# Patient Record
Sex: MALE | Race: WHITE | NOT HISPANIC OR LATINO | Employment: OTHER
[De-identification: names, ages, dates, MRNs, and addresses within clinical notes are randomized per-mention and may not be internally consistent; named-entity substitution may affect disease eponyms.]

---

## 2017-02-02 ENCOUNTER — RECORDS - HEALTHEAST (OUTPATIENT)
Dept: ADMINISTRATIVE | Facility: OTHER | Age: 62
End: 2017-02-02

## 2017-02-02 ENCOUNTER — COMMUNICATION - HEALTHEAST (OUTPATIENT)
Dept: INTERNAL MEDICINE | Facility: CLINIC | Age: 62
End: 2017-02-02

## 2017-02-07 ENCOUNTER — COMMUNICATION - HEALTHEAST (OUTPATIENT)
Dept: INTERNAL MEDICINE | Facility: CLINIC | Age: 62
End: 2017-02-07

## 2017-04-25 ENCOUNTER — RECORDS - HEALTHEAST (OUTPATIENT)
Dept: ADMINISTRATIVE | Facility: OTHER | Age: 62
End: 2017-04-25

## 2017-05-30 ENCOUNTER — RECORDS - HEALTHEAST (OUTPATIENT)
Dept: ADMINISTRATIVE | Facility: OTHER | Age: 62
End: 2017-05-30

## 2017-06-30 ENCOUNTER — HOSPITAL ENCOUNTER (OUTPATIENT)
Dept: CT IMAGING | Facility: HOSPITAL | Age: 62
Discharge: HOME | End: 2017-06-30
Attending: INTERNAL MEDICINE

## 2017-06-30 ENCOUNTER — OFFICE VISIT - HEALTHEAST (OUTPATIENT)
Dept: INTERNAL MEDICINE | Facility: CLINIC | Age: 62
End: 2017-06-30

## 2017-06-30 DIAGNOSIS — I25.5 ISCHEMIC CARDIOMYOPATHY: ICD-10-CM

## 2017-06-30 DIAGNOSIS — R10.12 LEFT UPPER QUADRANT PAIN: ICD-10-CM

## 2017-06-30 DIAGNOSIS — I25.10 ASCVD (ARTERIOSCLEROTIC CARDIOVASCULAR DISEASE): ICD-10-CM

## 2017-06-30 DIAGNOSIS — E11.42 DIABETIC PERIPHERAL NEUROPATHY ASSOCIATED WITH TYPE 2 DIABETES MELLITUS (H): ICD-10-CM

## 2017-06-30 DIAGNOSIS — I10 ESSENTIAL HYPERTENSION: ICD-10-CM

## 2017-06-30 ASSESSMENT — MIFFLIN-ST. JEOR: SCORE: 1803.23

## 2017-07-03 ENCOUNTER — COMMUNICATION - HEALTHEAST (OUTPATIENT)
Dept: INTERNAL MEDICINE | Facility: CLINIC | Age: 62
End: 2017-07-03

## 2017-07-03 DIAGNOSIS — K83.8 DILATED BILE DUCT: ICD-10-CM

## 2017-07-12 ENCOUNTER — HOSPITAL ENCOUNTER (OUTPATIENT)
Dept: MRI IMAGING | Facility: HOSPITAL | Age: 62
Discharge: HOME | End: 2017-07-12
Attending: INTERNAL MEDICINE

## 2017-07-12 DIAGNOSIS — K83.8 DILATED BILE DUCT: ICD-10-CM

## 2017-07-17 ENCOUNTER — COMMUNICATION - HEALTHEAST (OUTPATIENT)
Dept: INTERNAL MEDICINE | Facility: CLINIC | Age: 62
End: 2017-07-17

## 2017-07-17 DIAGNOSIS — K83.8 DILATED BILE DUCT: ICD-10-CM

## 2017-09-01 ENCOUNTER — RECORDS - HEALTHEAST (OUTPATIENT)
Dept: ADMINISTRATIVE | Facility: OTHER | Age: 62
End: 2017-09-01

## 2017-09-10 ENCOUNTER — COMMUNICATION - HEALTHEAST (OUTPATIENT)
Dept: INTERNAL MEDICINE | Facility: CLINIC | Age: 62
End: 2017-09-10

## 2017-09-14 ENCOUNTER — RECORDS - HEALTHEAST (OUTPATIENT)
Dept: ADMINISTRATIVE | Facility: OTHER | Age: 62
End: 2017-09-14

## 2017-09-19 ENCOUNTER — RECORDS - HEALTHEAST (OUTPATIENT)
Dept: ADMINISTRATIVE | Facility: OTHER | Age: 62
End: 2017-09-19

## 2017-10-02 ENCOUNTER — OFFICE VISIT - HEALTHEAST (OUTPATIENT)
Dept: INTERNAL MEDICINE | Facility: CLINIC | Age: 62
End: 2017-10-02

## 2017-10-02 DIAGNOSIS — E11.9 TYPE 2 DIABETES MELLITUS WITH INSULIN THERAPY (H): ICD-10-CM

## 2017-10-02 DIAGNOSIS — L40.9 PSORIASIS: ICD-10-CM

## 2017-10-02 DIAGNOSIS — I25.5 ISCHEMIC CARDIOMYOPATHY: ICD-10-CM

## 2017-10-02 DIAGNOSIS — N52.9 ERECTILE DYSFUNCTION: ICD-10-CM

## 2017-10-02 DIAGNOSIS — I25.10 ASCVD (ARTERIOSCLEROTIC CARDIOVASCULAR DISEASE): ICD-10-CM

## 2017-10-02 DIAGNOSIS — Z51.81 MEDICATION MONITORING ENCOUNTER: ICD-10-CM

## 2017-10-02 DIAGNOSIS — I10 ESSENTIAL HYPERTENSION: ICD-10-CM

## 2017-10-02 DIAGNOSIS — Z79.4 TYPE 2 DIABETES MELLITUS WITH INSULIN THERAPY (H): ICD-10-CM

## 2017-10-02 DIAGNOSIS — G47.33 OBSTRUCTIVE SLEEP APNEA ON CPAP: ICD-10-CM

## 2017-10-02 DIAGNOSIS — Z00.00 ROUTINE GENERAL MEDICAL EXAMINATION AT A HEALTH CARE FACILITY: ICD-10-CM

## 2017-10-02 DIAGNOSIS — K83.8 DILATED BILE DUCT: ICD-10-CM

## 2017-10-02 DIAGNOSIS — L40.50 PSORIATIC ARTHRITIS (H): ICD-10-CM

## 2017-10-02 DIAGNOSIS — E78.00 HYPERCHOLESTEROLEMIA: ICD-10-CM

## 2017-10-02 DIAGNOSIS — M47.816 SPONDYLOSIS OF LUMBAR REGION WITHOUT MYELOPATHY OR RADICULOPATHY: ICD-10-CM

## 2017-10-02 DIAGNOSIS — F17.201 TOBACCO ABUSE, IN REMISSION: ICD-10-CM

## 2017-10-02 DIAGNOSIS — N28.89 RIGHT RENAL MASS: ICD-10-CM

## 2017-10-02 DIAGNOSIS — E29.1 HYPOGONADISM IN MALE: ICD-10-CM

## 2017-10-02 DIAGNOSIS — Z23 NEED FOR IMMUNIZATION AGAINST INFLUENZA: ICD-10-CM

## 2017-10-02 DIAGNOSIS — E11.42 DIABETIC PERIPHERAL NEUROPATHY ASSOCIATED WITH TYPE 2 DIABETES MELLITUS (H): ICD-10-CM

## 2017-10-02 LAB
CHOLEST SERPL-MCNC: 198 MG/DL
FASTING STATUS PATIENT QL REPORTED: YES
HBA1C MFR BLD: 7 % (ref 3.5–6)
HCV AB SERPL QL IA: NEGATIVE
HDLC SERPL-MCNC: 41 MG/DL
LDLC SERPL CALC-MCNC: 137 MG/DL
PSA SERPL-MCNC: 0.8 NG/ML (ref 0–4.5)
TRIGL SERPL-MCNC: 99 MG/DL

## 2017-10-02 ASSESSMENT — MIFFLIN-ST. JEOR: SCORE: 1807.77

## 2017-10-04 ENCOUNTER — COMMUNICATION - HEALTHEAST (OUTPATIENT)
Dept: INTERNAL MEDICINE | Facility: CLINIC | Age: 62
End: 2017-10-04

## 2017-10-04 ENCOUNTER — HOSPITAL ENCOUNTER (OUTPATIENT)
Dept: ULTRASOUND IMAGING | Facility: HOSPITAL | Age: 62
Discharge: HOME | End: 2017-10-04
Attending: INTERNAL MEDICINE

## 2017-10-04 DIAGNOSIS — N28.89 RIGHT RENAL MASS: ICD-10-CM

## 2017-10-23 ENCOUNTER — COMMUNICATION - HEALTHEAST (OUTPATIENT)
Dept: ENDOCRINOLOGY | Facility: CLINIC | Age: 62
End: 2017-10-23

## 2017-10-23 DIAGNOSIS — L40.50 PSORIATIC ARTHRITIS (H): ICD-10-CM

## 2017-10-31 ENCOUNTER — RECORDS - HEALTHEAST (OUTPATIENT)
Dept: ADMINISTRATIVE | Facility: OTHER | Age: 62
End: 2017-10-31

## 2017-10-31 ENCOUNTER — HOSPITAL ENCOUNTER (OUTPATIENT)
Dept: CT IMAGING | Facility: HOSPITAL | Age: 62
Discharge: HOME | End: 2017-10-31
Attending: UROLOGY

## 2017-10-31 DIAGNOSIS — N28.89 RENAL MASS: ICD-10-CM

## 2017-10-31 DIAGNOSIS — D49.59 NEOPLASM OF UNSPECIFIED BEHAVIOR OF OTHER GENITOURINARY ORGAN: ICD-10-CM

## 2017-11-09 ENCOUNTER — OFFICE VISIT - HEALTHEAST (OUTPATIENT)
Dept: RHEUMATOLOGY | Facility: CLINIC | Age: 62
End: 2017-11-09

## 2017-11-09 DIAGNOSIS — M43.28 ANKYLOSIS, SACROILIAC JOINT: ICD-10-CM

## 2017-11-09 DIAGNOSIS — M47.816 SPONDYLOSIS OF LUMBAR REGION WITHOUT MYELOPATHY OR RADICULOPATHY: ICD-10-CM

## 2017-11-09 DIAGNOSIS — L40.50 PSORIATIC ARTHRITIS (H): ICD-10-CM

## 2017-11-09 ASSESSMENT — MIFFLIN-ST. JEOR: SCORE: 1824.55

## 2017-11-10 LAB — HBV SURFACE AG SERPL QL IA: NEGATIVE

## 2017-11-16 ENCOUNTER — COMMUNICATION - HEALTHEAST (OUTPATIENT)
Dept: INTERNAL MEDICINE | Facility: CLINIC | Age: 62
End: 2017-11-16

## 2017-11-16 DIAGNOSIS — L40.50 PSORIATIC ARTHRITIS (H): ICD-10-CM

## 2017-11-17 ENCOUNTER — COMMUNICATION - HEALTHEAST (OUTPATIENT)
Dept: RHEUMATOLOGY | Facility: CLINIC | Age: 62
End: 2017-11-17

## 2017-11-17 DIAGNOSIS — L40.50 PSORIATIC ARTHRITIS (H): ICD-10-CM

## 2017-12-26 ENCOUNTER — OFFICE VISIT - HEALTHEAST (OUTPATIENT)
Dept: INTERNAL MEDICINE | Facility: CLINIC | Age: 62
End: 2017-12-26

## 2017-12-26 DIAGNOSIS — N28.89 RIGHT RENAL MASS: ICD-10-CM

## 2017-12-26 DIAGNOSIS — I25.5 ISCHEMIC CARDIOMYOPATHY: ICD-10-CM

## 2017-12-26 DIAGNOSIS — I10 ESSENTIAL HYPERTENSION: ICD-10-CM

## 2017-12-26 DIAGNOSIS — Z79.4 TYPE 2 DIABETES MELLITUS WITH INSULIN THERAPY (H): ICD-10-CM

## 2017-12-26 DIAGNOSIS — L40.50 PSORIATIC ARTHRITIS (H): ICD-10-CM

## 2017-12-26 DIAGNOSIS — R19.7 ACUTE DIARRHEA: ICD-10-CM

## 2017-12-26 DIAGNOSIS — G47.33 OBSTRUCTIVE SLEEP APNEA ON CPAP: ICD-10-CM

## 2017-12-26 DIAGNOSIS — E11.9 TYPE 2 DIABETES MELLITUS WITH INSULIN THERAPY (H): ICD-10-CM

## 2017-12-26 DIAGNOSIS — I25.10 ASCVD (ARTERIOSCLEROTIC CARDIOVASCULAR DISEASE): ICD-10-CM

## 2017-12-26 DIAGNOSIS — F32.A DEPRESSION: ICD-10-CM

## 2017-12-26 DIAGNOSIS — Z01.818 PRE-OP EXAM: ICD-10-CM

## 2017-12-26 LAB
ATRIAL RATE - MUSE: 71 BPM
DIASTOLIC BLOOD PRESSURE - MUSE: NORMAL MMHG
INTERPRETATION ECG - MUSE: NORMAL
P AXIS - MUSE: 62 DEGREES
PR INTERVAL - MUSE: 156 MS
QRS DURATION - MUSE: 82 MS
QT - MUSE: 368 MS
QTC - MUSE: 399 MS
R AXIS - MUSE: 56 DEGREES
SYSTOLIC BLOOD PRESSURE - MUSE: NORMAL MMHG
T AXIS - MUSE: 182 DEGREES
VENTRICULAR RATE- MUSE: 71 BPM

## 2017-12-26 ASSESSMENT — MIFFLIN-ST. JEOR: SCORE: 1794.16

## 2017-12-28 ENCOUNTER — COMMUNICATION - HEALTHEAST (OUTPATIENT)
Dept: INTERNAL MEDICINE | Facility: CLINIC | Age: 62
End: 2017-12-28

## 2017-12-29 ENCOUNTER — COMMUNICATION - HEALTHEAST (OUTPATIENT)
Dept: INTERNAL MEDICINE | Facility: CLINIC | Age: 62
End: 2017-12-29

## 2018-01-08 ENCOUNTER — RECORDS - HEALTHEAST (OUTPATIENT)
Dept: ADMINISTRATIVE | Facility: OTHER | Age: 63
End: 2018-01-08

## 2018-01-10 ENCOUNTER — SURGERY - HEALTHEAST (OUTPATIENT)
Dept: SURGERY | Facility: HOSPITAL | Age: 63
End: 2018-01-10

## 2018-01-10 ENCOUNTER — ANESTHESIA - HEALTHEAST (OUTPATIENT)
Dept: SURGERY | Facility: HOSPITAL | Age: 63
End: 2018-01-10

## 2018-01-10 ASSESSMENT — MIFFLIN-ST. JEOR: SCORE: 1619.07

## 2018-01-11 ASSESSMENT — MIFFLIN-ST. JEOR: SCORE: 1805.96

## 2018-01-16 ENCOUNTER — COMMUNICATION - HEALTHEAST (OUTPATIENT)
Dept: INTERNAL MEDICINE | Facility: CLINIC | Age: 63
End: 2018-01-16

## 2018-01-18 ENCOUNTER — RECORDS - HEALTHEAST (OUTPATIENT)
Dept: ADMINISTRATIVE | Facility: OTHER | Age: 63
End: 2018-01-18

## 2018-01-23 ENCOUNTER — RECORDS - HEALTHEAST (OUTPATIENT)
Dept: ADMINISTRATIVE | Facility: OTHER | Age: 63
End: 2018-01-23

## 2018-05-04 ENCOUNTER — HOSPITAL ENCOUNTER (OUTPATIENT)
Dept: CT IMAGING | Facility: HOSPITAL | Age: 63
Discharge: HOME | End: 2018-05-04
Attending: UROLOGY

## 2018-05-04 DIAGNOSIS — C64.9 KIDNEY CANCER, PRIMARY, WITH METASTASIS FROM KIDNEY TO OTHER SITE (H): ICD-10-CM

## 2018-05-04 DIAGNOSIS — C79.9 SECONDARY MALIGNANT NEOPLASM OF UNSPECIFIED SITE (CODE) (H): ICD-10-CM

## 2018-05-04 LAB
CREAT BLD-MCNC: 0.9 MG/DL
POC GFR AMER AF HE - HISTORICAL: >60 ML/MIN/1.73M2
POC GFR NON AMER AF HE - HISTORICAL: >60 ML/MIN/1.73M2

## 2018-05-24 ENCOUNTER — RECORDS - HEALTHEAST (OUTPATIENT)
Dept: ADMINISTRATIVE | Facility: OTHER | Age: 63
End: 2018-05-24

## 2018-07-18 ENCOUNTER — COMMUNICATION - HEALTHEAST (OUTPATIENT)
Dept: RHEUMATOLOGY | Facility: CLINIC | Age: 63
End: 2018-07-18

## 2018-07-18 DIAGNOSIS — L40.50 PSORIATIC ARTHRITIS (H): ICD-10-CM

## 2018-08-30 ENCOUNTER — COMMUNICATION - HEALTHEAST (OUTPATIENT)
Dept: RHEUMATOLOGY | Facility: CLINIC | Age: 63
End: 2018-08-30

## 2018-10-08 ENCOUNTER — COMMUNICATION - HEALTHEAST (OUTPATIENT)
Dept: RHEUMATOLOGY | Facility: CLINIC | Age: 63
End: 2018-10-08

## 2018-10-08 DIAGNOSIS — L40.50 PSORIATIC ARTHRITIS (H): ICD-10-CM

## 2018-10-08 LAB — HBA1C MFR BLD: 6.5 % (ref 0–5.6)

## 2018-10-12 ENCOUNTER — RECORDS - HEALTHEAST (OUTPATIENT)
Dept: ADMINISTRATIVE | Facility: OTHER | Age: 63
End: 2018-10-12

## 2018-11-06 ENCOUNTER — OFFICE VISIT - HEALTHEAST (OUTPATIENT)
Dept: RHEUMATOLOGY | Facility: CLINIC | Age: 63
End: 2018-11-06

## 2018-11-06 DIAGNOSIS — M47.816 SPONDYLOSIS OF LUMBAR REGION WITHOUT MYELOPATHY OR RADICULOPATHY: ICD-10-CM

## 2018-11-06 DIAGNOSIS — M25.462 KNEE EFFUSION, LEFT: ICD-10-CM

## 2018-11-06 DIAGNOSIS — L40.50 PSORIATIC ARTHRITIS (H): ICD-10-CM

## 2018-11-06 LAB
ALBUMIN SERPL-MCNC: 3.9 G/DL (ref 3.5–5)
ALT SERPL W P-5'-P-CCNC: 20 U/L (ref 0–45)
CREAT SERPL-MCNC: 0.97 MG/DL (ref 0.7–1.3)
ERYTHROCYTE [DISTWIDTH] IN BLOOD BY AUTOMATED COUNT: 11.6 % (ref 11–14.5)
GFR SERPL CREATININE-BSD FRML MDRD: >60 ML/MIN/1.73M2
HCT VFR BLD AUTO: 47.3 % (ref 40–54)
HGB BLD-MCNC: 16.3 G/DL (ref 14–18)
MCH RBC QN AUTO: 29.8 PG (ref 27–34)
MCHC RBC AUTO-ENTMCNC: 34.5 G/DL (ref 32–36)
MCV RBC AUTO: 86 FL (ref 80–100)
PLATELET # BLD AUTO: 168 THOU/UL (ref 140–440)
PMV BLD AUTO: 8.6 FL (ref 7–10)
RBC # BLD AUTO: 5.48 MILL/UL (ref 4.4–6.2)
WBC: 7.6 THOU/UL (ref 4–11)

## 2018-11-09 ENCOUNTER — COMMUNICATION - HEALTHEAST (OUTPATIENT)
Dept: RHEUMATOLOGY | Facility: CLINIC | Age: 63
End: 2018-11-09

## 2018-11-09 DIAGNOSIS — L40.50 PSORIATIC ARTHRITIS (H): ICD-10-CM

## 2018-11-26 ENCOUNTER — RECORDS - HEALTHEAST (OUTPATIENT)
Dept: ADMINISTRATIVE | Facility: OTHER | Age: 63
End: 2018-11-26

## 2019-01-09 ENCOUNTER — RECORDS - HEALTHEAST (OUTPATIENT)
Dept: ADMINISTRATIVE | Facility: OTHER | Age: 64
End: 2019-01-09

## 2019-01-10 ENCOUNTER — HOSPITAL ENCOUNTER (OUTPATIENT)
Dept: RADIOLOGY | Facility: HOSPITAL | Age: 64
Discharge: HOME | End: 2019-01-10
Attending: UROLOGY

## 2019-01-10 ENCOUNTER — HOSPITAL ENCOUNTER (OUTPATIENT)
Dept: CT IMAGING | Facility: HOSPITAL | Age: 64
Discharge: HOME | End: 2019-01-10
Attending: UROLOGY

## 2019-01-10 DIAGNOSIS — Z85.528 PERSONAL HISTORY OF KIDNEY CANCER: ICD-10-CM

## 2019-01-10 LAB
CREAT BLD-MCNC: 1 MG/DL
POC GFR AMER AF HE - HISTORICAL: >60 ML/MIN/1.73M2
POC GFR NON AMER AF HE - HISTORICAL: >60 ML/MIN/1.73M2

## 2019-01-18 ENCOUNTER — RECORDS - HEALTHEAST (OUTPATIENT)
Dept: ADMINISTRATIVE | Facility: OTHER | Age: 64
End: 2019-01-18

## 2019-06-14 ENCOUNTER — COMMUNICATION - HEALTHEAST (OUTPATIENT)
Dept: INTERNAL MEDICINE | Facility: CLINIC | Age: 64
End: 2019-06-14

## 2019-07-30 ENCOUNTER — COMMUNICATION - HEALTHEAST (OUTPATIENT)
Dept: RHEUMATOLOGY | Facility: CLINIC | Age: 64
End: 2019-07-30

## 2019-07-30 DIAGNOSIS — L40.50 PSORIATIC ARTHRITIS (H): ICD-10-CM

## 2019-09-25 ENCOUNTER — RECORDS - HEALTHEAST (OUTPATIENT)
Dept: ADMINISTRATIVE | Facility: OTHER | Age: 64
End: 2019-09-25

## 2019-09-25 ENCOUNTER — COMMUNICATION - HEALTHEAST (OUTPATIENT)
Dept: SCHEDULING | Facility: CLINIC | Age: 64
End: 2019-09-25

## 2019-10-16 ENCOUNTER — COMMUNICATION - HEALTHEAST (OUTPATIENT)
Dept: RHEUMATOLOGY | Facility: CLINIC | Age: 64
End: 2019-10-16

## 2019-10-16 DIAGNOSIS — L40.50 PSORIATIC ARTHRITIS (H): ICD-10-CM

## 2019-11-26 ENCOUNTER — OFFICE VISIT - HEALTHEAST (OUTPATIENT)
Dept: RHEUMATOLOGY | Facility: CLINIC | Age: 64
End: 2019-11-26

## 2019-11-26 ENCOUNTER — COMMUNICATION - HEALTHEAST (OUTPATIENT)
Dept: RHEUMATOLOGY | Facility: CLINIC | Age: 64
End: 2019-11-26

## 2019-11-26 DIAGNOSIS — M43.28 ANKYLOSIS, SACROILIAC JOINT: ICD-10-CM

## 2019-11-26 DIAGNOSIS — M77.8 RIGHT SHOULDER TENDONITIS: ICD-10-CM

## 2019-11-26 DIAGNOSIS — Z79.899 HIGH RISK MEDICATION USE: ICD-10-CM

## 2019-11-26 DIAGNOSIS — L40.9 PSORIASIS: ICD-10-CM

## 2019-11-26 DIAGNOSIS — L40.50 PSORIATIC ARTHRITIS (H): ICD-10-CM

## 2019-11-26 LAB
ALBUMIN SERPL-MCNC: 4.3 G/DL (ref 3.5–5)
ALT SERPL W P-5'-P-CCNC: 19 U/L (ref 0–45)
CREAT SERPL-MCNC: 1.06 MG/DL (ref 0.7–1.3)
ERYTHROCYTE [DISTWIDTH] IN BLOOD BY AUTOMATED COUNT: 11.4 % (ref 11–14.5)
GFR SERPL CREATININE-BSD FRML MDRD: >60 ML/MIN/1.73M2
HCT VFR BLD AUTO: 45.7 % (ref 40–54)
HGB BLD-MCNC: 15.7 G/DL (ref 14–18)
MCH RBC QN AUTO: 29.7 PG (ref 27–34)
MCHC RBC AUTO-ENTMCNC: 34.2 G/DL (ref 32–36)
MCV RBC AUTO: 87 FL (ref 80–100)
PLATELET # BLD AUTO: 175 THOU/UL (ref 140–440)
PMV BLD AUTO: 8.8 FL (ref 7–10)
RBC # BLD AUTO: 5.27 MILL/UL (ref 4.4–6.2)
WBC: 10.5 THOU/UL (ref 4–11)

## 2019-12-03 ENCOUNTER — COMMUNICATION - HEALTHEAST (OUTPATIENT)
Dept: ADMINISTRATIVE | Facility: CLINIC | Age: 64
End: 2019-12-03

## 2019-12-03 DIAGNOSIS — L40.50 PSORIATIC ARTHRITIS (H): ICD-10-CM

## 2019-12-19 ENCOUNTER — RECORDS - HEALTHEAST (OUTPATIENT)
Dept: ADMINISTRATIVE | Facility: OTHER | Age: 64
End: 2019-12-19

## 2019-12-24 ENCOUNTER — RECORDS - HEALTHEAST (OUTPATIENT)
Dept: HEALTH INFORMATION MANAGEMENT | Facility: CLINIC | Age: 64
End: 2019-12-24

## 2020-01-18 ENCOUNTER — RECORDS - HEALTHEAST (OUTPATIENT)
Dept: ADMINISTRATIVE | Facility: OTHER | Age: 65
End: 2020-01-18

## 2020-01-23 ENCOUNTER — RECORDS - HEALTHEAST (OUTPATIENT)
Dept: ADMINISTRATIVE | Facility: OTHER | Age: 65
End: 2020-01-23

## 2020-01-31 ENCOUNTER — RECORDS - HEALTHEAST (OUTPATIENT)
Dept: ADMINISTRATIVE | Facility: OTHER | Age: 65
End: 2020-01-31

## 2020-02-19 ENCOUNTER — RECORDS - HEALTHEAST (OUTPATIENT)
Dept: ADMINISTRATIVE | Facility: OTHER | Age: 65
End: 2020-02-19

## 2020-04-23 ENCOUNTER — RECORDS - HEALTHEAST (OUTPATIENT)
Dept: ADMINISTRATIVE | Facility: OTHER | Age: 65
End: 2020-04-23

## 2020-05-05 ENCOUNTER — HOSPITAL ENCOUNTER (OUTPATIENT)
Dept: CT IMAGING | Facility: HOSPITAL | Age: 65
Discharge: HOME | End: 2020-05-05
Attending: UROLOGY

## 2020-05-05 ENCOUNTER — COMMUNICATION - HEALTHEAST (OUTPATIENT)
Dept: TELEHEALTH | Facility: CLINIC | Age: 65
End: 2020-05-05

## 2020-05-05 DIAGNOSIS — Z85.528 HISTORY OF KIDNEY CANCER: ICD-10-CM

## 2020-05-05 LAB
CREAT BLD-MCNC: 1 MG/DL (ref 0.7–1.3)
GFR SERPL CREATININE-BSD FRML MDRD: >60 ML/MIN/1.73M2

## 2020-05-26 ENCOUNTER — COMMUNICATION - HEALTHEAST (OUTPATIENT)
Dept: RHEUMATOLOGY | Facility: CLINIC | Age: 65
End: 2020-05-26

## 2020-07-07 ENCOUNTER — COMMUNICATION - HEALTHEAST (OUTPATIENT)
Dept: RHEUMATOLOGY | Facility: CLINIC | Age: 65
End: 2020-07-07

## 2020-07-07 ENCOUNTER — OFFICE VISIT - HEALTHEAST (OUTPATIENT)
Dept: RHEUMATOLOGY | Facility: CLINIC | Age: 65
End: 2020-07-07

## 2020-07-07 DIAGNOSIS — L40.9 PSORIASIS: ICD-10-CM

## 2020-07-07 DIAGNOSIS — M43.28 ANKYLOSIS, SACROILIAC JOINT: ICD-10-CM

## 2020-07-07 DIAGNOSIS — L40.50 PSORIATIC ARTHRITIS (H): ICD-10-CM

## 2020-07-22 ENCOUNTER — OFFICE VISIT - HEALTHEAST (OUTPATIENT)
Dept: RHEUMATOLOGY | Facility: CLINIC | Age: 65
End: 2020-07-22

## 2020-07-22 DIAGNOSIS — M70.61 TROCHANTERIC BURSITIS OF RIGHT HIP: ICD-10-CM

## 2020-07-22 DIAGNOSIS — E11.8 TYPE 2 DIABETES MELLITUS WITH COMPLICATION, WITH LONG-TERM CURRENT USE OF INSULIN (H): ICD-10-CM

## 2020-07-22 DIAGNOSIS — M25.50 POLYARTHRALGIA: ICD-10-CM

## 2020-07-22 DIAGNOSIS — M77.8 RIGHT SHOULDER TENDONITIS: ICD-10-CM

## 2020-07-22 DIAGNOSIS — Z79.4 TYPE 2 DIABETES MELLITUS WITH COMPLICATION, WITH LONG-TERM CURRENT USE OF INSULIN (H): ICD-10-CM

## 2020-10-28 ENCOUNTER — COMMUNICATION - HEALTHEAST (OUTPATIENT)
Dept: RHEUMATOLOGY | Facility: CLINIC | Age: 65
End: 2020-10-28

## 2020-12-30 ENCOUNTER — COMMUNICATION - HEALTHEAST (OUTPATIENT)
Dept: LAB | Facility: CLINIC | Age: 65
End: 2020-12-30

## 2020-12-30 DIAGNOSIS — L40.50 PSORIATIC ARTHRITIS (H): ICD-10-CM

## 2021-01-07 ENCOUNTER — OFFICE VISIT - HEALTHEAST (OUTPATIENT)
Dept: RHEUMATOLOGY | Facility: CLINIC | Age: 66
End: 2021-01-07

## 2021-01-07 ENCOUNTER — COMMUNICATION - HEALTHEAST (OUTPATIENT)
Dept: RHEUMATOLOGY | Facility: CLINIC | Age: 66
End: 2021-01-07

## 2021-01-07 DIAGNOSIS — M43.28 ANKYLOSIS, SACROILIAC JOINT: ICD-10-CM

## 2021-01-07 DIAGNOSIS — M47.816 SPONDYLOSIS OF LUMBAR REGION WITHOUT MYELOPATHY OR RADICULOPATHY: ICD-10-CM

## 2021-01-07 DIAGNOSIS — L40.50 PSORIATIC ARTHRITIS (H): ICD-10-CM

## 2021-01-07 DIAGNOSIS — L40.9 PSORIASIS: ICD-10-CM

## 2021-01-07 RX ORDER — MEDROXYPROGESTERONE ACETATE 150 MG/ML
50 INJECTION, SUSPENSION INTRAMUSCULAR WEEKLY
Qty: 4 ML | Refills: 5 | Status: SHIPPED | OUTPATIENT
Start: 2021-01-07 | End: 2021-08-20

## 2021-01-08 ENCOUNTER — COMMUNICATION - HEALTHEAST (OUTPATIENT)
Dept: RHEUMATOLOGY | Facility: CLINIC | Age: 66
End: 2021-01-08

## 2021-01-13 ENCOUNTER — AMBULATORY - HEALTHEAST (OUTPATIENT)
Dept: LAB | Facility: CLINIC | Age: 66
End: 2021-01-13

## 2021-01-13 ENCOUNTER — OFFICE VISIT - HEALTHEAST (OUTPATIENT)
Dept: RHEUMATOLOGY | Facility: CLINIC | Age: 66
End: 2021-01-13

## 2021-01-13 DIAGNOSIS — L40.50 PSORIATIC ARTHRITIS (H): ICD-10-CM

## 2021-01-13 DIAGNOSIS — M77.8 RIGHT SHOULDER TENDONITIS: ICD-10-CM

## 2021-01-13 DIAGNOSIS — M70.61 GREATER TROCHANTERIC BURSITIS OF RIGHT HIP: ICD-10-CM

## 2021-01-13 LAB
ALBUMIN SERPL-MCNC: 4 G/DL (ref 3.5–5)
ALT SERPL W P-5'-P-CCNC: 17 U/L (ref 0–45)
CREAT SERPL-MCNC: 1.07 MG/DL (ref 0.7–1.3)
ERYTHROCYTE [DISTWIDTH] IN BLOOD BY AUTOMATED COUNT: 10.7 % (ref 11–14.5)
GFR SERPL CREATININE-BSD FRML MDRD: >60 ML/MIN/1.73M2
HCT VFR BLD AUTO: 47.9 % (ref 40–54)
HGB BLD-MCNC: 16 G/DL (ref 14–18)
MCH RBC QN AUTO: 28.9 PG (ref 27–34)
MCHC RBC AUTO-ENTMCNC: 33.5 G/DL (ref 32–36)
MCV RBC AUTO: 86 FL (ref 80–100)
PLATELET # BLD AUTO: 174 THOU/UL (ref 140–440)
PMV BLD AUTO: 9 FL (ref 7–10)
RBC # BLD AUTO: 5.55 MILL/UL (ref 4.4–6.2)
WBC: 9.9 THOU/UL (ref 4–11)

## 2021-01-13 ASSESSMENT — MIFFLIN-ST. JEOR: SCORE: 1825.91

## 2021-05-24 ENCOUNTER — RECORDS - HEALTHEAST (OUTPATIENT)
Dept: ADMINISTRATIVE | Facility: CLINIC | Age: 66
End: 2021-05-24

## 2021-05-25 ENCOUNTER — RECORDS - HEALTHEAST (OUTPATIENT)
Dept: ADMINISTRATIVE | Facility: CLINIC | Age: 66
End: 2021-05-25

## 2021-05-26 ENCOUNTER — RECORDS - HEALTHEAST (OUTPATIENT)
Dept: ADMINISTRATIVE | Facility: CLINIC | Age: 66
End: 2021-05-26

## 2021-05-27 ENCOUNTER — RECORDS - HEALTHEAST (OUTPATIENT)
Dept: ADMINISTRATIVE | Facility: CLINIC | Age: 66
End: 2021-05-27

## 2021-05-29 NOTE — TELEPHONE ENCOUNTER
Dr. Munoz,    [facility] only.    Yanely MCGOVERN LPN .......... 9:13 AM  06/14/19

## 2021-05-29 NOTE — TELEPHONE ENCOUNTER
FYI - Status Update  Who is Calling: Shawna with [facility]  Update: Caller tried to reach out to the patient to offer telephone nursing support for the patient's diabetes and other health issues, but was not able to reach the patient. Caller stated that they like to make the PCP aware of this free service that is offered to the patient, in case the PCP wanted to offer this service to the patient.  Okay to leave a detailed message?:  No return call needed

## 2021-05-31 ENCOUNTER — RECORDS - HEALTHEAST (OUTPATIENT)
Dept: ADMINISTRATIVE | Facility: CLINIC | Age: 66
End: 2021-05-31

## 2021-05-31 VITALS — WEIGHT: 219.7 LBS | BODY MASS INDEX: 29.76 KG/M2 | HEIGHT: 72 IN

## 2021-05-31 VITALS — HEIGHT: 72 IN | WEIGHT: 216 LBS | BODY MASS INDEX: 29.26 KG/M2

## 2021-05-31 VITALS — BODY MASS INDEX: 29.2 KG/M2 | WEIGHT: 215.6 LBS | HEIGHT: 72 IN

## 2021-05-31 VITALS — BODY MASS INDEX: 28.85 KG/M2 | HEIGHT: 72 IN | WEIGHT: 213 LBS

## 2021-05-31 VITALS — BODY MASS INDEX: 29.12 KG/M2 | WEIGHT: 215 LBS | HEIGHT: 72 IN

## 2021-06-01 ENCOUNTER — RECORDS - HEALTHEAST (OUTPATIENT)
Dept: ADMINISTRATIVE | Facility: CLINIC | Age: 66
End: 2021-06-01

## 2021-06-01 NOTE — TELEPHONE ENCOUNTER
RN Triage:     Patient is calling in stating he thinks he broke his right shoulder. Pain since Thursday. He tripped over the dog and fell to the ground on Thursday. Patient is having pain on the top of the shoulder blade and pain towards the neck. Patient stated that when he turns over in bed the pain is worse. Patient advised an office visit today. He declined to schedule now. He may go to a place close to his home, or  to [facility].   Pauline Batres RN, BSN Care Connection Triage Nurse    Reason for Disposition    Can't move injured shoulder normally (e.g., full range of motion, able to touch top of head)    Patient wants to be seen    Protocols used: SHOULDER INJURY-A-OH

## 2021-06-02 ENCOUNTER — RECORDS - HEALTHEAST (OUTPATIENT)
Dept: ADMINISTRATIVE | Facility: CLINIC | Age: 66
End: 2021-06-02

## 2021-06-02 VITALS — BODY MASS INDEX: 30.79 KG/M2 | WEIGHT: 227 LBS

## 2021-06-03 NOTE — TELEPHONE ENCOUNTER
Dr. Soni patient    Patient will need a new script for Enbrel sent to new specialty pharmacy.    He decided to go with [facility].  He's not sure how to get this arrange and will need help.    Bon @ [phone]
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
## 2021-06-04 VITALS
BODY MASS INDEX: 29.84 KG/M2 | DIASTOLIC BLOOD PRESSURE: 78 MMHG | WEIGHT: 220 LBS | SYSTOLIC BLOOD PRESSURE: 134 MMHG | HEART RATE: 88 BPM

## 2021-06-04 VITALS
BODY MASS INDEX: 30.79 KG/M2 | DIASTOLIC BLOOD PRESSURE: 66 MMHG | WEIGHT: 227 LBS | HEART RATE: 100 BPM | SYSTOLIC BLOOD PRESSURE: 134 MMHG

## 2021-06-05 VITALS
HEIGHT: 72 IN | SYSTOLIC BLOOD PRESSURE: 130 MMHG | DIASTOLIC BLOOD PRESSURE: 66 MMHG | BODY MASS INDEX: 29.8 KG/M2 | HEART RATE: 80 BPM | WEIGHT: 220 LBS

## 2021-06-08 NOTE — TELEPHONE ENCOUNTER
[facility]  Staff have been trying to get a hold of pt many times. However, no call back from pt. Appt on Tuesday 2/26/20  with Dr. Soni is cancelled.  Isabelle Yen CMA MPW Rheumatology 5/26/2020 8:12 AM
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
## 2021-06-09 NOTE — PROGRESS NOTES
ASSESSMENT AND PLAN:  Bon Luque 65 y.o. male is seen here on 07/22/20 for evaluation of right shoulder, trochanteric area pain in the background of psoriatic arthritis, he has diabetes.  He has evidence of right trochanteric bursitis, right shoulder rotator cuff tendinopathy.  Options reviewed.  He wants to proceed with local injections with steroids pros and cons outlined including the risk of significant hyperglycemia, he has continuous monitoring for this, he will check with his primary physician for insulin management.  40 mg of Kenalog injected into the right trochanteric area and the same amount entered right sub-acromial space with brisk Marcaine effect.  Follow-up here in 3 months or sooner.  Diagnoses and all orders for this visit:    Trochanteric bursitis of right hip  -     triamcinolone acetonide 40 mg/mL injection 40 mg (KENALOG-40)    Right shoulder tendonitis  -     triamcinolone acetonide 40 mg/mL injection 40 mg (KENALOG-40)    Polyarthralgia    Type 2 diabetes mellitus with complication, with long-term current use of insulin (H)          HISTORY OF PRESENTING ILLNESS ON 07/22/20 :  Bon Luque 65 y.o. is here for a moderately severe flare up of pain. Here for a moderately severe flare up of pain.  Joints affected include multiple joints, both shoulder(s) and the right hip(s). This has gone on for several weeks. Pain is described as sharp. It is worse with activity at times bedtime..  Her symptoms are moderately severe. The symptoms are progressive.  Associated findings include /do not include: swelling, rash.  There is no associated recent fall or trauma.  Over-the-counter treatment to date has been without significant relief.    Further historical information, including ROS and limitation in activities as noted in the multidimensional health assessment questionnaire scanned in the EMR and in the assessment and plan section.    ALLERGIES:Atorvastatin; Valomag; Ampicillin; and Codeine    PAST  MEDICAL/ACTIVE PROBLEMS/MEDICATION/SOCIAL DATA  Past Medical History:   Diagnosis Date     Arthritis     psoriatic     ASCVD (arteriosclerotic cardiovascular disease) 4/28/2016    Coronary artery stent ×2 in RCA following myocardial infarction 2008 , echo June 2014 normal LV function with ejection fraction 50-55% with mild apical inferior hypokinesis     Chronic low back pain 4/28/2016     Coronary artery disease      Depression 12/26/2017     Diabetic peripheral neuropathy associated with type 2 diabetes mellitus (H) 04/28/2016    Abnormal EMG and workup at [facility]     Dilated bile duct 7/3/2017    CT scan with incidental finding of dilated intra-and extrahepatic ducts     HTN (hypertension)      Hypercholesterolemia      Hypogonadism in male      Ischemic cardiomyopathy     Stress Echocardiogram 2017 with decreased LV function with EF 43% worse compared to previous echo.  No change in infarct.  No new ischemia     Left upper quadrant pain 6/30/2017     MI (myocardial infarction) (H)      Obstructive sleep apnea on CPAP     CPAP     Optic neuritis      Psoriasis      Right renal mass      Sebaceous cyst 2010     Thoracic radiculopathy 2004    right T8 intercostal nerve block 2002     Type 2 diabetes mellitus with insulin therapy (H)      Social History     Tobacco Use   Smoking Status Never Smoker   Smokeless Tobacco Never Used     Patient Active Problem List   Diagnosis     Psoriatic arthritis (H)     Chronic low back pain     Diabetic peripheral neuropathy associated with type 2 diabetes mellitus (H)     Psoriasis     Hypercholesterolemia     HTN (hypertension)     Optic neuritis     Type 2 diabetes mellitus with insulin therapy (H)     Obstructive sleep apnea on CPAP     Thoracic radiculopathy     Hypogonadism in male     Erectile dysfunction     ASCVD (arteriosclerotic cardiovascular disease)     Alcohol abuse, in remission     Tobacco abuse, in remission     Ankylosis, sacroiliac joint     Spondylosis of  lumbar region without myelopathy or radiculopathy     Ischemic cardiomyopathy     Dilated bile duct     Right renal mass     Depression     Type 2 diabetes mellitus with complication, with long-term current use of insulin (H)     Chronic pain syndrome     Lower abdominal pain     Obstructive sleep apnea     Drug-induced constipation     Knee effusion, left     Current Outpatient Medications   Medication Sig Dispense Refill     DULoxetine (CYMBALTA) 60 MG capsule Take 60 mg by mouth 2 (two) times a day.       etanercept (ENBREL SURECLICK) 50 mg/mL (1 mL) PnIj Inject 50 mg under the skin once a week. 4 mL 5     gabapentin (NEURONTIN) 300 MG capsule Take 600 mg by mouth 2 (two) times a day.        ibuprofen (ADVIL,MOTRIN) 200 MG tablet Take 600 mg by mouth every 6 (six) hours as needed for pain.       insulin aspart (NOVOLOG) 100 unit/mL injection Inject under the skin 3 (three) times a day before meals. 1 unit of Novolog per 5 g of carbs       insulin glargine (LANTUS) 100 unit/mL injection Inject 70 Units under the skin 2 (two) times a day.        morphine (MS CONTIN) 60 MG 12 hr tablet Take 60 mg by mouth 3 (three) times a day.       oxyCODONE-acetaminophen (PERCOCET) 5-325 mg per tablet Take 1-2 tablets by mouth every 4 (four) hours as needed. 30 tablet 0     tadalafil (CIALIS) 20 MG tablet Take 20 mg by mouth daily as needed.       testosterone 10 mg/0.5 gram /actuation GlPm Place 40 mg on the skin daily. Apply to dry, clean, hairless skin       Current Facility-Administered Medications   Medication Dose Route Frequency Provider Last Rate Last Dose     triamcinolone acetonide 40 mg/mL injection 40 mg (KENALOG-40)  40 mg Intra-articular Once Merlin Soni MBBS         triamcinolone acetonide 40 mg/mL injection 40 mg (KENALOG-40)  40 mg Intra-articular Once Merlin Soni MBBS             DETAILED EXAMINATION  07/22/20  :  Vitals:    07/22/20 1258   BP: 134/78   Patient Site: Right Arm   Patient Position: Sitting    Cuff Size: Adult Large   Pulse: 88   Weight: 220 lb (99.8 kg)     Alert oriented. Head including the face is examined for malar rash, heliotropes, scarring, lupus pernio. Eyes examined for redness such as in episcleritis/scleritis, periorbital lesions.   Neck/ Face examined for parotid gland swelling, range of motion of neck.  Left upper and lower and right upper and lower extremities examined for tenderness, swelling, warmth of the appendicular joints, range of motion, edema, rash.  Some of the important findings included: He has mild tenderness the right trochanteric area there is no rash, he has impingement of the right shoulder less so on the left.  There is no dactylitis of digits.           LAB / IMAGING DATA:  ALT   Date Value Ref Range Status   11/26/2019 19 0 - 45 U/L Final   11/06/2018 20 0 - 45 U/L Final   10/02/2017 15 0 - 45 U/L Final     Albumin   Date Value Ref Range Status   11/26/2019 4.3 3.5 - 5.0 g/dL Final   11/06/2018 3.9 3.5 - 5.0 g/dL Final   10/02/2017 3.7 3.5 - 5.0 g/dL Final     Creatinine   Date Value Ref Range Status   11/26/2019 1.06 0.70 - 1.30 mg/dL Final   11/06/2018 0.97 0.70 - 1.30 mg/dL Final   01/13/2018 0.79 0.70 - 1.30 mg/dL Final       WBC   Date Value Ref Range Status   11/26/2019 10.5 4.0 - 11.0 thou/uL Final   11/06/2018 7.6 4.0 - 11.0 thou/uL Final     Hemoglobin   Date Value Ref Range Status   11/26/2019 15.7 14.0 - 18.0 g/dL Final   11/06/2018 16.3 14.0 - 18.0 g/dL Final   01/13/2018 11.7 (L) 14.0 - 18.0 g/dL Final     Platelets   Date Value Ref Range Status   11/26/2019 175 140 - 440 thou/uL Final   11/06/2018 168 140 - 440 thou/uL Final   01/13/2018 145 140 - 440 thou/uL Final       Lab Results   Component Value Date    SEDRATE 2 04/28/2016

## 2021-06-09 NOTE — PROGRESS NOTES
[facility]  [facility]st [facility],  no answer. Will try again later.     Isabelle Yen CMA [facility]umatology 7/7/2020 10:41 AM

## 2021-06-11 NOTE — PROGRESS NOTES
Office Visit - Follow Up   Bon Luque   62 y.o. male    Date of Visit: 6/30/2017    Chief Complaint   Patient presents with     side pain     lump on left side         Assessment and Plan   1. Left upper quadrant pain  Unclear etiology of left-sided abdominal pain.  On questioning whether this may be more of a referred problem from his back.  He does have history of thoracic radiculopathy but this was on his right side.  Will obtain appropriate labs and will obtain CT scan of abdomen/pelvis.  If all this proves normal, I would focus on his thoracic spine and should consider repeating MRI.  - HM2(CBC w/o Differential)  - Comprehensive Metabolic Panel  - CT Abdomen Pelvis With Oral With IV Contrast; Future    2. Ischemic cardiomyopathy with history of ASCVD.  Recent syncope with cardiac workup.  Reviewed stress echocardiogram showing decreased left ventricular systolic function now with ejection fraction of only 43%.  I am asking him to follow-up with his cardiologist to discuss.  He was previously on a beta-blocker but has not been taking his Toprol presumably after recent syncope.  He should discuss with cardiology.     3. Diabetic peripheral neuropathy associated with type 2 diabetes mellitus  He is followed by endocrinology.  Most recent hemoglobin A1c was 8.7%.    4. Essential hypertension  Blood pressure under fair control but systolic probably should be under 130.  Should consider starting ACE inhibitor or ARB.  He can discuss with cardiology.        Return in about 3 months (around 9/30/2017) for Annual physical.     History of Present Illness   This 62 y.o. old male with complex medical history including coronary artery disease, type 2 diabetes, hypertension, and psoriatic arthritis who is here with some pain and discomfort in the left side of his abdomen.  This is a new problem over the last month.  He describes a bulging on the left abdomen which is new.  The pain is described as a burning or sharp  sensation.  No change in bowel movements.  Appetite is good and weight is stable.  No fevers or chills.  No nausea or vomiting.  No change in urination.  No hematuria.  He does not recall any rash such as shingles.  He does have a history of thoracic radiculopathy 13 years ago but this was involving his right side.  He did require an injection to control symptoms.  This feels somewhat different.  He continues to see his endocrinologist for his diabetes.  Recent evaluation for syncope.  He underwent a stress echocardiogram showing no change in known infarct but no new ischemia.  However his left ventricular systolic function is decreased and his EF is only 43% worsened from previous.    Review of Systems: No blood in his stools.  Denies any recent chest pain.  No increasing shortness of breath.       Medications, Allergies and Problem List   Patient Active Problem List   Diagnosis     Psoriatic arthritis     Chronic low back pain     Diabetic peripheral neuropathy associated with type 2 diabetes mellitus     Psoriasis     Hypercholesterolemia     HTN (hypertension)     Optic neuritis     Type 2 diabetes mellitus with insulin therapy     Obstructive sleep apnea on CPAP     Thoracic radiculopathy     Hypogonadism in male     Erectile dysfunction     ASCVD (arteriosclerotic cardiovascular disease)     Adjustment disorder with depressed mood     Alcohol abuse, in remission     Tobacco abuse, in remission     Ankylosis, sacroiliac joint     Spondylosis of lumbar region without myelopathy or radiculopathy     Left upper quadrant pain     Ischemic cardiomyopathy       He has a past surgical history that includes Coronary stent placement; Nose surgery; Knee surgery; and Appendectomy.    Allergies   Allergen Reactions     Atorvastatin Myalgia     Valomag Nausea And Vomiting     gastric ulcer from aspirin use     Ampicillin Rash     Had a reaction to this medication many years ago.     Codeine Rash     It has been about 30  years ago       Current Outpatient Prescriptions   Medication Sig Dispense Refill     aspirin 81 MG EC tablet Take 1 tablet (81 mg total) by mouth daily. 150 tablet 2     DULoxetine (CYMBALTA) 60 MG capsule TAKE 1 CAPSULE BY MOUTH ONCE DAILY.  1     ENBREL SURECLICK 50 mg/mL (0.98 mL) PnIj REMOVE NEEDLE SHIELD. INJECT ONE PEN (50MG) SUBCUTANEOUSLY ONCE PER WEEK AS DIRECTED. TO USE, PRESS BUTTON AND RELEASE, WAIT FOR 2 CLICKS TH 4 Syringe 6     gabapentin (NEURONTIN) 300 MG capsule Take 300 mg by mouth 2 (two) times a day.        insulin aspart (NOVOLOG) 100 unit/mL injection Inject under the skin 3 (three) times a day before meals.       insulin glargine (LANTUS) 100 unit/mL injection Inject 70 Units under the skin 2 (two) times a day.        oxyCODONE (ROXICODONE) 5 MG immediate release tablet Take 5-10 mg by mouth.       tadalafil (CIALIS) 20 MG tablet Take 20 mg by mouth daily as needed.       testosterone 10 mg/0.5 gram /actuation GlPm Apply 40 mg on dry, clean, hairless skin once daily.       metoprolol succinate (TOPROL-XL) 25 MG Take 25 mg by mouth daily.       morphine (MS CONTIN) 15 MG 12 hr tablet Take 15 mg by mouth.       morphine (MS CONTIN) 60 MG 12 hr tablet Take 60 mg by mouth.       sulfamethoxazole-trimethoprim (SEPTRA DS) 800-160 mg per tablet        tamsulosin (FLOMAX) 0.4 mg Cp24 Take 0.4 mg by mouth daily.       No current facility-administered medications for this visit.         Physical Exam   General Appearance:   Middle-age male who appears stable    /60 (Patient Site: Right Arm, Patient Position: Sitting, Cuff Size: Adult Regular)  Pulse 78  Ht 6' (1.829 m)  Wt 215 lb (97.5 kg)  SpO2 95%  BMI 29.16 kg/m2        Abdomen soft, some asymmetry with slight bulging on left side.  No hepatosplenomegaly or masses.  No tenderness with deep palpation.  No rashes are seen.       Additional Information   Social History   Substance Use Topics     Smoking status: Never Smoker     Smokeless  tobacco: None     Alcohol use None         Review and/or order of clinical lab tests: We will check CBC and CMP  Review and/or order of radiology tests: Ordering CT scan abdomen/pelvis  Review and/or order of medicine tests: Reviewed recent stress echocardiogram showing no change in infarct with no new ischemia but decreased left ventricular systolic function with ejection fraction of 43%       Aditya Munoz MD

## 2021-06-12 NOTE — TELEPHONE ENCOUNTER
Prior Authorization Approval     Authorization Effective Date: 11/27/2020  Authorization Expiration Date: 1127/2021  Medication: Enbrel 50MG  Approved Dose/Quantity: 4/28 days  Reference #:   [id number]  Insurance Company: [employer]  Expected CoPay:       CoPay Card Available:  Yes  Foundation Assistance Needed:  No  Which Pharmacy is filling the prescription (Not needed for infusion/clinic administered):  [facility]  Pharmacy Notified:    Patient Notified:

## 2021-06-13 NOTE — PROGRESS NOTES
Office Visit - Physical   Bon Luque   62 y.o.  male    Date of visit: 10/2/2017  Physician: Aditya Munoz MD     Assessment and Plan   1. Routine general medical examination at a health care facility  Immunizations are reviewed and will update Tdap.  Living will discussed.  Former smoker.  Quit alcohol.  Regular exercise discussed.  Up to date with colonoscopies and this should be repeated in 5 years.  Prostate exam is normal and I will check a PSA for prostate cancer screening.  He sees his ophthalmologist regularly and gets glaucoma screening.  Skin exam performed and recommending regular use of sunblock.  Hepatitis C antibody for screening.      - Hepatitis C Antibody (Anti-HCV)  - PSA, Annual Screen (Prostatic-Specific Antigen)    2. Need for immunization against influenza    - Influenza, Seasonal Quad, Preservative Free 36+ Months    3. Type 2 diabetes mellitus with insulin therapy  Followed by endocrinology.  Last hemoglobin A1c over 3 months ago and will obtain.  He should continue to get an eye exam annually.  He does have peripheral neuropathy.  - Microalbumin, Random Urine  - Glycosylated Hemoglobin A1c  - Basic Metabolic Panel    4. ASCVD (arteriosclerotic cardiovascular disease)  Stable with current medical management    5. Ischemic cardiomyopathy  Well compensated.  Last EF 43%.  Continue current medication    6. Dilated bile duct  He needs to follow-up with gastroenterology to get endoscopic ultrasound completed.  Concern for stenosis at ampulla.  Cant exclude malignancy.  - Hepatic Profile  - Ambulatory referral to Gastroenterology    7. Right renal mass  1.9 cm mass in right kidney.  Will send to urology and recheck ultrasound  - Urinalysis  - US Kidney Right; Future  - Ambulatory referral to Urology    8. Psoriatic arthritis  Encouraging follow-up with rheumatologist.  Enbrel needs to be prescribed by him  - Ambulatory referral to Rheumatology    9. Psoriasis  Stable with current dose  of Enbrel    10. Tobacco abuse, in remission  Quit smoking but never heavy smoker.  Less than 30-pack-year    11. Spondylosis of lumbar region without myelopathy or radiculopathy  Chronic back pain followed by pain clinic.  Uses MS Contin    12. Obstructive sleep apnea on CPAP  Using CPAP regularly    13. Hypogonadism in male  Continues on testosterone replacement.  Level checked earlier this year was within normal range    14. Essential hypertension  Blood pressure looks well-controlled    15. Hypercholesterolemia  Recheck lipid profile.  Confirm whether he is on a statin.  He has had side effects from atorvastatin.  - Lipid Cascade    16. Erectile dysfunction  Using Cialis    17. Diabetic peripheral neuropathy associated with type 2 diabetes mellitus  Followed by pain clinic and is on gabapentin plus requires MS Contin    18. Medication monitoring encounter  Monitor CBC while on Enbrel  - HM2(CBC w/o Differential)    Return in about 3 months (around 1/2/2018) for Recheck.     Chief Complaint   Chief Complaint   Patient presents with     Annual Exam        Patient Profile   Social History     Social History Narrative        Past Medical History   Patient Active Problem List   Diagnosis     Psoriatic arthritis     Chronic low back pain     Diabetic peripheral neuropathy associated with type 2 diabetes mellitus     Psoriasis     Hypercholesterolemia     HTN (hypertension)     Optic neuritis     Type 2 diabetes mellitus with insulin therapy     Obstructive sleep apnea on CPAP     Thoracic radiculopathy     Hypogonadism in male     Erectile dysfunction     ASCVD (arteriosclerotic cardiovascular disease)     Adjustment disorder with depressed mood     Alcohol abuse, in remission     Tobacco abuse, in remission     Ankylosis, sacroiliac joint     Spondylosis of lumbar region without myelopathy or radiculopathy     Ischemic cardiomyopathy     Dilated bile duct     Right renal mass       Past Surgical History  He has a  past surgical history that includes Coronary stent placement; Nose surgery; Knee surgery; Appendectomy; and Colonoscopy.     History of Present Illness   This 62 y.o. old male with multiple chronic medical problems is here for a physical and to follow-up these problems and to discuss some new concerns.  He has psoriasis and psoriatic arthritis and remains on Enbrel.  It is working well to control his symptoms.  He is having some intermittent pain involving his left rib cage.  He has known thoracic radiculopathy.  Was having severe pain earlier this year which improved.  A CT scan at that time was obtained and did show dilated biliary ducts both intrahepatic and extrahepatic.  Had an MRCP confirming findings and met with gastroenterology who recommended endoscopic ultrasound but he never had this completed.  Also noted to have 1.9 cm mass on right kidney.  No history of hematuria.  No previous history of kidney mass.  He underwent colonoscopy last month showing hyperplastic polyp.  Diabetes is under control and he is followed by endocrinology although his last hemoglobin A1c was in the spring and was not at goal over 8%.  He continues on 65 units of Lantus twice daily.  History of coronary artery disease and ischemic cardiomyopathy with decreased left ventricular systolic function with EF of 43%.  Denies any increasing dyspnea or worsening edema.  Discontinued beta-blocker earlier this year after syncopal episode.  This has not recurred.  He does have sleep apnea and wears CPAP.  Continues on testosterone replacement for hypogonadism.    Review of Systems: A comprehensive review of systems was negative except as noted.  General: No chronic fatigue, unexpected weight loss or weight gain, fevers, chills, or night sweats  Eyes: No significant change in vision.  Seeing ophthalmologist regularly.  ENT: No ear or sinus infections.  No change in hearing.  No tinnitus.  Respiratory: No wheezing, dyspnea on exertion, or  chronic cough  Cardiovascular: No chest pain, palpitations, dizziness, or syncope.  No peripheral edema.  GI: No abdominal pain, reflux symptoms, dysphagia, nausea, vomiting, constipation, or diarrhea  : No change in frequency or incontinence.  No hematuria.  Skin: No new rashes or lesions.  Psoriasis under control  Neurologic: No headaches, seizures, dizziness, weakness,   Musculoskeletal: Chronic back pain  Lymphatic: No swollen lymph nodes  Psychiatric: Mood is stable     Medications and Allergies   Current Outpatient Prescriptions   Medication Sig Dispense Refill     aspirin 81 MG EC tablet Take 1 tablet (81 mg total) by mouth daily. 150 tablet 2     DULoxetine (CYMBALTA) 60 MG capsule TAKE 1 CAPSULE BY MOUTH ONCE DAILY.  1     ENBREL SURECLICK 50 mg/mL (0.98 mL) PnIj REMOVE NEEDLE SHIELD. INJECT ONE PEN (50MG) SUBCUTANEOUSLY ONCE PER WEEK AS DIRECTED. TO USE, PRESS BUTTON AND RELEASE, WAIT FOR 2 CLICKS TH 4 Syringe 6     gabapentin (NEURONTIN) 300 MG capsule Take 300 mg by mouth 2 (two) times a day.        insulin aspart (NOVOLOG) 100 unit/mL injection Inject under the skin 3 (three) times a day before meals.       insulin glargine (LANTUS) 100 unit/mL injection Inject 70 Units under the skin 2 (two) times a day.        morphine (MS CONTIN) 100 MG 12 hr tablet TK 1 T PO Q 8 H AS DIRECTED  0     oxyCODONE (ROXICODONE) 5 MG immediate release tablet Take 5-10 mg by mouth.       sulfamethoxazole-trimethoprim (SEPTRA DS) 800-160 mg per tablet        tadalafil (CIALIS) 20 MG tablet Take 20 mg by mouth daily as needed.       testosterone 10 mg/0.5 gram /actuation GlPm Apply 40 mg on dry, clean, hairless skin once daily.       metoprolol succinate (TOPROL-XL) 25 MG Take 25 mg by mouth daily.       tamsulosin (FLOMAX) 0.4 mg Cp24 Take 0.4 mg by mouth daily.       No current facility-administered medications for this visit.      Allergies   Allergen Reactions     Atorvastatin Myalgia     Valomag Nausea And Vomiting      gastric ulcer from aspirin use     Ampicillin Rash     Had a reaction to this medication many years ago.     Codeine Rash     It has been about 30 years ago        Family and Social History   History reviewed. No pertinent family history.     Social History   Substance Use Topics     Smoking status: Never Smoker     Smokeless tobacco: Never Used     Alcohol use None        Physical Exam   General Appearance:   Well-appearing middle-aged male    /68 (Patient Site: Left Arm, Patient Position: Sitting, Cuff Size: Adult Large)  Pulse 69  Ht 6' (1.829 m)  Wt 216 lb (98 kg)  SpO2 98%  BMI 29.29 kg/m2    EYES: Eyelids, conjunctiva, and sclera were normal. Pupils were normal. Cornea, iris, and lens were normal bilaterally.  HEAD, EARS, NOSE, MOUTH, AND THROAT: Head and face were normal. Nose appearance was normal and there was no discharge. Oropharynx was normal.  NECK: Neck appearance was normal. There were no neck masses and the thyroid was not enlarged and no nodules are felt.  No lymphadenopathy.  RESPIRATORY: Breathing pattern was normal and the chest moved symmetrically.  Percussion/auscultatory percussion was normal.  Lung sounds were normal and there were no rales or wheezes.  CARDIOVASCULAR: Heart rate and rhythm were normal.  S1 and S2 were normal and there were no extra sounds or murmurs. Peripheral pulses in arms and legs were normal.  Jugular venous pressure was normal.  There was no peripheral edema.  No carotid bruits.  GASTROINTESTINAL: The abdomen was normal in contour.  Bowel sounds were present.  Percussion detected no organ enlargement or tenderness.  Palpation detected no tenderness, mass, or enlarged organs.   RECTAL/PROSTATE: No external lesions.  Sphincter tone normal.  No palpable rectal lesions.  Prostate normal size, smooth, nontender without palpable lesions.  MUSCULOSKELETAL: Skeletal configuration was normal and muscle mass was normal for age. Joint appearance was overall  normal.  LYMPHATIC: There were no enlarged nodes.  SKIN/HAIR/NAILS: Skin color was normal.  There were no skin lesions.  Hair and nails were normal.  NEUROLOGIC: The patient was alert and oriented to person, place, time, and circumstance. Speech was normal. Cranial nerves were normal. Motor strength was normal for age. The patient was normally coordinated.    Diabetic foot exam: Decreased vibratory sensation.  Normal pedal pulses no other abnormalities  PSYCHIATRIC:  Mood and affect were normal and the patient had normal recent and remote memory. The patient's judgment and insight were normal.       Additional Information        Aditya Munoz MD  Internal Medicine  Contact me at [phone]

## 2021-06-14 NOTE — TELEPHONE ENCOUNTER
LM with Bon's wife letting her know of the PA approval and that [facility] is no longer [email or web address]. The plan allows him to fill at [facility] or [facility]. She will have Bon follow up with me. She took my name and direct #

## 2021-06-14 NOTE — PROGRESS NOTES
ASSESSMENT AND PLAN:  Bon Luque 62 y.o. male is here for follow-up of psoriatic arthritis, sacroiliitis, lumbar spondylosis, doing well on Enbrel.  He is also had trochanteric bursitis, discussed this with him defer corticosteroid injections.  Continue Enbrel once a hep B status is clarified. We talked what combination of spondylosis and spondylitis/sacroiliitis adding to pain.  Physical therapy importance is discussed.  We'll arrange for him to be seen in spine clinic. Return for follow-up here in the next 6 months.    Diagnoses and all orders for this visit:    Psoriatic arthritis  -     Hepatitis B Surface Antigen (HBsAG)    Spondylosis of lumbar region without myelopathy or radiculopathy    Ankylosis, sacroiliac joint  -     Hepatitis B Surface Antigen (HBsAG)      HISTORY OF PRESENTING ILLNESS:  Bon Luque, 62 y.o., male is here for for follow-up of ankylosing sacroiliitis, psoriatic arthritis, lumbar spondylosis.  He is complaining of pain in his trochanteric, shoulder region.  This is been bothersome over the past few months.  Wakes him up if he is laying on one side or the other for the 2-3 hours.  There is no radiation.  During the daytime there is no issue.  There is no rash.  His low back pain does not trouble him anymore.  He went to chiropractor who did a procedure which led to complete reversal as he recalls of the pain that he had felt to be associated with lumbar spondylosis.  He recalls that it was about 20 years ago when he started hurting in his joints.  He describes swelling of his fingers thumbs and feet.  It seems that he may be a describing dactylitis.  He was started on triple regimen one of his company as well as methotrexate that did help.  His psoriasis remained uncontrolled.  Then he changed his rheumatologist to Dr. Lewis.  He was started on Enbrel which provided him with dramatic improvement in both his joint symptoms as well as the psoriasis he has been on it since then.  He  reports that the pain in his lower back is severe.  There is no radiation down the leg.  He has morning stiffness lasting about an hour.  He wonders if this is a neuropathy as it is reminiscent of the one that he had that in the past.  He is on a variety of medications including Cymbalta,, gabapentin, morphine.  He is an  at 3 AM, he is nonsmoker.  His mom had been psoriasis.  Further historical information and ADL limitations as noted in the multidimensional health assessment questionnaire attached in the EMR.ALLERGIES:Atorvastatin; Valomag; Ampicillin; and Codeine    PAST MEDICAL/ACTIVE PROBLEMS/MEDICATION/ FAMILY HISTORY/SOCIAL DATA:  The patient has a family history of  Past Medical History:   Diagnosis Date     Adjustment disorder with depressed mood 5/22/2015     Alcohol abuse, in remission 4/28/2016     ASCVD (arteriosclerotic cardiovascular disease) 4/28/2016    Coronary artery stent ×2 in RCA following myocardial infarction 2008 , echo June 2014 normal LV function with ejection fraction 50-55% with mild apical inferior hypokinesis     Chronic low back pain 4/28/2016     Diabetic peripheral neuropathy associated with type 2 diabetes mellitus 04/28/2016    Abnormal EMG and workup at [facility]     Dilated bile duct 7/3/2017    CT scan with incidental finding of dilated intra-and extrahepatic ducts     Erectile dysfunction     Urologic evaluation July 2015, hypogonadism     HTN (hypertension)      Hypercholesterolemia      Hypogonadism in male      Ischemic cardiomyopathy     Stress Echocardiogram 2017 with decreased LV function with EF 43% worse compared to previous echo.  No change in infarct.  No new ischemia     Left upper quadrant pain 6/30/2017     MI (myocardial infarction)      Obstructive sleep apnea on CPAP      Optic neuritis      Psoriasis      Psoriatic arthritis 4/28/2016     Right renal mass 10/02/2017    1.9cm mass on right kidney on MRI July 2017, unchanged on ultrasound September  2017 concerning for renal cell cancer, has been referred to urology.     Sebaceous cyst 2010     Thoracic radiculopathy 2004    right T8 intercostal nerve block 2002     Tobacco abuse, in remission 4/28/2016     Type 2 diabetes mellitus with insulin therapy      History   Smoking Status     Never Smoker   Smokeless Tobacco     Never Used     Patient Active Problem List   Diagnosis     Psoriatic arthritis     Chronic low back pain     Diabetic peripheral neuropathy associated with type 2 diabetes mellitus     Psoriasis     Hypercholesterolemia     HTN (hypertension)     Optic neuritis     Type 2 diabetes mellitus with insulin therapy     Obstructive sleep apnea on CPAP     Thoracic radiculopathy     Hypogonadism in male     Erectile dysfunction     ASCVD (arteriosclerotic cardiovascular disease)     Adjustment disorder with depressed mood     Alcohol abuse, in remission     Tobacco abuse, in remission     Ankylosis, sacroiliac joint     Spondylosis of lumbar region without myelopathy or radiculopathy     Ischemic cardiomyopathy     Dilated bile duct     Right renal mass     Current Outpatient Prescriptions   Medication Sig Dispense Refill     aspirin 81 MG EC tablet Take 1 tablet (81 mg total) by mouth daily. 150 tablet 2     DULoxetine (CYMBALTA) 60 MG capsule TAKE 1 CAPSULE BY MOUTH ONCE DAILY.  1     etanercept (ENBREL SURECLICK) 50 mg/mL (0.98 mL) PnIj Inject 50 mg under the skin once a week. 4 Syringe 1     gabapentin (NEURONTIN) 300 MG capsule Take 300 mg by mouth 2 (two) times a day.        insulin aspart (NOVOLOG) 100 unit/mL injection Inject under the skin 3 (three) times a day before meals. Inject 10 units with meals plus sliding scale if necessary       insulin glargine (LANTUS) 100 unit/mL injection Inject 70 Units under the skin 2 (two) times a day.        morphine (MS CONTIN) 100 MG 12 hr tablet TK 1 T PO Q 8 H AS DIRECTED  0     tadalafil (CIALIS) 20 MG tablet Take 20 mg by mouth daily as needed.        tamsulosin (FLOMAX) 0.4 mg Cp24 Take 0.4 mg by mouth daily.       testosterone 10 mg/0.5 gram /actuation GlPm Apply 40 mg on dry, clean, hairless skin once daily.       No current facility-administered medications for this visit.        DETAILED EXAMINATION:  Vitals:    11/09/17 1548   BP: 134/66   Patient Site: Right Arm   Patient Position: Sitting   Cuff Size: Adult Regular   Pulse: 80   Weight: 219 lb 11.2 oz (99.7 kg)   Height: 6' (1.829 m)    Comfortable.  Alert oriented.  Eyes are without inflammatory changes.  Examination of both upper and lower extremities is performed for swollen & tender joints, range of motion, rash, weakness, discoloration, warmth, swelling.  The skin examined for nodules. The salient normal / abnormal findings are appended.  There is extensive psoriatic lesions still on the dorsum of his hand.  There is Heberden nodes.  He does not have dactylitis.  He has trochanteric area tenderness bilaterally.  No impingement in the shoulder range of motion.   No appreciable synovitis in any of the palpable appendicular joints.   LAB / IMAGING DATA:  ALT   Date Value Ref Range Status   10/02/2017 15 0 - 45 U/L Final   06/30/2017 13 0 - 45 U/L Final   04/28/2016 21 12 - 78 U/L Final     Albumin   Date Value Ref Range Status   10/02/2017 3.7 3.5 - 5.0 g/dL Final   06/30/2017 3.7 3.5 - 5.0 g/dL Final   04/28/2016 3.8 3.5 - 5.0 g/dL Final     Creatinine   Date Value Ref Range Status   10/02/2017 0.86 0.70 - 1.30 mg/dL Final   06/30/2017 0.86 0.70 - 1.30 mg/dL Final   04/28/2016 1.05 0.70 - 1.30 mg/dL Final     Comment:       New Creatinine method with new reference ranges as of 9/14/15       WBC   Date Value Ref Range Status   10/02/2017 8.2 4.0 - 11.0 thou/uL Final   06/30/2017 6.9 4.0 - 11.0 thou/uL Final     Hemoglobin   Date Value Ref Range Status   10/02/2017 15.6 14.0 - 18.0 g/dL Final   06/30/2017 14.9 14.0 - 18.0 g/dL Final   04/28/2016 16.5 14.0 - 18.0 g/dL Final     Platelets   Date Value Ref  Range Status   10/02/2017 173 140 - 440 thou/uL Final   06/30/2017 183 140 - 440 thou/uL Final   04/28/2016 184 140 - 440 thou/uL Final       Lab Results   Component Value Date    SEDRATE 2 04/28/2016

## 2021-06-14 NOTE — PROGRESS NOTES
"Bon Luque is a 65 y.o. male who is being evaluated via a billable telephone visit.      The patient has been notified of following:     \"This telephone visit will be conducted via a call between you and your physician/provider. We have found that certain health care needs can be provided without the need for a physical exam.  This service lets us provide the care you need with a short phone conversation.  If a prescription is necessary we can send it directly to your pharmacy.  If lab work is needed we can place an order for that and you can then stop by our lab to have the test done at a later time.    Telephone visits are billed at different rates depending on your insurance coverage. During this emergency period, for some insurers they may be billed the same as an in-person visit.  Please reach out to your insurance provider with any questions.    If during the course of the call the physician/provider feels a telephone visit is not appropriate, you will not be charged for this service.\"    Patient has given verbal consent to a Telephone visit? Yes    What phone number would you like to be contacted at? [phone]     Patient would like to receive their AVS by AVS Preference: Mail a copy.    This document was created using a software with less than 100% fidelity, at times resulting in unintended, even erroneous syntax and grammar.  The reader is advised to keep this under consideration while reviewing, interpreting this note.    ASSESSMENT AND PLAN:    Diagnoses and all orders for this visit:    Ankylosis, sacroiliac joint    Psoriatic arthritis (H)  -     etanercept (ENBREL SURECLICK) 50 mg/mL (1 mL) PnIj; Inject 50 mg under the skin once a week.  Dispense: 4 mL; Refill: 5  -     ALT (SGPT); Standing  -     Albumin; Standing  -     Creatinine; Standing  -     HM2(CBC w/o Differential); Standing    Spondylosis of lumbar region without myelopathy or radiculopathy    Psoriasis  -     etanercept (ENBREL " SURECLICK) 50 mg/mL (1 mL) PnIj; Inject 50 mg under the skin once a week.  Dispense: 4 mL; Refill: 5          HISTORY OF PRESENTING ILLNESS:  Bon Luque 65 y.o. is evaluated here via phone link for follow-up.  He is known to have psoriatic arthritis, psoriasis and sacroiliac involvement, he has been hurting more, this is in the trochanteric area bilaterally, he has had significant interference with his sleep, repeatedly woken up, he goes to the pain clinic, in the past corticosteroid injections were helpful.  He wonders if those could be repeated.  We will arrange for this in the next near future.  Reminded him of of labs.  Continue Enbrel as now.  Refills provided.       ROS enquiry held for fever, ocular symptoms, rash, headache,  GI issues.  Today we also discussed the issues related to the current pandemic, the pros and cons of the current treatment plan, the CDC guidelines such as social distancing washing the hands covering the cough.  ALLERGIES:Atorvastatin, Valomag, Ampicillin, and Codeine    PAST MEDICAL/ACTIVE PROBLEMS/MEDICATION/SOCIAL DATA  Past Medical History:   Diagnosis Date     Arthritis     psoriatic     ASCVD (arteriosclerotic cardiovascular disease) 4/28/2016    Coronary artery stent ×2 in RCA following myocardial infarction 2008 , echo June 2014 normal LV function with ejection fraction 50-55% with mild apical inferior hypokinesis     Chronic low back pain 4/28/2016     Coronary artery disease      Depression 12/26/2017     Diabetic peripheral neuropathy associated with type 2 diabetes mellitus (H) 04/28/2016    Abnormal EMG and workup at [facility]     Dilated bile duct 7/3/2017    CT scan with incidental finding of dilated intra-and extrahepatic ducts     HTN (hypertension)      Hypercholesterolemia      Hypogonadism in male      Ischemic cardiomyopathy     Stress Echocardiogram 2017 with decreased LV function with EF 43% worse compared to previous echo.  No change in infarct.  No new  ischemia     Left upper quadrant pain 6/30/2017     MI (myocardial infarction) (H)      Obstructive sleep apnea on CPAP     CPAP     Optic neuritis      Psoriasis      Right renal mass      Sebaceous cyst 2010     Thoracic radiculopathy 2004    right T8 intercostal nerve block 2002     Type 2 diabetes mellitus with insulin therapy (H)      Social History     Tobacco Use   Smoking Status Never Smoker   Smokeless Tobacco Never Used     Patient Active Problem List   Diagnosis     Psoriatic arthritis (H)     Chronic low back pain     Diabetic peripheral neuropathy associated with type 2 diabetes mellitus (H)     Psoriasis     Hypercholesterolemia     HTN (hypertension)     Optic neuritis     Type 2 diabetes mellitus with insulin therapy (H)     Obstructive sleep apnea on CPAP     Thoracic radiculopathy     Hypogonadism in male     Erectile dysfunction     ASCVD (arteriosclerotic cardiovascular disease)     Alcohol abuse, in remission     Tobacco abuse, in remission     Ankylosis, sacroiliac joint     Spondylosis of lumbar region without myelopathy or radiculopathy     Ischemic cardiomyopathy     Dilated bile duct     Right renal mass     Depression     Type 2 diabetes mellitus with complication, with long-term current use of insulin (H)     Chronic pain syndrome     Lower abdominal pain     Obstructive sleep apnea     Drug-induced constipation     Knee effusion, left     Current Outpatient Medications   Medication Sig Dispense Refill     DULoxetine (CYMBALTA) 60 MG capsule Take 60 mg by mouth 2 (two) times a day.       etanercept (ENBREL SURECLICK) 50 mg/mL (1 mL) PnIj Inject 50 mg under the skin once a week. 4 mL 5     gabapentin (NEURONTIN) 300 MG capsule Take 600 mg by mouth 2 (two) times a day.        ibuprofen (ADVIL,MOTRIN) 200 MG tablet Take 600 mg by mouth every 6 (six) hours as needed for pain.       insulin aspart (NOVOLOG) 100 unit/mL injection Inject under the skin 3 (three) times a day before meals. 1 unit  of Novolog per 5 g of carbs       insulin glargine (LANTUS) 100 unit/mL injection Inject 70 Units under the skin 2 (two) times a day.        morphine (MS CONTIN) 60 MG 12 hr tablet Take 60 mg by mouth 3 (three) times a day.       oxyCODONE-acetaminophen (PERCOCET) 5-325 mg per tablet Take 1-2 tablets by mouth every 4 (four) hours as needed. 30 tablet 0     tadalafil (CIALIS) 20 MG tablet Take 20 mg by mouth daily as needed.       testosterone 10 mg/0.5 gram /actuation GlPm Place 40 mg on the skin daily. Apply to dry, clean, hairless skin       No current facility-administered medications for this visit.          EXAMINATION: He sounded comfortable, alert, oriented, speech was fluent, he did not sound like he was in pain.  LAB / IMAGING DATA:  ALT   Date Value Ref Range Status   11/26/2019 19 0 - 45 U/L Final   11/06/2018 20 0 - 45 U/L Final   10/02/2017 15 0 - 45 U/L Final     Albumin   Date Value Ref Range Status   11/26/2019 4.3 3.5 - 5.0 g/dL Final   11/06/2018 3.9 3.5 - 5.0 g/dL Final   10/02/2017 3.7 3.5 - 5.0 g/dL Final     Creatinine   Date Value Ref Range Status   11/26/2019 1.06 0.70 - 1.30 mg/dL Final   11/06/2018 0.97 0.70 - 1.30 mg/dL Final   01/13/2018 0.79 0.70 - 1.30 mg/dL Final       WBC   Date Value Ref Range Status   11/26/2019 10.5 4.0 - 11.0 thou/uL Final   11/06/2018 7.6 4.0 - 11.0 thou/uL Final     Hemoglobin   Date Value Ref Range Status   11/26/2019 15.7 14.0 - 18.0 g/dL Final   11/06/2018 16.3 14.0 - 18.0 g/dL Final   01/13/2018 11.7 (L) 14.0 - 18.0 g/dL Final     Platelets   Date Value Ref Range Status   11/26/2019 175 140 - 440 thou/uL Final   11/06/2018 168 140 - 440 thou/uL Final   01/13/2018 145 140 - 440 ou/ Final       Lab Results   Component Value Date    SEDRATE 2 04/28/2016     Duration of the call:8  Minutes  Call start: 104  pm

## 2021-06-14 NOTE — PROGRESS NOTES
"ASSESSMENT AND PLAN:  Bon Luque 65 y.o. male is seen here on 01/13/21 for moderately severe exacerbation of pain in his right shoulder likely secondary to tendinopathy, and the right trochanteric area associated with likely bursitis, and this was in the background of several comorbidities including psoriatic arthritis, diabetes, in the past corticosteroid injections have provided him several months of relief.  He also describes recent cardiac work-up including stress test and understand that he \"passed with flying colors\".  We discussed options.  He would like to proceed with injections of steroids, 40 mg of Kenalog injected into the right trochanteric area, and the same amount of the right subacromial, postinjection hyperglycemia discussed.  Follow-up in 6 months or sooner.      Diagnoses and all orders for this visit:    Right shoulder tendonitis  -     triamcinolone acetonide 40 mg/mL injection 40 mg (KENALOG-40)    Greater trochanteric bursitis of right hip  -     triamcinolone acetonide 40 mg/mL injection 40 mg (KENALOG-40)          HISTORY OF PRESENTING ILLNESS ON 01/13/21 :  Bon Luque 65 y.o. is here for a moderately severe flare up of pain. Here for a moderately severe flare up of pain.  Joints affected include the right shoulder(s) and the right hip(s). This has gone on for several weeks. Pain is described as sharp. It is worse with activity at times bedtime..  Her symptoms are moderately severe. The symptoms are progressive.  Associated findings include /do not include: swelling, rash.  There is no associated recent fall or trauma.  Over-the-counter treatment to date has been without significant relief.    Further historical information, including ROS and limitation in activities as noted in the multidimensional health assessment questionnaire scanned in the EMR and in the assessment and plan section.    ALLERGIES:Atorvastatin, Valomag, Ampicillin, and Codeine    PAST MEDICAL/ACTIVE " PROBLEMS/MEDICATION/SOCIAL DATA  Past Medical History:   Diagnosis Date     Arthritis     psoriatic     ASCVD (arteriosclerotic cardiovascular disease) 4/28/2016    Coronary artery stent ×2 in RCA following myocardial infarction 2008 , echo June 2014 normal LV function with ejection fraction 50-55% with mild apical inferior hypokinesis     Chronic low back pain 4/28/2016     Coronary artery disease      Depression 12/26/2017     Diabetic peripheral neuropathy associated with type 2 diabetes mellitus (H) 04/28/2016    Abnormal EMG and workup at [facility]     Dilated bile duct 7/3/2017    CT scan with incidental finding of dilated intra-and extrahepatic ducts     HTN (hypertension)      Hypercholesterolemia      Hypogonadism in male      Ischemic cardiomyopathy     Stress Echocardiogram 2017 with decreased LV function with EF 43% worse compared to previous echo.  No change in infarct.  No new ischemia     Left upper quadrant pain 6/30/2017     MI (myocardial infarction) (H)      Obstructive sleep apnea on CPAP     CPAP     Optic neuritis      Psoriasis      Right renal mass      Sebaceous cyst 2010     Thoracic radiculopathy 2004    right T8 intercostal nerve block 2002     Type 2 diabetes mellitus with insulin therapy (H)      Social History     Tobacco Use   Smoking Status Never Smoker   Smokeless Tobacco Never Used     Patient Active Problem List   Diagnosis     Psoriatic arthritis (H)     Chronic low back pain     Diabetic peripheral neuropathy associated with type 2 diabetes mellitus (H)     Psoriasis     Hypercholesterolemia     HTN (hypertension)     Optic neuritis     Type 2 diabetes mellitus with insulin therapy (H)     Obstructive sleep apnea on CPAP     Thoracic radiculopathy     Hypogonadism in male     Erectile dysfunction     ASCVD (arteriosclerotic cardiovascular disease)     Alcohol abuse, in remission     Tobacco abuse, in remission     Ankylosis, sacroiliac joint     Spondylosis of lumbar region  without myelopathy or radiculopathy     Ischemic cardiomyopathy     Dilated bile duct     Right renal mass     Depression     Type 2 diabetes mellitus with complication, with long-term current use of insulin (H)     Chronic pain syndrome     Lower abdominal pain     Obstructive sleep apnea     Drug-induced constipation     Knee effusion, left     Current Outpatient Medications   Medication Sig Dispense Refill     DULoxetine (CYMBALTA) 60 MG capsule Take 60 mg by mouth 2 (two) times a day.       empagliflozin 25 mg Tab Take 25 mg by mouth daily.       etanercept (ENBREL SURECLICK) 50 mg/mL (1 mL) PnIj Inject 50 mg under the skin once a week. 4 mL 5     gabapentin (NEURONTIN) 300 MG capsule Take 600 mg by mouth 3 (three) times a day.        ibuprofen (ADVIL,MOTRIN) 200 MG tablet Take 600 mg by mouth every 6 (six) hours as needed for pain.       insulin aspart (NOVOLOG) 100 unit/mL injection Inject under the skin 3 (three) times a day before meals. 1 unit of Novolog per 5 g of carbs       insulin glargine (LANTUS) 100 unit/mL injection Inject 70 Units under the skin 2 (two) times a day.        morphine (MS CONTIN) 60 MG 12 hr tablet Take 60 mg by mouth 3 (three) times a day.       oxyCODONE-acetaminophen (PERCOCET) 5-325 mg per tablet Take 1-2 tablets by mouth every 4 (four) hours as needed. 30 tablet 0     tadalafil (CIALIS) 20 MG tablet Take 20 mg by mouth daily as needed.       testosterone 10 mg/0.5 gram /actuation GlPm Place 40 mg on the skin daily. Apply to dry, clean, hairless skin       No current facility-administered medications for this visit.          DETAILED EXAMINATION  01/13/21  :  Vitals:    01/13/21 1201   BP: 130/66   Pulse: 80   Weight: 220 lb (99.8 kg)   Height: 6' (1.829 m)     Alert oriented. Head including the face is examined for malar rash, heliotropes, scarring, lupus pernio. Eyes examined for redness such as in episcleritis/scleritis, periorbital lesions.   Neck/ Face examined for parotid  gland swelling, range of motion of neck.  Left upper and lower and right upper and lower extremities examined for tenderness, swelling, warmth of the appendicular joints, range of motion, edema, rash.  Some of the important findings included: He has tenderness the right trochanteric area there is no rash, he has impingement of the right shoulder less so on the left.  There is no dactylitis of digits.           LAB / IMAGING DATA:  ALT   Date Value Ref Range Status   11/26/2019 19 0 - 45 U/L Final   11/06/2018 20 0 - 45 U/L Final   10/02/2017 15 0 - 45 U/L Final     Albumin   Date Value Ref Range Status   11/26/2019 4.3 3.5 - 5.0 g/dL Final   11/06/2018 3.9 3.5 - 5.0 g/dL Final   10/02/2017 3.7 3.5 - 5.0 g/dL Final     Creatinine   Date Value Ref Range Status   11/26/2019 1.06 0.70 - 1.30 mg/dL Final   11/06/2018 0.97 0.70 - 1.30 mg/dL Final   01/13/2018 0.79 0.70 - 1.30 mg/dL Final       WBC   Date Value Ref Range Status   11/26/2019 10.5 4.0 - 11.0 thou/uL Final   11/06/2018 7.6 4.0 - 11.0 thou/uL Final     Hemoglobin   Date Value Ref Range Status   11/26/2019 15.7 14.0 - 18.0 g/dL Final   11/06/2018 16.3 14.0 - 18.0 g/dL Final   01/13/2018 11.7 (L) 14.0 - 18.0 g/dL Final     Platelets   Date Value Ref Range Status   11/26/2019 175 140 - 440 thou/uL Final   11/06/2018 168 140 - 440 thou/uL Final   01/13/2018 145 140 - 440 thou/uL Final       Lab Results   Component Value Date    SEDRATE 2 04/28/2016

## 2021-06-14 NOTE — TELEPHONE ENCOUNTER
Bon  has an upcoming lab appointment.  No orders have been placed in the chart or faxed to lab.  Please review and place orders if appropriate.  If not ok for labs, please advise patient.    Thanks,     Lab

## 2021-06-15 NOTE — PROGRESS NOTES
Office Visit - Follow Up   Bon Luque   62 y.o. male    Date of Visit: 12/26/2017    Chief Complaint   Patient presents with     Pre-op Exam     surg 01/10 [facility] Dr Rodríguez        Assessment and Plan   1. Pre-op exam, partial nephrectomy planned for right renal mass suspicious for renal cell carcinoma  No contraindications to proceeding with planned surgery and general anesthesia.  Overall risk is moderate given his history of coronary artery disease and immunosuppression for psoriatic arthritis.  Has had no problems with previous surgery.  No personal or family history of DVT or pulmonary embolus.  History of coronary artery disease, stable with normal stress test earlier this year.  Hypertension and diabetes reasonably well-controlled.  He does wear CPAP nightly and should continue to do so during hospitalization.  He is instructed to hold his Enbrel for 1 week prior to surgery and for 2 weeks following surgery to minimize risk for infection.  He will hold aspirin and NSAIDs for 1 week prior to surgery.  DVT prophylaxis per usual protocol.  - Electrocardiogram Perform and Read  - HM2(CBC w/o Differential)        3. ASCVD (arteriosclerotic cardiovascular disease)  History of myocardial infarction 2008 with coronary artery stent placement.  Recent stress test normal without ischemia.  Continue current medical management  - Electrocardiogram Perform and Read    4. Psoriatic arthritis  He uses Enbrel weekly to control symptoms.  Will clarify with his rheumatologist how long this should be held before and following surgery.    5. Type 2 diabetes mellitus with insulin therapy  We discussed insulin management prior and during hospitalization.  He will take his morning dose of Lantus but will hold his evening dose the night before surgery and will avoid all insulin including his NovoLog on the morning of surgery.  Monitor sugars 4 times daily and cover with insulin sliding scale.    6. Obstructive sleep apnea on  CPAP  He should bring his home CPAP machine to the hospital and where at night    7. Ischemic cardiomyopathy  Most recent echocardiogram showing normal LV systolic function    8. Essential hypertension  Good blood pressure control   - Basic Metabolic Panel    9. Depression  He will continue current dose of Cymbalta but will try taking at bedtime.  Consider increasing to 90 mg depending on response    10. Acute diarrhea  Resolving viral gastroenteritis.  Checking electrolytes and magnesium only 1.7.  Will replace.  - Magnesium    Return in about 3 months (around 3/26/2018) for Recheck.     History of Present Illness   This 62 y.o. old male with multiple chronic medical problems including psoriatic arthritis, type 2 diabetes requiring insulin complicated by peripheral neuropathy, coronary artery disease with history of myocardial infarction and ischemic cardiomyopathy, sleep apnea requiring CPAP, and hypertension who has been found to have a right renal mass worrisome for renal cell carcinoma.  Plans are made for partial nephrectomy on January 10, 2017.  Surgeon is Dr. Rodríguez and will take place at [facility].    He has had no significant complications following surgery or with general anesthesia.  No personal or family history of DVT or pulmonary embolus.  Known history of coronary artery disease with myocardial infarction 2008 with coronary artery stents placed.  Ischemic cardiomyopathy although most recent echocardiogram showing normal EF.  He underwent stress test earlier this year showing no ischemia.  Denies any exertional chest pain.  No increasing dyspnea.    Hypertension well controlled.  Wears CPAP faithfully at night.  Diabetes with fair control.  Uses insulin and is followed by endocrinology.    Chronic pain syndrome followed by pain clinic and remains on MS Contin decreased dose to 75 mg 3 times daily.    Recent gastroenteritis with diarrhea seems to be resolving.  No abdominal  pain.    Experiencing some worsening of his mood especially when he wakes up in the morning.  Asking about increasing dose of Cymbalta beyond 60 mg daily.    Review of Systems:  Otherwise, a comprehensive review of systems was negative except as noted.     Medications, Allergies and Problem List   Patient Active Problem List   Diagnosis     Psoriatic arthritis     Chronic low back pain     Diabetic peripheral neuropathy associated with type 2 diabetes mellitus     Psoriasis     Hypercholesterolemia     HTN (hypertension)     Optic neuritis     Type 2 diabetes mellitus with insulin therapy     Obstructive sleep apnea on CPAP     Thoracic radiculopathy     Hypogonadism in male     Erectile dysfunction     ASCVD (arteriosclerotic cardiovascular disease)     Alcohol abuse, in remission     Tobacco abuse, in remission     Ankylosis, sacroiliac joint     Spondylosis of lumbar region without myelopathy or radiculopathy     Ischemic cardiomyopathy     Dilated bile duct     Right renal mass     Depression       He has a past surgical history that includes Coronary stent placement; Nose surgery; Knee surgery; Appendectomy; and Colonoscopy.    Allergies   Allergen Reactions     Atorvastatin Myalgia     Valomag Nausea And Vomiting     gastric ulcer from aspirin use     Ampicillin Rash     Had a reaction to this medication many years ago.     Codeine Rash     It has been about 30 years ago       Current Outpatient Prescriptions   Medication Sig Dispense Refill     DULoxetine (CYMBALTA) 60 MG capsule TAKE 1 CAPSULE BY MOUTH ONCE DAILY.  1     etanercept (ENBREL SURECLICK) 50 mg/mL (0.98 mL) PnIj Inject 50 mg under the skin once a week. 4 Syringe 5     gabapentin (NEURONTIN) 300 MG capsule Take 300 mg by mouth 2 (two) times a day.        insulin aspart (NOVOLOG) 100 unit/mL injection Inject under the skin 3 (three) times a day before meals. Inject 10 units with meals plus sliding scale if necessary       insulin glargine (LANTUS)  100 unit/mL injection Inject 70 Units under the skin 2 (two) times a day.        morphine (MS CONTIN) 15 MG 12 hr tablet Take with 60 mg tab for a total of 75 mg three times daily       morphine (MS CONTIN) 60 MG 12 hr tablet Take with 15 mg tab for a total of 75 mg three times daily       tadalafil (CIALIS) 20 MG tablet Take 20 mg by mouth daily as needed.       tamsulosin (FLOMAX) 0.4 mg Cp24 Take 0.4 mg by mouth daily.       testosterone 10 mg/0.5 gram /actuation GlPm Apply 40 mg on dry, clean, hairless skin once daily.       No current facility-administered medications for this visit.         Physical Exam   General Appearance:   Well-appearing middle-aged male    /70 (Patient Site: Left Arm, Patient Position: Sitting, Cuff Size: Adult Large)  Pulse 80  Ht 6' (1.829 m)  Wt 213 lb (96.6 kg)  SpO2 98%  BMI 28.89 kg/m2    HEENT: Normal  Neck without lymphadenopathy  Respiratory: Normal respiratory effort.  Lungs are clear with no rales or wheezes.  Heart: Regular rate and rhythm without murmurs, rubs, or gallops.  No carotid bruits.  Abdomen: Abdomen is soft, nontender without guarding, rebound, masses, or hepatosplenomegaly.  Extremities: No peripheral edema.  Neurologic: Grossly nonfocal  Skin: No cyanosis or pallor  Psych: Alert and oriented ×3, mood appropriate    EKG with normal sinus rhythm, old inferior infarct and nonspecific ST/T-wave abnormality, no change from 2015    Potassium 4.2 magnesium 1.7 hemoglobin 16.0     Additional Information   Social History   Substance Use Topics     Smoking status: Never Smoker     Smokeless tobacco: Never Used     Alcohol use None         Review and/or order of clinical lab tests: We will check appropriate labs including BMP and CBC    Review and/or order of medicine tests: Ordering EKG as part of preop for upcoming surgery        Independent visualization of image, tracing or specimen itself: I personally reviewed and interpreted his EKG    Time: total time  spent with the patient was 40 minutes of which >50% was spent in counseling and coordination of care     Aditya Munoz MD

## 2021-06-15 NOTE — ANESTHESIA CARE TRANSFER NOTE
Last vitals:   Vitals:    01/10/18 1509   BP: (!) 199/91   Pulse: 98   Resp: 14   Temp: 36.5  C (97.7  F)   SpO2: 100%     Patient's level of consciousness is drowsy  Spontaneous respirations: yes  Maintains airway independently: yes  Dentition unchanged: yes  Oropharynx: oropharynx clear of all foreign objects    QCDR Measures:  ASA# 20 - Surgical Safety Checklist: WHO surgical safety checklist completed prior to induction  PQRS# 430 - Adult PONV Prevention: 4558F - Pt received => 2 anti-emetic agents (different classes) preop & intraop  ASA# 8 - Peds PONV Prevention: NA - Not pediatric patient, not GA or 2 or more risk factors NOT present  PQRS# 424 - Maru-op Temp Management: 4559F - At least one body temp DOCUMENTED => 35.5C or 95.9F within required timeframe  PQRS# 426 - PACU Transfer Protocol: - Transfer of care checklist used  ASA# 14 - Acute Post-op Pain: ASA14B - Patient did NOT experience pain >= 7 out of 10

## 2021-06-15 NOTE — ANESTHESIA POSTPROCEDURE EVALUATION
Patient: Bon Luque  RIGHT ROBOTIC PARTIAL NEPHRECTOMY WITH INTRAOPERATIVE ULTRASOUND  Anesthesia type: general    Patient location: PACU  Last vitals:   Vitals:    01/10/18 1620   BP: 144/67   Pulse: 83   Resp: 10   Temp:    SpO2: 99%     Post vital signs: stable  Level of consciousness: awake and responds to simple questions  Post-anesthesia pain: pain controlled  Post-anesthesia nausea and vomiting: no  Pulmonary: unassisted, return to baseline  Cardiovascular: stable and blood pressure at baseline  Hydration: adequate  Anesthetic events: no    QCDR Measures:  ASA# 11 - Maru-op Cardiac Arrest: ASA11B - Patient did NOT experience unanticipated cardiac arrest  ASA# 12 - Maru-op Mortality Rate: ASA12B - Patient did NOT die  ASA# 13 - PACU Re-Intubation Rate: ASA13B - Patient did NOT require a new airway mgmt  ASA# 10 - Composite Anes Safety: ASA10A - No serious adverse event    Additional Notes:

## 2021-06-15 NOTE — ANESTHESIA PREPROCEDURE EVALUATION
Anesthesia Evaluation      Patient summary reviewed   No history of anesthetic complications     Airway   Mallampati: I  Neck ROM: full   Pulmonary - normal exam   (+) sleep apnea on no CPAP, moderate,                          Cardiovascular - normal exam  Exercise tolerance: > or = 4 METS  (+) hypertension, CAD, CABG/stent, ,     (-) murmur  Rhythm: regular  Rate: normal,    no murmur      Neuro/Psych    (+) neuromuscular disease,  chronic pain    Endo/Other    (+) diabetes mellitus poorly controlled using insulin,      GI/Hepatic/Renal - negative ROS           Dental - normal exam                        Anesthesia Plan  Planned anesthetic: general endotracheal    ASA 3   Induction: intravenous   Anesthetic plan and risks discussed with: patient  Anesthesia plan special considerations: antiemetics,   Post-op plan: routine recovery

## 2021-06-16 PROBLEM — M25.462 KNEE EFFUSION, LEFT: Status: ACTIVE | Noted: 2018-11-06

## 2021-06-16 PROBLEM — K83.8 DILATED BILE DUCT: Status: ACTIVE | Noted: 2017-07-03

## 2021-06-16 PROBLEM — N28.89 RIGHT RENAL MASS: Status: ACTIVE | Noted: 2017-10-02

## 2021-06-16 PROBLEM — F32.A DEPRESSION: Status: ACTIVE | Noted: 2017-12-26

## 2021-06-16 NOTE — TELEPHONE ENCOUNTER
Telephone Encounter by Laura Madrigal at 11/26/2019  2:48 PM     Author: Laura Madrigal Service: -- Author Type: Financial Resource Guide    Filed: 11/26/2019  3:22 PM Encounter Date: 11/26/2019 Status: Signed    : Laura Madrigal (Financial Resource Guide)       Medication: Enbrel Sureclick 50mg/ml  Qty: 4 pens for 28 days  Effective Dates: 11/26/2019 - 11/26/2020  Pharmacy: [facility] - pt wanted to stay with [facility]  Copay Amount: $5170.12  Copay Assistance: Patient already enrolled in copay assistance and is on file at [facility]  Patient Notified? Yes  Additional Information: I will follow up with pharmacy and check on what copay ends up being for the patient. Told patient I would follow up with him on Friday 11/29/2019

## 2021-06-16 NOTE — TELEPHONE ENCOUNTER
Telephone Encounter by Laura Madrigal at 7/30/2019  3:05 PM     Author: Laura Madrigal Service: -- Author Type: Financial Resource Guide    Filed: 7/30/2019  3:07 PM Encounter Date: 7/30/2019 Status: Signed    : Laura Madrigal (Financial Resource Guide)       Medication: Enbrel Sureclick 50mg/ml  Qty: 4 pens for 28 days  Insurance: 3M  Test Claim: Product/Service not appropriate at this location  Pharmacy: Restricted to [facility]  Additional Information: PA expires 11/09/2020

## 2021-06-16 NOTE — TELEPHONE ENCOUNTER
Telephone Encounter by Laura Madrigal at 11/26/2019  1:40 PM     Author: Laura Madrigal Service: -- Author Type: Financial Resource Guide    Filed: 11/26/2019  1:42 PM Encounter Date: 11/26/2019 Status: Signed    : Laura Madrigal (Financial Resource Guide)       Medication: Enbrel Sureclick 50mg/ml  QTY: 4 pens for 28 days  Insurance: [employer] [place]  Additional Information: insurance change

## 2021-06-17 NOTE — TELEPHONE ENCOUNTER
Telephone Encounter by Laura Madrigal at 1/8/2021  2:16 PM     Author: Laura Madrigal Service: -- Author Type: Financial Resource Guide    Filed: 1/8/2021  2:19 PM Encounter Date: 1/8/2021 Status: Signed    : Laura Madrigal (Financial Resource Guide)       PA Initiation  Medication: Enbrel Sureclick 50mg/ml  QTY: 4 pens for 28 days  Insurance Company: [employer] Commercial  Pharmacy Filing Rx: pending  Filling Pharmacy Phone: NA  Filling Pharmacy Fax: NA  Start Date: 01/08/2021  Additional Information: PA on file states PA exp 11/2020

## 2021-06-17 NOTE — TELEPHONE ENCOUNTER
Telephone Encounter by Laura Madrigal at 11/2/2020  8:47 AM     Author: Laura Madrigal Service: -- Author Type: Financial Resource Guide    Filed: 11/2/2020  8:48 AM Encounter Date: 10/28/2020 Status: Signed    : Laura Madrigal (Financial Resource Guide)

## 2021-06-17 NOTE — TELEPHONE ENCOUNTER
Telephone Encounter by Laura Madrigal at 1/11/2021 12:10 PM     Author: Laura Madrigal Service: -- Author Type: Financial Resource Guide    Filed: 1/11/2021 12:23 PM Encounter Date: 1/8/2021 Status: Signed    : Laura Madrigal (Financial Resource Guide)       No PA required until 11/27/2021

## 2021-06-17 NOTE — TELEPHONE ENCOUNTER
Telephone Encounter by Laura Madrigal at 7/7/2020  1:54 PM     Author: Laura Madrigal Service: -- Author Type: Financial Resource Guide    Filed: 7/7/2020  2:01 PM Encounter Date: 7/7/2020 Status: Signed    : Laura Madrigal (Financial Resource Guide)       Medication: Enbrel Sureclick 50mg/ml  Qty: 4 pens for 28 days  Insurance: [facility] Commercial  Test Claim: $0  Pharmacy: [facility] per pt's request   Additional Information: patient decided he wanted to fill at [facility] December 2019
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
## 2021-08-09 DIAGNOSIS — L40.9 PSORIASIS: ICD-10-CM

## 2021-08-09 DIAGNOSIS — L40.50 PSORIATIC ARTHRITIS (H): ICD-10-CM

## 2021-08-10 NOTE — TELEPHONE ENCOUNTER
Patient is scheduled for labs 08/12 for med refills and soonest available in person appt on 11/23 for injection.     Patient doesn't know that he will able to wait until Nov for right shoulder injection. Please advise if there is sooner availability.

## 2021-08-12 NOTE — TELEPHONE ENCOUNTER
[facility] is calling to follow up on the refill status, prescription would be needed today to get medication sent out to the Pt.  Pharmacy notified that refill is pending labs.      Pharmacy states they will call back to check up on the status of the fill, but if there is any issue getting medication refilled, after labs are completed, please let the pharmacy know.

## 2021-08-19 ENCOUNTER — LAB (OUTPATIENT)
Dept: LAB | Facility: CLINIC | Age: 66
End: 2021-08-19
Payer: COMMERCIAL

## 2021-08-19 DIAGNOSIS — L40.50 PSORIATIC ARTHRITIS (H): ICD-10-CM

## 2021-08-19 LAB
ALBUMIN SERPL-MCNC: 4 G/DL (ref 3.5–5)
ALT SERPL W P-5'-P-CCNC: 17 U/L (ref 0–45)
CREAT SERPL-MCNC: 0.97 MG/DL (ref 0.7–1.3)
ERYTHROCYTE [DISTWIDTH] IN BLOOD BY AUTOMATED COUNT: 12.8 % (ref 10–15)
GFR SERPL CREATININE-BSD FRML MDRD: 82 ML/MIN/1.73M2
HCT VFR BLD AUTO: 44 % (ref 40–53)
HGB BLD-MCNC: 15 G/DL (ref 13.3–17.7)
MCH RBC QN AUTO: 28.4 PG (ref 26.5–33)
MCHC RBC AUTO-ENTMCNC: 34.1 G/DL (ref 31.5–36.5)
MCV RBC AUTO: 83 FL (ref 78–100)
PLATELET # BLD AUTO: 207 10E3/UL (ref 150–450)
RBC # BLD AUTO: 5.28 10E6/UL (ref 4.4–5.9)
WBC # BLD AUTO: 9.2 10E3/UL (ref 4–11)

## 2021-08-19 PROCEDURE — 36415 COLL VENOUS BLD VENIPUNCTURE: CPT

## 2021-08-19 PROCEDURE — 82040 ASSAY OF SERUM ALBUMIN: CPT

## 2021-08-19 PROCEDURE — 84460 ALANINE AMINO (ALT) (SGPT): CPT

## 2021-08-19 PROCEDURE — 82565 ASSAY OF CREATININE: CPT

## 2021-08-19 PROCEDURE — 85027 COMPLETE CBC AUTOMATED: CPT

## 2021-08-20 RX ORDER — MEDROXYPROGESTERONE ACETATE 150 MG/ML
INJECTION, SUSPENSION INTRAMUSCULAR
Qty: 4 ML | Refills: 1 | Status: SHIPPED | OUTPATIENT
Start: 2021-08-20 | End: 2021-10-14

## 2021-08-20 NOTE — TELEPHONE ENCOUNTER
Assuming this is for Enbrel,  Specialty pharmacy wanting an update on this refill. Patient had labs done yesterday. Please fill.

## 2021-08-24 ENCOUNTER — OFFICE VISIT (OUTPATIENT)
Dept: RHEUMATOLOGY | Facility: CLINIC | Age: 66
End: 2021-08-24
Payer: COMMERCIAL

## 2021-08-24 VITALS
HEART RATE: 76 BPM | HEIGHT: 72 IN | SYSTOLIC BLOOD PRESSURE: 132 MMHG | WEIGHT: 215 LBS | DIASTOLIC BLOOD PRESSURE: 68 MMHG | BODY MASS INDEX: 29.12 KG/M2

## 2021-08-24 DIAGNOSIS — L40.9 PSORIASIS: ICD-10-CM

## 2021-08-24 DIAGNOSIS — M77.8 RIGHT SHOULDER TENDONITIS: ICD-10-CM

## 2021-08-24 DIAGNOSIS — M43.28 ANKYLOSIS, SACROILIAC JOINT: Primary | ICD-10-CM

## 2021-08-24 DIAGNOSIS — L40.50 PSORIATIC ARTHRITIS (H): ICD-10-CM

## 2021-08-24 PROCEDURE — 20610 DRAIN/INJ JOINT/BURSA W/O US: CPT | Mod: RT | Performed by: INTERNAL MEDICINE

## 2021-08-24 PROCEDURE — 99214 OFFICE O/P EST MOD 30 MIN: CPT | Mod: 25 | Performed by: INTERNAL MEDICINE

## 2021-08-24 RX ORDER — TRIAMCINOLONE ACETONIDE 40 MG/ML
40 INJECTION, SUSPENSION INTRA-ARTICULAR; INTRAMUSCULAR ONCE
Status: COMPLETED | OUTPATIENT
Start: 2021-08-24 | End: 2021-08-24

## 2021-08-24 RX ADMIN — TRIAMCINOLONE ACETONIDE 40 MG: 40 INJECTION, SUSPENSION INTRA-ARTICULAR; INTRAMUSCULAR at 12:50

## 2021-08-24 ASSESSMENT — MIFFLIN-ST. JEOR: SCORE: 1803.23

## 2021-08-24 NOTE — PROGRESS NOTES
Rheumatology follow-up visit note     Bon is a 66 year old male presents today for follow-up.    Bon was seen today for recheck.    Diagnoses and all orders for this visit:    Ankylosis, sacroiliac joint  -     XR Shoulder 3 View Bilateral; Future  -     XR Shoulder 3 View Bilateral  -     ASPIRATION/INJECTION MAJOR JOINT  -     triamcinolone (KENALOG-40) injection 40 mg    Psoriatic arthritis (H)    Psoriasis    Right shoulder tendonitis  -     XR Shoulder 3 View Bilateral; Future  -     XR Shoulder 3 View Bilateral  -     ASPIRATION/INJECTION MAJOR JOINT  -     triamcinolone (KENALOG-40) injection 40 mg        This patient with ankylosing spondylitis, is complaining of increasing pain in his right shoulder consistent with tendinopathy of the shoulder he would like to proceed with local injection.    After pros and cons were discussed including risk of infection, bleeding, skin changes including thinning, pigmentary alteration and scarring to name a few, right shoulder injected as noted in the orders section. This was done with nontouch technique.  The patient tolerated the procedure well and had a brisk Marcaine effect.  The postinjection care was discussed.      Follow up in 3 months.    HPI    Bon Luque is a 66 year old male is here for follow-up. Here for a moderately severe flare up of pain.  Joints affected include the right shoulder(s) and the right hip(s). This has gone on for several weeks. Pain is described as sharp. It is worse with activity at times bedtime..  Her symptoms are moderately severe. The symptoms are progressive.  Associated findings include /do not include: swelling, rash.  There is no associated recent fall or trauma.  Over-the-counter treatment to date has been without significant relief.    Further historical information, including ROS and limitation in activities as noted in the multidimensional health assessment questionnaire scanned in the EMR and in the assessment and  plan section.      DETAILED EXAMINATION  08/24/21  :    Vitals:    08/24/21 1050   BP: 132/68   Pulse: 76   Weight: 97.5 kg (215 lb)   Height: 1.829 m (6')     Alert oriented. Head including the face is examined for malar rash, heliotropes, scarring, lupus pernio. Eyes examined for redness such as in episcleritis/scleritis, periorbital lesions.   Neck/ Face examined for parotid gland swelling, range of motion of neck.  Left upper and lower and right upper and lower extremities examined for tenderness, swelling, warmth of the appendicular joints, range of motion, edema, rash.  Some of the important findings included: he does not have evidence of synovitis in the palpable joints of the upper extremities.  No significant deformities of the digits.  He has painful abduction of the right shoulder, normal on the left.  No JLT effusion or warmth of the knees.  he does not have dactylitis of the digits.     Patient Active Problem List    Diagnosis Date Noted     Knee effusion, left 11/06/2018     Priority: Medium     Drug-induced constipation      Priority: Medium     Type 2 diabetes mellitus with complication, with long-term current use of insulin (H)      Priority: Medium     Chronic pain syndrome      Priority: Medium     Lower abdominal pain      Priority: Medium     Obstructive sleep apnea      Priority: Medium     Depression 12/26/2017     Priority: Medium     Right renal mass 10/02/2017     Priority: Medium     1.9cm mass on right kidney on MRI July 2017, unchanged on ultrasound   September 2017 concerning for renal cell cancer, has been referred to   urology.         Dilated bile duct 07/03/2017     Priority: Medium     CT scan with incidental finding of dilated intra-and extrahepatic ducts         Ischemic cardiomyopathy      Priority: Medium     Stress Echocardiogram 2017 with decreased LV function with EF 43% worse   compared to previous echo.  No change in infarct.  No new ischemia         Ankylosis, sacroiliac  joint 07/11/2016     Priority: Medium     Spondylosis of lumbar region without myelopathy or radiculopathy 07/11/2016     Priority: Medium     Psoriatic arthritis (H) 04/28/2016     Priority: Medium     Chronic low back pain 04/28/2016     Priority: Medium     Diabetic peripheral neuropathy associated with type 2 diabetes mellitus (H) 04/28/2016     Priority: Medium     ASCVD (arteriosclerotic cardiovascular disease) 04/28/2016     Priority: Medium     Coronary artery stent ×2 in RCA following myocardial infarction 2008 ,   echo June 2014 normal LV function with ejection fraction 50-55% with mild   apical inferior hypokinesis         Alcohol abuse, in remission 04/28/2016     Priority: Medium     Tobacco abuse, in remission 04/28/2016     Priority: Medium     Psoriasis      Priority: Medium     Hypercholesterolemia      Priority: Medium     HTN (hypertension)      Priority: Medium     Optic neuritis      Priority: Medium     Type 2 diabetes mellitus with insulin therapy (H)      Priority: Medium     Obstructive sleep apnea on CPAP      Priority: Medium     Hypogonadism in male      Priority: Medium     Erectile dysfunction      Priority: Medium     Urologic evaluation July 2015, hypogonadism         Thoracic radiculopathy 01/01/2004     Priority: Medium     right T8 intercostal nerve block 2002         Past Surgical History:   Procedure Laterality Date     APPENDECTOMY       COLONOSCOPY      Colonoscopy September 2017 with hyperplastic polyps, repeat in 10 years     CORONARY ANGIOPLASTY      stent     KNEE SURGERY       NOSE SURGERY       RI LAP,PARTIAL NEPHRECTOMY Right 1/10/2018    Procedure: RIGHT ROBOTIC PARTIAL NEPHRECTOMY WITH INTRAOPERATIVE ULTRASOUND;  Surgeon: Chase Rodríguez MD;  Location: [facility] - [place];  Service: Urology      Past Medical History:   Diagnosis Date     Arthritis     psoriatic     ASCVD (arteriosclerotic cardiovascular disease) 4/28/2016    Coronary artery stent ×2 in RCA  following myocardial infarction 2008 , echo June 2014 normal LV function with ejection fraction 50-55% with mild apical inferior hypokinesis     Chronic low back pain 4/28/2016     Coronary artery disease      Depression 12/26/2017     Diabetic peripheral neuropathy associated with type 2 diabetes mellitus (H) 04/28/2016    Abnormal [facility] and [facility] at [facility]     Dilated bile duct 7/3/2017    CT scan with incidental finding of dilated intra-and extrahepatic ducts     HTN (hypertension)      Hypercholesterolemia      Hypogonadism in male      Ischemic cardiomyopathy     Stress Echocardiogram 2017 with decreased LV function with EF 43% worse compared to previous echo.  No change in infarct.  No new ischemia     Left upper quadrant pain 6/30/2017     MI (myocardial infarction) (H)      Obstructive sleep apnea on CPAP     CPAP     Optic neuritis      Psoriasis      Right renal mass      Sebaceous cyst 2010     Thoracic radiculopathy 2004    right T8 intercostal nerve block 2002     Type 2 diabetes mellitus with insulin therapy (H)      Allergies   Allergen Reactions     Atorvastatin Muscle Pain (Myalgia)     Valomag [Arthritis Pain Formula] Nausea and Vomiting     gastric ulcer from aspirin use     Ampicillin Rash     Had a reaction to this medication many years ago.     Codeine Rash     It has been about 30 years ago     Current Outpatient Medications   Medication Sig Dispense Refill     DULoxetine (CYMBALTA) 60 MG capsule [DULOXETINE (CYMBALTA) 60 MG CAPSULE] Take 60 mg by mouth 2 (two) times a day.       empagliflozin 25 mg Tab [EMPAGLIFLOZIN 25 MG TAB] Take 25 mg by mouth daily.       ENBREL SURECLICK 50 MG/ML autoinjector INJECT THE CONTENTS OF 1 SURECLICK AUTOINJECTOR SUBCUTANEOUSLY EVERY WEEK. 4 mL 1     gabapentin (NEURONTIN) 300 MG capsule [GABAPENTIN (NEURONTIN) 300 MG CAPSULE] Take 600 mg by mouth 3 (three) times a day.        ibuprofen (ADVIL,MOTRIN) 200 MG tablet [IBUPROFEN (ADVIL,MOTRIN) 200 MG TABLET]  Take 600 mg by mouth every 6 (six) hours as needed for pain.       insulin aspart (NOVOLOG) 100 unit/mL injection [INSULIN ASPART (NOVOLOG) 100 UNIT/ML INJECTION] Inject under the skin 3 (three) times a day before meals. 1 unit of Novolog per 5 g of carbs       insulin glargine (LANTUS) 100 unit/mL injection [INSULIN GLARGINE (LANTUS) 100 UNIT/ML INJECTION] Inject 70 Units under the skin 2 (two) times a day.        morphine (MS CONTIN) 60 MG 12 hr tablet [MORPHINE (MS CONTIN) 60 MG 12 HR TABLET] Take 60 mg by mouth 3 (three) times a day.       oxyCODONE-acetaminophen (PERCOCET) 5-325 mg per tablet [OXYCODONE-ACETAMINOPHEN (PERCOCET) 5-325 MG PER TABLET] Take 1-2 tablets by mouth every 4 (four) hours as needed. 30 tablet 0     tadalafil (CIALIS) 20 MG tablet [TADALAFIL (CIALIS) 20 MG TABLET] Take 20 mg by mouth daily as needed.       testosterone 10 mg/0.5 gram /actuation GlPm [TESTOSTERONE 10 MG/0.5 GRAM /ACTUATION GLPM] Place 40 mg on the skin daily. Apply to dry, clean, hairless skin       family history is not on file.     Social Connections:      Frequency of Communication with Friends and Family:      Frequency of Social Gatherings with Friends and Family:      Attends Oriental orthodox Services:      Active Member of Clubs or Organizations:      Attends Club or Organization Meetings:      Marital Status:           WBC Count   Date Value Ref Range Status   08/19/2021 9.2 4.0 - 11.0 10e3/uL Final     RBC Count   Date Value Ref Range Status   08/19/2021 5.28 4.40 - 5.90 10e6/uL Final     Hemoglobin   Date Value Ref Range Status   08/19/2021 15.0 13.3 - 17.7 g/dL Final     Hematocrit   Date Value Ref Range Status   08/19/2021 44.0 40.0 - 53.0 % Final     MCV   Date Value Ref Range Status   08/19/2021 83 78 - 100 fL Final     MCH   Date Value Ref Range Status   08/19/2021 28.4 26.5 - 33.0 pg Final     Platelet Count   Date Value Ref Range Status   08/19/2021 207 150 - 450 10e3/uL Final     % Lymphocytes   Date Value Ref  Range Status   01/13/2018 18 (L) 20 - 40 % Final     ALT   Date Value Ref Range Status   08/19/2021 17 0 - 45 U/L Final     Albumin   Date Value Ref Range Status   08/19/2021 4.0 3.5 - 5.0 g/dL Final     Creatinine   Date Value Ref Range Status   08/19/2021 0.97 0.70 - 1.30 mg/dL Final     GFR Estimate   Date Value Ref Range Status   08/19/2021 82 >60 mL/min/1.73m2 Final     Comment:     As of July 11, 2021, eGFR is calculated by the CKD-EPI creatinine equation, without race adjustment. eGFR can be influenced by muscle mass, exercise, and diet. The reported eGFR is an estimation only and is only applicable if the renal function is stable.   01/13/2021 >60 >60 mL/min/1.73m2 Final     GFR Estimate If Black   Date Value Ref Range Status   01/13/2021 >60 >60 mL/min/1.73m2 Final

## 2021-08-28 ENCOUNTER — HEALTH MAINTENANCE LETTER (OUTPATIENT)
Age: 66
End: 2021-08-28

## 2021-10-20 ENCOUNTER — OFFICE VISIT (OUTPATIENT)
Dept: RHEUMATOLOGY | Facility: CLINIC | Age: 66
End: 2021-10-20
Payer: COMMERCIAL

## 2021-10-20 VITALS
BODY MASS INDEX: 29.89 KG/M2 | DIASTOLIC BLOOD PRESSURE: 80 MMHG | SYSTOLIC BLOOD PRESSURE: 140 MMHG | WEIGHT: 220.4 LBS | HEART RATE: 84 BPM

## 2021-10-20 DIAGNOSIS — G89.29 CHRONIC RIGHT SHOULDER PAIN: Primary | ICD-10-CM

## 2021-10-20 DIAGNOSIS — M19.011 OSTEOARTHRITIS OF BILATERAL GLENOHUMERAL JOINTS: ICD-10-CM

## 2021-10-20 DIAGNOSIS — M77.8 RIGHT SHOULDER TENDONITIS: ICD-10-CM

## 2021-10-20 DIAGNOSIS — L40.50 PSORIATIC ARTHRITIS (H): ICD-10-CM

## 2021-10-20 DIAGNOSIS — M19.012 OSTEOARTHRITIS OF BILATERAL GLENOHUMERAL JOINTS: ICD-10-CM

## 2021-10-20 DIAGNOSIS — M25.511 CHRONIC RIGHT SHOULDER PAIN: Primary | ICD-10-CM

## 2021-10-20 PROCEDURE — 99214 OFFICE O/P EST MOD 30 MIN: CPT | Performed by: INTERNAL MEDICINE
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
## 2021-10-23 ENCOUNTER — HEALTH MAINTENANCE LETTER (OUTPATIENT)
Age: 66
End: 2021-10-23

## 2021-11-04 ENCOUNTER — TELEPHONE (OUTPATIENT)
Dept: RHEUMATOLOGY | Facility: CLINIC | Age: 66
End: 2021-11-04

## 2021-11-04 DIAGNOSIS — L40.50 PSORIATIC ARTHRITIS (H): ICD-10-CM

## 2021-11-04 DIAGNOSIS — M19.011 OSTEOARTHRITIS OF BILATERAL GLENOHUMERAL JOINTS: ICD-10-CM

## 2021-11-04 DIAGNOSIS — M77.8 RIGHT SHOULDER TENDONITIS: Primary | ICD-10-CM

## 2021-11-04 DIAGNOSIS — M19.012 OSTEOARTHRITIS OF BILATERAL GLENOHUMERAL JOINTS: ICD-10-CM

## 2021-11-04 NOTE — TELEPHONE ENCOUNTER
Dr. Soni ordered an MRI for patient. He was scheduled at [facility] but they are out of network with his insurance. He would like MRI order sent to [place].

## 2021-11-08 ENCOUNTER — TELEPHONE (OUTPATIENT)
Dept: RHEUMATOLOGY | Facility: CLINIC | Age: 66
End: 2021-11-08
Payer: COMMERCIAL

## 2021-11-08 NOTE — TELEPHONE ENCOUNTER
PA Initiation    Medication: Enbrel Sureclick - pending   Insurance Company: [employer] [place] - Phone [phone] Fax [phone]  Pharmacy Filling the Rx: [facility] - [place], [place] - [facility] [address]  Filling Pharmacy Phone: [phone]  Filling Pharmacy Fax:    Start Date: 11/8/2021

## 2021-11-11 ENCOUNTER — TELEPHONE (OUTPATIENT)
Dept: RHEUMATOLOGY | Facility: CLINIC | Age: 66
End: 2021-11-11

## 2021-11-11 DIAGNOSIS — L40.50 PSORIATIC ARTHRITIS (H): ICD-10-CM

## 2021-11-11 DIAGNOSIS — L40.9 PSORIASIS: ICD-10-CM

## 2021-11-11 NOTE — TELEPHONE ENCOUNTER
Prior Authorization Approval    Authorization Effective Date: 11/28/2021  Authorization Expiration Date: 11/28/2022  Medication: Enbrel Sureclick - approved  Approved Dose/Quantity: 4 pens for 28 days  Reference #: Key: [id number]   Insurance Company: [employer] [place] - Phone [phone] Fax [phone]  Expected CoPay: $0     CoPay Card Available: Yes    Foundation Assistance Needed: no  Which Pharmacy is filling the prescription (Not needed for infusion/clinic administered): [facility] - [place], [place] - [address]  Pharmacy Notified: Yes  Patient Notified: Yes

## 2021-11-11 NOTE — TELEPHONE ENCOUNTER
Prior Authorization Not Needed per Insurance    Medication: sajjad  Insurance Company: [employer] - Phone [phone] Fax [phone]  Expected CoPay:      Pharmacy Filling the Rx:    Pharmacy Notified: No  Patient Notified: No    Please see encounter date 11/8, medication approved until 11/28/2022    Patria Velez [place] OSHEA    6:49 AM  Note     Prior Authorization Approval     Authorization Effective Date: 11/28/2021  Authorization Expiration Date: 11/28/2022  Medication: Enbrel Sureclick - approved  Approved Dose/Quantity: 4 pens for 28 days  Reference #: Key: [id number]   Insurance Company: [employer] [place] - Phone [phone] Fax [phone]  Expected CoPay: $0     CoPay Card Available: Yes    Foundation Assistance Needed: no  Which Pharmacy is filling the prescription (Not needed for infusion/clinic administered): [facility] - [place], [place] - [address]  Pharmacy Notified: Yes  Patient Notified: Yes

## 2021-11-12 RX ORDER — MEDROXYPROGESTERONE ACETATE 150 MG/ML
INJECTION, SUSPENSION INTRAMUSCULAR
Qty: 12 ML | Refills: 3 | Status: SHIPPED | OUTPATIENT
Start: 2021-11-12 | End: 2022-05-16

## 2021-12-13 ENCOUNTER — APPOINTMENT (OUTPATIENT)
Dept: URBAN - METROPOLITAN AREA CLINIC 260 | Age: 66
Setting detail: DERMATOLOGY
End: 2021-12-14

## 2021-12-13 VITALS — WEIGHT: 225.5 LBS | HEIGHT: 70 IN

## 2021-12-13 DIAGNOSIS — B00.1 HERPESVIRAL VESICULAR DERMATITIS: ICD-10-CM

## 2021-12-13 DIAGNOSIS — L71.8 OTHER ROSACEA: ICD-10-CM

## 2021-12-13 DIAGNOSIS — L40.0 PSORIASIS VULGARIS: ICD-10-CM

## 2021-12-13 DIAGNOSIS — L57.0 ACTINIC KERATOSIS: ICD-10-CM

## 2021-12-13 DIAGNOSIS — B35.6 TINEA CRURIS: ICD-10-CM

## 2021-12-13 PROCEDURE — OTHER COUNSELING: OTHER

## 2021-12-13 PROCEDURE — OTHER PRESCRIPTION MEDICATION MANAGEMENT: OTHER

## 2021-12-13 PROCEDURE — 17000 DESTRUCT PREMALG LESION: CPT

## 2021-12-13 PROCEDURE — 99204 OFFICE O/P NEW MOD 45 MIN: CPT | Mod: 25

## 2021-12-13 PROCEDURE — OTHER PRESCRIPTION: OTHER

## 2021-12-13 PROCEDURE — 17003 DESTRUCT PREMALG LES 2-14: CPT

## 2021-12-13 PROCEDURE — OTHER LIQUID NITROGEN: OTHER

## 2021-12-13 RX ORDER — ECONAZOLE NITRATE 10 MG/G
CREAM TOPICAL BID
Qty: 30 | Refills: 2 | Status: ERX | COMMUNITY
Start: 2021-12-13

## 2021-12-13 RX ORDER — METRONIDAZOLE 7.5 MG/G
CREAM TOPICAL BID
Qty: 45 | Refills: 3 | Status: ERX | COMMUNITY
Start: 2021-12-13

## 2021-12-13 RX ORDER — CLOBETASOL PROPIONATE 0.5 MG/G
CREAM TOPICAL BID
Qty: 60 | Refills: 2 | Status: ERX | COMMUNITY
Start: 2021-12-13

## 2021-12-13 RX ORDER — VALACYCLOVIR 500 MG/1
TABLET, FILM COATED ORAL BID
Qty: 24 | Refills: 2 | Status: ERX | COMMUNITY
Start: 2021-12-13

## 2021-12-13 ASSESSMENT — LOCATION ZONE DERM
LOCATION ZONE: PENIS
LOCATION ZONE: TRUNK
LOCATION ZONE: HAND
LOCATION ZONE: LEG
LOCATION ZONE: FACE

## 2021-12-13 ASSESSMENT — SEVERITY ASSESSMENT OVERALL AMONG ALL PATIENTS
IN YOUR EXPERIENCE, AMONG ALL PATIENTS YOU HAVE SEEN WITH THIS CONDITION, HOW SEVERE IS THIS PATIENT'S CONDITION?: MODERATE TO SEVERE

## 2021-12-13 ASSESSMENT — LOCATION DETAILED DESCRIPTION DERM
LOCATION DETAILED: DORSAL GLANS
LOCATION DETAILED: RIGHT KNEE
LOCATION DETAILED: LEFT FOREHEAD
LOCATION DETAILED: LEFT CENTRAL MALAR CHEEK
LOCATION DETAILED: RIGHT INFERIOR LATERAL FOREHEAD
LOCATION DETAILED: RIGHT ULNAR DORSAL HAND
LOCATION DETAILED: LEFT KNEE
LOCATION DETAILED: RIGHT INGUINAL CREASE
LOCATION DETAILED: SUPRAPUBIC SKIN
LOCATION DETAILED: LEFT RADIAL DORSAL HAND

## 2021-12-13 ASSESSMENT — LOCATION SIMPLE DESCRIPTION DERM
LOCATION SIMPLE: LEFT FOREHEAD
LOCATION SIMPLE: LEFT CHEEK
LOCATION SIMPLE: PENIS
LOCATION SIMPLE: LEFT HAND
LOCATION SIMPLE: GROIN
LOCATION SIMPLE: RIGHT KNEE
LOCATION SIMPLE: LEFT KNEE
LOCATION SIMPLE: RIGHT FOREHEAD
LOCATION SIMPLE: RIGHT HAND

## 2021-12-13 ASSESSMENT — PGA PSORIASIS: PGA PSORIASIS 2020: MILD

## 2021-12-13 NOTE — HPI: INFECTION (TINEA)
How Severe Is Your Dermatophytosis?: moderate
Is This A New Presentation, Or A Follow-Up?: Follow Up Tinea Cruris
Additional History: Has used econazole cream to treat this area before and it worked well.  Does not have a current RX

## 2021-12-13 NOTE — PROCEDURE: LIQUID NITROGEN
Duration Of Freeze Thaw-Cycle (Seconds): 0
Post-Care Instructions: - Patient was instructed to avoid picking at any of the treated lesions.
Render Post-Care Instructions In Note?: yes
Detail Level: Detailed
Consent: - Discussed that these are a result of cumulative sun exposure.\\n- Verbal and written consent was obtained, and risks were reviewed prior to procedure today. \\n- Risks discussed include but are not limited to pain, crusting, scabbing, blistering, scarring, temporary or permanent darker or lighter pigmentary change, recurrence, incomplete resolution, and infection.

## 2021-12-13 NOTE — PROCEDURE: PRESCRIPTION MEDICATION MANAGEMENT
Render In Strict Bullet Format?: No
Continue Regimen: Retinol nightly (has at home)
Plan: Follow up in 60 days
Continue Regimen: Enbrel as prescribed by rheumatology for PSA, Clobetasol cream BID as needed for persistent plaques
Detail Level: Simple
Initiate Treatment: Econazole 1% cream BID
Initiate Treatment: Metronidazole 0.75% cream BID

## 2021-12-23 ENCOUNTER — DOCUMENTATION ONLY (OUTPATIENT)
Dept: LAB | Facility: CLINIC | Age: 66
End: 2021-12-23
Payer: COMMERCIAL

## 2021-12-23 DIAGNOSIS — L40.50 PSORIATIC ARTHRITIS (H): Primary | ICD-10-CM

## 2022-04-09 ENCOUNTER — HEALTH MAINTENANCE LETTER (OUTPATIENT)
Age: 67
End: 2022-04-09

## 2022-04-14 NOTE — TELEPHONE ENCOUNTER
Telephone Encounter by Maria Isabel Gamble at 10/28/2020  3:28 PM     Author: Maria Isabel Gamble Service: -- Author Type: Financial Resource Guide    Filed: 10/28/2020  3:40 PM Encounter Date: 10/28/2020 Status: Signed    : Maria Isabel Gamble (Financial Resource Guide)         PA Initiation    Medication: Enbrel  Insurance Company: [facility]  Pharmacy Filling Rx: [facility]  Start Date: 10/28/2020            moderate

## 2022-04-19 ENCOUNTER — TELEPHONE (OUTPATIENT)
Dept: RHEUMATOLOGY | Facility: CLINIC | Age: 67
End: 2022-04-19
Payer: COMMERCIAL

## 2022-04-19 DIAGNOSIS — L40.50 PSORIATIC ARTHRITIS (H): Primary | ICD-10-CM

## 2022-04-19 NOTE — TELEPHONE ENCOUNTER
Patient requesting Enbrel refill. Please initiate PA if needed. Patient has new insurance through [employer].

## 2022-05-10 NOTE — TELEPHONE ENCOUNTER
Multiple attempts have been made to contact patient to schedule a lab appointment with no call back from the patient.

## 2022-05-16 ENCOUNTER — LAB (OUTPATIENT)
Dept: LAB | Facility: CLINIC | Age: 67
End: 2022-05-16

## 2022-05-16 ENCOUNTER — OFFICE VISIT (OUTPATIENT)
Dept: RHEUMATOLOGY | Facility: CLINIC | Age: 67
End: 2022-05-16
Payer: COMMERCIAL

## 2022-05-16 VITALS
SYSTOLIC BLOOD PRESSURE: 130 MMHG | BODY MASS INDEX: 31.06 KG/M2 | DIASTOLIC BLOOD PRESSURE: 70 MMHG | WEIGHT: 229 LBS | HEART RATE: 80 BPM

## 2022-05-16 DIAGNOSIS — L40.50 PSORIATIC ARTHRITIS (H): ICD-10-CM

## 2022-05-16 DIAGNOSIS — G89.29 CHRONIC RIGHT SHOULDER PAIN: ICD-10-CM

## 2022-05-16 DIAGNOSIS — L40.9 PSORIASIS: ICD-10-CM

## 2022-05-16 DIAGNOSIS — M43.28 ANKYLOSIS, SACROILIAC JOINT: ICD-10-CM

## 2022-05-16 DIAGNOSIS — L40.50 PSORIATIC ARTHRITIS (H): Primary | ICD-10-CM

## 2022-05-16 DIAGNOSIS — M25.511 CHRONIC RIGHT SHOULDER PAIN: ICD-10-CM

## 2022-05-16 DIAGNOSIS — M77.8 RIGHT SHOULDER TENDONITIS: ICD-10-CM

## 2022-05-16 LAB
ALBUMIN SERPL-MCNC: 3.9 G/DL (ref 3.5–5)
ALT SERPL W P-5'-P-CCNC: 41 U/L (ref 0–45)
CREAT SERPL-MCNC: 1.11 MG/DL (ref 0.7–1.3)
ERYTHROCYTE [DISTWIDTH] IN BLOOD BY AUTOMATED COUNT: 12.5 % (ref 10–15)
GFR SERPL CREATININE-BSD FRML MDRD: 74 ML/MIN/1.73M2
HCT VFR BLD AUTO: 44.1 % (ref 40–53)
HGB BLD-MCNC: 15.2 G/DL (ref 13.3–17.7)
MCH RBC QN AUTO: 28.4 PG (ref 26.5–33)
MCHC RBC AUTO-ENTMCNC: 34.5 G/DL (ref 31.5–36.5)
MCV RBC AUTO: 82 FL (ref 78–100)
PLATELET # BLD AUTO: 180 10E3/UL (ref 150–450)
RBC # BLD AUTO: 5.36 10E6/UL (ref 4.4–5.9)
WBC # BLD AUTO: 6.4 10E3/UL (ref 4–11)

## 2022-05-16 PROCEDURE — 82040 ASSAY OF SERUM ALBUMIN: CPT

## 2022-05-16 PROCEDURE — 82565 ASSAY OF CREATININE: CPT

## 2022-05-16 PROCEDURE — 85027 COMPLETE CBC AUTOMATED: CPT

## 2022-05-16 PROCEDURE — 84460 ALANINE AMINO (ALT) (SGPT): CPT

## 2022-05-16 PROCEDURE — 36415 COLL VENOUS BLD VENIPUNCTURE: CPT

## 2022-05-16 PROCEDURE — 99214 OFFICE O/P EST MOD 30 MIN: CPT | Performed by: INTERNAL MEDICINE

## 2022-05-16 RX ORDER — MEDROXYPROGESTERONE ACETATE 150 MG/ML
INJECTION, SUSPENSION INTRAMUSCULAR
Qty: 12 ML | Refills: 3 | Status: SHIPPED | OUTPATIENT
Start: 2022-05-16 | End: 2023-10-05

## 2022-05-16 NOTE — PROGRESS NOTES
Rheumatology follow-up visit note     Bon is a 67 year old male presents today for follow-up.    Bon was seen today for recheck.    Diagnoses and all orders for this visit:    Psoriatic arthritis (H)  -     MR Shoulder Right w/o Contrast; Future  -     etanercept (ENBREL SURECLICK) 50 MG/ML autoinjector; INJECT THE CONTENTS OF 1 SURECLICK AUTOINJECTOR SUBCUTANEOUSLY EVERY WEEK.    Psoriasis  -     etanercept (ENBREL SURECLICK) 50 MG/ML autoinjector; INJECT THE CONTENTS OF 1 SURECLICK AUTOINJECTOR SUBCUTANEOUSLY EVERY WEEK.    Right shoulder tendonitis  -     MR Shoulder Right w/o Contrast; Future    Ankylosis, sacroiliac joint    Chronic right shoulder pain        This patient is here for follow-up of psoriatic arthritis on Enbrel, worsening pain in the right shoulder where he has evidence of tendinopathy, osteoarthritis worsening pain keeping him up at night, arranging for MRI of the right shoulder and depending on the results consider orthopedic referral in the past corticosteroid injections have provided him with transient relief.  He is to continue Enbrel as now.  We will meet here in 6 months with labs today as noted.    Follow up in 6 months.    HPI    Bon Luque is a 67 year old male is here for follow-up of moderately severe flare up of pain.  Joints affected include the right shoulder.  He was unable to do the MRI as we discussed previously year.  It is related with insurance issues those have been sorted out.  He is continue to be on Enbrel.  He has noted discomfort in some of the joints although predominantly related to the shoulder symptoms.  He is able to do most of his day-to-day activities with some more no difficulty.  The right shoulder pain is noted to be severe keeping him up at night.  Overall stiffness is for 30 minutes in the morning.    .    Further historical information, including ROS and limitation in activities as noted in the multidimensional health assessment questionnaire  scanned in the EMR and in the assessment and plan section.      DETAILED EXAMINATION  05/16/22  :    Vitals:    05/16/22 1238   BP: 130/70   Pulse: 80   Weight: 103.9 kg (229 lb)     Alert oriented. Head including the face is examined for malar rash, heliotropes, scarring, lupus pernio. Eyes examined for redness such as in episcleritis/scleritis, periorbital lesions.   Neck/ Face examined for parotid gland swelling, range of motion of neck.  Left upper and lower and right upper and lower extremities examined for tenderness, swelling, warmth of the appendicular joints, range of motion, edema, rash.  Some of the important findings included: he does not have evidence of synovitis in the palpable joints of the upper extremities.  No significant deformities of the digits.  + Heberden nodes.  Range of motion of the shoulders  show full abduction, however painful arc on the right side.  No JLT effusion or warmth of the knees.  he does not have dactylitis of the digits.     Patient Active Problem List    Diagnosis Date Noted     Knee effusion, left 11/06/2018     Priority: Medium     Drug-induced constipation      Priority: Medium     Type 2 diabetes mellitus with complication, with long-term current use of insulin (H)      Priority: Medium     Chronic pain syndrome      Priority: Medium     Lower abdominal pain      Priority: Medium     Obstructive sleep apnea      Priority: Medium     Depression 12/26/2017     Priority: Medium     Right renal mass 10/02/2017     Priority: Medium     1.9cm mass on right kidney on MRI July 2017, unchanged on ultrasound   September 2017 concerning for renal cell cancer, has been referred to   urology.         Dilated bile duct 07/03/2017     Priority: Medium     CT scan with incidental finding of dilated intra-and extrahepatic ducts         Ischemic cardiomyopathy      Priority: Medium     Stress Echocardiogram 2017 with decreased LV function with EF 43% worse   compared to previous echo.   No change in infarct.  No new ischemia         Ankylosis, sacroiliac joint 07/11/2016     Priority: Medium     Spondylosis of lumbar region without myelopathy or radiculopathy 07/11/2016     Priority: Medium     Psoriatic arthritis (H) 04/28/2016     Priority: Medium     Chronic low back pain 04/28/2016     Priority: Medium     Diabetic peripheral neuropathy associated with type 2 diabetes mellitus (H) 04/28/2016     Priority: Medium     ASCVD (arteriosclerotic cardiovascular disease) 04/28/2016     Priority: Medium     Coronary artery stent ×2 in RCA following myocardial infarction 2008 ,   echo June 2014 normal LV function with ejection fraction 50-55% with mild   apical inferior hypokinesis         Alcohol abuse, in remission 04/28/2016     Priority: Medium     Tobacco abuse, in remission 04/28/2016     Priority: Medium     Psoriasis      Priority: Medium     Hypercholesterolemia      Priority: Medium     HTN (hypertension)      Priority: Medium     Optic neuritis      Priority: Medium     Type 2 diabetes mellitus with insulin therapy (H)      Priority: Medium     Obstructive sleep apnea on CPAP      Priority: Medium     Hypogonadism in male      Priority: Medium     Erectile dysfunction      Priority: Medium     Urologic evaluation July 2015, hypogonadism         Thoracic radiculopathy 01/01/2004     Priority: Medium     right T8 intercostal nerve block 2002         Past Surgical History:   Procedure Laterality Date     APPENDECTOMY       COLONOSCOPY      Colonoscopy September 2017 with hyperplastic polyps, repeat in 10 years     CORONARY ANGIOPLASTY      stent     KNEE SURGERY       NOSE SURGERY       UT LAP,PARTIAL NEPHRECTOMY Right 1/10/2018    Procedure: RIGHT ROBOTIC PARTIAL NEPHRECTOMY WITH INTRAOPERATIVE ULTRASOUND;  Surgeon: Chase Rodríguez MD;  Location: [facility] [place];  Service: Urology      Past Medical History:   Diagnosis Date     Arthritis     psoriatic     ASCVD (arteriosclerotic  cardiovascular disease) 4/28/2016    Coronary artery stent ×2 in RCA following myocardial infarction 2008 , echo June 2014 normal LV function with ejection fraction 50-55% with mild apical inferior hypokinesis     Chronic low back pain 4/28/2016     Coronary artery disease      Depression 12/26/2017     Diabetic peripheral neuropathy associated with type 2 diabetes mellitus (H) 04/28/2016    Abnormal EMG and [facility]up at [facility]     Dilated bile duct 7/3/2017    CT scan with incidental finding of dilated intra-and extrahepatic ducts     HTN (hypertension)      Hypercholesterolemia      Hypogonadism in male      Ischemic cardiomyopathy     Stress Echocardiogram 2017 with decreased LV function with EF 43% worse compared to previous echo.  No change in infarct.  No new ischemia     Left upper quadrant pain 6/30/2017     MI (myocardial infarction) (H)      Obstructive sleep apnea on CPAP     CPAP     Optic neuritis      Psoriasis      Right renal mass      Sebaceous cyst 2010     Thoracic radiculopathy 2004    right T8 intercostal nerve block 2002     Type 2 diabetes mellitus with insulin therapy (H)      Allergies   Allergen Reactions     Atorvastatin Muscle Pain (Myalgia)     Valomag [Arthritis Pain Formula] Nausea and Vomiting     gastric ulcer from aspirin use     Ampicillin Rash     Had a reaction to this medication many years ago.     Codeine Rash     It has been about 30 years ago     Current Outpatient Medications   Medication Sig Dispense Refill     DULoxetine (CYMBALTA) 60 MG capsule [DULOXETINE (CYMBALTA) 60 MG CAPSULE] Take 60 mg by mouth 2 (two) times a day.       empagliflozin 25 mg Tab [EMPAGLIFLOZIN 25 MG TAB] Take 25 mg by mouth daily.       ENBREL SURECLICK 50 MG/ML autoinjector INJECT THE CONTENTS OF 1 SURECLICK AUTOINJECTOR SUBCUTANEOUSLY EVERY WEEK. 12 mL 3     gabapentin (NEURONTIN) 300 MG capsule [GABAPENTIN (NEURONTIN) 300 MG CAPSULE] Take 600 mg by mouth 3 (three) times a day.        ibuprofen  (ADVIL,MOTRIN) 200 MG tablet [IBUPROFEN (ADVIL,MOTRIN) 200 MG TABLET] Take 600 mg by mouth every 6 (six) hours as needed for pain.       insulin aspart (NOVOLOG) 100 unit/mL injection [INSULIN ASPART (NOVOLOG) 100 UNIT/ML INJECTION] Inject under the skin 3 (three) times a day before meals. 1 unit of Novolog per 5 g of carbs       insulin glargine (LANTUS) 100 unit/mL injection [INSULIN GLARGINE (LANTUS) 100 UNIT/ML INJECTION] Inject 70 Units under the skin 2 (two) times a day.        morphine (MS CONTIN) 60 MG 12 hr tablet [MORPHINE (MS CONTIN) 60 MG 12 HR TABLET] Take 60 mg by mouth 3 (three) times a day.       oxyCODONE-acetaminophen (PERCOCET) 5-325 mg per tablet [OXYCODONE-ACETAMINOPHEN (PERCOCET) 5-325 MG PER TABLET] Take 1-2 tablets by mouth every 4 (four) hours as needed. 30 tablet 0     tadalafil (CIALIS) 20 MG tablet [TADALAFIL (CIALIS) 20 MG TABLET] Take 20 mg by mouth daily as needed.       testosterone 10 mg/0.5 gram /actuation GlPm [TESTOSTERONE 10 MG/0.5 GRAM /ACTUATION GLPM] Place 40 mg on the skin daily. Apply to dry, clean, hairless skin       family history is not on file.  Social Connections: Not on file          WBC Count   Date Value Ref Range Status   08/19/2021 9.2 4.0 - 11.0 10e3/uL Final     RBC Count   Date Value Ref Range Status   08/19/2021 5.28 4.40 - 5.90 10e6/uL Final     Hemoglobin   Date Value Ref Range Status   08/19/2021 15.0 13.3 - 17.7 g/dL Final     Hematocrit   Date Value Ref Range Status   08/19/2021 44.0 40.0 - 53.0 % Final     MCV   Date Value Ref Range Status   08/19/2021 83 78 - 100 fL Final     MCH   Date Value Ref Range Status   08/19/2021 28.4 26.5 - 33.0 pg Final     Platelet Count   Date Value Ref Range Status   08/19/2021 207 150 - 450 10e3/uL Final     % Lymphocytes   Date Value Ref Range Status   01/13/2018 18 (L) 20 - 40 % Final     ALT   Date Value Ref Range Status   08/19/2021 17 0 - 45 U/L Final     Albumin   Date Value Ref Range Status   08/19/2021 4.0 3.5 -  5.0 g/dL Final     Creatinine   Date Value Ref Range Status   08/19/2021 0.97 0.70 - 1.30 mg/dL Final     GFR Estimate   Date Value Ref Range Status   08/19/2021 82 >60 mL/min/1.73m2 Final     Comment:     As of July 11, 2021, eGFR is calculated by the CKD-EPI creatinine equation, without race adjustment. eGFR can be influenced by muscle mass, exercise, and diet. The reported eGFR is an estimation only and is only applicable if the renal function is stable.   01/13/2021 >60 >60 mL/min/1.73m2 Final     GFR Estimate If Black   Date Value Ref Range Status   01/13/2021 >60 >60 mL/min/1.73m2 Final

## 2022-05-26 ENCOUNTER — TELEPHONE (OUTPATIENT)
Dept: RHEUMATOLOGY | Facility: CLINIC | Age: 67
End: 2022-05-26

## 2022-05-27 NOTE — TELEPHONE ENCOUNTER
Called patient to notify of high copay and look into free drug as an option but had to leave a message.

## 2022-05-31 ENCOUNTER — NURSE TRIAGE (OUTPATIENT)
Dept: NURSING | Facility: CLINIC | Age: 67
End: 2022-05-31
Payer: COMMERCIAL

## 2022-05-31 NOTE — TELEPHONE ENCOUNTER
"Patient is calling and feels that he may have a blood clot in his leg.  Right leg above the knee in the inside has a spot and sore to touch and does spasm.  \"stinging can be present\".  Denies swelling.  Denies entire foot is cool or blue.  Patient states that he will go to hospital.    COVID 19 Nurse Triage Plan/Patient Instructions    Please be aware that novel coronavirus (COVID-19) may be circulating in the community. If you develop symptoms such as fever, cough, or SOB or if you have concerns about the presence of another infection including coronavirus (COVID-19), please contact your health care provider or visit [email or web address].     Disposition/Instructions    In-Person Visit with provider recommended. Reference Visit Selection Guide.    Thank you for taking steps to prevent the spread of this virus.  o Limit your contact with others.  o Wear a simple mask to cover your cough.  o Wash your hands well and often.    Resources    [facility]: About COVID-19: [email or web address]    CDC: What to Do If You're Sick: www.cdc.gov/coronavirus/2019-ncov/about/steps-when-sick.html    CDC: Ending Home Isolation: www.cdc.gov/coronavirus/2019-ncov/hcp/disposition-in-home-patients.html     CDC: Caring for Someone: www.cdc.gov/coronavirus/2019-ncov/if-you-are-sick/care-for-someone.html     [facility]: Interim Guidance for Hospital Discharge to Home: www.health.[facility].[place]/diseases/coronavirus/hcp/hospdischarge.pdf    [facility] clinical trials (COVID-19 research studies): [email or web address]     Below are the COVID-19 hotlines at the [facility] ([facility]). Interpreters are available.   o For health questions: Call 651-387-8387 or 1-967.584.3638 (7 a.m. to 7 p.m.)  o For questions about schools and childcare: Call 645-477-5896 or 1-619.203.2423 (7 a.m. to 7 p.m.)                       Reason for Disposition    Thigh or calf pain in only one leg and " present > 1 hour    Additional Information    Negative: Looks like a broken bone or dislocated joint (e.g., crooked or deformed)    Negative: Sounds like a life-threatening emergency to the triager    Negative: Chest pain    Negative: Difficulty breathing    Negative: Entire foot is cool or blue in comparison to other side    Negative: Unable to walk    Negative: Fever and red area (or area very tender to touch)    Negative: Fever and swollen joint    Protocols used: LEG PAIN-A-[place]

## 2022-06-02 NOTE — TELEPHONE ENCOUNTER
Prior Authorization Not Needed per Insurance    Medication: Enbrel - Free Drug Pending  Insurance Company: [email or web address] - Phone [phone] Fax [phone]  Expected CoPay: $1595.64    Pharmacy Filling the Rx: [facility] - [place], [place] - [address]   Pharmacy Notified:    Patient Notified:      Reaching out to patient regarding free drug.

## 2022-06-04 ENCOUNTER — HEALTH MAINTENANCE LETTER (OUTPATIENT)
Age: 67
End: 2022-06-04

## 2022-06-09 NOTE — TELEPHONE ENCOUNTER
Left voicemail for patient to call back to discuss high copay/pursue free drug.   Pt reports chest pain started yesterday, feels pressure, some SOA. Hx heart attack. Cardiologist Kailey De Santiago, RN  08/19/19 [id number]

## 2022-06-16 ENCOUNTER — HOSPITAL ENCOUNTER (OUTPATIENT)
Dept: CT IMAGING | Facility: CLINIC | Age: 67
Discharge: HOME | End: 2022-06-16
Attending: PHYSICIAN ASSISTANT | Admitting: PHYSICIAN ASSISTANT
Payer: COMMERCIAL

## 2022-06-16 ENCOUNTER — OFFICE VISIT (OUTPATIENT)
Dept: FAMILY MEDICINE | Facility: CLINIC | Age: 67
End: 2022-06-16
Payer: COMMERCIAL

## 2022-06-16 VITALS
TEMPERATURE: 97.8 F | OXYGEN SATURATION: 96 % | DIASTOLIC BLOOD PRESSURE: 84 MMHG | RESPIRATION RATE: 19 BRPM | HEART RATE: 80 BPM | SYSTOLIC BLOOD PRESSURE: 150 MMHG

## 2022-06-16 DIAGNOSIS — R10.13 EPIGASTRIC PAIN: ICD-10-CM

## 2022-06-16 DIAGNOSIS — R53.83 FATIGUE, UNSPECIFIED TYPE: Primary | ICD-10-CM

## 2022-06-16 DIAGNOSIS — E11.9 TYPE 2 DIABETES MELLITUS WITHOUT COMPLICATION, UNSPECIFIED WHETHER LONG TERM INSULIN USE (H): ICD-10-CM

## 2022-06-16 LAB
ALBUMIN SERPL-MCNC: 3.8 G/DL (ref 3.5–5)
ALBUMIN UR-MCNC: NEGATIVE MG/DL
ALP SERPL-CCNC: 143 U/L (ref 45–120)
ALT SERPL W P-5'-P-CCNC: 20 U/L (ref 0–45)
ANION GAP SERPL CALCULATED.3IONS-SCNC: 14 MMOL/L (ref 5–18)
APPEARANCE UR: CLEAR
AST SERPL W P-5'-P-CCNC: 18 U/L (ref 0–40)
BACTERIA #/AREA URNS HPF: ABNORMAL /HPF
BASOPHILS # BLD AUTO: 0 10E3/UL (ref 0–0.2)
BASOPHILS NFR BLD AUTO: 0 %
BILIRUB SERPL-MCNC: 0.7 MG/DL (ref 0–1)
BILIRUB UR QL STRIP: NEGATIVE
BUN SERPL-MCNC: 14 MG/DL (ref 8–22)
CALCIUM SERPL-MCNC: 9.1 MG/DL (ref 8.5–10.5)
CHLORIDE BLD-SCNC: 99 MMOL/L (ref 98–107)
CO2 SERPL-SCNC: 22 MMOL/L (ref 22–31)
COLOR UR AUTO: YELLOW
CREAT BLD-MCNC: 1 MG/DL (ref 0.7–1.3)
CREAT SERPL-MCNC: 1.18 MG/DL (ref 0.7–1.3)
EOSINOPHIL # BLD AUTO: 0.2 10E3/UL (ref 0–0.7)
EOSINOPHIL NFR BLD AUTO: 2 %
ERYTHROCYTE [DISTWIDTH] IN BLOOD BY AUTOMATED COUNT: 12.4 % (ref 10–15)
GFR SERPL CREATININE-BSD FRML MDRD: 68 ML/MIN/1.73M2
GFR SERPL CREATININE-BSD FRML MDRD: >60 ML/MIN/1.73M2
GLUCOSE BLD-MCNC: 336 MG/DL (ref 70–125)
GLUCOSE UR STRIP-MCNC: 500 MG/DL
HCT VFR BLD AUTO: 47 % (ref 40–53)
HGB BLD-MCNC: 16 G/DL (ref 13.3–17.7)
HGB UR QL STRIP: ABNORMAL
IMM GRANULOCYTES # BLD: 0 10E3/UL
IMM GRANULOCYTES NFR BLD: 0 %
KETONES UR STRIP-MCNC: 15 MG/DL
LEUKOCYTE ESTERASE UR QL STRIP: NEGATIVE
LIPASE SERPL-CCNC: 16 U/L (ref 0–52)
LYMPHOCYTES # BLD AUTO: 2.4 10E3/UL (ref 0.8–5.3)
LYMPHOCYTES NFR BLD AUTO: 25 %
MCH RBC QN AUTO: 28 PG (ref 26.5–33)
MCHC RBC AUTO-ENTMCNC: 34 G/DL (ref 31.5–36.5)
MCV RBC AUTO: 82 FL (ref 78–100)
MONOCYTES # BLD AUTO: 0.9 10E3/UL (ref 0–1.3)
MONOCYTES NFR BLD AUTO: 9 %
NEUTROPHILS # BLD AUTO: 6.3 10E3/UL (ref 1.6–8.3)
NEUTROPHILS NFR BLD AUTO: 64 %
NITRATE UR QL: NEGATIVE
PH UR STRIP: 5.5 [PH] (ref 5–8)
PLATELET # BLD AUTO: 265 10E3/UL (ref 150–450)
POTASSIUM BLD-SCNC: 4.4 MMOL/L (ref 3.5–5)
PROT SERPL-MCNC: 7.4 G/DL (ref 6–8)
RBC # BLD AUTO: 5.71 10E6/UL (ref 4.4–5.9)
RBC #/AREA URNS AUTO: ABNORMAL /HPF
SODIUM SERPL-SCNC: 135 MMOL/L (ref 136–145)
SP GR UR STRIP: 1.02 (ref 1–1.03)
SQUAMOUS #/AREA URNS AUTO: ABNORMAL /LPF
TSH SERPL DL<=0.005 MIU/L-ACNC: 0.7 UIU/ML (ref 0.3–5)
UROBILINOGEN UR STRIP-ACNC: 1 E.U./DL
WBC # BLD AUTO: 9.9 10E3/UL (ref 4–11)
WBC #/AREA URNS AUTO: ABNORMAL /HPF

## 2022-06-16 PROCEDURE — 83690 ASSAY OF LIPASE: CPT | Performed by: PHYSICIAN ASSISTANT

## 2022-06-16 PROCEDURE — 74177 CT ABD & PELVIS W/CONTRAST: CPT

## 2022-06-16 PROCEDURE — 82565 ASSAY OF CREATININE: CPT

## 2022-06-16 PROCEDURE — 36415 COLL VENOUS BLD VENIPUNCTURE: CPT | Performed by: PHYSICIAN ASSISTANT

## 2022-06-16 PROCEDURE — 250N000011 HC RX IP 250 OP 636: Performed by: PHYSICIAN ASSISTANT

## 2022-06-16 PROCEDURE — 99204 OFFICE O/P NEW MOD 45 MIN: CPT | Performed by: PHYSICIAN ASSISTANT

## 2022-06-16 PROCEDURE — 83880 ASSAY OF NATRIURETIC PEPTIDE: CPT | Performed by: PHYSICIAN ASSISTANT

## 2022-06-16 PROCEDURE — 80050 GENERAL HEALTH PANEL: CPT | Performed by: PHYSICIAN ASSISTANT

## 2022-06-16 PROCEDURE — 81001 URINALYSIS AUTO W/SCOPE: CPT | Performed by: PHYSICIAN ASSISTANT

## 2022-06-16 RX ORDER — IOPAMIDOL 755 MG/ML
100 INJECTION, SOLUTION INTRAVASCULAR ONCE
Status: COMPLETED | OUTPATIENT
Start: 2022-06-16 | End: 2022-06-16

## 2022-06-16 RX ADMIN — IOPAMIDOL 100 ML: 755 INJECTION, SOLUTION INTRAVENOUS at 19:43

## 2022-06-16 NOTE — PROGRESS NOTES
Assessment & Plan:      Problem List Items Addressed This Visit    None     Visit Diagnoses     Fatigue, unspecified type    -  Primary    Relevant Orders    Comprehensive metabolic panel (BMP + Alb, Alk Phos, ALT, AST, Total. Bili, TP)    Lipase    CBC with platelets and differential (Completed)    TSH with free T4 reflex    B-Type Natriuretic Peptide ([facility] Only)    UA macro with reflex to Microscopic and Culture - Clinc Collect (Completed)    Urine Microscopic (Completed)    Epigastric pain        Relevant Orders    CT Abdomen Pelvis w Contrast (Completed)    Adult Gastro Ref - Consult Only    Type 2 diabetes mellitus without complication, unspecified whether long term insulin use (H)            Medical Decision Making  Patient with history of type 2 diabetes presents with ongoing fatigue, nausea, poor appetite, night sweats following a diagnosis of COVID-19 2 weeks ago.  Low concern for COVID-19 causing patient's ongoing symptoms.  Instead have more concern for patient's abdominal pains, nausea, and poor appetite.  Urine analysis was negative for UTI but did show glucose urea.  Suspect this likely explains patient's increased thirst.  Did consider possible diabetic ketoacidosis, however patient states that blood sugar levels at home have been between 126 and 84.  CBC shows normal white blood cell count to rule out a worse infection.  Obtain CT of the abdomen which showed ongoing dilation of the common duct and the intrahepatic biliary region.  Also found diffuse hepatic steatosis with trace ascites.  Thus, suspect likely hepatobiliary etiology.  There are other labs in process.  Recommend follow-up with primary care to discuss these lab results in person.  Also placed urgent referral to gastroenterology due to patient's significant symptoms.  Patient is otherwise tolerating fluids and some fluids appropriately at this time.  Discussed signs of worsening symptoms and when to be seen immediately in the  emergency room.     Subjective:      Bon Luque is a 67 year old male with history of type 2 diabetes, here for evaluation of ongoing fatigue, nausea, poor appetite, night sweats.  Onset of symptoms was 2 weeks ago with on and off symptoms before then.  Patient was diagnosed with COVID-19 2 weeks ago and feels like he has been no improvement, in fact symptoms have slightly worsened.     The following portions of the patient's history were reviewed and updated as appropriate: allergies, current medications, and problem list.     Review of Systems  Pertinent items are noted in HPI.    Allergies  Allergies   Allergen Reactions     Atorvastatin Muscle Pain (Myalgia)     Valomag [Arthritis Pain Formula] Nausea and Vomiting     gastric ulcer from aspirin use     Ampicillin Rash     Had a reaction to this medication many years ago.     Codeine Rash     It has been about 30 years ago       No family history on file.    Social History     Tobacco Use     Smoking status: Former Smoker     Smokeless tobacco: Never Used   Substance Use Topics     Alcohol use: No     Comment: Alcoholic Drinks/day: alcohol abuse - in remission x16 years        Objective:      BP (!) 150/84   Pulse 80   Temp 97.8  F (36.6  C)   Resp 19   SpO2 96%   General appearance - alert, well appearing, and in no distress and pale  Ears - bilateral TM's and external ear canals normal  Nose - normal and patent, no erythema, discharge or polyps  Mouth - mucous membranes moist, pharynx normal without lesions  Neck - supple, no significant adenopathy  Chest - clear to auscultation, no wheezes, rales or rhonchi, symmetric air entry  Heart - normal rate, regular rhythm, normal S1, S2, no murmurs, rubs, clicks or gallops  Abdomen - Tenderness across the epigastric and right upper quadrant regions  Back exam - No CVA tenderness     Lab & Imaging Results    Results for orders placed or performed during the hospital encounter of 06/16/22   Creatinine POCT      Status: Normal   Result Value Ref Range    Creatinine POCT 1.0 0.7 - 1.3 mg/dL    GFR, ESTIMATED POCT >60 >60 mL/min/1.73m2   Results for orders placed or performed in visit on 06/16/22   CT Abdomen Pelvis w Contrast     Status: None    Narrative    EXAM: CT ABDOMEN PELVIS W CONTRAST  LOCATION: [facility]  DATE/TIME: 6/16/2022 7:12 PM    INDICATION: Upper abdominal discomfort with poor appetite. Evaluate for abdominal mass versus hepatomegaly.  COMPARISON: 05/05/2020, 10/31/2017  TECHNIQUE: CT scan of the abdomen and pelvis was performed following injection of IV contrast. Multiplanar reformats were obtained. Dose reduction techniques were used.  CONTRAST: [id number] 100ml    FINDINGS:   LOWER CHEST: Unremarkable.    HEPATOBILIARY: Diffuse hepatic steatosis. No focal lesion identified. Gallbladder appears normal. The common duct measures 1.9 cm in diameter, unchanged since 2017. Mild intrahepatic biliary dilatation.    PANCREAS: Normal.    SPLEEN: Normal.    ADRENAL GLANDS: Normal.    KIDNEYS/BLADDER: Redemonstrated postsurgical changes from partial right nephrectomy. Left kidney appears normal. No hydronephrosis. Urinary bladder is partially underdistended with mild bladder wall thickening, likely related to underdistention.    BOWEL: No obstruction or inflammatory change. Appendix not visualized, although no secondary signs of acute appendicitis.    PERITONEUM/RETROPERITONEUM: Trace free fluid along the bilateral paracolic gutters and in the pelvis. No focal fluid collection. No intraperitoneal free air.    LYMPH NODES: No lymphadenopathy.    VASCULATURE: Moderate atherosclerotic calcifications.    PELVIC ORGANS: Unremarkable.    MUSCULOSKELETAL: Multilevel degenerative changes of the spine.      Impression    IMPRESSION:   1.  Trace ascites, of uncertain etiology.    2.  Otherwise, no acute findings or inflammatory changes in the abdomen or pelvis.    3.  Diffuse hepatic steatosis.    4.   Unchanged chronic common duct dilatation and mild intrahepatic biliary dilatation.   UA macro with reflex to Microscopic and Culture - Clinc Collect     Status: Abnormal    Specimen: Urine, Clean Catch   Result Value Ref Range    Color Urine Yellow Colorless, Straw, Light Yellow, Yellow    Appearance Urine Clear Clear    Glucose Urine 500  (A) Negative mg/dL    Bilirubin Urine Negative Negative    Ketones Urine 15  (A) Negative mg/dL    Specific Gravity Urine 1.020 1.005 - 1.030    Blood Urine Trace (A) Negative    pH Urine 5.5 5.0 - 8.0    Protein Albumin Urine Negative Negative mg/dL    Urobilinogen Urine 1.0 0.2, 1.0 E.U./dL    Nitrite Urine Negative Negative    Leukocyte Esterase Urine Negative Negative   CBC with platelets and differential     Status: None   Result Value Ref Range    WBC Count 9.9 4.0 - 11.0 10e3/uL    RBC Count 5.71 4.40 - 5.90 10e6/uL    Hemoglobin 16.0 13.3 - 17.7 g/dL    Hematocrit 47.0 40.0 - 53.0 %    MCV 82 78 - 100 fL    MCH 28.0 26.5 - 33.0 pg    MCHC 34.0 31.5 - 36.5 g/dL    RDW 12.4 10.0 - 15.0 %    Platelet Count 265 150 - 450 10e3/uL    % Neutrophils 64 %    % Lymphocytes 25 %    % Monocytes 9 %    % Eosinophils 2 %    % Basophils 0 %    % Immature Granulocytes 0 %    Absolute Neutrophils 6.3 1.6 - 8.3 10e3/uL    Absolute Lymphocytes 2.4 0.8 - 5.3 10e3/uL    Absolute Monocytes 0.9 0.0 - 1.3 10e3/uL    Absolute Eosinophils 0.2 0.0 - 0.7 10e3/uL    Absolute Basophils 0.0 0.0 - 0.2 10e3/uL    Absolute Immature Granulocytes 0.0 <=0.4 10e3/uL   Urine Microscopic     Status: Abnormal   Result Value Ref Range    Bacteria Urine None Seen None Seen /HPF    RBC Urine None Seen 0-2 /HPF /HPF    WBC Urine None Seen 0-5 /HPF /HPF    Squamous Epithelials Urine Few (A) None Seen /LPF    Narrative    Urine Culture not indicated   CBC with platelets and differential     Status: None    Narrative    The following orders were created for panel order CBC with platelets and differential.  Procedure                                Abnormality         Status                     ---------                               -----------         ------                     CBC with platelets and d...[[id number]]                      Final result                 Please view results for these tests on the individual orders.       I personally reviewed these results and discussed findings with the patient.    The use of Dragon/[employer] services was used to construct the content of this note; any grammatical errors are non-intentional. Please contact the author directly if you are in need of any clarification.

## 2022-06-17 ENCOUNTER — TELEPHONE (OUTPATIENT)
Dept: GASTROENTEROLOGY | Facility: CLINIC | Age: 67
End: 2022-06-17

## 2022-06-17 LAB — BNP SERPL-MCNC: 11 PG/ML (ref 0–59)

## 2022-06-17 NOTE — TELEPHONE ENCOUNTER
[facility] Call Center    Phone Message    May a detailed message be left on voicemail: yes     Reason for Call: Appointment Intake    Referring Provider Name: Epigastric pain     Diagnosis and/or Symptoms: Epigastric pain, Liver disease    URGENT REFERRAL 3-5 DAYS    Action Taken: Message routed to:  [facility] ([facility]): hep    Travel Screening: Not Applicable

## 2022-06-23 ENCOUNTER — OFFICE VISIT (OUTPATIENT)
Dept: INTERNAL MEDICINE | Facility: CLINIC | Age: 67
End: 2022-06-23
Payer: COMMERCIAL

## 2022-06-23 VITALS
WEIGHT: 223.9 LBS | SYSTOLIC BLOOD PRESSURE: 148 MMHG | DIASTOLIC BLOOD PRESSURE: 75 MMHG | BODY MASS INDEX: 30.37 KG/M2 | HEART RATE: 77 BPM | OXYGEN SATURATION: 97 %

## 2022-06-23 DIAGNOSIS — M25.511 CHRONIC RIGHT SHOULDER PAIN: ICD-10-CM

## 2022-06-23 DIAGNOSIS — E29.1 HYPOGONADISM IN MALE: ICD-10-CM

## 2022-06-23 DIAGNOSIS — G89.4 CHRONIC PAIN SYNDROME: ICD-10-CM

## 2022-06-23 DIAGNOSIS — F32.1 CURRENT MODERATE EPISODE OF MAJOR DEPRESSIVE DISORDER, UNSPECIFIED WHETHER RECURRENT (H): ICD-10-CM

## 2022-06-23 DIAGNOSIS — E11.8 TYPE 2 DIABETES MELLITUS WITH COMPLICATION, WITH LONG-TERM CURRENT USE OF INSULIN (H): ICD-10-CM

## 2022-06-23 DIAGNOSIS — C64.1 RENAL CELL CARCINOMA, RIGHT (H): ICD-10-CM

## 2022-06-23 DIAGNOSIS — L40.50 PSORIATIC ARTHRITIS (H): ICD-10-CM

## 2022-06-23 DIAGNOSIS — K83.8 DILATED BILE DUCT: ICD-10-CM

## 2022-06-23 DIAGNOSIS — G89.29 CHRONIC RIGHT SHOULDER PAIN: ICD-10-CM

## 2022-06-23 DIAGNOSIS — Z79.4 TYPE 2 DIABETES MELLITUS WITH COMPLICATION, WITH LONG-TERM CURRENT USE OF INSULIN (H): ICD-10-CM

## 2022-06-23 DIAGNOSIS — E11.42 DIABETIC PERIPHERAL NEUROPATHY ASSOCIATED WITH TYPE 2 DIABETES MELLITUS (H): ICD-10-CM

## 2022-06-23 DIAGNOSIS — R53.82 CHRONIC FATIGUE: ICD-10-CM

## 2022-06-23 DIAGNOSIS — I10 PRIMARY HYPERTENSION: ICD-10-CM

## 2022-06-23 DIAGNOSIS — G47.33 OBSTRUCTIVE SLEEP APNEA ON CPAP: ICD-10-CM

## 2022-06-23 DIAGNOSIS — I25.10 ASCVD (ARTERIOSCLEROTIC CARDIOVASCULAR DISEASE): ICD-10-CM

## 2022-06-23 DIAGNOSIS — R10.13 ABDOMINAL PAIN, EPIGASTRIC: Primary | ICD-10-CM

## 2022-06-23 DIAGNOSIS — L40.9 PSORIASIS: ICD-10-CM

## 2022-06-23 PROBLEM — N28.89 RIGHT RENAL MASS: Status: RESOLVED | Noted: 2017-10-02 | Resolved: 2022-06-23

## 2022-06-23 PROCEDURE — 99215 OFFICE O/P EST HI 40 MIN: CPT | Performed by: INTERNAL MEDICINE

## 2022-06-23 PROCEDURE — 96127 BRIEF EMOTIONAL/BEHAV ASSMT: CPT | Performed by: INTERNAL MEDICINE

## 2022-06-23 RX ORDER — METFORMIN HYDROCHLORIDE 750 MG/1
750 TABLET, EXTENDED RELEASE ORAL
COMMUNITY
Start: 2022-03-17 | End: 2023-01-20

## 2022-06-23 RX ORDER — INSULIN GLARGINE-YFGN 100 [IU]/ML
50 INJECTION, SOLUTION SUBCUTANEOUS 2 TIMES DAILY
Status: ON HOLD | COMMUNITY
Start: 2022-03-28 | End: 2023-10-30

## 2022-06-23 RX ORDER — PEN NEEDLE, DIABETIC 31 GX5/16"
NEEDLE, DISPOSABLE MISCELLANEOUS
COMMUNITY
Start: 2021-12-06 | End: 2024-04-15

## 2022-06-23 RX ORDER — GABAPENTIN 300 MG/1
600 CAPSULE ORAL
COMMUNITY
End: 2022-06-23

## 2022-06-23 RX ORDER — VALACYCLOVIR HYDROCHLORIDE 500 MG/1
500 TABLET, FILM COATED ORAL 2 TIMES DAILY PRN
COMMUNITY
Start: 2021-12-13 | End: 2024-05-09

## 2022-06-23 RX ORDER — FLASH GLUCOSE SENSOR
KIT MISCELLANEOUS
COMMUNITY
Start: 2022-05-31

## 2022-06-23 RX ORDER — CLOBETASOL PROPIONATE 0.5 MG/G
CREAM TOPICAL 2 TIMES DAILY PRN
COMMUNITY
Start: 2021-12-13 | End: 2023-10-28

## 2022-06-23 RX ORDER — ECONAZOLE NITRATE 10 MG/G
CREAM TOPICAL 2 TIMES DAILY PRN
COMMUNITY
Start: 2021-12-13 | End: 2023-10-05

## 2022-06-23 RX ORDER — MORPHINE SULFATE 30 MG/1
30 TABLET, FILM COATED, EXTENDED RELEASE ORAL EVERY 8 HOURS
COMMUNITY
Start: 2022-05-31

## 2022-06-23 RX ORDER — TESTOSTERONE 20.25 MG/1.25G
20.25 GEL TOPICAL DAILY
COMMUNITY
Start: 2022-03-17 | End: 2023-11-17

## 2022-06-23 ASSESSMENT — PATIENT HEALTH QUESTIONNAIRE - PHQ9
10. IF YOU CHECKED OFF ANY PROBLEMS, HOW DIFFICULT HAVE THESE PROBLEMS MADE IT FOR YOU TO DO YOUR WORK, TAKE CARE OF THINGS AT HOME, OR GET ALONG WITH OTHER PEOPLE: SOMEWHAT DIFFICULT
SUM OF ALL RESPONSES TO PHQ QUESTIONS 1-9: 12
SUM OF ALL RESPONSES TO PHQ QUESTIONS 1-9: 12

## 2022-06-23 NOTE — PATIENT INSTRUCTIONS
You need to be more faithful wearing CPAP every night    Begin omeprazole 20 mg every morning before breakfast for the next 6 weeks

## 2022-06-23 NOTE — PROGRESS NOTES
Assessment & Plan     Abdominal pain, epigastric  67-year-old male with complicated medical history experiencing epigastric abdominal pain occurring after meals associated with bloating.  Stomach feels hard.  Recent CT scan abdomen/pelvis with no acute findings.  Again seen is mildly dilated biliary ducts without other abnormality.  However, no change since 2017.  Reviewed labs from June 16.  LFTs normal except for slightly elevated alkaline phosphatase of 143.  AST 18 ALT 20 and total bilirubin 0.7.  Former smoker quitting last fall.  He does not use alcohol for the past 20 years.  He will take Aleve frequently.  I suspect symptoms represent gastritis or peptic ulcer disease.  Recommending omeprazole 20 mg every morning for the next 6 weeks.  I would also like him to be evaluated by gastroenterology and he does have an appointment scheduled for early August.  - omeprazole (PRILOSEC) 20 MG DR capsule; Take 1 capsule (20 mg) by mouth daily before breakfast    Renal cell carcinoma, right (H)  Diagnosed with renal cell carcinoma 2017 with right partial nephrectomy.  Recent CT scan showing no recurrence.  He should follow-up with urology annually.    Chronic fatigue  Describes having low energy feeling tired throughout the day.  Reviewed recent labs showing no obvious cause.  Could be from inadequately treated sleep apnea as he is not wearing his CPAP at night.  Chronic use of opioids may also be a contributing factor.    Type 2 diabetes mellitus with complication, with long-term current use of insulin (H)  Followed by endocrinology and most recent A1c was 7.1%.  He is using Semglee 70 units twice daily and NovoLog with meals and as needed.  He now has a freestyle camron system.  He should be getting annual diabetic eye exams completed.    Obstructive sleep apnea on CPAP  Unfortunately not wearing CPAP faithfully at night and this may be contributing to some of his daytime fatigue and sleepiness.  Encouraging him to  start using on a regular basis.    Moderate depression  PHQ-9 score is 12.  Depression could be contributing to some of his symptoms of fatigue.  Consider low-dose SSRI if not feeling better wearing CPAP more faithfully    Diabetic peripheral neuropathy associated with type 2 diabetes mellitus (H)  Chronic pain followed by pain clinic.  Continues on gabapentin 3 times daily and is managed with MS Contin    ASCVD (arteriosclerotic cardiovascular disease)  History of placement of coronary artery stents.  Denies exertional chest pain.    Chronic pain syndrome  Followed by pain clinic as above.  Using MS Contin 30 mg 3 times daily    Dilated bile duct  No change in mildly dilated bile ducts going back to 2017.  Doubtful if of any significance.  He will see a gastroenterologist in early August.  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    Primary hypertension  Blood pressure not at goal and will need to be rechecked at his return.    Hypogonadism in male  He is on testosterone replacement    Psoriatic arthritis (H)  Followed by rheumatology and continues on Enbrel injections    Psoriasis  Psoriasis well controlled with Enbrel    Chronic right shoulder pain  Plans for MRI and possible surgery        46 minutes spent on the date of the encounter doing chart review, history and exam, documentation and further activities per the note       BMI:   Estimated body mass index is 30.37 kg/m  as calculated from the following:    Height as of 8/24/21: 1.829 m (6').    Weight as of this encounter: 101.6 kg (223 lb 14.4 oz).       Depression Screening Follow Up    PHQ 6/23/2022   PHQ-9 Total Score 12   Q9: Thoughts of better off dead/self-harm past 2 weeks Not at all             Return in about 3 months (around 9/23/2022) for Annual wellness visit.    Aditya Munoz MD  [facility]    Florencio Crockett is a 67 year old, presenting for the following health issues: Multiple problems  discussed today including abdominal pain and chronic fatigue.  See assessment plan for details  RECHECK (F/U CT Scan)      Review of Systems   12 point review of systems is negative other than what is discussed in assessment and plan      Objective    BP (!) 148/75   Pulse 77   Wt 101.6 kg (223 lb 14.4 oz)   SpO2 97%   BMI 30.37 kg/m    Body mass index is 30.37 kg/m .  Physical Exam   Well-appearing middle-age male  Lungs clear bilaterally no rales or wheezes  Heart regular rate and rhythm without murmur or gallop  Abdomen soft without hepatosplenomegaly masses or tenderness  PHQ-9 score is 12            .  ..

## 2022-06-24 ENCOUNTER — HOSPITAL ENCOUNTER (OUTPATIENT)
Dept: MRI IMAGING | Facility: HOSPITAL | Age: 67
Discharge: HOME | End: 2022-06-24
Attending: INTERNAL MEDICINE | Admitting: INTERNAL MEDICINE
Payer: COMMERCIAL

## 2022-06-24 DIAGNOSIS — M77.8 RIGHT SHOULDER TENDONITIS: ICD-10-CM

## 2022-06-24 DIAGNOSIS — L40.50 PSORIATIC ARTHRITIS (H): ICD-10-CM

## 2022-06-24 PROCEDURE — 73221 MRI JOINT UPR EXTREM W/O DYE: CPT | Mod: RT

## 2022-07-06 NOTE — TELEPHONE ENCOUNTER
Patient called to discuss free drug. I emailed the application along with link to the website. Included my fax number to send back if he doesn't send to [employer]. Sent message to care team for provider's signature.

## 2022-07-08 NOTE — TELEPHONE ENCOUNTER
Free Drug Application Initiated  Medication: Enbrel  Sponsor: [employer]  Phone #: [phone]  Fax #: [phone]  Additional Information: Faxed signed provider portion only, patient portion is still pending.

## 2022-07-12 ENCOUNTER — APPOINTMENT (OUTPATIENT)
Dept: CT IMAGING | Facility: HOSPITAL | Age: 67
DRG: 418 | End: 2022-07-12
Attending: EMERGENCY MEDICINE
Payer: COMMERCIAL

## 2022-07-12 ENCOUNTER — ANESTHESIA EVENT (OUTPATIENT)
Dept: SURGERY | Facility: HOSPITAL | Age: 67
DRG: 418 | End: 2022-07-12
Payer: COMMERCIAL

## 2022-07-12 ENCOUNTER — APPOINTMENT (OUTPATIENT)
Dept: RADIOLOGY | Facility: HOSPITAL | Age: 67
DRG: 418 | End: 2022-07-12
Attending: EMERGENCY MEDICINE
Payer: COMMERCIAL

## 2022-07-12 ENCOUNTER — HOSPITAL ENCOUNTER (INPATIENT)
Facility: HOSPITAL | Age: 67
LOS: 1 days | Discharge: HOME | DRG: 418 | End: 2022-07-13
Attending: EMERGENCY MEDICINE | Admitting: INTERNAL MEDICINE
Payer: COMMERCIAL

## 2022-07-12 ENCOUNTER — APPOINTMENT (OUTPATIENT)
Dept: RADIOLOGY | Facility: HOSPITAL | Age: 67
DRG: 418 | End: 2022-07-12
Attending: INTERNAL MEDICINE
Payer: COMMERCIAL

## 2022-07-12 ENCOUNTER — ANESTHESIA (OUTPATIENT)
Dept: SURGERY | Facility: HOSPITAL | Age: 67
DRG: 418 | End: 2022-07-12
Payer: COMMERCIAL

## 2022-07-12 DIAGNOSIS — R17 JAUNDICE: ICD-10-CM

## 2022-07-12 DIAGNOSIS — Z79.4 TYPE 2 DIABETES MELLITUS WITH INSULIN THERAPY (H): ICD-10-CM

## 2022-07-12 DIAGNOSIS — E11.9 TYPE 2 DIABETES MELLITUS WITH INSULIN THERAPY (H): ICD-10-CM

## 2022-07-12 DIAGNOSIS — K83.8 DILATED CBD, ACQUIRED: ICD-10-CM

## 2022-07-12 DIAGNOSIS — K81.0 ACUTE CHOLECYSTITIS: Primary | ICD-10-CM

## 2022-07-12 DIAGNOSIS — R74.01 TRANSAMINITIS: ICD-10-CM

## 2022-07-12 PROBLEM — E87.1 HYPONATREMIA: Status: ACTIVE | Noted: 2022-07-12

## 2022-07-12 LAB
ALBUMIN SERPL-MCNC: 3.5 G/DL (ref 3.5–5)
ALBUMIN UR-MCNC: 50 MG/DL
ALP SERPL-CCNC: 143 U/L (ref 45–120)
ALT SERPL W P-5'-P-CCNC: 396 U/L (ref 0–45)
AMORPH CRY #/AREA URNS HPF: ABNORMAL /HPF
ANION GAP SERPL CALCULATED.3IONS-SCNC: 7 MMOL/L (ref 5–18)
APPEARANCE UR: ABNORMAL
AST SERPL W P-5'-P-CCNC: 431 U/L (ref 0–40)
ATRIAL RATE - MUSE: 95 BPM
B BURGDOR IGG+IGM SER QL: 0.02
BACTERIA #/AREA URNS HPF: ABNORMAL /HPF
BASOPHILS # BLD AUTO: 0 10E3/UL (ref 0–0.2)
BASOPHILS NFR BLD AUTO: 0 %
BILIRUB SERPL-MCNC: 5.3 MG/DL (ref 0–1)
BILIRUB UR QL STRIP: ABNORMAL
BUN SERPL-MCNC: 10 MG/DL (ref 8–22)
CALCIUM SERPL-MCNC: 8.9 MG/DL (ref 8.5–10.5)
CHLORIDE BLD-SCNC: 98 MMOL/L (ref 98–107)
CO2 SERPL-SCNC: 27 MMOL/L (ref 22–31)
COLOR UR AUTO: ABNORMAL
CREAT SERPL-MCNC: 1.08 MG/DL (ref 0.7–1.3)
DIASTOLIC BLOOD PRESSURE - MUSE: NORMAL MMHG
EOSINOPHIL # BLD AUTO: 0 10E3/UL (ref 0–0.7)
EOSINOPHIL NFR BLD AUTO: 0 %
ERCP: NORMAL
ERYTHROCYTE [DISTWIDTH] IN BLOOD BY AUTOMATED COUNT: 12.8 % (ref 10–15)
ETHANOL SERPL-MCNC: <10 MG/DL
FLUAV AG SPEC QL IA: NEGATIVE
FLUBV AG SPEC QL IA: NEGATIVE
GFR SERPL CREATININE-BSD FRML MDRD: 76 ML/MIN/1.73M2
GLUCOSE BLD-MCNC: 134 MG/DL (ref 70–125)
GLUCOSE BLDC GLUCOMTR-MCNC: 117 MG/DL (ref 70–99)
GLUCOSE BLDC GLUCOMTR-MCNC: 152 MG/DL (ref 70–99)
GLUCOSE BLDC GLUCOMTR-MCNC: 174 MG/DL (ref 70–99)
GLUCOSE BLDC GLUCOMTR-MCNC: 194 MG/DL (ref 70–99)
GLUCOSE BLDC GLUCOMTR-MCNC: 255 MG/DL (ref 70–99)
GLUCOSE BLDC GLUCOMTR-MCNC: 290 MG/DL (ref 70–99)
GLUCOSE BLDC GLUCOMTR-MCNC: 326 MG/DL (ref 70–99)
GLUCOSE UR STRIP-MCNC: NEGATIVE MG/DL
HBA1C MFR BLD: 8.2 %
HCT VFR BLD AUTO: 42.6 % (ref 40–53)
HGB BLD-MCNC: 14.5 G/DL (ref 13.3–17.7)
HGB UR QL STRIP: ABNORMAL
IMM GRANULOCYTES # BLD: 0.1 10E3/UL
IMM GRANULOCYTES NFR BLD: 1 %
INTERPRETATION ECG - MUSE: NORMAL
KETONES UR STRIP-MCNC: 20 MG/DL
LEUKOCYTE ESTERASE UR QL STRIP: NEGATIVE
LIPASE SERPL-CCNC: <9 U/L (ref 0–52)
LYMPHOCYTES # BLD AUTO: 0.4 10E3/UL (ref 0.8–5.3)
LYMPHOCYTES NFR BLD AUTO: 4 %
MCH RBC QN AUTO: 28.2 PG (ref 26.5–33)
MCHC RBC AUTO-ENTMCNC: 34 G/DL (ref 31.5–36.5)
MCV RBC AUTO: 83 FL (ref 78–100)
MONOCYTES # BLD AUTO: 0.7 10E3/UL (ref 0–1.3)
MONOCYTES NFR BLD AUTO: 7 %
MUCOUS THREADS #/AREA URNS LPF: PRESENT /LPF
NEUTROPHILS # BLD AUTO: 8.2 10E3/UL (ref 1.6–8.3)
NEUTROPHILS NFR BLD AUTO: 88 %
NITRATE UR QL: NEGATIVE
NRBC # BLD AUTO: 0 10E3/UL
NRBC BLD AUTO-RTO: 0 /100
P AXIS - MUSE: 54 DEGREES
PH UR STRIP: 6 [PH] (ref 5–7)
PLATELET # BLD AUTO: 150 10E3/UL (ref 150–450)
PLATELET # BLD AUTO: 151 10E3/UL (ref 150–450)
POTASSIUM BLD-SCNC: 3.5 MMOL/L (ref 3.5–5)
PR INTERVAL - MUSE: 156 MS
PROT SERPL-MCNC: 7.1 G/DL (ref 6–8)
QRS DURATION - MUSE: 94 MS
QT - MUSE: 330 MS
QTC - MUSE: 414 MS
R AXIS - MUSE: 41 DEGREES
RBC # BLD AUTO: 5.15 10E6/UL (ref 4.4–5.9)
RBC URINE: 7 /HPF
SARS-COV-2 RNA RESP QL NAA+PROBE: POSITIVE
SODIUM SERPL-SCNC: 132 MMOL/L (ref 136–145)
SP GR UR STRIP: 1.03 (ref 1–1.03)
SQUAMOUS EPITHELIAL: 2 /HPF
SYSTOLIC BLOOD PRESSURE - MUSE: NORMAL MMHG
T AXIS - MUSE: 143 DEGREES
TROPONIN I SERPL-MCNC: 0.03 NG/ML (ref 0–0.29)
UROBILINOGEN UR STRIP-MCNC: >12 MG/DL
VENTRICULAR RATE- MUSE: 95 BPM
WBC # BLD AUTO: 9.3 10E3/UL (ref 4–11)
WBC URINE: 3 /HPF

## 2022-07-12 PROCEDURE — 999N000180 XR SURGERY CARM FLUORO LESS THAN 5 MIN

## 2022-07-12 PROCEDURE — 0FT44ZZ RESECTION OF GALLBLADDER, PERCUTANEOUS ENDOSCOPIC APPROACH: ICD-10-PCS | Performed by: SURGERY

## 2022-07-12 PROCEDURE — 0FC98ZZ EXTIRPATION OF MATTER FROM COMMON BILE DUCT, VIA NATURAL OR ARTIFICIAL OPENING ENDOSCOPIC: ICD-10-PCS | Performed by: INTERNAL MEDICINE

## 2022-07-12 PROCEDURE — 96361 HYDRATE IV INFUSION ADD-ON: CPT

## 2022-07-12 PROCEDURE — 87798 DETECT AGENT NOS DNA AMP: CPT | Performed by: EMERGENCY MEDICINE

## 2022-07-12 PROCEDURE — 255N000002 HC RX 255 OP 636: Performed by: INTERNAL MEDICINE

## 2022-07-12 PROCEDURE — C1769 GUIDE WIRE: HCPCS | Performed by: INTERNAL MEDICINE

## 2022-07-12 PROCEDURE — 36415 COLL VENOUS BLD VENIPUNCTURE: CPT | Performed by: EMERGENCY MEDICINE

## 2022-07-12 PROCEDURE — 258N000003 HC RX IP 258 OP 636: Performed by: SURGERY

## 2022-07-12 PROCEDURE — 360N000083 HC SURGERY LEVEL 3 W/ FLUORO, PER MIN: Performed by: INTERNAL MEDICINE

## 2022-07-12 PROCEDURE — 999N000141 HC STATISTIC PRE-PROCEDURE NURSING ASSESSMENT: Performed by: INTERNAL MEDICINE

## 2022-07-12 PROCEDURE — 370N000017 HC ANESTHESIA TECHNICAL FEE, PER MIN: Performed by: INTERNAL MEDICINE

## 2022-07-12 PROCEDURE — 250N000011 HC RX IP 250 OP 636: Performed by: EMERGENCY MEDICINE

## 2022-07-12 PROCEDURE — 710N000009 HC RECOVERY PHASE 1, LEVEL 1, PER MIN: Performed by: INTERNAL MEDICINE

## 2022-07-12 PROCEDURE — 250N000012 HC RX MED GY IP 250 OP 636 PS 637: Performed by: INTERNAL MEDICINE

## 2022-07-12 PROCEDURE — 84484 ASSAY OF TROPONIN QUANT: CPT | Performed by: EMERGENCY MEDICINE

## 2022-07-12 PROCEDURE — 86618 LYME DISEASE ANTIBODY: CPT | Performed by: EMERGENCY MEDICINE

## 2022-07-12 PROCEDURE — 87149 DNA/RNA DIRECT PROBE: CPT | Performed by: EMERGENCY MEDICINE

## 2022-07-12 PROCEDURE — C1726 CATH, BAL DIL, NON-VASCULAR: HCPCS | Performed by: INTERNAL MEDICINE

## 2022-07-12 PROCEDURE — 87077 CULTURE AEROBIC IDENTIFY: CPT | Performed by: EMERGENCY MEDICINE

## 2022-07-12 PROCEDURE — C9803 HOPD COVID-19 SPEC COLLECT: HCPCS

## 2022-07-12 PROCEDURE — 85025 COMPLETE CBC W/AUTO DIFF WBC: CPT | Performed by: EMERGENCY MEDICINE

## 2022-07-12 PROCEDURE — 88304 TISSUE EXAM BY PATHOLOGIST: CPT | Mod: TC | Performed by: SURGERY

## 2022-07-12 PROCEDURE — 250N000012 HC RX MED GY IP 250 OP 636 PS 637: Performed by: ANESTHESIOLOGY

## 2022-07-12 PROCEDURE — 250N000025 HC SEVOFLURANE, PER MIN: Performed by: INTERNAL MEDICINE

## 2022-07-12 PROCEDURE — 74176 CT ABD & PELVIS W/O CONTRAST: CPT

## 2022-07-12 PROCEDURE — 85049 AUTOMATED PLATELET COUNT: CPT | Performed by: SURGERY

## 2022-07-12 PROCEDURE — 250N000011 HC RX IP 250 OP 636: Performed by: SURGERY

## 2022-07-12 PROCEDURE — 80053 COMPREHEN METABOLIC PANEL: CPT | Performed by: EMERGENCY MEDICINE

## 2022-07-12 PROCEDURE — 82077 ASSAY SPEC XCP UR&BREATH IA: CPT | Performed by: EMERGENCY MEDICINE

## 2022-07-12 PROCEDURE — 250N000011 HC RX IP 250 OP 636: Performed by: INTERNAL MEDICINE

## 2022-07-12 PROCEDURE — 87804 INFLUENZA ASSAY W/OPTIC: CPT | Performed by: EMERGENCY MEDICINE

## 2022-07-12 PROCEDURE — 99222 1ST HOSP IP/OBS MODERATE 55: CPT | Mod: AI | Performed by: INTERNAL MEDICINE

## 2022-07-12 PROCEDURE — 250N000013 HC RX MED GY IP 250 OP 250 PS 637: Performed by: SURGERY

## 2022-07-12 PROCEDURE — 88304 TISSUE EXAM BY PATHOLOGIST: CPT | Mod: 26 | Performed by: PATHOLOGY

## 2022-07-12 PROCEDURE — 250N000013 HC RX MED GY IP 250 OP 250 PS 637: Performed by: ANESTHESIOLOGY

## 2022-07-12 PROCEDURE — 71045 X-RAY EXAM CHEST 1 VIEW: CPT

## 2022-07-12 PROCEDURE — 83690 ASSAY OF LIPASE: CPT | Performed by: EMERGENCY MEDICINE

## 2022-07-12 PROCEDURE — 999N000157 HC STATISTIC RCP TIME EA 10 MIN

## 2022-07-12 PROCEDURE — 250N000012 HC RX MED GY IP 250 OP 636 PS 637: Performed by: SURGERY

## 2022-07-12 PROCEDURE — 36415 COLL VENOUS BLD VENIPUNCTURE: CPT | Performed by: SURGERY

## 2022-07-12 PROCEDURE — 272N000001 HC OR GENERAL SUPPLY STERILE: Performed by: INTERNAL MEDICINE

## 2022-07-12 PROCEDURE — 47562 LAPAROSCOPIC CHOLECYSTECTOMY: CPT | Performed by: SURGERY

## 2022-07-12 PROCEDURE — 99285 EMERGENCY DEPT VISIT HI MDM: CPT | Mod: 25

## 2022-07-12 PROCEDURE — 258N000003 HC RX IP 258 OP 636: Performed by: STUDENT IN AN ORGANIZED HEALTH CARE EDUCATION/TRAINING PROGRAM

## 2022-07-12 PROCEDURE — 250N000013 HC RX MED GY IP 250 OP 250 PS 637: Performed by: INTERNAL MEDICINE

## 2022-07-12 PROCEDURE — 120N000001 HC R&B MED SURG/OB

## 2022-07-12 PROCEDURE — 81001 URINALYSIS AUTO W/SCOPE: CPT | Performed by: EMERGENCY MEDICINE

## 2022-07-12 PROCEDURE — 99221 1ST HOSP IP/OBS SF/LOW 40: CPT | Mod: 57 | Performed by: SURGERY

## 2022-07-12 PROCEDURE — 258N000003 HC RX IP 258 OP 636: Performed by: NURSE ANESTHETIST, CERTIFIED REGISTERED

## 2022-07-12 PROCEDURE — 96365 THER/PROPH/DIAG IV INF INIT: CPT

## 2022-07-12 PROCEDURE — 93005 ELECTROCARDIOGRAM TRACING: CPT | Performed by: EMERGENCY MEDICINE

## 2022-07-12 PROCEDURE — 250N000011 HC RX IP 250 OP 636: Performed by: ANESTHESIOLOGY

## 2022-07-12 PROCEDURE — 258N000003 HC RX IP 258 OP 636: Performed by: EMERGENCY MEDICINE

## 2022-07-12 PROCEDURE — 83036 HEMOGLOBIN GLYCOSYLATED A1C: CPT | Performed by: STUDENT IN AN ORGANIZED HEALTH CARE EDUCATION/TRAINING PROGRAM

## 2022-07-12 PROCEDURE — 250N000011 HC RX IP 250 OP 636: Performed by: NURSE ANESTHETIST, CERTIFIED REGISTERED

## 2022-07-12 PROCEDURE — 87635 SARS-COV-2 COVID-19 AMP PRB: CPT | Performed by: EMERGENCY MEDICINE

## 2022-07-12 PROCEDURE — 250N000009 HC RX 250: Performed by: NURSE ANESTHETIST, CERTIFIED REGISTERED

## 2022-07-12 PROCEDURE — C9113 INJ PANTOPRAZOLE SODIUM, VIA: HCPCS | Performed by: INTERNAL MEDICINE

## 2022-07-12 RX ORDER — ONDANSETRON 2 MG/ML
4 INJECTION INTRAMUSCULAR; INTRAVENOUS EVERY 6 HOURS PRN
Status: DISCONTINUED | OUTPATIENT
Start: 2022-07-12 | End: 2022-07-13 | Stop reason: HOSPADM

## 2022-07-12 RX ORDER — ONDANSETRON 2 MG/ML
4 INJECTION INTRAMUSCULAR; INTRAVENOUS EVERY 6 HOURS PRN
Status: DISCONTINUED | OUTPATIENT
Start: 2022-07-12 | End: 2022-07-13

## 2022-07-12 RX ORDER — EPINEPHRINE IN SOD CHLOR,ISO 1 MG/10 ML
SYRINGE (ML) INTRAVENOUS PRN
Status: DISCONTINUED | OUTPATIENT
Start: 2022-07-12 | End: 2022-07-12 | Stop reason: HOSPADM

## 2022-07-12 RX ORDER — HEPARIN SODIUM 5000 [USP'U]/.5ML
5000 INJECTION, SOLUTION INTRAVENOUS; SUBCUTANEOUS EVERY 8 HOURS
Status: DISCONTINUED | OUTPATIENT
Start: 2022-07-15 | End: 2022-07-13 | Stop reason: HOSPADM

## 2022-07-12 RX ORDER — ONDANSETRON 4 MG/1
4 TABLET, ORALLY DISINTEGRATING ORAL EVERY 6 HOURS PRN
Status: DISCONTINUED | OUTPATIENT
Start: 2022-07-12 | End: 2022-07-12

## 2022-07-12 RX ORDER — MORPHINE SULFATE 15 MG/1
30 TABLET, FILM COATED, EXTENDED RELEASE ORAL EVERY 8 HOURS
Status: DISCONTINUED | OUTPATIENT
Start: 2022-07-12 | End: 2022-07-13 | Stop reason: HOSPADM

## 2022-07-12 RX ORDER — SODIUM CHLORIDE, SODIUM LACTATE, POTASSIUM CHLORIDE, CALCIUM CHLORIDE 600; 310; 30; 20 MG/100ML; MG/100ML; MG/100ML; MG/100ML
INJECTION, SOLUTION INTRAVENOUS CONTINUOUS
Status: DISCONTINUED | OUTPATIENT
Start: 2022-07-12 | End: 2022-07-13

## 2022-07-12 RX ORDER — NALOXONE HYDROCHLORIDE 0.4 MG/ML
0.4 INJECTION, SOLUTION INTRAMUSCULAR; INTRAVENOUS; SUBCUTANEOUS
Status: DISCONTINUED | OUTPATIENT
Start: 2022-07-12 | End: 2022-07-13 | Stop reason: HOSPADM

## 2022-07-12 RX ORDER — BISACODYL 10 MG
10 SUPPOSITORY, RECTAL RECTAL DAILY PRN
Status: DISCONTINUED | OUTPATIENT
Start: 2022-07-12 | End: 2022-07-13 | Stop reason: HOSPADM

## 2022-07-12 RX ORDER — FENTANYL CITRATE 50 UG/ML
25-50 INJECTION, SOLUTION INTRAMUSCULAR; INTRAVENOUS EVERY 5 MIN PRN
Status: DISCONTINUED | OUTPATIENT
Start: 2022-07-12 | End: 2022-07-12 | Stop reason: HOSPADM

## 2022-07-12 RX ORDER — OXYCODONE HYDROCHLORIDE 5 MG/1
10 TABLET ORAL EVERY 4 HOURS PRN
Status: DISCONTINUED | OUTPATIENT
Start: 2022-07-12 | End: 2022-07-13 | Stop reason: HOSPADM

## 2022-07-12 RX ORDER — NALOXONE HYDROCHLORIDE 0.4 MG/ML
0.2 INJECTION, SOLUTION INTRAMUSCULAR; INTRAVENOUS; SUBCUTANEOUS
Status: DISCONTINUED | OUTPATIENT
Start: 2022-07-12 | End: 2022-07-13 | Stop reason: HOSPADM

## 2022-07-12 RX ORDER — SODIUM CHLORIDE 9 MG/ML
INJECTION, SOLUTION INTRAVENOUS CONTINUOUS
Status: DISCONTINUED | OUTPATIENT
Start: 2022-07-12 | End: 2022-07-12

## 2022-07-12 RX ORDER — FLUMAZENIL 0.1 MG/ML
0.2 INJECTION, SOLUTION INTRAVENOUS
Status: DISCONTINUED | OUTPATIENT
Start: 2022-07-12 | End: 2022-07-13

## 2022-07-12 RX ORDER — ONDANSETRON 2 MG/ML
4 INJECTION INTRAMUSCULAR; INTRAVENOUS EVERY 6 HOURS PRN
Status: DISCONTINUED | OUTPATIENT
Start: 2022-07-12 | End: 2022-07-12

## 2022-07-12 RX ORDER — LIDOCAINE HYDROCHLORIDE 10 MG/ML
INJECTION, SOLUTION INFILTRATION; PERINEURAL PRN
Status: DISCONTINUED | OUTPATIENT
Start: 2022-07-12 | End: 2022-07-12

## 2022-07-12 RX ORDER — INDOMETHACIN 50 MG/1
100 SUPPOSITORY RECTAL
Status: DISCONTINUED | OUTPATIENT
Start: 2022-07-12 | End: 2022-07-13

## 2022-07-12 RX ORDER — DIPHENHYDRAMINE HYDROCHLORIDE 50 MG/ML
12.5 INJECTION INTRAMUSCULAR; INTRAVENOUS EVERY 6 HOURS PRN
Status: DISCONTINUED | OUTPATIENT
Start: 2022-07-12 | End: 2022-07-13 | Stop reason: HOSPADM

## 2022-07-12 RX ORDER — KETAMINE HYDROCHLORIDE 10 MG/ML
INJECTION INTRAMUSCULAR; INTRAVENOUS PRN
Status: DISCONTINUED | OUTPATIENT
Start: 2022-07-12 | End: 2022-07-12

## 2022-07-12 RX ORDER — SODIUM CHLORIDE, SODIUM LACTATE, POTASSIUM CHLORIDE, CALCIUM CHLORIDE 600; 310; 30; 20 MG/100ML; MG/100ML; MG/100ML; MG/100ML
INJECTION, SOLUTION INTRAVENOUS CONTINUOUS
Status: DISCONTINUED | OUTPATIENT
Start: 2022-07-12 | End: 2022-07-12

## 2022-07-12 RX ORDER — DEXAMETHASONE SODIUM PHOSPHATE 10 MG/ML
INJECTION, SOLUTION INTRAMUSCULAR; INTRAVENOUS PRN
Status: DISCONTINUED | OUTPATIENT
Start: 2022-07-12 | End: 2022-07-12

## 2022-07-12 RX ORDER — HALOPERIDOL 5 MG/ML
1 INJECTION INTRAMUSCULAR
Status: DISCONTINUED | OUTPATIENT
Start: 2022-07-12 | End: 2022-07-12 | Stop reason: HOSPADM

## 2022-07-12 RX ORDER — DEXMEDETOMIDINE HYDROCHLORIDE 4 UG/ML
INJECTION, SOLUTION INTRAVENOUS PRN
Status: DISCONTINUED | OUTPATIENT
Start: 2022-07-12 | End: 2022-07-12

## 2022-07-12 RX ORDER — SODIUM CHLORIDE, SODIUM LACTATE, POTASSIUM CHLORIDE, CALCIUM CHLORIDE 600; 310; 30; 20 MG/100ML; MG/100ML; MG/100ML; MG/100ML
INJECTION, SOLUTION INTRAVENOUS CONTINUOUS
Status: DISCONTINUED | OUTPATIENT
Start: 2022-07-12 | End: 2022-07-12 | Stop reason: HOSPADM

## 2022-07-12 RX ORDER — HYDROMORPHONE HYDROCHLORIDE 1 MG/ML
0.5 INJECTION, SOLUTION INTRAMUSCULAR; INTRAVENOUS; SUBCUTANEOUS
Status: DISCONTINUED | OUTPATIENT
Start: 2022-07-12 | End: 2022-07-13 | Stop reason: HOSPADM

## 2022-07-12 RX ORDER — ACETAMINOPHEN 325 MG/1
650 TABLET ORAL EVERY 4 HOURS PRN
Status: DISCONTINUED | OUTPATIENT
Start: 2022-07-15 | End: 2022-07-13 | Stop reason: HOSPADM

## 2022-07-12 RX ORDER — CEFAZOLIN SODIUM 2 G/100ML
2 INJECTION, SOLUTION INTRAVENOUS SEE ADMIN INSTRUCTIONS
Status: CANCELLED | OUTPATIENT
Start: 2022-07-12

## 2022-07-12 RX ORDER — ONDANSETRON 4 MG/1
4 TABLET, ORALLY DISINTEGRATING ORAL EVERY 30 MIN PRN
Status: DISCONTINUED | OUTPATIENT
Start: 2022-07-12 | End: 2022-07-12 | Stop reason: HOSPADM

## 2022-07-12 RX ORDER — NICOTINE POLACRILEX 4 MG
15-30 LOZENGE BUCCAL
Status: DISCONTINUED | OUTPATIENT
Start: 2022-07-12 | End: 2022-07-12

## 2022-07-12 RX ORDER — FENTANYL CITRATE 50 UG/ML
25 INJECTION, SOLUTION INTRAMUSCULAR; INTRAVENOUS
Status: CANCELLED | OUTPATIENT
Start: 2022-07-12

## 2022-07-12 RX ORDER — AMOXICILLIN 250 MG
1 CAPSULE ORAL 2 TIMES DAILY
Status: DISCONTINUED | OUTPATIENT
Start: 2022-07-12 | End: 2022-07-13 | Stop reason: HOSPADM

## 2022-07-12 RX ORDER — PROCHLORPERAZINE MALEATE 5 MG
5 TABLET ORAL EVERY 6 HOURS PRN
Status: DISCONTINUED | OUTPATIENT
Start: 2022-07-12 | End: 2022-07-12

## 2022-07-12 RX ORDER — ACETAMINOPHEN 325 MG/1
975 TABLET ORAL ONCE
Status: COMPLETED | OUTPATIENT
Start: 2022-07-12 | End: 2022-07-12

## 2022-07-12 RX ORDER — ONDANSETRON 2 MG/ML
4 INJECTION INTRAMUSCULAR; INTRAVENOUS EVERY 30 MIN PRN
Status: DISCONTINUED | OUTPATIENT
Start: 2022-07-12 | End: 2022-07-12 | Stop reason: HOSPADM

## 2022-07-12 RX ORDER — DULOXETIN HYDROCHLORIDE 60 MG/1
60 CAPSULE, DELAYED RELEASE ORAL AT BEDTIME
Status: DISCONTINUED | OUTPATIENT
Start: 2022-07-12 | End: 2022-07-13 | Stop reason: HOSPADM

## 2022-07-12 RX ORDER — OXYCODONE HYDROCHLORIDE 5 MG/1
5 TABLET ORAL EVERY 4 HOURS PRN
Status: DISCONTINUED | OUTPATIENT
Start: 2022-07-12 | End: 2022-07-12 | Stop reason: HOSPADM

## 2022-07-12 RX ORDER — FAMOTIDINE 20 MG/1
20 TABLET, FILM COATED ORAL EVERY 12 HOURS
Status: DISCONTINUED | OUTPATIENT
Start: 2022-07-12 | End: 2022-07-13 | Stop reason: HOSPADM

## 2022-07-12 RX ORDER — MEROPENEM 1 G/1
1 INJECTION, POWDER, FOR SOLUTION INTRAVENOUS EVERY 8 HOURS
Status: DISCONTINUED | OUTPATIENT
Start: 2022-07-12 | End: 2022-07-13 | Stop reason: HOSPADM

## 2022-07-12 RX ORDER — LIDOCAINE 40 MG/G
CREAM TOPICAL
Status: DISCONTINUED | OUTPATIENT
Start: 2022-07-12 | End: 2022-07-13 | Stop reason: HOSPADM

## 2022-07-12 RX ORDER — PROPOFOL 10 MG/ML
INJECTION, EMULSION INTRAVENOUS PRN
Status: DISCONTINUED | OUTPATIENT
Start: 2022-07-12 | End: 2022-07-12

## 2022-07-12 RX ORDER — PROCHLORPERAZINE MALEATE 5 MG
5 TABLET ORAL EVERY 6 HOURS PRN
Status: DISCONTINUED | OUTPATIENT
Start: 2022-07-12 | End: 2022-07-13 | Stop reason: HOSPADM

## 2022-07-12 RX ORDER — LIDOCAINE 40 MG/G
CREAM TOPICAL
Status: DISCONTINUED | OUTPATIENT
Start: 2022-07-12 | End: 2022-07-12

## 2022-07-12 RX ORDER — PROCHLORPERAZINE 25 MG
12.5 SUPPOSITORY, RECTAL RECTAL EVERY 12 HOURS PRN
Status: DISCONTINUED | OUTPATIENT
Start: 2022-07-12 | End: 2022-07-13 | Stop reason: HOSPADM

## 2022-07-12 RX ORDER — DEXTROSE MONOHYDRATE 25 G/50ML
25-50 INJECTION, SOLUTION INTRAVENOUS
Status: DISCONTINUED | OUTPATIENT
Start: 2022-07-12 | End: 2022-07-12

## 2022-07-12 RX ORDER — DIPHENHYDRAMINE HCL 12.5MG/5ML
12.5 LIQUID (ML) ORAL EVERY 6 HOURS PRN
Status: DISCONTINUED | OUTPATIENT
Start: 2022-07-12 | End: 2022-07-13 | Stop reason: HOSPADM

## 2022-07-12 RX ORDER — HYDROMORPHONE HYDROCHLORIDE 1 MG/ML
0.5 INJECTION, SOLUTION INTRAMUSCULAR; INTRAVENOUS; SUBCUTANEOUS EVERY 5 MIN PRN
Status: DISCONTINUED | OUTPATIENT
Start: 2022-07-12 | End: 2022-07-12 | Stop reason: HOSPADM

## 2022-07-12 RX ORDER — KETOROLAC TROMETHAMINE 30 MG/ML
15 INJECTION, SOLUTION INTRAMUSCULAR; INTRAVENOUS EVERY 6 HOURS
Status: DISCONTINUED | OUTPATIENT
Start: 2022-07-12 | End: 2022-07-13 | Stop reason: HOSPADM

## 2022-07-12 RX ORDER — POLYETHYLENE GLYCOL 3350 17 G/17G
17 POWDER, FOR SOLUTION ORAL DAILY
Status: DISCONTINUED | OUTPATIENT
Start: 2022-07-13 | End: 2022-07-13 | Stop reason: HOSPADM

## 2022-07-12 RX ORDER — CEFAZOLIN SODIUM 2 G/100ML
2 INJECTION, SOLUTION INTRAVENOUS
Status: CANCELLED | OUTPATIENT
Start: 2022-07-12

## 2022-07-12 RX ORDER — FENTANYL CITRATE 50 UG/ML
INJECTION, SOLUTION INTRAMUSCULAR; INTRAVENOUS PRN
Status: DISCONTINUED | OUTPATIENT
Start: 2022-07-12 | End: 2022-07-12

## 2022-07-12 RX ORDER — GABAPENTIN 300 MG/1
600 CAPSULE ORAL 3 TIMES DAILY
Status: DISCONTINUED | OUTPATIENT
Start: 2022-07-12 | End: 2022-07-13 | Stop reason: HOSPADM

## 2022-07-12 RX ORDER — ONDANSETRON 2 MG/ML
INJECTION INTRAMUSCULAR; INTRAVENOUS PRN
Status: DISCONTINUED | OUTPATIENT
Start: 2022-07-12 | End: 2022-07-12

## 2022-07-12 RX ORDER — MEROPENEM 1 G/1
1 INJECTION, POWDER, FOR SOLUTION INTRAVENOUS ONCE
Status: COMPLETED | OUTPATIENT
Start: 2022-07-12 | End: 2022-07-12

## 2022-07-12 RX ORDER — ONDANSETRON 4 MG/1
4 TABLET, ORALLY DISINTEGRATING ORAL EVERY 6 HOURS PRN
Status: DISCONTINUED | OUTPATIENT
Start: 2022-07-12 | End: 2022-07-13 | Stop reason: HOSPADM

## 2022-07-12 RX ORDER — OXYCODONE HYDROCHLORIDE 5 MG/1
5 TABLET ORAL EVERY 4 HOURS PRN
Status: DISCONTINUED | OUTPATIENT
Start: 2022-07-12 | End: 2022-07-13 | Stop reason: HOSPADM

## 2022-07-12 RX ORDER — ACETAMINOPHEN 325 MG/1
975 TABLET ORAL EVERY 8 HOURS
Status: DISCONTINUED | OUTPATIENT
Start: 2022-07-12 | End: 2022-07-13 | Stop reason: HOSPADM

## 2022-07-12 RX ORDER — MAGNESIUM SULFATE 4 G/50ML
4 INJECTION INTRAVENOUS ONCE
Status: COMPLETED | OUTPATIENT
Start: 2022-07-12 | End: 2022-07-12

## 2022-07-12 RX ORDER — BUPIVACAINE HYDROCHLORIDE 2.5 MG/ML
INJECTION, SOLUTION INFILTRATION; PERINEURAL PRN
Status: DISCONTINUED | OUTPATIENT
Start: 2022-07-12 | End: 2022-07-12 | Stop reason: HOSPADM

## 2022-07-12 RX ADMIN — LIDOCAINE HYDROCHLORIDE 5 ML: 10 INJECTION, SOLUTION INFILTRATION; PERINEURAL at 12:14

## 2022-07-12 RX ADMIN — PHENYLEPHRINE HYDROCHLORIDE 200 MCG: 10 INJECTION INTRAVENOUS at 13:31

## 2022-07-12 RX ADMIN — DULOXETINE HYDROCHLORIDE 60 MG: 60 CAPSULE, DELAYED RELEASE PELLETS ORAL at 21:49

## 2022-07-12 RX ADMIN — INSULIN ASPART 1 UNITS: 100 INJECTION, SOLUTION INTRAVENOUS; SUBCUTANEOUS at 10:51

## 2022-07-12 RX ADMIN — INSULIN ASPART 4 UNITS: 100 INJECTION, SOLUTION INTRAVENOUS; SUBCUTANEOUS at 22:47

## 2022-07-12 RX ADMIN — KETAMINE HYDROCHLORIDE 50 MG: 10 INJECTION, SOLUTION INTRAMUSCULAR; INTRAVENOUS at 12:14

## 2022-07-12 RX ADMIN — PANTOPRAZOLE SODIUM 40 MG: 40 INJECTION, POWDER, FOR SOLUTION INTRAVENOUS at 10:42

## 2022-07-12 RX ADMIN — MAGNESIUM SULFATE HEPTAHYDRATE 4 G: 80 INJECTION, SOLUTION INTRAVENOUS at 12:01

## 2022-07-12 RX ADMIN — PHENYLEPHRINE HYDROCHLORIDE 100 MCG: 10 INJECTION INTRAVENOUS at 13:22

## 2022-07-12 RX ADMIN — SODIUM CHLORIDE: 9 INJECTION, SOLUTION INTRAVENOUS at 15:40

## 2022-07-12 RX ADMIN — INSULIN ASPART 3 UNITS: 100 INJECTION, SOLUTION INTRAVENOUS; SUBCUTANEOUS at 19:03

## 2022-07-12 RX ADMIN — FENTANYL CITRATE 25 MCG: 50 INJECTION, SOLUTION INTRAMUSCULAR; INTRAVENOUS at 15:07

## 2022-07-12 RX ADMIN — KETOROLAC TROMETHAMINE 15 MG: 30 INJECTION, SOLUTION INTRAMUSCULAR at 14:40

## 2022-07-12 RX ADMIN — PROPOFOL 150 MG: 10 INJECTION, EMULSION INTRAVENOUS at 12:14

## 2022-07-12 RX ADMIN — KETOROLAC TROMETHAMINE 15 MG: 30 INJECTION, SOLUTION INTRAMUSCULAR at 20:45

## 2022-07-12 RX ADMIN — ACETAMINOPHEN 975 MG: 325 TABLET ORAL at 20:45

## 2022-07-12 RX ADMIN — Medication 12 MCG: at 12:45

## 2022-07-12 RX ADMIN — FENTANYL CITRATE 50 MCG: 50 INJECTION, SOLUTION INTRAMUSCULAR; INTRAVENOUS at 14:15

## 2022-07-12 RX ADMIN — GABAPENTIN 600 MG: 300 CAPSULE ORAL at 10:39

## 2022-07-12 RX ADMIN — ACETAMINOPHEN 975 MG: 325 TABLET ORAL at 12:02

## 2022-07-12 RX ADMIN — DEXAMETHASONE SODIUM PHOSPHATE 10 MG: 10 INJECTION, SOLUTION INTRAMUSCULAR; INTRAVENOUS at 12:14

## 2022-07-12 RX ADMIN — INSULIN ASPART 4 UNITS: 100 INJECTION, SOLUTION INTRAVENOUS; SUBCUTANEOUS at 15:12

## 2022-07-12 RX ADMIN — MORPHINE SULFATE 30 MG: 15 TABLET, EXTENDED RELEASE ORAL at 21:48

## 2022-07-12 RX ADMIN — PHENYLEPHRINE HYDROCHLORIDE 100 MCG: 10 INJECTION INTRAVENOUS at 13:25

## 2022-07-12 RX ADMIN — MIDAZOLAM 2 MG: 1 INJECTION INTRAMUSCULAR; INTRAVENOUS at 12:10

## 2022-07-12 RX ADMIN — SODIUM CHLORIDE, POTASSIUM CHLORIDE, SODIUM LACTATE AND CALCIUM CHLORIDE: 600; 310; 30; 20 INJECTION, SOLUTION INTRAVENOUS at 18:31

## 2022-07-12 RX ADMIN — FAMOTIDINE 40 MG: 10 INJECTION INTRAVENOUS at 18:31

## 2022-07-12 RX ADMIN — ONDANSETRON 4 MG: 4 TABLET, ORALLY DISINTEGRATING ORAL at 10:39

## 2022-07-12 RX ADMIN — FENTANYL CITRATE 50 MCG: 50 INJECTION, SOLUTION INTRAMUSCULAR; INTRAVENOUS at 12:14

## 2022-07-12 RX ADMIN — ONDANSETRON 4 MG: 2 INJECTION INTRAMUSCULAR; INTRAVENOUS at 13:25

## 2022-07-12 RX ADMIN — Medication 8 MCG: at 12:35

## 2022-07-12 RX ADMIN — SODIUM CHLORIDE 1000 ML: 9 INJECTION, SOLUTION INTRAVENOUS at 02:37

## 2022-07-12 RX ADMIN — SUGAMMADEX 200 MG: 100 INJECTION, SOLUTION INTRAVENOUS at 14:00

## 2022-07-12 RX ADMIN — GABAPENTIN 600 MG: 300 CAPSULE ORAL at 20:44

## 2022-07-12 RX ADMIN — SODIUM CHLORIDE: 9 INJECTION, SOLUTION INTRAVENOUS at 05:20

## 2022-07-12 RX ADMIN — SODIUM CHLORIDE: 9 INJECTION, SOLUTION INTRAVENOUS at 12:52

## 2022-07-12 RX ADMIN — FENTANYL CITRATE 25 MCG: 50 INJECTION, SOLUTION INTRAMUSCULAR; INTRAVENOUS at 15:14

## 2022-07-12 RX ADMIN — MEROPENEM 1 G: 1 INJECTION, POWDER, FOR SOLUTION INTRAVENOUS at 20:44

## 2022-07-12 RX ADMIN — ROCURONIUM BROMIDE 50 MG: 50 INJECTION, SOLUTION INTRAVENOUS at 12:14

## 2022-07-12 RX ADMIN — MEROPENEM 1 G: 1 INJECTION, POWDER, FOR SOLUTION INTRAVENOUS at 04:17

## 2022-07-12 RX ADMIN — MEROPENEM 1 G: 1 INJECTION, POWDER, FOR SOLUTION INTRAVENOUS at 11:49

## 2022-07-12 RX ADMIN — HYDROMORPHONE HYDROCHLORIDE 1 MG: 1 INJECTION, SOLUTION INTRAMUSCULAR; INTRAVENOUS; SUBCUTANEOUS at 12:14

## 2022-07-12 RX ADMIN — PHENYLEPHRINE HYDROCHLORIDE 100 MCG: 10 INJECTION INTRAVENOUS at 13:19

## 2022-07-12 RX ADMIN — INSULIN GLARGINE 20 UNITS: 100 INJECTION, SOLUTION SUBCUTANEOUS at 22:48

## 2022-07-12 RX ADMIN — FENTANYL CITRATE 50 MCG: 50 INJECTION, SOLUTION INTRAMUSCULAR; INTRAVENOUS at 12:30

## 2022-07-12 RX ADMIN — ROCURONIUM BROMIDE 10 MG: 50 INJECTION, SOLUTION INTRAVENOUS at 13:01

## 2022-07-12 ASSESSMENT — ACTIVITIES OF DAILY LIVING (ADL)
ADLS_ACUITY_SCORE: 20
DIFFICULTY_EATING/SWALLOWING: NO
ADLS_ACUITY_SCORE: 35
ADLS_ACUITY_SCORE: 35
DRESSING/BATHING_DIFFICULTY: NO
ADLS_ACUITY_SCORE: 35
CONCENTRATING,_REMEMBERING_OR_MAKING_DECISIONS_DIFFICULTY: NO
ADLS_ACUITY_SCORE: 35
WALKING_OR_CLIMBING_STAIRS_DIFFICULTY: NO
CHANGE_IN_FUNCTIONAL_STATUS_SINCE_ONSET_OF_CURRENT_ILLNESS/INJURY: NO
ADLS_ACUITY_SCORE: 35
ADLS_ACUITY_SCORE: 37
TOILETING_ISSUES: NO
ADLS_ACUITY_SCORE: 35
DOING_ERRANDS_INDEPENDENTLY_DIFFICULTY: NO
WEAR_GLASSES_OR_BLIND: NO
FALL_HISTORY_WITHIN_LAST_SIX_MONTHS: NO
ADLS_ACUITY_SCORE: 35
ADLS_ACUITY_SCORE: 20

## 2022-07-12 NOTE — CONSULTS
General surgery consult         ASSESSMENT     1. Jaundice    2. Transaminitis    3. Dilated cbd, acquired    67-year-old diabetic male presents with right upper quadrant abdominal pain that appears consistent with choledocholithiasis and evidence for chronic cholecystitis.         PLAN      Laparoscopic cholecystectomy followed by ERCP         CHIEF COMPLAINT     Chief Complaint   Patient presents with     Altered Mental Status         HPI     Bon Luque is a 67 year old male who presents with ongoing troubles with epigastric abdominal pain that escalated over the last 3 days.  As things have gotten worse in the last few days he came into the hospital last night and the study showed him to have an elevated bilirubin at 5.3 with a dilated common bile duct.  There were stones noted in his gallbladder on a CT scan.  He denies any prior abdominal surgery he is a nondiabetic..         PAST MEDICAL HISTORY SURGICAL HISTORY     Past Medical History:   Diagnosis Date     Abdominal pain, epigastric 06/23/2022     Arthritis     psoriatic     ASCVD (arteriosclerotic cardiovascular disease) 04/28/2016    Coronary artery stent ×2 in RCA following myocardial infarction 2008 , echo June 2014 normal LV function with ejection fraction 50-55% with mild apical inferior hypokinesis     Chronic fatigue 06/23/2022     Chronic low back pain 04/28/2016     Chronic right shoulder pain 6/23/2022     Coronary artery disease      Depression 12/26/2017     Diabetic peripheral neuropathy associated with type 2 diabetes mellitus (H) 04/28/2016    Abnormal EMG and workup at [facility]     Dilated bile duct 07/03/2017    CT scan with incidental finding of dilated intra-and extrahepatic ducts     HTN (hypertension)      Hypercholesterolemia      Hypogonadism in male      Ischemic cardiomyopathy     Stress Echocardiogram 2017 with decreased LV function with EF 43% worse compared to previous echo.  No change in infarct.  No new ischemia     Left  upper quadrant pain 06/30/2017     MI (myocardial infarction) (H)      Obstructive sleep apnea on CPAP     CPAP     Optic neuritis      Psoriasis      Renal cell carcinoma, right (H) 06/23/2022    Right partial nephrectomy 2017     Right renal mass      Sebaceous cyst 2010     Thoracic radiculopathy 2004    right T8 intercostal nerve block 2002     Type 2 diabetes mellitus with insulin therapy (H)     Past Surgical History:   Procedure Laterality Date     APPENDECTOMY       COLONOSCOPY      Colonoscopy September 2017 with hyperplastic polyps, repeat in 10 years     CORONARY ANGIOPLASTY      stent     KNEE SURGERY       NOSE SURGERY       NE LAP,PARTIAL NEPHRECTOMY Right 1/10/2018    Procedure: RIGHT ROBOTIC PARTIAL NEPHRECTOMY WITH INTRAOPERATIVE ULTRASOUND;  Surgeon: Chase Rodríguez MD;  Location: [facility];  Service: Urology          CURRENT MEDICATIONS ALLERGIES     Current Outpatient Rx   Medication Sig Dispense Refill     B-D U/F 31G X 8 MM insulin pen needle FOR ADMINISTERING INSULIN AT HOME       clobetasol (TEMOVATE) 0.05 % external cream APPLY TO AREAS OF PSORIASIS ON HANDS TWICE DAILY AS NEEDED       Continuous Blood Gluc Sensor (QuantumSphereSTYLE SHIRA 14 DAY SENSOR) MISC        DULoxetine (CYMBALTA) 60 MG capsule [DULOXETINE (CYMBALTA) 60 MG CAPSULE] Take 60 mg by mouth 2 (two) times a day.       econazole nitrate 1 % external cream        etanercept (ENBREL SURECLICK) 50 MG/ML autoinjector INJECT THE CONTENTS OF 1 SURECLICK AUTOINJECTOR SUBCUTANEOUSLY EVERY WEEK. (Patient not taking: Reported on 6/23/2022) 12 mL 3     gabapentin (NEURONTIN) 300 MG capsule [GABAPENTIN (NEURONTIN) 300 MG CAPSULE] Take 600 mg by mouth 3 (three) times a day.        insulin aspart (NOVOLOG) 100 unit/mL injection [INSULIN ASPART (NOVOLOG) 100 UNIT/ML INJECTION] Inject under the skin 3 (three) times a day before meals. 1 unit of Novolog per 5 g of carbs       Insulin Glargine-yfgn 100 UNIT/ML SOPN Inject 70 Units  Subcutaneous       metFORMIN (GLUCOPHAGE-XR) 750 MG 24 hr tablet Take 1,500 mg by mouth       metroNIDAZOLE (METROCREAM) 0.75 % external cream APPLY THIN LAYER TOPICALLY TO FACE TWICE DAILY FOR ROSACEA       morphine (MS CONTIN) 30 MG CR tablet Take 30 mg by mouth       naloxone (NARCAN) 4 MG/0.1ML nasal spray Spray 1 spray in nostril       omeprazole (PRILOSEC) 20 MG DR capsule Take 1 capsule (20 mg) by mouth daily before breakfast 90 capsule 3     tadalafil (CIALIS) 20 MG tablet [TADALAFIL (CIALIS) 20 MG TABLET] Take 20 mg by mouth daily as needed.       testosterone (ANDROGEL) 20.25 MG/1.25GM (1.62%) topical gel Place 20.25 mg onto the skin       valACYclovir (VALTREX) 500 MG tablet TAKE 1 TABLET BY MOUTH TWICE DAILY AT ONSET OF SYMPTOMS FOR 3 DAYS. REPEAT FOR FLARES      Allergies   Allergen Reactions     Atorvastatin Muscle Pain (Myalgia)     Valomag [Arthritis Pain Formula] Nausea and Vomiting     gastric ulcer from aspirin use     Ampicillin Rash     Had a reaction to this medication many years ago.     Codeine Rash     It has been about 30 years ago          FAMILY HISTORY   No family history on file.      SOCIAL HISTORY     Social History     Socioeconomic History     Marital status:    Tobacco Use     Smoking status: Former Smoker     Smokeless tobacco: Never Used   Substance and Sexual Activity     Alcohol use: No     Comment: Alcoholic Drinks/day: alcohol abuse - in remission x16 years     Drug use: No         REVIEW OF SYSTEMS       12 point review of systems are unremarkable except for the symptoms described in the HPI    PHYSICAL EXAM     VITAL SIGNS: /69   Pulse 83   Temp 100.3  F (37.9  C) (Oral)   Resp 15   Ht 1.829 m (6')   Wt 104.3 kg (230 lb)   SpO2 96%   BMI 31.19 kg/m     General : Alert, cooperative, appears stated age   Skin: Skin color, texture, turgor normal, no rashes or lesions   Lymph nodes: No obvious adenopathy   Head: Normocephalic, without obvious abnormality,  atraumatic   HEENT:  conjunctiva/corneas clear, EOM's intact, no scleral icterus   Throat: Lips, mucosa, and tongue normal; teeth and gums normal    Neck: Supple, thyroid not enlarged   Back: No CVA tenderness   Lungs: Clear to auscultation bilaterally, respirations unlabored, no rales, rhonchi or wheezes   Chest Wall:No tenderness or deformity   Heart: Regular rate and rhythm, S1, S2 normal, no murmur, rub or gallop   Abdomen: Soft, mildly tender to palpation in the right upper quadrant of the abdomen no guarding, + bowel sounds active,   Extremities : No obvious swelling,  Neurologic: Cranial Nerves II-XII normal, moves all extremities equally, no focal findings          RADIOLOGY      CT Abdomen Pelvis w/o Contrast   Final Result   IMPRESSION:    1.  Dilated gallbladder and common bile duct with findings suspicious for choledocholithiasis. Further evaluation with with MRCP recommended.         XR Chest Port 1 View   Final Result   IMPRESSION: Negative chest.          EKG     Reviewed        LABS     Results for orders placed or performed during the hospital encounter of 07/12/22   XR Chest Port 1 View    Impression    IMPRESSION: Negative chest.   CT Abdomen Pelvis w/o Contrast    Impression    IMPRESSION:   1.  Dilated gallbladder and common bile duct with findings suspicious for choledocholithiasis. Further evaluation with with MRCP recommended.     Comprehensive metabolic panel   Result Value Ref Range    Sodium 132 (L) 136 - 145 mmol/L    Potassium 3.5 3.5 - 5.0 mmol/L    Chloride 98 98 - 107 mmol/L    Carbon Dioxide (CO2) 27 22 - 31 mmol/L    Anion Gap 7 5 - 18 mmol/L    Urea Nitrogen 10 8 - 22 mg/dL    Creatinine 1.08 0.70 - 1.30 mg/dL    Calcium 8.9 8.5 - 10.5 mg/dL    Glucose 134 (H) 70 - 125 mg/dL    Alkaline Phosphatase 143 (H) 45 - 120 U/L     (H) 0 - 40 U/L     (H) 0 - 45 U/L    Protein Total 7.1 6.0 - 8.0 g/dL    Albumin 3.5 3.5 - 5.0 g/dL    Bilirubin Total 5.3 (H) 0.0 - 1.0 mg/dL     GFR Estimate 76 >60 mL/min/1.73m2   Result Value Ref Range    Troponin I 0.03 0.00 - 0.29 ng/mL   UA with Microscopic reflex to Culture    Specimen: Urine, Midstream   Result Value Ref Range    Color Urine Franci (A) Colorless, Straw, Light Yellow, Yellow    Appearance Urine Turbid (A) Clear    Glucose Urine Negative Negative mg/dL    Bilirubin Urine 8.0 mg/dL (A) Negative    Ketones Urine 20  (A) Negative mg/dL    Specific Gravity Urine 1.030 1.001 - 1.030    Blood Urine 0.2 mg/dL (A) Negative    pH Urine 6.0 5.0 - 7.0    Protein Albumin Urine 50  (A) Negative mg/dL    Urobilinogen Urine >12.0 (A) <2.0 mg/dL    Nitrite Urine Negative Negative    Leukocyte Esterase Urine Negative Negative    Bacteria Urine Few (A) None Seen /HPF    Mucus Urine Present (A) None Seen /LPF    Amorphous Crystals Urine Few (A) None Seen /HPF    RBC Urine 7 (H) <=2 /HPF    WBC Urine 3 <=5 /HPF    Squamous Epithelials Urine 2 (H) <=1 /HPF   Result Value Ref Range    Lipase <9 0 - 52 U/L   Ethyl Alcohol Level   Result Value Ref Range    Alcohol, Blood <10 None detected mg/dL   Glucose by meter   Result Value Ref Range    GLUCOSE BY METER POCT 117 (H) 70 - 99 mg/dL   CBC with platelets and differential   Result Value Ref Range    WBC Count 9.3 4.0 - 11.0 10e3/uL    RBC Count 5.15 4.40 - 5.90 10e6/uL    Hemoglobin 14.5 13.3 - 17.7 g/dL    Hematocrit 42.6 40.0 - 53.0 %    MCV 83 78 - 100 fL    MCH 28.2 26.5 - 33.0 pg    MCHC 34.0 31.5 - 36.5 g/dL    RDW 12.8 10.0 - 15.0 %    Platelet Count 150 150 - 450 10e3/uL    % Neutrophils 88 %    % Lymphocytes 4 %    % Monocytes 7 %    % Eosinophils 0 %    % Basophils 0 %    % Immature Granulocytes 1 %    NRBCs per 100 WBC 0 <1 /100    Absolute Neutrophils 8.2 1.6 - 8.3 10e3/uL    Absolute Lymphocytes 0.4 (L) 0.8 - 5.3 10e3/uL    Absolute Monocytes 0.7 0.0 - 1.3 10e3/uL    Absolute Eosinophils 0.0 0.0 - 0.7 10e3/uL    Absolute Basophils 0.0 0.0 - 0.2 10e3/uL    Absolute Immature Granulocytes 0.1 <=0.4  10e3/uL    Absolute NRBCs 0.0 10e3/uL   Asymptomatic COVID-19 Virus (Coronavirus) by PCR Nasopharyngeal    Specimen: Nasopharyngeal; Swab   Result Value Ref Range    SARS CoV2 PCR Positive (A) Negative   Result Value Ref Range    Hemoglobin A1C 8.2 (H) <=5.6 %   Influenza A/B antigen    Specimen: Nasopharyngeal; Swab   Result Value Ref Range    Influenza A antigen Negative Negative    Influenza B antigen Negative Negative           [facility]  [phone]

## 2022-07-12 NOTE — CONSULTS
"[place] [facility]    Bon Luque   [address] [place] [address]  67 year old male    Admission Date/Time: 7/12/2022  2:08 AM    Primary Care Provider:  Aditya Munoz    Requesting Physician: Tammi Martell MD      CHIEF COMPLAINT:   Abdominal pain    REASON FOR THE CONSULT:  Elevated liver enzymes, cholelithiasis    HPI:   67 year old yo M with hx of DM, CAD, CHF 2/2 ICM, chronic pain/neuropathy, presents to the hospital with confusion/disorientation and fever. Pt reports upper abdominal pain, intermittent for years. Recent worsening symptoms over the past few days. Yesterday developed a fever. Associated nausea. Poor appetite. Denies weight loss. Had been taking 3 ASA daily without relief. BM normal. No melena or hematochezia. Urine becoming \"darker.\" Eval in ED revealed elevated LFTs. Imaging with biliary dilation and concern for choledocholithiasis. Currently reports ongoing nausea and mild abdominal pain. Antibx administered.  Recovered from COVID >1M ago, test remains positive. Denies CP, SOB.      REVIEW OF SYSTEMS:   10 point ROS neg other than the symptoms noted above in the HPI.    MEDICATIONS:  Current Outpatient Medications   Medication Instructions     B-D U/F 31G X 8 MM insulin pen needle FOR ADMINISTERING INSULIN AT HOME     clobetasol (TEMOVATE) 0.05 % external cream APPLY TO AREAS OF PSORIASIS ON HANDS TWICE DAILY AS NEEDED     Continuous Blood Gluc Sensor (FREESTYLE SHIRA 14 DAY SENSOR) MISC No dose, route, or frequency recorded.     DULoxetine (CYMBALTA) 60 mg, 2 TIMES DAILY     econazole nitrate 1 % external cream No dose, route, or frequency recorded.     etanercept (ENBREL SURECLICK) 50 MG/ML autoinjector INJECT THE CONTENTS OF 1 SURECLICK AUTOINJECTOR SUBCUTANEOUSLY EVERY WEEK.     gabapentin (NEURONTIN) 600 mg, 3 TIMES DAILY     insulin aspart (NOVOLOG) 100 unit/mL injection 3 TIMES DAILY BEFORE MEALS     Insulin Glargine-yfgn 70 Units, Subcutaneous     " metFORMIN (GLUCOPHAGE-XR) 1,500 mg, Oral     metroNIDAZOLE (METROCREAM) 0.75 % external cream APPLY THIN LAYER TOPICALLY TO FACE TWICE DAILY FOR ROSACEA     morphine (MS CONTIN) 30 mg, Oral     naloxone (NARCAN) 4 MG/0.1ML nasal spray 1 spray, Nasal     omeprazole (PRILOSEC) 20 mg, Oral, Daily before breakfast     tadalafil (CIALIS) 20 mg, DAILY PRN     testosterone (ANDROGEL) 20.25 mg, Transdermal     valACYclovir (VALTREX) 500 MG tablet TAKE 1 TABLET BY MOUTH TWICE DAILY AT ONSET OF SYMPTOMS FOR 3 DAYS. REPEAT FOR FLARES       PAST MEDICAL HISTORY:  Past Medical History:   Diagnosis Date     Abdominal pain, epigastric 06/23/2022     Arthritis     psoriatic     ASCVD (arteriosclerotic cardiovascular disease) 04/28/2016    Coronary artery stent ×2 in RCA following myocardial infarction 2008 , echo June 2014 normal LV function with ejection fraction 50-55% with mild apical inferior hypokinesis     Chronic fatigue 06/23/2022     Chronic low back pain 04/28/2016     Chronic right shoulder pain 6/23/2022     Coronary artery disease      Depression 12/26/2017     Diabetic peripheral neuropathy associated with type 2 diabetes mellitus (H) 04/28/2016    Abnormal EMG and workup at [facility]     Dilated bile duct 07/03/2017    CT scan with incidental finding of dilated intra-and extrahepatic ducts     HTN (hypertension)      Hypercholesterolemia      Hypogonadism in male      Ischemic cardiomyopathy     Stress Echocardiogram 2017 with decreased LV function with EF 43% worse compared to previous echo.  No change in infarct.  No new ischemia     Left upper quadrant pain 06/30/2017     MI (myocardial infarction) (H)      Obstructive sleep apnea on CPAP     CPAP     Optic neuritis      Psoriasis      Renal cell carcinoma, right (H) 06/23/2022    Right partial nephrectomy 2017     Right renal mass      Sebaceous cyst 2010     Thoracic radiculopathy 2004    right T8 intercostal nerve block 2002     Type 2 diabetes mellitus with  insulin therapy (H)        PAST SURGICAL HISTORY:  Past Surgical History:   Procedure Laterality Date     APPENDECTOMY       COLONOSCOPY      Colonoscopy September 2017 with hyperplastic polyps, repeat in 10 years     CORONARY ANGIOPLASTY      stent     KNEE SURGERY       NOSE SURGERY       OR LAP,PARTIAL NEPHRECTOMY Right 1/10/2018    Procedure: RIGHT ROBOTIC PARTIAL NEPHRECTOMY WITH INTRAOPERATIVE ULTRASOUND;  Surgeon: Chase Rodríguez MD;  Location: [facility];  Service: Urology       FAMILY HISTORY:  No family history on file.    SOCIAL HISTORY:  Social History     Tobacco Use     Smoking status: Former Smoker     Smokeless tobacco: Never Used   Substance Use Topics     Alcohol use: No     Comment: Alcoholic Drinks/day: alcohol abuse - in remission x16 years       ALLERGIES/SENSITIVITIES:  Atorvastatin, Valomag [arthritis pain formula], Ampicillin, and Codeine      PHYSICAL EXAM:  /69   Pulse 83   Temp 100.3  F (37.9  C) (Oral)   Resp 15   Ht 1.829 m (6')   Wt 104.3 kg (230 lb)   SpO2 96%   BMI 31.19 kg/m    Body mass index is 31.19 kg/m .  General: A&O, NAD, non-toxic appearing  Eyes: Mild icterus  ENT: MMM, OP clear without ulcerations  Neck/Thyroid: Supple, no masses  Pulmonary: CTA B  Cardiovascular: RR, S1, S2  Gastrointestinal: Soft, NTTP, NABS, no r/g, no masses  Skin: No jaundice/petechiae/rashes  Lymph: No cervical or supraclavicular lymphadenopathy  Extrem: PPI, no c/c/e      LABORATORY DATA:  CBC:  Recent Labs   Lab Test 07/12/22 0228   WBC 9.3   RBC 5.15   HGB 14.5   HCT 42.6   MCV 83   MCH 28.2   MCHC 34.0   RDW 12.8           BMP:  Recent Labs   Lab 07/12/22 0228 07/12/22 0210   *  --    POTASSIUM 3.5  --    CHLORIDE 98  --    CO2 27  --    * 117*   CR 1.08  --    BUN 10  --        INR:  No results for input(s): INR in the last 35713 hours.    Liver and Pancreas panel:  Recent Labs   Lab 07/12/22 0228   *   *   ALKPHOS 143*    BILITOTAL 5.3*   LIPASE <9         IMAGING:    XR Chest Port 1 View    Result Date: 7/12/2022  EXAM: XR CHEST PORT 1 VIEW LOCATION: [facility] DATE/TIME: 7/12/2022 2:51 AM INDICATION: Fatigue COMPARISON: 01/10/2019     IMPRESSION: Negative chest.    CT Abdomen Pelvis w/o Contrast    Result Date: 7/12/2022  EXAM: CT ABDOMEN PELVIS W/O CONTRAST LOCATION: [facility] DATE/TIME: 7/12/2022 2:57 AM INDICATION: resolved epigastric pain, dark urine, slight jaundice COMPARISON: 06/16/2022 TECHNIQUE: CT scan of the abdomen and pelvis was performed without IV contrast. Multiplanar reformats were obtained. Dose reduction techniques were used. CONTRAST: None. FINDINGS: LOWER CHEST: Left basilar atelectasis. Coronary artery calcification. HEPATOBILIARY: Hepatic steatosis. Distended gallbladder with dependent stones. Common bile duct is dilated measuring 1.9 cm, previously 1.7 cm. Probable tiny stone in the region of the ampulla, coronal series image 92. PANCREAS: Unremarkable SPLEEN: Unremarkable ADRENAL GLANDS: Unremarkable KIDNEYS/BLADDER: Postoperative changes in the right kidney. No hydronephrosis. Bladder is thick-walled but not well-distended. BOWEL: No bowel obstruction. LYMPH NODES: No significant retroperitoneal adenopathy VASCULATURE: Moderate atherosclerotic calcification. PELVIC ORGANS: No free fluid MUSCULOSKELETAL: No acute bony abnormalities.     IMPRESSION: 1.  Dilated gallbladder and common bile duct with findings suspicious for choledocholithiasis. Further evaluation with with MRCP recommended.         ASSESSMENT:   1. Elevated liver enzymes - Imaging concerning for choledocholithiasis. Possible stone noted at ampulla. Cholelithiasis. Antibx given. Febrile. Hemodynamically stable.  2. Cholelithiasis  3. DM  4. CAD  5. COVID+ - Recovered. Positive >1M ago.    PLAN:  -NPO  -Continue IV antibx  -Analgesia and anti-emetic rx PRN  -Surgery following  -ERCP  today  -Repeat LFTs in AM.    Approximately 25 minutes of total time was spent providing patient care including patient evaluation, reviewing documentation/test results, and .             Darron Gonzalez MD  Thank you for the opportunity to participate in the care of this patient.   Please feel free to call me with any questions or concerns.  Phone number [phone].            CC: [facility], Aditya Munoz

## 2022-07-12 NOTE — ANESTHESIA POSTPROCEDURE EVALUATION
Patient: Bon Luque    Procedure: Procedure(s):  ENDOSCOPIC RETROGRADE CHOLANGIOPANCREATOGRAPHY, STONE EXTRACTION, BILIARY SPHINCTEROTOMY  CHOLECYSTECTOMY, LAPAROSCOPIC       Anesthesia Type:  General    Note:  Disposition: Inpatient   Postop Pain Control: Uneventful            Sign Out: Well controlled pain   PONV: No   Neuro/Psych: Uneventful            Sign Out: Acceptable/Baseline neuro status   Airway/Respiratory: Uneventful            Sign Out: Acceptable/Baseline resp. status   CV/Hemodynamics: Uneventful            Sign Out: Acceptable CV status; No obvious hypovolemia; No obvious fluid overload   Other NRE: NONE   DID A NON-ROUTINE EVENT OCCUR?            Last vitals:  Vitals Value Taken Time   /68 07/12/22 1530   Temp 36.2  C (97.1  F) 07/12/22 1530   Pulse 72 07/12/22 1539   Resp 9 07/12/22 1539   SpO2 98 % 07/12/22 1539   Vitals shown include unvalidated device data.    Electronically Signed By: Susan Salinas MD  July 12, 2022  6:30 PM

## 2022-07-12 NOTE — PHARMACY-ADMISSION MEDICATION HISTORY
Pharmacy Note - Admission Medication History    Pertinent Provider Information: N/A     ______________________________________________________________________    Prior To Admission (PTA) med list completed and updated in EMR.       PTA Med List   Medication Sig Last Dose     clobetasol (TEMOVATE) 0.05 % external cream Apply topically 2 times daily as needed For psoriasis on hands. Unknown at Unknown time     DULoxetine (CYMBALTA) 60 MG capsule Take 60 mg by mouth At Bedtime 7/11/2022 at PM     econazole nitrate 1 % external cream Apply topically 2 times daily as needed Unknown at Unknown time     gabapentin (NEURONTIN) 300 MG capsule [GABAPENTIN (NEURONTIN) 300 MG CAPSULE] Take 600 mg by mouth 3 (three) times a day.  7/11/2022 at Unknown time     insulin aspart (NOVOLOG) 100 unit/mL injection Inject Subcutaneous 3 times daily (before meals) 1 unit per 5 g carbs plus sliding scale. 7/11/2022 at Unknown time     Insulin Glargine-yfgn 100 UNIT/ML SOPN Inject 70 Units Subcutaneous 2 times daily 7/11/2022 at AM     metFORMIN (GLUCOPHAGE-XR) 750 MG 24 hr tablet Take 750 mg by mouth daily (with dinner) 7/11/2022 at PM     metroNIDAZOLE (METROCREAM) 0.75 % external cream Apply topically 2 times daily as needed Apply thin layer to face for rosacea. Unknown at Unknown time     morphine (MS CONTIN) 30 MG CR tablet Take 30 mg by mouth every 8 hours 7/11/2022 at Unknown time     naloxone (NARCAN) 4 MG/0.1ML nasal spray Spray 1 spray into one nostril alternating nostrils once as needed for opioid reversal Unknown at Unknown time     omeprazole (PRILOSEC) 20 MG DR capsule Take 1 capsule (20 mg) by mouth daily before breakfast 7/11/2022 at AM     testosterone (ANDROGEL) 20.25 MG/1.25GM (1.62%) topical gel Place 20.25 mg onto the skin daily 7/11/2022 at AM     valACYclovir (VALTREX) 500 MG tablet Take 500 mg by mouth 2 times daily as needed Take twice daily at onset of symptoms for 3 days. Repeat for flares. Unknown at Unknown time        Information source(s): Patient and CareEverywhere/SureScripts  Method of interview communication: in-person    Summary of Changes to PTA Med List  New: N/A  Discontinued: Cialis  Changed: Cymbalta freq, Semglee freq, metformin dose, MS Contin freq, testosterone freq    Patient was asked about OTC/herbal products specifically.  PTA med list reflects this.    In the past week, patient estimated taking medication this percent of the time:  greater than 90%.    Allergies were reviewed, assessed, and updated with the patient.      Patient does not anticipate needing any multi-use medications during admission.    The information provided in this note is only as accurate as the sources available at the time of the update(s).    Thank you for the opportunity to participate in the care of this patient.    Torres Del Cid [facility]  7/12/2022 9:11 AM

## 2022-07-12 NOTE — ANESTHESIA CARE TRANSFER NOTE
Patient: Bon Luque    Procedure: Procedure(s):  ENDOSCOPIC RETROGRADE CHOLANGIOPANCREATOGRAPHY, STONE EXTRACTION, BILIARY SPHINCTEROTOMY  CHOLECYSTECTOMY, LAPAROSCOPIC       Diagnosis: Jaundice [R17]  Transaminitis [R74.01]  Dilated cbd, acquired [K83.8]  Diagnosis Additional Information: No value filed.    Anesthesia Type:   General     Note:    Oropharynx: oropharynx clear of all foreign objects  Level of Consciousness: awake  Oxygen Supplementation: face mask  Level of Supplemental Oxygen (L/min / FiO2): 8l/min/100%  Independent Airway: airway patency satisfactory and stable  Dentition: dentition unchanged  Vital Signs Stable: post-procedure vital signs reviewed and stable  Report to RN Given: handoff report given  Patient transferred to: PACU    Handoff Report: Identifed the Patient, Identified the Reponsible Provider, Reviewed the pertinent medical history, Discussed the surgical course, Reviewed Intra-OP anesthesia mangement and issues during anesthesia, Set expectations for post-procedure period and Allowed opportunity for questions and acknowledgement of understanding      Vitals:  Vitals Value Taken Time   /73 07/12/22 1418   Temp 36.8  C (98.2  F) 07/12/22 1418   Pulse 89 07/12/22 1420   Resp 17 07/12/22 1420   SpO2 100 % 07/12/22 1420   Vitals shown include unvalidated device data.    Electronically Signed By: CRISTINA Salazar CRNA  July 12, 2022  2:22 PM

## 2022-07-12 NOTE — H&P
[facility]    History and Physical - Hospitalist Service       Date of Admission:  7/12/2022    Assessment & Plan      Bon Luque is a 67 year old male with hx of IDDM, CAD, CHF 2/2 ICM, EtOH/tobacco use disorder in remission, chronic pain/neuropathy admitted on 7/12/2022.     Probable choledocholithiasis with elevated LFTs and CT abdomen with enlarged CBD.  At risk for stenting cholangitis.  -NPO, IV fluids, symptomatic treatment with analgesics/antiemetics/antipyretics  -GI consulted, plan for ERCP today  -Monitor LFTs  -Continue with meropenem, first dose was given in ED    Calculus cholecystitis.  Patient was seen by Dr. March, with plan for laparoscopic cholecystectomy followed by ERCP.    Insulin-dependent diabetes mellitus.  Not optimally controlled, A1c 8.2.  BG this morning 117-134, while patient is NPO.  OP controlled with metformin, glargine 70 units twice a day, NovoLog 1: 5 g carbs plus sliding scale.  Rita ischemic monitor in place.  Patient frequently misses doses of insulin, per family.  -NPO, holding prandial NovoLog, medium resistant SSI NovoLog  -Glargine 20 units only tonight, monitor glucose closely.  S/p 10 mg Decadron given at noon today.  -Diabetes educator consulted.    Diabetic polyneuropathy, on Cymbalta and Neurontin.    Coronary disease, no recent angina.  Patient not on any cardiac medications as OP.  EKG with normal sinus rhythm.    Chronic pain syndrome, due to chronic low back pain.  On MS Contin 30 mg 3 times daily.  -Continue home regimen of MS Contin.    COVID-19 convalescent, was positive more than 1 months ago.  Tested positive on admission.    Diet:  NPO  DVT Prophylaxis: Pneumatic Compression Devices, no heparin products in anticipation of GI procedure  Seals Catheter: Not present  Central Lines: None  Cardiac Monitoring: None  Code Status:  Full    Clinically Significant Risk Factors Present on Admission         # Hyponatremia: Na = 132 mmol/L  (Ref range: 136 - 145 mmol/L) on admission, will monitor as appropriate            # Hypertension: home medication list includes antihypertensive(s)  # Obesity: Estimated body mass index is 31.19 kg/m  as calculated from the following:    Height as of this encounter: 1.829 m (6').    Weight as of this encounter: 104.3 kg (230 lb).        Disposition Plan    Anticipate hospitalization for more than 2 midnights.    The patient's care was discussed with the Bedside Nurse, Patient and Patient's Family   Tammi Martell MD  Hospitalist Service  [facility]  Securely message with the Vocera Web Console (learn more here)  Text page via [facility] Paging/Directory ______________________________________________________________________    Chief Complaint   Intermittent abdominal pain    History is obtained from the patient, electronic health record and emergency department physician    History of Present Illness   Bon Luque is a 67 year old male Bon with hx of IDDM, CAD, CHF 2/2 ICM, EtOH/tobacco use disorder in remission, chronic pain/neuropathy, intermittent abdominal pain, COVID convalescent 1 months ago, presented to the hospital with fever, confusion, worsened abdominal pain.  He has chronic abdominal pain for years.  It worsened yesterday, with associated fever, nausea.  Patient took 3 aspirins and attempt to elevate pain, without success.  Also he noted urine becoming darker.  Patient denies melena, hematochezia, hematemesis.  Work-up in ED-elevated LFTs.  Patient with history of EtOH use disorder, currently in remission.  CT abdomen/pelvis: Dilated CBD.  He was already seen by GI, plan for ERCP today.  Collateral information from patient's wife: Patient has not been taking good care of diabetes.  He has camron glucose monitor and injects insulin sporadically, sometimes every other day, sometimes daily.    Review of Systems    The 10 point Review of Systems is negative other than noted in the  HPI or here.     Past Medical History    I have reviewed this patient's medical history and updated it with pertinent information if needed.   Past Medical History:   Diagnosis Date     Abdominal pain, epigastric 06/23/2022     Arthritis     psoriatic     ASCVD (arteriosclerotic cardiovascular disease) 04/28/2016    Coronary artery stent ×2 in RCA following myocardial infarction 2008 , echo June 2014 normal LV function with ejection fraction 50-55% with mild apical inferior hypokinesis     Chronic fatigue 06/23/2022     Chronic low back pain 04/28/2016     Chronic right shoulder pain 6/23/2022     Coronary artery disease      Depression 12/26/2017     Diabetic peripheral neuropathy associated with type 2 diabetes mellitus (H) 04/28/2016    Abnormal EMG and workup at [facility]     Dilated bile duct 07/03/2017    CT scan with incidental finding of dilated intra-and extrahepatic ducts     HTN (hypertension)      Hypercholesterolemia      Hypogonadism in male      Ischemic cardiomyopathy     Stress Echocardiogram 2017 with decreased LV function with EF 43% worse compared to previous echo.  No change in infarct.  No new ischemia     Left upper quadrant pain 06/30/2017     MI (myocardial infarction) (H)      Obstructive sleep apnea on CPAP     CPAP     Optic neuritis      Psoriasis      Renal cell carcinoma, right (H) 06/23/2022    Right partial nephrectomy 2017     Right renal mass      Sebaceous cyst 2010     Thoracic radiculopathy 2004    right T8 intercostal nerve block 2002     Type 2 diabetes mellitus with insulin therapy (H)        Past Surgical History   I have reviewed this patient's surgical history and updated it with pertinent information if needed.  Past Surgical History:   Procedure Laterality Date     APPENDECTOMY       COLONOSCOPY      Colonoscopy September 2017 with hyperplastic polyps, repeat in 10 years     CORONARY ANGIOPLASTY      stent     KNEE SURGERY       NOSE SURGERY       [place] LAP,PARTIAL  NEPHRECTOMY Right 1/10/2018    Procedure: RIGHT ROBOTIC PARTIAL NEPHRECTOMY WITH INTRAOPERATIVE ULTRASOUND;  Surgeon: Chase Rod[place]guez MD;  [place] [facility] [place]: [place]     Social History   I have reviewed this patient's social history and updated it with pertinent information if needed.  Social History     Tobacco Use     Smoking status: Former Smoker     Smokeless tobacco: Never Used   Substance Use Topics     Alcohol use: No     Comment: Alcoholic Drinks/day: alcohol abuse - in remission x16 years     Drug use: No     Family History   Family history reviewed and not pertinent to chief complaint.    Prior to Admission Medications   Prior to Admission Medications   Prescriptions Last Dose Informant Patient Reported? Taking?   B-D U/F 31G X 8 MM insulin pen needle   Yes No   Sig: FOR ADMINISTERING INSULIN AT HOME   Continuous Blood Gluc Sensor (FREESTYLE SHIRA 14 DAY SENSOR) MISC   Yes No   DULoxetine (CYMBALTA) 60 MG capsule 7/11/2022 at PM  Yes Yes   Sig: Take 60 mg by mouth At Bedtime   Insulin Glargine-yfgn 100 UNIT/ML SOPN   Yes No   Sig: Inject 70 Units Subcutaneous   clobetasol (TEMOVATE) 0.05 % external cream Unknown at Unknown time  Yes Yes   Sig: Apply topically 2 times daily as needed For psoriasis on hands.   econazole nitrate 1 % external cream Unknown at Unknown time  Yes Yes   Sig: Apply topically 2 times daily as needed   etanercept (ENBREL SURECLICK) 50 MG/ML autoinjector month  No No   Sig: INJECT THE CONTENTS OF 1 SURECLICK AUTOINJECTOR SUBCUTANEOUSLY EVERY WEEK.   Patient not taking: No sig reported   gabapentin (NEURONTIN) 300 MG capsule   Yes No   Sig: [GABAPENTIN (NEURONTIN) 300 MG CAPSULE] Take 600 mg by mouth 3 (three) times a day.    insulin aspart (NOVOLOG) 100 unit/mL injection   Yes No   Sig: [INSULIN ASPART (NOVOLOG) 100 UNIT/ML INJECTION] Inject under the skin 3 (three) times a day before meals. 1 unit of Novolog per 5 g of carbs   metFORMIN (GLUCOPHAGE-XR) 750  MG 24 hr tablet   Yes No   Sig: Take 1,500 mg by mouth   metroNIDAZOLE (METROCREAM) 0.75 % external cream   Yes No   Sig: APPLY THIN LAYER TOPICALLY TO FACE TWICE DAILY FOR ROSACEA   morphine (MS CONTIN) 30 MG CR tablet   Yes No   Sig: Take 30 mg by mouth   naloxone (NARCAN) 4 MG/0.1ML nasal spray   Yes No   Sig: Spray 1 spray in nostril   omeprazole (PRILOSEC) 20 MG DR capsule   No No   Sig: Take 1 capsule (20 mg) by mouth daily before breakfast   tadalafil (CIALIS) 20 MG tablet   Yes No   Sig: [TADALAFIL (CIALIS) 20 MG TABLET] Take 20 mg by mouth daily as needed.   testosterone (ANDROGEL) 20.25 MG/1.25GM (1.62%) topical gel   Yes No   Sig: Place 20.25 mg onto the skin   valACYclovir (VALTREX) 500 MG tablet   Yes No   Sig: TAKE 1 TABLET BY MOUTH TWICE DAILY AT ONSET OF SYMPTOMS FOR 3 DAYS. REPEAT FOR FLARES      Facility-Administered Medications: None     Allergies   Allergies   Allergen Reactions     Atorvastatin Muscle Pain (Myalgia)     Valomag [Arthritis Pain Formula] Nausea and Vomiting     gastric ulcer from aspirin use     Ampicillin Rash     Had a reaction to this medication many years ago.     Codeine Rash     It has been about 30 years ago     Physical Exam   Vital Signs: Temp: 100.3  F (37.9  C) Temp src: Oral BP: 138/69 Pulse: 76   Resp: 23 SpO2: 100 % O2 Device: None (Room air)    Weight: 230 lbs 0 oz    General: Alert and oriented x 3. Not in obvious distress.  HEENT: NC, AT. Neck- supple, No JVP elevation, lymphadenopathy or thyromegaly. Trachea-central.  Chest: Clear to auscultation bilaterally.  Heart: S1S2 regular. No M/R/G.  Abdomen: Soft, tender in epigastrium, right upper quadrant, no rebound tenderness.  Back: No spine tenderness. No CVA tenderness.  Extremities: No leg swelling. Peripheral pulses 2+ bilaterally.  Neuro: Cranial nerves 1-12 grossly normal. No focal neurological deficit    Data   Data reviewed today: I reviewed all medications, new labs and imaging results over the last 24  hours. I personally reviewed.    Recent Labs   Lab 07/12/22 0228 07/12/22  0210   WBC 9.3  --    HGB 14.5  --    MCV 83  --      --    *  --    POTASSIUM 3.5  --    CHLORIDE 98  --    CO2 27  --    BUN 10  --    CR 1.08  --    ANIONGAP 7  --    MIGUEL 8.9  --    * 117*   ALBUMIN 3.5  --    PROTTOTAL 7.1  --    BILITOTAL 5.3*  --    ALKPHOS 143*  --    *  --    *  --    LIPASE <9  --      9.3    \    14.5    /    150   N 88    L N/A    132 (L)    98    10 /   ------------------------------------ 134 (H)    (H)    (H)    (H)   ALB 3.5   Ca 8.9  3.5    27    1.08 \    % RETIC N/A    LDH N/A  Troponin N/A    BNP N/A    CK N/A  INR N/A   PTT N/A    D-dimer N/A    Fibrinogen N/A    Antithrombin N/A  Ferritin N/A  CRP N/A    IL-6 N/A  Recent Results (from the past 24 hour(s))   XR Chest Port 1 View    Narrative    EXAM: XR CHEST PORT 1 VIEW  LOCATION: [facility]  DATE/TIME: 7/12/2022 2:51 AM    INDICATION: Fatigue  COMPARISON: 01/10/2019      Impression    IMPRESSION: Negative chest.   CT Abdomen Pelvis w/o Contrast    Narrative    EXAM: CT ABDOMEN PELVIS W/O CONTRAST  LOCATION: [facility]  DATE/TIME: 7/12/2022 2:57 AM    INDICATION: resolved epigastric pain, dark urine, slight jaundice  COMPARISON: 06/16/2022  TECHNIQUE: CT scan of the abdomen and pelvis was performed without IV contrast. Multiplanar reformats were obtained. Dose reduction techniques were used.  CONTRAST: None.    FINDINGS:   LOWER CHEST: Left basilar atelectasis. Coronary artery calcification.    HEPATOBILIARY: Hepatic steatosis. Distended gallbladder with dependent stones. Common bile duct is dilated measuring 1.9 cm, previously 1.7 cm. Probable tiny stone in the region of the ampulla, coronal series image 92.    PANCREAS: Unremarkable    SPLEEN: Unremarkable    ADRENAL GLANDS: Unremarkable    KIDNEYS/BLADDER: Postoperative changes in the right  kidney. No hydronephrosis. Bladder is thick-walled but not well-distended.    BOWEL: No bowel obstruction.    LYMPH NODES: No significant retroperitoneal adenopathy    VASCULATURE: Moderate atherosclerotic calcification.    PELVIC ORGANS: No free fluid    MUSCULOSKELETAL: No acute bony abnormalities.      Impression    IMPRESSION:   1.  Dilated gallbladder and common bile duct with findings suspicious for choledocholithiasis. Further evaluation with with MRCP recommended.

## 2022-07-12 NOTE — PROGRESS NOTES
Pre-procedure Note    Reason for procedure: Cholelithiasis, biliary obstruction    History and Physical Reviewed: Reviewed, no changes.    Pre-sedation assessment:    General: alert, appears stated age, and cooperative  Airway: normal  Heart: regular rate and rhythm  Lungs: clear to auscultation bilaterally    Sedation Plan based on assessment: General    Mallampati score: Class II (visualization of the soft palate, fauces, and uvula)          ASA Classification: ASA 3 - Patient with moderate systemic disease with functional limitations    Impression: Patient deemed adequate candidate for general anesthesia     Risks, benefits and alternatives were discussed with the patient and informed consent was obtained.    Plan: ERCP      Darron Gonzalez MD 7/12/2022 11:57 AM                                               Darron Gonzalez MD  Thank you for the opportunity to participate in the care of this patient.   Please feel free to call me with any questions or concerns.  Phone number [phone].

## 2022-07-12 NOTE — ED PROVIDER NOTES
EMERGENCY DEPARTMENT ENCOUNTER      NAME: Bon Luque  AGE: 67 year old male  YOB: 1955  MRN: [id number]  EVALUATION DATE & TIME: 7/12/2022  2:08 AM    PCP: Aditya Munoz    ED PROVIDER: Chio Ellis M.D.      Chief Complaint   Patient presents with     Altered Mental Status         FINAL IMPRESSION:  1. Jaundice    2. Transaminitis    3. Dilated cbd, acquired          ED COURSE & MEDICAL DECISION MAKING:    ED Course as of 07/12/22 0458   Tue Jul 12, 2022   0219 Patient with normal VS with unexplained fatigue for 3 days with FSG reassuring. He is amenable to screening XR, influenza testing (recently recovered from COVID19 last month), UA and reassessment.    0247 UA very dark and with very slight eye jaundice, no EtOH use, CT abdomen pending.   0346 UA with some RBC in urine and dark color to urine with floridly high urobilinogen. LFTs quite elevated with Tbili 5.3 fitting with reported dark urine and his conjunctiva with jaundice. He did report on reassessment he had epigastric discomfort earlier tonight but resoled prior to ED arrival.    0347 CT abdomen with dilated CBD, which could represent choledocolithiasis vs. Ascending cholangitis. GI paged and abx provided meropenem with h/o ampicillin rash. Anaplasma/erlichia and lyme PCR underway. Patient without EtOH per him recently and EtOH <10 in the ED.    0400 Patient updated, I spoke with Dr Benítez from [facility] who will see patient while admitted, admitting hospitalist paged for hospital admission   0444 Patient signed out to hospitalist Dr Schwab to med surg   0456 Patient incidentally found to be COVID19 + and hospitalist paged to update   0457 Patient + for COVID19 one MONTH ago so PCR unlikely representative of acute illness, hospitalist updated by RN       Pertinent Labs & Imaging studies reviewed. (See chart for details)    N95 worn  A face shield was worn also  COVID PPE      At the conclusion of the encounter I discussed the results of all  "of the tests and the disposition. The questions were answered. The patient or family acknowledged understanding and was agreeable with the care plan.     MEDICATIONS GIVEN IN THE EMERGENCY:  Medications   0.9% sodium chloride BOLUS (0 mLs Intravenous Stopped 7/12/22 0330)   meropenem (MERREM) 1 g vial to attach to  mL bag (0 g Intravenous Stopped 7/12/22 0455)       NEW PRESCRIPTIONS STARTED AT TODAY'S ER VISIT  New Prescriptions    No medications on file          =================================================================    HPI      Bon Luque is a 67 year old male with PMHx of IDDM2, CAD, HTN, HLD, obesity who presents to the ED today via EMS with fatigue.    Since Friday (3 days ago) he reports increased fatigue and some nausea which is now resolved. No fever, no rash, no chest pain, no cough, no shortness of breath, no abdominal pain, no nausea/vomiting/diarrhea, no sick contacts. He had COVID19 a month ago and is recovered. No sick contacts, no syncope or near syncope, no confusion. His glucose earlier today was in the 200s and he used sliding scale insulin at normal dose, called EMS with fatigue tonight around 0100 and EMS found that his glucose was 93 and administered food \"in case his sugar had dropped too fast\" and transported him to the ED for assessment. FSG here 113 in triage.      REVIEW OF SYSTEMS   All other systems reviewed and are negative except as noted above in HPI.    PAST MEDICAL HISTORY:  Past Medical History:   Diagnosis Date     Abdominal pain, epigastric 06/23/2022     Arthritis     psoriatic     ASCVD (arteriosclerotic cardiovascular disease) 04/28/2016    Coronary artery stent ×2 in RCA following myocardial infarction 2008 , echo June 2014 normal LV function with ejection fraction 50-55% with mild apical inferior hypokinesis     Chronic fatigue 06/23/2022     Chronic low back pain 04/28/2016     Chronic right shoulder pain 6/23/2022     Coronary artery disease      " Depression 12/26/2017     Diabetic peripheral neuropathy associated with type 2 diabetes mellitus (H) 04/28/2016    Abnormal [facility] and [facility]up at [facility]     Dilated bile duct 07/03/2017    CT scan with incidental finding of dilated intra-and extrahepatic ducts     HTN (hypertension)      Hypercholesterolemia      Hypogonadism in male      Ischemic cardiomyopathy     Stress Echocardiogram 2017 with decreased LV function with EF 43% worse compared to previous echo.  No change in infarct.  No new ischemia     Left upper quadrant pain 06/30/2017     MI (myocardial infarction) (H)      Obstructive sleep apnea on CPAP     CPAP     Optic neuritis      Psoriasis      Renal cell carcinoma, right (H) 06/23/2022    Right partial nephrectomy 2017     Right renal mass      Sebaceous cyst 2010     Thoracic radiculopathy 2004    right T8 intercostal nerve block 2002     Type 2 diabetes mellitus with insulin therapy (H)        PAST SURGICAL HISTORY:  Past Surgical History:   Procedure Laterality Date     APPENDECTOMY       COLONOSCOPY      Colonoscopy September 2017 with hyperplastic polyps, repeat in 10 years     CORONARY ANGIOPLASTY      stent     KNEE SURGERY       NOSE SURGERY       MA LAP,PARTIAL NEPHRECTOMY Right 1/10/2018    Procedure: RIGHT ROBOTIC PARTIAL NEPHRECTOMY WITH INTRAOPERATIVE ULTRASOUND;  Surgeon: Chase Rodríguez MD;  Location: [facility] [place];  Service: Urology       CURRENT MEDICATIONS:    B-D U/F 31G X 8 MM insulin pen needle  clobetasol (TEMOVATE) 0.05 % external cream  Continuous Blood Gluc Sensor (FREESTYLE SHIRA 14 DAY SENSOR) MISC  DULoxetine (CYMBALTA) 60 MG capsule  econazole nitrate 1 % external cream  etanercept (ENBREL SURECLICK) 50 MG/ML autoinjector  gabapentin (NEURONTIN) 300 MG capsule  insulin aspart (NOVOLOG) 100 unit/mL injection  Insulin Glargine-yfgn 100 UNIT/ML SOPN  metFORMIN (GLUCOPHAGE-XR) 750 MG 24 hr tablet  metroNIDAZOLE (METROCREAM) 0.75 % external cream  morphine (MS  CONTIN) 30 MG CR tablet  naloxone (NARCAN) 4 MG/0.1ML nasal spray  omeprazole (PRILOSEC) 20 MG DR capsule  tadalafil (CIALIS) 20 MG tablet  testosterone (ANDROGEL) 20.25 MG/1.25GM (1.62%) topical gel  valACYclovir (VALTREX) 500 MG tablet        ALLERGIES:  Allergies   Allergen Reactions     Atorvastatin Muscle Pain (Myalgia)     Valomag [Arthritis Pain Formula] Nausea and Vomiting     gastric ulcer from aspirin use     Ampicillin Rash     Had a reaction to this medication many years ago.     Codeine Rash     It has been about 30 years ago       FAMILY HISTORY:  No family history on file.    SOCIAL HISTORY:   Social History     Socioeconomic History     Marital status:    Tobacco Use     Smoking status: Former Smoker     Smokeless tobacco: Never Used   Substance and Sexual Activity     Alcohol use: No     Comment: Alcoholic Drinks/day: alcohol abuse - in remission x16 years     Drug use: No       VITALS:  Patient Vitals for the past 24 hrs:   BP Temp Temp src Pulse Resp SpO2 Height Weight   07/12/22 0400 138/69 -- -- 83 15 96 % -- --   07/12/22 0330 136/67 -- -- 81 17 96 % -- --   07/12/22 0315 131/65 -- -- 83 17 96 % -- --   07/12/22 0300 (!) 141/72 -- -- -- -- -- -- --   07/12/22 0230 -- -- -- 96 18 95 % -- --   07/12/22 0213 (!) 177/81 100.3  F (37.9  C) Oral 96 20 95 % 1.829 m (6') 104.3 kg (230 lb)       PHYSICAL EXAM    GENERAL: Awake, alert.  In no acute distress.   HEENT: Normocephalic, atraumatic.  Pupils equal, round and reactive.  Conjunctiva silghtly jaundiced.  EOMI.  NECK: No stridor or apparent deformity.  PULMONARY: Symmetrical breath sounds without distress.  Lungs clear to auscultation bilaterally without wheezes, rhonchi or rales.  CARDIO: Regular rate and rhythm.  No significant murmur, rub or gallop.  Radial pulses strong and symmetrical.  ABDOMINAL: Abdomen soft, non-distended and non-tender to palpation.  No CVAT, no palpable hepatosplenomegaly.  EXTREMITIES: No lower extremity swelling  or edema.    NEURO: Alert and oriented to person, place and time.  Cranial nerves grossly intact.  No focal motor deficit.  PSYCH: Normal mood and affect  SKIN: No rashes      LAB:  All pertinent labs reviewed and interpreted.  Results for orders placed or performed during the hospital encounter of 07/12/22   XR Chest Port 1 View    Impression    IMPRESSION: Negative chest.   CT Abdomen Pelvis w/o Contrast    Impression    IMPRESSION:   1.  Dilated gallbladder and common bile duct with findings suspicious for choledocholithiasis. Further evaluation with with MRCP recommended.     Comprehensive metabolic panel   Result Value Ref Range    Sodium 132 (L) 136 - 145 mmol/L    Potassium 3.5 3.5 - 5.0 mmol/L    Chloride 98 98 - 107 mmol/L    Carbon Dioxide (CO2) 27 22 - 31 mmol/L    Anion Gap 7 5 - 18 mmol/L    Urea Nitrogen 10 8 - 22 mg/dL    Creatinine 1.08 0.70 - 1.30 mg/dL    Calcium 8.9 8.5 - 10.5 mg/dL    Glucose 134 (H) 70 - 125 mg/dL    Alkaline Phosphatase 143 (H) 45 - 120 U/L     (H) 0 - 40 U/L     (H) 0 - 45 U/L    Protein Total 7.1 6.0 - 8.0 g/dL    Albumin 3.5 3.5 - 5.0 g/dL    Bilirubin Total 5.3 (H) 0.0 - 1.0 mg/dL    GFR Estimate 76 >60 mL/min/1.73m2   Result Value Ref Range    Troponin I 0.03 0.00 - 0.29 ng/mL   UA with Microscopic reflex to Culture    Specimen: Urine, Midstream   Result Value Ref Range    Color Urine Franci (A) Colorless, Straw, Light Yellow, Yellow    Appearance Urine Turbid (A) Clear    Glucose Urine Negative Negative mg/dL    Bilirubin Urine 8.0 mg/dL (A) Negative    Ketones Urine 20  (A) Negative mg/dL    Specific Gravity Urine 1.030 1.001 - 1.030    Blood Urine 0.2 mg/dL (A) Negative    pH Urine 6.0 5.0 - 7.0    Protein Albumin Urine 50  (A) Negative mg/dL    Urobilinogen Urine >12.0 (A) <2.0 mg/dL    Nitrite Urine Negative Negative    Leukocyte Esterase Urine Negative Negative    Bacteria Urine Few (A) None Seen /HPF    Mucus Urine Present (A) None Seen /LPF     Amorphous Crystals Urine Few (A) None Seen /HPF    RBC Urine 7 (H) <=2 /HPF    WBC Urine 3 <=5 /HPF    Squamous Epithelials Urine 2 (H) <=1 /HPF   Result Value Ref Range    Lipase <9 0 - 52 U/L   Ethyl Alcohol Level   Result Value Ref Range    Alcohol, Blood <10 None detected mg/dL   Glucose by meter   Result Value Ref Range    GLUCOSE BY METER POCT 117 (H) 70 - 99 mg/dL   CBC with platelets and differential   Result Value Ref Range    WBC Count 9.3 4.0 - 11.0 10e3/uL    RBC Count 5.15 4.40 - 5.90 10e6/uL    Hemoglobin 14.5 13.3 - 17.7 g/dL    Hematocrit 42.6 40.0 - 53.0 %    MCV 83 78 - 100 fL    MCH 28.2 26.5 - 33.0 pg    MCHC 34.0 31.5 - 36.5 g/dL    RDW 12.8 10.0 - 15.0 %    Platelet Count 150 150 - 450 10e3/uL    % Neutrophils 88 %    % Lymphocytes 4 %    % Monocytes 7 %    % Eosinophils 0 %    % Basophils 0 %    % Immature Granulocytes 1 %    NRBCs per 100 WBC 0 <1 /100    Absolute Neutrophils 8.2 1.6 - 8.3 10e3/uL    Absolute Lymphocytes 0.4 (L) 0.8 - 5.3 10e3/uL    Absolute Monocytes 0.7 0.0 - 1.3 10e3/uL    Absolute Eosinophils 0.0 0.0 - 0.7 10e3/uL    Absolute Basophils 0.0 0.0 - 0.2 10e3/uL    Absolute Immature Granulocytes 0.1 <=0.4 10e3/uL    Absolute NRBCs 0.0 10e3/uL   Asymptomatic COVID-19 Virus (Coronavirus) by PCR Nasopharyngeal    Specimen: Nasopharyngeal; Swab   Result Value Ref Range    SARS CoV2 PCR Positive (A) Negative   Influenza A/B antigen    Specimen: Nasopharyngeal; Swab   Result Value Ref Range    Influenza A antigen Negative Negative    Influenza B antigen Negative Negative       RADIOLOGY:  Reviewed all pertinent imaging. Please see official radiology report.  CT Abdomen Pelvis w/o Contrast   Final Result   IMPRESSION:    1.  Dilated gallbladder and common bile duct with findings suspicious for choledocholithiasis. Further evaluation with with MRCP recommended.         XR Chest Port 1 View   Final Result   IMPRESSION: Negative chest.            EKG:    Reviewed and interpreted as:  0235 NSR without ST anomalies other than atypical ST in V5/v6 and Q waves in II, III and aVF same as prior EKG from 12/26/2017      I have independently reviewed and interpreted the EKG(s) documented above.             Chio Ellis MD  07/12/22 0445       Chio Ellis MD  07/12/22 0457       Chio Ellis MD  07/12/22 0458

## 2022-07-12 NOTE — ED NOTES
Bed: JNEDH-04  Expected date: 7/12/22  Expected time: 1:53 AM  Means of arrival:   Comments:  [facility]  67 M--AMS

## 2022-07-12 NOTE — ED TRIAGE NOTES
Patient arrived here via EMS for AMS. Patient has been struggling with blood sugars since Covid dx about one month ago. In ED pt is alert and orientated x4. . Complaint ABD pain this morning.      Triage Assessment     Row Name 07/12/22 0215       Triage Assessment (Adult)    Airway WDL WDL       Respiratory WDL    Respiratory WDL WDL       Skin Circulation/Temperature WDL    Skin Circulation/Temperature WDL temperature    Skin Temperature warm       Cardiac WDL    Cardiac WDL WDL       Peripheral/Neurovascular WDL    Peripheral Neurovascular WDL WDL       Cognitive/Neuro/Behavioral WDL    Cognitive/Neuro/Behavioral WDL WDL

## 2022-07-12 NOTE — OP NOTE
Operative Note:  Laparoscopic Cholecystectomy    Name:  Bon Luque  PCP:  Aditya Munoz  Procedure Date:  7/12/2022      Procedure(s):  ENDOSCOPIC RETROGRADE CHOLANGIOPANCREATOGRAPHY  CHOLECYSTECTOMY, LAPAROSCOPIC     Pre-Procedure Diagnosis:  Cholecystitis with choledocholithiasis    Post-Procedure Diagnosis:    Chronic Cholecystits with gall stones    Surgeon(s):  Adam March MD Howard, Natia, PA-C;  Their assistance was required for exposure and visualization      Anesthesia Type:  GET      Findings:  Multiple 4 mm black stones    Operative Report:    The patient was taken to Operating Room, identified, and the procedure verified as Laparoscopic Cholecystectomy  A Time Out was held.    General endotracheal anesthesia was administered and tolerated well. After the induction, the abdomen was prepped in the usual sterile fashion. The patient was positioned in the supine position. They were sterilely prepped and draped in the usual surgical fashion.    Local anesthetic agent was injected into the skin near the umbilicus and an incision made. A periumbilical incision was made and a 5mm trocar was placed under direct visualization using the optiview technique, and a pneumoperitoneum was brought up to 15 mm Hg. . 2 5's and a  11 mm port was placed under direct vision.  All skin incisions were infiltrated with a local anesthetic agent before making the incision and placing the trocars.     The gallbladder was identified, the fundus grasped and retracted cephalad up over the inferior edge of the liver. The infundibulum was grasped and retracted laterally, exposing the neck of the gallbladder.  The visceral peritoneum overlying the angle of Calot was dissected through with hook cautery. The cystic duct was clearly identified and bluntly dissected circumferentially. The junctions of the gallbladder and the cystic duct was clearly identified.  The cystic duct was clipped with one clip on the gallbladder  side and 3 clips more proximally on the cystic duct. The Cystic duct was divided sharply with laparoscopic scissors between the clips. The cystic artery was identified, it was dissected free of surrounding structures.  The cystic artery was clipped and divided.  There was a branch more proximal off the cystic artery that was bleeding.  That was traced back and clips were applied to get complete hemostasis.      The gallbladder was dissected from the liver bed in retrograde fashion with the electrocautery. The gallbladder was removed by placing it in a endocatch bag and extracting it from through the 11 mm trocar site.   The fascia of the 10 mm trocar site was then closed with 0 vicryl using an Endo fascial closure device under direct physician laparoscope     Pneumoperitoneum was reduced.  The trocars were removed.  The skin was then closed with 4-0 monocryl and a sterile dressing was applied.    Instrument, sponge, and needle counts were correct at closure and at the conclusion of the case.    Estimated Blood Loss:   250 cc    Specimens:    Gall Bladder       Drains:   None    Complications:    None    Adam March MD     Date: 7/12/2022  Time: 1:36 PM

## 2022-07-12 NOTE — ANESTHESIA PREPROCEDURE EVALUATION
Anesthesia Pre-Procedure Evaluation    Patient: Bon Luque   MRN: [id number] : 1955        Procedure : Procedure(s):  ENDOSCOPIC RETROGRADE CHOLANGIOPANCREATOGRAPHY  CHOLECYSTECTOMY, LAPAROSCOPIC          Past Medical History:   Diagnosis Date     Abdominal pain, epigastric 2022     Arthritis     psoriatic     ASCVD (arteriosclerotic cardiovascular disease) 2016    Coronary artery stent ×2 in RCA following myocardial infarction  , echo 2014 normal LV function with ejection fraction 50-55% with mild apical inferior hypokinesis     Chronic fatigue 2022     Chronic low back pain 2016     Chronic right shoulder pain 2022     Coronary artery disease      Depression 2017     Diabetic peripheral neuropathy associated with type 2 diabetes mellitus (H) 2016    Abnormal EMG and workup at [facility]     Dilated bile duct 2017    CT scan with incidental finding of dilated intra-and extrahepatic ducts     HTN (hypertension)      Hypercholesterolemia      Hypogonadism in male      Ischemic cardiomyopathy     Stress Echocardiogram  with decreased LV function with EF 43% worse compared to previous echo.  No change in infarct.  No new ischemia     Left upper quadrant pain 2017     MI (myocardial infarction) (H)      Obstructive sleep apnea on CPAP     CPAP     Optic neuritis      Psoriasis      Renal cell carcinoma, right (H) 2022    Right partial nephrectomy 2017     Right renal mass      Sebaceous cyst 2010     Thoracic radiculopathy 2004    right T8 intercostal nerve block      Type 2 diabetes mellitus with insulin therapy (H)       Past Surgical History:   Procedure Laterality Date     APPENDECTOMY       COLONOSCOPY      Colonoscopy 2017 with hyperplastic polyps, repeat in 10 years     CORONARY ANGIOPLASTY      stent     KNEE SURGERY       NOSE SURGERY       [place] LAP,PARTIAL NEPHRECTOMY Right 1/10/2018    Procedure: RIGHT ROBOTIC  PARTIAL NEPHRECTOMY WITH INTRAOPERATIVE ULTRASOUND;  Surgeon: Chase Rodríguez MD;  Location: [facility];  Service: Urology      Allergies   Allergen Reactions     Atorvastatin Muscle Pain (Myalgia)     Valomag [Arthritis Pain Formula] Nausea and Vomiting     gastric ulcer from aspirin use     Ampicillin Rash     Had a reaction to this medication many years ago.     Codeine Rash     It has been about 30 years ago      Social History     Tobacco Use     Smoking status: Former Smoker     Smokeless tobacco: Never Used   Substance Use Topics     Alcohol use: No     Comment: Alcoholic Drinks/day: alcohol abuse - in remission x16 years      Wt Readings from Last 1 Encounters:   07/12/22 104.3 kg (230 lb)        Anesthesia Evaluation   Pt has had prior anesthetic.     No history of anesthetic complications       ROS/MED HX  ENT/Pulmonary:     (+) sleep apnea, uses CPAP,     Neurologic:     (+) peripheral neuropathy,     Cardiovascular:     (+) Dyslipidemia hypertension--CAD --stent- (-) angina and angina   METS/Exercise Tolerance: >4 METS    Hematologic:  - neg hematologic  ROS     Musculoskeletal:   (+) arthritis,     GI/Hepatic:     (+) cholecystitis/cholelithiasis,     Renal/Genitourinary:     (+) renal disease,     Endo:     (+) type II DM,     Psychiatric/Substance Use:     (+) psychiatric history anxiety and depression alcohol abuse     Infectious Disease:       Malignancy:   (+) Malignancy, History of Other.    Other:      (+) , H/O Chronic Pain,        Physical Exam    Airway        Mallampati: IV    Neck ROM: full     Respiratory Devices and Support         Dental       (+) chipped and missing      Cardiovascular          Rhythm and rate: regular     Pulmonary   pulmonary exam normal            Other findings:     Stress ECHO 2/19/2020:    FINAL CONCLUSIONS    Abnormal exercise stress perfusion imaging study.    Images demonstrate a small sized, partially reversible defect in the inferior and    inferolateral wall at the basal ventricular  wall(s).This is    consistent with infarction with mild associated ischemia.    Stress ECG is positive for myocardial ischemia.    Exercise produced expected fatigue.    Increased left ventricular size with reduced systolic function and a calculated LVEF of 35%.    Moderate to Severe Global hypokinesis with akinesis of the apex.    Compared to the prior study of 2017 the inferoseptal defect appears improved and othewise   other perfusion defects are similar. Kade EF    is reduced from 43% to 35%.     OUTSIDE LABS:  CBC:   Lab Results   Component Value Date    WBC 9.3 07/12/2022    WBC 9.9 06/16/2022    HGB 14.5 07/12/2022    HGB 16.0 06/16/2022    HCT 42.6 07/12/2022    HCT 47.0 06/16/2022     07/12/2022     06/16/2022     BMP:   Lab Results   Component Value Date     (L) 07/12/2022     (L) 06/16/2022    POTASSIUM 3.5 07/12/2022    POTASSIUM 4.4 06/16/2022    CHLORIDE 98 07/12/2022    CHLORIDE 99 06/16/2022    CO2 27 07/12/2022    CO2 22 06/16/2022    BUN 10 07/12/2022    BUN 14 06/16/2022    CR 1.08 07/12/2022    CR 1.0 06/16/2022     (H) 07/12/2022     (H) 07/12/2022     COAGS: No results found for: PTT, INR, FIBR  POC: No results found for: BGM, HCG, HCGS  HEPATIC:   Lab Results   Component Value Date    ALBUMIN 3.5 07/12/2022    PROTTOTAL 7.1 07/12/2022     (H) 07/12/2022     (H) 07/12/2022    ALKPHOS 143 (H) 07/12/2022    BILITOTAL 5.3 (H) 07/12/2022     OTHER:   Lab Results   Component Value Date    A1C 8.2 (H) 07/12/2022    MIGUEL 8.9 07/12/2022    MAG 1.9 01/11/2018    LIPASE <9 07/12/2022    TSH 0.70 06/16/2022       Anesthesia Plan    ASA Status:  3   NPO Status:  NPO Appropriate    Anesthesia Type: General.     - Airway: ETT   Induction: Intravenous, Propofol.           Consents    Anesthesia Plan(s) and associated risks, benefits, and realistic alternatives discussed. Questions answered and  patient/representative(s) expressed understanding.     - Discussed: Risks, Benefits and Alternatives for the PROCEDURE were discussed     - Discussed with:  Patient      - Patient is DNR/DNI Status: No         Postoperative Care    Pain management: Multi-modal analgesia.   PONV prophylaxis: Dexamethasone or Solumedrol, Ondansetron (or other 5HT-3)     Comments:    Other Comments:               Ailin Simeon MD

## 2022-07-13 VITALS
HEART RATE: 86 BPM | RESPIRATION RATE: 20 BRPM | OXYGEN SATURATION: 98 % | SYSTOLIC BLOOD PRESSURE: 125 MMHG | HEIGHT: 72 IN | WEIGHT: 230 LBS | DIASTOLIC BLOOD PRESSURE: 58 MMHG | BODY MASS INDEX: 31.15 KG/M2 | TEMPERATURE: 97.7 F

## 2022-07-13 LAB
ALBUMIN SERPL-MCNC: 3.1 G/DL (ref 3.5–5)
ALP SERPL-CCNC: 113 U/L (ref 45–120)
ALT SERPL W P-5'-P-CCNC: 235 U/L (ref 0–45)
ANION GAP SERPL CALCULATED.3IONS-SCNC: 8 MMOL/L (ref 5–18)
AST SERPL W P-5'-P-CCNC: 122 U/L (ref 0–40)
BASOPHILS # BLD AUTO: 0 10E3/UL (ref 0–0.2)
BASOPHILS NFR BLD AUTO: 0 %
BILIRUB SERPL-MCNC: 1.8 MG/DL (ref 0–1)
BUN SERPL-MCNC: 17 MG/DL (ref 8–22)
CALCIUM SERPL-MCNC: 8.2 MG/DL (ref 8.5–10.5)
CHLORIDE BLD-SCNC: 104 MMOL/L (ref 98–107)
CO2 SERPL-SCNC: 22 MMOL/L (ref 22–31)
CREAT SERPL-MCNC: 0.93 MG/DL (ref 0.7–1.3)
ENTEROCOCCUS FAECALIS: NOT DETECTED
ENTEROCOCCUS FAECIUM: NOT DETECTED
EOSINOPHIL # BLD AUTO: 0 10E3/UL (ref 0–0.7)
EOSINOPHIL NFR BLD AUTO: 0 %
ERYTHROCYTE [DISTWIDTH] IN BLOOD BY AUTOMATED COUNT: 13.2 % (ref 10–15)
GFR SERPL CREATININE-BSD FRML MDRD: >90 ML/MIN/1.73M2
GLUCOSE BLD-MCNC: 286 MG/DL (ref 70–125)
GLUCOSE BLDC GLUCOMTR-MCNC: 259 MG/DL (ref 70–99)
GLUCOSE BLDC GLUCOMTR-MCNC: 269 MG/DL (ref 70–99)
GLUCOSE BLDC GLUCOMTR-MCNC: 292 MG/DL (ref 70–99)
HCT VFR BLD AUTO: 37.5 % (ref 40–53)
HGB BLD-MCNC: 12.6 G/DL (ref 13.3–17.7)
IMM GRANULOCYTES # BLD: 0.1 10E3/UL
IMM GRANULOCYTES NFR BLD: 1 %
LACTATE SERPL-SCNC: 1.8 MMOL/L (ref 0.7–2)
LISTERIA SPECIES (DETECTED/NOT DETECTED): NOT DETECTED
LYMPHOCYTES # BLD AUTO: 0.9 10E3/UL (ref 0.8–5.3)
LYMPHOCYTES NFR BLD AUTO: 8 %
MCH RBC QN AUTO: 28.2 PG (ref 26.5–33)
MCHC RBC AUTO-ENTMCNC: 33.6 G/DL (ref 31.5–36.5)
MCV RBC AUTO: 84 FL (ref 78–100)
MONOCYTES # BLD AUTO: 0.9 10E3/UL (ref 0–1.3)
MONOCYTES NFR BLD AUTO: 8 %
NEUTROPHILS # BLD AUTO: 9.3 10E3/UL (ref 1.6–8.3)
NEUTROPHILS NFR BLD AUTO: 83 %
NRBC # BLD AUTO: 0 10E3/UL
NRBC BLD AUTO-RTO: 0 /100
PATH REPORT.COMMENTS IMP SPEC: NORMAL
PATH REPORT.COMMENTS IMP SPEC: NORMAL
PATH REPORT.FINAL DX SPEC: NORMAL
PATH REPORT.GROSS SPEC: NORMAL
PATH REPORT.MICROSCOPIC SPEC OTHER STN: NORMAL
PATH REPORT.RELEVANT HX SPEC: NORMAL
PHOTO IMAGE: NORMAL
PLATELET # BLD AUTO: 140 10E3/UL (ref 150–450)
POTASSIUM BLD-SCNC: 4.2 MMOL/L (ref 3.5–5)
PROCALCITONIN SERPL-MCNC: 1.11 NG/ML (ref 0–0.49)
PROT SERPL-MCNC: 6.4 G/DL (ref 6–8)
RBC # BLD AUTO: 4.47 10E6/UL (ref 4.4–5.9)
SODIUM SERPL-SCNC: 134 MMOL/L (ref 136–145)
STAPHYLOCOCCUS AUREUS: NOT DETECTED
STAPHYLOCOCCUS EPIDERMIDIS: DETECTED
STAPHYLOCOCCUS LUGDUNENSIS: NOT DETECTED
STREPTOCOCCUS AGALACTIAE: NOT DETECTED
STREPTOCOCCUS ANGINOSUS GROUP: NOT DETECTED
STREPTOCOCCUS PNEUMONIAE: NOT DETECTED
STREPTOCOCCUS PYOGENES: NOT DETECTED
STREPTOCOCCUS SPECIES: NOT DETECTED
WBC # BLD AUTO: 11.1 10E3/UL (ref 4–11)

## 2022-07-13 PROCEDURE — 258N000003 HC RX IP 258 OP 636: Performed by: INTERNAL MEDICINE

## 2022-07-13 PROCEDURE — 85014 HEMATOCRIT: CPT | Performed by: SURGERY

## 2022-07-13 PROCEDURE — 87040 BLOOD CULTURE FOR BACTERIA: CPT | Performed by: INTERNAL MEDICINE

## 2022-07-13 PROCEDURE — 80053 COMPREHEN METABOLIC PANEL: CPT | Performed by: SURGERY

## 2022-07-13 PROCEDURE — C9113 INJ PANTOPRAZOLE SODIUM, VIA: HCPCS | Performed by: SURGERY

## 2022-07-13 PROCEDURE — 250N000011 HC RX IP 250 OP 636: Performed by: SURGERY

## 2022-07-13 PROCEDURE — 250N000013 HC RX MED GY IP 250 OP 250 PS 637: Performed by: SURGERY

## 2022-07-13 PROCEDURE — 83605 ASSAY OF LACTIC ACID: CPT | Performed by: INTERNAL MEDICINE

## 2022-07-13 PROCEDURE — 250N000012 HC RX MED GY IP 250 OP 636 PS 637: Performed by: INTERNAL MEDICINE

## 2022-07-13 PROCEDURE — 84145 PROCALCITONIN (PCT): CPT | Performed by: INTERNAL MEDICINE

## 2022-07-13 PROCEDURE — 36415 COLL VENOUS BLD VENIPUNCTURE: CPT | Performed by: SURGERY

## 2022-07-13 PROCEDURE — 36415 COLL VENOUS BLD VENIPUNCTURE: CPT | Performed by: INTERNAL MEDICINE

## 2022-07-13 PROCEDURE — 99239 HOSP IP/OBS DSCHRG MGMT >30: CPT | Performed by: INTERNAL MEDICINE

## 2022-07-13 PROCEDURE — 258N000003 HC RX IP 258 OP 636: Performed by: SURGERY

## 2022-07-13 PROCEDURE — 250N000011 HC RX IP 250 OP 636: Performed by: INTERNAL MEDICINE

## 2022-07-13 RX ORDER — CEFAZOLIN SODIUM 1 G/50ML
2000 SOLUTION INTRAVENOUS ONCE
Status: COMPLETED | OUTPATIENT
Start: 2022-07-13 | End: 2022-07-13

## 2022-07-13 RX ORDER — CONTAINER,EMPTY
EACH MISCELLANEOUS
Qty: 1 EACH | Refills: 0 | Status: SHIPPED | OUTPATIENT
Start: 2022-07-13 | End: 2024-04-15

## 2022-07-13 RX ORDER — ACETAMINOPHEN 325 MG/1
650 TABLET ORAL EVERY 4 HOURS PRN
COMMUNITY
Start: 2022-07-15 | End: 2023-10-05

## 2022-07-13 RX ORDER — DEXTROSE MONOHYDRATE 25 G/50ML
25-50 INJECTION, SOLUTION INTRAVENOUS
Status: DISCONTINUED | OUTPATIENT
Start: 2022-07-13 | End: 2022-07-13 | Stop reason: HOSPADM

## 2022-07-13 RX ORDER — METFORMIN HYDROCHLORIDE 750 MG/1
750 TABLET, EXTENDED RELEASE ORAL
Status: DISCONTINUED | OUTPATIENT
Start: 2022-07-14 | End: 2022-07-13 | Stop reason: HOSPADM

## 2022-07-13 RX ORDER — BLOOD PRESSURE TEST KIT
KIT MISCELLANEOUS
Qty: 100 EACH | Refills: 0 | Status: SHIPPED | OUTPATIENT
Start: 2022-07-13 | End: 2024-04-15

## 2022-07-13 RX ORDER — NICOTINE POLACRILEX 4 MG
15-30 LOZENGE BUCCAL
Status: DISCONTINUED | OUTPATIENT
Start: 2022-07-13 | End: 2022-07-13 | Stop reason: HOSPADM

## 2022-07-13 RX ORDER — VANCOMYCIN HYDROCHLORIDE 1 G/200ML
1000 INJECTION, SOLUTION INTRAVENOUS EVERY 12 HOURS
Status: DISCONTINUED | OUTPATIENT
Start: 2022-07-13 | End: 2022-07-13 | Stop reason: HOSPADM

## 2022-07-13 RX ADMIN — MEROPENEM 1 G: 1 INJECTION, POWDER, FOR SOLUTION INTRAVENOUS at 13:21

## 2022-07-13 RX ADMIN — OXYCODONE HYDROCHLORIDE 10 MG: 5 TABLET ORAL at 11:00

## 2022-07-13 RX ADMIN — KETOROLAC TROMETHAMINE 15 MG: 30 INJECTION, SOLUTION INTRAMUSCULAR at 03:37

## 2022-07-13 RX ADMIN — ACETAMINOPHEN 975 MG: 325 TABLET ORAL at 13:22

## 2022-07-13 RX ADMIN — KETOROLAC TROMETHAMINE 15 MG: 30 INJECTION, SOLUTION INTRAMUSCULAR at 08:51

## 2022-07-13 RX ADMIN — GABAPENTIN 600 MG: 300 CAPSULE ORAL at 08:35

## 2022-07-13 RX ADMIN — MORPHINE SULFATE 30 MG: 15 TABLET, EXTENDED RELEASE ORAL at 13:22

## 2022-07-13 RX ADMIN — ACETAMINOPHEN 975 MG: 325 TABLET ORAL at 03:37

## 2022-07-13 RX ADMIN — KETOROLAC TROMETHAMINE 15 MG: 30 INJECTION, SOLUTION INTRAMUSCULAR at 13:33

## 2022-07-13 RX ADMIN — SODIUM CHLORIDE, POTASSIUM CHLORIDE, SODIUM LACTATE AND CALCIUM CHLORIDE: 600; 310; 30; 20 INJECTION, SOLUTION INTRAVENOUS at 04:03

## 2022-07-13 RX ADMIN — INSULIN ASPART 3 UNITS: 100 INJECTION, SOLUTION INTRAVENOUS; SUBCUTANEOUS at 03:38

## 2022-07-13 RX ADMIN — GABAPENTIN 600 MG: 300 CAPSULE ORAL at 13:23

## 2022-07-13 RX ADMIN — INSULIN ASPART 3 UNITS: 100 INJECTION, SOLUTION INTRAVENOUS; SUBCUTANEOUS at 08:33

## 2022-07-13 RX ADMIN — PANTOPRAZOLE SODIUM 40 MG: 40 INJECTION, POWDER, FOR SOLUTION INTRAVENOUS at 08:34

## 2022-07-13 RX ADMIN — FAMOTIDINE 20 MG: 20 TABLET ORAL at 06:13

## 2022-07-13 RX ADMIN — POLYETHYLENE GLYCOL 3350 17 G: 17 POWDER, FOR SOLUTION ORAL at 08:35

## 2022-07-13 RX ADMIN — MORPHINE SULFATE 30 MG: 15 TABLET, EXTENDED RELEASE ORAL at 06:13

## 2022-07-13 RX ADMIN — MEROPENEM 1 G: 1 INJECTION, POWDER, FOR SOLUTION INTRAVENOUS at 03:36

## 2022-07-13 RX ADMIN — INSULIN ASPART 7 UNITS: 100 INJECTION, SOLUTION INTRAVENOUS; SUBCUTANEOUS at 13:27

## 2022-07-13 RX ADMIN — VANCOMYCIN HYDROCHLORIDE 2000 MG: 5 INJECTION, POWDER, LYOPHILIZED, FOR SOLUTION INTRAVENOUS at 10:27

## 2022-07-13 ASSESSMENT — ACTIVITIES OF DAILY LIVING (ADL)
ADLS_ACUITY_SCORE: 20

## 2022-07-13 NOTE — DISCHARGE INSTRUCTIONS
From your Surgeon:    Follow up:  For straightforward laparoscopic procedures, our practice is to check-in with you over the phone a few days after your procedure.  If you would like a scheduled follow up appointment in clinic, please call us at [phone] to schedule an appointment at your convenience.  If you would prefer to follow up with us further by phone, please let us know so that we may contact you 2-3 weeks following your procedure.        Diet: Regular diet. Patients can have difficulty with constipation following surgery, due in part to the administration of narcotic medications.  If you are suffering with constipation, you should avoid foods such as hard cheeses or red meat.  Foods high in fiber are recommended.      Activity: You should continue to be active at home, including ambulating frequently.  If possible try to limit the amount of time spent in bed.    Restrictions: You should not lift greater than 15 pounds for 2 weeks, and will want to avoid strenuous physical activity for 1-2 weeks.  You should limit your physical activity if it causes you discomfort; however, this should resolve within 1-2 weeks.   Walking does not count as strenuous physical activity.  You are safe to walk up and down stairs.  Following 2 weeks you may resume all normal physical activity.    Work:  You can return to work once your surgical pain has resolved.  If you perform duties that require lifting, pushing or pulling anything greater than 15 pounds, then you would have to be on light duty for the immediate 2 weeks after your surgery (if your work allows light duty).  After 2 weeks, you can return to work without restrictions.    Wound care: You may remove your Band-aids after a period of 24 hours.  The small white strips on the incisions act like artificial scabs, and will begin to peel at the edges at around 5-7 days.  These can then be removed.  It is normal to have a small rim of red present around the incisions.  This should not, however, extend beyond 1/4 inch from the incision.  If your incisions become increasingly tender, red, or draining, please contact us.       Bathing: You may shower after 24 hours from surgery.  It is ok to get your incisions wet, but avoid rubbing them.  Avoid soaking in bath tubs, or swimming in lakes, pools, or streams for 2 weeks following surgery.       Diabetes Care:  1. Check blood sugar 4 x daily, goals before meals  (if check two hours after the start of meals goal is < 180).   2. Call doctor if 2 or more unexplained low blood sugars in a week or if blood sugar is over 400  3. Ask your primary care provider for a referral to see an outpatient diabetes educator to further diabetes management education.  4. Follow insulin regimen on discharge orders until able to see provider where doses may be adjusted based on blood sugar patterns.

## 2022-07-13 NOTE — PLAN OF CARE
Problem: Plan of Care - These are the overarching goals to be used throughout the patient stay.    Goal: Readiness for Transition of Care  Outcome: Adequate for Care Transition   Goal Outcome Evaluation:        Pt discharged to home via daughter. Pt verbalizes understanding discharge instructions, changes to medications, activity restrictions and follow up appointments. Pt states he has all of his personal items and is ready for discharge.

## 2022-07-13 NOTE — CONSULTS
DIABETES CARE  Situation:  Consulted by Provider for Diabetes Education for uncontrolled diabetes  Bon Luque is a 67 year old male with hx of IDDM, CAD, CHF 2/2 ICM, EtOH/tobacco use disorder in remission, chronic pain/neuropathy admitted on 7/12/2022.     Background:  PCP: Aditya Munoz MD  Social: Lives with wife and daughter    Diabetes History:   Has had DM for over 20 years, does see endocrinologist at [facility], Dr. Keyes, last seen 3/17/22.  Will need to make another appt for next Follow-up.  In notes here, states family believe he misses insulin doses to which he agreed.  He also explained how he takes the Novolog.  Looks at the Rita reading before the meal and divides by 10 and then also uses 10 to figure out meal dose. He also checks glucose on Rita after eating and gives insulin. Confusing the time and dosing he is using. He did state he had some low sugar, occ during the night. He is aware of treatment.     Meds for BG Management PTA:  70 units of Basaglar bid  1;5 I:C ratio at meals plus sliding scale (however patient admitted to not really knowing how to carb count, stating even that he may eat 400 grams (thinking he may mean calories) at a meal    Current Inpatient Meds for BG Management:  20 units of Lantus bid  1:5 I:C ratio at meals, Novolog to carb grams  Novolog correction scale 1/25 over 140 premeal, > 200 hs    Labs:  Hemoglobin A1C: 8.2%              GFR: >90 mL/min     Blood Glucose POC:    Latest Reference Range & Units 07/12/22 11:33 07/12/22 14:33 07/12/22 18:32 07/12/22 22:32 07/13/22 03:36 07/13/22 08:16   GLUCOSE BY METER POCT 70 - 99 mg/dL 194 (H) 290 (H) 255 (H) 326 (H) 259 (H) 269 (H)   (H): Data is abnormally high  Did have Dexamethasone in surgery 1214 yesterday    Diet Order: 60 grams CHO   Intake: 75%   Weight: 104.3 kg    BMI: 31.19 kg/m^2    DM EDUCATION/COUNSELING:  Barriers to Learning and/or DM Self-Management: None  Previous DM Education: in past, ? How  long ago  Current education   Educated/reviewed diabetes basics: pathophysiology, hyperglycemia, long term complications, treatment.  Educated/reviewed hypoglycemia: sx, causes, treatment.  Explained normal/goals of blood sugar control, A1C  Patient has a Rita 2 CGMS but no meter. Will give him a meter as it is recommended to have a blood glucose meter to verify a sensor dose if glucose is going up quickly or down quickly or if patient doesn't feel like the value on the sensor screen.   Educated on normal pancreas function, how basal insulin and mealtime insulin imitate that.  Specific insulins were explained, including how they each acted on blood sugar, their timing and differences in dosing.   Reviewed the purpose of a correction scale.  If he is using it a lot, the doses of the insulin for basal and/or nutrition bolus need to be adjusted.  Reinforced his need for an OP CDE to help with fine tuning his management as well as his endocrinologist. The endo has a CDE in clinic as well as his PCP's clinic.     The insulin pen is used by patient at home. Also discussed site rotation, proper storage   Carbohydrates were briefly discussed and their effect on BG. Reinforced need for fast acting insulin to be given before the meal based on carb grams consumed. He will have less variability if the amount of insulin matches carbs and the timing is right.   RD to see here for refresher on carb counting.    Created goals with patient:    Check glucose before the meal, figure out the carb grams he is going to eat and divide by the second number in the ratio. Gave example of present order 1:5. That will be your Novolog dose for the meal     If blood glucose is over 140, check the Novolog correction chart to see how many extra units to add to the mealtime Novolog dose.    Talk with your provider, endocrinologist or diabetes educator if you are using the correction scale a lot or if you are having low blood sugars  "unexplained.    Try to be consistent in giving insulin doses    Assessment:  Patient has been giving insulin in a way not recommended that would result in variable glucose patterns  Patient receptive to changing how he gives his mealtime/correction insulin    Recommendations:  1. Assess BG pattern daily and adjust doses as needed.  2. Likely will need more basal insulin. Based on Rule of 500 with his present I:C ratio, the basal dose would be 50. If looking at his home basal dose, the I:C ratio would be 1:2. I would guess his basal insulin dose maybe too high vs what he takes at meals but his CGMS reports should provide good info for insulin dose adjustment, both basal and bolus  3. Will benefit from meeting with OP CDE as well as Follow-up with endocrinologist    Refer to \"Guidelines for Insulin Initiation and Care in Hospitalized Adults\"  link in Diabetes Management Order set for dosing guidelines.  Hospital BG goals for blood glucose levels are < 180 for improved health outcomes.      DISCHARGE NEEDS:  RX needed at DC (preferred by patient's insurance):  1. Accuchek Guide strips, 2 boxes of 50  2. SoftClix lancets, 1 box  Insulin requiring  Diagnosis code: E11.65  To test BG four times daily if not on CGMS    Thank you,     Fidelia Obando RN, [place] [address]  [email or web address]   Office: [phone]  Pager: [phone]                                      "

## 2022-07-13 NOTE — PROGRESS NOTES
[facility] Digestive Health Progress Note    Subjective:  Pt reports feeling much better today. Mild pain around surgical incisional site, otherwise no abdominal pain. No fever. Tolerating liquid diet. No nausea, emesis. Two BM last evening; unremarkable.  Anticipating discharge today.      Objective:  Vital signs in last 24 hours  Temp:  [97.1  F (36.2  C)-98.4  F (36.9  C)] 97.6  F (36.4  C)  Pulse:  [70-89] 71  Resp:  [10-18] 18  BP: (131-160)/(65-86) 160/86  SpO2:  [96 %-100 %] 100 % O2 Device: None (Room air)      Gen: A&Ox3, NAD  Eyes: No icterus  Gastrointestinal: Soft, mild TTP in RUQ, no rebound or guarding  Extrem: PPI, no c/c/e      LABORATORY    ELECTROLYTE PANEL   Recent Labs   Lab 07/13/22  0816 07/13/22  0606 07/13/22  0336 07/12/22  1017 07/12/22 0228   NA  --  134*  --   --  132*   POTASSIUM  --  4.2  --   --  3.5   CHLORIDE  --  104  --   --  98   CO2  --  22  --   --  27   * 286* 259*   < > 134*   CR  --  0.93  --   --  1.08   BUN  --  17  --   --  10    < > = values in this interval not displayed.      HEMATOLOGY PANEL   Recent Labs   Lab 07/13/22  0606 07/12/22  1749 07/12/22 0228   HGB 12.6*  --  14.5   MCV 84  --  83   WBC 11.1*  --  9.3   * 151 150      LIVER AND PANCREAS PANEL   Recent Labs   Lab 07/13/22  0606 07/12/22 0228   * 431*   * 396*   ALKPHOS 113 143*   BILITOTAL 1.8* 5.3*   LIPASE  --  <9     IMAGING STUDIES    XR Chest Port 1 View    Result Date: 7/12/2022  EXAM: XR CHEST PORT 1 VIEW LOCATION: [facility] DATE/TIME: 7/12/2022 2:51 AM INDICATION: Fatigue COMPARISON: 01/10/2019     IMPRESSION: Negative chest.    CT Abdomen Pelvis w/o Contrast    Result Date: 7/12/2022  EXAM: CT ABDOMEN PELVIS W/O CONTRAST LOCATION: [facility] DATE/TIME: 7/12/2022 2:57 AM INDICATION: resolved epigastric pain, dark urine, slight jaundice COMPARISON: 06/16/2022 TECHNIQUE: CT scan of the abdomen and pelvis was performed without  IV contrast. Multiplanar reformats were obtained. Dose reduction techniques were used. CONTRAST: None. FINDINGS: LOWER CHEST: Left basilar atelectasis. Coronary artery calcification. HEPATOBILIARY: Hepatic steatosis. Distended gallbladder with dependent stones. Common bile duct is dilated measuring 1.9 cm, previously 1.7 cm. Probable tiny stone in the region of the ampulla, coronal series image 92. PANCREAS: Unremarkable SPLEEN: Unremarkable ADRENAL GLANDS: Unremarkable KIDNEYS/BLADDER: Postoperative changes in the right kidney. No hydronephrosis. Bladder is thick-walled but not well-distended. BOWEL: No bowel obstruction. LYMPH NODES: No significant retroperitoneal adenopathy VASCULATURE: Moderate atherosclerotic calcification. PELVIC ORGANS: No free fluid MUSCULOSKELETAL: No acute bony abnormalities.     IMPRESSION: 1.  Dilated gallbladder and common bile duct with findings suspicious for choledocholithiasis. Further evaluation with with MRCP recommended.     XR Surgery HALIMA Fluoro L/T 5 Min    Result Date: 7/12/2022  This exam was marked as non-reportable because it will not be read by a radiologist or a [place] non-radiologist provider.       I have reviewed the current diagnostic and laboratory tests.              ERCP  Impression:            - An area successfully injected.                          - The entire main bile duct was dilated, with a stone                          causing an obstruction.                          - Choledocholithiasis was found. Complete removal was                          accomplished by biliary sphincterotomy and balloon                          extraction.                          - A biliary sphincterotomy was performed.                          - The biliary tree was swept.       Assessment:  1. Choledocholithiasis - S/p ERCP with stone extraction. LFTs improving. S/p cholecystectomy. Clinically improving. LFTs decreasing.  2. DM  3. CAD  4. COVID+ - Recovered. Positive >1M  ago.    Patient Active Problem List   Diagnosis     Psoriatic arthritis (H)     Chronic low back pain     Diabetic peripheral neuropathy associated with type 2 diabetes mellitus (H)     Psoriasis     Hypercholesterolemia     HTN (hypertension)     Optic neuritis     Type 2 diabetes mellitus with insulin therapy (H)     Obstructive sleep apnea on CPAP     Thoracic radiculopathy     Hypogonadism in male     Erectile dysfunction     ASCVD (arteriosclerotic cardiovascular disease)     Alcohol abuse, in remission     Tobacco abuse, in remission     Ankylosis, sacroiliac joint     Spondylosis of lumbar region without myelopathy or radiculopathy     Ischemic cardiomyopathy     Dilated bile duct     Depression     Type 2 diabetes mellitus with complication, with long-term current use of insulin (H)     Chronic pain syndrome     Lower abdominal pain     Drug-induced constipation     Knee effusion, left     Renal cell carcinoma, right (H)     Abdominal pain, epigastric     Chronic fatigue     Chronic right shoulder pain     Jaundice     Transaminitis     Dilated cbd, acquired     Hyponatremia     Acute cholecystitis       Plan:  -No anticoagulation for an additional 48 hours.  -Advance diet to low fat diet for 2-3 days, then as tolerated.  -Analgesia as needed.  -If tolerating meal this afternoon, OK to discharge from GI perspective.    Will sign off. Please call with questions.    Approximately 15 minutes of total time was spent providing patient care including patient evaluation, reviewing documentation/test results, and .                                                  Darron Gonzalez MD  Thank you for the opportunity to participate in the care of this patient.   Please feel free to call me with any questions or concerns.  Phone number [phone].

## 2022-07-13 NOTE — PROGRESS NOTES
General Surgery Progress Note:    Hospital Day # 1    ASSESSMENT:   1. Acute cholecystitis    2. Jaundice    3. Transaminitis    4. Dilated cbd, acquired        Bon Luque is a 67 year old male s/p lap cholecystectomy with ERCP and biliary sphincterotomy.    PLAN:   Regular diet  Activity as tolerated  Transition to oral medications   Patient ok to discharge from surgical standpoint.       SUBJECTIVE:   Bon Luque is laying in bed. Reports minimal pain. Denies nausea/vomiting. Has had a small bowel movement. Has a good appetite. Patient is very happy with his care and reports that the brain fog that he was experiencing before surgery has resolved.     Patient Vitals for the past 24 hrs:   BP Temp Temp src Pulse Resp SpO2 Height   07/13/22 0818 (!) 160/86 97.6  F (36.4  C) Oral 71 18 100 % --   07/13/22 0331 136/65 97.7  F (36.5  C) Oral 76 18 98 % --   07/13/22 0054 (!) 144/66 97.4  F (36.3  C) Axillary 78 18 99 % --   07/12/22 1732 (!) 142/68 97.4  F (36.3  C) Axillary 80 18 99 % --   07/12/22 1700 (!) 147/70 97.7  F (36.5  C) Oral 77 16 96 % --   07/12/22 1630 131/73 97.5  F (36.4  C) Oral 77 18 97 % --   07/12/22 1615 (!) 154/77 97.6  F (36.4  C) Oral 72 18 97 % --   07/12/22 1600 -- -- -- -- -- -- 1.829 m (6')   07/12/22 1545 (!) 156/72 -- -- 74 15 98 % --   07/12/22 1530 136/68 97.1  F (36.2  C) -- 75 13 98 % --   07/12/22 1515 (!) 146/75 -- -- 75 17 98 % --   07/12/22 1500 (!) 152/73 -- -- 75 10 97 % --   07/12/22 1445 (!) 141/68 -- -- 77 14 100 % --   07/12/22 1430 (!) 160/71 -- -- 84 13 100 % --   07/12/22 1418 (!) 159/73 98.2  F (36.8  C) -- 89 16 100 % --   07/12/22 1134 (!) 142/66 98.4  F (36.9  C) Oral 70 18 98 % --   07/12/22 1030 (!) 151/77 -- -- 73 16 98 % --   07/12/22 1015 (!) 144/62 -- -- 72 14 97 % --   07/12/22 1000 (!) 145/68 98.1  F (36.7  C) Oral 71 18 97 % --   07/12/22 0900 -- -- -- 78 21 -- --       Physical Exam:  General: NAD, pleasant  ABD: soft, slightly tender RUQ, nondistended.  Incisions clean and intact.  EXT:no CCE    Admission on 07/12/2022   Component Date Value     Sodium 07/12/2022 132 (A)     Potassium 07/12/2022 3.5      Chloride 07/12/2022 98      Carbon Dioxide (CO2) 07/12/2022 27      Anion Gap 07/12/2022 7      Urea Nitrogen 07/12/2022 10      Creatinine 07/12/2022 1.08      Calcium 07/12/2022 8.9      Glucose 07/12/2022 134 (A)     Alkaline Phosphatase 07/12/2022 143 (A)     AST 07/12/2022 431 (A)     ALT 07/12/2022 396 (A)     Protein Total 07/12/2022 7.1      Albumin 07/12/2022 3.5      Bilirubin Total 07/12/2022 5.3 (A)     GFR Estimate 07/12/2022 76      Troponin I 07/12/2022 0.03      Ventricular Rate 07/12/2022 95      Atrial Rate 07/12/2022 95      TN Interval 07/12/2022 156      QRS Duration 07/12/2022 94      QT 07/12/2022 330      QTc 07/12/2022 414      P Axis 07/12/2022 54      R AXIS 07/12/2022 41      T Axis 07/12/2022 143      Interpretation ECG 07/12/2022                      Value:Sinus rhythm  Possible Inferior infarct (cited on or before 26-DEC-2017)  Abnormal ECG  When compared with ECG of 26-DEC-2017 15:31,  Nonspecific T wave abnormality now evident in Anterior leads  T wave inversion less evident in Lateral leads  Confirmed by SEE ED PROVIDER NOTE FOR, ECG INTERPRETATION ([id number]),  ABI SARKAR ([id number]) on 7/12/2022 9:04:32 AM       Color Urine 07/12/2022 Franci (A)     Appearance Urine 07/12/2022 Turbid (A)     Glucose Urine 07/12/2022 Negative      Bilirubin Urine 07/12/2022 8.0 mg/dL (A)     Ketones Urine 07/12/2022 20  (A)     Specific Gravity Urine 07/12/2022 1.030      Blood Urine 07/12/2022 0.2 mg/dL (A)     pH Urine 07/12/2022 6.0      Protein Albumin Urine 07/12/2022 50  (A)     Urobilinogen Urine 07/12/2022 >12.0 (A)     Nitrite Urine 07/12/2022 Negative      Leukocyte Esterase Urine 07/12/2022 Negative      Bacteria Urine 07/12/2022 Few (A)     Mucus Urine 07/12/2022 Present (A)     Amorphous Crystals Urine 07/12/2022 Few (A)     RBC  Urine 07/12/2022 7 (A)     WBC Urine 07/12/2022 3      Squamous Epithelials Uri* 07/12/2022 2 (A)     Influenza A antigen 07/12/2022 Negative      Influenza B antigen 07/12/2022 Negative      Lipase 07/12/2022 <9      Alcohol, Blood 07/12/2022 <10      Lyme Disease Antibodies * 07/12/2022 0.02      GLUCOSE BY METER POCT 07/12/2022 117 (A)     WBC Count 07/12/2022 9.3      RBC Count 07/12/2022 5.15      Hemoglobin 07/12/2022 14.5      Hematocrit 07/12/2022 42.6      MCV 07/12/2022 83      MCH 07/12/2022 28.2      MCHC 07/12/2022 34.0      RDW 07/12/2022 12.8      Platelet Count 07/12/2022 150      % Neutrophils 07/12/2022 88      % Lymphocytes 07/12/2022 4      % Monocytes 07/12/2022 7      % Eosinophils 07/12/2022 0      % Basophils 07/12/2022 0      % Immature Granulocytes 07/12/2022 1      NRBCs per 100 WBC 07/12/2022 0      Absolute Neutrophils 07/12/2022 8.2      Absolute Lymphocytes 07/12/2022 0.4 (A)     Absolute Monocytes 07/12/2022 0.7      Absolute Eosinophils 07/12/2022 0.0      Absolute Basophils 07/12/2022 0.0      Absolute Immature Granul* 07/12/2022 0.1      Absolute NRBCs 07/12/2022 0.0      Culture 07/12/2022 Positive on the 1st day of incubation (A)     Culture 07/12/2022 Gram positive cocci in clusters (A)     Culture 07/12/2022 No growth after 12 hours      SARS CoV2 PCR 07/12/2022 Positive (A)     Hemoglobin A1C 07/12/2022 8.2 (A)     GLUCOSE BY METER POCT 07/12/2022 152 (A)     GLUCOSE BY METER POCT 07/12/2022 174 (A)     GLUCOSE BY METER POCT 07/12/2022 194 (A)     ERCP 07/12/2022                      Value:[facility]  [address] [place] [address]  _______________________________________________________________________________  Patient Name: Bon Luque             Procedure Date: 7/12/2022 1:30 PM  MRN: [id number]                       Account Number: [id number]  YOB: 1955              Admit Type: Inpatient  Age: 67                                 Note Status: Finalized  Attending MD: PORFIRIO VILLEGAS MD   Instrument Name: ERCP Scope 2635  _______________________________________________________________________________     Procedure:             ERCP  Indications:           Bile duct stone(s)  Providers:             PORFIRIO VILLEGAS MD  Patient Profile:       This is a 67 year old male.  Referring MD:            Medicines:             General Anesthesia  Complications:         No immediate complications.  _______________________________________________________________________________  Procedure:             Pre-Anesthesia Assessment:                                                   - Prior to the procedure, a History and Physical was                          performed, and patient medications, allergies and                          sensitivities were reviewed. The patient's tolerance                          of previous anesthesia was reviewed.                         After obtaining informed consent, the scope was passed                          under direct vision. Throughout the procedure, the                          patient's blood pressure, pulse, and oxygen                          saturations were monitored continuously. The ERCP                          scope was introduced through the mouth, and used to                          inject contrast into and used to inject contrast into                          the bile duct. The ERCP was accomplished without                          difficulty. The patient tolerated the procedure well.                                                                                   Findings:       The                            film was normal. The esophagus was successfully intubated        under direct vision. The scope was advanced to a normal major papilla in        the descending duodenum without detailed examination of the pharynx,        larynx and associated structures, and upper GI tract. The upper GI  tract        was grossly normal. The bile duct was deeply cannulated. Contrast was        injected. The lower third of the main bile duct contained one stone,        which was 4 mm in diameter. The main bile duct was diffusely dilated,        with a stone causing an obstruction. The largest diameter was 12 mm. A        wire was passed into the biliary tree. An 8 mm biliary sphincterotomy        was made with a traction (standard) sphincterotome using ERBE        electrocautery. The sphincterotomy oozed blood. To discover objects, the        biliary tree was swept with an 8.5 mm balloon starting at the        bifurcation. Sludge was swept from the duct. All stones were removed.        Area was successfu                          lly injected with 3 mL of a 0.1 mg/mL solution of        epinephrine through the ERCP scope for hemostasis.                                                                                   Moderate Sedation:       GA  Impression:            - An area successfully injected.                         - The entire main bile duct was dilated, with a stone                          causing an obstruction.                         - Choledocholithiasis was found. Complete removal was                          accomplished by biliary sphincterotomy and balloon                          extraction.                         - A biliary sphincterotomy was performed.                         - The biliary tree was swept.  Recommendation:        - No anticoagulation for 72 hours.                         - Clear liquid diet for 24 hours.                         - If pain free after 24 hours advance diet slowly as                          tolerated.                         - [facility]                           for ongoing care.                                                                                     Porfirio Meyer MD  ____________________  PORFIRIO MEYER MD  7/12/2022 2:11:22  PM  I was physically present for the entire viewing portion of the exam.  __________________________  Signature of teaching physician  Breanna/[id number] VILLEGAS MD  Number of Addenda: 0    Note Initiated On: 7/12/2022 1:30 PM  Scope In: 1:38:34 PM  Scope Out: 2:04:11 PM       GLUCOSE BY METER POCT 07/12/2022 290 (A)     Platelet Count 07/12/2022 151      GLUCOSE BY METER POCT 07/12/2022 255 (A)     GLUCOSE BY METER POCT 07/12/2022 326 (A)     Sodium 07/13/2022 134 (A)     Potassium 07/13/2022 4.2      Chloride 07/13/2022 104      Carbon Dioxide (CO2) 07/13/2022 22      Anion Gap 07/13/2022 8      Urea Nitrogen 07/13/2022 17      Creatinine 07/13/2022 0.93      Calcium 07/13/2022 8.2 (A)     Glucose 07/13/2022 286 (A)     Alkaline Phosphatase 07/13/2022 113      AST 07/13/2022 122 (A)     ALT 07/13/2022 235 (A)     Protein Total 07/13/2022 6.4      Albumin 07/13/2022 3.1 (A)     Bilirubin Total 07/13/2022 1.8 (A)     GFR Estimate 07/13/2022 >90      GLUCOSE BY METER POCT 07/13/2022 259 (A)     WBC Count 07/13/2022 11.1 (A)     RBC Count 07/13/2022 4.47      Hemoglobin 07/13/2022 12.6 (A)     Hematocrit 07/13/2022 37.5 (A)     MCV 07/13/2022 84      MCH 07/13/2022 28.2      MCHC 07/13/2022 33.6      RDW 07/13/2022 13.2      Platelet Count 07/13/2022 140 (A)     % Neutrophils 07/13/2022 83      % Lymphocytes 07/13/2022 8      % Monocytes 07/13/2022 8      % Eosinophils 07/13/2022 0      % Basophils 07/13/2022 0      % Immature Granulocytes 07/13/2022 1      NRBCs per 100 WBC 07/13/2022 0      Absolute Neutrophils 07/13/2022 9.3 (A)     Absolute Lymphocytes 07/13/2022 0.9      Absolute Monocytes 07/13/2022 0.9      Absolute Eosinophils 07/13/2022 0.0      Absolute Basophils 07/13/2022 0.0      Absolute Immature Granul* 07/13/2022 0.1      Absolute NRBCs 07/13/2022 0.0      GLUCOSE BY METER POCT 07/13/2022 269 (A)        Elena Ledesma PA-C  [facility]  [address]  [address]  [facility] [address]  Phone- [phone]  Fax- [phone]

## 2022-07-13 NOTE — PLAN OF CARE
Goal Outcome Evaluation:    Plan of Care Reviewed With: patient     Overall Patient Progress: improving    Outcome Evaluation: POD #0 for lap cholecystectomy with ERCP, biliary sphincterotomy. Tolerating clear liquid diet. Ambulated in magdaleno and tolerated well. Slight nausea be declined anitemetic. No PRN medications given. Pain managed with scheduled Tylenol and ketorolac, and ice. Urinating well, 2 BM this evening. Held senna-docusate. IVF running per order. Blood sugars elevated at 255 and 326. SSI and lantus given as ordered. 4 lap sites with old drainage. Pt's CPAP available at bedside.      Problem: Plan of Care - These are the overarching goals to be used throughout the patient stay.    Goal: Absence of Hospital-Acquired Illness or Injury  Intervention: Identify and Manage Fall Risk  Recent Flowsheet Documentation  Taken 7/12/2022 1600 by Therese Lobato RN  Safety Promotion/Fall Prevention:   bed alarm on   clutter free environment maintained   fall prevention program maintained   nonskid shoes/slippers when out of bed   patient and family education     Problem: Plan of Care - These are the overarching goals to be used throughout the patient stay.    Goal: Absence of Hospital-Acquired Illness or Injury  Intervention: Prevent and Manage VTE (Venous Thromboembolism) Risk  Recent Flowsheet Documentation  Taken 7/12/2022 1600 by Therese Lobato RN  VTE Prevention/Management: SCDs (sequential compression devices) on  Activity Management:   activity adjusted per tolerance   activity encouraged     Problem: Pain (Surgery Nonspecified)  Goal: Acceptable Pain Control  Intervention: Prevent or Manage Pain  Recent Flowsheet Documentation  Taken 7/12/2022 2045 by Therese Lobato, RN  Pain Management Interventions:   medication (see MAR)   repositioned   rest  Taken 7/12/2022 1600 by Therese Lobato, RN  Pain Management Interventions:   cold applied   repositioned   rest

## 2022-07-13 NOTE — DISCHARGE SUMMARY
[facility]  Hospitalist Discharge Summary      Date of Admission:  7/12/2022  Date of Discharge:  7/13/2022  Discharging Provider: Tammi Martell MD  Discharge Service: Hospitalist Service    Discharge Diagnoses   Patient Active Problem List   Diagnosis     Psoriatic arthritis (H)     Chronic low back pain     Diabetic peripheral neuropathy associated with type 2 diabetes mellitus (H)     Psoriasis     Hypercholesterolemia     HTN (hypertension)     Optic neuritis     Type 2 diabetes mellitus with insulin therapy (H)     Obstructive sleep apnea on CPAP     Thoracic radiculopathy     Hypogonadism in male     Erectile dysfunction     ASCVD (arteriosclerotic cardiovascular disease)     Alcohol abuse, in remission     Tobacco abuse, in remission     Ankylosis, sacroiliac joint     Spondylosis of lumbar region without myelopathy or radiculopathy     Ischemic cardiomyopathy     Dilated bile duct     Depression     Type 2 diabetes mellitus with complication, with long-term current use of insulin (H)     Chronic pain syndrome     Lower abdominal pain     Drug-induced constipation     Knee effusion, left     Renal cell carcinoma, right (H)     Abdominal pain, epigastric     Chronic fatigue     Chronic right shoulder pain     Jaundice     Transaminitis     Dilated cbd, acquired     Hyponatremia     Acute cholecystitis     Follow-ups Needed After Discharge   Follow-up Appointments     Follow-up and recommended labs and tests       Follow up with primary care provider, Aditya Munoz, within 7 days   to evaluate medication change, to evaluate after surgery, and for hospital   follow- up.  The following labs/tests are recommended: CBC, CMP in 1 week.      Follow up with surgery in 7-10 days.       Unresulted Labs Ordered in the Past 30 Days of this Admission     Date and Time Order Name Status Description    7/13/2022  8:53 AM Blood Culture Arm, Right In process     7/13/2022  8:53 AM Blood  Culture Arm, Left In process     7/12/2022  3:49 AM Ehrlichia Anaplasma Sp by PCR In process     7/12/2022  3:49 AM Blood Culture Peripheral Blood Preliminary     7/12/2022  3:49 AM Blood Culture Peripheral Blood Preliminary       These results will be followed up by me    Discharge Disposition   Discharged to home  Condition at discharge: Stable    Hospital Course    Bon Luque is a 67 year old male with hx of IDDM, CAD, CHF 2/2 ICM, EtOH/tobacco use disorder in remission, chronic pain/neuropathy admitted on 7/12/2022.      Choledocholithiasis with elevated LFTs and CT abdomen with enlarged CBD.  S/p ERCP with stone extraction.  LFT improving.  S/p cholecystectomy.  Clinically improving.  Blood culture 1/2 grew staph epidermidis on 7/12, and negative on 7/13.  Likely contaminant.  Was treated with meropenem preoperatively.      Insulin-dependent diabetes mellitus.  Not optimally controlled, A1c 8.2.  BG this morning 117-134, while patient is NPO.  OP controlled with metformin, glargine 70 units twice a day, NovoLog 1: 5 g carbs plus sliding scale.  Rita ischemic monitor in place.  Patient frequently misses doses of insulin, per family.  He was seen by diabetes educator and will follow up the clinic.  Patient verbalized interest in optimizing his glycemic control/diet/lifestyle.     Diabetic polyneuropathy, on Cymbalta and Neurontin.     Coronary disease, no recent angina.  Patient not on any cardiac medications as OP.  EKG with normal sinus rhythm.     Chronic pain syndrome, due to chronic low back pain.  On MS Contin 30 mg 3 times daily.  Continued.     COVID-19 convalescent, was positive more than 1 months ago.  Tested positive on admission.  Asymptomatic.    Consultations This Hospital Stay   GASTROENTEROLOGY IP CONSULT  SURGERY GENERAL IP CONSULT  DIABETES EDUCATION IP CONSULT  PHARMACY TO DOSE VANCO  NUTRITION SERVICES ADULT IP CONSULT    Code Status   Full Code    Time Spent on this Encounter   Tmami RYAN  MD Jasbir, personally saw the patient today and spent greater than 30 minutes discharging this patient.       Tammi Martell MD  [address] [address]  Phone: [phone]  Fax: [phone]  ______________________________________________________________________    Physical Exam   Vital Signs: Temp: 97.7  F (36.5  C) Temp src: Oral BP: 125/58 Pulse: 86   Resp: 20 SpO2: 98 % O2 Device: None (Room air) Oxygen Delivery: 8 LPM  Weight: 230 lbs 0 oz  General: Alert and oriented x 3. Not in obvious distress.  HEENT: NC, AT. Neck- supple, No JVP elevation, lymphadenopathy or thyromegaly. Trachea-central.  Chest: Clear to auscultation bilaterally.  Heart: S1S2 regular. No M/R/G.  Abdomen: Soft, tender in epigastrium, right upper quadrant, no rebound tenderness.  Back: No spine tenderness. No CVA tenderness.  Extremities: No leg swelling. Peripheral pulses 2+ bilaterally.  Neuro: Cranial nerves 1-12 grossly normal. No focal neurological deficit    Primary Care Physician   Aditya Munoz    Discharge Orders      Reason for your hospital stay    Acute cholecystitis     Follow-up and recommended labs and tests     Follow up with primary care provider, Aditya Munoz, within 7 days to evaluate medication change, to evaluate after surgery, and for hospital follow- up.  The following labs/tests are recommended: CBC, CMP in 1 week.      Follow up with surgery in 7-10 days.     Activity    Your activity upon discharge: activity as tolerated     Diet    Follow this diet upon discharge: Orders Placed This Encounter      Moderate Consistent Carb (60 g CHO per Meal) Diet     Significant Results and Procedures   Results for orders placed or performed during the hospital encounter of 07/12/22   XR Chest Port 1 View    Narrative    EXAM: XR CHEST PORT 1 VIEW  LOCATION: [facility]  DATE/TIME: 7/12/2022 2:51 AM    INDICATION:  Fatigue  COMPARISON: 01/10/2019      Impression    IMPRESSION: Negative chest.   CT Abdomen Pelvis w/o Contrast    Narrative    EXAM: CT ABDOMEN PELVIS W/O CONTRAST  LOCATION: [facility]  DATE/TIME: 7/12/2022 2:57 AM    INDICATION: resolved epigastric pain, dark urine, slight jaundice  COMPARISON: 06/16/2022  TECHNIQUE: CT scan of the abdomen and pelvis was performed without IV contrast. Multiplanar reformats were obtained. Dose reduction techniques were used.  CONTRAST: None.    FINDINGS:   LOWER CHEST: Left basilar atelectasis. Coronary artery calcification.    HEPATOBILIARY: Hepatic steatosis. Distended gallbladder with dependent stones. Common bile duct is dilated measuring 1.9 cm, previously 1.7 cm. Probable tiny stone in the region of the ampulla, coronal series image 92.    PANCREAS: Unremarkable    SPLEEN: Unremarkable    ADRENAL GLANDS: Unremarkable    KIDNEYS/BLADDER: Postoperative changes in the right kidney. No hydronephrosis. Bladder is thick-walled but not well-distended.    BOWEL: No bowel obstruction.    LYMPH NODES: No significant retroperitoneal adenopathy    VASCULATURE: Moderate atherosclerotic calcification.    PELVIC ORGANS: No free fluid    MUSCULOSKELETAL: No acute bony abnormalities.      Impression    IMPRESSION:   1.  Dilated gallbladder and common bile duct with findings suspicious for choledocholithiasis. Further evaluation with with MRCP recommended.     XR Surgery HALIMA Fluoro L/T 5 Min    Narrative    This exam was marked as non-reportable because it will not be read by a   radiologist or a [place] non-radiologist provider.           Discharge Medications   Current Discharge Medication List      START taking these medications    Details   acetaminophen (TYLENOL) 325 MG tablet Take 2 tablets (650 mg) by mouth every 4 hours as needed for other (For optimal non-opioid multimodal pain management to improve pain control.)    Associated Diagnoses: Acute  cholecystitis      Alcohol Swabs PADS Use to swab the area of the injection or sherry as directed Per insurance coverage  Qty: 100 each, Refills: 0    Associated Diagnoses: Type 2 diabetes mellitus with insulin therapy (H)      blood glucose (NO BRAND SPECIFIED) lancets standard To use to test glucose level in the blood Use to test blood sugar  4  times daily as directed. To accompany glucose monitor brands per insurance coverage.  Qty: 100 each, Refills: 0    Associated Diagnoses: Type 2 diabetes mellitus with insulin therapy (H)      blood glucose (NO BRAND SPECIFIED) test strip To use to test glucose level in the blood Use to test blood sugar  4 times daily as directed. To accompany glucose monitor brands per insurance coverage.  Qty: 100 strip, Refills: 0    Associated Diagnoses: Type 2 diabetes mellitus with insulin therapy (H)      Sharps Container MISC Use as directed to dispose of needles, lancets and other sharps Per Insurance coverage  Qty: 1 each, Refills: 0    Associated Diagnoses: Type 2 diabetes mellitus with insulin therapy (H)         CONTINUE these medications which have NOT CHANGED    Details   clobetasol (TEMOVATE) 0.05 % external cream Apply topically 2 times daily as needed For psoriasis on hands.      DULoxetine (CYMBALTA) 60 MG capsule Take 60 mg by mouth At Bedtime      econazole nitrate 1 % external cream Apply topically 2 times daily as needed      gabapentin (NEURONTIN) 300 MG capsule [GABAPENTIN (NEURONTIN) 300 MG CAPSULE] Take 600 mg by mouth 3 (three) times a day.       insulin aspart (NOVOLOG) 100 unit/mL injection Inject Subcutaneous 3 times daily (before meals) 1 unit per 5 g carbs plus sliding scale.      Insulin Glargine-yfgn 100 UNIT/ML SOPN Inject 70 Units Subcutaneous 2 times daily      metFORMIN (GLUCOPHAGE-XR) 750 MG 24 hr tablet Take 750 mg by mouth daily (with dinner)      metroNIDAZOLE (METROCREAM) 0.75 % external cream Apply topically 2 times daily as needed Apply thin  layer to face for rosacea.      morphine (MS CONTIN) 30 MG CR tablet Take 30 mg by mouth every 8 hours      naloxone (NARCAN) 4 MG/0.1ML nasal spray Spray 1 spray into one nostril alternating nostrils once as needed for opioid reversal      omeprazole (PRILOSEC) 20 MG DR capsule Take 1 capsule (20 mg) by mouth daily before breakfast  Qty: 90 capsule, Refills: 3    Associated Diagnoses: Abdominal pain, epigastric      testosterone (ANDROGEL) 20.25 MG/1.25GM (1.62%) topical gel Place 20.25 mg onto the skin daily      valACYclovir (VALTREX) 500 MG tablet Take 500 mg by mouth 2 times daily as needed Take twice daily at onset of symptoms for 3 days. Repeat for flares.      B-D U/F 31G X 8 MM insulin pen needle FOR ADMINISTERING INSULIN AT HOME      Continuous Blood Gluc Sensor ([place] [employer]otechSTYLE SHIRA 14 DAY SENSOR) MISC       etanercept (ENBREL SURECLICK) 50 MG/ML autoinjector INJECT THE CONTENTS OF 1 SURECLICK AUTOINJECTOR SUBCUTANEOUSLY EVERY WEEK.  Qty: 12 mL, Refills: 3    Associated Diagnoses: Psoriatic arthritis (H); Psoriasis           Allergies   Allergies   Allergen Reactions     Atorvastatin Muscle Pain (Myalgia)     Valomag [Arthritis Pain Formula] Nausea and Vomiting     gastric ulcer from aspirin use     Ampicillin Rash     Had a reaction to this medication many years ago.     Codeine Rash     It has been about 30 years ago

## 2022-07-13 NOTE — PHARMACY-VANCOMYCIN DOSING SERVICE
Pharmacy Vancomycin Initial Note  Date of Service 2022  Patient's  1955  67 year old, male    Indication: Bacteremia    Current estimated CrCl = Estimated Creatinine Clearance: 98.9 mL/min (based on SCr of 0.93 mg/dL).    Creatinine for last 3 days  2022:  2:28 AM Creatinine 1.08 mg/dL  2022:  6:06 AM Creatinine 0.93 mg/dL    Recent Vancomycin Level(s) for last 3 days  No results found for requested labs within last 72 hours.      Vancomycin IV Administrations (past 72 hours)                   vancomycin (VANCOCIN) 2,000 mg in sodium chloride 0.9 % 500 mL intermittent infusion (mg) 2,000 mg New Bag 22 1027                Nephrotoxins and other renal medications (From now, onward)    Start     Dose/Rate Route Frequency Ordered Stop    22 0930  vancomycin (VANCOCIN) 2,000 mg in sodium chloride 0.9 % 500 mL intermittent infusion         2,000 mg  over 2 Hours Intravenous ONCE 22 0900      22 1430  ketorolac (TORADOL) injection 15 mg        Note to Pharmacy: IF celecoxib was given pre-operatively, start ketorolac 12 hours after celecoxib given.    15 mg Intravenous EVERY 6 HOURS 22 1429 22 1429    22 1134  indomethacin (INDOCIN) 50 MG Suppository 100 mg         100 mg Rectal ONCE PRN 22 1134            Contrast Orders - past 72 hours (72h ago, onward)    Start     Dose/Rate Route Frequency Stop    22 1400  iohexol (OMNIPAQUE) 300 mg/mL injection  Status:  Discontinued           PRN 22 1417          [employer] Prediction of Planned Initial Vancomycin Regimen  Loading dose: 2000 mg IV once on 2022 at 1030  Regimen: 1000 mg IV every 12 hours.  Start time: 22:00 on 2022  Exposure target: AUC24 (range)400-600 mg/L.hr   AUC24,ss: 449 mg/L.hr  Probability of AUC24 > 400: 62 %  Ctrough,ss: 14.6 mg/L  Probability of Ctrough,ss > 20: 24 %  Probability of nephrotoxicity (Lodise RUSSELL ): 10 %        Plan:  1. Start vancomycin  2000 mg  IV one time loading dose followed by 1000 mg IV every 12 hours.   2. Vancomycin monitoring method: AUC  3. Vancomycin therapeutic monitoring goal: 400-600 mg*h/L  4. Pharmacy will check vancomycin levels as appropriate in 1-3 Days.    5. Serum creatinine levels will be ordered daily for the first week of therapy and at least twice weekly for subsequent weeks.      Jose Manuel Hernandez, [facility]

## 2022-07-13 NOTE — PLAN OF CARE
Problem: Plan of Care - These are the overarching goals to be used throughout the patient stay.    Goal: Optimal Comfort and Wellbeing  Intervention: Monitor Pain and Promote Comfort  Recent Flowsheet Documentation  Taken 7/13/2022 1100 by yN Perez RN  Pain Management Interventions: medication (see MAR)   Goal Outcome Evaluation:      Pt reports that pain has been controlled with scheduled tylenol and toradol and ms contin.   Tolerating a regular carb controlled diet, spoke with diabetic nurse educator and will be sent home a new blood sugar machine and supplies.   Pt is passing flatus and has had a bm.   Anticipating discharge later today.

## 2022-07-14 ENCOUNTER — PATIENT OUTREACH (OUTPATIENT)
Dept: CARE COORDINATION | Facility: CLINIC | Age: 67
End: 2022-07-14

## 2022-07-14 DIAGNOSIS — Z71.89 OTHER SPECIFIED COUNSELING: ICD-10-CM

## 2022-07-14 LAB
A PHAGOCYTOPH DNA BLD QL NAA+PROBE: NOT DETECTED
E CHAFFEENSIS DNA BLD QL NAA+PROBE: NOT DETECTED
E EWINGII DNA SPEC QL NAA+PROBE: NOT DETECTED
EHRLICHIA DNA SPEC QL NAA+PROBE: NOT DETECTED

## 2022-07-14 NOTE — PROGRESS NOTES
[facility] Care Resource Center Contact  [facility]/Voicemail     Clinical Data: Care Coordinator Outreach - TCM      Outreach attempted x 1.  Left message on patient's voicemail, providing [facility]'s 24/7 scheduling and nurse triage phone number [phone] ([phone]) for questions/concerns and/or to schedule an appt with an [facility] provider, if they do not have a PCP.      Plan:  [facility] will try to reach patient again in 1-2 business days.       Cindy Jiang RN  Connected Care Resource Center, [facility]    *Connected Care Resource Team does NOT follow patient ongoing. Referrals are identified based on internal discharge reports and the outreach is to ensure patient has an understanding of their discharge instructions.

## 2022-07-15 LAB
BACTERIA BLD CULT: ABNORMAL
BACTERIA BLD CULT: ABNORMAL

## 2022-07-15 NOTE — PROGRESS NOTES
Clinic Care Coordination Contact  [facility]/Voicemail       Clinical Data: Care Coordinator Outreach - TCM      Outreach attempted x 2.  Left message on patient's voicemail, providing [facility]'s 24/7 scheduling and nurse triage phone number [phone] ([phone]) for questions/concerns and/or to schedule an appt with an [facility] provider, if they do not have a PCP.      Plan:  [facility] will make no further outreaches at this time.        KARYN Zaldivar  Connected Care Resource Center, [facility]     *Connected Care Resource Team does NOT follow patient ongoing. Referrals are identified based on internal discharge reports and the outreach is to ensure patient has an understanding of their discharge instructions.

## 2022-07-17 LAB — BACTERIA BLD CULT: NO GROWTH

## 2022-07-18 LAB
BACTERIA BLD CULT: NO GROWTH
BACTERIA BLD CULT: NO GROWTH

## 2022-07-20 NOTE — TELEPHONE ENCOUNTER
RECORDS RECEIVED FROM: Internal   Appt Date: 08.01.2022   NOTES STATUS DETAILS   OFFICE NOTE from referring provider Internal 06.16.2022 Alea Desai PA-C   OFFICE NOTES from other specialists     DISCHARGE SUMMARY from hospital     MEDICATION LIST Internal    LIVER BIOSPY (IF APPLICABLE)      PATHOLOGY REPORTS      IMAGING     ENDOSCOPY (IF AVAILABLE)     COLONOSCOPY (IF AVAILABLE)     ULTRASOUND LIVER     CT OF ABDOMEN     MRI OF LIVER     FIBROSCAN, US ELASTOGRAPHY, FIBROSIS SCAN, MR ELASTOGRAPHY     LABS     HEPATIC PANEL (LIVER PANEL)     BASIC METABOLIC PANEL Care Everywhere 03.17.2022   COMPLETE METABOLIC PANEL Internal 07.13.2022   COMPLETE BLOOD COUNT (CBC) Internal 07.13.2022   INTERNATIONAL NORMALIZED RATIO (INR)     HEPATITIS C ANTIBODY Internal 10.02.2017   HEPATITIS C VIRAL LOAD/PCR     HEPATITIS C GENOTYPE     HEPATITIS B SURFACE ANTIGEN Internal 11.09.2017   HEPATITIS B SURFACE ANTIBODY     HEPATITIS B DNA QUANT LEVEL     HEPATITIS B CORE ANTIBODY

## 2022-07-25 NOTE — TELEPHONE ENCOUNTER
Referral is for liver disease patient is scheduled in the hepatology clinic no follow up in luminal needed closing encounter

## 2022-07-27 ENCOUNTER — OFFICE VISIT (OUTPATIENT)
Dept: SURGERY | Facility: CLINIC | Age: 67
End: 2022-07-27

## 2022-07-27 ENCOUNTER — LAB (OUTPATIENT)
Dept: LAB | Facility: CLINIC | Age: 67
End: 2022-07-27
Payer: COMMERCIAL

## 2022-07-27 VITALS — SYSTOLIC BLOOD PRESSURE: 146 MMHG | DIASTOLIC BLOOD PRESSURE: 68 MMHG

## 2022-07-27 DIAGNOSIS — R79.89 ELEVATED LFTS: ICD-10-CM

## 2022-07-27 DIAGNOSIS — R79.89 ELEVATED LFTS: Primary | ICD-10-CM

## 2022-07-27 DIAGNOSIS — Z48.89 ENCOUNTER FOR POSTOPERATIVE CARE: ICD-10-CM

## 2022-07-27 LAB
ALBUMIN SERPL BCG-MCNC: 3.8 G/DL (ref 3.5–5.2)
ALP SERPL-CCNC: 163 U/L (ref 40–129)
ALT SERPL W P-5'-P-CCNC: 22 U/L (ref 10–50)
AST SERPL W P-5'-P-CCNC: 31 U/L (ref 10–50)
BILIRUB DIRECT SERPL-MCNC: <0.2 MG/DL (ref 0–0.3)
BILIRUB SERPL-MCNC: 0.4 MG/DL
PROT SERPL-MCNC: 6.7 G/DL (ref 6.4–8.3)

## 2022-07-27 PROCEDURE — 99024 POSTOP FOLLOW-UP VISIT: CPT | Performed by: PHYSICIAN ASSISTANT

## 2022-07-27 PROCEDURE — 36415 COLL VENOUS BLD VENIPUNCTURE: CPT

## 2022-07-27 PROCEDURE — 80076 HEPATIC FUNCTION PANEL: CPT

## 2022-07-27 NOTE — LETTER
7/27/2022         RE: Bon Luque  [address] [place] [address]        Dear Colleague,    Thank you for referring your patient, Bon Luque, to the [facility] [place]. Please see a copy of my visit note below.    HPI: Pt is here for follow up of a laparoscopic cholecystectomy and ERCP.   he is doing well.  Pain is well controlled.  No difficulties with the surgical wound/wounds.  he is eating well and denies fever and chills.         BP (!) 146/68 (BP Location: Right arm, Patient Position: Sitting, Cuff Size: Adult Regular)     EXAM:  GENERAL:Appears well  ABDOMEN:  Soft, +BS  SURGICAL WOUNDS:  Incisions healing well, no enduration or drainage.    Pathology with chronic cholelithiasis and cholecystitis  Assessment/Plan: . Doing well after surgery and should follow up as needed.    Zhane FLOWERS              Again, thank you for allowing me to participate in the care of your patient.        Sincerely,        Zhane Lujan PA-C

## 2022-08-01 ENCOUNTER — PRE VISIT (OUTPATIENT)
Dept: GASTROENTEROLOGY | Facility: CLINIC | Age: 67
End: 2022-08-01

## 2022-08-02 NOTE — TELEPHONE ENCOUNTER
Called [employer] ([phone]) and spoke with repSharonda, no record of patient veronique on file. Sent follow-up email to patient along with copy of application and link to veronique online.

## 2022-08-06 NOTE — PROGRESS NOTES
HPI: Pt is here for follow up of a laparoscopic cholecystectomy and ERCP.   he is doing well.  Pain is well controlled.  No difficulties with the surgical wound/wounds.  he is eating well and denies fever and chills.         BP (!) 146/68 (BP Location: Right arm, Patient Position: Sitting, Cuff Size: Adult Regular)     EXAM:  GENERAL:Appears well  ABDOMEN:  Soft, +BS  SURGICAL WOUNDS:  Incisions healing well, no enduration or drainage.    Pathology with chronic cholelithiasis and cholecystitis  Assessment/Plan: . Doing well after surgery and should follow up as needed.    Zhane FLOWERS

## 2022-08-10 NOTE — TELEPHONE ENCOUNTER
Called [employer] ([phone]) and spoke with , Ely, no record of patient veronique on file.    Sent [employer] message to patient for follow-up and included link to website.

## 2022-08-24 NOTE — TELEPHONE ENCOUNTER
Notified Briseyda after multiple attempts patient has not replied or submitted his portion to [employer]. Will wait until patient reaches out for assistance.

## 2022-10-09 ENCOUNTER — HEALTH MAINTENANCE LETTER (OUTPATIENT)
Age: 67
End: 2022-10-09

## 2022-10-24 ENCOUNTER — DOCUMENTATION ONLY (OUTPATIENT)
Dept: RHEUMATOLOGY | Facility: CLINIC | Age: 67
End: 2022-10-24

## 2022-10-24 DIAGNOSIS — M77.8 RIGHT SHOULDER TENDONITIS: ICD-10-CM

## 2022-10-24 DIAGNOSIS — M19.011 OSTEOARTHRITIS OF BILATERAL GLENOHUMERAL JOINTS: Primary | ICD-10-CM

## 2022-10-24 DIAGNOSIS — M19.012 OSTEOARTHRITIS OF BILATERAL GLENOHUMERAL JOINTS: Primary | ICD-10-CM

## 2022-10-24 DIAGNOSIS — L40.50 PSORIATIC ARTHRITIS (H): ICD-10-CM

## 2022-10-24 NOTE — CONFIDENTIAL NOTE
Patient came in today to drop off some paperwork for Dr. Soni regarding his [employer] medication.  He filled out all that he needs to as the patient and now he needs the doctor to finish it and fax it to the number highlighted on the 2nd page.  I put everything in an envelope with Dr. Soni's name on it and put it in the workroom by the files behind the .    Lauren

## 2022-10-25 NOTE — PROGRESS NOTES
Spoke to pt, appt scheduled with Dr. Soni for 1/19/23.     Pt is wondering what can be done for his shoulder between then and now? Says he was thinking about going to the [facility] to see about talking to a surgeon about possibly operating on his shoulder, but he's not sure that's the best route he could go. Is wondering if Dr. Soni has an insights or recommendations for what he can do in the meantime until he's able to actually meet with Dr. Soni?     Routed to RHEUMATOLOGY SUPPORT POOL

## 2022-10-27 NOTE — PROGRESS NOTES
Left message for pt to call back    Dr Soni can see pt sooner if needed, can also do a cortisone injection if this has provided pt with relief in past or can refer pt to Orthopedics.

## 2022-11-02 NOTE — PROGRESS NOTES
Pt notified of Dr Soni recommendations. He would like to come in to try a cortisone injection. Added to 11/4 with Dr Soni. Pt would also like ortho referral to [facility]

## 2022-11-03 ENCOUNTER — TRANSFERRED RECORDS (OUTPATIENT)
Dept: HEALTH INFORMATION MANAGEMENT | Facility: CLINIC | Age: 67
End: 2022-11-03

## 2023-01-11 ENCOUNTER — TELEPHONE (OUTPATIENT)
Dept: RHEUMATOLOGY | Facility: CLINIC | Age: 68
End: 2023-01-11
Payer: COMMERCIAL

## 2023-01-11 NOTE — TELEPHONE ENCOUNTER
[facility] Call Center    Phone Message    May a detailed message be left on voicemail: yes     Reason for Call: Medication Refill Request    Has the patient contacted the pharmacy for the refill? Yes   Name of medication being requested: Enbrel  Provider who prescribed the medication: Dr. Soni  Pharmacy: [facility]  Date medication is needed: ASAP   Patient has been off of Enbrel because he was trying to get assistance from Enbrel Support.  He can no longer wait and would like this refill ASAP.      Action Taken: Other: [facility]    Travel Screening: Not Applicable

## 2023-01-12 NOTE — TELEPHONE ENCOUNTER
KAREN for pt to c/b- was pt working on it by himself? Would patient like Liaison to reach out again to help?     We can release rx however test claim shows copay would be about $1700 for one month.

## 2023-01-19 ENCOUNTER — OFFICE VISIT (OUTPATIENT)
Dept: RHEUMATOLOGY | Facility: CLINIC | Age: 68
End: 2023-01-19
Payer: COMMERCIAL

## 2023-01-19 VITALS
WEIGHT: 225.6 LBS | HEART RATE: 80 BPM | DIASTOLIC BLOOD PRESSURE: 80 MMHG | BODY MASS INDEX: 30.6 KG/M2 | SYSTOLIC BLOOD PRESSURE: 138 MMHG

## 2023-01-19 DIAGNOSIS — L40.50 PSORIATIC ARTHRITIS (H): Primary | ICD-10-CM

## 2023-01-19 DIAGNOSIS — L40.9 PSORIASIS: ICD-10-CM

## 2023-01-19 DIAGNOSIS — M19.012 OSTEOARTHRITIS OF BILATERAL GLENOHUMERAL JOINTS: ICD-10-CM

## 2023-01-19 DIAGNOSIS — M43.28 ANKYLOSIS, SACROILIAC JOINT: ICD-10-CM

## 2023-01-19 DIAGNOSIS — M19.011 OSTEOARTHRITIS OF BILATERAL GLENOHUMERAL JOINTS: ICD-10-CM

## 2023-01-19 PROCEDURE — 20610 DRAIN/INJ JOINT/BURSA W/O US: CPT | Mod: LT | Performed by: INTERNAL MEDICINE

## 2023-01-19 PROCEDURE — 99214 OFFICE O/P EST MOD 30 MIN: CPT | Mod: 25 | Performed by: INTERNAL MEDICINE

## 2023-01-19 RX ORDER — TRIAMCINOLONE ACETONIDE 40 MG/ML
40 INJECTION, SUSPENSION INTRA-ARTICULAR; INTRAMUSCULAR ONCE
Status: COMPLETED | OUTPATIENT
Start: 2023-01-19 | End: 2023-01-19

## 2023-01-19 RX ADMIN — TRIAMCINOLONE ACETONIDE 40 MG: 40 INJECTION, SUSPENSION INTRA-ARTICULAR; INTRAMUSCULAR at 15:22

## 2023-01-19 NOTE — PROGRESS NOTES
Rheumatology follow-up visit note     Bon is a 67 year old male presents today for follow-up.    Bon was seen today for recheck.    Diagnoses and all orders for this visit:    Psoriatic arthritis (H)    Psoriasis    Osteoarthritis of bilateral glenohumeral joints  -     triamcinolone (KENALOG-40) injection 40 mg  -     ASPIRATION/INJECTION MAJOR JOINT    Ankylosis, sacroiliac joint        This patient with psoriasis, psoriatic arthritis has been off Enbrel since April 2022 due to financial limitations.  During this time while his psoriasis has erupted quite severely fortunately the joint symptoms have remained quiet.  He does have bilateral shoulder pain more likely due to degenerative changes in the glenohumeral joints.  He also has some tendinitis.  We discussed various options.  As far as the skin issue is concerned he is going to follow-up with dermatology.  We reviewed how things have changed and improved in terms of therapy for psoriasis, psoriatic arthritis over the past several years.  He has increasing pain in his left shoulder likely secondary to neuropathy in the background of OA would like to proceed local injection done all pros and cons were outlined with 20 mg and Kenalog subacromially, physical therapy outlined.  Follow up in 6 months.    HPI    Bon Luque is a 67 year old male is here for follow-up.  He he has extensive psoriasis now that he has not been on Enbrel since April 2022 because of the cost, $1700 per month since he can 1 Medicare.  This is led to recurrence of psoriasis [facility] many [facility] when he first started Enbrel at the beginning of and [place] availability.  He has extensive lesions on upper and lower extremities.  Fortunately joints and appendicular systems have not flared up.  He has not had any significant joint pains at all with the exception of shoulder discomfort that is associated with glenohumeral degenerative changes as documented on the MRI of June 2022  and the x-rays were before that.  He narrated how went Enbrel was started his psoriasis had improved so much and prior to that it was such a extensive eruption 85% of body area and had such social implications for him as well.  He has noted worsening pain in his shoulder especially in the left side which keeps him up at night there is no radiation down arm.  No history of trauma.    .    Further historical information, including ROS and limitation in activities as noted in the multidimensional health assessment questionnaire scanned in the EMR and in the assessment and plan section.    DETAILED EXAMINATION  01/19/23  :    Vitals:    01/19/23 1202   BP: 138/80   Pulse: 80   Weight: 102.3 kg (225 lb 9.6 oz)     Alert oriented. Head including the face is examined for malar rash, heliotropes, scarring, lupus pernio. Eyes examined for redness such as in episcleritis/scleritis, periorbital lesions.   Neck/ Face examined for parotid gland swelling, range of motion of neck.  Left upper and lower and right upper and lower extremities examined for tenderness, swelling, warmth of the appendicular joints, range of motion, edema, rash.  Some of the important findings included: he does not have evidence of synovitis in the palpable joints of the upper extremities.  No significant deformities of the digits.  There is no dactylitis.  He has extensive psoriatic lesions the upper extremities, lower.     Patient Active Problem List    Diagnosis Date Noted     Jaundice 07/12/2022     Priority: Medium     Transaminitis 07/12/2022     Priority: Medium     Dilated cbd, acquired 07/12/2022     Priority: Medium     Hyponatremia 07/12/2022     Priority: Medium     Acute cholecystitis 07/12/2022     Priority: Medium     Renal cell carcinoma, right (H) 06/23/2022     Priority: Medium     Abdominal pain, epigastric 06/23/2022     Priority: Medium     Chronic fatigue 06/23/2022     Priority: Medium     Chronic right shoulder pain 06/23/2022      Priority: Medium     Knee effusion, left 11/06/2018     Priority: Medium     Drug-induced constipation      Priority: Medium     Type 2 diabetes mellitus with complication, with long-term current use of insulin (H)      Priority: Medium     Chronic pain syndrome      Priority: Medium     Lower abdominal pain      Priority: Medium     Depression 12/26/2017     Priority: Medium     Dilated bile duct 07/03/2017     Priority: Medium     CT scan with incidental finding of dilated intra-and extrahepatic ducts         Ischemic cardiomyopathy      Priority: Medium     Stress Echocardiogram 2017 with decreased LV function with EF 43% worse   compared to previous echo.  No change in infarct.  No new ischemia         Ankylosis, sacroiliac joint 07/11/2016     Priority: Medium     Spondylosis of lumbar region without myelopathy or radiculopathy 07/11/2016     Priority: Medium     Psoriatic arthritis (H) 04/28/2016     Priority: Medium     Chronic low back pain 04/28/2016     Priority: Medium     Diabetic peripheral neuropathy associated with type 2 diabetes mellitus (H) 04/28/2016     Priority: Medium     ASCVD (arteriosclerotic cardiovascular disease) 04/28/2016     Priority: Medium     Coronary artery stent ×2 in RCA following myocardial infarction 2008 ,   echo June 2014 normal LV function with ejection fraction 50-55% with mild   apical inferior hypokinesis         Alcohol abuse, in remission 04/28/2016     Priority: Medium     Tobacco abuse, in remission 04/28/2016     Priority: Medium     Psoriasis      Priority: Medium     Hypercholesterolemia      Priority: Medium     HTN (hypertension)      Priority: Medium     Optic neuritis      Priority: Medium     Type 2 diabetes mellitus with insulin therapy (H)      Priority: Medium     Obstructive sleep apnea on CPAP      Priority: Medium     Hypogonadism in male      Priority: Medium     Erectile dysfunction      Priority: Medium     Urologic evaluation July 2015,  hypogonadism         Thoracic radiculopathy 01/01/2004     Priority: Medium     right T8 intercostal nerve block 2002         Past Surgical History:   Procedure Laterality Date     APPENDECTOMY       COLONOSCOPY      Colonoscopy September 2017 with hyperplastic polyps, repeat in 10 years     CORONARY ANGIOPLASTY      stent     ENDOSCOPIC RETROGRADE CHOLANGIOPANCREATOGRAM N/A 7/12/2022    Procedure: ENDOSCOPIC RETROGRADE CHOLANGIOPANCREATOGRAPHY, STONE EXTRACTION, BILIARY SPHINCTEROTOMY;  Surgeon: Bon Meyer MD;  Location: [facility] OR     KNEE SURGERY       LAPAROSCOPIC CHOLECYSTECTOMY N/A 7/12/2022    Procedure: CHOLECYSTECTOMY, LAPAROSCOPIC;  Surgeon: Adam March MD;  Location: [facility] OR     NOSE SURGERY       AL LAP,PARTIAL NEPHRECTOMY Right 1/10/2018    Procedure: RIGHT ROBOTIC PARTIAL NEPHRECTOMY WITH INTRAOPERATIVE ULTRASOUND;  Surgeon: Chase Rodríguez MD;  Location: [facility] [place];  Service: Urology      Past Medical History:   Diagnosis Date     Abdominal pain, epigastric 06/23/2022     Arthritis     psoriatic     ASCVD (arteriosclerotic cardiovascular disease) 04/28/2016    Coronary artery stent ×2 in RCA following myocardial infarction 2008 , echo June 2014 normal LV function with ejection fraction 50-55% with mild apical inferior hypokinesis     Chronic fatigue 06/23/2022     Chronic low back pain 04/28/2016     Chronic right shoulder pain 6/23/2022     Coronary artery disease      Depression 12/26/2017     Diabetic peripheral neuropathy associated with type 2 diabetes mellitus (H) 04/28/2016    Abnormal EMG and workup at [facility]     Dilated bile duct 07/03/2017    CT scan with incidental finding of dilated intra-and extrahepatic ducts     HTN (hypertension)      Hypercholesterolemia      Hypogonadism in male      Ischemic cardiomyopathy     Stress Echocardiogram 2017 with decreased LV function with EF 43% worse compared to previous echo.  No change in  infarct.  No new ischemia     Left upper quadrant pain 06/30/2017     MI (myocardial infarction) (H)      Obstructive sleep apnea on CPAP     CPAP     Optic neuritis      Psoriasis      Renal cell carcinoma, right (H) 06/23/2022    Right partial nephrectomy 2017     Right renal mass      Sebaceous cyst 2010     Thoracic radiculopathy 2004    right T8 intercostal nerve block 2002     Type 2 diabetes mellitus with insulin therapy (H)      Allergies   Allergen Reactions     Atorvastatin Muscle Pain (Myalgia)     Valomag [Arthritis Pain Formula] Nausea and Vomiting     gastric ulcer from aspirin use     Ampicillin Rash     Had a reaction to this medication many years ago.     Codeine Rash     It has been about 30 years ago     Current Outpatient Medications   Medication Sig Dispense Refill     acetaminophen (TYLENOL) 325 MG tablet Take 2 tablets (650 mg) by mouth every 4 hours as needed for other (For optimal non-opioid multimodal pain management to improve pain control.)       Alcohol Swabs PADS Use to swab the area of the injection or sherry as directed Per insurance coverage 100 each 0     B-D U/F 31G X 8 MM insulin pen needle FOR ADMINISTERING INSULIN AT HOME       blood glucose (NO BRAND SPECIFIED) lancets standard To use to test glucose level in the blood Use to test blood sugar  4  times daily as directed. To accompany glucose monitor brands per insurance coverage. 100 each 0     blood glucose (NO BRAND SPECIFIED) test strip To use to test glucose level in the blood Use to test blood sugar  4 times daily as directed. To accompany glucose monitor brands per insurance coverage. 100 strip 0     clobetasol (TEMOVATE) 0.05 % external cream Apply topically 2 times daily as needed For psoriasis on hands.       Continuous Blood Gluc Sensor (Precision Through ImagingSTYLE SHIRA 14 DAY SENSOR) MISC        DULoxetine (CYMBALTA) 60 MG capsule Take 60 mg by mouth At Bedtime       econazole nitrate 1 % external cream Apply topically 2 times daily  as needed       gabapentin (NEURONTIN) 300 MG capsule [GABAPENTIN (NEURONTIN) 300 MG CAPSULE] Take 600 mg by mouth 3 (three) times a day.        insulin aspart (NOVOLOG) 100 unit/mL injection Inject Subcutaneous 3 times daily (before meals) 1 unit per 5 g carbs plus sliding scale.       Insulin Glargine-yfgn 100 UNIT/ML SOPN Inject 70 Units Subcutaneous 2 times daily       metFORMIN (GLUCOPHAGE-XR) 750 MG 24 hr tablet Take 750 mg by mouth daily (with dinner)       metroNIDAZOLE (METROCREAM) 0.75 % external cream Apply topically 2 times daily as needed Apply thin layer to face for rosacea.       morphine (MS CONTIN) 30 MG CR tablet Take 30 mg by mouth every 8 hours       naloxone (NARCAN) 4 MG/0.1ML nasal spray Spray 1 spray into one nostril alternating nostrils once as needed for opioid reversal       omeprazole (PRILOSEC) 20 MG DR capsule Take 1 capsule (20 mg) by mouth daily before breakfast 90 capsule 3     Sharps Container MISC Use as directed to dispose of needles, lancets and other sharps Per Insurance coverage 1 each 0     testosterone (ANDROGEL) 20.25 MG/1.25GM (1.62%) topical gel Place 20.25 mg onto the skin daily       valACYclovir (VALTREX) 500 MG tablet Take 500 mg by mouth 2 times daily as needed Take twice daily at onset of symptoms for 3 days. Repeat for flares.       etanercept (ENBREL SURECLICK) 50 MG/ML autoinjector INJECT THE CONTENTS OF 1 SURECLICK AUTOINJECTOR SUBCUTANEOUSLY EVERY WEEK. (Patient not taking: Reported on 7/27/2022) 12 mL 3     family history is not on file.  Social Connections: Not on file          WBC Count   Date Value Ref Range Status   07/13/2022 11.1 (H) 4.0 - 11.0 10e3/uL Final     RBC Count   Date Value Ref Range Status   07/13/2022 4.47 4.40 - 5.90 10e6/uL Final     Hemoglobin   Date Value Ref Range Status   07/13/2022 12.6 (L) 13.3 - 17.7 g/dL Final     Hematocrit   Date Value Ref Range Status   07/13/2022 37.5 (L) 40.0 - 53.0 % Final     MCV   Date Value Ref Range Status    07/13/2022 84 78 - 100 fL Final     MCH   Date Value Ref Range Status   07/13/2022 28.2 26.5 - 33.0 pg Final     Platelet Count   Date Value Ref Range Status   07/13/2022 140 (L) 150 - 450 10e3/uL Final     % Lymphocytes   Date Value Ref Range Status   07/13/2022 8 % Final     AST   Date Value Ref Range Status   07/27/2022 31 10 - 50 U/L Final     ALT   Date Value Ref Range Status   07/27/2022 22 10 - 50 U/L Final     Albumin   Date Value Ref Range Status   07/27/2022 3.8 3.5 - 5.2 g/dL Final   07/13/2022 3.1 (L) 3.5 - 5.0 g/dL Final     Alkaline Phosphatase   Date Value Ref Range Status   07/27/2022 163 (H) 40 - 129 U/L Final     Creatinine   Date Value Ref Range Status   07/13/2022 0.93 0.70 - 1.30 mg/dL Final     GFR Estimate   Date Value Ref Range Status   07/13/2022 >90 >60 mL/min/1.73m2 Final     Comment:     Effective December 21, 2021 eGFRcr in adults is calculated using the 2021 CKD-EPI creatinine equation which includes age and gender (Danyel et al., NEJM, DOI: 10.1056/WLDKuw6545749)   01/13/2021 >60 >60 mL/min/1.73m2 Final     GFR, ESTIMATED POCT   Date Value Ref Range Status   06/16/2022 >60 >60 mL/min/1.73m2 Final     GFR Estimate If Black   Date Value Ref Range Status   01/13/2021 >60 >60 mL/min/1.73m2 Final

## 2023-01-20 ENCOUNTER — APPOINTMENT (OUTPATIENT)
Dept: RADIOLOGY | Facility: HOSPITAL | Age: 68
End: 2023-01-20
Attending: EMERGENCY MEDICINE
Payer: COMMERCIAL

## 2023-01-20 ENCOUNTER — HOSPITAL ENCOUNTER (OUTPATIENT)
Facility: HOSPITAL | Age: 68
Setting detail: OBSERVATION
Discharge: HOME | End: 2023-01-21
Attending: STUDENT IN AN ORGANIZED HEALTH CARE EDUCATION/TRAINING PROGRAM | Admitting: STUDENT IN AN ORGANIZED HEALTH CARE EDUCATION/TRAINING PROGRAM
Payer: COMMERCIAL

## 2023-01-20 ENCOUNTER — APPOINTMENT (OUTPATIENT)
Dept: CT IMAGING | Facility: HOSPITAL | Age: 68
End: 2023-01-20
Attending: STUDENT IN AN ORGANIZED HEALTH CARE EDUCATION/TRAINING PROGRAM
Payer: COMMERCIAL

## 2023-01-20 DIAGNOSIS — R06.09 DOE (DYSPNEA ON EXERTION): ICD-10-CM

## 2023-01-20 DIAGNOSIS — K21.00 GASTROESOPHAGEAL REFLUX DISEASE WITH ESOPHAGITIS, UNSPECIFIED WHETHER HEMORRHAGE: Primary | ICD-10-CM

## 2023-01-20 LAB
ANION GAP SERPL CALCULATED.3IONS-SCNC: 9 MMOL/L (ref 7–15)
BASOPHILS # BLD AUTO: 0 10E3/UL (ref 0–0.2)
BASOPHILS NFR BLD AUTO: 0 %
BUN SERPL-MCNC: 16 MG/DL (ref 8–23)
CALCIUM SERPL-MCNC: 9.7 MG/DL (ref 8.8–10.2)
CHLORIDE SERPL-SCNC: 101 MMOL/L (ref 98–107)
CREAT SERPL-MCNC: 0.91 MG/DL (ref 0.67–1.17)
DEPRECATED HCO3 PLAS-SCNC: 29 MMOL/L (ref 22–29)
EOSINOPHIL # BLD AUTO: 0 10E3/UL (ref 0–0.7)
EOSINOPHIL NFR BLD AUTO: 0 %
ERYTHROCYTE [DISTWIDTH] IN BLOOD BY AUTOMATED COUNT: 13.1 % (ref 10–15)
GFR SERPL CREATININE-BSD FRML MDRD: >90 ML/MIN/1.73M2
GLUCOSE BLDC GLUCOMTR-MCNC: 254 MG/DL (ref 70–99)
GLUCOSE SERPL-MCNC: 356 MG/DL (ref 70–99)
HCT VFR BLD AUTO: 42.6 % (ref 40–53)
HGB BLD-MCNC: 13.9 G/DL (ref 13.3–17.7)
IMM GRANULOCYTES # BLD: 0 10E3/UL
IMM GRANULOCYTES NFR BLD: 0 %
LYMPHOCYTES # BLD AUTO: 1.6 10E3/UL (ref 0.8–5.3)
LYMPHOCYTES NFR BLD AUTO: 13 %
MCH RBC QN AUTO: 27 PG (ref 26.5–33)
MCHC RBC AUTO-ENTMCNC: 32.6 G/DL (ref 31.5–36.5)
MCV RBC AUTO: 83 FL (ref 78–100)
MONOCYTES # BLD AUTO: 0.8 10E3/UL (ref 0–1.3)
MONOCYTES NFR BLD AUTO: 6 %
NEUTROPHILS # BLD AUTO: 9.7 10E3/UL (ref 1.6–8.3)
NEUTROPHILS NFR BLD AUTO: 81 %
NRBC # BLD AUTO: 0 10E3/UL
NRBC BLD AUTO-RTO: 0 /100
PLAT MORPH BLD: ABNORMAL
PLATELET # BLD AUTO: ABNORMAL 10*3/UL
POTASSIUM SERPL-SCNC: 4.2 MMOL/L (ref 3.4–5.3)
RBC # BLD AUTO: 5.15 10E6/UL (ref 4.4–5.9)
RBC MORPH BLD: ABNORMAL
SODIUM SERPL-SCNC: 139 MMOL/L (ref 136–145)
TROPONIN T SERPL HS-MCNC: 21 NG/L
WBC # BLD AUTO: 12 10E3/UL (ref 4–11)

## 2023-01-20 PROCEDURE — C9113 INJ PANTOPRAZOLE SODIUM, VIA: HCPCS | Performed by: STUDENT IN AN ORGANIZED HEALTH CARE EDUCATION/TRAINING PROGRAM

## 2023-01-20 PROCEDURE — 36415 COLL VENOUS BLD VENIPUNCTURE: CPT | Performed by: EMERGENCY MEDICINE

## 2023-01-20 PROCEDURE — 71275 CT ANGIOGRAPHY CHEST: CPT

## 2023-01-20 PROCEDURE — 99221 1ST HOSP IP/OBS SF/LOW 40: CPT | Performed by: FAMILY MEDICINE

## 2023-01-20 PROCEDURE — G0378 HOSPITAL OBSERVATION PER HR: HCPCS

## 2023-01-20 PROCEDURE — 93005 ELECTROCARDIOGRAM TRACING: CPT | Performed by: EMERGENCY MEDICINE

## 2023-01-20 PROCEDURE — 71046 X-RAY EXAM CHEST 2 VIEWS: CPT

## 2023-01-20 PROCEDURE — 80048 BASIC METABOLIC PNL TOTAL CA: CPT | Performed by: EMERGENCY MEDICINE

## 2023-01-20 PROCEDURE — 250N000009 HC RX 250: Performed by: STUDENT IN AN ORGANIZED HEALTH CARE EDUCATION/TRAINING PROGRAM

## 2023-01-20 PROCEDURE — 250N000013 HC RX MED GY IP 250 OP 250 PS 637: Performed by: STUDENT IN AN ORGANIZED HEALTH CARE EDUCATION/TRAINING PROGRAM

## 2023-01-20 PROCEDURE — 84484 ASSAY OF TROPONIN QUANT: CPT | Performed by: EMERGENCY MEDICINE

## 2023-01-20 PROCEDURE — 82962 GLUCOSE BLOOD TEST: CPT

## 2023-01-20 PROCEDURE — 250N000011 HC RX IP 250 OP 636: Performed by: FAMILY MEDICINE

## 2023-01-20 PROCEDURE — 96374 THER/PROPH/DIAG INJ IV PUSH: CPT

## 2023-01-20 PROCEDURE — 99285 EMERGENCY DEPT VISIT HI MDM: CPT | Mod: 25

## 2023-01-20 PROCEDURE — 85004 AUTOMATED DIFF WBC COUNT: CPT | Performed by: EMERGENCY MEDICINE

## 2023-01-20 PROCEDURE — 250N000013 HC RX MED GY IP 250 OP 250 PS 637: Performed by: EMERGENCY MEDICINE

## 2023-01-20 PROCEDURE — 250N000011 HC RX IP 250 OP 636: Performed by: STUDENT IN AN ORGANIZED HEALTH CARE EDUCATION/TRAINING PROGRAM

## 2023-01-20 PROCEDURE — 93005 ELECTROCARDIOGRAM TRACING: CPT | Performed by: STUDENT IN AN ORGANIZED HEALTH CARE EDUCATION/TRAINING PROGRAM

## 2023-01-20 RX ORDER — NICOTINE POLACRILEX 4 MG
15-30 LOZENGE BUCCAL
Status: DISCONTINUED | OUTPATIENT
Start: 2023-01-20 | End: 2023-01-21

## 2023-01-20 RX ORDER — NITROGLYCERIN 0.4 MG/1
0.4 TABLET SUBLINGUAL EVERY 5 MIN PRN
Status: DISCONTINUED | OUTPATIENT
Start: 2023-01-20 | End: 2023-01-21 | Stop reason: HOSPADM

## 2023-01-20 RX ORDER — ASPIRIN 81 MG/1
324 TABLET, CHEWABLE ORAL ONCE
Status: COMPLETED | OUTPATIENT
Start: 2023-01-20 | End: 2023-01-20

## 2023-01-20 RX ORDER — IOPAMIDOL 755 MG/ML
100 INJECTION, SOLUTION INTRAVASCULAR ONCE
Status: COMPLETED | OUTPATIENT
Start: 2023-01-20 | End: 2023-01-20

## 2023-01-20 RX ORDER — DEXTROSE MONOHYDRATE 25 G/50ML
25-50 INJECTION, SOLUTION INTRAVENOUS
Status: DISCONTINUED | OUTPATIENT
Start: 2023-01-20 | End: 2023-01-21

## 2023-01-20 RX ORDER — ONDANSETRON 4 MG/1
4 TABLET, ORALLY DISINTEGRATING ORAL EVERY 6 HOURS PRN
Status: DISCONTINUED | OUTPATIENT
Start: 2023-01-20 | End: 2023-01-21 | Stop reason: HOSPADM

## 2023-01-20 RX ORDER — ONDANSETRON 2 MG/ML
4 INJECTION INTRAMUSCULAR; INTRAVENOUS EVERY 6 HOURS PRN
Status: DISCONTINUED | OUTPATIENT
Start: 2023-01-20 | End: 2023-01-21 | Stop reason: HOSPADM

## 2023-01-20 RX ADMIN — ASPIRIN 324 MG: 81 TABLET, CHEWABLE ORAL at 18:56

## 2023-01-20 RX ADMIN — LIDOCAINE HYDROCHLORIDE 30 ML: 20 SOLUTION ORAL; TOPICAL at 19:52

## 2023-01-20 RX ADMIN — PANTOPRAZOLE SODIUM 40 MG: 40 INJECTION, POWDER, FOR SOLUTION INTRAVENOUS at 19:53

## 2023-01-20 RX ADMIN — IOPAMIDOL 100 ML: 755 INJECTION, SOLUTION INTRAVENOUS at 21:57

## 2023-01-20 ASSESSMENT — ACTIVITIES OF DAILY LIVING (ADL)
ADLS_ACUITY_SCORE: 35

## 2023-01-21 ENCOUNTER — APPOINTMENT (OUTPATIENT)
Dept: CARDIOLOGY | Facility: HOSPITAL | Age: 68
End: 2023-01-21
Attending: FAMILY MEDICINE
Payer: COMMERCIAL

## 2023-01-21 VITALS
WEIGHT: 225 LBS | OXYGEN SATURATION: 97 % | RESPIRATION RATE: 18 BRPM | BODY MASS INDEX: 32.21 KG/M2 | TEMPERATURE: 97.6 F | DIASTOLIC BLOOD PRESSURE: 70 MMHG | SYSTOLIC BLOOD PRESSURE: 148 MMHG | HEART RATE: 70 BPM | HEIGHT: 70 IN

## 2023-01-21 LAB
ANION GAP SERPL CALCULATED.3IONS-SCNC: 9 MMOL/L (ref 7–15)
BUN SERPL-MCNC: 15.5 MG/DL (ref 8–23)
CALCIUM SERPL-MCNC: 9.5 MG/DL (ref 8.8–10.2)
CHLORIDE SERPL-SCNC: 103 MMOL/L (ref 98–107)
CREAT SERPL-MCNC: 0.86 MG/DL (ref 0.67–1.17)
DEPRECATED HCO3 PLAS-SCNC: 27 MMOL/L (ref 22–29)
ERYTHROCYTE [DISTWIDTH] IN BLOOD BY AUTOMATED COUNT: 13.1 % (ref 10–15)
GFR SERPL CREATININE-BSD FRML MDRD: >90 ML/MIN/1.73M2
GLUCOSE BLDC GLUCOMTR-MCNC: 166 MG/DL (ref 70–99)
GLUCOSE BLDC GLUCOMTR-MCNC: 208 MG/DL (ref 70–99)
GLUCOSE BLDC GLUCOMTR-MCNC: 251 MG/DL (ref 70–99)
GLUCOSE SERPL-MCNC: 200 MG/DL (ref 70–99)
HCT VFR BLD AUTO: 41.1 % (ref 40–53)
HGB BLD-MCNC: 13.4 G/DL (ref 13.3–17.7)
LVEF ECHO: NORMAL
MCH RBC QN AUTO: 26.9 PG (ref 26.5–33)
MCHC RBC AUTO-ENTMCNC: 32.6 G/DL (ref 31.5–36.5)
MCV RBC AUTO: 82 FL (ref 78–100)
PLATELET # BLD AUTO: 258 10E3/UL (ref 150–450)
POTASSIUM SERPL-SCNC: 4.6 MMOL/L (ref 3.4–5.3)
RBC # BLD AUTO: 4.99 10E6/UL (ref 4.4–5.9)
SODIUM SERPL-SCNC: 139 MMOL/L (ref 136–145)
TROPONIN T SERPL HS-MCNC: 30 NG/L
WBC # BLD AUTO: 13.7 10E3/UL (ref 4–11)

## 2023-01-21 PROCEDURE — G0378 HOSPITAL OBSERVATION PER HR: HCPCS

## 2023-01-21 PROCEDURE — 250N000012 HC RX MED GY IP 250 OP 636 PS 637: Performed by: INTERNAL MEDICINE

## 2023-01-21 PROCEDURE — 250N000013 HC RX MED GY IP 250 OP 250 PS 637: Performed by: INTERNAL MEDICINE

## 2023-01-21 PROCEDURE — 84484 ASSAY OF TROPONIN QUANT: CPT | Performed by: FAMILY MEDICINE

## 2023-01-21 PROCEDURE — 255N000002 HC RX 255 OP 636: Performed by: INTERNAL MEDICINE

## 2023-01-21 PROCEDURE — 96372 THER/PROPH/DIAG INJ SC/IM: CPT | Mod: 59 | Performed by: INTERNAL MEDICINE

## 2023-01-21 PROCEDURE — 85027 COMPLETE CBC AUTOMATED: CPT | Performed by: FAMILY MEDICINE

## 2023-01-21 PROCEDURE — 96375 TX/PRO/DX INJ NEW DRUG ADDON: CPT

## 2023-01-21 PROCEDURE — 999N000208 ECHOCARDIOGRAM COMPLETE

## 2023-01-21 PROCEDURE — 80048 BASIC METABOLIC PNL TOTAL CA: CPT | Performed by: FAMILY MEDICINE

## 2023-01-21 PROCEDURE — 93306 TTE W/DOPPLER COMPLETE: CPT | Mod: 26 | Performed by: INTERNAL MEDICINE

## 2023-01-21 PROCEDURE — 99222 1ST HOSP IP/OBS MODERATE 55: CPT | Performed by: INTERNAL MEDICINE

## 2023-01-21 PROCEDURE — 82962 GLUCOSE BLOOD TEST: CPT

## 2023-01-21 PROCEDURE — 99239 HOSP IP/OBS DSCHRG MGMT >30: CPT | Performed by: INTERNAL MEDICINE

## 2023-01-21 PROCEDURE — 36415 COLL VENOUS BLD VENIPUNCTURE: CPT | Performed by: FAMILY MEDICINE

## 2023-01-21 PROCEDURE — 250N000013 HC RX MED GY IP 250 OP 250 PS 637: Performed by: FAMILY MEDICINE

## 2023-01-21 RX ORDER — MORPHINE SULFATE 30 MG/1
30 TABLET, FILM COATED, EXTENDED RELEASE ORAL ONCE
Status: COMPLETED | OUTPATIENT
Start: 2023-01-21 | End: 2023-01-21

## 2023-01-21 RX ORDER — NICOTINE POLACRILEX 4 MG
15-30 LOZENGE BUCCAL
Status: DISCONTINUED | OUTPATIENT
Start: 2023-01-21 | End: 2023-01-21 | Stop reason: HOSPADM

## 2023-01-21 RX ORDER — DEXTROSE MONOHYDRATE 25 G/50ML
25-50 INJECTION, SOLUTION INTRAVENOUS
Status: DISCONTINUED | OUTPATIENT
Start: 2023-01-21 | End: 2023-01-21 | Stop reason: HOSPADM

## 2023-01-21 RX ORDER — GABAPENTIN 300 MG/1
600 CAPSULE ORAL 3 TIMES DAILY
Status: DISCONTINUED | OUTPATIENT
Start: 2023-01-21 | End: 2023-01-21 | Stop reason: HOSPADM

## 2023-01-21 RX ORDER — ACETAMINOPHEN 325 MG/1
650 TABLET ORAL EVERY 4 HOURS PRN
Status: DISCONTINUED | OUTPATIENT
Start: 2023-01-21 | End: 2023-01-21 | Stop reason: HOSPADM

## 2023-01-21 RX ORDER — INSULIN GLARGINE-YFGN 100 [IU]/ML
70 INJECTION, SOLUTION SUBCUTANEOUS 2 TIMES DAILY
Status: DISCONTINUED | OUTPATIENT
Start: 2023-01-21 | End: 2023-01-21

## 2023-01-21 RX ORDER — GABAPENTIN 300 MG/1
600 CAPSULE ORAL 3 TIMES DAILY
Status: DISCONTINUED | OUTPATIENT
Start: 2023-01-21 | End: 2023-01-21

## 2023-01-21 RX ORDER — PANTOPRAZOLE SODIUM 40 MG/1
40 TABLET, DELAYED RELEASE ORAL
Qty: 30 TABLET | Refills: 1 | Status: SHIPPED | OUTPATIENT
Start: 2023-01-22 | End: 2023-09-01

## 2023-01-21 RX ORDER — DULOXETIN HYDROCHLORIDE 60 MG/1
60 CAPSULE, DELAYED RELEASE ORAL AT BEDTIME
Status: DISCONTINUED | OUTPATIENT
Start: 2023-01-21 | End: 2023-01-21 | Stop reason: HOSPADM

## 2023-01-21 RX ORDER — PANTOPRAZOLE SODIUM 40 MG/1
40 TABLET, DELAYED RELEASE ORAL
Status: DISCONTINUED | OUTPATIENT
Start: 2023-01-22 | End: 2023-01-21 | Stop reason: HOSPADM

## 2023-01-21 RX ORDER — MORPHINE SULFATE 30 MG/1
30 TABLET, FILM COATED, EXTENDED RELEASE ORAL EVERY 8 HOURS
Status: DISCONTINUED | OUTPATIENT
Start: 2023-01-21 | End: 2023-01-21 | Stop reason: HOSPADM

## 2023-01-21 RX ADMIN — GABAPENTIN 600 MG: 300 CAPSULE ORAL at 02:59

## 2023-01-21 RX ADMIN — INSULIN ASPART 10 UNITS: 100 INJECTION, SOLUTION INTRAVENOUS; SUBCUTANEOUS at 10:04

## 2023-01-21 RX ADMIN — MORPHINE SULFATE 30 MG: 30 TABLET, EXTENDED RELEASE ORAL at 01:23

## 2023-01-21 RX ADMIN — GABAPENTIN 600 MG: 300 CAPSULE ORAL at 15:18

## 2023-01-21 RX ADMIN — DULOXETINE HYDROCHLORIDE 60 MG: 60 CAPSULE, DELAYED RELEASE ORAL at 02:59

## 2023-01-21 RX ADMIN — GABAPENTIN 600 MG: 300 CAPSULE ORAL at 10:00

## 2023-01-21 RX ADMIN — INSULIN ASPART 7 UNITS: 100 INJECTION, SOLUTION INTRAVENOUS; SUBCUTANEOUS at 13:39

## 2023-01-21 RX ADMIN — MORPHINE SULFATE 30 MG: 30 TABLET, FILM COATED, EXTENDED RELEASE ORAL at 10:08

## 2023-01-21 RX ADMIN — PERFLUTREN 2 ML: 6.52 INJECTION, SUSPENSION INTRAVENOUS at 14:15

## 2023-01-21 RX ADMIN — INSULIN GLARGINE 70 UNITS: 100 INJECTION, SOLUTION SUBCUTANEOUS at 10:00

## 2023-01-21 ASSESSMENT — ACTIVITIES OF DAILY LIVING (ADL)
DEPENDENT_IADLS:: INDEPENDENT
ADLS_ACUITY_SCORE: 35

## 2023-01-21 NOTE — PLAN OF CARE
PRIMARY DIAGNOSIS: CHEST PAIN  OUTPATIENT/OBSERVATION GOALS TO BE MET BEFORE DISCHARGE:  1. Ruled out acute coronary syndrome (negative or stable Troponin):  Yes  2. Pain Status: Improved-controlled with oral pain medications.  3. Appropriate provocative testing performed: Yes  - Stress Test Procedure: Regular  - Interpretation of cardiac rhythm per telemetry tech: NSR    4. Cleared by Consultants (if applicable):N/A  5. Return to near baseline physical activity: Yes  Discharge Planner Nurse   Safe discharge environment identified: No  Barriers to discharge: Yes, further testing and consults pending troponin results.       Entered by: Cecy Patterson RN 01/21/2023 3:31 AM     Please review provider order for any additional goals.   Nurse to notify provider when observation goals have been met and patient is ready for discharge.

## 2023-01-21 NOTE — CONSULTS
"  HEART CARE CONSULTATON NOTE        Assessment/Recommendations   Assessment/Plan:    Bon Luque is a 67 year old male CAD s/p MI, HTN, HLP, DM II and morbid obesity who presents to ED with chest pain.    1. Chest pain  - ECG shows normal sinus rhythm with old inferior infarct, no new changes  -Pain relieved after Maalox  - S/p Aspirin in the ER  - TTE ordered  - Patient would like to follow-up with his cardiologist for a stress test as an outpatient  -Discussed with him in detail the options including stress test during this hospital stay versus outpatient and possible risk benefit ratio of each option.  He would like to pursue a stress test as an outpatient with his cardiologist.  - Okay to discharge if TTE WNL. Patient agreeable to wait for TTE  - Patient to follow up with his Cardiologist for a Stress test ideally within a week  - Patient advised to go to the nearest ER in case he has repeat symptoms       History of Present Illness/Subjective    HPI: Bon Luque is a 67 year old male CAD s/p MI, HTN, HLP, DM II and morbid obesity who presents to ED with chest pain.    Mr Luque states he started having a burning sensation in his mid chest area after he had some quesadillas yesterday afternoon.  Prior to having his lunch he had been using his snowblower.  He says this burning pain persisted for about 2 hours and was about 6 on 10 in its intensity at its worst.  The chest pain improved after he got some Maalox in the ER.  He did have some minor chest discomfort after having some mashed potatoes earlier today.  He has noticed he has \"esophageal\" problems in the last few months.    He does have a history of CAD with PCI about 15 years ago.  He has not had any cardiac issues since his last PCI.  He has a cardiologist he follows up with in [place] at [place].    ECG shows normal sinus rhythm with old inferior infarct, no new changes    CT angio was negative for PE.       Physical Examination  Review of Systems   VITALS: " "/58 (BP Location: Right arm)   Pulse 72   Temp 98.1  F (36.7  C) (Oral)   Resp 16   Ht 1.778 m (5' 10\")   Wt 102.1 kg (225 lb)   SpO2 97%   BMI 32.28 kg/m    BMI: Body mass index is 32.28 kg/m .  Wt Readings from Last 3 Encounters:   01/20/23 102.1 kg (225 lb)   01/19/23 102.3 kg (225 lb 9.6 oz)   07/12/22 104.3 kg (230 lb)       Intake/Output Summary (Last 24 hours) at 1/21/2023 0744  Last data filed at 1/21/2023 0120  Gross per 24 hour   Intake 200 ml   Output --   Net 200 ml     General Appearance:   no distress, normal body habitus   ENT/Mouth: membranes moist, no oral lesions or bleeding gums.      EYES:  no scleral icterus, normal conjunctivae   Neck: no carotid bruits or thyromegaly   Chest/Lungs:   lungs are clear to auscultation, no rales or wheezing, no sternal scar, equal chest wall expansion    Cardiovascular:   Regular. Normal first and second heart sounds with no murmurs, rubs, or gallops; the carotid, radial and posterior tibial pulses are intact, no edema bilaterally    Abdomen:  no organomegaly, masses, bruits, or tenderness; bowel sounds are present   Extremities: no cyanosis or clubbing   Skin: no xanthelasma, warm.    Neurologic: normal  bilateral, no tremors     Psychiatric: alert and oriented x3, calm     Review Of Systems  Skin: negative  Eyes: negative  Ears/Nose/Throat: negative  Respiratory: No shortness of breath, dyspnea on exertion, cough, or hemoptysis, No cough and No hemoptysis  Cardiovascular: positive as above  Gastrointestinal: negative  Genitourinary: negative  Musculoskeletal: negative  Neurologic: negative  Psychiatric: negative  Hematologic/Lymphatic/Immunologic: negative  Endocrine: negative          Lab Results    Chemistry/lipid CBC Cardiac Enzymes/BNP/TSH/INR   Recent Labs   Lab Test 10/02/17  1049   CHOL 198   HDL 41   *   TRIG 99     Recent Labs   Lab Test 10/02/17  1049 04/28/16  1406   * 109     Recent Labs   Lab Test 01/21/23  0256   NA " 139   POTASSIUM 4.6   CHLORIDE 103   CO2 27   *   BUN 15.5   CR 0.86   GFRESTIMATED >90   MIGUEL 9.5     Recent Labs   Lab Test 01/21/23  0256 01/20/23  1906 07/13/22  0606   CR 0.86 0.91 0.93     Recent Labs   Lab Test 07/12/22  0228 01/10/18  1547 10/02/17  1049   A1C 8.2* 8.6* 7.0*          Recent Labs   Lab Test 01/21/23  0256   WBC 13.7*   HGB 13.4   HCT 41.1   MCV 82        Recent Labs   Lab Test 01/21/23  0256 01/20/23  1906 07/13/22  0606   HGB 13.4 13.9 12.6*    Recent Labs   Lab Test 07/12/22  0228   TROPONINI 0.03     Recent Labs   Lab Test 06/16/22  1838   BNP 11     Recent Labs   Lab Test 06/16/22  1838   TSH 0.70     No results for input(s): INR in the last 32375 hours.     Medical History  Surgical History Family History Social History   Past Medical History:   Diagnosis Date     Abdominal pain, epigastric 06/23/2022     Arthritis     psoriatic     ASCVD (arteriosclerotic cardiovascular disease) 04/28/2016    Coronary artery stent ×2 in RCA following myocardial infarction 2008 , echo June 2014 normal LV function with ejection fraction 50-55% with mild apical inferior hypokinesis     Chronic fatigue 06/23/2022     Chronic low back pain 04/28/2016     Chronic right shoulder pain 6/23/2022     Coronary artery disease      Depression 12/26/2017     Diabetic peripheral neuropathy associated with type 2 diabetes mellitus (H) 04/28/2016    Abnormal EMG and workup at [facility]     Dilated bile duct 07/03/2017    CT scan with incidental finding of dilated intra-and extrahepatic ducts     HTN (hypertension)      Hypercholesterolemia      Hypogonadism in male      Ischemic cardiomyopathy     Stress Echocardiogram 2017 with decreased LV function with EF 43% worse compared to previous echo.  No change in infarct.  No new ischemia     Left upper quadrant pain 06/30/2017     MI (myocardial infarction) (H)      Obstructive sleep apnea on CPAP     CPAP     Optic neuritis      Psoriasis      Renal cell  carcinoma, right (H) 06/23/2022    Right partial nephrectomy 2017     Right renal mass      Sebaceous cyst 2010     Thoracic radiculopathy 2004    right T8 intercostal nerve block 2002     Type 2 diabetes mellitus with insulin therapy (H)      Past Surgical History:   Procedure Laterality Date     APPENDECTOMY       COLONOSCOPY      Colonoscopy September 2017 with hyperplastic polyps, repeat in 10 years     CORONARY ANGIOPLASTY      stent     ENDOSCOPIC RETROGRADE CHOLANGIOPANCREATOGRAM N/A 7/12/2022    Procedure: ENDOSCOPIC RETROGRADE CHOLANGIOPANCREATOGRAPHY, STONE EXTRACTION, BILIARY SPHINCTEROTOMY;  Surgeon: Bon Meyer MD;  Location: [facility] OR     KNEE SURGERY       LAPAROSCOPIC CHOLECYSTECTOMY N/A 7/12/2022    Procedure: CHOLECYSTECTOMY, LAPAROSCOPIC;  Surgeon: Adam March MD;  Location: [facility] OR     NOSE SURGERY       DE LAP,PARTIAL NEPHRECTOMY Right 1/10/2018    Procedure: RIGHT ROBOTIC PARTIAL NEPHRECTOMY WITH INTRAOPERATIVE ULTRASOUND;  Surgeon: Chase Rodríguez MD;  Location: [facility];  Service: Urology     No family history on file.     Social History     Socioeconomic History     Marital status:      Spouse name: Not on file     Number of children: Not on file     Years of education: Not on file     Highest education level: Not on file   Occupational History     Not on file   Tobacco Use     Smoking status: Former     Smokeless tobacco: Never   Substance and Sexual Activity     Alcohol use: No     Comment: Alcoholic Drinks/day: alcohol abuse - in remission x16 years     Drug use: No     Sexual activity: Not on file   Other Topics Concern     Not on file   Social History Narrative     Not on file     Social Determinants of Health     Financial Resource Strain: Not on file   Food Insecurity: Not on file   Transportation Needs: Not on file   Physical Activity: Not on file   Stress: Not on file   Social Connections: Not on file   Intimate Partner  Violence: Not on file   Housing Stability: Not on file         Medications  Allergies   Current Outpatient Medications   Medication Sig Dispense Refill     acetaminophen (TYLENOL) 325 MG tablet Take 2 tablets (650 mg) by mouth every 4 hours as needed for other (For optimal non-opioid multimodal pain management to improve pain control.)       clobetasol (TEMOVATE) 0.05 % external cream Apply topically 2 times daily as needed For psoriasis on hands.       DULoxetine (CYMBALTA) 60 MG capsule Take 60 mg by mouth At Bedtime       econazole nitrate 1 % external cream Apply topically 2 times daily as needed       gabapentin (NEURONTIN) 300 MG capsule [GABAPENTIN (NEURONTIN) 300 MG CAPSULE] Take 600 mg by mouth 3 (three) times a day.        insulin aspart (NOVOLOG) 100 unit/mL injection Inject Subcutaneous 3 times daily (before meals) 1 unit per 5 g carbs plus sliding scale.       Insulin Glargine-yfgn 100 UNIT/ML SOPN Inject 70 Units Subcutaneous 2 times daily       metroNIDAZOLE (METROCREAM) 0.75 % external cream Apply topically 2 times daily as needed Apply thin layer to face for rosacea.       morphine (MS CONTIN) 30 MG CR tablet Take 30 mg by mouth every 8 hours       naloxone (NARCAN) 4 MG/0.1ML nasal spray Spray 1 spray into one nostril alternating nostrils once as needed for opioid reversal       testosterone (ANDROGEL) 20.25 MG/1.25GM (1.62%) topical gel Place 20.25 mg onto the skin daily       valACYclovir (VALTREX) 500 MG tablet Take 500 mg by mouth 2 times daily as needed Take twice daily at onset of symptoms for 3 days. Repeat for flares.       Alcohol Swabs PADS Use to swab the area of the injection or sherry as directed Per insurance coverage 100 each 0     B-D U/F 31G X 8 MM insulin pen needle FOR ADMINISTERING INSULIN AT HOME       blood glucose (NO BRAND SPECIFIED) lancets standard To use to test glucose level in the blood Use to test blood sugar  4  times daily as directed. To accompany glucose monitor  brands per insurance coverage. 100 each 0     blood glucose (NO BRAND SPECIFIED) test strip To use to test glucose level in the blood Use to test blood sugar  4 times daily as directed. To accompany glucose monitor brands per insurance coverage. 100 strip 0     Continuous Blood Gluc Sensor (FREESTYLE SHIRA 14 DAY SENSOR) MISC        etanercept (ENBREL SURECLICK) 50 MG/ML autoinjector INJECT THE CONTENTS OF 1 SURECLICK AUTOINJECTOR SUBCUTANEOUSLY EVERY WEEK. (Patient not taking: Reported on 7/27/2022) 12 mL 3     Sharps Container MISC Use as directed to dispose of needles, lancets and other sharps Per Insurance coverage 1 each 0        Allergies   Allergen Reactions     Atorvastatin Muscle Pain (Myalgia)     Valomag [Arthritis Pain Formula] Nausea and Vomiting     gastric ulcer from aspirin use     Ampicillin Rash     Had a reaction to this medication many years ago.     Codeine Rash     It has been about 30 years ago         Azul Miller MD

## 2023-01-21 NOTE — ED TRIAGE NOTES
Pt reports CP that radiates down R arm and sob after snowblowing today. He also notes feeling fatigued x weeks.

## 2023-01-21 NOTE — CONSULTS
Care Management Initial Consult    General Information  Assessment completed with: Patient,    Type of CM/SW Visit: Initial Assessment    Primary Care Provider verified and updated as needed: Yes   Readmission within the last 30 days: no previous admission in last 30 days      Reason for Consult: discharge planning  Advance Care Planning: Advance Care Planning Reviewed: verified with patient          Communication Assessment  Patient's communication style: spoken language (English or Bilingual)             Cognitive  Cognitive/Neuro/Behavioral: WDL  Level of Consciousness: alert  Arousal Level: opens eyes spontaneously                Living Environment:   People in home: spouse  Spouse Dwaine  Current living Arrangements: house      Able to return to prior arrangements: yes       Family/Social Support:  Care provided by: self  Provides care for: no one  Marital Status:   Wife, Children  Dwaine       Description of Support System: Supportive, Involved         Current Resources:   Patient receiving home care services: No     Community Resources: None  Equipment currently used at home: glucometer  Supplies currently used at home: Diabetic Supplies    Employment/Financial:  Employment Status: retired        Financial Concerns:             Lifestyle & Psychosocial Needs:  Social Determinants of Health     Tobacco Use: Medium Risk     Smoking Tobacco Use: Former     Smokeless Tobacco Use: Never     Passive Exposure: Not on file   Alcohol Use: Not on file   Financial Resource Strain: Not on file   Food Insecurity: Not on file   Transportation Needs: Not on file   Physical Activity: Not on file   Stress: Not on file   Social Connections: Not on file   Intimate Partner Violence: Not on file   Depression: Not at risk     PHQ-2 Score: 2   Housing Stability: Not on file       Functional Status:  Prior to admission patient needed assistance:   Dependent ADLs:: Independent  Dependent IADLs:: Independent       Mental Health  Status:          Chemical Dependency Status:                Values/Beliefs:  Spiritual, Cultural Beliefs, Gnosticism Practices, Values that affect care:                 Additional Information:  Lives w/spouse in their home. Indep at baseline. No services, no DME. No apparent CM needs at this time. Spouse to transport.     BRANDT Finley RN

## 2023-01-21 NOTE — PROGRESS NOTES
"PRIMARY DIAGNOSIS: \"GENERIC\" NURSING  OUTPATIENT/OBSERVATION GOALS TO BE MET BEFORE DISCHARGE:  1. ADLs back to baseline: Yes    2. Activity and level of assistance: Up with standby assistance.    3. Pain status: reports chronic lower extremity neuropathy pain and generalized aches at 3/10. Scheduled meds given. Pt requesting no prn meds.     4. Return to near baseline physical activity: Yes     Discharge Planner Nurse   Safe discharge environment identified: Yes  Barriers to discharge: Yes       Entered by: Ciera Luciano RN 01/21/2023 5:11 PM     Please review provider order for any additional goals.   Nurse to notify provider when observation goals have been met and patient is ready for discharge.    Pt a/o x4, pleasant, cooperative. Plan for echo this afternoon, then discharge to home if wnl. per cardiologist note. Pt up to bathroom w/ stand by assist, gait steady. Tele tracing normal sinus. Reminded pt to use call light for needs. Continue to monitor pt.  "

## 2023-01-21 NOTE — PLAN OF CARE
Vitally stable, denies SOB, chest pain 1/10 declined interventions other than scheduled HS medication.  Tele: NSR.  Goal Outcome Evaluation:  Problem: Hypertension Comorbidity  Goal: Blood Pressure in Desired Range  Outcome: Progressing  Intervention: Maintain Blood Pressure Management  Recent Flowsheet Documentation  Taken 1/21/2023 0312 by Cecy Patterson, RN  Medication Review/Management: medications reviewed     Problem: Pain Chronic (Persistent) (Comorbidity Management)  Goal: Acceptable Pain Control and Functional Ability  Outcome: Progressing  Intervention: Develop Pain Management Plan  Recent Flowsheet Documentation  Taken 1/21/2023 0004 by Cecy Patterson RN  Pain Management Interventions: declines  Intervention: Manage Persistent Pain  Recent Flowsheet Documentation  Taken 1/21/2023 0312 by Cecy Patterson RN  Medication Review/Management: medications reviewed     Problem: Chest Pain  Goal: Resolution of Chest Pain Symptoms  Outcome: Progressing

## 2023-01-21 NOTE — PROGRESS NOTES
"PRIMARY DIAGNOSIS: \"GENERIC\" NURSING  OUTPATIENT/OBSERVATION GOALS TO BE MET BEFORE DISCHARGE:  1. ADLs back to baseline: Yes    2. Activity and level of assistance: Up with standby assistance.    3. Pain status: Improved-controlled with oral pain medications-no chest pain this am     4. Return to near baseline physical activity: Yes     Discharge Planner Nurse   Safe discharge environment identified: Yes  Barriers to discharge: Yes       Entered by: Ciera Luciano RN 01/21/2023 5:08 PM     Please review provider order for any additional goals.   Nurse to notify provider when observation goals have been met and patient is ready for discharge.    Pt a/o x4, pleasant, cooperative. Reports chronic neuropathy pain in feet and generalized aches 4/10. Denies chest pain. Pt receiving scheduled meds as ordered. Tele tracing normal sinus. On room air. Up to bathroom w/ stand by assist, gait steady. Voiding adequately and reports had bm this am. Pt states cardiology just saw him.  Reminded pt to use call light for needs. Continue to monitor pt.  "

## 2023-01-21 NOTE — ED PROVIDER NOTES
"  Emergency Department Encounter         FINAL IMPRESSION:  Dyspnea on exertion        ED COURSE AND MEDICAL DECISION MAKING     6:50 PM I introduced myself to the patient, obtained patient history, performed a physical exam, and discussed plan for ED workup including potential diagnostic laboratory/imaging studies and interventions.  6:57 PM Patient given 324 mg chewable aspirin.  8:45 PM I spoke with the hospitalist. We discussed the patient's case and they agree to admit the patient.   8:47 PM Updated patient on admission.    ED Course as of 01/20/23 2050 Fri Jan 20, 2023   1842 EKG is normal sinus rhythm rate of 93, no signs of acute ischemia, no inversions no depressions no STEMI criteria QTC is 417 overall unchanged from July 2022.   1900 Patient is a obese diabetic hypertensive 67-year-old male history of multiple cardiac stents here from home with concerns of chest discomfort as well as arm discomfort after eating marilou harris for dinner.  Patient states he is no blood earlier and had no issues.  However, on further review of systems patient describes significant shortness of breath as well as weakness tiredness and dizziness with ambulation up the stairs.  States he feels like he is in a pass out at times.  States he also does not know why he has felt incredibly fatigued over the past few months.  No black or bloody stool, fevers, abdominal pain, chest pain, headache.  No leg swelling on examination.  Heart and lungs otherwise normal.  Vital stable on arrival.  EKG as above.  Concern for patient's progressive and worsening exertional dyspnea and fatigue as well as lightheadedness and dizziness with exertion.  Plan for admission.   1902 As far as his symptoms that brought him here tonight, seems that those symptoms are most likely reflux related as patient reports the other day while eating notes \"I felt some of it come back up into my throat.\"     -Patient's labs overall reassuring however will " need admission for concern of anginal equivalent.                     Medical Decision Making    History:    Supplemental history from: Documented in chart, if applicable    External Record(s) reviewed: Documented in chart, if applicable.    Work Up:    Chart documentation includes differential considered and any EKGs or imaging independently interpreted by provider, where specified.    In additional to work up documented, I considered the following work up: Documented in chart, if applicable.    External consultation:    Discussion of management with another provider: Documented in chart, if applicable    Complicating factors:    Care impacted by chronic illness: Chronic Pain, Diabetes, Hyperlipidemia and Hypertension    Care affected by social determinants of health: N/A    Disposition considerations: Admit.            At the conclusion of the encounter I discussed the results of all the tests and the disposition. The questions were answered. The patient or family acknowledged understanding and was agreeable with the care plan.                  MEDICATIONS GIVEN IN THE EMERGENCY DEPARTMENT:  Medications   aspirin (ASA) chewable tablet 324 mg (324 mg Oral Given 1/20/23 1856)   lidocaine (viscous) (XYLOCAINE) 2 % 15 mL, alum & mag hydroxide-simethicone (MAALOX) 15 mL GI Cocktail (30 mLs Oral Given 1/20/23 1952)   pantoprazole (PROTONIX) IV push injection 40 mg (40 mg Intravenous Given 1/20/23 1953)       NEW PRESCRIPTIONS STARTED AT TODAY'S ED VISIT:  New Prescriptions    No medications on file       HPI     Patient information obtained from: Patient    Use of Interpretor: N/A    Bon Luque is a 67 year old male with a pertinent history of HTN, HLD, CAD, ASCVD s/p stent x2 in RCA follwing MI (2008), DM2, renal carcinoma s/p right nephrectomy (2017) and chronic pain who presents to this ED by private car with his wife for evaluation of chest pain.    Per chart review, last ECHO was 2/19/22 at [facility].  Impression: Normal left ventricular systolic function. No regional wall motion abnormalities. Estimated LV EF is 55%. Normal right ventricular systolic function. No significant valvular heart disease. No significant change from previous ECHO from 2/9/16.    The patient is presenting for evaluation after he developed central chest pain and discomfort after eating dinner. The patient reports he had been out snowblowing then came in to rest and have dinner. He had onset of chest pain shortly after eating quesadillas. The pain seemed to start in his lower chest and come up his throat. He reports he had an episode of regurgiation yesterday while eating. He went to lay down, but the chest discomfort did not improve prompting his ED presentation. He did not have any chest pain when he was snowblowing.    The patient reports for awhile now he has had increased fatigue, especially over the last few days. Over the past few weeks he has also had significant shortness of breath and dizziness with minimal exertion such as walking up the stairs. He notes he lives a fairly sedentary life since he retired. He denies bloody or black stool, urinary symptoms, and appetite change.     He reports he is compliant with his medications and routinely ahs stress tests every 2-3 years.    REVIEW OF SYSTEMS:  Review of Systems   Constitutional: Negative for malaise, appetite change. Positive for fatigue.  HEENT: Negative runny nose, sore throat, ear pain, neck pain  Respiratory: Negative for cough, congestion. Positive for shortness of breath.  Cardiovascular: Negative for leg edema. Positive for chest pain.  Gastrointestinal: Negative for abdominal distention, abdominal pain, constipation, vomiting, nausea, diarrhea, bloody or black stool.  Genitourinary: Negative for dysuria and hematuria.   Integument: Negative for rash, skin breakdown  Neurological: Negative for paresthesias, weakness, headache. Positive for dizziness.  Musculoskeletal:  Negative for joint pain, joint swelling      All other systems reviewed and are negative.          MEDICAL HISTORY     Past Medical History:   Diagnosis Date     Abdominal pain, epigastric 06/23/2022     Arthritis      ASCVD (arteriosclerotic cardiovascular disease) 04/28/2016     Chronic fatigue 06/23/2022     Chronic low back pain 04/28/2016     Chronic right shoulder pain 6/23/2022     Coronary artery disease      Depression 12/26/2017     Diabetic peripheral neuropathy associated with type 2 diabetes mellitus (H) 04/28/2016     Dilated bile duct 07/03/2017     HTN (hypertension)      Hypercholesterolemia      Hypogonadism in male      Ischemic cardiomyopathy      Left upper quadrant pain 06/30/2017     MI (myocardial infarction) (H)      Obstructive sleep apnea on CPAP      Optic neuritis      Psoriasis      Renal cell carcinoma, right (H) 06/23/2022     Right renal mass      Sebaceous cyst 2010     Thoracic radiculopathy 2004     Type 2 diabetes mellitus with insulin therapy (H)        Past Surgical History:   Procedure Laterality Date     APPENDECTOMY       COLONOSCOPY      Colonoscopy September 2017 with hyperplastic polyps, repeat in 10 years     CORONARY ANGIOPLASTY      stent     ENDOSCOPIC RETROGRADE CHOLANGIOPANCREATOGRAM N/A 7/12/2022    Procedure: ENDOSCOPIC RETROGRADE CHOLANGIOPANCREATOGRAPHY, STONE EXTRACTION, BILIARY SPHINCTEROTOMY;  Surgeon: Bon Meyer MD;  Location: [facility] OR     KNEE SURGERY       LAPAROSCOPIC CHOLECYSTECTOMY N/A 7/12/2022    Procedure: CHOLECYSTECTOMY, LAPAROSCOPIC;  Surgeon: Adam March MD;  Location: [facility] OR     NOSE SURGERY       AL LAP,PARTIAL NEPHRECTOMY Right 1/10/2018    Procedure: RIGHT ROBOTIC PARTIAL NEPHRECTOMY WITH INTRAOPERATIVE ULTRASOUND;  Surgeon: Chase Rodríguez MD;  Location: [facility];  Service: Urology       Social History     Tobacco Use     Smoking status: Former     Smokeless tobacco: Never   Substance  "Use Topics     Alcohol use: No     Comment: Alcoholic Drinks/day: alcohol abuse - in remission x16 years     Drug use: No       acetaminophen (TYLENOL) 325 MG tablet  clobetasol (TEMOVATE) 0.05 % external cream  DULoxetine (CYMBALTA) 60 MG capsule  econazole nitrate 1 % external cream  gabapentin (NEURONTIN) 300 MG capsule  insulin aspart (NOVOLOG) 100 unit/mL injection  Insulin Glargine-yfgn 100 UNIT/ML SOPN  metroNIDAZOLE (METROCREAM) 0.75 % external cream  morphine (MS CONTIN) 30 MG CR tablet  naloxone (NARCAN) 4 MG/0.1ML nasal spray  testosterone (ANDROGEL) 20.25 MG/1.25GM (1.62%) topical gel  valACYclovir (VALTREX) 500 MG tablet  Alcohol Swabs PADS  B-D U/F 31G X 8 MM insulin pen needle  blood glucose (NO BRAND SPECIFIED) lancets standard  blood glucose (NO BRAND SPECIFIED) test strip  Continuous Blood Gluc Sensor ([employer]idesSTYLE SHIRA 14 DAY SENSOR) MISC  etanercept (ENBREL SURECLICK) 50 MG/ML autoinjector  Sharps Container MISC            PHYSICAL EXAM     BP (!) 153/82   Pulse 82   Temp 97.3  F (36.3  C)   Resp 18   Ht 1.778 m (5' 10\")   Wt 102.1 kg (225 lb)   SpO2 97%   BMI 32.28 kg/m        PHYSICAL EXAM:     General: Patient appears well, nontoxic, comfortable  HEENT: Moist mucous membranes,  No head trauma.  No midline neck pain.  Cardiovascular: Normal rate, normal rhythm, no extremity edema.  No appreciable murmur.  Respiratory: No signs of respiratory distress, lungs are clear to auscultation bilaterally with no wheezes rhonchi or rales.  Abdominal: Soft, nontender, nondistended, no palpable masses, no guarding, no rebound  Musculoskeletal: Full range of motion of joints, no deformities appreciated.  Neurological: Alert and oriented, grossly neurologically intact.  Psychological: Normal affect and mood.  Integument: No rashes appreciated      RESULTS       Labs Ordered and Resulted from Time of ED Arrival to Time of ED Departure   BASIC METABOLIC PANEL - Abnormal       Result Value    Sodium 139   "    Potassium 4.2      Chloride 101      Carbon Dioxide (CO2) 29      Anion Gap 9      Urea Nitrogen 16.0      Creatinine 0.91      Calcium 9.7      Glucose 356 (*)     GFR Estimate >90     CBC WITH PLATELETS AND DIFFERENTIAL - Abnormal    WBC Count 12.0 (*)     RBC Count 5.15      Hemoglobin 13.9      Hematocrit 42.6      MCV 83      MCH 27.0      MCHC 32.6      RDW 13.1      Platelet Count        % Neutrophils 81      % Lymphocytes 13      % Monocytes 6      % Eosinophils 0      % Basophils 0      % Immature Granulocytes 0      NRBCs per 100 WBC 0      Absolute Neutrophils 9.7 (*)     Absolute Lymphocytes 1.6      Absolute Monocytes 0.8      Absolute Eosinophils 0.0      Absolute Basophils 0.0      Absolute Immature Granulocytes 0.0      Absolute NRBCs 0.0     RBC AND PLATELET MORPHOLOGY - Abnormal    Platelet Assessment Platelets Clumped (*)     RBC Morphology Confirmed RBC Indices     TROPONIN T, HIGH SENSITIVITY - Normal    Troponin T, High Sensitivity 21         XR Chest 2 Views   Final Result   IMPRESSION: Patchy prominent epicardial fat pad on the left. Lungs are clear. Suggestion of trace effusion on the right posteriorly on the lateral view. Heart and pulmonary vascularity are normal. No signs of failure.      CT Chest Pulmonary Embolism w Contrast    (Results Pending)                     PROCEDURES:  Procedures:  Procedures       I, Franko Hernandez am serving as a scribe to document services personally performed by Thierry Hatfield DO, based on my observations and the provider's statements to me.  I, Thierry Hatfield DO, attest that Franko Hernandez is acting in a scribe capacity, has observed my performance of the services and has documented them in accordance with my direction.    Thierry Hatfield DO  Emergency Medicine  [facility] EMERGENCY DEPARTMENT       Thierry Hatfield DO  01/20/23 3212

## 2023-01-21 NOTE — DISCHARGE SUMMARY
[facility]  Hospitalist Discharge Summary      Date of Admission:  1/20/2023  Date of Discharge:  1/21/2023  Discharging Provider: Jessee Guidry MD  Discharge Service: Hospitalist Service    Discharge Diagnoses   Atypical chest pain likely due to GERD resolved  Insulin-dependent diabetes  Essential hypertension  Psoriatic arthritis  Psoriasis  Osteoarthritis  Ankylosis of the sacroiliac joint    Follow-ups Needed After Discharge   {As given below  Unresulted Labs Ordered in the Past 30 Days of this Admission     No orders found for last 31 day(s).      Echocardiogram results to be followed with cardiology    Discharge Disposition   Discharged to home  Condition at discharge: Stable      Hospital Course   67 year old male with PMH significant for CAD s/p MI, HTN, HLP, DM II and morbid obesity who presents to ED with chest pain. Admitted on 1/20/2023 for observation.   Troponin x2 were unremarkable.  Cardiology was consulted and thought that this was more related to GERD rather than cardiac in origin.  CT chest for PE was negative.  Echocardiogram was ordered and results are still pending at discharge but have low suspicion for abnormality.  TTE results to be followed by cardiology as outpatient.  Defer outpatient stress test within a week.  Chest pain is worsened with eating and therefore likely due to GERD.  Will discharge the patient on PPI.    Consultations This Hospital Stay   CARDIOLOGY IP CONSULT  CARE MANAGEMENT / SOCIAL WORK IP CONSULT    Code Status   Full Code    Time Spent on this Encounter   I, Jessee Guidry MD, personally saw the patient today and spent greater than 30 minutes discharging this patient.       Jessee Guidry MD  [facility] EMERGENCY DEPARTMENT  [address] [place] [id number]  Phone: [phone]  ______________________________________________________________________    Physical Exam   Vital Signs: Temp: 97.6  F (36.4  C) Temp  src: Oral BP: (!) 148/70 Pulse: 70   Resp: 18 SpO2: 97 % O2 Device: None (Room air)    Weight: 225 lbs 0 oz  Psoriatic patches on bilateral arms  Patient in no apparent distress       Primary Care Physician   Aditya Munoz    Discharge Orders      Follow-up and recommended labs and tests     Outpatient stress test in 1 week.  Follow-up with cardiology as recommended.  Follow-up with PCP in 1 to 2 weeks.     Activity    Your activity upon discharge: activity as tolerated     Diet    Follow this diet upon discharge: Orders Placed This Encounter      Combination Diet Moderate Consistent Carb (60 g CHO per Meal) Diet;       Significant Results and Procedures   Results for orders placed or performed during the hospital encounter of 01/20/23   XR Chest 2 Views    Narrative    EXAM: XR CHEST 2 VIEWS  LOCATION: [facility]  DATE/TIME: 1/20/2023 7:41 PM    INDICATION: cp  COMPARISON: Chest x-ray and CT AP 07/12/2022      Impression    IMPRESSION: Patchy prominent epicardial fat pad on the left. Lungs are clear. Suggestion of trace effusion on the right posteriorly on the lateral view. Heart and pulmonary vascularity are normal. No signs of failure.   CT Chest Pulmonary Embolism w Contrast    Narrative    EXAM: CT CHEST PULMONARY EMBOLISM W CONTRAST  LOCATION: [facility]  DATE/TIME: 1/20/2023 9:58 PM    INDICATION: REDD  COMPARISON: 05/04/2018.  TECHNIQUE: CT chest pulmonary angiogram during arterial phase injection of IV contrast. Multiplanar reformats and MIP reconstructions were performed. Dose reduction techniques were used.   CONTRAST: isovue 370 100ml    FINDINGS:  ANGIOGRAM CHEST: No pulmonary embolism. Pulmonary arteries normal in caliber. Thoracic aorta normal in caliber. No aortic dissection.    HEART: Cardiac chambers within normal limits. No pericardial effusion. Severe coronary artery calcification.    MEDIASTINUM: No adenopathy or mass. Small hiatal hernia.  Mild distal esophageal wall thickening.    LUNGS AND PLEURA: No pulmonary mass, consolidation, or suspicious pulmonary nodule. Small right pleural effusion and adjacent atelectasis in the right lower lobe. Mild subsegmental atelectasis in the left lower lobe. No pneumothorax.    LIMITED UPPER ABDOMEN: Negative.    MUSCULOSKELETAL: Severe degenerative change in the right shoulder. No suspicious bone lesion. No fracture.      Impression    IMPRESSION:  1.  No evidence for pulmonary embolism.  2.  Small right pleural effusion mild right lower lobe atelectasis.     Echo results reviewed and does not show any wall motion abnormality.    Discharge Medications   Current Discharge Medication List      START taking these medications    Details   pantoprazole (PROTONIX) 40 MG EC tablet Take 1 tablet (40 mg) by mouth every morning (before breakfast)  Qty: 30 tablet, Refills: 1    Associated Diagnoses: Gastroesophageal reflux disease with esophagitis, unspecified whether hemorrhage         CONTINUE these medications which have NOT CHANGED    Details   acetaminophen (TYLENOL) 325 MG tablet Take 2 tablets (650 mg) by mouth every 4 hours as needed for other (For optimal non-opioid multimodal pain management to improve pain control.)    Associated Diagnoses: Acute cholecystitis      clobetasol (TEMOVATE) 0.05 % external cream Apply topically 2 times daily as needed For psoriasis on hands.      DULoxetine (CYMBALTA) 60 MG capsule Take 60 mg by mouth At Bedtime      econazole nitrate 1 % external cream Apply topically 2 times daily as needed      gabapentin (NEURONTIN) 300 MG capsule [GABAPENTIN (NEURONTIN) 300 MG CAPSULE] Take 600 mg by mouth 3 (three) times a day.       insulin aspart (NOVOLOG) 100 unit/mL injection Inject Subcutaneous 3 times daily (before meals) 1 unit per 5 g carbs plus sliding scale.      Insulin Glargine-yfgn 100 UNIT/ML SOPN Inject 70 Units Subcutaneous 2 times daily      metroNIDAZOLE (METROCREAM) 0.75 %  external cream Apply topically 2 times daily as needed Apply thin layer to face for rosacea.      morphine (MS CONTIN) 30 MG CR tablet Take 30 mg by mouth every 8 hours      naloxone (NARCAN) 4 MG/0.1ML nasal spray Spray 1 spray into one nostril alternating nostrils once as needed for opioid reversal      testosterone (ANDROGEL) 20.25 MG/1.25GM (1.62%) topical gel Place 20.25 mg onto the skin daily      valACYclovir (VALTREX) 500 MG tablet Take 500 mg by mouth 2 times daily as needed Take twice daily at onset of symptoms for 3 days. Repeat for flares.      Alcohol Swabs PADS Use to swab the area of the injection or sherry as directed Per insurance coverage  Qty: 100 each, Refills: 0    Associated Diagnoses: Type 2 diabetes mellitus with insulin therapy (H)      B-D U/F 31G X 8 MM insulin pen needle FOR ADMINISTERING INSULIN AT HOME      blood glucose (NO BRAND SPECIFIED) lancets standard To use to test glucose level in the blood Use to test blood sugar  4  times daily as directed. To accompany glucose monitor brands per insurance coverage.  Qty: 100 each, Refills: 0    Associated Diagnoses: Type 2 diabetes mellitus with insulin therapy (H)      blood glucose (NO BRAND SPECIFIED) test strip To use to test glucose level in the blood Use to test blood sugar  4 times daily as directed. To accompany glucose monitor brands per insurance coverage.  Qty: 100 strip, Refills: 0    Associated Diagnoses: Type 2 diabetes mellitus with insulin therapy (H)      Continuous Blood Gluc Sensor (FREESTYLE SHIRA 14 DAY SENSOR) MISC       etanercept (ENBREL SURECLICK) 50 MG/ML autoinjector INJECT THE CONTENTS OF 1 SURECLICK AUTOINJECTOR SUBCUTANEOUSLY EVERY WEEK.  Qty: 12 mL, Refills: 3    Associated Diagnoses: Psoriatic arthritis (H); Psoriasis      Sharps Container MISC Use as directed to dispose of needles, lancets and other sharps Per Insurance coverage  Qty: 1 each, Refills: 0    Associated Diagnoses: Type 2 diabetes mellitus with  insulin therapy (H)           Allergies   Allergies   Allergen Reactions     Atorvastatin Muscle Pain (Myalgia)     Valomag [Arthritis Pain Formula] Nausea and Vomiting     gastric ulcer from aspirin use     Ampicillin Rash     Had a reaction to this medication many years ago.     Codeine Rash     It has been about 30 years ago

## 2023-01-21 NOTE — PHARMACY-ADMISSION MEDICATION HISTORY
Pharmacy Note - Admission Medication History   ______________________________________________________________________    Prior To Admission (PTA) med list completed and updated in EMR.       PTA Med List   Medication Sig Last Dose     acetaminophen (TYLENOL) 325 MG tablet Take 2 tablets (650 mg) by mouth every 4 hours as needed for other (For optimal non-opioid multimodal pain management to improve pain control.) Past Week     clobetasol (TEMOVATE) 0.05 % external cream Apply topically 2 times daily as needed For psoriasis on hands. Past Week     DULoxetine (CYMBALTA) 60 MG capsule Take 60 mg by mouth At Bedtime 1/19/2023 at Bedtime     econazole nitrate 1 % external cream Apply topically 2 times daily as needed Past Week     gabapentin (NEURONTIN) 300 MG capsule [GABAPENTIN (NEURONTIN) 300 MG CAPSULE] Take 600 mg by mouth 3 (three) times a day.  1/20/2023 at 1200     insulin aspart (NOVOLOG) 100 unit/mL injection Inject Subcutaneous 3 times daily (before meals) 1 unit per 5 g carbs plus sliding scale. 1/19/2023     Insulin Glargine-yfgn 100 UNIT/ML SOPN Inject 70 Units Subcutaneous 2 times daily 1/20/2023 at AM     metroNIDAZOLE (METROCREAM) 0.75 % external cream Apply topically 2 times daily as needed Apply thin layer to face for rosacea. 1/19/2023 at AM     morphine (MS CONTIN) 30 MG CR tablet Take 30 mg by mouth every 8 hours 1/20/2023 at 1200     naloxone (NARCAN) 4 MG/0.1ML nasal spray Spray 1 spray into one nostril alternating nostrils once as needed for opioid reversal      testosterone (ANDROGEL) 20.25 MG/1.25GM (1.62%) topical gel Place 20.25 mg onto the skin daily Past Week     valACYclovir (VALTREX) 500 MG tablet Take 500 mg by mouth 2 times daily as needed Take twice daily at onset of symptoms for 3 days. Repeat for flares. Past Month       Information source(s): Patient and CareEverywhere/SureScripts  Method of interview communication: in-person    Summary of Changes to PTA Med List  New:  None  Discontinued: None  Changed: Omeprazole    Patient was asked about OTC/herbal products specifically.  PTA med list reflects this.    In the past week, patient estimated taking medication this percent of the time:  greater than 90%.    Medication Affordability:  Not including over the counter (OTC) medications, was there a time in the past 12 months when you did not take your medications as prescribed because of cost?: Yes  Has this happened in the past 3 months?: Yes (With Etanercept autoinjector, is currently working with Medicare for coverage)    Allergies were reviewed, assessed, and updated with the patient.      Patient does not anticipate needing any multi-use medications during admission.    The information provided in this note is only as accurate as the sources available at the time of the update(s).    Thank you for the opportunity to participate in the care of this patient.    SANDI GILBERT RPH  1/20/2023 8:53 PM

## 2023-01-21 NOTE — PROGRESS NOTES
Pt a/o x4, cooperative. Ate well for lunch. Echo completed at bedside this afternoon. Dr Guidyr said pt ok to go without waiting for results from echo. Pt states is glad to be able to go home. discharge instructions reviewed w/ pt & he verbalized understanding. Bottle of protonix sent w/ pt from outpatient pharmacy as prescribed. Pt d/c'd w/ belongings, discharge papers and protonix bottle at this time to home. Tech walked pt out to wife's car. No complaints.

## 2023-01-21 NOTE — DISCHARGE INSTRUCTIONS
Outpatient stress test in 1 week.  Follow-up with cardiology as recommended.  Follow-up with PCP in 1 to 2 weeks.    Your activity upon discharge: activity as tolerated    Combination Diet Moderate Consistent Carb (60 g CHO per Meal) Diet;

## 2023-01-21 NOTE — H&P
"[facility]    History and Physical - Hospitalist Service       Date of Admission:  1/20/2023    Assessment & Plan      Bon Luque is a 67 year old male with PMH significant for CAD s/p MI, HTN, HLP, DM II and morbid obesity who presents to ED with chest pain. Admitted on 1/20/2023 for observation.     1. Chest Pain- Admit for observation. Initial troponin=21, will repeat q6 hours x 2 more. EKG reviewed. S/p ASA. Add PRN NTG. Codeine allergy. Check 2D echo. Stress test in a.m. if troponin remain negative. F/u CTA chest ordered in ED to rule out PE. Telemetry monitoring. Consider Cardiology consultation in a.m.    2. DM2- Place on ISS. Monitor accuchecks.    3. HTN- Resume home meds. Monitor vitals closely.     Diet:   Cardiac/Diabetic  DVT Prophylaxis: Pneumatic Compression Devices  Seals Catheter: Not present  Lines: None     Cardiac Monitoring: None  Code Status:   Full    Clinically Significant Risk Factors Present on Admission                       # Obesity: Estimated body mass index is 32.28 kg/m  as calculated from the following:    Height as of this encounter: 1.778 m (5' 10\").    Weight as of this encounter: 102.1 kg (225 lb).           Disposition Plan   Anticipate discharge 1/21/23       Linsey Rocha MD  Hospitalist Service  [facility]  Securely message with [employer] (more info)  Text page via [employer] Paging/Directory     ______________________________________________________________________    Chief Complaint   Chest pain    History is obtained from the patient    History of Present Illness   Bon Luque is a 67 year old male with PMH significant for CAD s/p MI, HTN, HLP, DM II and morbid obesity who presents to ED with chest pain. Patient says shortly after using snow-blower in his yard, he came inside and a some quesadillas. Afterwards he developed sharp substernal chest pain rated 6/10. He took a nap and when he woke up the chest pain was unchanged " and began to radiate down his right arm. Associated symptoms include shortness of breath and nausea. Patient had similar symptoms yesterday. He denies diaphoresis, vomiting, abdominal pain, fever, chills, lightheadedness or dizziness. Patient also reports increasing fatigue and REDD over the past few weeks.      Past Medical History    Past Medical History:   Diagnosis Date     Abdominal pain, epigastric 06/23/2022     Arthritis     psoriatic     ASCVD (arteriosclerotic cardiovascular disease) 04/28/2016    Coronary artery stent ×2 in RCA following myocardial infarction 2008 , echo June 2014 normal LV function with ejection fraction 50-55% with mild apical inferior hypokinesis     Chronic fatigue 06/23/2022     Chronic low back pain 04/28/2016     Chronic right shoulder pain 6/23/2022     Coronary artery disease      Depression 12/26/2017     Diabetic peripheral neuropathy associated with type 2 diabetes mellitus (H) 04/28/2016    Abnormal EMG and workup at [facility]     Dilated bile duct 07/03/2017    CT scan with incidental finding of dilated intra-and extrahepatic ducts     HTN (hypertension)      Hypercholesterolemia      Hypogonadism in male      Ischemic cardiomyopathy     Stress Echocardiogram 2017 with decreased LV function with EF 43% worse compared to previous echo.  No change in infarct.  No new ischemia     Left upper quadrant pain 06/30/2017     MI (myocardial infarction) (H)      Obstructive sleep apnea on CPAP     CPAP     Optic neuritis      Psoriasis      Renal cell carcinoma, right (H) 06/23/2022    Right partial nephrectomy 2017     Right renal mass      Sebaceous cyst 2010     Thoracic radiculopathy 2004    right T8 intercostal nerve block 2002     Type 2 diabetes mellitus with insulin therapy (H)        Past Surgical History   Past Surgical History:   Procedure Laterality Date     APPENDECTOMY       COLONOSCOPY      Colonoscopy September 2017 with hyperplastic polyps, repeat in 10 years      CORONARY ANGIOPLASTY      stent     ENDOSCOPIC RETROGRADE CHOLANGIOPANCREATOGRAM N/A 7/12/2022    Procedure: ENDOSCOPIC RETROGRADE CHOLANGIOPANCREATOGRAPHY, STONE EXTRACTION, BILIARY SPHINCTEROTOMY;  Surgeon: Bon Meyer MD;  Location: [facility]     KNEE SURGERY       LAPAROSCOPIC CHOLECYSTECTOMY N/A 7/12/2022    Procedure: CHOLECYSTECTOMY, LAPAROSCOPIC;  Surgeon: Adam March MD;  Location: [facility] [place] OR     NOSE SURGERY       UT LAP,PARTIAL NEPHRECTOMY Right 1/10/2018    Procedure: RIGHT ROBOTIC PARTIAL NEPHRECTOMY WITH INTRAOPERATIVE ULTRASOUND;  Surgeon: Chase Rodríguez MD;  Location: [facility] [place];  Service: Urology       Prior to Admission Medications   Prior to Admission Medications   Prescriptions Last Dose Informant Patient Reported? Taking?   Alcohol Swabs PADS   No No   Sig: Use to swab the area of the injection or sherry as directed Per insurance coverage   B-D U/F 31G X 8 MM insulin pen needle   Yes No   Sig: FOR ADMINISTERING INSULIN AT HOME   Continuous Blood Gluc Sensor (FREESTYLE SHIRA 14 DAY SENSOR) MISC   Yes No   DULoxetine (CYMBALTA) 60 MG capsule   Yes No   Sig: Take 60 mg by mouth At Bedtime   Insulin Glargine-yfgn 100 UNIT/ML SOPN   Yes No   Sig: Inject 70 Units Subcutaneous 2 times daily   Sharps Container MISC   No No   Sig: Use as directed to dispose of needles, lancets and other sharps Per Insurance coverage   acetaminophen (TYLENOL) 325 MG tablet   No No   Sig: Take 2 tablets (650 mg) by mouth every 4 hours as needed for other (For optimal non-opioid multimodal pain management to improve pain control.)   blood glucose (NO BRAND SPECIFIED) lancets standard   No No   Sig: To use to test glucose level in the blood Use to test blood sugar  4  times daily as directed. To accompany glucose monitor brands per insurance coverage.   blood glucose (NO BRAND SPECIFIED) test strip   No No   Sig: To use to test glucose level in the blood Use to test blood sugar   4 times daily as directed. To accompany glucose monitor brands per insurance coverage.   clobetasol (TEMOVATE) 0.05 % external cream   Yes No   Sig: Apply topically 2 times daily as needed For psoriasis on hands.   econazole nitrate 1 % external cream   Yes No   Sig: Apply topically 2 times daily as needed   etanercept (ENBREL SURECLICK) 50 MG/ML autoinjector   No No   Sig: INJECT THE CONTENTS OF 1 SURECLICK AUTOINJECTOR SUBCUTANEOUSLY EVERY WEEK.   Patient not taking: Reported on 7/27/2022   gabapentin (NEURONTIN) 300 MG capsule   Yes No   Sig: [GABAPENTIN (NEURONTIN) 300 MG CAPSULE] Take 600 mg by mouth 3 (three) times a day.    insulin aspart (NOVOLOG) 100 unit/mL injection   Yes No   Sig: Inject Subcutaneous 3 times daily (before meals) 1 unit per 5 g carbs plus sliding scale.   metFORMIN (GLUCOPHAGE-XR) 750 MG 24 hr tablet   Yes No   Sig: Take 750 mg by mouth daily (with dinner)   metroNIDAZOLE (METROCREAM) 0.75 % external cream   Yes No   Sig: Apply topically 2 times daily as needed Apply thin layer to face for rosacea.   morphine (MS CONTIN) 30 MG CR tablet   Yes No   Sig: Take 30 mg by mouth every 8 hours   naloxone (NARCAN) 4 MG/0.1ML nasal spray   Yes No   Sig: Spray 1 spray into one nostril alternating nostrils once as needed for opioid reversal   omeprazole (PRILOSEC) 20 MG DR capsule   No No   Sig: Take 1 capsule (20 mg) by mouth daily before breakfast   testosterone (ANDROGEL) 20.25 MG/1.25GM (1.62%) topical gel   Yes No   Sig: Place 20.25 mg onto the skin daily   valACYclovir (VALTREX) 500 MG tablet   Yes No   Sig: Take 500 mg by mouth 2 times daily as needed Take twice daily at onset of symptoms for 3 days. Repeat for flares.      Facility-Administered Medications: None        Review of Systems    The 10 point Review of Systems is negative other than noted in the HPI.  Physical Exam   Vital Signs: Temp: 97.3  F (36.3  C)   BP: (!) 153/82 Pulse: 82   Resp: 18 SpO2: 97 %      Weight: 225 lbs 0  oz    General Appearance: AAOx3, NAD  Respiratory: Poor inspiratory effort  Cardiovascular: Regular rate, S1S2  GI: Central obesity, +BS, nontender  Skin: poor turgor, psoriasis  Other: No pedal edema     Medical Decision Making     50 MINUTES SPENT BY ME on the date of service doing chart review, history, exam, documentation & further activities per the note.      Data     I have personally reviewed the following data over the past 24 hrs:    12.0 (H)  \   13.9   / N/A     139 101 16.0 /  356 (H)   4.2 29 0.91 \       Trop: 21 BNP: N/A       Imaging results reviewed over the past 24 hrs:   Recent Results (from the past 24 hour(s))   XR Chest 2 Views    Narrative    EXAM: XR CHEST 2 VIEWS  LOCATION: [facility]  DATE/TIME: 1/20/2023 7:41 PM    INDICATION: cp  COMPARISON: Chest x-ray and CT AP 07/12/2022      Impression    IMPRESSION: Patchy prominent epicardial fat pad on the left. Lungs are clear. Suggestion of trace effusion on the right posteriorly on the lateral view. Heart and pulmonary vascularity are normal. No signs of failure.

## 2023-02-17 LAB
ATRIAL RATE - MUSE: 93 BPM
DIASTOLIC BLOOD PRESSURE - MUSE: NORMAL MMHG
INTERPRETATION ECG - MUSE: NORMAL
P AXIS - MUSE: 65 DEGREES
PR INTERVAL - MUSE: 158 MS
QRS DURATION - MUSE: 94 MS
QT - MUSE: 336 MS
QTC - MUSE: 417 MS
R AXIS - MUSE: 60 DEGREES
SYSTOLIC BLOOD PRESSURE - MUSE: NORMAL MMHG
T AXIS - MUSE: 68 DEGREES
VENTRICULAR RATE- MUSE: 93 BPM

## 2023-02-18 ENCOUNTER — HEALTH MAINTENANCE LETTER (OUTPATIENT)
Age: 68
End: 2023-02-18

## 2023-04-18 ENCOUNTER — PATIENT OUTREACH (OUTPATIENT)
Dept: INTERNAL MEDICINE | Facility: CLINIC | Age: 68
End: 2023-04-18
Payer: COMMERCIAL

## 2023-04-18 NOTE — TELEPHONE ENCOUNTER
Patient Quality Outreach    Patient is due for the following:   Diabetes -  LDL (Fasting), Eye Exam, Microalbumin and Foot Exam  Physical Annual Wellness Visit      Topic Date Due     Pneumococcal Vaccine (1 - PCV) Never done     Zoster (Shingles) Vaccine (1 of 2) Never done     COVID-19 Vaccine (4 - Booster for Pfizer series) 03/11/2022     Flu Vaccine (1) 09/01/2022       Next Steps:   Schedule a Annual Wellness Visit    Type of outreach:    Sent [employer] message.      Questions for provider review:    None     Lennie Baldwin MA

## 2023-05-10 ENCOUNTER — TRANSFERRED RECORDS (OUTPATIENT)
Dept: HEALTH INFORMATION MANAGEMENT | Facility: CLINIC | Age: 68
End: 2023-05-10
Payer: COMMERCIAL

## 2023-06-10 ENCOUNTER — TRANSFERRED RECORDS (OUTPATIENT)
Dept: MULTI SPECIALTY CLINIC | Facility: CLINIC | Age: 68
End: 2023-06-10

## 2023-06-10 ENCOUNTER — HEALTH MAINTENANCE LETTER (OUTPATIENT)
Age: 68
End: 2023-06-10

## 2023-06-10 LAB — RETINOPATHY: NORMAL

## 2023-08-19 ENCOUNTER — HEALTH MAINTENANCE LETTER (OUTPATIENT)
Age: 68
End: 2023-08-19

## 2023-09-01 ENCOUNTER — OFFICE VISIT (OUTPATIENT)
Dept: INTERNAL MEDICINE | Facility: CLINIC | Age: 68
End: 2023-09-01
Payer: COMMERCIAL

## 2023-09-01 VITALS
DIASTOLIC BLOOD PRESSURE: 64 MMHG | TEMPERATURE: 98.2 F | RESPIRATION RATE: 16 BRPM | SYSTOLIC BLOOD PRESSURE: 138 MMHG | HEIGHT: 70 IN | BODY MASS INDEX: 29.54 KG/M2 | WEIGHT: 206.3 LBS | HEART RATE: 66 BPM

## 2023-09-01 DIAGNOSIS — Z79.4 TYPE 2 DIABETES MELLITUS WITH COMPLICATION, WITH LONG-TERM CURRENT USE OF INSULIN (H): ICD-10-CM

## 2023-09-01 DIAGNOSIS — R11.10 RECURRENT VOMITING: ICD-10-CM

## 2023-09-01 DIAGNOSIS — R10.13 ABDOMINAL PAIN, EPIGASTRIC: Primary | ICD-10-CM

## 2023-09-01 DIAGNOSIS — K81.0 ACUTE CHOLECYSTITIS: ICD-10-CM

## 2023-09-01 DIAGNOSIS — E11.8 TYPE 2 DIABETES MELLITUS WITH COMPLICATION, WITH LONG-TERM CURRENT USE OF INSULIN (H): ICD-10-CM

## 2023-09-01 PROBLEM — K83.8 DILATED BILE DUCT: Status: RESOLVED | Noted: 2017-07-03 | Resolved: 2023-09-01

## 2023-09-01 LAB
ALBUMIN SERPL BCG-MCNC: 3.7 G/DL (ref 3.5–5.2)
ALP SERPL-CCNC: 122 U/L (ref 40–129)
ALT SERPL W P-5'-P-CCNC: 12 U/L (ref 0–70)
ANION GAP SERPL CALCULATED.3IONS-SCNC: 11 MMOL/L (ref 7–15)
AST SERPL W P-5'-P-CCNC: 28 U/L (ref 0–45)
BILIRUB SERPL-MCNC: 0.6 MG/DL
BUN SERPL-MCNC: 7.9 MG/DL (ref 8–23)
CALCIUM SERPL-MCNC: 9.1 MG/DL (ref 8.8–10.2)
CHLORIDE SERPL-SCNC: 102 MMOL/L (ref 98–107)
CREAT SERPL-MCNC: 0.86 MG/DL (ref 0.67–1.17)
DEPRECATED HCO3 PLAS-SCNC: 25 MMOL/L (ref 22–29)
ERYTHROCYTE [DISTWIDTH] IN BLOOD BY AUTOMATED COUNT: 12.9 % (ref 10–15)
GFR SERPL CREATININE-BSD FRML MDRD: >90 ML/MIN/1.73M2
GLUCOSE SERPL-MCNC: 169 MG/DL (ref 70–99)
HCT VFR BLD AUTO: 40.3 % (ref 40–53)
HGB BLD-MCNC: 13 G/DL (ref 13.3–17.7)
LIPASE SERPL-CCNC: 31 U/L (ref 13–60)
MCH RBC QN AUTO: 25.8 PG (ref 26.5–33)
MCHC RBC AUTO-ENTMCNC: 32.3 G/DL (ref 31.5–36.5)
MCV RBC AUTO: 80 FL (ref 78–100)
PLATELET # BLD AUTO: 277 10E3/UL (ref 150–450)
POTASSIUM SERPL-SCNC: 4.5 MMOL/L (ref 3.4–5.3)
PROT SERPL-MCNC: 7.2 G/DL (ref 6.4–8.3)
RBC # BLD AUTO: 5.04 10E6/UL (ref 4.4–5.9)
SODIUM SERPL-SCNC: 138 MMOL/L (ref 136–145)
WBC # BLD AUTO: 8.5 10E3/UL (ref 4–11)

## 2023-09-01 PROCEDURE — 36415 COLL VENOUS BLD VENIPUNCTURE: CPT | Performed by: INTERNAL MEDICINE

## 2023-09-01 PROCEDURE — 99215 OFFICE O/P EST HI 40 MIN: CPT | Performed by: INTERNAL MEDICINE

## 2023-09-01 PROCEDURE — 80053 COMPREHEN METABOLIC PANEL: CPT | Performed by: INTERNAL MEDICINE

## 2023-09-01 PROCEDURE — 85027 COMPLETE CBC AUTOMATED: CPT | Performed by: INTERNAL MEDICINE

## 2023-09-01 PROCEDURE — 83690 ASSAY OF LIPASE: CPT | Performed by: INTERNAL MEDICINE

## 2023-09-01 RX ORDER — METFORMIN HYDROCHLORIDE 750 MG/1
750 TABLET, EXTENDED RELEASE ORAL
Qty: 90 TABLET | Refills: 3
Start: 2023-09-01 | End: 2024-04-15

## 2023-09-01 ASSESSMENT — PATIENT HEALTH QUESTIONNAIRE - PHQ9
SUM OF ALL RESPONSES TO PHQ QUESTIONS 1-9: 12
10. IF YOU CHECKED OFF ANY PROBLEMS, HOW DIFFICULT HAVE THESE PROBLEMS MADE IT FOR YOU TO DO YOUR WORK, TAKE CARE OF THINGS AT HOME, OR GET ALONG WITH OTHER PEOPLE: NOT DIFFICULT AT ALL
SUM OF ALL RESPONSES TO PHQ QUESTIONS 1-9: 12

## 2023-09-01 NOTE — PROGRESS NOTES
Assessment & Plan     Abdominal pain, epigastric with nausea and recurrent vomiting  68-year-old male with complicated medical history including type 2 diabetes on insulin, and hospitalization last year with acute cholecystitis with cholecystectomy.  He has been experiencing persistent nausea with episodes of recurrent emesis associated with epigastric abdominal pain.  He describes multiple episodes of vomiting.  Started OTC omeprazole earlier this week and he is feeling significantly better suggesting that his symptoms are most likely peptic ulcer disease or gastritis.  He will use Aleve to control his arthritis symptoms although has not used any of the medication for the past several weeks.  Last year he was having similar symptoms thought to be peptic ulcer disease and was prescribed omeprazole but he cannot recall if he started to feel better.  He was scheduled to meet with gastroenterology at that time but ended up in the ER with acute cholecystitis.  Underwent cholecystectomy.  We discussed that he could be experiencing recurrent gallstones and biliary duct dilatation even after cholecystectomy and will check LFTs and lipase.  If these are abnormal I will get an abdominal ultrasound.  He could also be experiencing gastroparesis with underlying diabetes and I will make arrangements for a nuclear medicine gastric emptying study.  However, his symptoms sound most like gastritis or peptic ulcer disease especially with initial response to PPI.  Given persistence of symptoms, we will make arrangements for EGD and will continue omeprazole 20 mg every morning before breakfast.  We discussed that it will take at least 6 weeks for everything to completely heal.  If he does have EGD, H. pylori can be checked.  - Comprehensive metabolic panel (BMP + Alb, Alk Phos, ALT, AST, Total. Bili, TP); Future  - CBC with platelets; Future  - Lipase; Future  - omeprazole (PRILOSEC) 20 MG DR capsule; Take 1 capsule (20 mg) by mouth  "daily before breakfast  - Adult GI  Referral - Procedure Only; Future      - Adult GI  Referral - Procedure Only; Future  - NM Gastric Emptying; Future    History of acute cholecystitis  Hospitalized last summer with epigastric abdominal pain that he describes as somewhat different from what he is currently experiencing.  At that time LFTs elevated and abnormal CT showing dilated gallbladder and biliary ducts.  Underwent cholecystectomy.    Type 2 diabetes mellitus with complication, with long-term current use of insulin (H)  He is followed by endocrinology most recent A1c 7.3%.  He does take metformin which could cause nausea although has been on this stable dose for some time.  - metFORMIN (GLUCOPHAGE-XR) 750 MG 24 hr tablet; Take 1 tablet (750 mg) by mouth daily (with dinner)      40 minutes spent by me on the date of the encounter doing chart review, history and exam, documentation and further activities per the note       BMI:   Estimated body mass index is 29.6 kg/m  as calculated from the following:    Height as of this encounter: 1.778 m (5' 10\").    Weight as of this encounter: 93.6 kg (206 lb 4.8 oz).       Depression Screening Follow Up        9/1/2023    10:35 AM   PHQ   PHQ-9 Total Score 12   Q9: Thoughts of better off dead/self-harm past 2 weeks Not at all           See Patient Instructions    Aditya Munoz MD  [facility]    Florencio Crockett is a 68 year old, presenting for the following health issues:  Follow Up (GI follow up./Difficulty eating, vomiting for several days; brown colored. Has been going on for 1 month./Has been taking a medication that helped.)        9/1/2023    11:03 AM   Additional Questions   Roomed by Elva SAGASTUME       History of Present Illness       Diabetes:   He presents for follow up of diabetes.  He is checking home blood glucose four or more times daily.   He checks blood glucose before meals, after meals and at " "bedtime.  Blood glucose is sometimes over 200 and sometimes under 70. He is aware of hypoglycemia symptoms including shakiness and blurred vision.    He has no concerns regarding his diabetes at this time.  He is having numbness in feet and burning in feet.  The patient has had a diabetic eye exam in the last 12 months. Eye exam performed on [address]. Location of last eye exam [facility].        Reason for visit:  Stomach acid    He eats 2-3 servings of fruits and vegetables daily.He consumes 0 sweetened beverage(s) daily.He exercises with enough effort to increase his heart rate 9 or less minutes per day.  He exercises with enough effort to increase his heart rate 4 days per week.   He is taking medications regularly.         Review of Systems   No melena or hematochezia.  No fevers or chills.  No unintentional weight loss      Objective    /64 (BP Location: Right arm, Patient Position: Sitting, Cuff Size: Adult Regular)   Pulse 66   Temp 98.2  F (36.8  C) (Oral)   Resp 16   Ht 1.778 m (5' 10\")   Wt 93.6 kg (206 lb 4.8 oz)   BMI 29.60 kg/m    Body mass index is 29.6 kg/m .  Physical Exam   Well-appearing middle-age male  Abdomen soft with tenderness across upper abdomen especially epigastric and left upper quadrant.  No masses or hepatosplenomegaly        "

## 2023-09-01 NOTE — PATIENT INSTRUCTIONS
You can call [facility] at [phone] to schedule the EGD that was ordered today

## 2023-09-11 ENCOUNTER — HOSPITAL ENCOUNTER (OUTPATIENT)
Dept: NUCLEAR MEDICINE | Facility: HOSPITAL | Age: 68
Discharge: HOME | End: 2023-09-11
Attending: INTERNAL MEDICINE
Payer: COMMERCIAL

## 2023-09-11 DIAGNOSIS — R11.10 RECURRENT VOMITING: ICD-10-CM

## 2023-09-11 PROCEDURE — 78264 GASTRIC EMPTYING IMG STUDY: CPT

## 2023-09-11 PROCEDURE — 343N000001 HC RX 343: Performed by: INTERNAL MEDICINE

## 2023-09-11 PROCEDURE — A9541 TC99M SULFUR COLLOID: HCPCS | Performed by: INTERNAL MEDICINE

## 2023-09-11 RX ADMIN — Medication 1 MILLICURIE: at 08:56

## 2023-09-12 PROBLEM — R10.13 ABDOMINAL PAIN, EPIGASTRIC: Status: RESOLVED | Noted: 2022-06-23 | Resolved: 2023-09-12

## 2023-09-12 PROBLEM — R10.13 ABDOMINAL PAIN, EPIGASTRIC: Status: ACTIVE | Noted: 2022-06-23

## 2023-09-14 ENCOUNTER — PATIENT OUTREACH (OUTPATIENT)
Dept: INTERNAL MEDICINE | Facility: CLINIC | Age: 68
End: 2023-09-14
Payer: COMMERCIAL

## 2023-09-14 NOTE — TELEPHONE ENCOUNTER
Patient Quality Outreach    Patient is due for the following:   Diabetes -  A1C, LDL (Fasting), Microalbumin, and Foot Exam  Physical Annual Wellness Visit      Topic Date Due    Pneumococcal Vaccine (1 - PCV) Never done    Zoster (Shingles) Vaccine (1 of 2) Never done    COVID-19 Vaccine (4 - Pfizer risk series) 03/11/2022    Flu Vaccine (1) 09/01/2023       Next Steps:   Patient has upcoming appointment, these items will be addressed at that time.  AWV 12/4/23    Type of outreach:    Chart review performed, no outreach needed.      Questions for provider review:    None           Lennie Baldwin MA

## 2023-09-18 ENCOUNTER — TELEPHONE (OUTPATIENT)
Dept: INTERNAL MEDICINE | Facility: CLINIC | Age: 68
End: 2023-09-18
Payer: COMMERCIAL

## 2023-09-18 NOTE — TELEPHONE ENCOUNTER
General Call    Contacts         Type Contact Phone/Fax    09/18/2023 12:10 PM CDT Phone (Incoming) [facility] (Other) [phone]          Reason for Call:  Clarification    What are your questions or concerns:  [facility] calling to clarify if the pt is having an upper endoscopy for the referral or?    Please call them back and verify.    Date of last appointment with provider: NA    Could we send this information to you in [employer] or would you prefer to receive a phone call?:   No preference   Okay to leave a detailed message?: Yes at Other phone number:  [phone]*

## 2023-09-27 ENCOUNTER — TRANSFERRED RECORDS (OUTPATIENT)
Dept: HEALTH INFORMATION MANAGEMENT | Facility: CLINIC | Age: 68
End: 2023-09-27
Payer: COMMERCIAL

## 2023-10-05 ENCOUNTER — NURSE TRIAGE (OUTPATIENT)
Dept: INTERNAL MEDICINE | Facility: CLINIC | Age: 68
End: 2023-10-05
Payer: COMMERCIAL

## 2023-10-05 ENCOUNTER — TELEPHONE (OUTPATIENT)
Dept: INTERNAL MEDICINE | Facility: CLINIC | Age: 68
End: 2023-10-05
Payer: COMMERCIAL

## 2023-10-05 ENCOUNTER — APPOINTMENT (OUTPATIENT)
Dept: RADIOLOGY | Facility: HOSPITAL | Age: 68
DRG: 388 | End: 2023-10-05
Attending: INTERNAL MEDICINE
Payer: COMMERCIAL

## 2023-10-05 ENCOUNTER — HOSPITAL ENCOUNTER (INPATIENT)
Facility: HOSPITAL | Age: 68
LOS: 6 days | Discharge: HOME | DRG: 388 | End: 2023-10-11
Attending: EMERGENCY MEDICINE | Admitting: HOSPITALIST
Payer: COMMERCIAL

## 2023-10-05 ENCOUNTER — APPOINTMENT (OUTPATIENT)
Dept: CT IMAGING | Facility: HOSPITAL | Age: 68
DRG: 388 | End: 2023-10-05
Payer: COMMERCIAL

## 2023-10-05 ENCOUNTER — APPOINTMENT (OUTPATIENT)
Dept: RADIOLOGY | Facility: HOSPITAL | Age: 68
DRG: 388 | End: 2023-10-05
Payer: COMMERCIAL

## 2023-10-05 DIAGNOSIS — E11.10 DKA (DIABETIC KETOACIDOSIS) (H): ICD-10-CM

## 2023-10-05 DIAGNOSIS — R10.13 ABDOMINAL PAIN, EPIGASTRIC: ICD-10-CM

## 2023-10-05 DIAGNOSIS — I82.A11 ACUTE DEEP VEIN THROMBOSIS (DVT) OF AXILLARY VEIN OF RIGHT UPPER EXTREMITY (H): ICD-10-CM

## 2023-10-05 DIAGNOSIS — G89.4 CHRONIC PAIN SYNDROME: ICD-10-CM

## 2023-10-05 DIAGNOSIS — K56.609 SBO (SMALL BOWEL OBSTRUCTION) (H): ICD-10-CM

## 2023-10-05 DIAGNOSIS — R11.2 NAUSEA AND VOMITING: ICD-10-CM

## 2023-10-05 DIAGNOSIS — R10.84 GENERALIZED ABDOMINAL PAIN: Primary | ICD-10-CM

## 2023-10-05 DIAGNOSIS — F32.1 CURRENT MODERATE EPISODE OF MAJOR DEPRESSIVE DISORDER, UNSPECIFIED WHETHER RECURRENT (H): ICD-10-CM

## 2023-10-05 PROBLEM — E83.39 HYPOPHOSPHATEMIA: Status: ACTIVE | Noted: 2023-10-05

## 2023-10-05 LAB
ABO/RH(D): NORMAL
ALBUMIN SERPL BCG-MCNC: 4.2 G/DL (ref 3.5–5.2)
ALBUMIN SERPL BCG-MCNC: 4.2 G/DL (ref 3.5–5.2)
ALBUMIN UR-MCNC: NEGATIVE MG/DL
ALP SERPL-CCNC: 127 U/L (ref 40–129)
ALP SERPL-CCNC: 127 U/L (ref 40–129)
ALT SERPL W P-5'-P-CCNC: 19 U/L (ref 0–70)
ALT SERPL W P-5'-P-CCNC: 19 U/L (ref 0–70)
ANION GAP SERPL CALCULATED.3IONS-SCNC: 18 MMOL/L (ref 7–15)
ANION GAP SERPL CALCULATED.3IONS-SCNC: 20 MMOL/L (ref 7–15)
ANTIBODY SCREEN: NEGATIVE
APPEARANCE UR: CLEAR
AST SERPL W P-5'-P-CCNC: 19 U/L (ref 0–45)
AST SERPL W P-5'-P-CCNC: 19 U/L (ref 0–45)
B-OH-BUTYR SERPL-SCNC: 1.7 MMOL/L
BASE EXCESS BLDV CALC-SCNC: 7.3 MMOL/L
BASO+EOS+MONOS # BLD AUTO: ABNORMAL 10*3/UL
BASO+EOS+MONOS NFR BLD AUTO: ABNORMAL %
BASOPHILS # BLD AUTO: 0 10E3/UL (ref 0–0.2)
BASOPHILS NFR BLD AUTO: 0 %
BILIRUB DIRECT SERPL-MCNC: 0.31 MG/DL (ref 0–0.3)
BILIRUB SERPL-MCNC: 1 MG/DL
BILIRUB SERPL-MCNC: 1 MG/DL
BILIRUB UR QL STRIP: NEGATIVE
BUN SERPL-MCNC: 11.4 MG/DL (ref 8–23)
BUN SERPL-MCNC: 12.2 MG/DL (ref 8–23)
CALCIUM SERPL-MCNC: 10.1 MG/DL (ref 8.8–10.2)
CALCIUM SERPL-MCNC: 9.5 MG/DL (ref 8.8–10.2)
CHLORIDE SERPL-SCNC: 94 MMOL/L (ref 98–107)
CHLORIDE SERPL-SCNC: 95 MMOL/L (ref 98–107)
COLOR UR AUTO: COLORLESS
CREAT SERPL-MCNC: 0.74 MG/DL (ref 0.67–1.17)
CREAT SERPL-MCNC: 0.77 MG/DL (ref 0.67–1.17)
CRP SERPL-MCNC: 19.5 MG/L
DEPRECATED HCO3 PLAS-SCNC: 21 MMOL/L (ref 22–29)
DEPRECATED HCO3 PLAS-SCNC: 26 MMOL/L (ref 22–29)
EGFRCR SERPLBLD CKD-EPI 2021: >90 ML/MIN/1.73M2
EGFRCR SERPLBLD CKD-EPI 2021: >90 ML/MIN/1.73M2
EOSINOPHIL # BLD AUTO: 0 10E3/UL (ref 0–0.7)
EOSINOPHIL NFR BLD AUTO: 0 %
ERYTHROCYTE [DISTWIDTH] IN BLOOD BY AUTOMATED COUNT: 13.7 % (ref 10–15)
ETHANOL SERPL-MCNC: <0.01 G/DL
GLUCOSE BLDC GLUCOMTR-MCNC: 308 MG/DL (ref 70–99)
GLUCOSE BLDC GLUCOMTR-MCNC: 369 MG/DL (ref 70–99)
GLUCOSE BLDC GLUCOMTR-MCNC: 388 MG/DL (ref 70–99)
GLUCOSE BLDC GLUCOMTR-MCNC: 397 MG/DL (ref 70–99)
GLUCOSE BLDC GLUCOMTR-MCNC: 407 MG/DL (ref 70–99)
GLUCOSE SERPL-MCNC: 356 MG/DL (ref 70–99)
GLUCOSE SERPL-MCNC: 398 MG/DL (ref 70–99)
GLUCOSE UR STRIP-MCNC: >1000 MG/DL
HBA1C MFR BLD: 8 %
HCO3 BLDV-SCNC: 29 MMOL/L (ref 24–30)
HCT VFR BLD AUTO: 44.1 % (ref 40–53)
HGB BLD-MCNC: 14.6 G/DL (ref 13.3–17.7)
HGB UR QL STRIP: NEGATIVE
HOLD SPECIMEN: NORMAL
HOLD SPECIMEN: NORMAL
IMM GRANULOCYTES # BLD: 0.1 10E3/UL
IMM GRANULOCYTES NFR BLD: 0 %
KETONES UR STRIP-MCNC: 60 MG/DL
LACTATE SERPL-SCNC: 2 MMOL/L (ref 0.7–2)
LACTATE SERPL-SCNC: 2.7 MMOL/L (ref 0.7–2)
LEUKOCYTE ESTERASE UR QL STRIP: NEGATIVE
LIPASE SERPL-CCNC: 46 U/L (ref 13–60)
LYMPHOCYTES # BLD AUTO: 1.2 10E3/UL (ref 0.8–5.3)
LYMPHOCYTES NFR BLD AUTO: 8 %
MAGNESIUM SERPL-MCNC: 1.8 MG/DL (ref 1.7–2.3)
MCH RBC QN AUTO: 25.5 PG (ref 26.5–33)
MCHC RBC AUTO-ENTMCNC: 33.1 G/DL (ref 31.5–36.5)
MCV RBC AUTO: 77 FL (ref 78–100)
MONOCYTES # BLD AUTO: 0.5 10E3/UL (ref 0–1.3)
MONOCYTES NFR BLD AUTO: 3 %
NEUTROPHILS # BLD AUTO: 12.4 10E3/UL (ref 1.6–8.3)
NEUTROPHILS NFR BLD AUTO: 89 %
NITRATE UR QL: NEGATIVE
NRBC # BLD AUTO: 0 10E3/UL
NRBC BLD AUTO-RTO: 0 /100
OSMOLALITY SERPL: 305 MMOL/KG (ref 280–301)
OXYHGB MFR BLDV: 96 % (ref 70–75)
PCO2 BLDV: 32 MM HG (ref 35–50)
PH BLDV: 7.58 [PH] (ref 7.35–7.45)
PH UR STRIP: 8 [PH] (ref 5–7)
PHOSPHATE SERPL-MCNC: 2.2 MG/DL (ref 2.5–4.5)
PLATELET # BLD AUTO: 316 10E3/UL (ref 150–450)
PO2 BLDV: 74 MM HG (ref 25–47)
POTASSIUM SERPL-SCNC: 3.6 MMOL/L (ref 3.4–5.3)
POTASSIUM SERPL-SCNC: 3.7 MMOL/L (ref 3.4–5.3)
PROT SERPL-MCNC: 8.2 G/DL (ref 6.4–8.3)
PROT SERPL-MCNC: 8.2 G/DL (ref 6.4–8.3)
RBC # BLD AUTO: 5.72 10E6/UL (ref 4.4–5.9)
RBC URINE: 3 /HPF
SAO2 % BLDV: 96.3 % (ref 70–75)
SODIUM SERPL-SCNC: 136 MMOL/L (ref 135–145)
SODIUM SERPL-SCNC: 138 MMOL/L (ref 135–145)
SP GR UR STRIP: 1.03 (ref 1–1.03)
SPECIMEN EXPIRATION DATE: NORMAL
SQUAMOUS EPITHELIAL: <1 /HPF
TROPONIN T SERPL HS-MCNC: 17 NG/L
TROPONIN T SERPL HS-MCNC: 19 NG/L
UROBILINOGEN UR STRIP-MCNC: <2 MG/DL
WBC # BLD AUTO: 14.1 10E3/UL (ref 4–11)
WBC URINE: 0 /HPF

## 2023-10-05 PROCEDURE — 999N000065 XR ABDOMEN PORT 1 VIEW

## 2023-10-05 PROCEDURE — 83036 HEMOGLOBIN GLYCOSYLATED A1C: CPT | Performed by: HOSPITALIST

## 2023-10-05 PROCEDURE — 83735 ASSAY OF MAGNESIUM: CPT | Performed by: EMERGENCY MEDICINE

## 2023-10-05 PROCEDURE — 82378 CARCINOEMBRYONIC ANTIGEN: CPT

## 2023-10-05 PROCEDURE — 81001 URINALYSIS AUTO W/SCOPE: CPT

## 2023-10-05 PROCEDURE — 99221 1ST HOSP IP/OBS SF/LOW 40: CPT | Performed by: SURGERY

## 2023-10-05 PROCEDURE — 99223 1ST HOSP IP/OBS HIGH 75: CPT | Performed by: HOSPITALIST

## 2023-10-05 PROCEDURE — 80053 COMPREHEN METABOLIC PANEL: CPT | Performed by: EMERGENCY MEDICINE

## 2023-10-05 PROCEDURE — 250N000011 HC RX IP 250 OP 636: Mod: JZ | Performed by: EMERGENCY MEDICINE

## 2023-10-05 PROCEDURE — 96376 TX/PRO/DX INJ SAME DRUG ADON: CPT

## 2023-10-05 PROCEDURE — 258N000003 HC RX IP 258 OP 636

## 2023-10-05 PROCEDURE — 82077 ASSAY SPEC XCP UR&BREATH IA: CPT

## 2023-10-05 PROCEDURE — 86901 BLOOD TYPING SEROLOGIC RH(D): CPT | Performed by: EMERGENCY MEDICINE

## 2023-10-05 PROCEDURE — 84484 ASSAY OF TROPONIN QUANT: CPT

## 2023-10-05 PROCEDURE — 96374 THER/PROPH/DIAG INJ IV PUSH: CPT

## 2023-10-05 PROCEDURE — 80048 BASIC METABOLIC PNL TOTAL CA: CPT | Performed by: HOSPITALIST

## 2023-10-05 PROCEDURE — 82962 GLUCOSE BLOOD TEST: CPT

## 2023-10-05 PROCEDURE — 86301 IMMUNOASSAY TUMOR CA 19-9: CPT

## 2023-10-05 PROCEDURE — 83690 ASSAY OF LIPASE: CPT | Performed by: EMERGENCY MEDICINE

## 2023-10-05 PROCEDURE — 99291 CRITICAL CARE FIRST HOUR: CPT | Mod: 25

## 2023-10-05 PROCEDURE — 86140 C-REACTIVE PROTEIN: CPT

## 2023-10-05 PROCEDURE — 96361 HYDRATE IV INFUSION ADD-ON: CPT

## 2023-10-05 PROCEDURE — 85025 COMPLETE CBC W/AUTO DIFF WBC: CPT | Performed by: STUDENT IN AN ORGANIZED HEALTH CARE EDUCATION/TRAINING PROGRAM

## 2023-10-05 PROCEDURE — 83605 ASSAY OF LACTIC ACID: CPT

## 2023-10-05 PROCEDURE — 250N000011 HC RX IP 250 OP 636: Performed by: HOSPITALIST

## 2023-10-05 PROCEDURE — 82805 BLOOD GASES W/O2 SATURATION: CPT

## 2023-10-05 PROCEDURE — 83930 ASSAY OF BLOOD OSMOLALITY: CPT | Performed by: EMERGENCY MEDICINE

## 2023-10-05 PROCEDURE — 83690 ASSAY OF LIPASE: CPT | Performed by: STUDENT IN AN ORGANIZED HEALTH CARE EDUCATION/TRAINING PROGRAM

## 2023-10-05 PROCEDURE — 250N000012 HC RX MED GY IP 250 OP 636 PS 637

## 2023-10-05 PROCEDURE — 250N000011 HC RX IP 250 OP 636: Mod: JZ

## 2023-10-05 PROCEDURE — 250N000012 HC RX MED GY IP 250 OP 636 PS 637: Performed by: HOSPITALIST

## 2023-10-05 PROCEDURE — 93005 ELECTROCARDIOGRAM TRACING: CPT

## 2023-10-05 PROCEDURE — 120N000001 HC R&B MED SURG/OB

## 2023-10-05 PROCEDURE — 80053 COMPREHEN METABOLIC PANEL: CPT | Performed by: STUDENT IN AN ORGANIZED HEALTH CARE EDUCATION/TRAINING PROGRAM

## 2023-10-05 PROCEDURE — 87040 BLOOD CULTURE FOR BACTERIA: CPT

## 2023-10-05 PROCEDURE — 250N000009 HC RX 250

## 2023-10-05 PROCEDURE — 258N000003 HC RX IP 258 OP 636: Performed by: HOSPITALIST

## 2023-10-05 PROCEDURE — 74018 RADEX ABDOMEN 1 VIEW: CPT

## 2023-10-05 PROCEDURE — 84100 ASSAY OF PHOSPHORUS: CPT | Performed by: EMERGENCY MEDICINE

## 2023-10-05 PROCEDURE — 85025 COMPLETE CBC W/AUTO DIFF WBC: CPT | Performed by: EMERGENCY MEDICINE

## 2023-10-05 PROCEDURE — 36415 COLL VENOUS BLD VENIPUNCTURE: CPT

## 2023-10-05 PROCEDURE — 74174 CTA ABD&PLVS W/CONTRAST: CPT

## 2023-10-05 PROCEDURE — 96375 TX/PRO/DX INJ NEW DRUG ADDON: CPT

## 2023-10-05 PROCEDURE — 82010 KETONE BODYS QUAN: CPT | Performed by: EMERGENCY MEDICINE

## 2023-10-05 RX ORDER — SODIUM CHLORIDE 9 MG/ML
INJECTION, SOLUTION INTRAVENOUS CONTINUOUS
Status: DISCONTINUED | OUTPATIENT
Start: 2023-10-05 | End: 2023-10-06

## 2023-10-05 RX ORDER — HYDRALAZINE HYDROCHLORIDE 20 MG/ML
5 INJECTION INTRAMUSCULAR; INTRAVENOUS EVERY 6 HOURS PRN
Status: DISCONTINUED | OUTPATIENT
Start: 2023-10-05 | End: 2023-10-06

## 2023-10-05 RX ORDER — ONDANSETRON 4 MG/1
4 TABLET, ORALLY DISINTEGRATING ORAL EVERY 6 HOURS PRN
Status: DISCONTINUED | OUTPATIENT
Start: 2023-10-05 | End: 2023-10-11 | Stop reason: HOSPADM

## 2023-10-05 RX ORDER — ONDANSETRON 2 MG/ML
4 INJECTION INTRAMUSCULAR; INTRAVENOUS ONCE
Status: COMPLETED | OUTPATIENT
Start: 2023-10-05 | End: 2023-10-05

## 2023-10-05 RX ORDER — PROCHLORPERAZINE 25 MG
12.5 SUPPOSITORY, RECTAL RECTAL EVERY 12 HOURS PRN
Status: DISCONTINUED | OUTPATIENT
Start: 2023-10-05 | End: 2023-10-11 | Stop reason: HOSPADM

## 2023-10-05 RX ORDER — INSULIN LISPRO 100 [IU]/ML
INJECTION, SOLUTION INTRAVENOUS; SUBCUTANEOUS
COMMUNITY

## 2023-10-05 RX ORDER — HYDROMORPHONE HCL IN WATER/PF 6 MG/30 ML
0.2 PATIENT CONTROLLED ANALGESIA SYRINGE INTRAVENOUS
Status: DISCONTINUED | OUTPATIENT
Start: 2023-10-05 | End: 2023-10-11 | Stop reason: ALTCHOICE

## 2023-10-05 RX ORDER — NALOXONE HYDROCHLORIDE 0.4 MG/ML
0.4 INJECTION, SOLUTION INTRAMUSCULAR; INTRAVENOUS; SUBCUTANEOUS
Status: DISCONTINUED | OUTPATIENT
Start: 2023-10-05 | End: 2023-10-11 | Stop reason: HOSPADM

## 2023-10-05 RX ORDER — MORPHINE SULFATE 4 MG/ML
4 INJECTION, SOLUTION INTRAMUSCULAR; INTRAVENOUS ONCE
Status: COMPLETED | OUTPATIENT
Start: 2023-10-05 | End: 2023-10-05

## 2023-10-05 RX ORDER — NALOXONE HYDROCHLORIDE 0.4 MG/ML
0.2 INJECTION, SOLUTION INTRAMUSCULAR; INTRAVENOUS; SUBCUTANEOUS
Status: DISCONTINUED | OUTPATIENT
Start: 2023-10-05 | End: 2023-10-11 | Stop reason: HOSPADM

## 2023-10-05 RX ORDER — HYDROMORPHONE HYDROCHLORIDE 1 MG/ML
0.5 INJECTION, SOLUTION INTRAMUSCULAR; INTRAVENOUS; SUBCUTANEOUS
Status: DISCONTINUED | OUTPATIENT
Start: 2023-10-05 | End: 2023-10-11 | Stop reason: ALTCHOICE

## 2023-10-05 RX ORDER — MORPHINE SULFATE 15 MG/1
30 TABLET, FILM COATED, EXTENDED RELEASE ORAL EVERY 8 HOURS SCHEDULED
Status: DISCONTINUED | OUTPATIENT
Start: 2023-10-05 | End: 2023-10-11 | Stop reason: HOSPADM

## 2023-10-05 RX ORDER — ONDANSETRON 2 MG/ML
4 INJECTION INTRAMUSCULAR; INTRAVENOUS EVERY 6 HOURS PRN
Status: DISCONTINUED | OUTPATIENT
Start: 2023-10-05 | End: 2023-10-11 | Stop reason: HOSPADM

## 2023-10-05 RX ORDER — NICOTINE POLACRILEX 4 MG
15-30 LOZENGE BUCCAL
Status: DISCONTINUED | OUTPATIENT
Start: 2023-10-05 | End: 2023-10-11 | Stop reason: HOSPADM

## 2023-10-05 RX ORDER — CALCIUM CARBONATE 500 MG/1
1000 TABLET, CHEWABLE ORAL 4 TIMES DAILY PRN
Status: DISCONTINUED | OUTPATIENT
Start: 2023-10-05 | End: 2023-10-11 | Stop reason: HOSPADM

## 2023-10-05 RX ORDER — IOPAMIDOL 755 MG/ML
90 INJECTION, SOLUTION INTRAVASCULAR ONCE
Status: COMPLETED | OUTPATIENT
Start: 2023-10-05 | End: 2023-10-05

## 2023-10-05 RX ORDER — PROCHLORPERAZINE MALEATE 5 MG
5 TABLET ORAL EVERY 6 HOURS PRN
Status: DISCONTINUED | OUTPATIENT
Start: 2023-10-05 | End: 2023-10-11 | Stop reason: HOSPADM

## 2023-10-05 RX ORDER — METOCLOPRAMIDE HYDROCHLORIDE 5 MG/ML
10 INJECTION INTRAMUSCULAR; INTRAVENOUS ONCE
Status: COMPLETED | OUTPATIENT
Start: 2023-10-05 | End: 2023-10-05

## 2023-10-05 RX ORDER — DEXTROSE MONOHYDRATE 25 G/50ML
25-50 INJECTION, SOLUTION INTRAVENOUS
Status: DISCONTINUED | OUTPATIENT
Start: 2023-10-05 | End: 2023-10-11 | Stop reason: HOSPADM

## 2023-10-05 RX ORDER — LIDOCAINE HYDROCHLORIDE 20 MG/ML
10 JELLY TOPICAL ONCE
Status: COMPLETED | OUTPATIENT
Start: 2023-10-05 | End: 2023-10-05

## 2023-10-05 RX ADMIN — MIDAZOLAM HYDROCHLORIDE 1 MG: 1 INJECTION, SOLUTION INTRAMUSCULAR; INTRAVENOUS at 16:46

## 2023-10-05 RX ADMIN — TOPICAL ANESTHETIC 2.5 ML: 200 SPRAY DENTAL; PERIODONTAL at 16:46

## 2023-10-05 RX ADMIN — SODIUM CHLORIDE: 9 INJECTION, SOLUTION INTRAVENOUS at 17:10

## 2023-10-05 RX ADMIN — SODIUM CHLORIDE 1000 ML: 9 INJECTION, SOLUTION INTRAVENOUS at 12:36

## 2023-10-05 RX ADMIN — SODIUM CHLORIDE 6 UNITS: 9 INJECTION, SOLUTION INTRAVENOUS at 16:08

## 2023-10-05 RX ADMIN — MORPHINE SULFATE 4 MG: 4 INJECTION, SOLUTION INTRAMUSCULAR; INTRAVENOUS at 14:39

## 2023-10-05 RX ADMIN — SODIUM CHLORIDE: 9 INJECTION, SOLUTION INTRAVENOUS at 19:02

## 2023-10-05 RX ADMIN — LIDOCAINE HYDROCHLORIDE 10 ML: 20 JELLY TOPICAL at 15:50

## 2023-10-05 RX ADMIN — METOCLOPRAMIDE 10 MG: 5 INJECTION, SOLUTION INTRAMUSCULAR; INTRAVENOUS at 12:37

## 2023-10-05 RX ADMIN — ONDANSETRON 4 MG: 2 INJECTION INTRAMUSCULAR; INTRAVENOUS at 14:39

## 2023-10-05 RX ADMIN — INSULIN ASPART 4 UNITS: 100 INJECTION, SOLUTION INTRAVENOUS; SUBCUTANEOUS at 21:23

## 2023-10-05 RX ADMIN — FAMOTIDINE 20 MG: 10 INJECTION, SOLUTION INTRAVENOUS at 12:37

## 2023-10-05 RX ADMIN — ONDANSETRON 4 MG: 2 INJECTION INTRAMUSCULAR; INTRAVENOUS at 12:11

## 2023-10-05 RX ADMIN — IOPAMIDOL 90 ML: 755 INJECTION, SOLUTION INTRAVENOUS at 13:50

## 2023-10-05 RX ADMIN — SODIUM CHLORIDE 1000 ML: 9 INJECTION, SOLUTION INTRAVENOUS at 14:39

## 2023-10-05 RX ADMIN — INSULIN ASPART 5 UNITS: 100 INJECTION, SOLUTION INTRAVENOUS; SUBCUTANEOUS at 17:45

## 2023-10-05 RX ADMIN — INSULIN GLARGINE 10 UNITS: 100 INJECTION, SOLUTION SUBCUTANEOUS at 17:42

## 2023-10-05 RX ADMIN — HYDROMORPHONE HYDROCHLORIDE 0.5 MG: 1 INJECTION, SOLUTION INTRAMUSCULAR; INTRAVENOUS; SUBCUTANEOUS at 21:22

## 2023-10-05 ASSESSMENT — ENCOUNTER SYMPTOMS
ABDOMINAL PAIN: 1
SHORTNESS OF BREATH: 0
WEAKNESS: 0
APPETITE CHANGE: 1
CONSTIPATION: 0
DIARRHEA: 0
CHILLS: 1
NAUSEA: 1
DYSURIA: 0
ABDOMINAL DISTENTION: 1
BACK PAIN: 0
FLANK PAIN: 0
BLOOD IN STOOL: 0
VOMITING: 1
FEVER: 0

## 2023-10-05 ASSESSMENT — ACTIVITIES OF DAILY LIVING (ADL)
ADLS_ACUITY_SCORE: 35

## 2023-10-05 NOTE — TELEPHONE ENCOUNTER
"S-(situation): Wife calling to report continuous vomiting and pt has MRI scheduled later today, asks, \"What can we do to get this vomiting under control?\"    B-(background): Wife reports that this has been coming in cycles and \"this is why we have the MRI set up; to try to figure this out.\"    A-(assessment): Pt heard retching in the background. He reports that color is dark brown/black. \"Any time I drink any water, it comes right back up.\"    R-(recommendations): Recommended ED right away, informing that dark brown/black may be blood and if everything is coming right back up, it's possible that he has a bowel obstruction. In ED, he can get meds to control vomiting, they can decompress his stomach if necessary, they can do imaging to get some answers.     Wife agrees to bring patient to [facility] ED.    Routing to provider to inform    Reason for Disposition   Vomiting red blood or black (coffee ground) material    Additional Information   Negative: Shock suspected (e.g., cold/pale/clammy skin, too weak to stand, low BP, rapid pulse)   Negative: Difficult to awaken or acting confused (e.g., disoriented, slurred speech)   Negative: Sounds like a life-threatening emergency to the triager   Negative: Vomiting occurs only while coughing   Negative: Pregnant < 20 Weeks and nausea/vomiting began in early pregnancy (i.e., 4-8 weeks pregnant)   Negative: Chest pain   Negative: Headache is main symptom    Protocols used: Vomiting-A-OH    "

## 2023-10-05 NOTE — ED PROVIDER NOTES
"EMERGENCY DEPARTMENT ENCOUNTER      NAME: Bon Luque  AGE: 68 year old male  YOB: 1955  MRN: [id number]  EVALUATION DATE & TIME: No admission date for patient encounter.    PCP: Aditya Munoz    ED PROVIDER: Ginny Ramirez PA-C    Chief Complaint   Patient presents with    Abdominal Pain     FINAL IMPRESSION:  1. Nausea and vomiting    2. DKA (diabetic ketoacidosis) (H)    3. SBO (small bowel obstruction) (H)      MEDICAL DECISION MAKING:    Pertinent Labs & Imaging studies reviewed. (See chart for details)  Bon Luque is a 68 year old male who presents for evaluation of nausea, vomiting and epigastric abdominal pain. Last evening, developed an acute onset of vomiting and epigastric abdominal pain.  Has been vomiting several times and into this morning.  Pain has been persistent since onset. Feeling bloated as well. Has a history of this similar episode a few months ago, but was \"never seen for it\", symptoms ultimately resolved on their own but \"took a while\".  Had EGD done last week for these symptoms. Symptoms were worse than previous before prompting him to call [facility].  They recommended he come to the ER due to his symptoms. Denies alcohol or drug use.  Denies chest pain, shortness of breath, constipation, diarrhea, urinary symptoms, back pain or other symptoms.  History of cholecystectomy, appendectomy..     On my initial evaluation, patient hypertensive and tachycardic. On physical exam and is uncomfortable and ill-appearing, clutching an emesis bag.  Heart sounds are tachycardic, but regular.  Lungs are clear without wheezing or crackles.  Has generalized tenderness on palpation to his abdomen, worse to the left upper quadrant and right upper quadrant abdomen.  Mild distention.  No CVA tenderness bilaterally.    Differential diagnosis includes esophagitis, gastritis, pancreatitis, cholecystitis, cholelithiasis, hepatitis, small bowel obstruction, large bowel obstruction, " appendicitis, diverticulitis, UTI, pyelonephritis, gastroenteritis, ACS, diabetic ketoacidosis, electrolyte abnormality. Emergency department workup included basic labs in addition to LFTs, lipase, lactate, CRP, troponin, EKG, CTA chest abdomen pelvis. Patient was given IV Pepcid, IV Zofran, IV fluids.     CT highly concerning for obstruction or partial obstruction at the second portion the duodenum where there is marked circumferential wall thickening. Additionally, there is soft tissue stranding extending towards the paola hepatis/portacaval region with encasement of the common hepatic and gastroduodenal arteries. The constellation of findings are concerning for a malignant process likely from the duodenum versus pancreas. Stomach is markedly distended with fluid-filled contents.  NG tube was placed.  I updated the patient on CT results and concerning findings suspicious for malignancy, he is understanding of this at this time.  I contacted general surgery and spoke with Dr. Culp who recommended GI consult for likely EGD.  I spoke with GI and spoke with Dr. Robins who will see the patient likely tomorrow morning.    Work-up also notable for diabetic ketoacidosis, including an anion gap of 18, ketones of 1.70, lactic acid of 2.7, WBC 14.1, glucose 397.  Venous pH 7.58, likely respiratory compensation.  Started on protocol and given insulin and fluids for this.  Electrolytes are otherwise within normal limits.    Suspect symptoms are due to bowel obstruction.  He was given Pepcid, Reglan, Zofran, morphine for symptoms with some improvement.  Ultimately feeling somewhat improved after NG tube placement.  Spoke with hospitalist, Dr. Felipe, who accepts the patient for admission.  Did not feel ICU admission was necessary at this point.  Did recommend cardiac telemetry bed.  I updated the patient and wife on results and plan for admission as well as GI consult, they are in agreement this plan.  Hemodynamically stable  on admission.    >30  minutes of critical care time     Medical Decision Making    History:  Supplemental history from: Documented in chart, if applicable and Family Member/Significant Other  External Record(s) reviewed: Documented in chart, if applicable. and Outpatient Record: 9/1/23    Work Up:  Chart documentation includes differential considered and any EKGs or imaging independently interpreted by provider, where specified.  In additional to work up documented, I considered the following work up: Documented in chart, if applicable.    External consultation:  Discussion of management with another provider: Documented in chart, if applicable and Gastroenterology, General Surgery, and Hospitalist    Complicating factors:  Care impacted by chronic illness: N/A  Care affected by social determinants of health: N/A    Disposition considerations: Admit.    ED COURSE:  12:14 PM  I reviewed the patient's chart. I met with the patient to gather history and to perform my initial exam.   12:27 PM received critical with pH 7.58   12:43 PM  I staffed this patient case with Dr. Edna Delaney who agrees with plan at this time and will see the patient for their history, exam, and complete evaluation.  2:49 PM paged general surgery  2:50 PM paged admitting physician  3:07 PM spoke with Dr. Bustamante who accepts the patient for admission. Did not feel ICU was necessary at this point.   3:08 PM  I rechecked the patient and updated him and wife on results of CT, discussed possibility of malignancy. Discussed plan for admission.   4:07 PM spoke with general surgery, Dr. Deng, who recommends GI consult  4:07 PM paged for GI   4:41 PM spoke with Dr. Robins with GI, who will see the patient tomorrow. Recommending adding cancer markers.     At the conclusion of the encounter I discussed the results of all of the tests and the disposition. The questions were answered. The patient or family acknowledged understanding and was agreeable with  the care plan.     Voice recognition software was used in the creation of this note. Any grammatical or nonsensical errors are due to inherent errors with the software and are not the intention of the writer.     MEDICATIONS GIVEN IN THE EMERGENCY:  Medications   morphine (MS CONTIN) 12 hr tablet 30 mg (has no administration in time range)   melatonin tablet 1 mg (has no administration in time range)   ondansetron (ZOFRAN ODT) ODT tab 4 mg (has no administration in time range)     Or   ondansetron (ZOFRAN) injection 4 mg (has no administration in time range)   calcium carbonate (TUMS) chewable tablet 1,000 mg (has no administration in time range)   glucose gel 15-30 g (has no administration in time range)     Or   dextrose 50 % injection 25-50 mL (has no administration in time range)     Or   glucagon injection 1 mg (has no administration in time range)   sodium chloride 0.9 % infusion ( Intravenous $New Bag 10/5/23 1710)   HYDROmorphone (DILAUDID) injection 0.2 mg (has no administration in time range)   HYDROmorphone (PF) (DILAUDID) injection 0.5 mg (has no administration in time range)   prochlorperazine (COMPAZINE) injection 5 mg (has no administration in time range)     Or   prochlorperazine (COMPAZINE) tablet 5 mg (has no administration in time range)     Or   prochlorperazine (COMPAZINE) suppository 12.5 mg (has no administration in time range)   insulin aspart (NovoLOG) injection (RAPID ACTING) (has no administration in time range)   hydrALAZINE (APRESOLINE) injection 5 mg (has no administration in time range)   insulin glargine (LANTUS PEN) injection 10 Units (has no administration in time range)   ondansetron (ZOFRAN) injection 4 mg (4 mg Intravenous $Given 10/5/23 1211)   famotidine (PEPCID) injection 20 mg (20 mg Intravenous $Given 10/5/23 1237)   metoclopramide (REGLAN) injection 10 mg (10 mg Intravenous $Given 10/5/23 1237)   sodium chloride 0.9% BOLUS 1,000 mL (0 mLs Intravenous Stopped 10/5/23 1439)  "  iopamidol (ISOVUE-370) solution 90 mL (90 mLs Intravenous $Given 10/5/23 1350)   morphine (PF) injection 4 mg (4 mg Intravenous $Given 10/5/23 1439)   sodium chloride 0.9% BOLUS 1,000 mL (0 mLs Intravenous Stopped 10/5/23 1702)   ondansetron (ZOFRAN) injection 4 mg (4 mg Intravenous $Given 10/5/23 1439)   insulin regular 1 unit/mL injection 6 Units (6 Units Intravenous $Given 10/5/23 1608)   lidocaine (XYLOCAINE) 2 % external gel 10 mL (10 mLs Urethral $Given 10/5/23 1550)   benzocaine 20% (HURRICAINE/TOPEX) 20 % spray 2.5 mL (2.5 mLs Mouth/Throat $Given 10/5/23 1646)   midazolam (VERSED) injection 1 mg (1 mg Intravenous $Given 10/5/23 1646)     NEW PRESCRIPTIONS STARTED AT TODAY'S ER VISIT  New Prescriptions    No medications on file     =================================================================    HPI:    Patient information was obtained from: patient and wife     Use of Interpretor: N/A       Bon Luque is a 68 year old male with a pertinent history of chronic abdominal pain, type 2 diabetes, pretension, hyperlipidemia, ASCVD, renal cell carcinoma, status post nephrectomy, depression, status post cholecystectomy, status post appendectomy who presents to this ED  for evaluation of abdominal pain and vomiting.    History provided by patient and wife.    Last evening, developed an acute onset of vomiting and epigastric abdominal pain.  Has been vomiting several times and into this morning.  Pain has been persistent since onset. Feeling bloated as well. Has a history of this similar episode a few months ago, but was \"never seen for it\", symptoms ultimately resolved on their own but \"took a while\".  Symptoms were worse than previous before prompting him to call [facility].  They recommended he come to the ER due to his symptoms.  Reports that last Monday (1.5 weeks ago), had an endoscopy, which was reportedly normal.  Plan was for a CT scan done today as an outpatient, however due to symptoms ultimately " did not get this done.  Intermittently feeling chilled and feverish at home.  Feeling very nauseated currently and rates pain at a 10 out of 10, described as sharp.  Reports now due to his vomiting, he has been vomiting up darker sputum that looks like blood.  Denies alcohol or drug use.  Denies chest pain, shortness of breath, constipation, diarrhea, urinary symptoms, back pain or other symptoms.  History of cholecystectomy, appendectomy.    Per chart review, patient seen in clinic 9/1/23 for persistent nausea with episodes of recurrent emesis associated with epigastric abdominal pain. He describes multiple episodes of vomiting. Started OTC omeprazole earlier this week and he is feeling significantly better suggesting that his symptoms are most likely peptic ulcer disease or gastritis. He will use Aleve to control his arthritis symptoms although has not used any of the medication for the past several weeks. Last year he was having similar symptoms thought to be peptic ulcer disease and was prescribed omeprazole but he cannot recall if he started to feel better. He was scheduled to meet with gastroenterology at that time but ended up in the ER with acute cholecystitis. Underwent cholecystectomy. We discussed that he could be experiencing recurrent gallstones and biliary duct dilatation even after cholecystectomy and will check LFTs and lipase. If these are abnormal I will get an abdominal ultrasound. He could also be experiencing gastroparesis with underlying diabetes and I will make arrangements for a nuclear medicine gastric emptying study. However, his symptoms sound most like gastritis or peptic ulcer disease especially with initial response to PPI. Given persistence of symptoms, we will make arrangements for EGD and will continue omeprazole 20 mg every morning before breakfast. We discussed that it will take at least 6 weeks for everything to completely heal. If he does have EGD, H. pylori can be checked.      REVIEW OF SYSTEMS:  Review of Systems   Constitutional:  Positive for appetite change (decreased) and chills. Negative for fever.   Respiratory:  Negative for shortness of breath.    Cardiovascular:  Negative for chest pain.   Gastrointestinal:  Positive for abdominal distention, abdominal pain (epigastric), nausea and vomiting. Negative for blood in stool, constipation and diarrhea.   Genitourinary:  Negative for dysuria and flank pain.   Musculoskeletal:  Negative for back pain.   Neurological:  Negative for weakness.   All other systems reviewed and are negative.       PAST MEDICAL HISTORY:  Past Medical History:   Diagnosis Date    Abdominal pain, epigastric 06/23/2022    Normal gastric emptying study.  Peptic ulcer disease or gastritis suspected    Acute cholecystitis 07/12/2022    Hospitalized with acute abdominal pain, abnormal LFTs and CT showing distended gallbladder and bile duct dilatation.  Acute cholecystitis.  Cholecystectomy.    Arthritis     psoriatic    ASCVD (arteriosclerotic cardiovascular disease) 04/28/2016    Coronary artery stent ×2 in RCA following myocardial infarction 2008 , echo June 2014 normal LV function with ejection fraction 50-55% with mild apical inferior hypokinesis    Chronic fatigue 06/23/2022    Chronic low back pain 04/28/2016    Chronic right shoulder pain 06/23/2022    Coronary artery disease     Depression 12/26/2017    Diabetic peripheral neuropathy associated with type 2 diabetes mellitus (H) 04/28/2016    Abnormal EMG and [facility]up at [facility]    Dilated bile duct 07/03/2017    CT scan with incidental finding of dilated intra-and extrahepatic ducts    HTN (hypertension)     Hypercholesterolemia     Hypogonadism in male     Ischemic cardiomyopathy     Stress Echocardiogram 2017 with decreased LV function with EF 43% worse compared to previous echo.  No change in infarct.  No new ischemia    Left upper quadrant pain 06/30/2017    MI (myocardial infarction) (H)      Obstructive sleep apnea on CPAP     CPAP    Optic neuritis     Psoriasis     Recurrent vomiting 09/01/2023    Renal cell carcinoma, right (H) 06/23/2022    Right partial nephrectomy 2017    Right renal mass     Sebaceous cyst 2010    Thoracic radiculopathy 2004    right T8 intercostal nerve block 2002    Type 2 diabetes mellitus with insulin therapy (H)        PAST SURGICAL HISTORY:  Past Surgical History:   Procedure Laterality Date    APPENDECTOMY      COLONOSCOPY      Colonoscopy September 2017 with hyperplastic polyps, repeat in 10 years    CORONARY ANGIOPLASTY      stent    ENDOSCOPIC RETROGRADE CHOLANGIOPANCREATOGRAM N/A 7/12/2022    Procedure: ENDOSCOPIC RETROGRADE CHOLANGIOPANCREATOGRAPHY, STONE EXTRACTION, BILIARY SPHINCTEROTOMY;  Surgeon: Bon Meyer MD;  Location: [facility] [place] OR    KNEE SURGERY      LAPAROSCOPIC CHOLECYSTECTOMY N/A 7/12/2022    Procedure: CHOLECYSTECTOMY, LAPAROSCOPIC;  Surgeon: Adam March MD;  Location: [facility] [place] OR    NOSE SURGERY      WA LAP,PARTIAL NEPHRECTOMY Right 1/10/2018    Procedure: RIGHT ROBOTIC PARTIAL NEPHRECTOMY WITH INTRAOPERATIVE ULTRASOUND;  Surgeon: Chase Rodríguez MD;  Location: [facility] [place];  Service: Urology       CURRENT MEDICATIONS:      Current Facility-Administered Medications:     calcium carbonate (TUMS) chewable tablet 1,000 mg, 1,000 mg, Oral, 4x Daily PRN, Dilma Bustamante MD    glucose gel 15-30 g, 15-30 g, Oral, Q15 Min PRN **OR** dextrose 50 % injection 25-50 mL, 25-50 mL, Intravenous, Q15 Min PRN **OR** glucagon injection 1 mg, 1 mg, Subcutaneous, Q15 Min PRN, Dilma Bustamante MD    hydrALAZINE (APRESOLINE) injection 5 mg, 5 mg, Intravenous, Q6H PRN, Dilma Bustamante MD    HYDROmorphone (DILAUDID) injection 0.2 mg, 0.2 mg, Intravenous, Q2H PRN, Dilam Bustamante MD    HYDROmorphone (PF) (DILAUDID) injection 0.5 mg, 0.5 mg, Intravenous, Q2H PRN, Dilma Bustamante MD    insulin aspart (NovoLOG) injection (RAPID ACTING), 1-6  Units, Subcutaneous, Q4H, Dilma Bustamante MD    insulin glargine (LANTUS PEN) injection 10 Units, 10 Units, Subcutaneous, Q24H, Dlima Bustamante MD    melatonin tablet 1 mg, 1 mg, Oral, At Bedtime PRN, Dilma Bustamante MD    morphine (MS CONTIN) 12 hr tablet 30 mg, 30 mg, Oral, Q8H MICH, Dilma Bustamante MD    ondansetron (ZOFRAN ODT) ODT tab 4 mg, 4 mg, Oral, Q6H PRN **OR** ondansetron (ZOFRAN) injection 4 mg, 4 mg, Intravenous, Q6H PRN, Dilma Bustamante MD    prochlorperazine (COMPAZINE) injection 5 mg, 5 mg, Intravenous, Q6H PRN **OR** prochlorperazine (COMPAZINE) tablet 5 mg, 5 mg, Oral, Q6H PRN **OR** prochlorperazine (COMPAZINE) suppository 12.5 mg, 12.5 mg, Rectal, Q12H PRN, Dilma Bustamante MD    sodium chloride 0.9 % infusion, , Intravenous, Continuous, Dilma Bustamante MD, Last Rate: 200 mL/hr at 10/05/23 1710, New Bag at 10/05/23 1710    Current Outpatient Medications:     clobetasol (TEMOVATE) 0.05 % external cream, Apply topically 2 times daily as needed For psoriasis on hands., Disp: , Rfl:     Continuous Blood Gluc Sensor (FREESTYLE SHIRA 14 DAY SENSOR) MISC, , Disp: , Rfl:     DULoxetine (CYMBALTA) 30 MG capsule, Take 30 mg by mouth daily, Disp: , Rfl:     gabapentin (NEURONTIN) 300 MG capsule, [GABAPENTIN (NEURONTIN) 300 MG CAPSULE] Take 600 mg by mouth 3 (three) times a day. , Disp: , Rfl:     insulin lispro (HUMALOG KWIKPEN) 100 UNIT/ML (1 unit dial) KWIKPEN, Inject Subcutaneous 3 times daily (before meals) Sliding scale. Average use is 20 units with each meal, Disp: , Rfl:     metFORMIN (GLUCOPHAGE-XR) 750 MG 24 hr tablet, Take 1 tablet (750 mg) by mouth daily (with dinner), Disp: 90 tablet, Rfl: 3    morphine (MS CONTIN) 30 MG CR tablet, Take 30 mg by mouth every 8 hours, Disp: , Rfl:     naloxone (NARCAN) 4 MG/0.1ML nasal spray, Spray 1 spray into one nostril alternating nostrils once as needed for opioid reversal, Disp: , Rfl:     omeprazole (PRILOSEC) 20 MG DR capsule, Take 1 capsule (20 mg) by mouth daily  before breakfast, Disp: 90 capsule, Rfl: 3    testosterone (ANDROGEL) 20.25 MG/1.25GM (1.62%) topical gel, Place 20.25 mg onto the skin daily, Disp: , Rfl:     valACYclovir (VALTREX) 500 MG tablet, Take 500 mg by mouth 2 times daily as needed Take twice daily at onset of symptoms for 3 days. Repeat for flares., Disp: , Rfl:     Alcohol Swabs PADS, Use to swab the area of the injection or sherry as directed Per insurance coverage, Disp: 100 each, Rfl: 0    B-D U/F 31G X 8 MM insulin pen needle, FOR ADMINISTERING INSULIN AT HOME, Disp: , Rfl:     blood glucose (NO BRAND SPECIFIED) test strip, To use to test glucose level in the blood Use to test blood sugar  4 times daily as directed. To accompany glucose monitor brands per insurance coverage., Disp: 100 strip, Rfl: 0    Insulin Glargine-yfgn 100 UNIT/ML SOPN, Inject 50 Units Subcutaneous 2 times daily, Disp: , Rfl:     Sharps Container MISC, Use as directed to dispose of needles, lancets and other sharps Per Insurance coverage, Disp: 1 each, Rfl: 0    ALLERGIES:  Allergies   Allergen Reactions    Atorvastatin Muscle Pain (Myalgia)    Valomag [Arthritis Pain Formula] Nausea and Vomiting     gastric ulcer from aspirin use    Ampicillin Rash     Had a reaction to this medication many years ago.    Codeine Rash     It has been about 30 years ago       FAMILY HISTORY:  No family history on file.    SOCIAL HISTORY:   Social History     Socioeconomic History    Marital status:    Tobacco Use    Smoking status: Former     Passive exposure: Never    Smokeless tobacco: Never   Vaping Use    Vaping Use: Never used   Substance and Sexual Activity    Alcohol use: No     Comment: Alcoholic Drinks/day: alcohol abuse - in remission x16 years    Drug use: No       VITALS:  Patient Vitals for the past 24 hrs:   BP Temp Temp src Pulse Resp SpO2 Height Weight   10/05/23 1651 -- -- -- 110 16 93 % -- --   10/05/23 1646 -- -- -- 118 29 97 % -- --   10/05/23 1630 -- -- -- 111 26 96 %  -- --   10/05/23 1613 (!) 166/85 -- -- 108 29 95 % -- --   10/05/23 1600 -- -- -- 111 18 97 % -- --   10/05/23 1500 (!) 219/104 -- -- 100 18 97 % -- --   10/05/23 1445 (!) 204/93 -- -- 111 (!) 37 96 % -- --   10/05/23 1430 (!) 221/106 -- -- 98 23 99 % -- --   10/05/23 1415 (!) 169/82 -- -- 97 24 100 % -- --   10/05/23 1345 (!) 207/95 -- -- 100 15 100 % -- --   10/05/23 1315 (!) 197/95 -- -- 93 25 100 % -- --   10/05/23 1300 (!) 190/93 -- -- 96 20 95 % -- --   10/05/23 1245 (!) 193/93 -- -- 94 17 94 % -- --   10/05/23 1150 (!) 206/98 97.8  F (36.6  C) Temporal 114 22 99 % 1.829 m (6') 93.4 kg (206 lb)       PHYSICAL EXAM    Constitutional: uncomfortable and ill-appearing, clutching an emesis bag  HENT: Normocephalic, Atraumatic, Bilateral external ears normal, Oropharynx normal, mucous membranes moist, Nose normal.   Neck: Normal range of motion, No tenderness, Supple, No stridor.  Eyes: PERRL, EOMI, Conjunctiva normal, No discharge.   Respiratory: Normal breath sounds, No respiratory distress, No wheezing, Speaks full sentences easily. No cough.  Cardiovascular: Normal heart rate, Regular rhythm, No murmurs, No rubs, No gallops. Chest wall nontender.  GI: Soft, generalized tenderness, worse to RUQ and LUQ, mild distention, No masses, No flank tenderness. No rebound or guarding.  Musculoskeletal: 2+ DP pulses. No edema. No cyanosis, No clubbing. Good range of motion in all major joints. No tenderness to palpation or major deformities noted. No tenderness of the CTLS spine.   Integument: Warm, Dry, No erythema, No rash. No petechiae.  Neurologic: Alert & oriented x 3, Normal motor function, Normal sensory function, No focal deficits noted. Normal gait.  Psychiatric: Affect normal, Judgment normal, Mood normal. Cooperative.    LAB:  All pertinent labs reviewed and interpreted.  Labs Ordered and Resulted from Time of ED Arrival to Time of ED Departure   COMPREHENSIVE METABOLIC PANEL - Abnormal       Result Value     Sodium 138      Potassium 3.7      Carbon Dioxide (CO2) 26      Anion Gap 18 (*)     Urea Nitrogen 12.2      Creatinine 0.77      GFR Estimate >90      Calcium 10.1      Chloride 94 (*)     Glucose 398 (*)     Alkaline Phosphatase 127      AST 19      ALT 19      Protein Total 8.2      Albumin 4.2      Bilirubin Total 1.0     HEPATIC FUNCTION PANEL - Abnormal    Protein Total 8.2      Albumin 4.2      Bilirubin Total 1.0      Alkaline Phosphatase 127      AST 19      ALT 19      Bilirubin Direct 0.31 (*)    LACTIC ACID WHOLE BLOOD - Abnormal    Lactic Acid 2.7 (*)    ROUTINE UA WITH MICROSCOPIC REFLEX TO CULTURE - Abnormal    Color Urine Colorless      Appearance Urine Clear      Glucose Urine >1000 (*)     Bilirubin Urine Negative      Ketones Urine 60 (*)     Specific Gravity Urine 1.035 (*)     Blood Urine Negative      pH Urine 8.0 (*)     Protein Albumin Urine Negative      Urobilinogen Urine <2.0      Nitrite Urine Negative      Leukocyte Esterase Urine Negative      RBC Urine 3 (*)     WBC Urine 0      Squamous Epithelials Urine <1     BLOOD GAS VENOUS - Abnormal    pH Venous 7.58 (*)     pCO2 Venous 32 (*)     pO2 Venous 74 (*)     Bicarbonate Venous 29      Base Excess/Deficit 7.3      Oxyhemoglobin Venous 96.0 (*)     O2 Sat, Venous 96.3 (*)    CRP INFLAMMATION - Abnormal    CRP Inflammation 19.50 (*)    CBC WITH PLATELETS AND DIFFERENTIAL - Abnormal    WBC Count 14.1 (*)     RBC Count 5.72      Hemoglobin 14.6      Hematocrit 44.1      MCV 77 (*)     MCH 25.5 (*)     MCHC 33.1      RDW 13.7      Platelet Count 316      % Neutrophils 89      % Lymphocytes 8      % Monocytes 3      Mids % (Monos, Eos, Basos)        % Eosinophils 0      % Basophils 0      % Immature Granulocytes 0      NRBCs per 100 WBC 0      Absolute Neutrophils 12.4 (*)     Absolute Lymphocytes 1.2      Absolute Monocytes 0.5      Mids Abs (Monos, Eos, Basos)        Absolute Eosinophils 0.0      Absolute Basophils 0.0      Absolute  Immature Granulocytes 0.1      Absolute NRBCs 0.0     GLUCOSE BY METER - Abnormal    GLUCOSE BY METER POCT 397 (*)    KETONE BETA-HYDROXYBUTYRATE QUANTITATIVE, RAPID - Abnormal    Ketone (Beta-Hydroxybutyrate) Quantitative 1.70 (*)    PHOSPHORUS - Abnormal    Phosphorus 2.2 (*)    GLUCOSE BY METER - Abnormal    GLUCOSE BY METER POCT 388 (*)    GLUCOSE BY METER - Abnormal    GLUCOSE BY METER POCT 407 (*)    LIPASE - Normal    Lipase 46     ETHYL ALCOHOL LEVEL - Normal    Alcohol ethyl <0.01     LACTIC ACID WHOLE BLOOD - Normal    Lactic Acid 2.0     TROPONIN T, HIGH SENSITIVITY - Normal    Troponin T, High Sensitivity 17     TROPONIN T, HIGH SENSITIVITY - Normal    Troponin T, High Sensitivity 19     MAGNESIUM - Normal    Magnesium 1.8     GLUCOSE MONITOR NURSING POCT   OSMOLALITY   GLUCOSE MONITOR NURSING POCT   CEA   CANCER ANTIGEN 19-9   BASIC METABOLIC PANEL   GLUCOSE MONITOR NURSING POCT   HEMOGLOBIN A1C   TYPE AND SCREEN, ADULT    ABO/RH(D) B POS      Antibody Screen Negative      SPECIMEN EXPIRATION DATE [id number]     BLOOD CULTURE   BLOOD CULTURE   ABO/RH TYPE AND SCREEN       RADIOLOGY:  Reviewed all pertinent imaging. Please see official radiology report.  XR Abdomen Port 1 View   Final Result   IMPRESSION: Nasogastric tube is looped in the mid and distal esophagus. Cholecystectomy clips. Visualized upper abdomen otherwise unremarkable.      CTA Abdomen Pelvis with Contrast   Final Result   IMPRESSION:   1.  Findings highly concerning for obstruction or partial obstruction at the second portion the duodenum where there is marked circumferential wall thickening. Additionally, there is soft tissue stranding extending towards the paola hepatis/portacaval    region with encasement of the common hepatic and gastroduodenal arteries. The constellation of findings are concerning for a malignant process likely from the duodenum versus pancreas. An inflammatory process such as duodenitis or peptic ulcer disease     are in the differential but felt to be less likely the primary process. EGD is recommended for further evaluation.   2.  The stomach is markedly distended with fluid-filled contents. Additionally, there is diffuse circumferential wall thickening of the esophagus which may be related to vomiting and/or esophagitis.   3.  There are numerous enlarged upper abdominal and retroperitoneal lymph nodes which are suspicious.   4.  Hepatic steatosis.             EKG:    Performed at: 1244  Rate: 97 bpm  Impression: Sinus rhythm, no T wave inversion, ST elevation or ST depression    I have reviewed and interpreted the EKG(s) documented above along with Dr. Delaney.    PROCEDURES:   None     Diagnosis:  1. Nausea and vomiting    2. DKA (diabetic ketoacidosis) (H)    3. SBO (small bowel obstruction) (H)        Ginny Ramirez PA-C  Emergency Medicine  [facility]  10/5/2023       Ginny Ramirez PA-C  10/05/23 1072

## 2023-10-05 NOTE — PHARMACY-ADMISSION MEDICATION HISTORY
"Pharmacist Admission Medication History    Admission medication history is complete. The information provided in this note is only as accurate as the sources available at the time of the update.    Information Source(s): Patient, Family member, Clinic records, and CareEverywhere/SureScripts via in-person    Pertinent Information: \"supposed to be taking metformin but is not\"    Changes made to PTA medication list:  Added: Humalog  Deleted: Novolog, Enbrel, MetroGel, econzaole  Changed: duloxetine, glargine     Medication Affordability:       Allergies reviewed with patient and updates made in EHR: yes    Medication History Completed By: Jose Manuel Hernandez formerly [facility] 10/5/2023 4:11 PM    Prior to Admission medications    Medication Sig Last Dose Taking? Auth Provider Long Term End Date   clobetasol (TEMOVATE) 0.05 % external cream Apply topically 2 times daily as needed For psoriasis on hands. More than a month Yes Reported, Patient     Continuous Blood Gluc Sensor (FREESTYLE SHIRA 14 DAY SENSOR) MISC   Yes Reported, Patient     DULoxetine (CYMBALTA) 30 MG capsule Take 30 mg by mouth daily 10/4/2023 at am Yes Provider, Historical     gabapentin (NEURONTIN) 300 MG capsule [GABAPENTIN (NEURONTIN) 300 MG CAPSULE] Take 600 mg by mouth 3 (three) times a day.  10/4/2023 at pm Yes Provider, Historical     insulin lispro (HUMALOG KWIKPEN) 100 UNIT/ML (1 unit dial) KWIKPEN Inject Subcutaneous 3 times daily (before meals) Sliding scale. Average use is 20 units with each meal 10/4/2023 at pm Yes Unknown, Entered By History Yes    metFORMIN (GLUCOPHAGE-XR) 750 MG 24 hr tablet Take 1 tablet (750 mg) by mouth daily (with dinner) More than a month at supposed to take but is not Yes Aditya Munoz MD Yes    morphine (MS CONTIN) 30 MG CR tablet Take 30 mg by mouth every 8 hours 10/4/2023 at pm Yes Reported, Patient     naloxone (NARCAN) 4 MG/0.1ML nasal spray Spray 1 spray into one nostril alternating nostrils once as needed for opioid " reversal  Yes Reported, Patient Yes    omeprazole (PRILOSEC) 20 MG DR capsule Take 1 capsule (20 mg) by mouth daily before breakfast 10/4/2023 at am Yes Aditya Munoz MD Yes    testosterone (ANDROGEL) 20.25 MG/1.25GM (1.62%) topical gel Place 20.25 mg onto the skin daily More than a month Yes Reported, Patient     valACYclovir (VALTREX) 500 MG tablet Take 500 mg by mouth 2 times daily as needed Take twice daily at onset of symptoms for 3 days. Repeat for flares.  Yes Reported, Patient Yes    Alcohol Swabs PADS Use to swab the area of the injection or sherry as directed Per insurance coverage   Tammi Martell MD B-D U/F 31G X 8 MM insulin pen needle FOR ADMINISTERING INSULIN AT HOME   Reported, Patient     blood glucose (NO BRAND SPECIFIED) test strip To use to test glucose level in the blood Use to test blood sugar  4 times daily as directed. To accompany glucose monitor brands per insurance coverage.   Tammi Martell MD     Insulin Glargine-yfgn 100 UNIT/ML SOPN Inject 50 Units Subcutaneous 2 times daily   Reported, Patient     Sharps Container MISC Use as directed to dispose of needles, lancets and other sharps Per Insurance coverage   Tammi Martell MD

## 2023-10-05 NOTE — TELEPHONE ENCOUNTER
There are too many possible causes for his symptoms of recurrent emesis.  With his history of biliary duct problems, urgent evaluation in the ER is appropriate.  There is no way to get him an MRI completed today.  It could take up to a week to get an appointment and this does not address the immediate concerns that he is having.

## 2023-10-05 NOTE — H&P
"[facility]    History and Physical - Hospitalist Service       Date of Admission:  10/5/2023  Bon Luque,  1955, MRN [id number]  PCP: Aditya Munoz    Assessment & Plan      Bon Luque is a 68 year old male admitted on 10/5/2023. He has history of insulin-dependent diabetes, chronic pain syndrome on MS Contin, coronary artery disease status post stent in , hypertension, hyperlipidemia, MARÍA ELENA, renal cell carcinoma status post partial nephrectomy in 2017, recent issues with nausea and vomiting status post normal gastric emptying study about a month ago, presents for refractory nausea, vomiting, and emesis found to have small bowel obstruction secondary to likely mass, as well as mild DKA    #Small bowel obstruction  -Patient with what he describes as \"stomach trouble\" for the past 15 years or so, much worse in the past 6 weeks or so, was having daily vomiting and started on PPI with improvement for the last 6 weeks.  Recently began to feel worse again.  Comes in with nausea, vomiting.  CT showing obstruction at the second portion of the duodenum associated with likely mass, involvement of the paola hepatis and encasement of the common hepatic and GD arteries.  Also noted enlarged regional lymph nodes.  Very concerning for malignancy.  -NG tube decompression  -N.p.o.  -Pain meds, antiemetics as needed  -General surgery team consulted  -Likely would be best to have GI do endoscopic ultrasound, biopsy if possible.  GI have been consulted    #Mild DKA  -Patient with positive ketones and anion gap but is not actually acidotic, currently well compensated.  Suspect just very hypovolemic  -Aggressive IV fluid  -Accu-Cheks and subcutaneous insulin every 4 hours  -Repeat BMP now  -Do not feel that he currently needs insulin drip or ICU  -Home diabetes regimen is metformin, Humalog sliding scale typically 20 units with meals. Unclear from chart if still taking glargine 50 units " twice daily  -Lantus 10 units daily for now until get a sense of insulin needs and verify home meds    #Chronic pain syndrome  -Okay to take home MS Contin if able to clamp NG to administer medication, to avoid withdrawal  -IV Dilaudid as needed  -Hold other pain meds Cymbalta, gabapentin    #Hypophosphatemia  -Protocol    #Leukocytosis  -Suspect reactive  -Trend    #Hypertension  -Suspect driven by nausea, discomfort currently  -IV hydralazine as needed for severe range blood pressures         DVT Prophylaxis: High risk. SCDs  Diet: NPO per Anesthesia Guidelines for Procedure/Surgery Except for: Meds, No Exceptions    Seals Catheter: Not present  Lines: None     Cardiac Monitoring: ACTIVE order. Indication: DKA  Code Status: Full Code    Clinically Significant Risk Factors Present on Admission                    # Hypertension: Noted on problem list      # Overweight: Estimated body mass index is 27.94 kg/m  as calculated from the following:    Height as of this encounter: 1.829 m (6').    Weight as of this encounter: 93.4 kg (206 lb).              Disposition Plan      Expected Discharge Date: 10/07/2023                The patient's care was discussed with the Bedside Nurse, Patient, Patient's Family, and Surgery Consultant(s).    Dilma Bustamante MD  Hospitalist Service  [facility]  Securely message with [employer] (more info)  Text page via [employer] Paging/Directory     ______________________________________________________________________    Chief Complaint   N/v, epigastric pain    History is obtained from the patient    History of Present Illness   Bon Luque is a 68 year old male who is here for aforementioned chief complaint.  He tells me he has had stomach issues for about 15 years.  Then about 6 weeks ago, he had a 3-week spell where he was vomiting every day.  Thinks he vomited about 50 times in those 3 weeks and lost about 30 pounds.  He started on PPI and symptoms did improve quite a  bit.  However, they then worsened again over the past few days.  He never did regain the weight he had lost.  He has been vomiting clear fluid with bits of food in it.  Has not been able to eat or drink much.  Symptoms get worse if he eats, notes early satiety.  Has a sensation of indigestion or bloating after he eats.  Today, noted some chills and sweats.  Had a large, loose black stool today and a few smaller loose ones as well.  Typically bowel movements have not been loose.  Notes his urine output has been okay especially since receiving IV hydration.  Denies any chest pain or shortness of breath.      Past Medical History    Past Medical History:   Diagnosis Date    Abdominal pain, epigastric 06/23/2022    Normal gastric emptying study.  Peptic ulcer disease or gastritis suspected    Acute cholecystitis 07/12/2022    Hospitalized with acute abdominal pain, abnormal LFTs and CT showing distended gallbladder and bile duct dilatation.  Acute cholecystitis.  Cholecystectomy.    Arthritis     psoriatic    ASCVD (arteriosclerotic cardiovascular disease) 04/28/2016    Coronary artery stent ×2 in RCA following myocardial infarction 2008 , echo June 2014 normal LV function with ejection fraction 50-55% with mild apical inferior hypokinesis    Chronic fatigue 06/23/2022    Chronic low back pain 04/28/2016    Chronic right shoulder pain 06/23/2022    Coronary artery disease     Depression 12/26/2017    Diabetic peripheral neuropathy associated with type 2 diabetes mellitus (H) 04/28/2016    Abnormal EMG and workup at [facility]    Dilated bile duct 07/03/2017    CT scan with incidental finding of dilated intra-and extrahepatic ducts    HTN (hypertension)     Hypercholesterolemia     Hypogonadism in male     Ischemic cardiomyopathy     Stress Echocardiogram 2017 with decreased LV function with EF 43% worse compared to previous echo.  No change in infarct.  No new ischemia    Left upper quadrant pain 06/30/2017    MI  (myocardial infarction) (H)     Obstructive sleep apnea on CPAP     CPAP    Optic neuritis     Psoriasis     Recurrent vomiting 09/01/2023    Renal cell carcinoma, right (H) 06/23/2022    Right partial nephrectomy 2017    Right renal mass     Sebaceous cyst 2010    Thoracic radiculopathy 2004    right T8 intercostal nerve block 2002    Type 2 diabetes mellitus with insulin therapy (H)        Past Surgical History   Past Surgical History:   Procedure Laterality Date    APPENDECTOMY      COLONOSCOPY      Colonoscopy September 2017 with hyperplastic polyps, repeat in 10 years    CORONARY ANGIOPLASTY      stent    ENDOSCOPIC RETROGRADE CHOLANGIOPANCREATOGRAM N/A 7/12/2022    Procedure: ENDOSCOPIC RETROGRADE CHOLANGIOPANCREATOGRAPHY, STONE EXTRACTION, BILIARY SPHINCTEROTOMY;  Surgeon: Bon Meyer MD;  Location: [facility] OR    KNEE SURGERY      LAPAROSCOPIC CHOLECYSTECTOMY N/A 7/12/2022    Procedure: CHOLECYSTECTOMY, LAPAROSCOPIC;  Surgeon: Adam March MD;  Location: [facility] OR    NOSE SURGERY      MO LAP,PARTIAL NEPHRECTOMY Right 1/10/2018    Procedure: RIGHT ROBOTIC PARTIAL NEPHRECTOMY WITH INTRAOPERATIVE ULTRASOUND;  Surgeon: Chase Rodríguez MD;  Location: [facility];  Service: Urology       Prior to Admission Medications   Prior to Admission Medications   Prescriptions Last Dose Informant Patient Reported? Taking?   Alcohol Swabs PADS   No No   Sig: Use to swab the area of the injection or sherry as directed Per insurance coverage   B-D U/F 31G X 8 MM insulin pen needle   Yes No   Sig: FOR ADMINISTERING INSULIN AT HOME   Continuous Blood Gluc Sensor (FREESTYLE SHIRA 14 DAY SENSOR) MISC   Yes Yes   DULoxetine (CYMBALTA) 30 MG capsule 10/4/2023 at am  Yes Yes   Sig: Take 30 mg by mouth daily   Insulin Glargine-yfgn 100 UNIT/ML SOPN   Yes No   Sig: Inject 50 Units Subcutaneous 2 times daily   Sharps Container MISC   No No   Sig: Use as directed to dispose of needles, lancets  and other sharps Per Insurance coverage   blood glucose (NO BRAND SPECIFIED) test strip   No No   Sig: To use to test glucose level in the blood Use to test blood sugar  4 times daily as directed. To accompany glucose monitor brands per insurance coverage.   clobetasol (TEMOVATE) 0.05 % external cream More than a month  Yes Yes   Sig: Apply topically 2 times daily as needed For psoriasis on hands.   gabapentin (NEURONTIN) 300 MG capsule 10/4/2023 at pm  Yes Yes   Sig: [GABAPENTIN (NEURONTIN) 300 MG CAPSULE] Take 600 mg by mouth 3 (three) times a day.    insulin lispro (HUMALOG KWIKPEN) 100 UNIT/ML (1 unit dial) KWIKPEN 10/4/2023 at pm  Yes Yes   Sig: Inject Subcutaneous 3 times daily (before meals) Sliding scale. Average use is 20 units with each meal   metFORMIN (GLUCOPHAGE-XR) 750 MG 24 hr tablet More than a month at supposed to take but is not  No Yes   Sig: Take 1 tablet (750 mg) by mouth daily (with dinner)   morphine (MS CONTIN) 30 MG CR tablet 10/4/2023 at pm  Yes Yes   Sig: Take 30 mg by mouth every 8 hours   naloxone (NARCAN) 4 MG/0.1ML nasal spray   Yes Yes   Sig: Spray 1 spray into one nostril alternating nostrils once as needed for opioid reversal   omeprazole (PRILOSEC) 20 MG DR capsule 10/4/2023 at am  No Yes   Sig: Take 1 capsule (20 mg) by mouth daily before breakfast   testosterone (ANDROGEL) 20.25 MG/1.25GM (1.62%) topical gel More than a month  Yes Yes   Sig: Place 20.25 mg onto the skin daily   valACYclovir (VALTREX) 500 MG tablet   Yes Yes   Sig: Take 500 mg by mouth 2 times daily as needed Take twice daily at onset of symptoms for 3 days. Repeat for flares.      Facility-Administered Medications: None           Physical Exam   Vital Signs: Temp: 97.8  F (36.6  C) Temp src: Temporal BP: (!) 166/85 Pulse: 110   Resp: 16 SpO2: 93 % O2 Device: None (Room air)    Weight: 206 lbs 0 oz  General: Uncomfortable appearing male sitting up in bed holding emesis basin.  Alert and oriented x3.  Somewhat  distracted by discomfort.  Actively retching at times  HEENT: Head normocephalic atraumatic, oral mucosa tacky. Sclerae anicteric  CV: Regular rhythm, normal rate, no murmurs  Resp: No wheezes, no rales or rhonchi, no focal consolidations  GI: Belly soft,  mildly distended , nontender, bowel sounds present  Skin:  a bit flushed. Psoriatic lesions on BLE  Extremities: No peripheral edema  Psych: Normal affect, anxious mood  Neuro: Grossly normal    Medical Decision Making         Data   Imaging results reviewed over the past 24 hrs:   Recent Results (from the past 24 hour(s))   CTA Abdomen Pelvis with Contrast    Narrative    EXAM: CTA ABDOMEN PELVIS WITH CONTRAST  LOCATION: [facility]  DATE: 10/5/2023    INDICATION: abdominal pain, vomiting  COMPARISON: 07/12/2022  TECHNIQUE: CT angiogram abdomen pelvis during arterial phase of injection of IV contrast. 2D and 3D MIP reconstructions were performed by the CT technologist. Dose reduction techniques were used.  CONTRAST: 90 mL Isovue 370    FINDINGS:  ANGIOGRAM ABDOMEN/PELVIS: The aorta is normal in caliber. Mild atherosclerosis without high-grade stenosis. No active extravasation. LOWER CHEST: Trace right pleural effusion. Patchy bibasilar atelectasis/scarring. Mild degenerative interval thickening   of the esophagus.    HEPATOBILIARY: Hepatic steatosis. Cholecystectomy. No convincing pathologic biliary ductal dilation.    PANCREAS: No pancreatic ductal dilation.    SPLEEN: Normal.    ADRENAL GLANDS: Normal.    KIDNEYS/BLADDER: Partial right nephrectomy with residual scarring and volume loss in the right mid kidney. No hydronephrosis or genitourinary calculi.    BOWEL: There is severe fluid-filled distention of the stomach. There is also marked diffuse circumferential wall thickening of the second portion of the duodenum and duodenal bulb. There is ill-defined soft tissue thickening extending from the second   portion towards the portal caval  region and encasing the common hepatic and proximal gastroduodenal arteries on series 4 image 74. There is minimal surrounding fat stranding. No free air. There is a small duodenal diverticulum at the second-third portion   of the duodenum. No evidence of appendicitis.    LYMPH NODES: There are numerous borderline to mildly enlarged lymph nodes in the gastrohepatic, paola hepatis, peripancreatic, and retroperitoneal chains. The largest is in the retrocaval chain measuring 2.8 x 1.3 cm on series 4 image 83.    PELVIC ORGANS: Prostate calcifications.    MUSCULOSKELETAL: No suspicious osseous lesions. Degenerative change of the spine. Old, healed right inferior pubic ramus fracture.      Impression    IMPRESSION:  1.  Findings highly concerning for obstruction or partial obstruction at the second portion the duodenum where there is marked circumferential wall thickening. Additionally, there is soft tissue stranding extending towards the paola hepatis/portacaval   region with encasement of the common hepatic and gastroduodenal arteries. The constellation of findings are concerning for a malignant process likely from the duodenum versus pancreas. An inflammatory process such as duodenitis or peptic ulcer disease   are in the differential but felt to be less likely the primary process. EGD is recommended for further evaluation.  2.  The stomach is markedly distended with fluid-filled contents. Additionally, there is diffuse circumferential wall thickening of the esophagus which may be related to vomiting and/or esophagitis.  3.  There are numerous enlarged upper abdominal and retroperitoneal lymph nodes which are suspicious.  4.  Hepatic steatosis.     XR Abdomen Port 1 View    Narrative    EXAM: XR ABDOMEN PORT 1 VIEW  LOCATION: [facility]  DATE: 10/5/2023    INDICATION: confirm NG placement  COMPARISON: 10/05/2023 CTA AP.      Impression    IMPRESSION: Nasogastric tube is looped in the mid and  distal esophagus. Cholecystectomy clips. Visualized upper abdomen otherwise unremarkable.     Recent Results (from the past 12 hour(s))   Comprehensive metabolic panel    Collection Time: 10/05/23 12:04 PM   Result Value Ref Range    Sodium 138 135 - 145 mmol/L    Potassium 3.7 3.4 - 5.3 mmol/L    Carbon Dioxide (CO2) 26 22 - 29 mmol/L    Anion Gap 18 (H) 7 - 15 mmol/L    Urea Nitrogen 12.2 8.0 - 23.0 mg/dL    Creatinine 0.77 0.67 - 1.17 mg/dL    GFR Estimate >90 >60 mL/min/1.73m2    Calcium 10.1 8.8 - 10.2 mg/dL    Chloride 94 (L) 98 - 107 mmol/L    Glucose 398 (H) 70 - 99 mg/dL    Alkaline Phosphatase 127 40 - 129 U/L    AST 19 0 - 45 U/L    ALT 19 0 - 70 U/L    Protein Total 8.2 6.4 - 8.3 g/dL    Albumin 4.2 3.5 - 5.2 g/dL    Bilirubin Total 1.0 <=1.2 mg/dL   Lipase    Collection Time: 10/05/23 12:04 PM   Result Value Ref Range    Lipase 46 13 - 60 U/L   Hepatic function panel    Collection Time: 10/05/23 12:04 PM   Result Value Ref Range    Protein Total 8.2 6.4 - 8.3 g/dL    Albumin 4.2 3.5 - 5.2 g/dL    Bilirubin Total 1.0 <=1.2 mg/dL    Alkaline Phosphatase 127 40 - 129 U/L    AST 19 0 - 45 U/L    ALT 19 0 - 70 U/L    Bilirubin Direct 0.31 (H) 0.00 - 0.30 mg/dL   Lactic acid whole blood    Collection Time: 10/05/23 12:04 PM   Result Value Ref Range    Lactic Acid 2.7 (H) 0.7 - 2.0 mmol/L   Alcohol level blood    Collection Time: 10/05/23 12:04 PM   Result Value Ref Range    Alcohol ethyl <0.01 <=0.01 g/dL   Blood gas venous    Collection Time: 10/05/23 12:04 PM   Result Value Ref Range    pH Venous 7.58 (HH) 7.35 - 7.45    pCO2 Venous 32 (L) 35 - 50 mm Hg    pO2 Venous 74 (H) 25 - 47 mm Hg    Bicarbonate Venous 29 24 - 30 mmol/L    Base Excess/Deficit 7.3   mmol/L    Oxyhemoglobin Venous 96.0 (H) 70.0 - 75.0 %    O2 Sat, Venous 96.3 (H) 70.0 - 75.0 %   CRP inflammation    Collection Time: 10/05/23 12:04 PM   Result Value Ref Range    CRP Inflammation 19.50 (H) <5.00 mg/L   CBC with platelets and differential     Collection Time: 10/05/23 12:04 PM   Result Value Ref Range    WBC Count 14.1 (H) 4.0 - 11.0 10e3/uL    RBC Count 5.72 4.40 - 5.90 10e6/uL    Hemoglobin 14.6 13.3 - 17.7 g/dL    Hematocrit 44.1 40.0 - 53.0 %    MCV 77 (L) 78 - 100 fL    MCH 25.5 (L) 26.5 - 33.0 pg    MCHC 33.1 31.5 - 36.5 g/dL    RDW 13.7 10.0 - 15.0 %    Platelet Count 316 150 - 450 10e3/uL    % Neutrophils 89 %    % Lymphocytes 8 %    % Monocytes 3 %    Mids % (Monos, Eos, Basos)      % Eosinophils 0 %    % Basophils 0 %    % Immature Granulocytes 0 %    NRBCs per 100 WBC 0 <1 /100    Absolute Neutrophils 12.4 (H) 1.6 - 8.3 10e3/uL    Absolute Lymphocytes 1.2 0.8 - 5.3 10e3/uL    Absolute Monocytes 0.5 0.0 - 1.3 10e3/uL    Mids Abs (Monos, Eos, Basos)      Absolute Eosinophils 0.0 0.0 - 0.7 10e3/uL    Absolute Basophils 0.0 0.0 - 0.2 10e3/uL    Absolute Immature Granulocytes 0.1 <=0.4 10e3/uL    Absolute NRBCs 0.0 10e3/uL   Extra Red Top Tube    Collection Time: 10/05/23 12:04 PM   Result Value Ref Range    Hold Specimen JIC    Extra Purple Top Tube    Collection Time: 10/05/23 12:04 PM   Result Value Ref Range    Hold Specimen JIC    Troponin T, High Sensitivity (now)    Collection Time: 10/05/23 12:04 PM   Result Value Ref Range    Troponin T, High Sensitivity 17 <=22 ng/L   Ketone Beta-Hydroxybutyrate Quantitative    Collection Time: 10/05/23 12:04 PM   Result Value Ref Range    Ketone (Beta-Hydroxybutyrate) Quantitative 1.70 (HH) <=0.30 mmol/L   Adult Type and Screen    Collection Time: 10/05/23 12:04 PM   Result Value Ref Range    ABO/RH(D) B POS     Antibody Screen Negative Negative    SPECIMEN EXPIRATION DATE [id number]    Phosphorus    Collection Time: 10/05/23 12:04 PM   Result Value Ref Range    Phosphorus 2.2 (L) 2.5 - 4.5 mg/dL   Magnesium    Collection Time: 10/05/23 12:04 PM   Result Value Ref Range    Magnesium 1.8 1.7 - 2.3 mg/dL   Glucose by meter    Collection Time: 10/05/23 12:09 PM   Result Value Ref Range    GLUCOSE BY  METER POCT 397 (H) 70 - 99 mg/dL   UA with Microscopic reflex to Culture    Collection Time: 10/05/23  2:04 PM    Specimen: Urine, Clean Catch   Result Value Ref Range    Color Urine Colorless Colorless, Straw, Light Yellow, Yellow    Appearance Urine Clear Clear    Glucose Urine >1000 (A) Negative mg/dL    Bilirubin Urine Negative Negative    Ketones Urine 60 (A) Negative mg/dL    Specific Gravity Urine 1.035 (H) 1.001 - 1.030    Blood Urine Negative Negative    pH Urine 8.0 (H) 5.0 - 7.0    Protein Albumin Urine Negative Negative mg/dL    Urobilinogen Urine <2.0 <2.0 mg/dL    Nitrite Urine Negative Negative    Leukocyte Esterase Urine Negative Negative    RBC Urine 3 (H) <=2 /HPF    WBC Urine 0 <=5 /HPF    Squamous Epithelials Urine <1 <=1 /HPF   Glucose by meter    Collection Time: 10/05/23  2:26 PM   Result Value Ref Range    GLUCOSE BY METER POCT 388 (H) 70 - 99 mg/dL   Lactic acid whole blood    Collection Time: 10/05/23  2:31 PM   Result Value Ref Range    Lactic Acid 2.0 0.7 - 2.0 mmol/L   Troponin T, High Sensitivity (now)    Collection Time: 10/05/23  2:31 PM   Result Value Ref Range    Troponin T, High Sensitivity 19 <=22 ng/L   Glucose by meter    Collection Time: 10/05/23  4:08 PM   Result Value Ref Range    GLUCOSE BY METER POCT 407 (H) 70 - 99 mg/dL

## 2023-10-05 NOTE — PLAN OF CARE
Text paged the Swing coverage MD, Dr Hampton utilizing "[employer] messaging. Requesting a repeat CXR, as Pt's NG has slid out even though it has a FTAD on. FTAD was NOT tight and NG could freely move. Per report it took 3 attempts and Versed to put down the NG. NG advanced to 55.  Uncertain if it is where it should be.    1855 DR. Hampton stated that he was not the doc providing coverage and to call the Remote MD. Dr. Barton is the remote MD. Dr. Barton ordered another CXR.

## 2023-10-05 NOTE — TELEPHONE ENCOUNTER
General Call    Contacts         Type Contact Phone/Fax    10/05/2023 10:36 AM CDT Phone (Incoming) Arden Luque (Emergency Contact) [phone]          Reason for Call:  MRI orders    What are your questions or concerns:  Pt's spouse Dwaine calling - Pt has been having gastro issues and they had an MRI set up for today at [facility] but Dwaine had called them and cancelled due to pt non-stop vomiting. Pt did stop vomiting an hr ago. Pt was just triaged today and was told to go to the ER but pt said he did not want to wait hours in the emergency room. They are asking if Dr Munoz can put in an order for pt to have an MRI at [facility] for his gastro issue. When asked who ordered it since I see no active orders, Dwaine said Dr Munoz did. I do not see anything in the chart that is active orders for MRI. Please advise.     Could we send this information to you in [facility] or would you prefer to receive a phone call?:   Patient would prefer a phone call   Okay to leave a detailed message?: No at Home number on file [phone] (home)

## 2023-10-05 NOTE — TELEPHONE ENCOUNTER
"S-(situation): Provider agrees with ED dispo    B-(background): See previous notes in this encounter.    A-(assessment): Call back to wife at [phone], spoke to her. She said, \"Yeah, I'm taking him there right now, he started up again.\"    R-(recommendations): Reinforced ED dispo. Wife again reports intent to take patient to [facility].    "

## 2023-10-05 NOTE — TELEPHONE ENCOUNTER
Note that writer has triaged this patient in separate encounter on this date, please refer to triage encounter.

## 2023-10-05 NOTE — ED TRIAGE NOTES
Patient presents here with generalized abdominal pain with several bouts of vomiting and diarrhea. His last stool was black in color. He is an insulin dependent diabetic, but has not been checking his blood sugars.    Triage Assessment       Row Name 10/05/23 1151       Triage Assessment (Adult)    Airway WDL WDL       Respiratory WDL    Respiratory WDL WDL       Skin Circulation/Temperature WDL    Skin Circulation/Temperature WDL WDL       Cardiac WDL    Cardiac WDL WDL       Peripheral/Neurovascular WDL    Peripheral Neurovascular WDL WDL       Cognitive/Neuro/Behavioral WDL    Cognitive/Neuro/Behavioral WDL WDL

## 2023-10-06 ENCOUNTER — ANESTHESIA (OUTPATIENT)
Dept: SURGERY | Facility: HOSPITAL | Age: 68
DRG: 388 | End: 2023-10-06
Payer: COMMERCIAL

## 2023-10-06 ENCOUNTER — ANESTHESIA EVENT (OUTPATIENT)
Dept: SURGERY | Facility: HOSPITAL | Age: 68
DRG: 388 | End: 2023-10-06
Payer: COMMERCIAL

## 2023-10-06 LAB
ANION GAP SERPL CALCULATED.3IONS-SCNC: 14 MMOL/L (ref 7–15)
ATRIAL RATE - MUSE: 97 BPM
BUN SERPL-MCNC: 11.4 MG/DL (ref 8–23)
CALCIUM SERPL-MCNC: 9 MG/DL (ref 8.8–10.2)
CEA SERPL-MCNC: 1.4 NG/ML
CHLORIDE SERPL-SCNC: 100 MMOL/L (ref 98–107)
CREAT SERPL-MCNC: 0.78 MG/DL (ref 0.67–1.17)
DEPRECATED HCO3 PLAS-SCNC: 24 MMOL/L (ref 22–29)
DIASTOLIC BLOOD PRESSURE - MUSE: 99 MMHG
EGFRCR SERPLBLD CKD-EPI 2021: >90 ML/MIN/1.73M2
ERYTHROCYTE [DISTWIDTH] IN BLOOD BY AUTOMATED COUNT: 14.1 % (ref 10–15)
GLUCOSE BLDC GLUCOMTR-MCNC: 206 MG/DL (ref 70–99)
GLUCOSE BLDC GLUCOMTR-MCNC: 225 MG/DL (ref 70–99)
GLUCOSE BLDC GLUCOMTR-MCNC: 242 MG/DL (ref 70–99)
GLUCOSE BLDC GLUCOMTR-MCNC: 256 MG/DL (ref 70–99)
GLUCOSE BLDC GLUCOMTR-MCNC: 256 MG/DL (ref 70–99)
GLUCOSE BLDC GLUCOMTR-MCNC: 278 MG/DL (ref 70–99)
GLUCOSE BLDC GLUCOMTR-MCNC: 286 MG/DL (ref 70–99)
GLUCOSE SERPL-MCNC: 266 MG/DL (ref 70–99)
HCT VFR BLD AUTO: 40.3 % (ref 40–53)
HGB BLD-MCNC: 13.2 G/DL (ref 13.3–17.7)
INTERPRETATION ECG - MUSE: NORMAL
LACTATE SERPL-SCNC: 1.7 MMOL/L (ref 0.7–2)
MCH RBC QN AUTO: 25.8 PG (ref 26.5–33)
MCHC RBC AUTO-ENTMCNC: 32.8 G/DL (ref 31.5–36.5)
MCV RBC AUTO: 79 FL (ref 78–100)
P AXIS - MUSE: 67 DEGREES
PLATELET # BLD AUTO: 298 10E3/UL (ref 150–450)
POTASSIUM SERPL-SCNC: 3.5 MMOL/L (ref 3.4–5.3)
PR INTERVAL - MUSE: 160 MS
QRS DURATION - MUSE: 88 MS
QT - MUSE: 388 MS
QTC - MUSE: 492 MS
R AXIS - MUSE: 76 DEGREES
RBC # BLD AUTO: 5.11 10E6/UL (ref 4.4–5.9)
SODIUM SERPL-SCNC: 138 MMOL/L (ref 135–145)
SYSTOLIC BLOOD PRESSURE - MUSE: 202 MMHG
T AXIS - MUSE: 94 DEGREES
UPPER EUS: NORMAL
VENTRICULAR RATE- MUSE: 97 BPM
WBC # BLD AUTO: 17.1 10E3/UL (ref 4–11)

## 2023-10-06 PROCEDURE — 88185 FLOWCYTOMETRY/TC ADD-ON: CPT | Performed by: INTERNAL MEDICINE

## 2023-10-06 PROCEDURE — C9113 INJ PANTOPRAZOLE SODIUM, VIA: HCPCS | Mod: JZ | Performed by: HOSPITALIST

## 2023-10-06 PROCEDURE — 88189 FLOWCYTOMETRY/READ 16 & >: CPT | Mod: GC | Performed by: STUDENT IN AN ORGANIZED HEALTH CARE EDUCATION/TRAINING PROGRAM

## 2023-10-06 PROCEDURE — 250N000011 HC RX IP 250 OP 636: Mod: JZ | Performed by: INTERNAL MEDICINE

## 2023-10-06 PROCEDURE — 88305 TISSUE EXAM BY PATHOLOGIST: CPT | Mod: TC | Performed by: INTERNAL MEDICINE

## 2023-10-06 PROCEDURE — 80048 BASIC METABOLIC PNL TOTAL CA: CPT | Performed by: HOSPITALIST

## 2023-10-06 PROCEDURE — 258N000003 HC RX IP 258 OP 636: Performed by: HOSPITALIST

## 2023-10-06 PROCEDURE — 250N000013 HC RX MED GY IP 250 OP 250 PS 637: Performed by: HOSPITALIST

## 2023-10-06 PROCEDURE — 99233 SBSQ HOSP IP/OBS HIGH 50: CPT | Performed by: HOSPITALIST

## 2023-10-06 PROCEDURE — 250N000011 HC RX IP 250 OP 636: Performed by: NURSE ANESTHETIST, CERTIFIED REGISTERED

## 2023-10-06 PROCEDURE — 370N000017 HC ANESTHESIA TECHNICAL FEE, PER MIN: Performed by: INTERNAL MEDICINE

## 2023-10-06 PROCEDURE — 250N000011 HC RX IP 250 OP 636: Performed by: HOSPITALIST

## 2023-10-06 PROCEDURE — 83605 ASSAY OF LACTIC ACID: CPT | Performed by: HOSPITALIST

## 2023-10-06 PROCEDURE — 88364 INSITU HYBRIDIZATION (FISH): CPT | Mod: TC | Performed by: INTERNAL MEDICINE

## 2023-10-06 PROCEDURE — 250N000011 HC RX IP 250 OP 636: Mod: JZ | Performed by: HOSPITALIST

## 2023-10-06 PROCEDURE — 258N000003 HC RX IP 258 OP 636: Performed by: ANESTHESIOLOGY

## 2023-10-06 PROCEDURE — 272N000001 HC OR GENERAL SUPPLY STERILE: Performed by: INTERNAL MEDICINE

## 2023-10-06 PROCEDURE — 250N000013 HC RX MED GY IP 250 OP 250 PS 637: Performed by: INTERNAL MEDICINE

## 2023-10-06 PROCEDURE — 250N000009 HC RX 250: Performed by: NURSE ANESTHETIST, CERTIFIED REGISTERED

## 2023-10-06 PROCEDURE — 999N000248 HC STATISTIC IV INSERT WITH US BY RN

## 2023-10-06 PROCEDURE — 258N000003 HC RX IP 258 OP 636: Performed by: INTERNAL MEDICINE

## 2023-10-06 PROCEDURE — 250N000009 HC RX 250: Performed by: HOSPITALIST

## 2023-10-06 PROCEDURE — 82962 GLUCOSE BLOOD TEST: CPT

## 2023-10-06 PROCEDURE — 250N000011 HC RX IP 250 OP 636: Performed by: ANESTHESIOLOGY

## 2023-10-06 PROCEDURE — 84999 UNLISTED CHEMISTRY PROCEDURE: CPT | Performed by: PATHOLOGY

## 2023-10-06 PROCEDURE — 120N000001 HC R&B MED SURG/OB

## 2023-10-06 PROCEDURE — 0DB98ZX EXCISION OF DUODENUM, VIA NATURAL OR ARTIFICIAL OPENING ENDOSCOPIC, DIAGNOSTIC: ICD-10-PCS | Performed by: INTERNAL MEDICINE

## 2023-10-06 PROCEDURE — 85027 COMPLETE CBC AUTOMATED: CPT | Performed by: HOSPITALIST

## 2023-10-06 PROCEDURE — 88184 FLOWCYTOMETRY/ TC 1 MARKER: CPT | Performed by: STUDENT IN AN ORGANIZED HEALTH CARE EDUCATION/TRAINING PROGRAM

## 2023-10-06 PROCEDURE — 999N000141 HC STATISTIC PRE-PROCEDURE NURSING ASSESSMENT: Performed by: INTERNAL MEDICINE

## 2023-10-06 PROCEDURE — C9113 INJ PANTOPRAZOLE SODIUM, VIA: HCPCS | Mod: JZ | Performed by: INTERNAL MEDICINE

## 2023-10-06 PROCEDURE — 360N000076 HC SURGERY LEVEL 3, PER MIN: Performed by: INTERNAL MEDICINE

## 2023-10-06 PROCEDURE — 710N000010 HC RECOVERY PHASE 1, LEVEL 2, PER MIN: Performed by: INTERNAL MEDICINE

## 2023-10-06 PROCEDURE — 36415 COLL VENOUS BLD VENIPUNCTURE: CPT | Performed by: HOSPITALIST

## 2023-10-06 PROCEDURE — 88377 M/PHMTRC ALYS ISHQUANT/SEMIQ: CPT | Performed by: PATHOLOGY

## 2023-10-06 PROCEDURE — 88368 INSITU HYBRIDIZATION MANUAL: CPT | Performed by: PATHOLOGY

## 2023-10-06 RX ORDER — DULOXETIN HYDROCHLORIDE 30 MG/1
30 CAPSULE, DELAYED RELEASE ORAL DAILY
Status: DISCONTINUED | OUTPATIENT
Start: 2023-10-06 | End: 2023-10-08

## 2023-10-06 RX ORDER — HYDRALAZINE HYDROCHLORIDE 20 MG/ML
10 INJECTION INTRAMUSCULAR; INTRAVENOUS EVERY 6 HOURS PRN
Status: DISCONTINUED | OUTPATIENT
Start: 2023-10-06 | End: 2023-10-07

## 2023-10-06 RX ORDER — HALOPERIDOL 5 MG/ML
1 INJECTION INTRAMUSCULAR
Status: COMPLETED | OUTPATIENT
Start: 2023-10-06 | End: 2023-10-06

## 2023-10-06 RX ORDER — ONDANSETRON 2 MG/ML
4 INJECTION INTRAMUSCULAR; INTRAVENOUS EVERY 30 MIN PRN
Status: DISCONTINUED | OUTPATIENT
Start: 2023-10-06 | End: 2023-10-06 | Stop reason: HOSPADM

## 2023-10-06 RX ORDER — LIDOCAINE 40 MG/G
CREAM TOPICAL
Status: DISCONTINUED | OUTPATIENT
Start: 2023-10-06 | End: 2023-10-11 | Stop reason: HOSPADM

## 2023-10-06 RX ORDER — SODIUM CHLORIDE, SODIUM LACTATE, POTASSIUM CHLORIDE, CALCIUM CHLORIDE 600; 310; 30; 20 MG/100ML; MG/100ML; MG/100ML; MG/100ML
INJECTION, SOLUTION INTRAVENOUS CONTINUOUS
Status: DISCONTINUED | OUTPATIENT
Start: 2023-10-06 | End: 2023-10-06 | Stop reason: HOSPADM

## 2023-10-06 RX ORDER — PROPOFOL 10 MG/ML
INJECTION, EMULSION INTRAVENOUS CONTINUOUS PRN
Status: DISCONTINUED | OUTPATIENT
Start: 2023-10-06 | End: 2023-10-06

## 2023-10-06 RX ORDER — GABAPENTIN 300 MG/1
600 CAPSULE ORAL 3 TIMES DAILY
Status: DISCONTINUED | OUTPATIENT
Start: 2023-10-06 | End: 2023-10-11 | Stop reason: HOSPADM

## 2023-10-06 RX ORDER — FLUMAZENIL 0.1 MG/ML
0.2 INJECTION, SOLUTION INTRAVENOUS
Status: DISCONTINUED | OUTPATIENT
Start: 2023-10-06 | End: 2023-10-07

## 2023-10-06 RX ORDER — ONDANSETRON 4 MG/1
4 TABLET, ORALLY DISINTEGRATING ORAL EVERY 30 MIN PRN
Status: DISCONTINUED | OUTPATIENT
Start: 2023-10-06 | End: 2023-10-06 | Stop reason: HOSPADM

## 2023-10-06 RX ORDER — SODIUM CHLORIDE, SODIUM LACTATE, POTASSIUM CHLORIDE, CALCIUM CHLORIDE 600; 310; 30; 20 MG/100ML; MG/100ML; MG/100ML; MG/100ML
INJECTION, SOLUTION INTRAVENOUS CONTINUOUS
Status: DISCONTINUED | OUTPATIENT
Start: 2023-10-06 | End: 2023-10-09 | Stop reason: ALTCHOICE

## 2023-10-06 RX ORDER — PROPOFOL 10 MG/ML
INJECTION, EMULSION INTRAVENOUS PRN
Status: DISCONTINUED | OUTPATIENT
Start: 2023-10-06 | End: 2023-10-06

## 2023-10-06 RX ORDER — LIDOCAINE HYDROCHLORIDE 10 MG/ML
INJECTION, SOLUTION INFILTRATION; PERINEURAL PRN
Status: DISCONTINUED | OUTPATIENT
Start: 2023-10-06 | End: 2023-10-06

## 2023-10-06 RX ADMIN — GABAPENTIN 600 MG: 300 CAPSULE ORAL at 21:44

## 2023-10-06 RX ADMIN — Medication 5 MG: at 01:06

## 2023-10-06 RX ADMIN — ONDANSETRON 4 MG: 2 INJECTION INTRAMUSCULAR; INTRAVENOUS at 15:07

## 2023-10-06 RX ADMIN — PROPOFOL 20 MG: 10 INJECTION, EMULSION INTRAVENOUS at 14:03

## 2023-10-06 RX ADMIN — SODIUM CHLORIDE 8 MG/HR: 9 INJECTION, SOLUTION INTRAVENOUS at 06:52

## 2023-10-06 RX ADMIN — DULOXETINE HYDROCHLORIDE 30 MG: 30 CAPSULE, DELAYED RELEASE ORAL at 17:48

## 2023-10-06 RX ADMIN — SODIUM PHOSPHATE, MONOBASIC, MONOHYDRATE AND SODIUM PHOSPHATE, DIBASIC, ANHYDROUS 15 MMOL: 142; 276 INJECTION, SOLUTION INTRAVENOUS at 07:11

## 2023-10-06 RX ADMIN — ONDANSETRON 4 MG: 2 INJECTION INTRAMUSCULAR; INTRAVENOUS at 02:11

## 2023-10-06 RX ADMIN — HYDROMORPHONE HYDROCHLORIDE 0.5 MG: 1 INJECTION, SOLUTION INTRAMUSCULAR; INTRAVENOUS; SUBCUTANEOUS at 01:07

## 2023-10-06 RX ADMIN — LIDOCAINE HYDROCHLORIDE 50 MG: 10 INJECTION, SOLUTION INFILTRATION; PERINEURAL at 13:59

## 2023-10-06 RX ADMIN — INSULIN ASPART 3 UNITS: 100 INJECTION, SOLUTION INTRAVENOUS; SUBCUTANEOUS at 10:34

## 2023-10-06 RX ADMIN — INSULIN ASPART 3 UNITS: 100 INJECTION, SOLUTION INTRAVENOUS; SUBCUTANEOUS at 06:47

## 2023-10-06 RX ADMIN — HYDRALAZINE HYDROCHLORIDE 5 MG: 20 INJECTION INTRAMUSCULAR; INTRAVENOUS at 15:25

## 2023-10-06 RX ADMIN — PROPOFOL 30 MG: 10 INJECTION, EMULSION INTRAVENOUS at 13:58

## 2023-10-06 RX ADMIN — PROPOFOL 20 MG: 10 INJECTION, EMULSION INTRAVENOUS at 14:06

## 2023-10-06 RX ADMIN — MORPHINE SULFATE 30 MG: 15 TABLET, EXTENDED RELEASE ORAL at 22:01

## 2023-10-06 RX ADMIN — PANTOPRAZOLE SODIUM 80 MG: 40 INJECTION, POWDER, FOR SOLUTION INTRAVENOUS at 06:24

## 2023-10-06 RX ADMIN — PROCHLORPERAZINE EDISYLATE 5 MG: 5 INJECTION INTRAMUSCULAR; INTRAVENOUS at 16:09

## 2023-10-06 RX ADMIN — SODIUM PHOSPHATE, MONOBASIC, MONOHYDRATE AND SODIUM PHOSPHATE, DIBASIC, ANHYDROUS 15 MMOL: 142; 276 INJECTION, SOLUTION INTRAVENOUS at 11:00

## 2023-10-06 RX ADMIN — INSULIN ASPART 3 UNITS: 100 INJECTION, SOLUTION INTRAVENOUS; SUBCUTANEOUS at 17:30

## 2023-10-06 RX ADMIN — LIDOCAINE HYDROCHLORIDE 80 MG: 10 INJECTION, SOLUTION INFILTRATION; PERINEURAL at 14:33

## 2023-10-06 RX ADMIN — PROCHLORPERAZINE EDISYLATE 5 MG: 5 INJECTION INTRAMUSCULAR; INTRAVENOUS at 06:19

## 2023-10-06 RX ADMIN — SODIUM CHLORIDE, POTASSIUM CHLORIDE, SODIUM LACTATE AND CALCIUM CHLORIDE: 600; 310; 30; 20 INJECTION, SOLUTION INTRAVENOUS at 15:59

## 2023-10-06 RX ADMIN — HYDROMORPHONE HYDROCHLORIDE 0.5 MG: 1 INJECTION, SOLUTION INTRAMUSCULAR; INTRAVENOUS; SUBCUTANEOUS at 16:06

## 2023-10-06 RX ADMIN — SODIUM CHLORIDE 8 MG/HR: 9 INJECTION, SOLUTION INTRAVENOUS at 17:33

## 2023-10-06 RX ADMIN — ONDANSETRON 4 MG: 2 INJECTION INTRAMUSCULAR; INTRAVENOUS at 08:51

## 2023-10-06 RX ADMIN — INSULIN ASPART 3 UNITS: 100 INJECTION, SOLUTION INTRAVENOUS; SUBCUTANEOUS at 21:43

## 2023-10-06 RX ADMIN — PROCHLORPERAZINE EDISYLATE 5 MG: 5 INJECTION INTRAMUSCULAR; INTRAVENOUS at 23:32

## 2023-10-06 RX ADMIN — ONDANSETRON 4 MG: 2 INJECTION INTRAMUSCULAR; INTRAVENOUS at 17:28

## 2023-10-06 RX ADMIN — PROPOFOL 150 MCG/KG/MIN: 10 INJECTION, EMULSION INTRAVENOUS at 13:57

## 2023-10-06 RX ADMIN — INSULIN ASPART 3 UNITS: 100 INJECTION, SOLUTION INTRAVENOUS; SUBCUTANEOUS at 01:12

## 2023-10-06 RX ADMIN — SODIUM CHLORIDE, POTASSIUM CHLORIDE, SODIUM LACTATE AND CALCIUM CHLORIDE: 600; 310; 30; 20 INJECTION, SOLUTION INTRAVENOUS at 12:27

## 2023-10-06 RX ADMIN — HALOPERIDOL LACTATE 1 MG: 5 INJECTION, SOLUTION INTRAMUSCULAR at 15:30

## 2023-10-06 ASSESSMENT — ACTIVITIES OF DAILY LIVING (ADL)
DEPENDENT_IADLS:: INDEPENDENT
ADLS_ACUITY_SCORE: 35
ADLS_ACUITY_SCORE: 41
ADLS_ACUITY_SCORE: 35

## 2023-10-06 ASSESSMENT — LIFESTYLE VARIABLES: TOBACCO_USE: 1

## 2023-10-06 NOTE — CONSULTS
"General Surgery Consult Note     Bon Luque MRN# [id number]   YOB: 1955 Age: 68 year old      Date of Admission:  10/5/2023    Reason for Consult: Duodenal obstruction  Consulting Service: ED  Consulting Physician: Dr. Payne    Assessment: Bon Luque is a(n) 68 year old year old male with PMH notable for RCC s/p partial nephrectomy, presents with duodenal obstruction secondary to mass.    Plan:  - Continue NG decompression  - CTA interpretation is rather difficult due to timing, unclear of origin of mass, but certainly advanced regardless or primary, will need better imaging at some point  - Appreciate GI input re possible scope to help further recs  - Asked him to also see if he has the results of the recent EGD anywhere online    -------------------    HPI: Bon Luque is a(n) 68 year old year old male with history as below, notably RCC s/p partial nephrectomy presents with duodenal obstruction.  He reports having \"GI issues\" for many years.  Last month had significant n/v that ultimately resolved at home with bowel rest and PPI.  He did have an EGD within the past week that he tells me he doesn't know the results of.  He does have some vague associated abdominal pain.  Had been having bowel movement/passing flatus.    Past Medical History:  Past Medical History:   Diagnosis Date    Abdominal pain, epigastric 06/23/2022    Normal gastric emptying study.  Peptic ulcer disease or gastritis suspected    Acute cholecystitis 07/12/2022    Hospitalized with acute abdominal pain, abnormal LFTs and CT showing distended gallbladder and bile duct dilatation.  Acute cholecystitis.  Cholecystectomy.    Arthritis     psoriatic    ASCVD (arteriosclerotic cardiovascular disease) 04/28/2016    Coronary artery stent ×2 in RCA following myocardial infarction 2008 , echo June 2014 normal LV function with ejection fraction 50-55% with mild apical inferior hypokinesis    Chronic fatigue 06/23/2022    Chronic " low back pain 04/28/2016    Chronic right shoulder pain 06/23/2022    Coronary artery disease     Depression 12/26/2017    Diabetic peripheral neuropathy associated with type 2 diabetes mellitus (H) 04/28/2016    Abnormal EMG and workup at [facility]    Dilated bile duct 07/03/2017    CT scan with incidental finding of dilated intra-and extrahepatic ducts    HTN (hypertension)     Hypercholesterolemia     Hypogonadism in male     Ischemic cardiomyopathy     Stress Echocardiogram 2017 with decreased LV function with EF 43% worse compared to previous echo.  No change in infarct.  No new ischemia    Left upper quadrant pain 06/30/2017    MI (myocardial infarction) (H)     Obstructive sleep apnea on CPAP     CPAP    Optic neuritis     Psoriasis     Recurrent vomiting 09/01/2023    Renal cell carcinoma, right (H) 06/23/2022    Right partial nephrectomy 2017    Right renal mass     Sebaceous cyst 2010    Thoracic radiculopathy 2004    right T8 intercostal nerve block 2002    Type 2 diabetes mellitus with insulin therapy (H)        Past Surgical History:  Past Surgical History:   Procedure Laterality Date    APPENDECTOMY      COLONOSCOPY      Colonoscopy September 2017 with hyperplastic polyps, repeat in 10 years    CORONARY ANGIOPLASTY      stent    ENDOSCOPIC RETROGRADE CHOLANGIOPANCREATOGRAM N/A 7/12/2022    Procedure: ENDOSCOPIC RETROGRADE CHOLANGIOPANCREATOGRAPHY, STONE EXTRACTION, BILIARY SPHINCTEROTOMY;  Surgeon: Bon Meyer MD;  Location: [facility] OR    KNEE SURGERY      LAPAROSCOPIC CHOLECYSTECTOMY N/A 7/12/2022    Procedure: CHOLECYSTECTOMY, LAPAROSCOPIC;  Surgeon: Adam March MD;  Location: [facility] OR    NOSE SURGERY      MO LAP,PARTIAL NEPHRECTOMY Right 1/10/2018    Procedure: RIGHT ROBOTIC PARTIAL NEPHRECTOMY WITH INTRAOPERATIVE ULTRASOUND;  Surgeon: Chase Rodríguez MD;  Location: [facility];  Service: Urology       Allergies:     Allergies   Allergen Reactions     Atorvastatin Muscle Pain (Myalgia)    Valomag [Arthritis Pain Formula] Nausea and Vomiting     gastric ulcer from aspirin use    Ampicillin Rash     Had a reaction to this medication many years ago.    Codeine Rash     It has been about 30 years ago       Medications:  No current facility-administered medications on file prior to encounter.  clobetasol (TEMOVATE) 0.05 % external cream, Apply topically 2 times daily as needed For psoriasis on hands.  Continuous Blood Gluc Sensor (FREESTYLE SHIRA 14 DAY SENSOR) MISC,   DULoxetine (CYMBALTA) 30 MG capsule, Take 30 mg by mouth daily  gabapentin (NEURONTIN) 300 MG capsule, [GABAPENTIN (NEURONTIN) 300 MG CAPSULE] Take 600 mg by mouth 3 (three) times a day.   insulin lispro (HUMALOG KWIKPEN) 100 UNIT/ML (1 unit dial) KWIKPEN, Inject Subcutaneous 3 times daily (before meals) Sliding scale. Average use is 20 units with each meal  metFORMIN (GLUCOPHAGE-XR) 750 MG 24 hr tablet, Take 1 tablet (750 mg) by mouth daily (with dinner)  morphine (MS CONTIN) 30 MG CR tablet, Take 30 mg by mouth every 8 hours  naloxone (NARCAN) 4 MG/0.1ML nasal spray, Spray 1 spray into one nostril alternating nostrils once as needed for opioid reversal  omeprazole (PRILOSEC) 20 MG DR capsule, Take 1 capsule (20 mg) by mouth daily before breakfast  testosterone (ANDROGEL) 20.25 MG/1.25GM (1.62%) topical gel, Place 20.25 mg onto the skin daily  valACYclovir (VALTREX) 500 MG tablet, Take 500 mg by mouth 2 times daily as needed Take twice daily at onset of symptoms for 3 days. Repeat for flares.  Alcohol Swabs PADS, Use to swab the area of the injection or sherry as directed Per insurance coverage  B-D U/F 31G X 8 MM insulin pen needle, FOR ADMINISTERING INSULIN AT HOME  blood glucose (NO BRAND SPECIFIED) test strip, To use to test glucose level in the blood Use to test blood sugar  4 times daily as directed. To accompany glucose monitor brands per insurance coverage.  Insulin Glargine-yfgn 100 UNIT/ML  SOPN, Inject 50 Units Subcutaneous 2 times daily  Sharps Container MISC, Use as directed to dispose of needles, lancets and other sharps Per Insurance coverage        Social History:  Social History     Socioeconomic History    Marital status:      Spouse name: Not on file    Number of children: Not on file    Years of education: Not on file    Highest education level: Not on file   Occupational History    Not on file   Tobacco Use    Smoking status: Former     Passive exposure: Never    Smokeless tobacco: Never   Vaping Use    Vaping Use: Never used   Substance and Sexual Activity    Alcohol use: No     Comment: Alcoholic Drinks/day: alcohol abuse - in remission x16 years    Drug use: No    Sexual activity: Not on file   Other Topics Concern    Not on file   Social History Narrative    Not on file     Social Determinants of Health     Financial Resource Strain: Not on file   Food Insecurity: Not on file   Transportation Needs: Not on file   Physical Activity: Not on file   Stress: Not on file   Social Connections: Not on file   Interpersonal Safety: Not on file   Housing Stability: Not on file       Family History:  No family history on file.    ROS:  As noted above.     Exam:  BP (!) 185/91 (BP Location: Left arm, Patient Position: Sitting, Cuff Size: Adult Regular)   Pulse 102   Temp 99.2  F (37.3  C) (Oral)   Resp 20   Ht 1.829 m (6')   Wt 93.4 kg (206 lb)   SpO2 95%   BMI 27.94 kg/m    General: Alert, interactive, & in NAD  Resp: NLB  Cardiac: Mild tachycardia  Abdomen: Soft, mildly tender throughout, nondistended.   Extremities: No LE edema or obvious joint abnormalities  Skin: Warm and dry, no jaundice or rash    Labs:  All laboratory data reviewed    Imaging: Personally reviewed CTA, mass encasing TRINA      --    Shaun Elkins MD      12:23 AM, 10/6/2023

## 2023-10-06 NOTE — ANESTHESIA POSTPROCEDURE EVALUATION
Patient: Bon Luque    Procedure: Procedure(s):  ESOPHAGOGASTRODUODENOSCOPY, WITH ENDOSCOPIC ULTRASOUND WITH FINE NEEDLE ASPIRATION AND DUODENAL BIOPSY       Anesthesia Type:  MAC    Note:  Disposition: Inpatient   Postop Pain Control: Uneventful            Sign Out: Well controlled pain   PONV: No   Neuro/Psych: Uneventful            Sign Out: Acceptable/Baseline neuro status   Airway/Respiratory: Uneventful            Sign Out: Acceptable/Baseline resp. status   CV/Hemodynamics: Uneventful            Sign Out: Acceptable CV status; No obvious hypovolemia; No obvious fluid overload   Other NRE: NONE   DID A NON-ROUTINE EVENT OCCUR? No           Last vitals:  Vitals Value Taken Time   /87 10/06/23 1618   Temp 38.2  C (100.8  F) 10/06/23 1600   Pulse 99 10/06/23 1636   Resp 18 10/06/23 1636   SpO2 97 % 10/06/23 1636   Vitals shown include unvalidated device data.    Electronically Signed By: Mateus Stuart MD  October 6, 2023  5:15 PM

## 2023-10-06 NOTE — PLAN OF CARE
Problem: Plan of Care - These are the overarching goals to be used throughout the patient stay.    Goal: Optimal Comfort and Wellbeing  Outcome: Progressing     Pt reporting abdominal pain overnight, though it was apparently improved compared to when he first was admitted. PRN dilaudid given once.     NG tube in place. NG tube showed bloody output ~0600. Dr. Jefferson paged and verbally ordered suction to be paused. Clamped NG.   Nausea reported, PRN zofran and compazine mostly resolved.

## 2023-10-06 NOTE — ANESTHESIA PREPROCEDURE EVALUATION
Anesthesia Pre-Procedure Evaluation    Patient: Bon Luque   MRN: [id number] : 1955        Procedure : Procedure(s):  ESOPHAGOGASTRODUODENOSCOPY, WITH ENDOSCOPIC ULTRASOUND          Past Medical History:   Diagnosis Date    Abdominal pain, epigastric 2022    Normal gastric emptying study.  Peptic ulcer disease or gastritis suspected    Acute cholecystitis 2022    Hospitalized with acute abdominal pain, abnormal LFTs and CT showing distended gallbladder and bile duct dilatation.  Acute cholecystitis.  Cholecystectomy.    Arthritis     psoriatic    ASCVD (arteriosclerotic cardiovascular disease) 2016    Coronary artery stent ×2 in RCA following myocardial infarction  , echo 2014 normal LV function with ejection fraction 50-55% with mild apical inferior hypokinesis    Chronic fatigue 2022    Chronic low back pain 2016    Chronic right shoulder pain 2022    Coronary artery disease     Depression 2017    Diabetic peripheral neuropathy associated with type 2 diabetes mellitus (H) 2016    Abnormal EMG and workup at [facility]    Dilated bile duct 2017    CT scan with incidental finding of dilated intra-and extrahepatic ducts    HTN (hypertension)     Hypercholesterolemia     Hypogonadism in male     Ischemic cardiomyopathy     Stress Echocardiogram  with decreased LV function with EF 43% worse compared to previous echo.  No change in infarct.  No new ischemia    Left upper quadrant pain 2017    MI (myocardial infarction) (H)     Obstructive sleep apnea on CPAP     CPAP    Optic neuritis     Psoriasis     Recurrent vomiting 2023    Renal cell carcinoma, right (H) 2022    Right partial nephrectomy 2017    Right renal mass     Sebaceous cyst 2010    Thoracic radiculopathy 2004    right T8 intercostal nerve block     Type 2 diabetes mellitus with insulin therapy (H)       Past Surgical History:   Procedure Laterality Date     APPENDECTOMY      COLONOSCOPY      Colonoscopy September 2017 with hyperplastic polyps, repeat in 10 years    CORONARY ANGIOPLASTY      stent    ENDOSCOPIC RETROGRADE CHOLANGIOPANCREATOGRAM N/A 7/12/2022    Procedure: ENDOSCOPIC RETROGRADE CHOLANGIOPANCREATOGRAPHY, STONE EXTRACTION, BILIARY SPHINCTEROTOMY;  Surgeon: Bon Meyer MD;  Location: [facility] OR    KNEE SURGERY      LAPAROSCOPIC CHOLECYSTECTOMY N/A 7/12/2022    Procedure: CHOLECYSTECTOMY, LAPAROSCOPIC;  Surgeon: Adam March MD;  Location: [facility] OR    NOSE SURGERY      ME LAP,PARTIAL NEPHRECTOMY Right 1/10/2018    Procedure: RIGHT ROBOTIC PARTIAL NEPHRECTOMY WITH INTRAOPERATIVE ULTRASOUND;  Surgeon: Chase Rodríguez MD;  Location: [facility];  Service: Urology      Allergies   Allergen Reactions    Atorvastatin Muscle Pain (Myalgia)    Valomag [Arthritis Pain Formula] Nausea and Vomiting     gastric ulcer from aspirin use    Ampicillin Rash     Had a reaction to this medication many years ago.    Codeine Rash     It has been about 30 years ago      Social History     Tobacco Use    Smoking status: Former     Passive exposure: Never    Smokeless tobacco: Never   Substance Use Topics    Alcohol use: No     Comment: Alcoholic Drinks/day: alcohol abuse - in remission x16 years      Wt Readings from Last 1 Encounters:   10/05/23 93.4 kg (206 lb)        Anesthesia Evaluation   Pt has had prior anesthetic.     No history of anesthetic complications       ROS/MED HX  ENT/Pulmonary:     (+) sleep apnea (Noncompliant), doesn't use CPAP,              tobacco use, Past use,                      Neurologic:     (+)    peripheral neuropathy,                            Cardiovascular:     (+) Dyslipidemia hypertension- -  CAD -  - stent-      CHF                             (-) pacemaker and pacemaker   METS/Exercise Tolerance:     Hematologic:       Musculoskeletal:   (+)  arthritis,             GI/Hepatic: Comment: Epigastric  pain and nausea      Renal/Genitourinary:     (+) renal disease,             Endo:     (+)  type II DM,       Diabetic complications: neuropathy cardiac problems.      Obesity,       Psychiatric/Substance Use:     (+) psychiatric history anxiety and depression  H/O chronic opiod use .     Infectious Disease:       Malignancy:   (+) Malignancy, History of Other.    Other:      (+)  , H/O Chronic Pain,         Physical Exam    Airway      Comment: NGT in place    Mallampati: III   TM distance: > 3 FB   Neck ROM: full     Respiratory Devices and Support         Dental       (+) Multiple visibly decayed, broken teeth      Cardiovascular          Rhythm and rate: regular     Pulmonary           breath sounds clear to auscultation       Other findings:     ECHO 1/21/23:    Reason For Study: REDD  Ordering Physician: SHWETA NAVARRO  Performed By: PETROS     BSA: 2.2 m2  Height: 70 in  Weight: 225 lb  HR: 70  BP: 148/70 mmHg  ______________________________________________________________________________  Procedure  Complete Portable Echo Adult. Definity (NDC #74778-914) given intravenously.  ______________________________________________________________________________  Interpretation Summary     Left ventricular size, wall motion and function are normal. The ejection  fraction is 55-60%.  There is mild concentric left ventricular hypertrophy.  Normal right ventricle size and systolic function.  The ascending aorta is Mildly dilated.  No hemodynamically significant valvular abnormalities on 2D or color flow  imaging.          OUTSIDE LABS:  CBC:   Lab Results   Component Value Date    WBC 17.1 (H) 10/06/2023    WBC 14.1 (H) 10/05/2023    HGB 13.2 (L) 10/06/2023    HGB 14.6 10/05/2023    HCT 40.3 10/06/2023    HCT 44.1 10/05/2023     10/06/2023     10/05/2023     BMP:   Lab Results   Component Value Date     10/06/2023     10/05/2023    POTASSIUM 3.5 10/06/2023    POTASSIUM 3.6 10/05/2023    CHLORIDE  100 10/06/2023    CHLORIDE 95 (L) 10/05/2023    CO2 24 10/06/2023    CO2 21 (L) 10/05/2023    BUN 11.4 10/06/2023    BUN 11.4 10/05/2023    CR 0.78 10/06/2023    CR 0.74 10/05/2023     (H) 10/06/2023     (H) 10/06/2023     COAGS: No results found for: PTT, INR, FIBR  POC: No results found for: BGM, HCG, HCGS  HEPATIC:   Lab Results   Component Value Date    ALBUMIN 4.2 10/05/2023    ALBUMIN 4.2 10/05/2023    PROTTOTAL 8.2 10/05/2023    PROTTOTAL 8.2 10/05/2023    ALT 19 10/05/2023    ALT 19 10/05/2023    AST 19 10/05/2023    AST 19 10/05/2023    ALKPHOS 127 10/05/2023    ALKPHOS 127 10/05/2023    BILITOTAL 1.0 10/05/2023    BILITOTAL 1.0 10/05/2023     OTHER:   Lab Results   Component Value Date    LACT 2.0 10/05/2023    A1C 8.0 (H) 10/05/2023    MIGUEL 9.0 10/06/2023    PHOS 2.2 (L) 10/05/2023    MAG 1.8 10/05/2023    LIPASE 46 10/05/2023    TSH 0.70 06/16/2022       Anesthesia Plan    ASA Status:  3    NPO Status:  NPO Appropriate    Anesthesia Type: MAC.   Induction: N/a.   Maintenance: TIVA.        Consents    Anesthesia Plan(s) and associated risks, benefits, and realistic alternatives discussed. Questions answered and patient/representative(s) expressed understanding.     - Discussed:     - Discussed with:  Patient            Postoperative Care    Pain management: Multi-modal analgesia, IV analgesics.   PONV prophylaxis: Ondansetron (or other 5HT-3)     Comments:                Ailin Simeon MD

## 2023-10-06 NOTE — CONSULTS
Care Management Initial Consult    General Information  Assessment completed with: Patient, Spouse or significant other, patient, spouse Arden  Type of CM/SW Visit: Initial Assessment    Primary Care Provider verified and updated as needed: Yes   Readmission within the last 30 days: no previous admission in last 30 days      Reason for Consult: discharge planning  Advance Care Planning: Advance Care Planning Reviewed: no concerns identified          Communication Assessment  Patient's communication style: spoken language (English or Bilingual)             Cognitive  Cognitive/Neuro/Behavioral: WDL                      Living Environment:   People in home: spouse  patient, spouse Arden  Current living Arrangements: house      Able to return to prior arrangements: yes       Family/Social Support:  Care provided by: self  Provides care for: no one  Marital Status:   Wife, Children  Arden       Description of Support System: Supportive, Involved    Support Assessment: Patient communicates needs well met, Adequate family and caregiver support, Adequate social supports    Current Resources:   Patient receiving home care services: No     Community Resources: None  Equipment currently used at home:    Supplies currently used at home: Diabetic Supplies, Other (CPAP supplies)    Employment/Financial:  Employment Status: retired        Financial Concerns:             Does the patient's insurance plan have a 3 day qualifying hospital stay waiver?  No    Lifestyle & Psychosocial Needs:  Social Determinants of Health     Food Insecurity: Not on file   Depression: At risk (9/1/2023)    PHQ-2     PHQ-2 Score: 3   Housing Stability: Not on file   Tobacco Use: Medium Risk (9/12/2023)    Patient History     Smoking Tobacco Use: Former     Smokeless Tobacco Use: Never     Passive Exposure: Never   Financial Resource Strain: Not on file   Alcohol Use: Not on file   Transportation Needs: Not on file   Physical Activity: Not on file    Interpersonal Safety: Not on file   Stress: Not on file   Social Connections: Not on file       Functional Status:  Prior to admission patient needed assistance:   Dependent ADLs:: Independent  Dependent IADLs:: Independent  Assesssment of Functional Status: Not at  functional baseline    Mental Health Status:          Chemical Dependency Status:                Values/Beliefs:  Spiritual, Cultural Beliefs, Jehovah's witness Practices, Values that affect care:                 Additional Information:  Met with patient and spouse Arden at bedside, introduced self and care management role. Patient and Arden live in their private residence. He is independent with all I/ADLs.     CM to follow hospital progression, treatment team recommendations and assist with discharge planning as needed. Spouse to transport home.     Sapphire Finley RN

## 2023-10-06 NOTE — PROGRESS NOTES
"[facility]    Medicine Progress Note - Hospitalist Service    Date of Admission:  10/5/2023    Assessment & Plan                Bon Luque is a 68 year old male with history of insulin-dependent diabetes, chronic pain syndrome on MS Contin, coronary artery disease status post stent in 2008, hypertension, hyperlipidemia, MARÍA ELENA, renal cell carcinoma status post partial nephrectomy in 2017, recent issues with nausea and vomiting status post normal gastric emptying study about a month ago, presented for refractory nausea, vomiting, and emesis found to have small bowel obstruction secondary to likely mass, as well as mild DKA. Hospital Day: 2        #Small bowel obstruction  #Likely duodenal mass  #Retroperitoneal lymphadenopathy  -Patient with what he describes as \"stomach trouble\" for the past 15 years or so, much worse in the past 6 weeks or so, was having daily vomiting and started on PPI with improvement for the last 6 weeks.  Recently began to feel worse again.  Presented with nausea, vomiting.  CT showing obstruction at the second portion of the duodenum associated with likely mass, involvement of the paola hepatis and encasement of the common hepatic and GD arteries.  Also noted enlarged regional lymph nodes.  Very concerning for malignancy.  -Taken for EGD/EUS today, biopsies taken  -Continue NG tube decompression  -N.p.o., okay to clamp tube for meds  -Pain meds, antiemetics as needed  -General surgery and GI teams following  -On IV PPI given some bleeding from NG tube, gastric abrasion noted during EGD     #Mild DKA, resolved  -Patient with positive ketones and anion gap but was not actually acidotic.  Suspect was just very hypovolemic  -DKA has resolved after aggressive IV fluid and repeated doses of subcu insulin  -Scale back on IV fluid  -Accu-Cheks and subcutaneous insulin every 4 hours  -Home diabetes regimen is metformin, Humalog sliding scale typically 20 units with meals. " Unclear from chart if still taking glargine 50 units twice daily  -Increase Lantus dose to 20 units nightly     #Chronic pain syndrome  -Okay to take home MS Contin, ok to clamp NG to administer medication  -IV Dilaudid as needed  -Okay to resume home Cymbalta, gabapentin     #Hypophosphatemia  -Protocol     #Leukocytosis  -Suspect reactive  -Trend is up today but possibly due to a little bit rough overnight with some trauma from NG tube with bleeding  -Recheck in the morning     #Hypertension  -Suspect driven by nausea, discomfort currently  -IV hydralazine as needed for severe range blood pressures       DVT Prophylaxis: Moderate risk. Pharmacologic prophylaxis contraindicated due to active bleeding  Diet: NPO for Medical/Clinical Reasons Except for: Ice Chips    Seals Catheter: Not present  Lines: None     Cardiac Monitoring: ACTIVE order. Indication: DKA  Code Status: Full Code      Clinically Significant Risk Factors             # Anion Gap Metabolic Acidosis: Highest Anion Gap = 20 mmol/L in last 2 days, will monitor and treat as appropriate      # Hypertension: Noted on problem list       # DMII: A1C = 8.0 % (Ref range: <5.7 %) within past 6 months, PRESENT ON ADMISSION  # Overweight: Estimated body mass index is 27.94 kg/m  as calculated from the following:    Height as of this encounter: 1.829 m (6').    Weight as of this encounter: 93.4 kg (206 lb)., PRESENT ON ADMISSION            Disposition Plan   Disposition: Home    Expected Discharge Date: 10/07/2023      Destination: home with family       Medically ready to discharge today: No     The patient's care was discussed with the Patient and Patient's Family.    Dilma Bustamante MD  Hospitalist Service  [facility]  Securely message with Dinah (more info)  Text page via [facility] Paging/Directory   ______________________________________________________________________      Physical Exam   Vital Signs: Temp: 97.5  F (36.4  C) Temp src:  Oral BP: (!) 180/82 Pulse: 98   Resp: 14 SpO2: 97 % O2 Device: None (Room air)    Weight: 206 lbs 0 oz  General: Uncomfortable appearing male sitting up on side of bed.  Alert and oriented x3.  Somewhat distracted by discomfort.   HEENT: Head normocephalic atraumatic, oral mucosa tacky. Sclerae anicteric  Skin:  a bit flushed. Psoriatic lesions on BLE  Extremities: No peripheral edema  Psych: Normal affect, somewhat irritable mood  Neuro: Grossly normal      Medical Decision Making               Data   Recent Results (from the past 12 hour(s))   Basic metabolic panel    Collection Time: 10/06/23  6:11 AM   Result Value Ref Range    Sodium 138 135 - 145 mmol/L    Potassium 3.5 3.4 - 5.3 mmol/L    Chloride 100 98 - 107 mmol/L    Carbon Dioxide (CO2) 24 22 - 29 mmol/L    Anion Gap 14 7 - 15 mmol/L    Urea Nitrogen 11.4 8.0 - 23.0 mg/dL    Creatinine 0.78 0.67 - 1.17 mg/dL    GFR Estimate >90 >60 mL/min/1.73m2    Calcium 9.0 8.8 - 10.2 mg/dL    Glucose 266 (H) 70 - 99 mg/dL   CBC with platelets    Collection Time: 10/06/23  6:11 AM   Result Value Ref Range    WBC Count 17.1 (H) 4.0 - 11.0 10e3/uL    RBC Count 5.11 4.40 - 5.90 10e6/uL    Hemoglobin 13.2 (L) 13.3 - 17.7 g/dL    Hematocrit 40.3 40.0 - 53.0 %    MCV 79 78 - 100 fL    MCH 25.8 (L) 26.5 - 33.0 pg    MCHC 32.8 31.5 - 36.5 g/dL    RDW 14.1 10.0 - 15.0 %    Platelet Count 298 150 - 450 10e3/uL   Glucose by meter    Collection Time: 10/06/23  6:22 AM   Result Value Ref Range    GLUCOSE BY METER POCT 256 (H) 70 - 99 mg/dL   Glucose by meter    Collection Time: 10/06/23 10:32 AM   Result Value Ref Range    GLUCOSE BY METER POCT 286 (H) 70 - 99 mg/dL   Glucose by meter    Collection Time: 10/06/23 12:20 PM   Result Value Ref Range    GLUCOSE BY METER POCT 256 (H) 70 - 99 mg/dL   UPPER EUS    Collection Time: 10/06/23  1:57 PM   Result Value Ref Range    Upper EUS       [facility]  [address] [place]  [address]  _______________________________________________________________________________  Patient Name: Porfirio Luque             Procedure Date: 10/6/2023 1:57 PM  MRN: [id number]                       Account Number: [id number]  YOB: 1955              Admit Type: Inpatient  Age: 68                               Room: [place] [address]  Note Status: Finalized                Attending MD: PORFIRIO VILLEGAS MD,   Instrument Name: EUS Linear Scope [id number]   _______________________________________________________________________________     Procedure:             Upper EUS  Indications:           Duodenal Wall Thickening, Lymphadenopathy  Providers:             PORFIRIO VILLEGSA MD  Patient Profile:       This is a 68 year old male.  Referring MD:            Medicines:             Propofol per Anesthesia  Complications:         No immediate complications.  ___________________________________________________________________ ____________  Procedure:             Pre-Anesthesia Assessment:                         - Prior to the procedure, a History and Physical was                          performed, and patient medications, allergies and                          sensitivities were reviewed. The patient's tolerance                          of previous anesthesia was reviewed.                         After obtaining informed consent, the endoscope was                          passed under direct vision. Throughout the procedure,                          the patient's blood pressure, pulse, and oxygen                          saturations were monitored continuously. The EUS                          linear scope was introduced through the mouth, and                          advanced to the second part of duodenum. The upper EUS                          was accomplished without difficulty. The patient                          tolerated the procedure well.                                                                                     Findings:       ENDOSONOGRAPHIC FINDING: :       A few abnormal lymph nodes were visualized in the peripancreatic region.        The largest measured 17 mm by 12 mm in maximal cross-sectional diameter.        The nodes were oval, hypoechoic and had well defined margins. Fine        needle aspiration for cytology was performed. Color Doppler imaging was        utilized prior to needle puncture to confirm a lack of significant        vascular structures within the needle path. Seven passes were made with        the 22 gauge needle using a transduodenal approach. No stylet was used.        A cytologist was present and performed a preliminary cytologic        examination. The cellularity of the specimen was adequate. Final        cytology results are pending.       ENDOSCOPIC FINDING: :       Diffuse severely congested mucosa without active bleeding and with no        stigmata of bleeding was found in the first portion of the duodenum and        in the second porti on of the duodenum. Biopsies were taken with a cold        forceps for histology.                                                                                   Moderate Sedation:       MAC  Impression:            - A few abnormal lymph nodes were visualized in the                          peripancreatic region. Fine needle aspiration                          performed.                         - Congested duodenal mucosa. Biopsied.                         - Linear ulcer in the gastric cardia (likely related                          to NG tube.  Recommendation:        - Await pathology results.                         - [facility]                         - NG tube to low intermittent suction.                         - NPO diet status.                                                                                     Porfirio Meyer MD  ____________________  PORFIRIO MEYER MD  10/6/2023 2:38:41 PM  I was physically  present for the entire viewing portio n of the exam.  __________________________  Signature of teaching physician  Breanna/[id number] VILLEGAS MD  Number of Addenda: 0    Note Initiated On: 10/6/2023 1:57 PM  Scope In: 2:05:50 PM  Scope Out: 2:28:00 PM     Glucose by meter    Collection Time: 10/06/23  3:19 PM   Result Value Ref Range    GLUCOSE BY METER POCT 206 (H) 70 - 99 mg/dL   Glucose by meter    Collection Time: 10/06/23  5:15 PM   Result Value Ref Range    GLUCOSE BY METER POCT 225 (H) 70 - 99 mg/dL     Interval History     Patient has had NG clamped for several hours and very uncomfortable now  Place NG to LIS  Give MS Contin when able- worried if we give now, may be given into distended stomach and not absorbed. RN will attempt to decompress stomach before giving MS Contin. I think this will likely help his comfort a lot.  Discussed plan for the day with patient. He remains anxious about plan for diagnosis and next steps. GI have seen him, anticipate possible EGD/EUS today but defer to GI.    Not ready for discharge

## 2023-10-06 NOTE — PROGRESS NOTES
GI planning on EGD and EUS later today.  I did review the concern of bleeding into the NG which most likely was traumatic.  Hemoglobin is stable.  I agree with NG being placed back to suction.  We will wait for GI input on the procedure later today.  And follow-up with recommendations.    Zhane FLOWERS  [facility]  [address] [place] [address]  Phone- [phone]  Fax- [phone]

## 2023-10-06 NOTE — ANESTHESIA CARE TRANSFER NOTE
Patient: Bon Luque    Procedure: Procedure(s):  ESOPHAGOGASTRODUODENOSCOPY, WITH ENDOSCOPIC ULTRASOUND WITH FINE NEEDLE ASPIRATION AND DUODENAL BIOPSY       Diagnosis: Generalized abdominal pain [R10.84]  Diagnosis Additional Information: No value filed.    Anesthesia Type:   MAC     Note:    Oropharynx: oropharynx clear of all foreign objects and spontaneously breathing  Level of Consciousness: drowsy  Oxygen Supplementation: face mask  Level of Supplemental Oxygen (L/min / FiO2): 8  Independent Airway: airway patency satisfactory and stable  Dentition: dentition unchanged  Vital Signs Stable: post-procedure vital signs reviewed and stable  Report to RN Given: handoff report given  Patient transferred to: PACU    Handoff Report: Identifed the Patient, Identified the Reponsible Provider, Reviewed the pertinent medical history, Discussed the surgical course, Reviewed Intra-OP anesthesia mangement and issues during anesthesia, Set expectations for post-procedure period and Allowed opportunity for questions and acknowledgement of understanding      Vitals:  Vitals Value Taken Time   /92 10/06/23 1447   Temp 37.8  C (100.1  F) 10/06/23 1445   Pulse 88 10/06/23 1447   Resp 17 10/06/23 1447   SpO2 100 % 10/06/23 1447   Vitals shown include unvalidated device data.    Electronically Signed By: CRISTINA Schaefer CRNA  October 6, 2023  2:49 PM

## 2023-10-06 NOTE — H&P
GENERAL PRE-PROCEDURE:   Procedure:  EUS/EGD - Evaluate for cause of bleed and duodenal obstruction  Date/Time:  10/6/2023 12:23 PM    Verbal consent obtained?: Yes    Written consent obtained?: Yes    Consent given by:  Patient  Patient states understanding of procedure being performed: Yes    Patient's understanding of procedure matches consent: Yes    Procedure consent matches procedure scheduled: Yes    Expected level of sedation:  Deep  Appropriately NPO:  Yes  ASA Class:  3  Mallampati  :  Grade 3- soft palate visible, posterior pharyngeal wall not visible  Lungs:  Lungs clear with good breath sounds bilaterally  Heart:  Normal heart sounds and rate  History & Physical reviewed:  History and physical reviewed and no updates needed  Statement of review:  I have reviewed the lab findings, diagnostic data, medications, and the plan for sedation

## 2023-10-06 NOTE — CONSULTS
GI CONSULT NOTE      Name: Bon Luque  Medical Record #: [id number]  YOB: 1955  Date of Admission: 10/5/2023  Date/Time: 10/6/2023/10:41 AM     CHIEF COMPLAINT: Abdominal pain and vomiting     HISTORY OF PRESENT ILLNESS: We were asked to see Bon Luque by Dr Bustamante for evaluation of possible bowel obstruction. Bon Luque is a 68 year old year old male with history of type 2 diabetes, cholecystectomy, chronic pain  with peripheral neuropathy on chronic opiates, history of renal cell carcinoma status post right partial nephrectomy January 2018 who presented to the ER with abdominal pain and vomiting. No hematemesis. CTA of the abdomen showed possible obstruction versus partial obstruction in the second portion of the duodenum.  There is also soft tissue stranding toward the paola hepatis concerning for malignancy from the duodenum versus pancreas.  The stomach was distended and there were numerous enlarged upper abdominal and retroperitoneal peritoneal nodes.  There is also wall thickening of the esophagus.  The patient had an NG placed to intermittent suction overnight.  It appears as though this was a traumatic placement.  There was 1950 cc out overnight.  Early this morning, there was bright red blood output noted and the NG suction was briefly stopped.  Hemoglobin is stable.  NG has been placed back to intermittent suction.    The patient reports having difficulties with intermittent nausea and vomiting over the last month or so.  He had more severe symptoms about a month ago and was seen by his primary care provider.  He was started on a PPI.  Gastric emptying study was done in September 11, 2023 but did not show any findings of gastroparesis.  Upper endoscopy by Dr. Acuna September 27, 2023 showed normal esophagus, stomach, and duodenum.  Esophageal biopsies as well as gastric and duodenal biopsies were negative.    At this time, the patient reports having improvement of his abdominal  discomfort.  He also feels less nauseated.  He is passing stool with reports of passing a small brown stool early this AM.  Believe his stool appeared more dark or black in color earlier.  He believes his weight is down but he is not certain.    REVIEW OF SYSTEMS (ROS): Complete review of systems negative other than listed in HPI.    PAST MEDICAL HISTORY:  Past Medical History:   Diagnosis Date    Abdominal pain, epigastric 06/23/2022    Normal gastric emptying study.  Peptic ulcer disease or gastritis suspected    Acute cholecystitis 07/12/2022    Hospitalized with acute abdominal pain, abnormal LFTs and CT showing distended gallbladder and bile duct dilatation.  Acute cholecystitis.  Cholecystectomy.    Arthritis     psoriatic    ASCVD (arteriosclerotic cardiovascular disease) 04/28/2016    Coronary artery stent ×2 in RCA following myocardial infarction 2008 , echo June 2014 normal LV function with ejection fraction 50-55% with mild apical inferior hypokinesis    Chronic fatigue 06/23/2022    Chronic low back pain 04/28/2016    Chronic right shoulder pain 06/23/2022    Coronary artery disease     Depression 12/26/2017    Diabetic peripheral neuropathy associated with type 2 diabetes mellitus (H) 04/28/2016    Abnormal EMG and workup at [facility]    Dilated bile duct 07/03/2017    CT scan with incidental finding of dilated intra-and extrahepatic ducts    HTN (hypertension)     Hypercholesterolemia     Hypogonadism in male     Ischemic cardiomyopathy     Stress Echocardiogram 2017 with decreased LV function with EF 43% worse compared to previous echo.  No change in infarct.  No new ischemia    Left upper quadrant pain 06/30/2017    MI (myocardial infarction) (H)     Obstructive sleep apnea on CPAP     CPAP    Optic neuritis     Psoriasis     Recurrent vomiting 09/01/2023    Renal cell carcinoma, right (H) 06/23/2022    Right partial nephrectomy 2017    Right renal mass     Sebaceous cyst 2010    Thoracic  radiculopathy 2004    right T8 intercostal nerve block 2002    Type 2 diabetes mellitus with insulin therapy (H)         FAMILY HISTORY:  No family history on file.      MEDICATIONS PRIOR TO ADMISSION: (Not in a hospital admission)         ALLERGIES: Atorvastatin, Valomag [arthritis pain formula], Ampicillin, and Codeine    PHYSICAL EXAM:    BP (!) 167/82   Pulse 94   Temp 98.5  F (36.9  C) (Oral)   Resp 19   Ht 1.829 m (6')   Wt 93.4 kg (206 lb)   SpO2 96%   BMI 27.94 kg/m      GENERAL: Pleasant, no obvious distress  NECK: Supple without adenopathy  EYES: No scleral icterus  LUNGS: Clear to auscultation bilaterally  HEART: S1S2, no lower extremity edema  ABDOMEN: Non-distended. Positive bowel sounds. Soft, non-tender  MUSKULOSKELETAL:  Warm and well perfused, moves all extremities well  SKIN: No jaundice  NEUROLOGIC: Alert and oriented  PSYCHIATRIC: Normal affect    LAB DATA:  CMP Results:   Recent Labs   Lab Test 10/06/23  1032 10/06/23  0622 10/06/23  0611 10/05/23  1608 10/05/23  1431 10/05/23  1209 10/05/23  1204 09/01/23  1158 01/20/23  1906 07/27/22  1533   NA  --   --  138  --  136  --  138 138   < >  --    POTASSIUM  --   --  3.5  --  3.6  --  3.7 4.5   < >  --    CHLORIDE  --   --  100  --  95*  --  94* 102   < >  --    CO2  --   --  24  --  21*  --  26 25   < >  --    ANIONGAP  --   --  14  --  20*  --  18* 11   < >  --    * 256* 266*   < > 356*   < > 398* 169*   < >  --    BUN  --   --  11.4  --  11.4  --  12.2 7.9*   < >  --    CR  --   --  0.78  --  0.74  --  0.77 0.86   < >  --    BILITOTAL  --   --   --   --   --   --  1.0  1.0 0.6  --  0.4   ALKPHOS  --   --   --   --   --   --  127  127 122  --  163*   ALT  --   --   --   --   --   --  19 19 12  --  22   AST  --   --   --   --   --   --  19 19 28  --  31    < > = values in this interval not displayed.      CBC  Recent Labs   Lab 10/06/23  0611 10/05/23  1204   WBC 17.1* 14.1*   RBC 5.11 5.72   HGB 13.2* 14.6   HCT 40.3 44.1    MCV 79 77*   MCH 25.8* 25.5*   MCHC 32.8 33.1   RDW 14.1 13.7    316     INRNo lab results found in last 7 days.   Lipase   Date Value Ref Range Status   10/05/2023 46 13 - 60 U/L Final   09/01/2023 31 13 - 60 U/L Final   07/12/2022 <9 0 - 52 U/L Final   06/16/2022 16 0 - 52 U/L Final       IMAGING:  EXAM: CTA ABDOMEN PELVIS WITH CONTRAST  LOCATION: [facility]  DATE: 10/5/2023     INDICATION: abdominal pain, vomiting  COMPARISON: 07/12/2022  TECHNIQUE: CT angiogram abdomen pelvis during arterial phase of injection of IV contrast. 2D and 3D MIP reconstructions were performed by the CT technologist. Dose reduction techniques were used.  CONTRAST: 90 mL Isovue 370     FINDINGS:  ANGIOGRAM ABDOMEN/PELVIS: The aorta is normal in caliber. Mild atherosclerosis without high-grade stenosis. No active extravasation. LOWER CHEST: Trace right pleural effusion. Patchy bibasilar atelectasis/scarring. Mild degenerative interval thickening   of the esophagus.     HEPATOBILIARY: Hepatic steatosis. Cholecystectomy. No convincing pathologic biliary ductal dilation.     PANCREAS: No pancreatic ductal dilation.     SPLEEN: Normal.     ADRENAL GLANDS: Normal.     KIDNEYS/BLADDER: Partial right nephrectomy with residual scarring and volume loss in the right mid kidney. No hydronephrosis or genitourinary calculi.     BOWEL: There is severe fluid-filled distention of the stomach. There is also marked diffuse circumferential wall thickening of the second portion of the duodenum and duodenal bulb. There is ill-defined soft tissue thickening extending from the second   portion towards the portal caval region and encasing the common hepatic and proximal gastroduodenal arteries on series 4 image 74. There is minimal surrounding fat stranding. No free air. There is a small duodenal diverticulum at the second-third portion   of the duodenum. No evidence of appendicitis.     LYMPH NODES: There are numerous  borderline to mildly enlarged lymph nodes in the gastrohepatic, paola hepatis, peripancreatic, and retroperitoneal chains. The largest is in the retrocaval chain measuring 2.8 x 1.3 cm on series 4 image 83.     PELVIC ORGANS: Prostate calcifications.     MUSCULOSKELETAL: No suspicious osseous lesions. Degenerative change of the spine. Old, healed right inferior pubic ramus fracture.                                                                      IMPRESSION:  1.  Findings highly concerning for obstruction or partial obstruction at the second portion the duodenum where there is marked circumferential wall thickening. Additionally, there is soft tissue stranding extending towards the paola hepatis/portacaval   region with encasement of the common hepatic and gastroduodenal arteries. The constellation of findings are concerning for a malignant process likely from the duodenum versus pancreas. An inflammatory process such as duodenitis or peptic ulcer disease   are in the differential but felt to be less likely the primary process. EGD is recommended for further evaluation.  2.  The stomach is markedly distended with fluid-filled contents. Additionally, there is diffuse circumferential wall thickening of the esophagus which may be related to vomiting and/or esophagitis.  3.  There are numerous enlarged upper abdominal and retroperitoneal lymph nodes which are suspicious.  4.  Hepatic steatosis.    ASSESSMENT:  Partial obstruction at the second portion of the duodenum, concerning for malignant process involving the pancreas or duodenum with numerous enlarged upper abdominal and retroperitoneal lymph nodes-- This is a 68 year old year old male with history of type 2 diabetes, cholecystectomy, chronic pain  with peripheral neuropathy on chronic opiates, history of renal cell carcinoma status post right partial nephrectomy January 2018 who presented to the ER with abdominal pain and vomiting. He describes having a one  month history of symptoms, but his gi symptoms severely worsened 10/5/23 prompting evaluation.   CTA abdomen and pelvis shows findings of partial obstruction in the second portion of duodenum, concerning for malignant process.   Recent evaluation has included an unremarkable gastric emptying study 9/11/23 and unremarkable EGD 9/27/23 including negative eso, gastirc, and duodenal biopsies.     Findings on imaging and symptoms are concerning for malignancy and especially so in light of his prior history of renal cell carcinoma in 2018.   Will plan to proceed with EGD and EUS later today for further evaluation and biopsies.    Concern for upper gi bleeding with bloody output from NG-- suspect this was related to traumatic placement of NG. Hgb is stable.   Type 2 diabetes- blood glucose control per primary team  Chronic pain- per primary team    PLAN:  NPO. Continue NG to LIS for decompression.   Continue PPI.   Plan for EGD and EUS later today.   Follow Hgb, electrolytes and replace per primary team.     Total time spent on this encounter=45 minutes  Discussed with Dr. Robins and Dr Meyer who will also visit with the patient.                                                Shima Paniagua PA-C  Thank you for the opportunity to participate in the care of this patient.   Please feel free to call me with any questions or concerns.  Phone number [phone].

## 2023-10-06 NOTE — SIGNIFICANT EVENT
Significant Event Note    Time of event: 6:05 AM October 6, 2023    Description of event:  -RN reported mild serosanguineous bleeding through the NG tube and suction was stopped  -Hemodynamically stable  -Came to the hospital with nausea and vomiting  -CT showed obstruction/partial obstruction at the second portion of the duodenum; concern for malignant process from the duodenum versus pancreas versus inflammatory process such as duodenitis or peptic ulcer disease; diffuse wall thickening of the esophagus    #GI bleed  -Differential diagnosis include Macy-Kuhn versus esophagitis versus duodenitis versus malignancy versus peptic ulcer disease    Plan:  Continue holding NG tube  Stat a.m. labs  IV Protonix 80 mg followed by drip  GI service already consulted, will notify sooner if worsening symptoms  Consider repeat CTA for worsening bleeding    Discussed with: bedside nurse    Meek Jefferson MD

## 2023-10-07 LAB
ANION GAP SERPL CALCULATED.3IONS-SCNC: 9 MMOL/L (ref 7–15)
BUN SERPL-MCNC: 9.7 MG/DL (ref 8–23)
CALCIUM SERPL-MCNC: 9.2 MG/DL (ref 8.8–10.2)
CHLORIDE SERPL-SCNC: 102 MMOL/L (ref 98–107)
CREAT SERPL-MCNC: 0.97 MG/DL (ref 0.67–1.17)
DEPRECATED HCO3 PLAS-SCNC: 28 MMOL/L (ref 22–29)
EGFRCR SERPLBLD CKD-EPI 2021: 86 ML/MIN/1.73M2
ERYTHROCYTE [DISTWIDTH] IN BLOOD BY AUTOMATED COUNT: 14.1 % (ref 10–15)
GLUCOSE BLDC GLUCOMTR-MCNC: 136 MG/DL (ref 70–99)
GLUCOSE BLDC GLUCOMTR-MCNC: 137 MG/DL (ref 70–99)
GLUCOSE BLDC GLUCOMTR-MCNC: 140 MG/DL (ref 70–99)
GLUCOSE BLDC GLUCOMTR-MCNC: 155 MG/DL (ref 70–99)
GLUCOSE BLDC GLUCOMTR-MCNC: 160 MG/DL (ref 70–99)
GLUCOSE BLDC GLUCOMTR-MCNC: 179 MG/DL (ref 70–99)
GLUCOSE SERPL-MCNC: 135 MG/DL (ref 70–99)
HCT VFR BLD AUTO: 38.3 % (ref 40–53)
HGB BLD-MCNC: 12.6 G/DL (ref 13.3–17.7)
MAGNESIUM SERPL-MCNC: 1.7 MG/DL (ref 1.7–2.3)
MCH RBC QN AUTO: 26 PG (ref 26.5–33)
MCHC RBC AUTO-ENTMCNC: 32.9 G/DL (ref 31.5–36.5)
MCV RBC AUTO: 79 FL (ref 78–100)
PHOSPHATE SERPL-MCNC: 2.8 MG/DL (ref 2.5–4.5)
PLATELET # BLD AUTO: 260 10E3/UL (ref 150–450)
POTASSIUM SERPL-SCNC: 3.3 MMOL/L (ref 3.4–5.3)
POTASSIUM SERPL-SCNC: 3.9 MMOL/L (ref 3.4–5.3)
RBC # BLD AUTO: 4.85 10E6/UL (ref 4.4–5.9)
SODIUM SERPL-SCNC: 139 MMOL/L (ref 135–145)
WBC # BLD AUTO: 12.6 10E3/UL (ref 4–11)

## 2023-10-07 PROCEDURE — 250N000011 HC RX IP 250 OP 636: Mod: JZ | Performed by: INTERNAL MEDICINE

## 2023-10-07 PROCEDURE — 99231 SBSQ HOSP IP/OBS SF/LOW 25: CPT | Performed by: SURGERY

## 2023-10-07 PROCEDURE — 272N000451 HC KIT SHRLOCK 5FR POWER PICC DOUBLE LUMEN

## 2023-10-07 PROCEDURE — 36569 INSJ PICC 5 YR+ W/O IMAGING: CPT

## 2023-10-07 PROCEDURE — 250N000013 HC RX MED GY IP 250 OP 250 PS 637: Performed by: INTERNAL MEDICINE

## 2023-10-07 PROCEDURE — 258N000003 HC RX IP 258 OP 636: Performed by: INTERNAL MEDICINE

## 2023-10-07 PROCEDURE — 99233 SBSQ HOSP IP/OBS HIGH 50: CPT | Performed by: HOSPITALIST

## 2023-10-07 PROCEDURE — 83735 ASSAY OF MAGNESIUM: CPT | Performed by: HOSPITALIST

## 2023-10-07 PROCEDURE — 250N000013 HC RX MED GY IP 250 OP 250 PS 637: Performed by: HOSPITALIST

## 2023-10-07 PROCEDURE — 85027 COMPLETE CBC AUTOMATED: CPT | Performed by: HOSPITALIST

## 2023-10-07 PROCEDURE — B4185 PARENTERAL SOL 10 GM LIPIDS: HCPCS | Mod: JZ | Performed by: HOSPITALIST

## 2023-10-07 PROCEDURE — 84100 ASSAY OF PHOSPHORUS: CPT | Performed by: INTERNAL MEDICINE

## 2023-10-07 PROCEDURE — 120N000001 HC R&B MED SURG/OB

## 2023-10-07 PROCEDURE — 36415 COLL VENOUS BLD VENIPUNCTURE: CPT | Performed by: HOSPITALIST

## 2023-10-07 PROCEDURE — C9113 INJ PANTOPRAZOLE SODIUM, VIA: HCPCS | Mod: JZ | Performed by: INTERNAL MEDICINE

## 2023-10-07 PROCEDURE — 80048 BASIC METABOLIC PNL TOTAL CA: CPT | Performed by: HOSPITALIST

## 2023-10-07 PROCEDURE — 250N000009 HC RX 250: Performed by: HOSPITALIST

## 2023-10-07 PROCEDURE — 84132 ASSAY OF SERUM POTASSIUM: CPT | Performed by: HOSPITALIST

## 2023-10-07 RX ORDER — DEXTROSE MONOHYDRATE 100 MG/ML
INJECTION, SOLUTION INTRAVENOUS CONTINUOUS PRN
Status: DISCONTINUED | OUTPATIENT
Start: 2023-10-07 | End: 2023-10-11 | Stop reason: HOSPADM

## 2023-10-07 RX ORDER — POTASSIUM CHLORIDE 1500 MG/1
40 TABLET, EXTENDED RELEASE ORAL ONCE
Status: COMPLETED | OUTPATIENT
Start: 2023-10-07 | End: 2023-10-07

## 2023-10-07 RX ORDER — HYDRALAZINE HYDROCHLORIDE 20 MG/ML
5 INJECTION INTRAMUSCULAR; INTRAVENOUS EVERY 6 HOURS PRN
Status: DISCONTINUED | OUTPATIENT
Start: 2023-10-07 | End: 2023-10-11 | Stop reason: HOSPADM

## 2023-10-07 RX ORDER — LIDOCAINE 40 MG/G
CREAM TOPICAL
Status: ACTIVE | OUTPATIENT
Start: 2023-10-07 | End: 2023-10-10

## 2023-10-07 RX ADMIN — SODIUM CHLORIDE, POTASSIUM CHLORIDE, SODIUM LACTATE AND CALCIUM CHLORIDE: 600; 310; 30; 20 INJECTION, SOLUTION INTRAVENOUS at 22:09

## 2023-10-07 RX ADMIN — ONDANSETRON 4 MG: 4 TABLET, ORALLY DISINTEGRATING ORAL at 02:40

## 2023-10-07 RX ADMIN — INSULIN ASPART 1 UNITS: 100 INJECTION, SOLUTION INTRAVENOUS; SUBCUTANEOUS at 11:06

## 2023-10-07 RX ADMIN — ONDANSETRON 4 MG: 2 INJECTION INTRAMUSCULAR; INTRAVENOUS at 11:05

## 2023-10-07 RX ADMIN — DULOXETINE HYDROCHLORIDE 30 MG: 30 CAPSULE, DELAYED RELEASE ORAL at 08:27

## 2023-10-07 RX ADMIN — OLIVE OIL AND SOYBEAN OIL 250 ML: 16; 4 INJECTION, EMULSION INTRAVENOUS at 20:33

## 2023-10-07 RX ADMIN — GABAPENTIN 600 MG: 300 CAPSULE ORAL at 21:53

## 2023-10-07 RX ADMIN — LIDOCAINE HYDROCHLORIDE 3 ML: 10 INJECTION, SOLUTION INFILTRATION; PERINEURAL at 17:58

## 2023-10-07 RX ADMIN — SODIUM CHLORIDE, POTASSIUM CHLORIDE, SODIUM LACTATE AND CALCIUM CHLORIDE: 600; 310; 30; 20 INJECTION, SOLUTION INTRAVENOUS at 00:00

## 2023-10-07 RX ADMIN — MORPHINE SULFATE 30 MG: 15 TABLET, EXTENDED RELEASE ORAL at 14:07

## 2023-10-07 RX ADMIN — SODIUM CHLORIDE 8 MG/HR: 9 INJECTION, SOLUTION INTRAVENOUS at 04:43

## 2023-10-07 RX ADMIN — MORPHINE SULFATE 30 MG: 15 TABLET, EXTENDED RELEASE ORAL at 07:07

## 2023-10-07 RX ADMIN — GABAPENTIN 600 MG: 300 CAPSULE ORAL at 08:28

## 2023-10-07 RX ADMIN — POTASSIUM CHLORIDE 40 MEQ: 1500 TABLET, EXTENDED RELEASE ORAL at 12:09

## 2023-10-07 RX ADMIN — HYDROMORPHONE HYDROCHLORIDE 0.2 MG: 0.2 INJECTION, SOLUTION INTRAMUSCULAR; INTRAVENOUS; SUBCUTANEOUS at 03:13

## 2023-10-07 RX ADMIN — INSULIN ASPART 1 UNITS: 100 INJECTION, SOLUTION INTRAVENOUS; SUBCUTANEOUS at 18:25

## 2023-10-07 RX ADMIN — MORPHINE SULFATE 30 MG: 15 TABLET, EXTENDED RELEASE ORAL at 21:53

## 2023-10-07 RX ADMIN — ASCORBIC ACID, VITAMIN A PALMITATE, CHOLECALCIFEROL, THIAMINE HYDROCHLORIDE, RIBOFLAVIN-5 PHOSPHATE SODIUM, PYRIDOXINE HYDROCHLORIDE, NIACINAMIDE, DEXPANTHENOL, ALPHA-TOCOPHEROL ACETATE, VITAMIN K1, FOLIC ACID, BIOTIN, CYANOCOBALAMIN: 200; 3300; 200; 6; 3.6; 6; 40; 15; 10; 150; 600; 60; 5 INJECTION, SOLUTION INTRAVENOUS at 20:33

## 2023-10-07 RX ADMIN — ONDANSETRON 4 MG: 2 INJECTION INTRAMUSCULAR; INTRAVENOUS at 21:58

## 2023-10-07 RX ADMIN — GABAPENTIN 600 MG: 300 CAPSULE ORAL at 14:07

## 2023-10-07 RX ADMIN — INSULIN ASPART 1 UNITS: 100 INJECTION, SOLUTION INTRAVENOUS; SUBCUTANEOUS at 02:41

## 2023-10-07 RX ADMIN — ONDANSETRON 4 MG: 2 INJECTION INTRAMUSCULAR; INTRAVENOUS at 08:28

## 2023-10-07 RX ADMIN — INSULIN ASPART 1 UNITS: 100 INJECTION, SOLUTION INTRAVENOUS; SUBCUTANEOUS at 21:55

## 2023-10-07 ASSESSMENT — ACTIVITIES OF DAILY LIVING (ADL)
ADLS_ACUITY_SCORE: 37
ADLS_ACUITY_SCORE: 41
WALKING_OR_CLIMBING_STAIRS_DIFFICULTY: NO
WEAR_GLASSES_OR_BLIND: NO
ADLS_ACUITY_SCORE: 41
CONCENTRATING,_REMEMBERING_OR_MAKING_DECISIONS_DIFFICULTY: NO
DOING_ERRANDS_INDEPENDENTLY_DIFFICULTY: NO
EQUIPMENT_CURRENTLY_USED_AT_HOME: GLUCOMETER
DRESSING/BATHING_DIFFICULTY: NO
FALL_HISTORY_WITHIN_LAST_SIX_MONTHS: NO
ADLS_ACUITY_SCORE: 37
ADLS_ACUITY_SCORE: 41
ADLS_ACUITY_SCORE: 37
TOILETING_ISSUES: NO
ADLS_ACUITY_SCORE: 41
CHANGE_IN_FUNCTIONAL_STATUS_SINCE_ONSET_OF_CURRENT_ILLNESS/INJURY: NO
DIFFICULTY_EATING/SWALLOWING: NO
ADLS_ACUITY_SCORE: 37
ADLS_ACUITY_SCORE: 37
ADLS_ACUITY_SCORE: 41

## 2023-10-07 NOTE — PLAN OF CARE
Goal Outcome Evaluation:     1657... Pt came in from PACU after an EGD today, a/o x4, assist to 1 to the bathroom, c/o abdominal pain 7/10 and was medicated with PRN MS Contin. Pt came with an NG tube but was clamped for 1 hour after medication. Sepsis protocol  triggered and had lactic acid done, result came back at 1.7. On tele and has been sinus tachy.  Blood pressure has been elevated, last reading was 177/81, he has PRN Hydralazine but has not been given yet. Pt had a large loose bowel movement x1.      2100... Pt pulled out the NG tube and insisted that he does not want it re-inserted. Dr. Sams was notified but referred writer to General surgery. At 9:30 PM... General surgery was notified, no new order at this time. Will continue to monitor.

## 2023-10-07 NOTE — PROGRESS NOTES
"[facility]    Medicine Progress Note - Hospitalist Service    Date of Admission:  10/5/2023    Assessment & Plan                  Bno Luque is a 68 year old male with history of insulin-dependent diabetes, chronic pain syndrome on MS Contin, coronary artery disease status post stent in 2008, hypertension, hyperlipidemia, MARÍA ELENA, renal cell carcinoma status post partial nephrectomy in 2017, recent issues with nausea and vomiting status post normal gastric emptying study about a month ago, presented for refractory nausea, vomiting, and emesis found to have small bowel obstruction secondary to likely mass, as well as mild DKA. Now starting TPN in anticipation of surgical bypass. Hospital Day: 3        #Small bowel obstruction  #Likely duodenal vs pancreatic mass  #Retroperitoneal lymphadenopathy  -Patient with what he describes as \"stomach trouble\" for the past 15 years or so, much worse in the past 6 weeks or so, was having daily vomiting and started on PPI with improvement for the last 6 weeks.  Recently began to feel worse again.  Presented with nausea, vomiting.  CT showing obstruction at the second portion of the duodenum associated with likely mass, involvement of the paola hepatis and encasement of the common hepatic and GD arteries.  Also noted enlarged regional lymph nodes.  Very concerning for malignancy.  -Taken for EGD/EUS 10/6, biopsies taken  -NG tube fell out and he is tolerating. May need replacement if he starts vomiting again  -Just small sips of clears for now  -Pain meds, antiemetics as needed  -General surgery and GI teams following  -On IV PPI given some bleeding from NG tube, gastric abrasion noted during EGD    #Severe malnutrition  -30 lb weight loss past 6 weeks  -Currently with duodenal obstruction and no immediate means of giving enteral nutrition.  Unknown when pathology results will come back, and only once they are back can we begin to formulate a surgical plan.  " Patient should not enter into a potentially large surgery with such severe malnutrition.  Would initiate TPN to help mitigate surgical risk.  Patient is agreeable.  Place PICC and start TPN  -PharmD to please notify me if a bag of TPN will be given tonight so I can dose Lantus appropriately     #Mild DKA, resolved  #Insulin-dependent diabetes  -Patient presented with positive ketones and anion gap but was not actually acidotic.  Suspect was just very hypovolemic  -DKA resolved after aggressive IV fluid and repeated doses of subcu insulin  -Accu-Cheks and subcutaneous insulin every 4 hours  -Home diabetes regimen is metformin, Humalog sliding scale typically 20 units with meals; glargine (or similar long acting insulin- he is unsure of name) 50 units twice daily  -here on Lantus 20 units nightly while NPO but anticipate will need to increase substantially when begins TPN     #Chronic pain syndrome  -Okay to take home MS Contin, Cymbalta, gabapentin  -IV Dilaudid as needed      #Hypophosphatemia  #HypoK  -Protocols     #Leukocytosis  -Likely reactive  -Improved     #Hypertension  -Suspect driven by nausea, discomfort currently  -IV hydralazine as needed for severe range blood pressures    #ABLA  -Hgb 14.6-->12.6  -Suspect Hgb drop is mostly dilutional but there was some bleeding noted from NG tube trauma of stomach       DVT Prophylaxis: Moderate risk. Pharmacologic prophylaxis contraindicated due to active bleeding  Diet: NPO for Medical/Clinical Reasons Except for: Ice Chips, Meds    Seals Catheter: Not present  Lines: None     Cardiac Monitoring: None  Code Status: Full Code      Clinically Significant Risk Factors        # Hypokalemia: Lowest K = 3.3 mmol/L in last 2 days, will replace as needed             # Hypertension: Noted on problem list       # DMII: A1C = 8.0 % (Ref range: <5.7 %) within past 6 months, PRESENT ON ADMISSION    # Overweight: Estimated body mass index is 27.94 kg/m  as calculated from the  following:    Height as of this encounter: 1.829 m (6').    Weight as of this encounter: 93.4 kg (206 lb)., PRESENT ON ADMISSION            Disposition Plan   Disposition: Home     Expected Discharge Date: 10/08/2023      Destination: home with family       Medically ready to discharge today: No     The patient's care was discussed with the Patient and Patient's Family.    Dilma Bustamante MD  Hospitalist Service  [facility]  Securely message with [employer] (more info)  Text page via [email or web address] Paging/Directory   ______________________________________________________________________      Physical Exam   Vital Signs: Temp: 98.4  F (36.9  C) Temp src: Oral BP: (!) 158/74 Pulse: 83   Resp: 18 SpO2: 97 % O2 Device: None (Room air)    Weight: 206 lbs 0 oz  General: comfortable appearing male lying in bed.  Alert and oriented x3.   HEENT: Head normocephalic atraumatic, oral mucosa tacky. Sclerae anicteric  Skin:  a bit flushed. Psoriatic lesions on BLE  Extremities: No peripheral edema  Psych: Normal affect, mildly anxious but overall in better spirits today  Neuro: Grossly normal      Medical Decision Making               Data   Recent Results (from the past 12 hour(s))   Glucose by meter    Collection Time: 10/07/23  6:23 AM   Result Value Ref Range    GLUCOSE BY METER POCT 136 (H) 70 - 99 mg/dL   Phosphorus    Collection Time: 10/07/23  7:16 AM   Result Value Ref Range    Phosphorus 2.8 2.5 - 4.5 mg/dL   CBC with platelets    Collection Time: 10/07/23  7:16 AM   Result Value Ref Range    WBC Count 12.6 (H) 4.0 - 11.0 10e3/uL    RBC Count 4.85 4.40 - 5.90 10e6/uL    Hemoglobin 12.6 (L) 13.3 - 17.7 g/dL    Hematocrit 38.3 (L) 40.0 - 53.0 %    MCV 79 78 - 100 fL    MCH 26.0 (L) 26.5 - 33.0 pg    MCHC 32.9 31.5 - 36.5 g/dL    RDW 14.1 10.0 - 15.0 %    Platelet Count 260 150 - 450 10e3/uL   Basic metabolic panel    Collection Time: 10/07/23  7:16 AM   Result Value Ref Range    Sodium 139 135 - 145 mmol/L     Potassium 3.3 (L) 3.4 - 5.3 mmol/L    Chloride 102 98 - 107 mmol/L    Carbon Dioxide (CO2) 28 22 - 29 mmol/L    Anion Gap 9 7 - 15 mmol/L    Urea Nitrogen 9.7 8.0 - 23.0 mg/dL    Creatinine 0.97 0.67 - 1.17 mg/dL    GFR Estimate 86 >60 mL/min/1.73m2    Calcium 9.2 8.8 - 10.2 mg/dL    Glucose 135 (H) 70 - 99 mg/dL   Glucose by meter    Collection Time: 10/07/23  8:12 AM   Result Value Ref Range    GLUCOSE BY METER POCT 137 (H) 70 - 99 mg/dL   Glucose by meter    Collection Time: 10/07/23 10:55 AM   Result Value Ref Range    GLUCOSE BY METER POCT 160 (H) 70 - 99 mg/dL     Interval History     2:41 PM    Returned to the room to discuss with patient about starting TPN.  Explained rationale.  Patient is okay with starting TPN.  He mentioned he would be interested in pursuing a second opinion at [facility], told him this would be reasonable but would recommend waiting until we have initial recommendations to give him, then he will have something to compare with [facility] recommendations.  Patient agreeable to staying at [facility] at this time.      10:02 AM    Patient states he is doing okay today.  NG was dislodged overnight, he is doing fine without it in place.  He is interested in advancing diet.  We will defer to GI if this is okay to attempt.  Have concerns about his ability to maintain adequate hydration and nutrition at home orally.  Patient confirms he does take long-acting insulin 50 units twice daily at home.  We are giving much less here and blood sugar is currently adequately controlled.  Not ready for discharge.  Wife updated at bedside.

## 2023-10-07 NOTE — PROGRESS NOTES
[facility] DIGESTIVE HEALTH PROGRESS NOTE      Subjective:              Frustrated.  Taking ice chips.  US findings are reviewed.  He is obstructed and will need some relief of that either surgical or by stenting.  We will need to know what the pathology is before moving forward.    Objective:                  Vital signs in last 24 hrs;  Temp:  [97.4  F (36.3  C)-100.8  F (38.2  C)] 98.2  F (36.8  C)  Pulse:  [] 86  Resp:  [14-30] 18  BP: (134-193)/(63-98) 134/63  SpO2:  [96 %-99 %] 97 %    Physical Exam:   General: Comfortable appearing. Awake.   Cardiovascular: Regular rate. No edema  Chest: Non-labored breathing. Symmetric chest rise.   Abdomen: soft, non-tender, non-distended  Neurologic: Alert, no focal defects.     Current Labs:  CBC RESULTS:   Recent Labs   Lab Test 10/07/23  0716   WBC 12.6*   RBC 4.85   HGB 12.6*   HCT 38.3*   MCV 79   MCH 26.0*   MCHC 32.9   RDW 14.1           CMP Results:   Recent Labs   Lab Test 10/07/23  0812 10/07/23  0716 10/05/23  1209 10/05/23  1204   NA  --  139   < > 138   POTASSIUM  --  3.3*   < > 3.7   CHLORIDE  --  102   < > 94*   CO2  --  28   < > 26   ANIONGAP  --  9   < > 18*   * 135*   < > 398*   BUN  --  9.7   < > 12.2   CR  --  0.97   < > 0.77   BILITOTAL  --   --   --  1.0  1.0   ALKPHOS  --   --   --  127  127   ALT  --   --   --  19  19   AST  --   --   --  19  19    < > = values in this interval not displayed.        INR Results: No results for input(s): INR in the last 17989 hours.       Relevant Imaging:  Reviewed    Assessment:       Duodenal obstruction likely from pancreatic or biliary malignancy.  We are awaiting biopsy results.  And plan for either tenting or surgical bypass in this area will depend on whether other surgery is anticipated after pathology is found.          Plan:     Continue n.p.o. at this time nutrition to see.  Further planning will be dependent upon pathology.            Gelacio Robins MD  Thank you for the opportunity to  participate in the care of this patient.   Please feel free to call me with any questions or concerns.  Phone number [phone].

## 2023-10-07 NOTE — PROGRESS NOTES
General Surgery Progress Note    Subjective:   Patient seen with wife at bedside.  His NG tube fell out last night.  He has taken in small amounts of liquids.  It is staying down and he denies having any nausea.  He would prefer not having NG tube replaced if possible.    Vitals:    10/06/23 2226 10/06/23 2346 10/07/23 0218 10/07/23 0810   BP: (!) 177/81 (!) 181/88 (!) 143/68 134/63   BP Location: Left arm Left arm Left arm Left arm   Patient Position:       Cuff Size: Adult Regular      Pulse: 98 95 80 86   Resp: 17 18 18 18   Temp: 97.4  F (36.3  C) 98.3  F (36.8  C) 98.5  F (36.9  C) 98.2  F (36.8  C)   TempSrc: Oral Oral Oral Oral   SpO2: 98% 96% 96% 97%   Weight:       Height:           10/06 0700 - 10/07 0659  In: 1608 [I.V.:1608]  Out: 1575 [Urine:1500]    Physical Exam:  General: NAD, pleasant  ABD: Soft without tenderness    Recent Labs   Lab 10/07/23  0716   WBC 12.6*   HGB 12.6*   HCT 38.3*          Recent Labs   Lab 10/07/23  0716 10/05/23  1431 10/05/23  1204      < > 138   CO2 28   < > 26   BUN 9.7   < > 12.2   ALBUMIN  --   --  4.2  4.2   ALKPHOS  --   --  127  127   ALT  --   --  19  19   AST  --   --  19  19    < > = values in this interval not displayed.       Assessment:   68-year-old male with duodenal obstruction from appears to be a malignancy of either biliary or pancreatic origin.  He underwent EUS with biopsy yesterday.    Plan:   Await biopsy results.  Okay for small sips of liquids.  Replace NG tube if vomiting recurs.  Will ultimately need stenting or surgical bypass    Cheyenne Muniz DO  General Surgeon  [facility]  Surgery [facility] - [address]  [address]  [place] [address]  Office: [phone]  Employed by - [facility]

## 2023-10-07 NOTE — PLAN OF CARE
Problem: Plan of Care - These are the overarching goals to be used throughout the patient stay.    Goal: Optimal Comfort and Wellbeing  Outcome: Progressing     Problem: Nausea and Vomiting  Goal: Nausea and Vomiting Relief  Outcome: Progressing  Intervention: Prevent and Manage Nausea and Vomiting  Recent Flowsheet Documentation  Taken 10/7/2023 0000 by Aditya Hughes RN  Nausea/Vomiting Interventions: antiemetic   Goal Outcome Evaluation:    VSS. Hypertensive but did not meet parameters for hydralazine.    A/Ox4. On RA.     On tele with NSR.    Reported moderate abdominal pain partially relieved by PRN 0.2mg dilaudid x1.    Reported nausea. Gave PRN compazine x1 with relief for several hours. Pt reported nausea returned, gave PRN zofran x1 with relief of symptoms.    Slept intermittently, awakened by pain and nausea.     Ambulated to bathroom with SBA and voided. No BM overnight.       Recent Labs   Lab 10/07/23  0219 10/06/23  2126 10/06/23  1715 10/06/23  1519 10/06/23  1220 10/06/23  1032   * 278* 225* 206* 256* 286*            Aditya Hughes RN

## 2023-10-07 NOTE — PROCEDURES
"PICC Line Insertion Procedure Note  Pt. Name: Bon Luque  MRN:        [id number]    Procedure: Insertion of a  dual Lumen  5 fr  Bard SOLO (valved) Power PICC, Lot number [id number]    Indications: TPN    Contraindications : None    Procedure Details     Patient identified with 2 identifiers and \"Time Out\" conducted.  .     Central line insertion bundle followed: hand hygiene performed prior to procedure, site cleansed with cholraprep, hat, mask, sterile gloves, sterile gown worn, patient draped with maximum barrier head to toe drape, sterile field maintained.    The vein was assessed and found to be compressible and of adequate size.     Lidocaine 1% 2 ml administered sq to the insertion site. A 5 Fr PICC was inserted into the brachial vein of the right arm with ultrasound guidance. 2 attempt(s) required to access vein.   Catheter threaded without difficulty. Good blood return noted.    Modified Seldinger Technique used for insertion.    The 8 sharps that are included in the PICC insertion kit were accounted for and disposed of in the sharps container prior to breakdown of the sterile field.    Catheter secured with Statlock, biopatch and Tegaderm dressing applied.    Findings:    Total catheter length  43 cm, with 1 cm exposed. Mid upper arm circumference is 31 cm. Catheter was flushed with 20 cc NS. Patient  tolerated procedure well.    Tip placement verified by ECG.   Tip placement in the SVC.    CLABSI prevention brochure left at bedside.    Patient's primary RN notified PICC is ready for use.      Comments:          [facility] [place]      "

## 2023-10-07 NOTE — PROGRESS NOTES
"CLINICAL NUTRITION SERVICES - ASSESSMENT NOTE     Nutrition Prescription    RECOMMENDATIONS FOR MDs/PROVIDERS TO ORDER:      Malnutrition Status:    Not noted at this time    Recommendations already ordered by Registered Dietitian (RD):  After central line placed plan  Clinimix 5/15 at 40 ml/hr continuous with daily lipid.  48 g AA, 144 g DEX, 50 g lipid    Future/Additional Recommendations:  Monitor TPN tolerance and adjust daily toward meeting needs.      REASON FOR ASSESSMENT  Bon Luque is a/an 68 year old male assessed by the dietitian for Pharmacy/Nutrition to Start and Manage PN    NUTRITION HISTORY  Patient admitted with SBO.  History DM, ASCVD.  Patient is s/p EGD 10/6.  He had an NG that came out and only to be replaced if pt vomits.  Central access pending.    Patient reports he has been dealing with abdominal issues for the past 2 months.  Patient last ate on Wednesday evening but that meal came back up.      CURRENT NUTRITION ORDERS  Diet: NPO x 2 days    LABS  Potassium 3.3 (L)-replaced per protocol.  Fsbg range 135-278  Labs reviewed    MEDICATIONS  Medications reviewed    ANTHROPOMETRICS  Height: 182.9 cm (6' 0\")  Most Recent Weight: 93.4 kg (206 lb)    BMI: Overweight BMI 25-29.9  Weight History:   Wt Readings from Last 5 Encounters:   10/05/23 93.4 kg (206 lb)   09/01/23 93.6 kg (206 lb 4.8 oz)   01/20/23 102.1 kg (225 lb)   01/19/23 102.3 kg (225 lb 9.6 oz)   07/12/22 104.3 kg (230 lb)   224 lb 5/10/23  8% loss in 8-9 months, not significant per malnutrition criteria.    Dosing Weight: 93.4 kg, for energy, 84 kg, adjusted for fluid    ASSESSED NUTRITION NEEDS  Estimated Energy Needs: 5051-5571 kcals/day (20 - 25 kcals/kg)  Justification: Overweight  Estimated Protein Needs:  grams protein/day (0.8 - 1.2 grams of pro/kg)  Justification: Increased needs  Estimated Fluid Needs: 2100+ mL/day (25 + mL/kg) adjusted wt.    Justification: Maintenance    PHYSICAL FINDINGS  See malnutrition section " below.  EGD 10/6:  Impression:   - A few abnormal lymph nodes were visualized in the                          peripancreatic region. Fine needle aspiration                          performed.                          - Congested duodenal mucosa. Biopsied.                          - Linear ulcer in the gastric cardia (likely related                          to NG tube.    Last BM 10/6.  375 ml out NG 10/6.        MALNUTRITION:  % Weight Loss:  Weight loss does not meet criteria for malnutrition   % Intake:  Decreased intake does not meet criteria for malnutrition   Subcutaneous Fat Loss:  Orbital region mild depletion  Muscle Loss:  Clavicle bone region mild depletion  Fluid Retention:  None noted    Malnutrition Diagnosis: Patient does not meet two of the above criteria necessary for diagnosing malnutrition    NUTRITION DIAGNOSIS  Altered GI function related to acute illness as evidenced NPO, SBO, need for TPN      INTERVENTIONS  Implementation  Nutrition Education: discussed plan for TPN, central line placement   Parenteral Nutrition/IV Fluids - Clinimix option for this evening after central line placed.  Clinimix 5/15 at 40 ml/hr with daily lipid.  This to provide 960 ml fluid, 1181 kcals, 48 g protein, 144 g carb, 50 g lipid/day  Request MVI added to clinimix     Monitor tolerance, labs, hydration.  Plan to adjust TPN daily toward meeting needs.  Could adjust to custom formula tomorrow.     Goals  Tolerate TPN at goal rate  Meet nutrition needs  Electrolytes WNL  Transition to po intake when able.     Monitoring/Evaluation  Progress toward goals will be monitored and evaluated per protocol.

## 2023-10-07 NOTE — PLAN OF CARE
"  Problem: Plan of Care - These are the overarching goals to be used throughout the patient stay.    Goal: Plan of Care Review  Description: The Plan of Care Review/Shift note should be completed every shift.  The Outcome Evaluation is a brief statement about your assessment that the patient is improving, declining, or no change.  This information will be displayed automatically on your shift note.  Outcome: Progressing  Goal: Patient-Specific Goal (Individualized)  Description: You can add care plan individualizations to a care plan. Examples of Individualization might be:  \"Parent requests to be called daily at 9am for status\", \"I have a hard time hearing out of my right ear\", or \"Do not touch me to wake me up as it startles me\".  Outcome: Progressing  Goal: Absence of Hospital-Acquired Illness or Injury  Outcome: Progressing  Intervention: Identify and Manage Fall Risk  Recent Flowsheet Documentation  Taken 10/7/2023 1548 by Natasha Pizarro RN  Safety Promotion/Fall Prevention:   activity supervised   assistive device/personal items within reach  Intervention: Prevent and Manage VTE (Venous Thromboembolism) Risk  Recent Flowsheet Documentation  Taken 10/7/2023 1548 by Natasha Pizarro RN  VTE Prevention/Management: SCDs (sequential compression devices) off  Goal: Optimal Comfort and Wellbeing  Outcome: Progressing  Goal: Readiness for Transition of Care  Outcome: Progressing     Problem: Nausea and Vomiting  Goal: Nausea and Vomiting Relief  Outcome: Progressing  Intervention: Prevent and Manage Nausea and Vomiting  Recent Flowsheet Documentation  Taken 10/7/2023 1548 by Natasha Pizarro RN  Nausea/Vomiting Interventions: antiemetic     Problem: Parenteral Nutrition  Goal: Effective Intravenous Nutrition Therapy Delivery  Outcome: Progressing  Intervention: Optimize Intravenous Nutrition Delivery  Recent Flowsheet Documentation  Taken 10/7/2023 1548 by Natasha Pizarro RN  Medication " Review/Management: medications reviewed   Goal Outcome Evaluation:         Pt is alert and oriented x4, denies pain, nausea and vomiting. PICC was placed to run TPN. LR is infusing at 100 ml/hr. Cardiac monitoring discontinue per order. Pt vitals are stable and will continue to monitor.

## 2023-10-07 NOTE — PHARMACY-CONSULT NOTE
Pharmacy Note: Parenteral Nutrition (PN) Management    Pharmacist consulted to dose PN for Bon Luque, a 68 year old male.    Subjective:    The patient is a new PN start.    The patient was started on PN in the hospital on 10/07/23 .    Indication for PN therapy: bowel obstruction    Inadequate nutrition anticipated for > 7 days.     Enteral nutrition contraindicated due to: small bowel obstruction.    Social History     Tobacco Use    Smoking status: Former     Passive exposure: Never    Smokeless tobacco: Never   Vaping Use    Vaping Use: Never used   Substance Use Topics    Alcohol use: No     Comment: Alcoholic Drinks/day: alcohol abuse - in remission x16 years    Drug use: No     Objective:    Ht Readings from Last 1 Encounters:   10/05/23 1.829 m (6')     Wt Readings from Last 1 Encounters:   10/05/23 93.4 kg (206 lb)       Body mass index is 27.94 kg/m .    Patient Vitals for the past 96 hrs:   Weight   10/05/23 1150 93.4 kg (206 lb)       Labs:  Last 3 days:  Recent Labs     10/05/23  1204 10/05/23  1431 10/06/23  0611 10/07/23  0716    136 138 139   POTASSIUM 3.7 3.6 3.5 3.3*   CHLORIDE 94* 95* 100 102   CO2 26 21* 24 28   BUN 12.2 11.4 11.4 9.7   CR 0.77 0.74 0.78 0.97   MIGUEL 10.1 9.5 9.0 9.2   MAG 1.8  --   --   --    PHOS 2.2*  --   --  2.8   PROTTOTAL 8.2  8.2  --   --   --    ALBUMIN 4.2  4.2  --   --   --    HGB 14.6  --  13.2* 12.6*   HCT 44.1  --  40.3 38.3*     --  298 260   BILITOTAL 1.0  1.0  --   --   --    AST 19  19  --   --   --    ALT 19  19  --   --   --    ALKPHOS 127  127  --   --   --        Glucose (past 48 hours):   Recent Labs     10/06/23  1032 10/06/23  1220 10/06/23  1519 10/06/23  1715 10/06/23  2126 10/07/23  0219 10/07/23  0623 10/07/23  0716 10/07/23  0812 10/07/23  1055   * 256* 206* 225* 278* 179* 136* 135* 137* 160*       Intake/Output (last 24 hours): I/O last 3 completed shifts:  In: 1795 [I.V.:1795]  Out: 1150 [Urine:1150]    Estimated CrCl:  Estimated Creatinine Clearance: 88.9 mL/min (based on SCr of 0.97 mg/dL).    Assessment:    Initiate patient on PN therapy as a continuous central therapy.     Given the patient's current condition/oral intake, PN is still indicated.    Lab results reviewed: -    Plan:  Rate of PN: 40 mL/hr Maintenance IV fluid rate will be adjusted appropriately to meet the total maximum IV fluids rate as ordered by provider.  Formula: Clinimix 5/15  Amino Acids 48 grams  Dextrose 144 grams  Sodium 33.6 mEq/day  Potassium 28.8 mEq/day  Calcium 4.32 mEq/day  Magnesium 4.8 mEq/day  Phosphorus 14.4 mMol/day  Chloride: Acetate Ratio 1:2  Standard Multivitamins w/Vitamin K  Trace Elements  Other none  Fat Emulsion: 20%, 250 mL IV daily  Check TPN panel labs tomorrow.  Pharmacist will continue to follow the patient's lab results, clinical status and blood glucose results and make adjustments as appropriate.    Thank you for the consult.  Leora Suggs, PharmD, BCPS 10/07/23 3:59 PM

## 2023-10-08 LAB
ALBUMIN SERPL BCG-MCNC: 3.4 G/DL (ref 3.5–5.2)
ALP SERPL-CCNC: 81 U/L (ref 40–129)
ALT SERPL W P-5'-P-CCNC: 13 U/L (ref 0–70)
ANION GAP SERPL CALCULATED.3IONS-SCNC: 7 MMOL/L (ref 7–15)
AST SERPL W P-5'-P-CCNC: 23 U/L (ref 0–45)
BILIRUB DIRECT SERPL-MCNC: 0.27 MG/DL (ref 0–0.3)
BILIRUB SERPL-MCNC: 0.7 MG/DL
BUN SERPL-MCNC: 11 MG/DL (ref 8–23)
CALCIUM SERPL-MCNC: 8.6 MG/DL (ref 8.8–10.2)
CANCER AG19-9 SERPL IA-ACNC: 16 U/ML
CHLORIDE SERPL-SCNC: 102 MMOL/L (ref 98–107)
CREAT SERPL-MCNC: 1 MG/DL (ref 0.67–1.17)
DEPRECATED HCO3 PLAS-SCNC: 29 MMOL/L (ref 22–29)
EGFRCR SERPLBLD CKD-EPI 2021: 83 ML/MIN/1.73M2
GLUCOSE BLDC GLUCOMTR-MCNC: 150 MG/DL (ref 70–99)
GLUCOSE BLDC GLUCOMTR-MCNC: 152 MG/DL (ref 70–99)
GLUCOSE BLDC GLUCOMTR-MCNC: 162 MG/DL (ref 70–99)
GLUCOSE BLDC GLUCOMTR-MCNC: 164 MG/DL (ref 70–99)
GLUCOSE BLDC GLUCOMTR-MCNC: 166 MG/DL (ref 70–99)
GLUCOSE BLDC GLUCOMTR-MCNC: 169 MG/DL (ref 70–99)
GLUCOSE BLDC GLUCOMTR-MCNC: 172 MG/DL (ref 70–99)
GLUCOSE SERPL-MCNC: 174 MG/DL (ref 70–99)
INR PPP: 1.16 (ref 0.85–1.15)
MAGNESIUM SERPL-MCNC: 1.8 MG/DL (ref 1.7–2.3)
PHOSPHATE SERPL-MCNC: 3.3 MG/DL (ref 2.5–4.5)
POTASSIUM SERPL-SCNC: 3.8 MMOL/L (ref 3.4–5.3)
PROT SERPL-MCNC: 6.1 G/DL (ref 6.4–8.3)
SODIUM SERPL-SCNC: 138 MMOL/L (ref 135–145)

## 2023-10-08 PROCEDURE — 250N000011 HC RX IP 250 OP 636: Mod: JZ | Performed by: HOSPITALIST

## 2023-10-08 PROCEDURE — 84134 ASSAY OF PREALBUMIN: CPT | Performed by: HOSPITALIST

## 2023-10-08 PROCEDURE — 99231 SBSQ HOSP IP/OBS SF/LOW 25: CPT | Mod: FS | Performed by: SURGERY

## 2023-10-08 PROCEDURE — B4185 PARENTERAL SOL 10 GM LIPIDS: HCPCS | Mod: JZ | Performed by: HOSPITALIST

## 2023-10-08 PROCEDURE — 82248 BILIRUBIN DIRECT: CPT | Performed by: HOSPITALIST

## 2023-10-08 PROCEDURE — 250N000013 HC RX MED GY IP 250 OP 250 PS 637: Performed by: INTERNAL MEDICINE

## 2023-10-08 PROCEDURE — 250N000013 HC RX MED GY IP 250 OP 250 PS 637: Performed by: HOSPITALIST

## 2023-10-08 PROCEDURE — 83735 ASSAY OF MAGNESIUM: CPT | Performed by: HOSPITALIST

## 2023-10-08 PROCEDURE — 80053 COMPREHEN METABOLIC PANEL: CPT | Performed by: HOSPITALIST

## 2023-10-08 PROCEDURE — 85610 PROTHROMBIN TIME: CPT | Performed by: HOSPITALIST

## 2023-10-08 PROCEDURE — 250N000009 HC RX 250: Performed by: HOSPITALIST

## 2023-10-08 PROCEDURE — 250N000011 HC RX IP 250 OP 636: Mod: JZ | Performed by: INTERNAL MEDICINE

## 2023-10-08 PROCEDURE — 99233 SBSQ HOSP IP/OBS HIGH 50: CPT | Performed by: HOSPITALIST

## 2023-10-08 PROCEDURE — C9113 INJ PANTOPRAZOLE SODIUM, VIA: HCPCS | Mod: JZ | Performed by: HOSPITALIST

## 2023-10-08 PROCEDURE — 120N000001 HC R&B MED SURG/OB

## 2023-10-08 PROCEDURE — 99231 SBSQ HOSP IP/OBS SF/LOW 25: CPT | Mod: FS | Performed by: PHYSICIAN ASSISTANT

## 2023-10-08 PROCEDURE — 84100 ASSAY OF PHOSPHORUS: CPT | Performed by: HOSPITALIST

## 2023-10-08 PROCEDURE — 258N000003 HC RX IP 258 OP 636: Performed by: INTERNAL MEDICINE

## 2023-10-08 RX ORDER — DULOXETIN HYDROCHLORIDE 60 MG/1
60 CAPSULE, DELAYED RELEASE ORAL DAILY
Status: DISCONTINUED | OUTPATIENT
Start: 2023-10-09 | End: 2023-10-11 | Stop reason: HOSPADM

## 2023-10-08 RX ADMIN — MORPHINE SULFATE 30 MG: 15 TABLET, EXTENDED RELEASE ORAL at 21:23

## 2023-10-08 RX ADMIN — MORPHINE SULFATE 30 MG: 15 TABLET, EXTENDED RELEASE ORAL at 14:20

## 2023-10-08 RX ADMIN — ONDANSETRON 4 MG: 2 INJECTION INTRAMUSCULAR; INTRAVENOUS at 21:27

## 2023-10-08 RX ADMIN — INSULIN ASPART 1 UNITS: 100 INJECTION, SOLUTION INTRAVENOUS; SUBCUTANEOUS at 14:28

## 2023-10-08 RX ADMIN — MAGNESIUM SULFATE HEPTAHYDRATE: 500 INJECTION, SOLUTION INTRAMUSCULAR; INTRAVENOUS at 21:01

## 2023-10-08 RX ADMIN — OLIVE OIL AND SOYBEAN OIL 250 ML: 16; 4 INJECTION, EMULSION INTRAVENOUS at 21:01

## 2023-10-08 RX ADMIN — SODIUM CHLORIDE, POTASSIUM CHLORIDE, SODIUM LACTATE AND CALCIUM CHLORIDE: 600; 310; 30; 20 INJECTION, SOLUTION INTRAVENOUS at 18:11

## 2023-10-08 RX ADMIN — GABAPENTIN 600 MG: 300 CAPSULE ORAL at 14:20

## 2023-10-08 RX ADMIN — DULOXETINE HYDROCHLORIDE 30 MG: 30 CAPSULE, DELAYED RELEASE ORAL at 09:20

## 2023-10-08 RX ADMIN — INSULIN ASPART 1 UNITS: 100 INJECTION, SOLUTION INTRAVENOUS; SUBCUTANEOUS at 06:19

## 2023-10-08 RX ADMIN — INSULIN ASPART 1 UNITS: 100 INJECTION, SOLUTION INTRAVENOUS; SUBCUTANEOUS at 02:15

## 2023-10-08 RX ADMIN — GABAPENTIN 600 MG: 300 CAPSULE ORAL at 21:23

## 2023-10-08 RX ADMIN — MORPHINE SULFATE 30 MG: 15 TABLET, EXTENDED RELEASE ORAL at 06:19

## 2023-10-08 RX ADMIN — PANTOPRAZOLE SODIUM 40 MG: 40 INJECTION, POWDER, FOR SOLUTION INTRAVENOUS at 09:19

## 2023-10-08 RX ADMIN — INSULIN ASPART 1 UNITS: 100 INJECTION, SOLUTION INTRAVENOUS; SUBCUTANEOUS at 22:12

## 2023-10-08 RX ADMIN — INSULIN ASPART 1 UNITS: 100 INJECTION, SOLUTION INTRAVENOUS; SUBCUTANEOUS at 18:17

## 2023-10-08 RX ADMIN — INSULIN ASPART 1 UNITS: 100 INJECTION, SOLUTION INTRAVENOUS; SUBCUTANEOUS at 09:23

## 2023-10-08 RX ADMIN — GABAPENTIN 600 MG: 300 CAPSULE ORAL at 09:20

## 2023-10-08 ASSESSMENT — ACTIVITIES OF DAILY LIVING (ADL)
ADLS_ACUITY_SCORE: 24
ADLS_ACUITY_SCORE: 26
ADLS_ACUITY_SCORE: 26
ADLS_ACUITY_SCORE: 24
ADLS_ACUITY_SCORE: 26
ADLS_ACUITY_SCORE: 24
ADLS_ACUITY_SCORE: 26
ADLS_ACUITY_SCORE: 37
ADLS_ACUITY_SCORE: 24
ADLS_ACUITY_SCORE: 24

## 2023-10-08 NOTE — PROGRESS NOTES
Nutrition Therapy  Parenteral Nutrition follow-up       Dietitian to Pharmacy  \With this evening's bag:  Rate of TPN: 75 ml/hr continuous.  Grams Dextrose: 285 g  Grams Amino Acid:  100 g    Lipids: 50 g daily.    Consider decrease in Lactated ringers this evening.         Current Nutrition Intake:  Diet: NPO except ice chips.  480 ml po fluid 10/7.  Clinimix formula per central line at 40 ml/hr continuous with 50 g lipid daily  (Clinimix formula with MVI and trace minerals)  Provides:  1181 kcals, 48 g protein, 144 g carb, 50 g lipid/day     TPN start 10/7.    Weight Trends  Admission wt:   10/05/23 93.4 kg (206 lb)   No new wt.    GI:  EGD 10/6:  Impression:   - A few abnormal lymph nodes were visualized in the                          peripancreatic region. Fine needle aspiration                          performed.                          - Congested duodenal mucosa. Biopsied.                          - Linear ulcer in the gastric cardia (likely related                          to NG tube.    Last BM 10/6.  NG removed.    Dosing Weight: 93.4 kg, for energy, 84 kg, adjusted for fluid     ESTIMATED NUTRITION NEEDS  Energy Needs: 2006-2451 kcals/day (20 - 25 kcals/kg)  Justification: Overweight  Protein Needs:  grams protein/day (0.8 - 1.2 grams of pro/kg)  Justification: Increased needs  Fluid Needs: 2100+ mL/day (25 + mL/kg) adjusted wt.    Justification: Maintenance    Labs:   Labs reviewed by dietitian    Medications:   Lactated ringers at 100 ml/hr-consider decreasing.  Reviewed by dietitian       MALNUTRITION:  % Weight Loss:  Weight loss does not meet criteria for malnutrition   % Intake:  Decreased intake does not meet criteria for malnutrition   Subcutaneous Fat Loss:  Orbital region mild depletion  Muscle Loss:  Clavicle bone region mild depletion  Fluid Retention:  None noted     Malnutrition Diagnosis: Patient does not meet two of the above criteria necessary for diagnosing  malnutrition    NUTRITION DIAGNOSIS  Altered GI function related to acute illness as evidenced NPO, SBO, need for TPN      Goals  Tolerate TPN at goal rate-progresssing  Meet nutrition needs-progressing  Electrolytes WNL-met  Transition to po intake when able-not able at this time    INTERVENTIONS  Implementation  Parenteral Nutrition/IV Fluids - adjust to custom formula with new bag this evening.  100 g AA, 285 g DEX at 75 ml/hr continuous with 50 g lipid daily.   This to provide 1699 kcals, 100 g protein, 285 g carb, 50 g lipid, 1800 ml fluid/day.     Consider decreasing lactated ringers with new TPN bag this evening.  Plan to check triglyceride this evening.    Continue to monitor TPN and adjust daily to meet meet needs.       Monitoring/Evaluation  Progress toward goals will be monitored and evaluated per protocol.

## 2023-10-08 NOTE — PROGRESS NOTES
[facility] DIGESTIVE HEALTH PROGRESS NOTE      Subjective:              No abdominal pain.  He feels much more comfortable.  Hyperalimentation is started.  Pathology pending.    Objective:                  Vital signs in last 24 hrs;  Temp:  [98.2  F (36.8  C)-98.4  F (36.9  C)] 98.3  F (36.8  C)  Pulse:  [67-83] 67  Resp:  [18] 18  BP: (132-160)/(67-78) 132/67  SpO2:  [96 %-98 %] 96 %    Physical Exam:   General: Comfortable appearing. Awake.   Cardiovascular: Regular rate. No edema  Chest: Non-labored breathing. Symmetric chest rise.   Abdomen: soft, non-tender, non-distended  Neurologic: Alert, no focal defects.     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  Awaiting pathology.  Fortunately he feels much more comfortable and is receiving nutrition.        Plan:     Follow-up pathology.            Gelacio Robins MD  Thank you for the opportunity to participate in the care of this patient.   Please feel free to call me with any questions or concerns.  Phone number [phone].

## 2023-10-08 NOTE — PHARMACY-CONSULT NOTE
Pharmacy Note: Parenteral Nutrition (PN) Management      Pharmacist consulted to dose PN for Bon Luque, a 68 year old male.     Subjective:     The patient is a new PN start.     The patient was started on PN in the hospital on 10/07/23 .     Indication for PN therapy: bowel obstruction     Inadequate nutrition anticipated for > 7 days.      Enteral nutrition contraindicated due to: small bowel obstruction.    Social History     Tobacco Use    Smoking status: Former     Passive exposure: Never    Smokeless tobacco: Never   Vaping Use    Vaping Use: Never used   Substance Use Topics    Alcohol use: No     Comment: Alcoholic Drinks/day: alcohol abuse - in remission x16 years    Drug use: No     Objective:    Ht Readings from Last 1 Encounters:   10/05/23 1.829 m (6')     Wt Readings from Last 1 Encounters:   10/05/23 93.4 kg (206 lb)       Body mass index is 27.94 kg/m .    Patient Vitals for the past 96 hrs:   Weight   10/05/23 1150 93.4 kg (206 lb)       Labs:  Last 3 days:  Recent Labs     10/05/23  1204 10/05/23  1431 10/06/23  0611 10/07/23  0716 10/07/23  1749 10/08/23  0622    136 138 139  --  138   POTASSIUM 3.7 3.6 3.5 3.3* 3.9 3.8   CHLORIDE 94* 95* 100 102  --  102   CO2 26 21* 24 28  --  29   BUN 12.2 11.4 11.4 9.7  --  11.0   CR 0.77 0.74 0.78 0.97  --  1.00   MIGUEL 10.1 9.5 9.0 9.2  --  8.6*   MAG 1.8  --   --   --  1.7 1.8   PHOS 2.2*  --   --  2.8  --  3.3   PROTTOTAL 8.2  8.2  --   --   --   --  6.1*   ALBUMIN 4.2  4.2  --   --   --   --  3.4*   HGB 14.6  --  13.2* 12.6*  --   --    HCT 44.1  --  40.3 38.3*  --   --      --  298 260  --   --    BILITOTAL 1.0  1.0  --   --   --   --  0.7   AST 19  19  --   --   --   --  23   ALT 19  19  --   --   --   --  13   ALKPHOS 127  127  --   --   --   --  81   INR  --   --   --   --   --  1.16*       Glucose (past 48 hours):   Recent Labs     10/07/23  0623 10/07/23  0716 10/07/23  0812 10/07/23  1055 10/07/23  1752 10/07/23  9833  10/08/23  0156 10/08/23  0559 10/08/23  0622 10/08/23  0806   * 135* 137* 160* 140* 155* 164* 162* 174* 169*       Intake/Output (last 24 hours): I/O last 3 completed shifts:  In: 3465 [P.O.:480; I.V.:2385]  Out: 1400 [Urine:1400]    Estimated CrCl: Estimated Creatinine Clearance: 86.2 mL/min (based on SCr of 1 mg/dL).    Assessment:    Continue patient on PN therapy as a continuous central therapy.     Given the patient's current condition/oral intake, PN is still indicated.    Lab results reviewed: TPN is changing from Clinimix to Custom TPN    Plan:  Rate of PN: 75 mL/hr.  Formula:   Amino Acids 100 grams  Dextrose 285 grams  Sodium 40 mEq/day  Potassium 40 mEq/day  Calcium 5 mEq/day  Magnesium 8 mEq/day  Phosphorus 20 mMol/day  Chloride: Acetate Ratio 1:1  Standard Multivitamins w/Vitamin K  Trace Elements  Fat Emulsion: 20%, 250 mL IV daily  Check TPN labs per protocol.  Pharmacist will continue to follow the patient's lab results, clinical status and blood glucose results and make adjustments as appropriate.    Thank you for the consult.  Alexis Aponte [facility]  10/8/2023 9:39 AM

## 2023-10-08 NOTE — PROGRESS NOTES
"[facility]    Medicine Progress Note - Hospitalist Service    Date of Admission:  10/5/2023    Assessment & Plan                  Bon Luque is a 68 year old male with history of insulin-dependent diabetes, chronic pain syndrome on MS Contin, coronary artery disease status post stent in 2008, hypertension, hyperlipidemia, MARÍA ELENA, renal cell carcinoma status post partial nephrectomy in 2017, recent issues with nausea and vomiting status post normal gastric emptying study about a month ago, presented for refractory nausea, vomiting, and emesis found to have small bowel obstruction secondary to likely mass, as well as mild DKA. On TPN in anticipation of surgical bypass. Hospital Day: 4        #Small bowel obstruction  #Likely duodenal vs pancreatic mass  #Retroperitoneal lymphadenopathy  -Patient with what he describes as \"stomach trouble\" for the past 15 years or so, much worse in the past 6 weeks or so, was having daily vomiting and started on PPI with improvement for the last 6 weeks.  Recently began to feel worse again.  Presented with nausea, vomiting.  CT showing obstruction at the second portion of the duodenum associated with likely mass, involvement of the paola hepatis and encasement of the common hepatic and GD arteries.  Also noted enlarged regional lymph nodes.  Very concerning for malignancy.  -Taken for EGD/EUS 10/6, biopsies taken  -NG tube fell out and he is tolerating. May need replacement if he starts vomiting again  -Just small sips of clears for now  -Pain meds, antiemetics as needed  -General surgery and GI teams following  -On IV PPI given some bleeding from NG tube, gastric abrasion noted during EGD  -Awaiting pathology results to determine surgical plan    #Severe malnutrition  -30 lb weight loss past 6 weeks  -Currently with duodenal obstruction and no immediate means of giving enteral nutrition.  Unknown when pathology results will come back, and only once they are " back can we begin to formulate a surgical plan.  Patient should not enter into a potentially large surgery with such severe malnutrition.  On TPN to help mitigate surgical risk.      #Mild DKA, resolved  #Insulin-dependent diabetes  -Patient presented with positive ketones and anion gap but was not actually acidotic.  Suspect was just very hypovolemic  -Ketosis resolved after aggressive IV fluid and repeated doses of subcu insulin  -Accu-Cheks and subcutaneous insulin every 4 hours  -Home diabetes regimen is metformin, Humalog sliding scale typically 20 units with meals; glargine (or similar long acting insulin- he is unsure of name) 50 units twice daily  -here on Lantus 20 units nightly while NPO, now that on TPN, increase to 15 units BID and monitor BG trend     #Chronic pain syndrome  -Okay to take home MS Contin, Cymbalta (increase dose- he notes med rec dose was incorrect), gabapentin  -IV Dilaudid as needed      #Hypophosphatemia  #HypoK  -Protocols     #Leukocytosis  -Likely reactive  -Improved     #Hypertension  -Suspect driven by nausea, discomfort currently  -IV hydralazine as needed for severe range blood pressures    #ABLA  -Hgb 14.6-->12.6  -Suspect Hgb drop was mostly dilutional but there was some bleeding noted from NG tube trauma of stomach       DVT Prophylaxis: Moderate risk. Pharmacologic prophylaxis contraindicated due to active bleeding  Diet: NPO for Medical/Clinical Reasons Except for: Ice Chips, Meds  parenteral nutrition - Clinimix E  parenteral nutrition - ADULT compounded formula    Seals Catheter: Not present  Lines: PRESENT      PICC 10/07/23 Double Lumen Right Brachial vein medial TPN-Site Assessment: WDL    Cardiac Monitoring: None  Code Status: Full Code      Clinically Significant Risk Factors        # Hypokalemia: Lowest K = 3.3 mmol/L in last 2 days, will replace as needed       # Hypoalbuminemia: Lowest albumin = 3.4 g/dL at 10/8/2023  6:22 AM, will monitor as appropriate  #  Coagulation Defect: INR = 1.16 (Ref range: 0.85 - 1.15) and/or PTT = N/A, will monitor for bleeding      # Hypertension: Noted on problem list       # DMII: A1C = 8.0 % (Ref range: <5.7 %) within past 6 months, PRESENT ON ADMISSION    # Overweight: Estimated body mass index is 27.94 kg/m  as calculated from the following:    Height as of this encounter: 1.829 m (6').    Weight as of this encounter: 93.4 kg (206 lb)., PRESENT ON ADMISSION            Disposition Plan   Disposition: Home     Expected Discharge Date: 10/11/2023      Destination: home with family  Discharge Comments: start TPN and await path and surgical plan     Medically ready to discharge today: No     The patient's care was discussed with the Patient.    Dilma Bustamante MD  Hospitalist Service  [facility]  Securely message with [employer] (more info)  Text page via [employer] Paging/Directory   ______________________________________________________________________      Physical Exam   Vital Signs: Temp: (P) 98.1  F (36.7  C) Temp src: (P) Oral BP: (!) (P) 142/67 Pulse: (P) 73   Resp: (P) 18 SpO2: (P) 97 % O2 Device: (P) None (Room air)    Weight: 206 lbs 0 oz  General: comfortable appearing male lying in bed.  Alert and oriented x3.   HEENT: Head normocephalic atraumatic, oral mucosa tacky. Sclerae anicteric  Skin:  a bit flushed. Psoriatic lesions on BLE  Extremities: No peripheral edema  Psych: Normal affect, mildly anxious but overall in better spirits today  Neuro: Grossly normal      Medical Decision Making               Data   Recent Results (from the past 12 hour(s))   Glucose by meter    Collection Time: 10/08/23  5:59 AM   Result Value Ref Range    GLUCOSE BY METER POCT 162 (H) 70 - 99 mg/dL   Phosphorus    Collection Time: 10/08/23  6:22 AM   Result Value Ref Range    Phosphorus 3.3 2.5 - 4.5 mg/dL   Comprehensive metabolic panel    Collection Time: 10/08/23  6:22 AM   Result Value Ref Range    Sodium 138 135 - 145 mmol/L     Potassium 3.8 3.4 - 5.3 mmol/L    Carbon Dioxide (CO2) 29 22 - 29 mmol/L    Anion Gap 7 7 - 15 mmol/L    Urea Nitrogen 11.0 8.0 - 23.0 mg/dL    Creatinine 1.00 0.67 - 1.17 mg/dL    GFR Estimate 83 >60 mL/min/1.73m2    Calcium 8.6 (L) 8.8 - 10.2 mg/dL    Chloride 102 98 - 107 mmol/L    Glucose 174 (H) 70 - 99 mg/dL    Alkaline Phosphatase 81 40 - 129 U/L    AST 23 0 - 45 U/L    ALT 13 0 - 70 U/L    Protein Total 6.1 (L) 6.4 - 8.3 g/dL    Albumin 3.4 (L) 3.5 - 5.2 g/dL    Bilirubin Total 0.7 <=1.2 mg/dL   Magnesium    Collection Time: 10/08/23  6:22 AM   Result Value Ref Range    Magnesium 1.8 1.7 - 2.3 mg/dL   INR    Collection Time: 10/08/23  6:22 AM   Result Value Ref Range    INR 1.16 (H) 0.85 - 1.15   Bilirubin direct    Collection Time: 10/08/23  6:22 AM   Result Value Ref Range    Bilirubin Direct 0.27 0.00 - 0.30 mg/dL   Glucose by meter    Collection Time: 10/08/23  8:06 AM   Result Value Ref Range    GLUCOSE BY METER POCT 169 (H) 70 - 99 mg/dL   Glucose by meter    Collection Time: 10/08/23 11:36 AM   Result Value Ref Range    GLUCOSE BY METER POCT 172 (H) 70 - 99 mg/dL   Glucose by meter    Collection Time: 10/08/23  2:28 PM   Result Value Ref Range    GLUCOSE BY METER POCT 150 (H) 70 - 99 mg/dL     Interval History     Patient states doing pretty well today.  Feels the IV nutrition is giving him more energy.  Tolerating water and has been passing some flatus, no longer having incontinent stools.  Overall happy with progress.  Continues to have questions about surgical plan which we do not have clear answers for this was reiterated to the patient.  He noted his home Cymbalta dose is actually 60 mg and so I updated this in the chart for him.  Not ready for discharge.

## 2023-10-08 NOTE — PROGRESS NOTES
"Care Management Follow Up    Length of Stay (days): 3    Expected Discharge Date: 10/11/2023     Concerns to be Addressed: Care progression - discharge planning    PICC line for TPN. NPO except sips of clears. IV PPI.  Patient plan of care discussed at interdisciplinary rounds: Yes    Anticipated Discharge Disposition:  TBD     Anticipated Discharge Services:  TBD  Anticipated Discharge DME:  NA    Patient/family educated on Medicare website which has current facility and service quality ratings:  NA  Education Provided on the Discharge Plan:  Per team  Patient/Family in Agreement with the Plan:  NA    Referrals Placed by CM/SW:  NA  Private pay costs discussed: Not applicable    Additional Information:  Chart reviewed.    Social Hx: \"Lives w/spouse in their home. Independent at baseline. Spouse to transport.\"    RNCM to follow for medical progression, recommendations, and final discharge plan.     Jaz Ham RN     "

## 2023-10-08 NOTE — PLAN OF CARE
Problem: Plan of Care - These are the overarching goals to be used throughout the patient stay.    Goal: Optimal Comfort and Wellbeing  Outcome: Progressing  Intervention: Monitor Pain and Promote Comfort  Recent Flowsheet Documentation  Taken 10/8/2023 0223 by Tyrone Rose RN  Pain Management Interventions: rest  Taken 10/7/2023 2200 by Tyrone Rose RN  Pain Management Interventions: declines  Taken 10/7/2023 2000 by Tyrone Rose RN  Pain Management Interventions: declines     Problem: Nausea and Vomiting  Goal: Nausea and Vomiting Relief  Outcome: Progressing  Intervention: Prevent and Manage Nausea and Vomiting  Recent Flowsheet Documentation  Taken 10/8/2023 0235 by Tyrone Rose RN  Nausea/Vomiting Interventions: antiemetic  Taken 10/7/2023 2000 by Tyrone Rose RN  Nausea/Vomiting Interventions: antiemetic     Problem: Parenteral Nutrition  Goal: Effective Intravenous Nutrition Therapy Delivery  Outcome: Progressing  Intervention: Optimize Intravenous Nutrition Delivery  Recent Flowsheet Documentation  Taken 10/8/2023 0235 by Tyrone Rose RN  Medication Review/Management: medications reviewed   Goal Outcome Evaluation:  VSS ex HTN. Denies pain. Nausea relieved by Zofran. LR, TPN, and lipids infusing through PICC. , 164, and 162. NPO except sips of clears. SBA. Slept between cares.    Tyrone Rose RN

## 2023-10-08 NOTE — PROGRESS NOTES
General Surgery Progress Note:    Hospital Day # 3    ASSESSMENT:   1. Generalized abdominal pain    2. Nausea and vomiting    3. DKA (diabetic ketoacidosis) (H)    4. SBO (small bowel obstruction) (H)        Bon Luque is a 68 year old male with duodenal obstruction with recent EGD and EUS with pending biopsies due to concern of possible malignancy either pancreatic or biliary.  Patient did have an NG in however currently the NG is out.  He is passing gas and feeling better today.  No nausea.  Patient n.p.o. and drinking water and ice chips.  Patient states that he is drinking quite a bit of water.    PLAN:   -Awaiting pathology determine next steps.  Either stenting versus gastric bypass really is going to depend on the pathology.  -We will peripherally follow until pathology returns.  Continue TPN and diet per GI  -Continue IV PPI      IChristopher D.O. have seen and evaluated Bon Luque today as part of a shared APRN/PA visit. I reviewed the Advance Practice Practitioner's history and physical exam as well as the diagnosis and plan.     My key exam findings include minimal abdominal pain.  Feels better.  Passing flatus.  Tolerating small sips  Abdomen-soft, nontender, nondistended    Key management decisions made by me and carried out under my direction include:  Duodenal outlet obstruction secondary to mass of unclear etiology.  Pathology is pending.  Continue with PPIs and sips of clears for now.    I spent 10 minutes face-to-face and/or coordinating care. Over 50% of our time on the unit was spent counseling the patient and/or coordinating care regarding ongoing management strategy..    Christopher Hoko DO [facility]  [phone]    SUBJECTIVE:   Bon Luque is feeling much better today.  He is passing lots of gas.  He states that he is drinking a lot of water.  No nausea.  No pain.    Patient Vitals for the past 24 hrs:   BP Temp Temp src Pulse Resp SpO2   10/08/23 0803 132/67 98.3  F  (36.8  C) Oral 67 18 96 %   10/07/23 2344 (!) 160/78 98.3  F (36.8  C) Oral 73 18 96 %   10/07/23 1520 136/72 98.2  F (36.8  C) Oral 71 18 98 %   10/07/23 1246 (!) 158/74 98.4  F (36.9  C) Oral 83 18 97 %       Physical Exam:  General: NAD, pleasant    ABD: Soft and nontender      Admission on 10/05/2023   Component Date Value    Sodium 10/05/2023 138     Potassium 10/05/2023 3.7     Carbon Dioxide (CO2) 10/05/2023 26     Anion Gap 10/05/2023 18 (H)     Urea Nitrogen 10/05/2023 12.2     Creatinine 10/05/2023 0.77     GFR Estimate 10/05/2023 >90     Calcium 10/05/2023 10.1     Chloride 10/05/2023 94 (L)     Glucose 10/05/2023 398 (H)     Alkaline Phosphatase 10/05/2023 127     AST 10/05/2023 19     ALT 10/05/2023 19     Protein Total 10/05/2023 8.2     Albumin 10/05/2023 4.2     Bilirubin Total 10/05/2023 1.0     Lipase 10/05/2023 46     Protein Total 10/05/2023 8.2     Albumin 10/05/2023 4.2     Bilirubin Total 10/05/2023 1.0     Alkaline Phosphatase 10/05/2023 127     AST 10/05/2023 19     ALT 10/05/2023 19     Bilirubin Direct 10/05/2023 0.31 (H)     Lactic Acid 10/05/2023 2.7 (H)     Alcohol ethyl 10/05/2023 <0.01     Color Urine 10/05/2023 Colorless     Appearance Urine 10/05/2023 Clear     Glucose Urine 10/05/2023 >1000 (A)     Bilirubin Urine 10/05/2023 Negative     Ketones Urine 10/05/2023 60 (A)     Specific Gravity Urine 10/05/2023 1.035 (H)     Blood Urine 10/05/2023 Negative     pH Urine 10/05/2023 8.0 (H)     Protein Albumin Urine 10/05/2023 Negative     Urobilinogen Urine 10/05/2023 <2.0     Nitrite Urine 10/05/2023 Negative     Leukocyte Esterase Urine 10/05/2023 Negative     RBC Urine 10/05/2023 3 (H)     WBC Urine 10/05/2023 0     Squamous Epithelials Uri* 10/05/2023 <1     pH Venous 10/05/2023 7.58 (HH)     pCO2 Venous 10/05/2023 32 (L)     pO2 Venous 10/05/2023 74 (H)     Bicarbonate Venous 10/05/2023 29     Base Excess/Deficit 10/05/2023 7.3     Oxyhemoglobin Venous 10/05/2023 96.0 (H)     O2 Sat,  Venous 10/05/2023 96.3 (H)     CRP Inflammation 10/05/2023 19.50 (H)     WBC Count 10/05/2023 14.1 (H)     RBC Count 10/05/2023 5.72     Hemoglobin 10/05/2023 14.6     Hematocrit 10/05/2023 44.1     MCV 10/05/2023 77 (L)     MCH 10/05/2023 25.5 (L)     MCHC 10/05/2023 33.1     RDW 10/05/2023 13.7     Platelet Count 10/05/2023 316     % Neutrophils 10/05/2023 89     % Lymphocytes 10/05/2023 8     % Monocytes 10/05/2023 3     % Eosinophils 10/05/2023 0     % Basophils 10/05/2023 0     % Immature Granulocytes 10/05/2023 0     NRBCs per 100 WBC 10/05/2023 0     Absolute Neutrophils 10/05/2023 12.4 (H)     Absolute Lymphocytes 10/05/2023 1.2     Absolute Monocytes 10/05/2023 0.5     Absolute Eosinophils 10/05/2023 0.0     Absolute Basophils 10/05/2023 0.0     Absolute Immature Granul* 10/05/2023 0.1     Absolute NRBCs 10/05/2023 0.0     Hold Specimen 10/05/2023 JIC     Hold Specimen 10/05/2023 JIC     GLUCOSE BY METER POCT 10/05/2023 397 (H)     Lactic Acid 10/05/2023 2.0     Troponin T, High Sensiti* 10/05/2023 17     Systolic Blood Pressure 10/05/2023 202     Diastolic Blood Pressure 10/05/2023 99     Ventricular Rate 10/05/2023 97     Atrial Rate 10/05/2023 97     AZ Interval 10/05/2023 160     QRS Duration 10/05/2023 88     QT 10/05/2023 388     QTc 10/05/2023 492     P Axis 10/05/2023 67     R AXIS 10/05/2023 76     T [place] 10/05/2023 94     Interpretation ECG 10/05/2023                      Value:Sinus rhythm  Possible Inferior infarct (cited on or before 26-DEC-2017)  Cannot rule out Anterior infarct , age undetermined  Abnormal ECG  When compared with ECG of 20-JAN-2023 18:33,  Nonspecific T wave abnormality now evident in Inferior leads  QT has lengthened  Confirmed by SEE ED PROVIDER NOTE FOR, ECG INTERPRETATION ([id number]),  GAGE DAILEY ([id number]) on 10/6/2023 1:25:08 AM      Culture 10/05/2023 No growth after 2 days     Culture 10/05/2023 No growth after 2 days     Ketone (Beta-Hydroxybuty* 10/05/2023 1.70  (HH)     ABO/RH(D) 10/05/2023 B POS     Antibody Screen 10/05/2023 Negative     SPECIMEN EXPIRATION DATE 10/05/2023 [id number]     Troponin T, High Sensiti* 10/05/2023 19     Phosphorus 10/05/2023 2.2 (L)     Magnesium 10/05/2023 1.8     Osmolality Blood 10/05/2023 305 (H)     GLUCOSE BY METER POCT 10/05/2023 388 (H)     GLUCOSE BY METER POCT 10/05/2023 407 (H)     CEA 10/05/2023 1.4     Sodium 10/05/2023 136     Potassium 10/05/2023 3.6     Chloride 10/05/2023 95 (L)     Carbon Dioxide (CO2) 10/05/2023 21 (L)     Anion Gap 10/05/2023 20 (H)     Urea Nitrogen 10/05/2023 11.4     Creatinine 10/05/2023 0.74     GFR Estimate 10/05/2023 >90     Calcium 10/05/2023 9.5     Glucose 10/05/2023 356 (H)     Hemoglobin A1C 10/05/2023 8.0 (H)     GLUCOSE BY METER POCT 10/05/2023 369 (H)     GLUCOSE BY METER POCT 10/05/2023 308 (H)     Sodium 10/06/2023 138     Potassium 10/06/2023 3.5     Chloride 10/06/2023 100     Carbon Dioxide (CO2) 10/06/2023 24     Anion Gap 10/06/2023 14     Urea Nitrogen 10/06/2023 11.4     Creatinine 10/06/2023 0.78     GFR Estimate 10/06/2023 >90     Calcium 10/06/2023 9.0     Glucose 10/06/2023 266 (H)     WBC Count 10/06/2023 17.1 (H)     RBC Count 10/06/2023 5.11     Hemoglobin 10/06/2023 13.2 (L)     Hematocrit 10/06/2023 40.3     MCV 10/06/2023 79     MCH 10/06/2023 25.8 (L)     MCHC 10/06/2023 32.8     RDW 10/06/2023 14.1     Platelet Count 10/06/2023 298     GLUCOSE BY METER POCT 10/06/2023 242 (H)     GLUCOSE BY METER POCT 10/06/2023 256 (H)     GLUCOSE BY METER POCT 10/06/2023 286 (H)     GLUCOSE BY METER POCT 10/06/2023 256 (H)     Upper EUS 10/06/2023                      Value:[facility]  [address] [place] [address]  _______________________________________________________________________________  Patient Name: Bon Luque             Procedure Date: 10/6/2023 1:57 PM  MRN: [id number]                       Account Number: [id number]  Date of Birth:  1955              Admit Type: Inpatient  Age: 68                               Room: [place] [address]  Note Status: Finalized                Attending MD: PORFIRIO VILLEGAS MD,   Instrument Name: EUS Linear Scope 6299   _______________________________________________________________________________     Procedure:             Upper EUS  Indications:           Duodenal Wall Thickening, Lymphadenopathy  Providers:             PORFIRIO VILLEGAS MD  Patient Profile:       This is a 68 year old male.  Referring MD:            Medicines:             Propofol per Anesthesia  Complications:         No immediate complications.  ___________________________________________________________________                          ____________  Procedure:             Pre-Anesthesia Assessment:                         - Prior to the procedure, a History and Physical was                          performed, and patient medications, allergies and                          sensitivities were reviewed. The patient's tolerance                          of previous anesthesia was reviewed.                         After obtaining informed consent, the endoscope was                          passed under direct vision. Throughout the procedure,                          the patient's blood pressure, pulse, and oxygen                          saturations were monitored continuously. The EUS                          linear scope was introduced through the mouth, and                          advanced to the second part of duodenum. The upper EUS                          was accomplished without difficulty. The patient                          tolerated the procedure well.                                                                                                             Findings:       ENDOSONOGRAPHIC FINDING: :       A few abnormal lymph nodes were visualized in the peripancreatic region.        The largest measured 17 mm by 12 mm in maximal  cross-sectional diameter.        The nodes were oval, hypoechoic and had well defined margins. Fine        needle aspiration for cytology was performed. Color Doppler imaging was        utilized prior to needle puncture to confirm a lack of significant        vascular structures within the needle path. Seven passes were made with        the 22 gauge needle using a transduodenal approach. No stylet was used.        A cytologist was present and performed a preliminary cytologic        examination. The cellularity of the specimen was adequate. Final        cytology results are pending.       ENDOSCOPIC FINDING: :       Diffuse severely congested mucosa without active bleeding and with no        stigmata of bleeding was found in the first portion of the duodenum and        in the second porti                          on of the duodenum. Biopsies were taken with a cold        forceps for histology.                                                                                   Moderate Sedation:       MAC  Impression:            - A few abnormal lymph nodes were visualized in the                          peripancreatic region. Fine needle aspiration                          performed.                         - Congested duodenal mucosa. Biopsied.                         - Linear ulcer in the gastric cardia (likely related                          to NG tube.  Recommendation:        - Await pathology results.                         - [facility].                         - NG tube to low intermittent suction.                         - NPO diet status.                                                                                     Porfirio Meyer MD  ____________________  PORFIRIO MEYER MD  10/6/2023 2:38:41 PM  I was physically present for the entire viewing portio                          n of the exam.  __________________________  Signature of teaching physician  [id number] MEYER  MD  Number of Addenda: 0    Note Initiated On: 10/6/2023 1:57 PM  Scope In: 2:05:50 PM  Scope Out: 2:28:00 PM      GLUCOSE BY METER POCT 10/06/2023 206 (H)     Lactic Acid 10/06/2023 1.7     GLUCOSE BY METER POCT 10/06/2023 225 (H)     GLUCOSE BY METER POCT 10/06/2023 278 (H)     Phosphorus 10/07/2023 2.8     WBC Count 10/07/2023 12.6 (H)     RBC Count 10/07/2023 4.85     Hemoglobin 10/07/2023 12.6 (L)     Hematocrit 10/07/2023 38.3 (L)     MCV 10/07/2023 79     MCH 10/07/2023 26.0 (L)     MCHC 10/07/2023 32.9     RDW 10/07/2023 14.1     Platelet Count 10/07/2023 260     Sodium 10/07/2023 139     Potassium 10/07/2023 3.3 (L)     Chloride 10/07/2023 102     Carbon Dioxide (CO2) 10/07/2023 28     Anion Gap 10/07/2023 9     Urea Nitrogen 10/07/2023 9.7     Creatinine 10/07/2023 0.97     GFR Estimate 10/07/2023 86     Calcium 10/07/2023 9.2     Glucose 10/07/2023 135 (H)     GLUCOSE BY METER POCT 10/07/2023 179 (H)     GLUCOSE BY METER POCT 10/07/2023 136 (H)     GLUCOSE BY METER POCT 10/07/2023 137 (H)     GLUCOSE BY METER POCT 10/07/2023 160 (H)     Potassium 10/07/2023 3.9     Magnesium 10/07/2023 1.7     GLUCOSE BY METER POCT 10/07/2023 140 (H)     GLUCOSE BY METER POCT 10/07/2023 155 (H)     Phosphorus 10/08/2023 3.3     Sodium 10/08/2023 138     Potassium 10/08/2023 3.8     Carbon Dioxide (CO2) 10/08/2023 29     Anion Gap 10/08/2023 7     Urea Nitrogen 10/08/2023 11.0     Creatinine 10/08/2023 1.00     GFR Estimate 10/08/2023 83     Calcium 10/08/2023 8.6 (L)     Chloride 10/08/2023 102     Glucose 10/08/2023 174 (H)     Alkaline Phosphatase 10/08/2023 81     AST 10/08/2023 23     ALT 10/08/2023 13     Protein Total 10/08/2023 6.1 (L)     Albumin 10/08/2023 3.4 (L)     Bilirubin Total 10/08/2023 0.7     Magnesium 10/08/2023 1.8     INR 10/08/2023 1.16 (H)     Bilirubin Direct 10/08/2023 0.27     GLUCOSE BY METER POCT 10/08/2023 164 (H)     GLUCOSE BY METER POCT 10/08/2023 162 (H)     GLUCOSE BY METER POCT  10/08/2023 169 (H)         CORNELL Pereira  [facility]  [address] [place] [id number]  Phone- [phone]  Fax- [phone]

## 2023-10-09 ENCOUNTER — APPOINTMENT (OUTPATIENT)
Dept: ULTRASOUND IMAGING | Facility: HOSPITAL | Age: 68
DRG: 388 | End: 2023-10-09
Payer: COMMERCIAL

## 2023-10-09 PROBLEM — I82.629 DEEP VEIN THROMBOSIS (DVT) OF UPPER EXTREMITY (H): Status: ACTIVE | Noted: 2023-10-09

## 2023-10-09 LAB
ALBUMIN SERPL BCG-MCNC: 3.4 G/DL (ref 3.5–5.2)
ALP SERPL-CCNC: 75 U/L (ref 40–129)
ALT SERPL W P-5'-P-CCNC: 12 U/L (ref 0–70)
ANION GAP SERPL CALCULATED.3IONS-SCNC: 10 MMOL/L (ref 7–15)
APTT PPP: 130 SECONDS (ref 22–38)
AST SERPL W P-5'-P-CCNC: 25 U/L (ref 0–45)
BILIRUB SERPL-MCNC: 0.7 MG/DL
BUN SERPL-MCNC: 10.3 MG/DL (ref 8–23)
CALCIUM SERPL-MCNC: 8.9 MG/DL (ref 8.8–10.2)
CHLORIDE SERPL-SCNC: 103 MMOL/L (ref 98–107)
CREAT SERPL-MCNC: 0.95 MG/DL (ref 0.67–1.17)
DEPRECATED HCO3 PLAS-SCNC: 27 MMOL/L (ref 22–29)
EGFRCR SERPLBLD CKD-EPI 2021: 88 ML/MIN/1.73M2
GLUCOSE BLDC GLUCOMTR-MCNC: 105 MG/DL (ref 70–99)
GLUCOSE BLDC GLUCOMTR-MCNC: 108 MG/DL (ref 70–99)
GLUCOSE BLDC GLUCOMTR-MCNC: 110 MG/DL (ref 70–99)
GLUCOSE BLDC GLUCOMTR-MCNC: 112 MG/DL (ref 70–99)
GLUCOSE BLDC GLUCOMTR-MCNC: 91 MG/DL (ref 70–99)
GLUCOSE BLDC GLUCOMTR-MCNC: 95 MG/DL (ref 70–99)
GLUCOSE SERPL-MCNC: 90 MG/DL (ref 70–99)
HGB BLD-MCNC: 11.2 G/DL (ref 13.3–17.7)
HGB BLD-MCNC: 11.8 G/DL (ref 13.3–17.7)
HGB BLD-MCNC: 12 G/DL (ref 13.3–17.7)
HOLD SPECIMEN: NORMAL
INR PPP: 1.26 (ref 0.85–1.15)
MAGNESIUM SERPL-MCNC: 1.8 MG/DL (ref 1.7–2.3)
PATH REPORT.COMMENTS IMP SPEC: NORMAL
PATH REPORT.COMMENTS IMP SPEC: NORMAL
PATH REPORT.FINAL DX SPEC: NORMAL
PATH REPORT.GROSS SPEC: NORMAL
PATH REPORT.MICROSCOPIC SPEC OTHER STN: NORMAL
PATH REPORT.RELEVANT HX SPEC: NORMAL
PHOSPHATE SERPL-MCNC: 4.4 MG/DL (ref 2.5–4.5)
PHOTO IMAGE: NORMAL
POTASSIUM SERPL-SCNC: 3.4 MMOL/L (ref 3.4–5.3)
POTASSIUM SERPL-SCNC: 3.9 MMOL/L (ref 3.4–5.3)
PREALB SERPL IA-MCNC: 11 MG/DL (ref 15–45)
PREALB SERPL IA-MCNC: 11 MG/DL (ref 15–45)
PROT SERPL-MCNC: 5.9 G/DL (ref 6.4–8.3)
SODIUM SERPL-SCNC: 140 MMOL/L (ref 135–145)
TRIGL SERPL-MCNC: 93 MG/DL
UFH PPP CHRO-ACNC: 0.49 IU/ML
UFH PPP CHRO-ACNC: 0.76 IU/ML

## 2023-10-09 PROCEDURE — 83735 ASSAY OF MAGNESIUM: CPT | Performed by: HOSPITALIST

## 2023-10-09 PROCEDURE — 120N000001 HC R&B MED SURG/OB

## 2023-10-09 PROCEDURE — 84100 ASSAY OF PHOSPHORUS: CPT | Performed by: HOSPITALIST

## 2023-10-09 PROCEDURE — 250N000011 HC RX IP 250 OP 636: Mod: JZ | Performed by: INTERNAL MEDICINE

## 2023-10-09 PROCEDURE — B4185 PARENTERAL SOL 10 GM LIPIDS: HCPCS | Mod: JZ | Performed by: HOSPITALIST

## 2023-10-09 PROCEDURE — 99231 SBSQ HOSP IP/OBS SF/LOW 25: CPT | Performed by: PHYSICIAN ASSISTANT

## 2023-10-09 PROCEDURE — 93971 EXTREMITY STUDY: CPT | Mod: RT

## 2023-10-09 PROCEDURE — 85730 THROMBOPLASTIN TIME PARTIAL: CPT | Performed by: INTERNAL MEDICINE

## 2023-10-09 PROCEDURE — 250N000013 HC RX MED GY IP 250 OP 250 PS 637: Performed by: INTERNAL MEDICINE

## 2023-10-09 PROCEDURE — 36415 COLL VENOUS BLD VENIPUNCTURE: CPT | Performed by: HOSPITALIST

## 2023-10-09 PROCEDURE — 36415 COLL VENOUS BLD VENIPUNCTURE: CPT | Performed by: INTERNAL MEDICINE

## 2023-10-09 PROCEDURE — 85610 PROTHROMBIN TIME: CPT | Performed by: HOSPITALIST

## 2023-10-09 PROCEDURE — 84478 ASSAY OF TRIGLYCERIDES: CPT | Performed by: INTERNAL MEDICINE

## 2023-10-09 PROCEDURE — C9113 INJ PANTOPRAZOLE SODIUM, VIA: HCPCS | Mod: JZ | Performed by: HOSPITALIST

## 2023-10-09 PROCEDURE — C9113 INJ PANTOPRAZOLE SODIUM, VIA: HCPCS | Mod: JZ | Performed by: PHYSICIAN ASSISTANT

## 2023-10-09 PROCEDURE — 85520 HEPARIN ASSAY: CPT | Performed by: INTERNAL MEDICINE

## 2023-10-09 PROCEDURE — 250N000011 HC RX IP 250 OP 636: Mod: JZ | Performed by: HOSPITALIST

## 2023-10-09 PROCEDURE — 99231 SBSQ HOSP IP/OBS SF/LOW 25: CPT | Performed by: INTERNAL MEDICINE

## 2023-10-09 PROCEDURE — 88305 TISSUE EXAM BY PATHOLOGIST: CPT | Mod: 26 | Performed by: PATHOLOGY

## 2023-10-09 PROCEDURE — 250N000009 HC RX 250: Mod: JZ | Performed by: HOSPITALIST

## 2023-10-09 PROCEDURE — 84132 ASSAY OF SERUM POTASSIUM: CPT | Performed by: HOSPITALIST

## 2023-10-09 PROCEDURE — 250N000009 HC RX 250: Performed by: INTERNAL MEDICINE

## 2023-10-09 PROCEDURE — 85018 HEMOGLOBIN: CPT | Performed by: INTERNAL MEDICINE

## 2023-10-09 PROCEDURE — 250N000013 HC RX MED GY IP 250 OP 250 PS 637: Performed by: HOSPITALIST

## 2023-10-09 PROCEDURE — 80053 COMPREHEN METABOLIC PANEL: CPT | Performed by: HOSPITALIST

## 2023-10-09 PROCEDURE — 84134 ASSAY OF PREALBUMIN: CPT | Performed by: HOSPITALIST

## 2023-10-09 PROCEDURE — 250N000011 HC RX IP 250 OP 636: Mod: JZ | Performed by: PHYSICIAN ASSISTANT

## 2023-10-09 RX ORDER — HEPARIN SODIUM,PORCINE 10 UNIT/ML
5-20 VIAL (ML) INTRAVENOUS EVERY 24 HOURS
Status: DISCONTINUED | OUTPATIENT
Start: 2023-10-09 | End: 2023-10-10

## 2023-10-09 RX ORDER — DEXTROSE, SODIUM CHLORIDE, SODIUM LACTATE, POTASSIUM CHLORIDE, AND CALCIUM CHLORIDE 5; .6; .31; .03; .02 G/100ML; G/100ML; G/100ML; G/100ML; G/100ML
INJECTION, SOLUTION INTRAVENOUS CONTINUOUS
Status: ACTIVE | OUTPATIENT
Start: 2023-10-09 | End: 2023-10-09

## 2023-10-09 RX ORDER — HEPARIN SODIUM 10000 [USP'U]/100ML
0-5000 INJECTION, SOLUTION INTRAVENOUS CONTINUOUS
Status: DISCONTINUED | OUTPATIENT
Start: 2023-10-09 | End: 2023-10-11

## 2023-10-09 RX ORDER — HEPARIN SODIUM,PORCINE 10 UNIT/ML
5-20 VIAL (ML) INTRAVENOUS
Status: DISCONTINUED | OUTPATIENT
Start: 2023-10-09 | End: 2023-10-11 | Stop reason: HOSPADM

## 2023-10-09 RX ORDER — POTASSIUM CHLORIDE 1500 MG/1
40 TABLET, EXTENDED RELEASE ORAL ONCE
Status: COMPLETED | OUTPATIENT
Start: 2023-10-09 | End: 2023-10-09

## 2023-10-09 RX ORDER — LIDOCAINE 40 MG/G
CREAM TOPICAL
Status: DISCONTINUED | OUTPATIENT
Start: 2023-10-09 | End: 2023-10-11 | Stop reason: HOSPADM

## 2023-10-09 RX ADMIN — POTASSIUM CHLORIDE 40 MEQ: 1500 TABLET, EXTENDED RELEASE ORAL at 10:13

## 2023-10-09 RX ADMIN — HEPARIN SODIUM AND DEXTROSE 1600 UNITS/HR: 10000; 5 INJECTION INTRAVENOUS at 15:20

## 2023-10-09 RX ADMIN — PANTOPRAZOLE SODIUM 40 MG: 40 INJECTION, POWDER, FOR SOLUTION INTRAVENOUS at 10:27

## 2023-10-09 RX ADMIN — DULOXETINE HYDROCHLORIDE 60 MG: 60 CAPSULE, DELAYED RELEASE PELLETS ORAL at 10:14

## 2023-10-09 RX ADMIN — HEPARIN SODIUM AND DEXTROSE 1700 UNITS/HR: 10000; 5 INJECTION INTRAVENOUS at 04:00

## 2023-10-09 RX ADMIN — PANTOPRAZOLE SODIUM 40 MG: 40 INJECTION, POWDER, FOR SOLUTION INTRAVENOUS at 20:39

## 2023-10-09 RX ADMIN — HEPARIN SODIUM AND DEXTROSE 1700 UNITS/HR: 10000; 5 INJECTION INTRAVENOUS at 05:30

## 2023-10-09 RX ADMIN — MORPHINE SULFATE 30 MG: 15 TABLET, EXTENDED RELEASE ORAL at 06:18

## 2023-10-09 RX ADMIN — OLIVE OIL AND SOYBEAN OIL 250 ML: 16; 4 INJECTION, EMULSION INTRAVENOUS at 20:45

## 2023-10-09 RX ADMIN — GABAPENTIN 600 MG: 300 CAPSULE ORAL at 13:44

## 2023-10-09 RX ADMIN — ONDANSETRON 4 MG: 2 INJECTION INTRAMUSCULAR; INTRAVENOUS at 21:15

## 2023-10-09 RX ADMIN — GABAPENTIN 600 MG: 300 CAPSULE ORAL at 20:39

## 2023-10-09 RX ADMIN — Medication 5 MG: at 21:15

## 2023-10-09 RX ADMIN — GABAPENTIN 600 MG: 300 CAPSULE ORAL at 10:13

## 2023-10-09 RX ADMIN — MAGNESIUM SULFATE HEPTAHYDRATE: 500 INJECTION, SOLUTION INTRAMUSCULAR; INTRAVENOUS at 20:50

## 2023-10-09 RX ADMIN — SODIUM CHLORIDE, SODIUM LACTATE, POTASSIUM CHLORIDE, CALCIUM CHLORIDE AND DEXTROSE MONOHYDRATE: 5; 600; 310; 30; 20 INJECTION, SOLUTION INTRAVENOUS at 04:04

## 2023-10-09 RX ADMIN — MORPHINE SULFATE 30 MG: 15 TABLET, EXTENDED RELEASE ORAL at 21:14

## 2023-10-09 RX ADMIN — MORPHINE SULFATE 30 MG: 15 TABLET, EXTENDED RELEASE ORAL at 13:44

## 2023-10-09 RX ADMIN — LIDOCAINE HYDROCHLORIDE 4 ML: 10 INJECTION, SOLUTION INFILTRATION; PERINEURAL at 12:45

## 2023-10-09 ASSESSMENT — ACTIVITIES OF DAILY LIVING (ADL)
ADLS_ACUITY_SCORE: 26

## 2023-10-09 NOTE — SIGNIFICANT EVENT
Significant Event Note      Description of event:  Paged by RN for patient complaints of pain and itching at PICC site. Per RN, PICC was placed yesterday for TPN. Briefly, patient is admitted for SBO secondary to likely mass as well as DKA, on TPN in anticipation of surgical bypass.     Patient otherwise vitally stable, afebrile.     Plan:  Due to new pain at PICC site, advised to have PICC team assess.     Ordered RUE U/S to assess.     Discussed with: bedside nurse    Екатерина Lr DO      2:31 AM  Paged by RN for result of ultrasound. Ultrasound shows:     IMPRESSION:  1.  DVT in the axillary and brachial veins.  2.  Superficial thrombus in the basilic vein    Per chart review, DVT prophylaxis was previously contraindicated due to active bleeding.     Messaged on call hospitalists to discuss care.     Hospitalist to start heparin drip and D5w infusion.     5:02 AM  Paged by RN for PTT of 130. PTT was drawn slightly after initiation of heparin bolus.     Discussed with pharmacy. PTT was not initially ordered as part of the heparin protocol, so PTT was ordered to get baseline. However, since PTT was drawn after initiation of heparin bolus, this is likely why PTT was prolonged.

## 2023-10-09 NOTE — PROVIDER NOTIFICATION
0220  Notified Resident at 0220 AM regarding radiology result.      Spoke with: Dr. Lr    Orders were not obtained.    Comments: Paged regarding US results. She told me to page Dr. Barger for further steps. Paged Dr. Barger at 0223, awaiting response.        0249  Spoke with: Dr. Barger    Orders were obtained.    Comments: Placing 2 IVs in LUE for heparin drip and D5W infusion. Leaving PICC in place for now.        Tyrone Rose RN

## 2023-10-09 NOTE — PROGRESS NOTES
General Surgery Progress Note:    Hospital Day # 4    ASSESSMENT:   1. Generalized abdominal pain    2. Nausea and vomiting    3. DKA (diabetic ketoacidosis) (H)    4. SBO (small bowel obstruction) (H)        Bon Luque is a 68 year old male admitted with a duodenal obstruction with recent EGD and EUS with biopsies due to concern of possible malignancy. Tissue biopsies revealed mild edema and chronic duodenitis and no evidence of ulceration, active enteritis, dysplasia or malignancy.  Cytology still pending. Overnight events notable for new RUE DVT now on heparin gtt.     PLAN:   - Okay for clear liquid diet  - Continue TPN per RD  - Continue IV PPI  - General surgery will continue to follow      SUBJECTIVE:   Bon Luque is doing okay.  Tolerating sips of water and ice chips without nausea, vomiting.  Abdominal pain stable.  Continues to pass flatus.  Anxiously awaiting pathology results.    Patient Vitals for the past 24 hrs:   BP Temp Temp src Pulse Resp SpO2 Weight   10/09/23 0736 (!) 171/81 97.6  F (36.4  C) Oral 64 17 97 % --   10/09/23 0524 -- -- -- -- -- -- 91.9 kg (202 lb 9.6 oz)   10/08/23 2349 (!) 143/67 98.3  F (36.8  C) Oral 60 18 97 % --   10/08/23 2219 (!) 158/69 98.6  F (37  C) Oral 74 18 100 % --   10/08/23 1552 (!) (P) 142/67 (P) 98.1  F (36.7  C) (P) Oral (P) 73 (P) 18 (P) 97 % --       Physical Exam:  General: patient seen resting in bed, no acute distress  Resp: no respiratory distress, breathing comfortably on room air  Abdomen: Soft, non-tender to palpation.  Extremities: warm and well perfused    No results displayed because visit has over 200 results.           Loida Durham PA-C  [facility] General Surgery  [address]  [address]  [place] [address]

## 2023-10-09 NOTE — PROGRESS NOTES
[facility]    PROGRESS NOTE - Hospitalist Service    Assessment and Plan    Principal Problem:    SBO (small bowel obstruction) (H)  Active Problems:    HTN (hypertension)    Chronic pain syndrome    Abdominal pain, epigastric    Nausea and vomiting    DKA (diabetic ketoacidosis) (H)    Hypophosphatemia    Deep vein thrombosis (DVT) of upper extremity (H)    Bon Luque is a 68 year old male with h/o insulin-dependent diabetes, chronic pain syndrome on MS Contin, coronary artery disease status post stent in 2008, hypertension, hyperlipidemia, MARÍA ELENA, renal cell carcinoma status post partial nephrectomy in 2017, recent issues with nausea and vomiting status post normal gastric emptying study about a month ago, presented for refractory nausea, vomiting, and emesis found to have small bowel obstruction secondary to likely mass, as well as mild DKA. On TPN in anticipation of surgical bypass. Hospital Day: 4         #Small bowel obstruction  #Likely duodenal vs pancreatic mass  #Retroperitoneal lymphadenopathy  -  CT showing obstruction at the second portion of the duodenum associated with likely mass, involvement of the paola hepatis and encasement of the common hepatic and GD arteries.  Also noted enlarged regional lymph nodes.  Very concerning for malignancy.  -Taken for EGD/EUS 10/6, biopsies taken  -NG tube fell out and he is tolerating.ain  -Just small sips of clears for now per surgery   -Pain meds, antiemetics as needed  -General surgery and GI teams following  -On IV PPI given some bleeding from NG tube, gastric abrasion noted during EGD  -Awaiting pathology results to determine surgical plan     #Severe malnutrition  -30 lb weight loss past 6 weeks  -Currently with duodenal obstruction and no immediate means of giving enteral nutrition.    - TPN to resume once new PICC placed    DVT RUE  - IV heparin started last night  - PICC line was still in place, remove once PICC placing LUE   -  "Oral Acc TBD pending resolution      #Mild DKA, resolved  #Insulin-dependent diabetes  -Patient presented with positive ketones and anion gap but was not actually acidotic.  Suspect was just very hypovolemic  -Ketosis resolved after aggressive IV fluid and repeated doses of subcu insulin  -Accu-Cheks and subcutaneous insulin every 4 hours  -Home diabetes regimen is metformin, Humalog sliding scale typically 20 units with meals; glargine (or similar long acting insulin- he is unsure of name) 50 units twice daily  -here on Lantus 20 units nightly while NPO, now that on TPN, increase to 15 units BID and monitor BG trend     #Chronic pain syndrome  -Okay to take home MS Contin, Cymbalta (increase dose- he notes med rec dose was incorrect), gabapentin  -IV Dilaudid as needed      #Hypophosphatemia  #HypoK  -Protocols     #Leukocytosis  -Likely reactive  -Improved     #Hypertension  -Suspect driven by nausea, discomfort currently  -IV hydralazine as needed for severe range blood pressures     #ABLA  -Hgb 14.6-->12.6  -Suspect Hgb drop was mostly dilutional but there was some bleeding noted from NG tube trauma of stomach      Clinically Significant Risk Factors      # Overweight: Estimated body mass index is 28.31 kg/m  as calculated from the following:  Height as of this encounter: 1.626 m (5' 4\").  Weight as of this encounter: 74.8 kg (164 lb 14.4 oz)., PRESENT ON ADMISSION    COVID-19 PCR Results          7/12/2022    04:13   COVID-19 PCR Results   SARS CoV2 PCR Positive      COVID-19 Antibody Results, Testing for Immunity           No data to display               Code Status: Full Code  VTE prophylaxis:  IV heparin   DIET: Orders Placed This Encounter      NPO for Medical/Clinical Reasons Except for: Ice Chips, Meds    Drains/Lines: none  Weight bearing status: WBAT        Subjective:  Passing some flatus, discussed with surgery briefly an dnote pending    PHYSICAL EXAM  Temp:  [97.6  F (36.4  C)-98.6  F (37  C)] 97.6 "  F (36.4  C)  Pulse:  [60-74] 64  Resp:  [17-18] 17  BP: (143-171)/(67-81) 171/81  SpO2:  [97 %-100 %] 97 %  Wt Readings from Last 1 Encounters:   10/09/23 91.9 kg (202 lb 9.6 oz)       Intake/Output Summary (Last 24 hours) at 10/9/2023 1051  Last data filed at 10/9/2023 1002  Gross per 24 hour   Intake 2988.6 ml   Output 4855 ml   Net -1866.4 ml      Body mass index is 27.48 kg/m .    Physical Exam  Constitutional:       General: He is not in acute distress.     Appearance: He is ill-appearing.   HENT:      Head: Normocephalic and atraumatic.   Cardiovascular:      Rate and Rhythm: Normal rate and regular rhythm.      Pulses: Normal pulses.   Pulmonary:      Effort: Pulmonary effort is normal.      Breath sounds: Normal breath sounds.   Abdominal:      Palpations: Abdomen is soft.      Comments: Hypoactive BS, TTP   Neurological:      Mental Status: He is alert.       PERTINENT LABS/IMAGING:  Results for orders placed or performed during the hospital encounter of 10/05/23   CTA Abdomen Pelvis with Contrast    Impression    IMPRESSION:  1.  Findings highly concerning for obstruction or partial obstruction at the second portion the duodenum where there is marked circumferential wall thickening. Additionally, there is soft tissue stranding extending towards the paola hepatis/portacaval   region with encasement of the common hepatic and gastroduodenal arteries. The constellation of findings are concerning for a malignant process likely from the duodenum versus pancreas. An inflammatory process such as duodenitis or peptic ulcer disease   are in the differential but felt to be less likely the primary process. EGD is recommended for further evaluation.  2.  The stomach is markedly distended with fluid-filled contents. Additionally, there is diffuse circumferential wall thickening of the esophagus which may be related to vomiting and/or esophagitis.  3.  There are numerous enlarged upper abdominal and retroperitoneal  lymph nodes which are suspicious.  4.  Hepatic steatosis.     XR Abdomen Port 1 View    Impression    IMPRESSION: Nasogastric tube is looped in the mid and distal esophagus. Cholecystectomy clips. Visualized upper abdomen otherwise unremarkable.   XR Abdomen Port 1 View    Impression    IMPRESSION: Nasogastric tube terminates in the stomach. Stomach is mildly distended. Prior cholecystectomy. Imaged portions lower chest unremarkable.    US Upper Extremity Venous Duplex Right    Impression    IMPRESSION:  1.  DVT in the axillary and brachial veins.  2.  Superficial thrombus in the basilic vein.       Imaging results reviewed over the past 24 hrs:   Recent Results (from the past 24 hour(s))   US Upper Extremity Venous Duplex Right    Narrative    EXAM: US UPPER EXTREMITY VENOUS DUPLEX RIGHT  LOCATION: [facility]  DATE: 10/9/2023    INDICATION: Right arm swelling and redness. PICC placed 10/07/2023.  COMPARISON: None.  TECHNIQUE: Venous Duplex ultrasound of the right upper extremity with (when possible) and without compression, augmentation, and duplex. Color flow and spectral Doppler with waveform analysis performed.    FINDINGS: Ultrasound includes evaluation of the internal jugular vein, innominate vein, subclavian vein, axillary vein, and brachial vein. The superficial cephalic and basilic veins were also evaluated where seen.     RIGHT: There is partially occlusive DVT in the axillary vein and occlusive DVT in the brachial vein of the proximal through distal upper arm. PICC is present in the brachial vein. There is superficial thrombus in the basilic vein of the mid upper arm   through the antecubital fossa.      Impression    IMPRESSION:  1.  DVT in the axillary and brachial veins.  2.  Superficial thrombus in the basilic vein.     Recent Labs   Lab 10/09/23  1515 10/09/23  1411 10/09/23  1136 10/09/23  1000 10/09/23  0634 10/09/23  0432 10/08/23  0806 10/08/23  0622 10/07/23  0812  10/07/23  0716 10/06/23  0622 10/06/23  0611 10/05/23  1209 10/05/23  1204   WBC  --   --   --   --   --   --   --   --   --  12.6*  --  17.1*  --  14.1*   HGB 12.0*  --  11.8*  --   --  11.2*  --   --   --  12.6*  --  13.2*  --  14.6   MCV  --   --   --   --   --   --   --   --   --  79  --  79  --  77*   PLT  --   --   --   --   --   --   --   --   --  260  --  298  --  316   INR  --   --   --   --   --  1.26*  --  1.16*  --   --   --   --   --   --    NA  --   --   --   --   --  140  --  138  --  139  --  138   < > 138   POTASSIUM  --   --  3.9  --   --  3.4  --  3.8   < > 3.3*  --  3.5   < > 3.7   CHLORIDE  --   --   --   --   --  103  --  102  --  102  --  100   < > 94*   CO2  --   --   --   --   --  27  --  29  --  28  --  24   < > 26   BUN  --   --   --   --   --  10.3  --  11.0  --  9.7  --  11.4   < > 12.2   CR  --   --   --   --   --  0.95  --  1.00  --  0.97  --  0.78   < > 0.77   ANIONGAP  --   --   --   --   --  10  --  7  --  9  --  14   < > 18*   MIGUEL  --   --   --   --   --  8.9  --  8.6*  --  9.2  --  9.0   < > 10.1   GLC  --  105*  --  108* 95 90   < > 174*   < > 135*   < > 266*   < > 398*   ALBUMIN  --   --   --   --   --  3.4*  --  3.4*  --   --   --   --   --  4.2  4.2   PROTTOTAL  --   --   --   --   --  5.9*  --  6.1*  --   --   --   --   --  8.2  8.2   BILITOTAL  --   --   --   --   --  0.7  --  0.7  --   --   --   --   --  1.0  1.0   ALKPHOS  --   --   --   --   --  75  --  81  --   --   --   --   --  127  127   ALT  --   --   --   --   --  12  --  13  --   --   --   --   --  19  19   AST  --   --   --   --   --  25  --  23  --   --   --   --   --  19  19   LIPASE  --   --   --   --   --   --   --   --   --   --   --   --   --  46    < > = values in this interval not displayed.     Recent Labs   Lab Test 10/02/17  1049   CHOL 198   HDL 41   *   TRIG 99     Recent Labs   Lab Test 10/09/23  1000 10/09/23  0634 10/09/23  0432   NA  --   --  140   POTASSIUM  --   --  3.4   CHLORIDE   --   --  103   CO2  --   --  27   *   < > 90   BUN  --   --  10.3   CR  --   --  0.95   GFRESTIMATED  --   --  88   MIGUEL  --   --  8.9    < > = values in this interval not displayed.     Recent Labs   Lab Test 10/05/23  1204 07/12/22  0228 01/10/18  1547   A1C 8.0* 8.2* 8.6*     Recent Labs   Lab Test 10/09/23  0432 10/07/23  0716 10/06/23  0611   HGB 11.2* 12.6* 13.2*     Recent Labs   Lab Test 07/12/22 0228   TROPONINI 0.03     Recent Labs   Lab Test 06/16/22  1838   BNP 11     Recent Labs   Lab Test 06/16/22 1838   TSH 0.70     Recent Labs   Lab Test 10/09/23  0432 10/08/23  0622   INR 1.26* 1.16*       25 MINUTES SPENT BY ME on the date of service doing chart review, history, exam, documentation, discussion with nursing staff and specialist, & further activities per the note.  Viviana Giraldo MD  [facility] Medicine Service  [phone]

## 2023-10-09 NOTE — PROGRESS NOTES
Called about DVT in RUE.close Hb f/unit(s). Currently 12.   Chart reviewed.  Awaiting surgery.  We will review access at this time.  Heparin drip per DVT protocol started.  This can be stopped 2 hours prior to surgery.  Blood sugars also decreasing.  20 cc.  Case discussed with patient's nurse.    Vital signs:  Temp: 98.3  F (36.8  C) Temp src: Oral BP: (!) 143/67 Pulse: 60   Resp: 18 SpO2: 97 % O2 Device: None (Room air)   Height: 182.9 cm (6') Weight: 93.4 kg (206 lb)  Estimated body mass index is 27.94 kg/m  as calculated from the following:    Height as of this encounter: 1.829 m (6').    Weight as of this encounter: 93.4 kg (206 lb).

## 2023-10-09 NOTE — PROGRESS NOTES
Nutrition Therapy  Parenteral Nutrition follow-up       Dietitian to Pharmacy  \With this evening's bag:  Rate of TPN: 75 ml/hr continuous.  Grams Dextrose: 285 g  Grams Amino Acid:  100 g    Lipids: 50 g daily.    Consider discontinuing  D 5% Lactated ringers this evening when TPN restarts    RD ordering triglyceride level          Current Nutrition Intake:  Diet: NPO except ice chips.  1440 ml po fluid 10/7.  Custom TPN: Stopped last night due to s/s DVT, PICC will be removed and a new PICC placed per Dr. Giraldo - order new TPN bag for tonight.    Custom TPN ordered but, stopped 100 g AA, 285 g DEX at 75 ml/hr continuous with 50 g lipid daily.   This provides 1869 kcals, 100 g protein, 285 g carb, 50 g lipid, 1800 ml fluid/day.     D 5% LR at 30 mL/hr provides: 720 mL, 36 gm dextrose and 122 calories    TPN start 10/7.    Weight Trends  Admission wt:   10/05/23 93.4 kg (206 lb)   Current weight: 91.9 kg (202 lb 9.6 oz) 10/9/23    GI:  Per chart,  Intermittent nausea  Hypoactive BS  Soft; tender abdomen  Last BM 10/6.  NG removed.    EGD 10/6:  Impression:   - A few abnormal lymph nodes were visualized in the                          peripancreatic region. Fine needle aspiration                          performed.                          - Congested duodenal mucosa. Biopsied.                          - Linear ulcer in the gastric cardia (likely related                          to NG tube.        Dosing Weight: 93.4 kg, for energy, 84 kg, adjusted for fluid     ESTIMATED NUTRITION NEEDS  Energy Needs: 8746-4770 kcals/day (20 - 25 kcals/kg)  Justification: Overweight  Protein Needs:  grams protein/day (0.8 - 1.2 grams of pro/kg)  Justification: Increased needs  Fluid Needs: 2100+ mL/day (25 + mL/kg) adjusted wt.    Justification: Maintenance    Labs:   Labs reviewed by dietitian    Medications:   Novolog  Lantus  MS contin  Kcl  Pantoprazole  D5% Lactated ringers at 30 ml/hr  K, MG, Phos replacement  protocols  Reviewed by dietitian       MALNUTRITION:  % Weight Loss:  Weight loss does not meet criteria for malnutrition   % Intake:  Decreased intake does not meet criteria for malnutrition   Subcutaneous Fat Loss:  Orbital region mild depletion  Muscle Loss:  Clavicle bone region mild depletion  Fluid Retention:  None noted     Malnutrition Diagnosis: Patient does not meet two of the above criteria necessary for diagnosing malnutrition    NUTRITION DIAGNOSIS  Altered GI function related to acute illness as evidenced NPO, SBO, need for TPN      Goals  Tolerate TPN at goal rate- stopped, new picc and resumption today is the plan  Meet nutrition needs-progressing  Electrolytes WNL-met  Transition to po intake when able-not able at this time    INTERVENTIONS  Implementation  Parenteral Nutrition/IV Fluids - continue      Consider stopping D 5% lactated ringers with new TPN bag this evening.      Continue to monitor TPN and adjust daily to meet meet needs.       Monitoring/Evaluation  Progress toward goals will be monitored and evaluated per protocol.

## 2023-10-09 NOTE — PROGRESS NOTES
GI Progress Note  Bon Luque  -25     Subjective:   Not on PPI routinely prior to admission.  Rather, took a 2-week course of felt better, then will go on and off depending on his symptoms.  Getting PICC placed today.     Objective:   BP (!) 171/81 (BP Location: Left arm)   Pulse 64   Temp 97.6  F (36.4  C) (Oral)   Resp 17   Ht 1.829 m (6')   Wt 91.9 kg (202 lb 9.6 oz)   SpO2 97%   BMI 27.48 kg/m    Body mass index is 27.48 kg/m .   Gen: No acute distress  Cardio: RRR  GI: Non-distended, BS positive, soft, non-tender. No guarding.    Laboratory  Recent Labs   Lab 10/09/23  1136 10/09/23  0432 10/07/23  0716 10/06/23  0611 10/05/23  1204   WBC  --   --  12.6* 17.1* 14.1*   RBC  --   --  4.85 5.11 5.72   HGB 11.8* 11.2* 12.6* 13.2* 14.6   HCT  --   --  38.3* 40.3 44.1   MCV  --   --  79 79 77*   MCH  --   --  26.0* 25.8* 25.5*   MCHC  --   --  32.9 32.8 33.1   RDW  --   --  14.1 14.1 13.7   PLT  --   --  260 298 316      Recent Labs   Lab 10/09/23  0432 10/08/23  0622 10/07/23  0716    138 139   CO2 27 29 28   BUN 10.3 11.0 9.7     Recent Labs   Lab 10/09/23  0432 10/08/23  0622 10/05/23  1204   ALKPHOS 75 81 127  127   AST 25 23 19  19   ALT 12 13 19  19     Lab Results   Component Value Date    INR 1.26 (H) 10/09/2023    INR 1.16 (H) 10/08/2023     US Upper Extremity Venous Duplex Right    Result Date: 10/9/2023  EXAM: US UPPER EXTREMITY VENOUS DUPLEX RIGHT LOCATION: [facility] DATE: 10/9/2023 INDICATION: Right arm swelling and redness. PICC placed 10/07/2023. COMPARISON: None. TECHNIQUE: Venous Duplex ultrasound of the right upper extremity with (when possible) and without compression, augmentation, and duplex. Color flow and spectral Doppler with waveform analysis performed. FINDINGS: Ultrasound includes evaluation of the internal jugular vein, innominate vein, subclavian vein, axillary vein, and brachial vein. The superficial cephalic and basilic veins were also  evaluated where seen. RIGHT: There is partially occlusive DVT in the axillary vein and occlusive DVT in the brachial vein of the proximal through distal upper arm. PICC is present in the brachial vein. There is superficial thrombus in the basilic vein of the mid upper arm through the antecubital fossa.     IMPRESSION: 1.  DVT in the axillary and brachial veins. 2.  Superficial thrombus in the basilic vein.     Assessment:   Duodenal wall thickening - EUS done 10/06.  Mild edema and chronic peptic duodenitis.   FNA was done of lumph nodes in peripancreatic region.   Linear ulcer in the stomach - likely related to NG tube.     Patient Active Problem List   Diagnosis    Psoriatic arthritis (H)    Chronic low back pain    Diabetic peripheral neuropathy associated with type 2 diabetes mellitus (H)    Psoriasis    Hypercholesterolemia    HTN (hypertension)    Optic neuritis    Obstructive sleep apnea on CPAP    Thoracic radiculopathy    Hypogonadism in male    Erectile dysfunction    ASCVD (arteriosclerotic cardiovascular disease)    Alcohol abuse, in remission    Tobacco abuse, in remission    Ankylosis, sacroiliac joint    Spondylosis of lumbar region without myelopathy or radiculopathy    Ischemic cardiomyopathy    Depression    Type 2 diabetes mellitus with complication, with long-term current use of insulin (H)    Chronic pain syndrome    Lower abdominal pain    Drug-induced constipation    Knee effusion, left    Renal cell carcinoma, right (H)    Chronic fatigue    Chronic right shoulder pain    Jaundice    Transaminitis    Dilated cbd, acquired    Hyponatremia    Recurrent vomiting    Abdominal pain, epigastric    SBO (small bowel obstruction) (H)    Nausea and vomiting    DKA (diabetic ketoacidosis) (H)    Hypophosphatemia    Deep vein thrombosis (DVT) of upper extremity (H)      Plan:   1. IV PPI - increase to BID.  Orders entered.  2. Cytology pending  3. Surgery following.  4. On TPN.    A total of 25 minutes  was spent on today's care.  Thank you.  Jamie Brambila PA-C  [facility]  [phone]

## 2023-10-09 NOTE — PHARMACY-CONSULT NOTE
Pharmacy Note: Parenteral Nutrition (PN) Management    Pharmacist consulted to dose PN for Bon Luque, a 68 year old male.    Subjective:    The patient is a new PN start.    The patient was started on PN in the hospital on 10/7/23.    Indication for PN therapy: bowel obstruction    Inadequate nutrition anticipated for > 7 days.     Enteral nutrition contraindicated due to: small bowel obstruction.      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    Patient Vitals for the past 96 hrs:   Weight   10/09/23 0524 91.9 kg (202 lb 9.6 oz)   10/05/23 1150 93.4 kg (206 lb)       Labs:  Last 3 days:  Recent Labs     10/07/23  0716 10/07/23  1749 10/08/23  0622 10/08/23  0622 10/09/23  0432     --  138  --  140   POTASSIUM 3.3* 3.9 3.8  --  3.4   CHLORIDE 102  --  102  --  103   CO2 28  --  29  --  27   BUN 9.7  --  11.0  --  10.3   CR 0.97  --  1.00  --  0.95   MIGUEL 9.2  --  8.6*  --  8.9   MAG  --  1.7 1.8  --  1.8   PHOS 2.8  --  3.3  --  4.4   PROTTOTAL  --   --  6.1*  --  5.9*   ALBUMIN  --   --  3.4*  --  3.4*   PREALB  --   --  11*  --  11*   HGB 12.6*  --   --   --  11.2*   HCT 38.3*  --   --   --   --      --   --   --   --    BILITOTAL  --   --  0.7  --  0.7   AST  --   --  23  --  25   ALT  --   --  13  --  12   ALKPHOS  --   --  81   < > 75   INR  --   --  1.16*  --  1.26*    < > = values in this interval not displayed.       Glucose (past 48 hours):   Recent Labs     10/08/23  0622 10/08/23  0806 10/08/23  1136 10/08/23  1428 10/08/23  1812 10/08/23  2158 10/09/23  0208  10/09/23  0432 10/09/23  0634 10/09/23  1000   * 169* 172* 150* 152* 166* 91 90 95 108*       Intake/Output (last 24 hours): I/O last 3 completed shifts:  In: 3564.6 [P.O.:1440; I.V.:1554.6]  Out: 4830 [Urine:4830]    Estimated CrCl: Estimated Creatinine Clearance: 99.4 mL/min (based on SCr of 0.95 mg/dL).    Assessment:    Continue patient on PN therapy as a continuous central therapy. PICC line to be replaced today    Given the patient's current condition/oral intake, PN is still indicated.    Lab results reviewed: overnight events noted. Lantus currently on hold, will likely need to be resumed with TPN restart. Adjust K, Mag, Phos in TPN tonight    Plan:  Rate of PN: 75 mL/hr.  Formula:   Amino Acids 100 grams  Dextrose 285 grams  Sodium 40 mEq/day  Potassium 60 mEq/day  Calcium 5 mEq/day  Magnesium 12 mEq/day  Phosphorus 15 mMol/day  Chloride: Acetate Ratio 1:1  Standard Multivitamins w/Vitamin K  Trace Elements  Fat Emulsion: 20%, 250 mL IV daily  Check BMP and Mg, Phos  labs tomorrow.  Pharmacist will continue to follow the patient's lab results, clinical status and blood glucose results and make adjustments as appropriate.    Thank you for the consult.  Margarita Allison RPH  10/9/2023 11:29 AM

## 2023-10-09 NOTE — PLAN OF CARE
Problem: Parenteral Nutrition  Goal: Effective Intravenous Nutrition Therapy Delivery  Outcome: Not Progressing  Intervention: Optimize Intravenous Nutrition Delivery  Recent Flowsheet Documentation  Taken 10/9/2023 0300 by Tyrone Rose RN  Medication Review/Management: medications reviewed  Taken 10/8/2023 2055 by Tyrone Rose RN  Medication Review/Management: medications reviewed     Problem: Nausea and Vomiting  Goal: Nausea and Vomiting Relief  Outcome: Progressing  Intervention: Prevent and Manage Nausea and Vomiting  Recent Flowsheet Documentation  Taken 10/9/2023 0300 by Tyrone Rose RN  Nausea/Vomiting Interventions: antiemetic  Taken 10/8/2023 2055 by Tyrone Rose RN  Nausea/Vomiting Interventions: antiemetic     Problem: Bleeding (Thrombolytic Therapy)  Goal: Absence of Bleeding  Outcome: Progressing   Goal Outcome Evaluation:  Started new bags of TPN and lipids, assessed PICC site about an hour later and noted S/S of DVT. Stopped all infusions and got US, see results. PICC is still in place and 2 new IVs were placed in left arm. Started heparin drip as well as dextrose 5% in LR to maintain BG level. BG dropped from 166 to 91 after stopping TPN, continuing q4 checks. Mild abdominal pain and nausea relieved by scheduled pain meds and Zofran. Next anti-Xa to be drawn by lab at 1130 today.     Tyrone Rose RN

## 2023-10-09 NOTE — PLAN OF CARE
Goal Outcome Evaluation:  Abdominal pain he rates a 2. On scheduled MS Contin for chronic neuropathy pain-well controlled today per patient.  Denies N/V. Tolerating ice chips and sips of water. On TPN at 40ml/hr, orders are for rate change with new bag tonight.  Uses urinal-Voiding large amounts.  K, Mg and Phos protocols are WNL and rechecks in am.  Up independently in his room.                         Detail Level: Detailed General Sunscreen Counseling: I recommended a broad spectrum sunscreen with a SPF of 30 or higher.  I explained that SPF 30 sunscreens block approximately 97 percent of the sun's harmful rays.  Sunscreens should be applied at least 15 minutes prior to expected sun exposure and then every 2 hours after that as long as sun exposure continues. If swimming or exercising sunscreen should be reapplied every 45 minutes to an hour after getting wet or sweating.  One ounce, or the equivalent of a shot glass full of sunscreen, is adequate to protect the skin not covered by a bathing suit. I also recommended a lip balm with a sunscreen as well. Sun protective clothing can be used in lieu of sunscreen but must be worn the entire time you are exposed to the sun's rays.

## 2023-10-09 NOTE — PROVIDER NOTIFICATION
Notified Resident at 2205 PM regarding change in condition.      Spoke with: Dr. Екатерина Lr    Orders were obtained.    Comments: Pt reported mild pain and itching at PICC site on right upper extremity. Noted warmth, redness, and mild edema below site. Stopped TPN, lipids, and LR infusions and paged Dr. Lr. She recommended PICC team to come assess and they suggested US. Obtained verbal order for RUE US.     Tyrone Rose RN

## 2023-10-09 NOTE — PROCEDURES
"PICC Line Insertion Procedure Note  Pt. Name: Bon Luque  MRN:  [id number]        Procedure: Insertion of a  dual Lumen  5 fr  Bard SOLO (valved) Power PICC, Lot number [id number]    Indications: TPN    Contraindications : RUE DVT from last PICC    Procedure Details   Patient identified with 2 identifiers and \"Time Out\" conducted.  .     Central line insertion bundle followed: hand hygeine performed prior to procedure, site cleansed with cholraprep, hat, mask, sterile gloves,sterile gown worn, patient draped with maximum barrier head to toe drape, sterile field maintained.    The vein was assessed and found to be compressible and of adequate size. 4 ml 1% Lidocaine administered sq to the insertion site. A 5 Fr PICC was inserted into the basilic vein of the left arm with ultrasound guidance. 1 attempt(s) required to access vein.   Catheter threaded without difficulty. Good blood return noted.    Modified Seldinger Technique used for insertion.    The 8 sharps that are included in the PICC insertion kit were accounted for and disposed of in the sharps container prior to breakdown of the sterile field.    Catheter secured with Statlock, biopatch and Tegaderm dressing applied.    Findings:  Total catheter length  50 cm, with 3 cm exposed. Mid upper arm circumference is 38 cm. Catheter was flushed with 30 cc NS. Patient  tolerated procedure well.    Tip placement verified by 3CG . Tip placement in the SVC.    Patient's primary RN notified PICC is ready for use.    Comments:        Brennan Jesus, TREMAINEN, RN  [facility]  "

## 2023-10-10 ENCOUNTER — APPOINTMENT (OUTPATIENT)
Dept: RADIOLOGY | Facility: HOSPITAL | Age: 68
DRG: 388 | End: 2023-10-10
Attending: PHYSICIAN ASSISTANT
Payer: COMMERCIAL

## 2023-10-10 PROBLEM — E43 SEVERE PROTEIN-CALORIE MALNUTRITION (H): Status: ACTIVE | Noted: 2023-10-10

## 2023-10-10 LAB
ANION GAP SERPL CALCULATED.3IONS-SCNC: 6 MMOL/L (ref 7–15)
BACTERIA BLD CULT: NO GROWTH
BACTERIA BLD CULT: NO GROWTH
BUN SERPL-MCNC: 11.7 MG/DL (ref 8–23)
CALCIUM SERPL-MCNC: 9 MG/DL (ref 8.8–10.2)
CHLORIDE SERPL-SCNC: 101 MMOL/L (ref 98–107)
CREAT SERPL-MCNC: 0.93 MG/DL (ref 0.67–1.17)
DEPRECATED HCO3 PLAS-SCNC: 28 MMOL/L (ref 22–29)
EGFRCR SERPLBLD CKD-EPI 2021: >90 ML/MIN/1.73M2
GLUCOSE BLDC GLUCOMTR-MCNC: 150 MG/DL (ref 70–99)
GLUCOSE BLDC GLUCOMTR-MCNC: 165 MG/DL (ref 70–99)
GLUCOSE BLDC GLUCOMTR-MCNC: 255 MG/DL (ref 70–99)
GLUCOSE BLDC GLUCOMTR-MCNC: 273 MG/DL (ref 70–99)
GLUCOSE BLDC GLUCOMTR-MCNC: 300 MG/DL (ref 70–99)
GLUCOSE BLDC GLUCOMTR-MCNC: 312 MG/DL (ref 70–99)
GLUCOSE SERPL-MCNC: 235 MG/DL (ref 70–99)
MAGNESIUM SERPL-MCNC: 1.9 MG/DL (ref 1.7–2.3)
PATH REPORT.COMMENTS IMP SPEC: NORMAL
PATH REPORT.FINAL DX SPEC: NORMAL
PATH REPORT.MICROSCOPIC SPEC OTHER STN: NORMAL
PATH REPORT.RELEVANT HX SPEC: NORMAL
PHOSPHATE SERPL-MCNC: 3.9 MG/DL (ref 2.5–4.5)
POTASSIUM SERPL-SCNC: 4.1 MMOL/L (ref 3.4–5.3)
SODIUM SERPL-SCNC: 135 MMOL/L (ref 135–145)
UFH PPP CHRO-ACNC: 0.55 IU/ML

## 2023-10-10 PROCEDURE — 99232 SBSQ HOSP IP/OBS MODERATE 35: CPT | Performed by: INTERNAL MEDICINE

## 2023-10-10 PROCEDURE — 120N000001 HC R&B MED SURG/OB

## 2023-10-10 PROCEDURE — 80048 BASIC METABOLIC PNL TOTAL CA: CPT | Performed by: INTERNAL MEDICINE

## 2023-10-10 PROCEDURE — 74240 X-RAY XM UPR GI TRC 1CNTRST: CPT

## 2023-10-10 PROCEDURE — 84100 ASSAY OF PHOSPHORUS: CPT | Performed by: HOSPITALIST

## 2023-10-10 PROCEDURE — 85520 HEPARIN ASSAY: CPT | Performed by: INTERNAL MEDICINE

## 2023-10-10 PROCEDURE — 250N000009 HC RX 250: Performed by: INTERNAL MEDICINE

## 2023-10-10 PROCEDURE — 99231 SBSQ HOSP IP/OBS SF/LOW 25: CPT | Performed by: SURGERY

## 2023-10-10 PROCEDURE — 250N000013 HC RX MED GY IP 250 OP 250 PS 637: Performed by: INTERNAL MEDICINE

## 2023-10-10 PROCEDURE — B4185 PARENTERAL SOL 10 GM LIPIDS: HCPCS | Mod: JZ | Performed by: HOSPITALIST

## 2023-10-10 PROCEDURE — 250N000009 HC RX 250: Mod: JZ | Performed by: HOSPITALIST

## 2023-10-10 PROCEDURE — 250N000011 HC RX IP 250 OP 636: Mod: JZ | Performed by: PHYSICIAN ASSISTANT

## 2023-10-10 PROCEDURE — C9113 INJ PANTOPRAZOLE SODIUM, VIA: HCPCS | Mod: JZ | Performed by: PHYSICIAN ASSISTANT

## 2023-10-10 PROCEDURE — 250N000013 HC RX MED GY IP 250 OP 250 PS 637: Performed by: HOSPITALIST

## 2023-10-10 PROCEDURE — 250N000011 HC RX IP 250 OP 636: Mod: JZ | Performed by: INTERNAL MEDICINE

## 2023-10-10 PROCEDURE — 83735 ASSAY OF MAGNESIUM: CPT | Performed by: HOSPITALIST

## 2023-10-10 RX ADMIN — DULOXETINE HYDROCHLORIDE 60 MG: 60 CAPSULE, DELAYED RELEASE PELLETS ORAL at 08:45

## 2023-10-10 RX ADMIN — HEPARIN SODIUM AND DEXTROSE 1600 UNITS/HR: 10000; 5 INJECTION INTRAVENOUS at 06:46

## 2023-10-10 RX ADMIN — GABAPENTIN 600 MG: 300 CAPSULE ORAL at 08:45

## 2023-10-10 RX ADMIN — GABAPENTIN 600 MG: 300 CAPSULE ORAL at 20:02

## 2023-10-10 RX ADMIN — PANTOPRAZOLE SODIUM 40 MG: 40 INJECTION, POWDER, FOR SOLUTION INTRAVENOUS at 20:03

## 2023-10-10 RX ADMIN — INSULIN ASPART 4 UNITS: 100 INJECTION, SOLUTION INTRAVENOUS; SUBCUTANEOUS at 11:43

## 2023-10-10 RX ADMIN — MORPHINE SULFATE 30 MG: 15 TABLET, EXTENDED RELEASE ORAL at 05:14

## 2023-10-10 RX ADMIN — INSULIN ASPART 1 UNITS: 100 INJECTION, SOLUTION INTRAVENOUS; SUBCUTANEOUS at 05:27

## 2023-10-10 RX ADMIN — INSULIN ASPART 3 UNITS: 100 INJECTION, SOLUTION INTRAVENOUS; SUBCUTANEOUS at 08:54

## 2023-10-10 RX ADMIN — MORPHINE SULFATE 30 MG: 15 TABLET, EXTENDED RELEASE ORAL at 21:15

## 2023-10-10 RX ADMIN — HEPARIN SODIUM AND DEXTROSE 1600 UNITS/HR: 10000; 5 INJECTION INTRAVENOUS at 22:51

## 2023-10-10 RX ADMIN — MORPHINE SULFATE 30 MG: 15 TABLET, EXTENDED RELEASE ORAL at 13:14

## 2023-10-10 RX ADMIN — INSULIN ASPART 3 UNITS: 100 INJECTION, SOLUTION INTRAVENOUS; SUBCUTANEOUS at 17:00

## 2023-10-10 RX ADMIN — OLIVE OIL AND SOYBEAN OIL 250 ML: 16; 4 INJECTION, EMULSION INTRAVENOUS at 20:07

## 2023-10-10 RX ADMIN — GABAPENTIN 600 MG: 300 CAPSULE ORAL at 13:14

## 2023-10-10 RX ADMIN — INSULIN ASPART 4 UNITS: 100 INJECTION, SOLUTION INTRAVENOUS; SUBCUTANEOUS at 20:21

## 2023-10-10 RX ADMIN — MAGNESIUM SULFATE HEPTAHYDRATE: 500 INJECTION, SOLUTION INTRAMUSCULAR; INTRAVENOUS at 20:03

## 2023-10-10 RX ADMIN — INSULIN ASPART 1 UNITS: 100 INJECTION, SOLUTION INTRAVENOUS; SUBCUTANEOUS at 02:19

## 2023-10-10 RX ADMIN — PANTOPRAZOLE SODIUM 40 MG: 40 INJECTION, POWDER, FOR SOLUTION INTRAVENOUS at 08:45

## 2023-10-10 ASSESSMENT — ACTIVITIES OF DAILY LIVING (ADL)
ADLS_ACUITY_SCORE: 26

## 2023-10-10 NOTE — PROGRESS NOTES
"CLINICAL NUTRITION SERVICES - REASSESSMENT NOTE     Nutrition Prescription    RECOMMENDATIONS FOR MDs/PROVIDERS TO ORDER:    Malnutrition Status:    Pt does not meet 2 criteria for diagnosing malnutrition    Recommendations already ordered by Registered Dietitian (RD):  No changes recommended in TPN regimen today    Future/Additional Recommendations:  Monitor TPN tolerance, weight, labs, diet advance     EVALUATION OF THE PROGRESS TOWARD GOALS   Diet: NPO  Nutrition Support: D 285  @ 75 ml/hr plus 250 ml of 20% lipids daily providin Calories, 100 g protein, 285 g CHO, 50 g fat and 1800 ml/day    ANTHROPOMETRICS  Height: 182.9 cm (6' 0\")  Most Recent Weight: 91.9 kg (202 lb 9.6 oz)    Weight History:   Wt Readings from Last 10 Encounters:   10/09/23 91.9 kg (202 lb 9.6 oz)   23 93.6 kg (206 lb 4.8 oz)   23 102.1 kg (225 lb)   23 102.3 kg (225 lb 9.6 oz)   22 104.3 kg (230 lb)   22 101.6 kg (223 lb 14.4 oz)   22 103.9 kg (229 lb)   10/20/21 100 kg (220 lb 6.4 oz)   21 97.5 kg (215 lb)   21 99.8 kg (220 lb)     GI CONCERNS  Abdomen rounded  Abdomen soft/tender  Hypoactive BS  Last BM 10/6/2023  I/O 634.3100 ( 10/9)  I/O 4356/3830 ( 10/8)    LABS  Reviewed: Na 135, K 4.1, urea nitrogen 11.7, Cr 0.93, Glu 235 (H), prealbumin 11 (L) on 10/9, TG 93 ( on 10/9)    MEDICATIONS  Reviewed: novolog, pantoprazole    CURRENT NUTRITION DIAGNOSIS  Altered GI function related to acute illness as evidenced by NPO, SBO and need for TPN      INTERVENTIONS  Implementation  Recommend adjusting insulin for hyperglycemia  Recommend continuing same TPN regimen today    Goals  Tolerate TPN at goal rate-met  Meet nutritional needs-met  Electrolytes WDL-met  Transition to po diet when able-not able at this time    Monitoring/Evaluation  Progress toward goals will be monitored and evaluated per protocol.   "

## 2023-10-10 NOTE — PROGRESS NOTES
GI Progress Note  Bon Luque  -57     Subjective:   Reports being able to tolerate clear liquids well.  Says he has drank several cups of clear liquids.  Discussed doing upper GI studies today and he is agreeable to that.  Cytology still pending and he is aware of that.  Vomiting dramatically improved since admission.    Objective:   BP (!) 167/73 (BP Location: Right arm)   Pulse 66   Temp 98.4  F (36.9  C) (Oral)   Resp 16   Ht 1.829 m (6')   Wt 91.9 kg (202 lb 9.6 oz)   SpO2 97%   BMI 27.48 kg/m    Body mass index is 27.48 kg/m .   Gen: No acute distress  Cardio: RRR  GI: Non-distended, BS positive, soft, non-tender. No guarding.    Laboratory  Recent Labs   Lab 10/09/23  1515 10/09/23  1136 10/09/23  0432 10/07/23  0716 10/06/23  0611 10/05/23  1204   WBC  --   --   --  12.6* 17.1* 14.1*   RBC  --   --   --  4.85 5.11 5.72   HGB 12.0* 11.8* 11.2* 12.6* 13.2* 14.6   HCT  --   --   --  38.3* 40.3 44.1   MCV  --   --   --  79 79 77*   MCH  --   --   --  26.0* 25.8* 25.5*   MCHC  --   --   --  32.9 32.8 33.1   RDW  --   --   --  14.1 14.1 13.7   PLT  --   --   --  260 298 316        Recent Labs   Lab 10/10/23  0531 10/09/23  0432 10/08/23  0622    140 138   CO2 28 27 29   BUN 11.7 10.3 11.0       Recent Labs   Lab 10/09/23  0432 10/08/23  0622 10/05/23  1204   ALKPHOS 75 81 127  127   AST 25 23 19  19   ALT 12 13 19  19       Lab Results   Component Value Date    INR 1.26 (H) 10/09/2023    INR 1.16 (H) 10/08/2023     CTA 10/05/2023  IMPRESSION:  1.  Findings highly concerning for obstruction or partial obstruction at the second portion the duodenum where there is marked circumferential wall thickening. Additionally, there is soft tissue stranding extending towards the paola hepatis/portacaval   region with encasement of the common hepatic and gastroduodenal arteries. The constellation of findings are concerning for a malignant process likely from the duodenum versus pancreas. An inflammatory  process such as duodenitis or peptic ulcer disease   are in the differential but felt to be less likely the primary process. EGD is recommended for further evaluation.  2.  The stomach is markedly distended with fluid-filled contents. Additionally, there is diffuse circumferential wall thickening of the esophagus which may be related to vomiting and/or esophagitis.  3.  There are numerous enlarged upper abdominal and retroperitoneal lymph nodes which are suspicious.  4.  Hepatic steatosis.     Assessment:   Duodenal wall thickening - EUS done 10/06.  Mild edema and chronic peptic duodenitis per duodenal biopsy.   FNA was done of lumph nodes in peripancreatic region -cytology pending.  Linear ulcer in the stomach - likely related to NG tube.     Patient Active Problem List   Diagnosis    Psoriatic arthritis (H)    Chronic low back pain    Diabetic peripheral neuropathy associated with type 2 diabetes mellitus (H)    Psoriasis    Hypercholesterolemia    HTN (hypertension)    Optic neuritis    Obstructive sleep apnea on CPAP    Thoracic radiculopathy    Hypogonadism in male    Erectile dysfunction    ASCVD (arteriosclerotic cardiovascular disease)    Alcohol abuse, in remission    Tobacco abuse, in remission    Ankylosis, sacroiliac joint    Spondylosis of lumbar region without myelopathy or radiculopathy    Ischemic cardiomyopathy    Depression    Type 2 diabetes mellitus with complication, with long-term current use of insulin (H)    Chronic pain syndrome    Lower abdominal pain    Drug-induced constipation    Knee effusion, left    Renal cell carcinoma, right (H)    Chronic fatigue    Chronic right shoulder pain    Jaundice    Transaminitis    Dilated cbd, acquired    Hyponatremia    Recurrent vomiting    Abdominal pain, epigastric    SBO (small bowel obstruction) (H)    Nausea and vomiting    DKA (diabetic ketoacidosis) (H)    Hypophosphatemia    Deep vein thrombosis (DVT) of upper extremity (H)      Plan:   1. IV  PPI - increase to BID.  O  2. Upper GI study - to assess further and help determine ability for orals.  3. Continues on TPN.  4. Cytology pending.    A total of 30 minutes was spent on today's care.  Thank you.  Jamie Brambila PA-C  [facility]  [phone]

## 2023-10-10 NOTE — PLAN OF CARE
"  Problem: Plan of Care - These are the overarching goals to be used throughout the patient stay.    Goal: Patient-Specific Goal (Individualized)  Description: You can add care plan individualizations to a care plan. Examples of Individualization might be:  \"Parent requests to be called daily at 9am for status\", \"I have a hard time hearing out of my right ear\", or \"Do not touch me to wake me up as it startles me\".  Outcome: Progressing     Problem: Plan of Care - These are the overarching goals to be used throughout the patient stay.    Goal: Absence of Hospital-Acquired Illness or Injury  Outcome: Progressing     Problem: Plan of Care - These are the overarching goals to be used throughout the patient stay.    Goal: Optimal Comfort and Wellbeing  Outcome: Progressing   Goal Outcome Evaluation:       Ongoing heparin infusion. antiXa now at goal. Heparin running at 1600 units/hr. TPN and Lipids running. Pain managed with scheduled meds. Tolerating clear liquids.                  "

## 2023-10-10 NOTE — PROGRESS NOTES
General Surgery Progress Note  Hospital Day # 5    Subjective:   CC: Gastric outlet obstruction  Status: Biopsies negative for malignancy, feels somewhat better, tolerating sips of clears    Vitals:    10/09/23 1645 10/09/23 2023 10/10/23 0007 10/10/23 0700   BP: (!) 169/74 (!) 151/72 135/65 (!) 167/73   BP Location: Right leg Right arm Right arm Right arm   Pulse: 65 66 70 66   Resp: 18 18 18 16   Temp: 98.5  F (36.9  C) 97.8  F (36.6  C) 97.9  F (36.6  C) 98.4  F (36.9  C)   TempSrc: Oral Oral Oral Oral   SpO2: 95% 97% 96% 97%   Weight:       Height:           Physical Exam:  General: NAD, pleasant  ABD: Soft, nontender, nondistended  EXT:no CCE    Recent Labs   Lab 10/09/23  1515 10/09/23  0432 10/07/23  0716   WBC  --   --  12.6*   HGB 12.0*   < > 12.6*   HCT  --   --  38.3*   PLT  --   --  260    < > = values in this interval not displayed.       Recent Labs   Lab 10/10/23  0531 10/09/23  0432    140   CO2 28 27   BUN 11.7 10.3   ALBUMIN  --  3.4*   ALKPHOS  --  75   ALT  --  12   AST  --  25       Assessment: Biopsies demonstrate inflammation secondary to peptic ulcer disease    Plan: -Continue with IV Protonix for now we will likely convert to oral soon  -Diet advanced to clear liquids.  If tolerates this can add nutritional shakes and wean TPN  -No surgery plans at this point.  We will follow peripherally.    Christopher Hook DO [facility]  [phone]

## 2023-10-10 NOTE — PROGRESS NOTES
[facility]    PROGRESS NOTE - Hospitalist Service    Assessment and Plan    Principal Problem:    SBO (small bowel obstruction) (H)  Active Problems:    HTN (hypertension)    Chronic pain syndrome    Abdominal pain, epigastric    Nausea and vomiting    DKA (diabetic ketoacidosis) (H)    Hypophosphatemia    Deep vein thrombosis (DVT) of upper extremity (H)    Bon Luque is a 68 year old male with h/o insulin-dependent diabetes, chronic pain syndrome on MS Contin, coronary artery disease status post stent in 2008, hypertension, hyperlipidemia, MARÍA ELENA, renal cell carcinoma status post partial nephrectomy in 2017, recent issues with nausea and vomiting status post normal gastric emptying study about a month ago, presented for refractory nausea, vomiting, and emesis found to have small bowel obstruction secondary to likely mass, as well as mild DKA. On TPN in anticipation of surgical bypass.         #Small bowel obstruction  #Likely duodenal vs pancreatic mass  #Retroperitoneal lymphadenopathy  -  CT showing obstruction at the second portion of the duodenum associated with likely mass, involvement of the paola hepatis and encasement of the common hepatic and GD arteries.  Also noted enlarged regional lymph nodes.  Very concerning for malignancy.  -Taken for EGD/EUS 10/6, biopsies taken  -NG tube fell out and he is tolerating  -Just small sips of clears for now per surgery   -Pain meds, antiemetics as needed  -GI order UGI   -On IV PPI given some bleeding from NG tube, gastric abrasion noted during EGD  -Awaiting pathology results to determine surgical plan  - continue TPN and iSS while on TPN      #Severe malnutrition  -30 lb weight loss past 6 weeks  -Currently with duodenal obstruction and no immediate means of giving enteral nutrition.    - TPN to resume d and new PICC placed on LUE   - pending findings on imaging consider stopping lipids today but awaiting results     DVT RUE  - IV heparin  "started 10/8  - PICC line RUE removed and new PICC LUE   - Oral Acc TBD pending resolution andif no surgical intervention      #Mild DKA, resolved  #Insulin-dependent diabetes  -Patient presented with positive ketones and anion gap but was not actually acidotic.  Suspect was just very hypovolemic  -Ketosis resolved after aggressive IV fluid and repeated doses of subcu insulin  -Lantus 20 units BID whileon TPN  - novolog sliding scale Q4hrs while on TPN, once off TPN and on orals can change to TID    #Chronic pain syndrome  -Okay to take home MS Contin, Cymbalta (increase dose- he notes med rec dose was incorrect), gabapentin  -IV Dilaudid as needed      #Hypophosphatemia  #HypoK  -Protocols     #Leukocytosis  -Likely reactive  -Improved     #Hypertension  -Suspect driven by nausea, discomfort currently  -IV hydralazine as needed for severe range blood pressures     #ABLA  -Hgb 14.6-->12.6  -Suspect Hgb drop was mostly dilutional but there was some bleeding noted from NG tube trauma of stomach      Clinically Significant Risk Factors      # Overweight: Estimated body mass index is 28.31 kg/m  as calculated from the following:  Height as of this encounter: 1.626 m (5' 4\").  Weight as of this encounter: 74.8 kg (164 lb 14.4 oz)., PRESENT ON ADMISSION    COVID-19 PCR Results          7/12/2022    04:13   COVID-19 PCR Results   SARS CoV2 PCR Positive      COVID-19 Antibody Results, Testing for Immunity           No data to display               Code Status: Full Code  VTE prophylaxis:  IV heparin   DIET: Orders Placed This Encounter      NPO for Medical/Clinical Reasons Except for: Ice Chips, Meds    Drains/Lines: none  Weight bearing status: WBAT        Subjective:  Passing some flatus, minimal pain    PHYSICAL EXAM  Temp:  [97.8  F (36.6  C)-98.5  F (36.9  C)] 97.9  F (36.6  C)  Pulse:  [65-70] 70  Resp:  [18] 18  BP: (135-169)/(65-74) 135/65  SpO2:  [95 %-97 %] 96 %  Wt Readings from Last 1 Encounters:   10/09/23 91.9 " kg (202 lb 9.6 oz)       Intake/Output Summary (Last 24 hours) at 10/9/2023 1051  Last data filed at 10/9/2023 1002  Gross per 24 hour   Intake 2988.6 ml   Output 4855 ml   Net -1866.4 ml      Body mass index is 27.48 kg/m .    Physical Exam  Constitutional:       General: He is not in acute distress.     Appearance: He is ill-appearing.   HENT:      Head: Normocephalic and atraumatic.   Cardiovascular:      Rate and Rhythm: Normal rate and regular rhythm.      Pulses: Normal pulses.   Pulmonary:      Effort: Pulmonary effort is normal.      Breath sounds: Normal breath sounds.   Abdominal:      Palpations: Abdomen is soft.      Comments: +BS minimal tenderness   Musculoskeletal:         General: Normal range of motion.   Skin:     General: Skin is warm and dry.      Capillary Refill: Capillary refill takes less than 2 seconds.   Neurological:      General: No focal deficit present.      Mental Status: He is alert and oriented to person, place, and time. Mental status is at baseline.       PERTINENT LABS/IMAGING:  Results for orders placed or performed during the hospital encounter of 10/05/23   CTA Abdomen Pelvis with Contrast    Impression    IMPRESSION:  1.  Findings highly concerning for obstruction or partial obstruction at the second portion the duodenum where there is marked circumferential wall thickening. Additionally, there is soft tissue stranding extending towards the paola hepatis/portacaval   region with encasement of the common hepatic and gastroduodenal arteries. The constellation of findings are concerning for a malignant process likely from the duodenum versus pancreas. An inflammatory process such as duodenitis or peptic ulcer disease   are in the differential but felt to be less likely the primary process. EGD is recommended for further evaluation.  2.  The stomach is markedly distended with fluid-filled contents. Additionally, there is diffuse circumferential wall thickening of the esophagus  which may be related to vomiting and/or esophagitis.  3.  There are numerous enlarged upper abdominal and retroperitoneal lymph nodes which are suspicious.  4.  Hepatic steatosis.     XR Abdomen Port 1 View    Impression    IMPRESSION: Nasogastric tube is looped in the mid and distal esophagus. Cholecystectomy clips. Visualized upper abdomen otherwise unremarkable.   XR Abdomen Port 1 View    Impression    IMPRESSION: Nasogastric tube terminates in the stomach. Stomach is mildly distended. Prior cholecystectomy. Imaged portions lower chest unremarkable.    US Upper Extremity Venous Duplex Right    Impression    IMPRESSION:  1.  DVT in the axillary and brachial veins.  2.  Superficial thrombus in the basilic vein.       Imaging results reviewed over the past 24 hrs:   No results found for this or any previous visit (from the past 24 hour(s)).    Recent Labs   Lab 10/10/23  0531 10/10/23  0523 10/10/23  0210 10/09/23  1743 10/09/23  1515 10/09/23  1411 10/09/23  1136 10/09/23  0634 10/09/23  0432 10/08/23  0806 10/08/23  0622 10/07/23  0812 10/07/23  0716 10/06/23  0622 10/06/23  0611 10/05/23  1209 10/05/23  1204   WBC  --   --   --   --   --   --   --   --   --   --   --   --  12.6*  --  17.1*  --  14.1*   HGB  --   --   --   --  12.0*  --  11.8*  --  11.2*  --   --   --  12.6*  --  13.2*  --  14.6   MCV  --   --   --   --   --   --   --   --   --   --   --   --  79  --  79  --  77*   PLT  --   --   --   --   --   --   --   --   --   --   --   --  260  --  298  --  316   INR  --   --   --   --   --   --   --   --  1.26*  --  1.16*  --   --   --   --   --   --      --   --   --   --   --   --   --  140  --  138  --  139  --  138   < > 138   POTASSIUM 4.1  --   --   --   --   --  3.9  --  3.4  --  3.8   < > 3.3*  --  3.5   < > 3.7   CHLORIDE 101  --   --   --   --   --   --   --  103  --  102  --  102  --  100   < > 94*   CO2 28  --   --   --   --   --   --   --  27  --  29  --  28  --  24   < > 26   BUN 11.7   --   --   --   --   --   --   --  10.3  --  11.0  --  9.7  --  11.4   < > 12.2   CR 0.93  --   --   --   --   --   --   --  0.95  --  1.00  --  0.97  --  0.78   < > 0.77   ANIONGAP 6*  --   --   --   --   --   --   --  10  --  7  --  9  --  14   < > 18*   MIGUEL 9.0  --   --   --   --   --   --   --  8.9  --  8.6*  --  9.2  --  9.0   < > 10.1   * 150* 165*   < >  --    < >  --    < > 90   < > 174*   < > 135*   < > 266*   < > 398*   ALBUMIN  --   --   --   --   --   --   --   --  3.4*  --  3.4*  --   --   --   --   --  4.2  4.2   PROTTOTAL  --   --   --   --   --   --   --   --  5.9*  --  6.1*  --   --   --   --   --  8.2  8.2   BILITOTAL  --   --   --   --   --   --   --   --  0.7  --  0.7  --   --   --   --   --  1.0  1.0   ALKPHOS  --   --   --   --   --   --   --   --  75  --  81  --   --   --   --   --  127  127   ALT  --   --   --   --   --   --   --   --  12  --  13  --   --   --   --   --  19  19   AST  --   --   --   --   --   --   --   --  25  --  23  --   --   --   --   --  19  19   LIPASE  --   --   --   --   --   --   --   --   --   --   --   --   --   --   --   --  46    < > = values in this interval not displayed.         Recent Labs   Lab Test 10/05/23  1204 07/12/22  0228 01/10/18  1547   A1C 8.0* 8.2* 8.6*       Recent Labs   Lab Test 10/09/23  0432 10/08/23  0622   INR 1.26* 1.16*       25 MINUTES SPENT BY ME on the date of service doing chart review, history, exam, documentation, discussion with nursing staff and specialist, & further activities per the note.  Viviana Giraldo MD  [facility] Medicine Service  [phone]

## 2023-10-10 NOTE — PHARMACY-CONSULT NOTE
Pharmacy Note: Parenteral Nutrition (PN) Management    Pharmacist consulted to dose PN for Bon Luque, a 68 year old male    Subjective:    The patient is a new PN start.    The patient was started on PN in the hospital on 10/7/23.    Indication for PN therapy: bowel obstruction    Inadequate nutrition anticipated for > 7 days.     Enteral nutrition contraindicated due to: small bowel obstruction.    Pertinent diseases and other special considerations     Social History     Tobacco Use    Smoking status: Former     Passive exposure: Never    Smokeless tobacco: Never   Vaping Use    Vaping Use: Never used   Substance Use Topics    Alcohol use: No     Comment: Alcoholic Drinks/day: alcohol abuse - in remission x16 years    Drug use: No     Objective:    Ht Readings from Last 1 Encounters:   10/05/23 1.829 m (6')     Wt Readings from Last 1 Encounters:   10/09/23 91.9 kg (202 lb 9.6 oz)       Body mass index is 27.48 kg/m .    Patient Vitals for the past 96 hrs:   Weight   10/09/23 0524 91.9 kg (202 lb 9.6 oz)       Labs:  Last 3 days:  Recent Labs     10/07/23  1749 10/08/23  0622 10/08/23  0622 10/09/23  0432 10/09/23  1136 10/09/23  1515 10/10/23  0531   NA  --  138  --  140  --   --  135   POTASSIUM 3.9 3.8  --  3.4 3.9  --  4.1   CHLORIDE  --  102  --  103  --   --  101   CO2  --  29  --  27  --   --  28   BUN  --  11.0  --  10.3  --   --  11.7   CR  --  1.00  --  0.95  --   --  0.93   MIGUEL  --  8.6*  --  8.9  --   --  9.0   MAG 1.7 1.8  --  1.8  --   --  1.9   PHOS  --  3.3  --  4.4  --   --  3.9   PROTTOTAL  --  6.1*  --  5.9*  --   --   --    ALBUMIN  --  3.4*  --  3.4*  --   --   --    PREALB  --  11*  --  11*  --   --   --    TRIG  --   --   --   --   --  93  --    HGB  --   --   --  11.2* 11.8* 12.0*  --    BILITOTAL  --  0.7  --  0.7  --   --   --    AST  --  23  --  25  --   --   --    ALT  --  13  --  12  --   --   --    ALKPHOS  --  81   < > 75  --   --   --    INR  --  1.16*  --  1.26*  --   --   --      < > = values in this interval not displayed.       Glucose (past 48 hours):   Recent Labs     10/09/23  0208 10/09/23  0432 10/09/23  0634 10/09/23  1000 10/09/23  1411 10/09/23  1743 10/09/23  2029 10/10/23  0210 10/10/23  0523 10/10/23  0531   GLC 91 90 95 108* 105* 110* 112* 165* 150* 235*       Intake/Output (last 24 hours): I/O last 3 completed shifts:  In: 240 [P.O.:240]  Out: 2950 [Urine:2950]    Estimated CrCl: Estimated Creatinine Clearance: 101.6 mL/min (based on SCr of 0.93 mg/dL).    Assessment:    Continue patient on PN therapy as a continuous central therapy.     Given the patient's current condition/oral intake, PN is still indicated.    Lab results reviewed: no changes to formula today    Plan:  Rate of PN: 75 mL/hr   Formula:   Amino Acids 100 grams  Dextrose 285 grams  Sodium 40 mEq/day  Potassium 60 mEq/day  Calcium 5 mEq/day  Magnesium 12 mEq/day  Phosphorus 15 mMol/day  Chloride: Acetate Ratio 1:1  Standard Multivitamins w/Vitamin K  Trace Elements  Fat Emulsion: 20%, 250 mL IV Daily   Check BMP and Mg and P  labs tomorrow.  Pharmacist will continue to follow the patient's lab results, clinical status and blood glucose results and make adjustments as appropriate.    Thank you for the consult.  Veronica Payan [facility]  10/10/2023 8:29 AM

## 2023-10-10 NOTE — PLAN OF CARE
Pain: Generalized pain 2/10.    Neuro: Alert and oriented. Chronic numbness and tingling in feet.  Resp: Lung sounds diminished. On room air.  Cardio: Hypertensive. Does not meet parameters for PRN hydralazine  GI/: Voiding freely  Skin: Cirrhosis plaques   Activity: Independent. Ambulated in hallway x2 today.  Nutrition/IV: Tolerating clear liquids    Blood glucose elevated. Majority of clear liquid choices high in glucose, TPN running.    Heparin drip: previous two values within desired range. Check Xa again in the morning.

## 2023-10-10 NOTE — PLAN OF CARE
BP (!) 167/73 (BP Location: Right arm)   Pulse 66   Temp 98.4  F (36.9  C) (Oral)   Resp 16   Ht 1.829 m (6')   Wt 91.9 kg (202 lb 9.6 oz)   SpO2 97%   BMI 27.48 kg/m       Patient VSS throughout shift. Heparin drip still running. Anti xa recheck in morning. TPN is running continuous, lipids stopped this morning. Pt not reporting much pain, just discomfort in R arm due to DVT.    Patient tolerating clear liquids well. Diet can be advanced by nursing staff as tolerated. Patient independent in room. Blood sugars have been on the higher end today.

## 2023-10-10 NOTE — PROGRESS NOTES
"Care Management Follow Up    Length of Stay (days): 5    Expected Discharge Date:       Concerns to be Addressed: care progression, discharge planning    Patient plan of care discussed at interdisciplinary rounds: Yes     Anticipated Discharge Disposition:  TBD     Anticipated Discharge Services:  TBD  Anticipated Discharge DME:  JES     Patient/family educated on Medicare website which has current facility and service quality ratings:  NA  Education Provided on the Discharge Plan:  Per team  Patient/Family in Agreement with the Plan:  NA     Referrals Placed by CM/SW:  NA  Private pay costs discussed: Not applicable     Additional Information:  Chart reviewed. TPN running. Upper GI studies today. Cytology pending.     CM to follow for medical progression, recommendations, and final discharge plan.      Social Hx:   \"Lives w/spouse in their home. Independent at baseline. Spouse to transport.\"    KARYN Gaspar      "

## 2023-10-11 VITALS
OXYGEN SATURATION: 98 % | HEIGHT: 72 IN | DIASTOLIC BLOOD PRESSURE: 63 MMHG | TEMPERATURE: 98.1 F | HEART RATE: 70 BPM | BODY MASS INDEX: 26.58 KG/M2 | RESPIRATION RATE: 18 BRPM | SYSTOLIC BLOOD PRESSURE: 132 MMHG | WEIGHT: 196.21 LBS

## 2023-10-11 LAB
ANION GAP SERPL CALCULATED.3IONS-SCNC: 6 MMOL/L (ref 7–15)
BUN SERPL-MCNC: 14.5 MG/DL (ref 8–23)
CALCIUM SERPL-MCNC: 8.6 MG/DL (ref 8.8–10.2)
CHLORIDE SERPL-SCNC: 102 MMOL/L (ref 98–107)
CREAT SERPL-MCNC: 0.88 MG/DL (ref 0.67–1.17)
DEPRECATED HCO3 PLAS-SCNC: 29 MMOL/L (ref 22–29)
EGFRCR SERPLBLD CKD-EPI 2021: >90 ML/MIN/1.73M2
GLUCOSE BLDC GLUCOMTR-MCNC: 240 MG/DL (ref 70–99)
GLUCOSE BLDC GLUCOMTR-MCNC: 254 MG/DL (ref 70–99)
GLUCOSE BLDC GLUCOMTR-MCNC: 272 MG/DL (ref 70–99)
GLUCOSE BLDC GLUCOMTR-MCNC: 311 MG/DL (ref 70–99)
GLUCOSE BLDC GLUCOMTR-MCNC: 386 MG/DL (ref 70–99)
GLUCOSE SERPL-MCNC: 261 MG/DL (ref 70–99)
MAGNESIUM SERPL-MCNC: 2 MG/DL (ref 1.7–2.3)
PHOSPHATE SERPL-MCNC: 2.6 MG/DL (ref 2.5–4.5)
POTASSIUM SERPL-SCNC: 4.2 MMOL/L (ref 3.4–5.3)
SODIUM SERPL-SCNC: 137 MMOL/L (ref 135–145)
UFH PPP CHRO-ACNC: 0.4 IU/ML

## 2023-10-11 PROCEDURE — 88341 IMHCHEM/IMCYTCHM EA ADD ANTB: CPT | Mod: 26 | Performed by: PATHOLOGY

## 2023-10-11 PROCEDURE — 88342 IMHCHEM/IMCYTCHM 1ST ANTB: CPT | Mod: 26 | Performed by: PATHOLOGY

## 2023-10-11 PROCEDURE — 88365 INSITU HYBRIDIZATION (FISH): CPT | Mod: 26 | Performed by: PATHOLOGY

## 2023-10-11 PROCEDURE — C9113 INJ PANTOPRAZOLE SODIUM, VIA: HCPCS | Mod: JZ | Performed by: PHYSICIAN ASSISTANT

## 2023-10-11 PROCEDURE — 88305 TISSUE EXAM BY PATHOLOGIST: CPT | Mod: 26 | Performed by: PATHOLOGY

## 2023-10-11 PROCEDURE — 250N000013 HC RX MED GY IP 250 OP 250 PS 637: Performed by: INTERNAL MEDICINE

## 2023-10-11 PROCEDURE — 250N000013 HC RX MED GY IP 250 OP 250 PS 637: Performed by: HOSPITALIST

## 2023-10-11 PROCEDURE — 88177 CYTP FNA EVAL EA ADDL: CPT | Mod: 26 | Performed by: PATHOLOGY

## 2023-10-11 PROCEDURE — 250N000011 HC RX IP 250 OP 636: Mod: JZ | Performed by: PHYSICIAN ASSISTANT

## 2023-10-11 PROCEDURE — 85520 HEPARIN ASSAY: CPT | Performed by: INTERNAL MEDICINE

## 2023-10-11 PROCEDURE — 88364 INSITU HYBRIDIZATION (FISH): CPT | Mod: 26 | Performed by: PATHOLOGY

## 2023-10-11 PROCEDURE — 88172 CYTP DX EVAL FNA 1ST EA SITE: CPT | Mod: 26 | Performed by: PATHOLOGY

## 2023-10-11 PROCEDURE — 83735 ASSAY OF MAGNESIUM: CPT | Performed by: HOSPITALIST

## 2023-10-11 PROCEDURE — 88312 SPECIAL STAINS GROUP 1: CPT | Mod: 26 | Performed by: PATHOLOGY

## 2023-10-11 PROCEDURE — 80048 BASIC METABOLIC PNL TOTAL CA: CPT | Performed by: HOSPITALIST

## 2023-10-11 PROCEDURE — 99239 HOSP IP/OBS DSCHRG MGMT >30: CPT | Performed by: INTERNAL MEDICINE

## 2023-10-11 PROCEDURE — 84100 ASSAY OF PHOSPHORUS: CPT | Performed by: HOSPITALIST

## 2023-10-11 PROCEDURE — 88173 CYTOPATH EVAL FNA REPORT: CPT | Mod: 26 | Performed by: PATHOLOGY

## 2023-10-11 RX ORDER — DULOXETIN HYDROCHLORIDE 60 MG/1
60 CAPSULE, DELAYED RELEASE ORAL DAILY
Qty: 30 CAPSULE | Refills: 0 | Status: SHIPPED | OUTPATIENT
Start: 2023-10-12 | End: 2023-12-04

## 2023-10-11 RX ORDER — POLYETHYLENE GLYCOL 3350 17 G/17G
17 POWDER, FOR SOLUTION ORAL 2 TIMES DAILY
Status: DISCONTINUED | OUTPATIENT
Start: 2023-10-11 | End: 2023-10-11 | Stop reason: HOSPADM

## 2023-10-11 RX ORDER — POLYETHYLENE GLYCOL 3350 17 G/17G
17 POWDER, FOR SOLUTION ORAL DAILY
Qty: 510 G | Refills: 1 | Status: SHIPPED | OUTPATIENT
Start: 2023-10-11 | End: 2024-03-28

## 2023-10-11 RX ORDER — PANTOPRAZOLE SODIUM 40 MG/1
40 TABLET, DELAYED RELEASE ORAL 2 TIMES DAILY
Qty: 60 TABLET | Refills: 0 | Status: SHIPPED | OUTPATIENT
Start: 2023-10-11 | End: 2023-11-10

## 2023-10-11 RX ADMIN — INSULIN ASPART 3 UNITS: 100 INJECTION, SOLUTION INTRAVENOUS; SUBCUTANEOUS at 08:41

## 2023-10-11 RX ADMIN — INSULIN ASPART 3 UNITS: 100 INJECTION, SOLUTION INTRAVENOUS; SUBCUTANEOUS at 04:35

## 2023-10-11 RX ADMIN — INSULIN ASPART 3 UNITS: 100 INJECTION, SOLUTION INTRAVENOUS; SUBCUTANEOUS at 00:01

## 2023-10-11 RX ADMIN — MORPHINE SULFATE 30 MG: 15 TABLET, EXTENDED RELEASE ORAL at 13:39

## 2023-10-11 RX ADMIN — APIXABAN 10 MG: 5 TABLET, FILM COATED ORAL at 12:42

## 2023-10-11 RX ADMIN — PANTOPRAZOLE SODIUM 40 MG: 40 INJECTION, POWDER, FOR SOLUTION INTRAVENOUS at 08:40

## 2023-10-11 RX ADMIN — MORPHINE SULFATE 30 MG: 15 TABLET, EXTENDED RELEASE ORAL at 06:20

## 2023-10-11 RX ADMIN — GABAPENTIN 600 MG: 300 CAPSULE ORAL at 13:39

## 2023-10-11 RX ADMIN — GABAPENTIN 600 MG: 300 CAPSULE ORAL at 08:39

## 2023-10-11 RX ADMIN — DULOXETINE HYDROCHLORIDE 60 MG: 60 CAPSULE, DELAYED RELEASE PELLETS ORAL at 08:39

## 2023-10-11 RX ADMIN — POLYETHYLENE GLYCOL 3350 17 G: 17 POWDER, FOR SOLUTION ORAL at 12:42

## 2023-10-11 ASSESSMENT — ACTIVITIES OF DAILY LIVING (ADL)
ADLS_ACUITY_SCORE: 26

## 2023-10-11 NOTE — PROGRESS NOTES
NURSING PROGRESS NOTE  Shift Summary      Date: October 11, 2023     Neuro/Musculoskeletal:  A&Ox4. Calm and pleasant towards writer and other staff. Receptive to all cares.   Cardiac: Not TELE monitored. VSS. Denied and offered no c/o CP.   Respiratory: LS: diminished throughout but absent of adventitious sounds. SpO2: 96% on RA. Denied SOB  GI/:  BS: hypoactive x4Q. Passing flatus. Last BM recorded on 10/6. Voiding spontaneously without difficulty, clear yellow urine.   Diet/Appetite:  Tolerating a clear liquid diet. Denies nausea at this time. On TPN and Lipids  Activity:  Up indep. Steady gait noted.   Pain:  C/o RUE discomfort/ ache at site of DVT. Scheduled MS Contin administered, which was effective.   Skin:  No new deficits noted.   LDAs + Drips/IVF:  L forearm PIV x1: Heparin infusion continued at 1600 units per hour. LUE double lumen PICC remained patent, dressing remained CDI:TPN and lipids infusing  Protocols/Labs:  On Mag, K+ and Phos protocols, redraw in the AM. Heparin protocol /Anti Xa: therapeutic, redraw in the AM.

## 2023-10-11 NOTE — DISCHARGE SUMMARY
[facility]  Hospitalist Discharge Summary      Date of Admission:  10/5/2023  Date of Discharge:  10/11/2023  Discharging Provider: Viviana Giraldo MD  Discharge Service: Hospitalist Service    Discharge Diagnoses   Principal Problem:    SBO (small bowel obstruction) (H)  Active Problems:    HTN (hypertension)    Type 2 diabetes mellitus with complication, with long-term current use of insulin (H)    Chronic pain syndrome    Abdominal pain, epigastric    Nausea and vomiting    DKA (diabetic ketoacidosis) (H)    Hypophosphatemia    Deep vein thrombosis (DVT) of upper extremity (H)    Severe protein-calorie malnutrition (H24)        Clinically Significant Risk Factors     # DMII: A1C = 8.0 % (Ref range: <5.7 %) within past 6 months    # Overweight: Estimated body mass index is 26.61 kg/m  as calculated from the following:    Height as of this encounter: 1.829 m (6').    Weight as of this encounter: 89 kg (196 lb 3.4 oz).       Follow-ups Needed After Discharge   Follow-up Appointments     Follow-up and recommended labs and tests       Follow up with primary care provider, Aditya Munoz, within   3-5days, to evaluate medication change and for hospital follow- up. The   following labs/tests are recommended: BMP, CBC, Mag. Course of eliquis for   RUE DVT.     [facility] will arrange out patient follow up.            Unresulted Labs Ordered in the Past 30 Days of this Admission       Date and Time Order Name Status Description    10/11/2023  3:29 PM Khai Marginal Zone Lymphoma Panel In process             Discharge Disposition   Discharged to home  Condition at discharge: Stable    Hospital Course   Bon Luque is a 68 year old male with h/o insulin-dependent diabetes, chronic pain syndrome on MS Contin, coronary artery disease status post stent in 2008, hypertension, hyperlipidemia, MARÍA ELENA, renal cell carcinoma status post partial nephrectomy in 2017, recent issues with nausea and vomiting  status post normal gastric emptying study about a month ago, presented for refractory nausea, vomiting, and emesis found to have small bowel obstruction secondary to likely mass, as well as mild DKA.         #Small bowel obstruction  #Likely duodenal vs pancreatic mass  #Retroperitoneal lymphadenopathy  -  CT showing obstruction at the second portion of the duodenum associated with likely mass, involvement of the paola hepatis and encasement of the common hepatic and GD arteries.  Also noted enlarged regional lymph nodes.  Very concerning for malignancy.  -Taken for EGD/EUS 10/6, biopsies taken  -NG tube fell out and he is tolerating  -Pain meds, antiemetics as needed  -GI order UGI mild narrowing seen   - weaned off TPN, and tolerating solids, mechanical soft diet and PPI BID  - GI will arrange outpatient follow up     #Severe malnutrition  -30 lb weight loss past 6 weeks  - TPN weaned off and tolerating orals       DVT RUE  - IV heparin started 10/8  - PICC line RUE removed and new PICC LUE   - discussed with price of DOACs with patient and will need AC for at least 3 months. He is agreement with either instead of warfarin and lovenox      #Mild DKA, resolved  #Insulin-dependent diabetes  -Patient presented with positive ketones and anion gap but was not actually acidotic.  Suspect was just very hypovolemic  -Ketosis resolved after aggressive IV fluid and repeated doses of subcu insulin  - resume home insulin regiment      #Chronic pain syndrome  -Okay to take home MS Contin, Cymbalta (increase dose- he notes med rec dose was incorrect), gabapentin     #Hypophosphatemia  #HypoK  -Protocols     #Leukocytosis  -Likely reactive  -Improved     #Hypertension  - stable     #ABLA  -Hgb 14.6-->12.6  -Suspect Hgb drop was mostly dilutional but there was some bleeding noted from NG tube trauma of stomach       Consultations This Hospital Stay   SURGERY GENERAL IP CONSULT  GASTROENTEROLOGY IP CONSULT  CARE MANAGEMENT /  SOCIAL WORK IP CONSULT  PHARMACY/NUTRITION TO START AND MANAGE TPN  VASCULAR ACCESS ADULT IP CONSULT  PHARMACY IP CONSULT  PHARMACY IP CONSULT  VASCULAR ACCESS ADULT IP CONSULT  PHARMACY IP CONSULT  PHARMACY IP CONSULT  PHARMACY IP CONSULT  PHARMACY TEST CLAIM IP CONSULT  PHARMACY IP CONSULT  PHARMACY IP CONSULT    Code Status   Prior    Time Spent on this Encounter   I, Viviana Giraldo MD, personally saw the patient today and spent greater than 30 minutes discharging this patient.       Viviana Giraldo MD  [address] [address]  Phone: [phone]  Fax: [phone]  ______________________________________________________________________    Physical Exam   Vital Signs:                    Weight: 196 lbs 3.35 oz  Physical Exam  Constitutional:       Appearance: Normal appearance.   HENT:      Head: Normocephalic and atraumatic.   Cardiovascular:      Rate and Rhythm: Normal rate and regular rhythm.      Pulses: Normal pulses.   Pulmonary:      Effort: Pulmonary effort is normal.      Breath sounds: Normal breath sounds.   Abdominal:      General: Bowel sounds are normal. There is no distension.      Palpations: Abdomen is soft.      Tenderness: There is no abdominal tenderness. There is no guarding.   Musculoskeletal:         General: Normal range of motion.      Comments: PICC LUE, RUE swelling decreased   Skin:     General: Skin is warm and dry.      Capillary Refill: Capillary refill takes less than 2 seconds.   Neurological:      General: No focal deficit present.      Mental Status: He is alert and oriented to person, place, and time. Mental status is at baseline.              Primary Care Physician   Aditya Munoz    Discharge Orders      Reason for your hospital stay    Principal Problem:    SBO (small bowel obstruction) (H)  Active Problems:    HTN (hypertension)    Type 2 diabetes mellitus with complication, with long-term current  use of insulin (H)    Chronic pain syndrome    Abdominal pain, epigastric    Nausea and vomiting    DKA (diabetic ketoacidosis) (H)    Hypophosphatemia    Deep vein thrombosis (DVT) of upper extremity (H)    Severe protein-calorie malnutrition (H24)     Follow-up and recommended labs and tests     Follow up with primary care provider, Aditya Munoz, within 3-5days, to evaluate medication change and for hospital follow- up. The following labs/tests are recommended: BMP, CBC, Mag. Course of eliquis for RUE DVT.     [facility] will arrange out patient follow up.     Activity    Your activity upon discharge: activity as tolerated     Discharge Instructions    PPI twice daily.  Recommend first dose 30 minutes before breakfast and second dose 30 minutes before suppe  Chew food extremely well and be very careful with food choices. [facility] discussed those foods that are likely to come lodged in his significantly narrowed duodenum.  Outpatient EGD in 1 month.  [facility] will contact patient to arrange.     Diet    Follow this diet upon discharge: Orders Placed This Encounter      Snacks/Supplements Adult: Glucerna; Between Meals      Combination Diet Regular Diet; Moderate Consistent Carb (60 g CHO per Meal) Diet; Mechanical/Dental Soft Diet       Significant Results and Procedures   Most Recent 3 CBC's:  Recent Labs   Lab Test 10/09/23  1515 10/09/23  1136 10/09/23  0432 10/07/23  0716 10/06/23  0611 10/05/23  1204   WBC  --   --   --  12.6* 17.1* 14.1*   HGB 12.0* 11.8* 11.2* 12.6* 13.2* 14.6   MCV  --   --   --  79 79 77*   PLT  --   --   --  260 298 316     Most Recent 3 BMP's:  Recent Labs   Lab Test 10/11/23  1323 10/11/23  1157 10/11/23  0741 10/11/23  0611 10/10/23  0853 10/10/23  0531 10/09/23  1411 10/09/23  1136 10/09/23  0634 10/09/23  0432   NA  --   --   --  137  --  135  --   --   --  140   POTASSIUM  --   --   --  4.2  --  4.1  --  3.9  --  3.4   CHLORIDE  --   --   --  102  --  101  --   --   --  103   CO2  --   --    --  29  --  28  --   --   --  27   BUN  --   --   --  14.5  --  11.7  --   --   --  10.3   CR  --   --   --  0.88  --  0.93  --   --   --  0.95   ANIONGAP  --   --   --  6*  --  6*  --   --   --  10   MIGUEL  --   --   --  8.6*  --  9.0  --   --   --  8.9   * 386* 272* 261*   < > 235*   < >  --    < > 90    < > = values in this interval not displayed.     Most Recent 2 LFT's:  Recent Labs   Lab Test 10/09/23  0432 10/08/23  0622   AST 25 23   ALT 12 13   ALKPHOS 75 81   BILITOTAL 0.7 0.7     Most Recent 3 INR's:  Recent Labs   Lab Test 10/09/23  0432 10/08/23  0622   INR 1.26* 1.16*     Most Recent 6 glucoses:  Recent Labs   Lab Test 10/11/23  1323 10/11/23  1157 10/11/23  0741 10/11/23  0611 10/11/23  0359 10/10/23  2349   * 386* 272* 261* 254* 240*     Most Recent Urinalysis:  Recent Labs   Lab Test 10/05/23  1404 07/12/22  0245 06/16/22  1833   COLOR Colorless   < > Yellow   APPEARANCE Clear   < > Clear   URINEGLC >1000*   < > 500*   URINEBILI Negative   < > Negative   URINEKETONE 60*   < > 15*   SG 1.035*   < > 1.020   UBLD Negative   < > Trace*   URINEPH 8.0*   < > 5.5   PROTEIN Negative   < > Negative   UROBILINOGEN  --   --  1.0   NITRITE Negative   < > Negative   LEUKEST Negative   < > Negative   RBCU 3*   < > None Seen   WBCU 0   < > None Seen    < > = values in this interval not displayed.   ,   Results for orders placed or performed during the hospital encounter of 10/05/23   CTA Abdomen Pelvis with Contrast    Narrative    EXAM: CTA ABDOMEN PELVIS WITH CONTRAST  LOCATION: [facility]  DATE: 10/5/2023    INDICATION: abdominal pain, vomiting  COMPARISON: 07/12/2022  TECHNIQUE: CT angiogram abdomen pelvis during arterial phase of injection of IV contrast. 2D and 3D MIP reconstructions were performed by the CT technologist. Dose reduction techniques were used.  CONTRAST: 90 mL Isovue 370    FINDINGS:  ANGIOGRAM ABDOMEN/PELVIS: The aorta is normal in caliber. Mild  atherosclerosis without high-grade stenosis. No active extravasation. LOWER CHEST: Trace right pleural effusion. Patchy bibasilar atelectasis/scarring. Mild degenerative interval thickening   of the esophagus.    HEPATOBILIARY: Hepatic steatosis. Cholecystectomy. No convincing pathologic biliary ductal dilation.    PANCREAS: No pancreatic ductal dilation.    SPLEEN: Normal.    ADRENAL GLANDS: Normal.    KIDNEYS/BLADDER: Partial right nephrectomy with residual scarring and volume loss in the right mid kidney. No hydronephrosis or genitourinary calculi.    BOWEL: There is severe fluid-filled distention of the stomach. There is also marked diffuse circumferential wall thickening of the second portion of the duodenum and duodenal bulb. There is ill-defined soft tissue thickening extending from the second   portion towards the portal caval region and encasing the common hepatic and proximal gastroduodenal arteries on series 4 image 74. There is minimal surrounding fat stranding. No free air. There is a small duodenal diverticulum at the second-third portion   of the duodenum. No evidence of appendicitis.    LYMPH NODES: There are numerous borderline to mildly enlarged lymph nodes in the gastrohepatic, paola hepatis, peripancreatic, and retroperitoneal chains. The largest is in the retrocaval chain measuring 2.8 x 1.3 cm on series 4 image 83.    PELVIC ORGANS: Prostate calcifications.    MUSCULOSKELETAL: No suspicious osseous lesions. Degenerative change of the spine. Old, healed right inferior pubic ramus fracture.      Impression    IMPRESSION:  1.  Findings highly concerning for obstruction or partial obstruction at the second portion the duodenum where there is marked circumferential wall thickening. Additionally, there is soft tissue stranding extending towards the paola hepatis/portacaval   region with encasement of the common hepatic and gastroduodenal arteries. The constellation of findings are concerning for a  malignant process likely from the duodenum versus pancreas. An inflammatory process such as duodenitis or peptic ulcer disease   are in the differential but felt to be less likely the primary process. EGD is recommended for further evaluation.  2.  The stomach is markedly distended with fluid-filled contents. Additionally, there is diffuse circumferential wall thickening of the esophagus which may be related to vomiting and/or esophagitis.  3.  There are numerous enlarged upper abdominal and retroperitoneal lymph nodes which are suspicious.  4.  Hepatic steatosis.     XR Abdomen Port 1 View    Narrative    EXAM: XR ABDOMEN PORT 1 VIEW  LOCATION: [facility]  DATE: 10/5/2023    INDICATION: confirm NG placement  COMPARISON: 10/05/2023 CTA AP.      Impression    IMPRESSION: Nasogastric tube is looped in the mid and distal esophagus. Cholecystectomy clips. Visualized upper abdomen otherwise unremarkable.   XR Abdomen Port 1 View    Narrative    EXAM: XR ABDOMEN PORT 1 VIEW  LOCATION: [facility]  DATE: 10/5/2023    INDICATION: NG tube location?  COMPARISON: 10/05/2023. CT abdomen pelvis 10/05/2023.      Impression    IMPRESSION: Nasogastric tube terminates in the stomach. Stomach is mildly distended. Prior cholecystectomy. Imaged portions lower chest unremarkable.    US Upper Extremity Venous Duplex Right    Narrative    EXAM: US UPPER EXTREMITY VENOUS DUPLEX RIGHT  LOCATION: [facility]  DATE: 10/9/2023    INDICATION: Right arm swelling and redness. PICC placed 10/07/2023.  COMPARISON: None.  TECHNIQUE: Venous Duplex ultrasound of the right upper extremity with (when possible) and without compression, augmentation, and duplex. Color flow and spectral Doppler with waveform analysis performed.    FINDINGS: Ultrasound includes evaluation of the internal jugular vein, innominate vein, subclavian vein, axillary vein, and brachial vein. The superficial  cephalic and basilic veins were also evaluated where seen.     RIGHT: There is partially occlusive DVT in the axillary vein and occlusive DVT in the brachial vein of the proximal through distal upper arm. PICC is present in the brachial vein. There is superficial thrombus in the basilic vein of the mid upper arm   through the antecubital fossa.      Impression    IMPRESSION:  1.  DVT in the axillary and brachial veins.  2.  Superficial thrombus in the basilic vein.   XR Upper GI without KUB    Narrative    EXAM: XR UPPER GI WITHOUT KUB  LOCATION: [facility]  DATE: 10/10/2023    INDICATION: duodenal wall thickening. EGD 10/06/2023.  COMPARISON: CT 10/05/2023   TECHNIQUE: Limited single contrast barium study.      Impression    FINDINGS AND IMPRESSION:   FLUOROSCOPIC TIME: 1.8  NUMBER OF IMAGES: 11     Thin consistency barium was ingested. There is significant short segment narrowing of the second portion of the duodenum. Measurements cannot be performed for technical reasons. Contrast traverses the area of duodenal narrowing without delay. The stomach   is not distended.       Discharge Medications   Discharge Medication List as of 10/11/2023  3:48 PM        START taking these medications    Details   pantoprazole (PROTONIX) 40 MG EC tablet Take 1 tablet (40 mg) by mouth 2 times daily for 30 days, Disp-60 tablet, R-0, E-Prescribe      polyethylene glycol (MIRALAX) 17 GM/Dose powder Take 17 g by mouth daily, Disp-510 g, R-1, E-PrescribeHold for more than 3 stools in 24 hrs. Goal of 1-2 soft stools daily           CONTINUE these medications which have CHANGED    Details   apixaban ANTICOAGULANT (ELIQUIS) 5 MG tablet Take 2 tablets (10 mg) by mouth 2 times daily for 7 days, THEN 1 tablet (5 mg) 2 times daily for 30 days., Disp-88 tablet, R-0, E-Prescribe      DULoxetine (CYMBALTA) 60 MG capsule Take 1 capsule (60 mg) by mouth daily for 30 days, Disp-30 capsule, R-0, E-Prescribe            CONTINUE these medications which have NOT CHANGED    Details   Alcohol Swabs PADS Use to swab the area of the injection or sherry as directed Per insurance coverage, Disp-100 each, R-0, E-Prescribe      B-D U/F 31G X 8 MM insulin pen needle FOR ADMINISTERING INSULIN AT HOMEDAWHistorical      blood glucose (NO BRAND SPECIFIED) test strip To use to test glucose level in the blood Use to test blood sugar  4 times daily as directed. To accompany glucose monitor brands per insurance coverage., Disp-100 strip, R-0, E-Prescribe      clobetasol (TEMOVATE) 0.05 % external cream Apply topically 2 times daily as needed For psoriasis on hands.Historical      Continuous Blood Gluc Sensor (FREESTYLE SHIRA 14 DAY SENSOR) MIS Historical      gabapentin (NEURONTIN) 300 MG capsule [GABAPENTIN (NEURONTIN) 300 MG CAPSULE] Take 600 mg by mouth 3 (three) times a day. , Historical      Insulin Glargine-yfgn 100 UNIT/ML SOPN Inject 50 Units Subcutaneous 2 times daily, Historical      insulin lispro (HUMALOG KWIKPEN) 100 UNIT/ML (1 unit dial) KWIKPEN Inject Subcutaneous 3 times daily (before meals) Sliding scale. Average use is 20 units with each meal, Historical      metFORMIN (GLUCOPHAGE-XR) 750 MG 24 hr tablet Take 1 tablet (750 mg) by mouth daily (with dinner), Disp-90 tablet, R-3, No Print Out      morphine (MS CONTIN) 30 MG CR tablet Take 30 mg by mouth every 8 hours, Historical      naloxone (NARCAN) 4 MG/0.1ML nasal spray Spray 1 spray into one nostril alternating nostrils once as needed for opioid reversal, Historical      Sharps Container MISC Use as directed to dispose of needles, lancets and other sharps Per Insurance coverage, Disp-1 each, R-0, E-Prescribe      testosterone (ANDROGEL) 20.25 MG/1.25GM (1.62%) topical gel Place 20.25 mg onto the skin daily, Historical      valACYclovir (VALTREX) 500 MG tablet Take 500 mg by mouth 2 times daily as needed Take twice daily at onset of symptoms for 3 days. Repeat for flares.,  Historical           STOP taking these medications       omeprazole (PRILOSEC) 20 MG DR capsule Comments:   Reason for Stopping:             Allergies   Allergies   Allergen Reactions    Atorvastatin Muscle Pain (Myalgia)    Valomag [Arthritis Pain Formula] Nausea and Vomiting     gastric ulcer from aspirin use    Ampicillin Rash     Had a reaction to this medication many years ago.    Codeine Rash     It has been about 30 years ago

## 2023-10-11 NOTE — PROGRESS NOTES
GI Progress Note  Bon Luque  -11     Subjective:   Reviewed results of barium upper GI study from yesterday.  There is still significant narrowing, though the contrast was able to move through freely.  He has been taking generous amounts of full liquids this morning and tolerating very well.  We talked about discharge tomorrow; however, he strongly asked that he be discharged later today.  We discussed the need for outpatient follow-up EGD.    Objective:   /63 (BP Location: Right arm)   Pulse 70   Temp 98.1  F (36.7  C) (Oral)   Resp 18   Ht 1.829 m (6')   Wt 89 kg (196 lb 3.4 oz)   SpO2 98%   BMI 26.61 kg/m    Body mass index is 26.61 kg/m .   Gen: No acute distress  Cardio: RRR  GI: Non-distended, BS positive, soft, non-tender. No guarding.    Laboratory  Recent Labs   Lab 10/09/23  1515 10/09/23  1136 10/09/23  0432 10/07/23  0716 10/06/23  0611 10/05/23  1204   WBC  --   --   --  12.6* 17.1* 14.1*   RBC  --   --   --  4.85 5.11 5.72   HGB 12.0* 11.8* 11.2* 12.6* 13.2* 14.6   HCT  --   --   --  38.3* 40.3 44.1   MCV  --   --   --  79 79 77*   MCH  --   --   --  26.0* 25.8* 25.5*   MCHC  --   --   --  32.9 32.8 33.1   RDW  --   --   --  14.1 14.1 13.7   PLT  --   --   --  260 298 316      Recent Labs   Lab 10/11/23  0611 10/10/23  0531 10/09/23  0432    135 140   CO2 29 28 27   BUN 14.5 11.7 10.3     Recent Labs   Lab 10/09/23  0432 10/08/23  0622 10/05/23  1204   ALKPHOS 75 81 127  127   AST 25 23 19  19   ALT 12 13 19  19     Lab Results   Component Value Date    INR 1.26 (H) 10/09/2023    INR 1.16 (H) 10/08/2023     CTA 10/05/2023  IMPRESSION:  1.  Findings highly concerning for obstruction or partial obstruction at the second portion the duodenum where there is marked circumferential wall thickening. Additionally, there is soft tissue stranding extending towards the paola hepatis/portacaval   region with encasement of the common hepatic and gastroduodenal arteries. The  constellation of findings are concerning for a malignant process likely from the duodenum versus pancreas. An inflammatory process such as duodenitis or peptic ulcer disease   are in the differential but felt to be less likely the primary process. EGD is recommended for further evaluation.  2.  The stomach is markedly distended with fluid-filled contents. Additionally, there is diffuse circumferential wall thickening of the esophagus which may be related to vomiting and/or esophagitis.  3.  There are numerous enlarged upper abdominal and retroperitoneal lymph nodes which are suspicious.  4.  Hepatic steatosis.     Assessment:   Duodenal wall thickening - EUS done 10/06.  Mild edema and chronic peptic duodenitis per duodenal biopsy.   FNA was done of lumph nodes in peripancreatic region -cytology still pending.  Linear ulcer in the stomach - likely related to NG tube.   -Upper GI study yesterday showed significant narrowing; however, contrast freely flowed through the narrowed area in the duodenum.  -We will need outpatient EGD    Patient Active Problem List   Diagnosis    Psoriatic arthritis (H)    Chronic low back pain    Diabetic peripheral neuropathy associated with type 2 diabetes mellitus (H)    Psoriasis    Hypercholesterolemia    HTN (hypertension)    Optic neuritis    Obstructive sleep apnea on CPAP    Thoracic radiculopathy    Hypogonadism in male    Erectile dysfunction    ASCVD (arteriosclerotic cardiovascular disease)    Alcohol abuse, in remission    Tobacco abuse, in remission    Ankylosis, sacroiliac joint    Spondylosis of lumbar region without myelopathy or radiculopathy    Ischemic cardiomyopathy    Depression    Type 2 diabetes mellitus with complication, with long-term current use of insulin (H)    Chronic pain syndrome    Lower abdominal pain    Drug-induced constipation    Knee effusion, left    Renal cell carcinoma, right (H)    Chronic fatigue    Chronic right shoulder pain    Jaundice     Transaminitis    Dilated cbd, acquired    Hyponatremia    Recurrent vomiting    Abdominal pain, epigastric    SBO (small bowel obstruction) (H)    Nausea and vomiting    DKA (diabetic ketoacidosis) (H)    Hypophosphatemia    Deep vein thrombosis (DVT) of upper extremity (H)    Severe protein-calorie malnutrition (H24)      Plan:   1.  PPI twice daily.  Recommend first dose 30 minutes before breakfast and second dose 30 minutes before supper.  2.  Trial soft diet.  - Patient knows to chew food extremely well and be very careful with food choices.  We discussed those foods that are likely to come lodged in his significantly narrowed duodenum.  3.  Wean TPN.  4. Cytology still pending from 10/6.  5.  Outpatient EGD in 1 month.  [facility] will contact patient to arrange.    Dispo: If he tolerates oral intake he can discharge later today.  Total of 30 minutes was spent on today's care, including discussion with Dr. Mcdonald and Dr. Anna Matias.  Thank you for the opportunity to participate in this patient's care.  Jamie Brambila PA-C  [facility]  [phone]

## 2023-10-11 NOTE — PLAN OF CARE
Patient discharged home via family transport. New discharge medications supplied by [facility] discharge [facility] including protonix and eliquis.     PICC line removed per order without incident. Pt monitored x1 hour for bleeding.

## 2023-10-11 NOTE — PHARMACY-CONSULT NOTE
Pharmacy Note: Parenteral Nutrition (PN) Management    Pharmacist consulted to dose PN for Bon Luque, a 68 year old male.    Subjective:    The patient is a new PN start.    The patient was started on PN in the hospital on 10/7/23.    Indication for PN therapy: bowel obstruction    Inadequate nutrition anticipated for > 7 days.     Enteral nutrition contraindicated due to: small bowel obstruction.    Pertinent diseases and other special considerations     Social History     Tobacco Use    Smoking status: Former     Passive exposure: Never    Smokeless tobacco: Never   Vaping Use    Vaping Use: Never used   Substance Use Topics    Alcohol use: No     Comment: Alcoholic Drinks/day: alcohol abuse - in remission x16 years    Drug use: No     Objective:    Ht Readings from Last 1 Encounters:   10/05/23 1.829 m (6')     Wt Readings from Last 1 Encounters:   10/11/23 (P) 89 kg (196 lb 3.4 oz)       Body mass index is 26.61 kg/m  (pended).    Patient Vitals for the past 96 hrs:   Weight   10/11/23 0737 (P) 89 kg (196 lb 3.4 oz)   10/11/23 0630 92.2 kg (203 lb 4.2 oz)   10/09/23 0524 91.9 kg (202 lb 9.6 oz)       Labs:  Last 3 days:  Recent Labs     10/09/23  0432 10/09/23  1136 10/09/23  1515 10/10/23  0531 10/11/23  0611     --   --  135 137   POTASSIUM 3.4 3.9  --  4.1 4.2   CHLORIDE 103  --   --  101 102   CO2 27  --   --  28 29   BUN 10.3  --   --  11.7 14.5   CR 0.95  --   --  0.93 0.88   MIGUEL 8.9  --   --  9.0 8.6*   MAG 1.8  --   --  1.9 2.0   PHOS 4.4  --   --  3.9 2.6   PROTTOTAL 5.9*  --   --   --   --    ALBUMIN 3.4*  --   --   --   --    PREALB 11*  --   --   --   --    TRIG  --   --  93  --   --    HGB 11.2* 11.8* 12.0*  --   --    BILITOTAL 0.7  --   --   --   --    AST 25  --   --   --   --    ALT 12  --   --   --   --    ALKPHOS 75  --   --   --   --    INR 1.26*  --   --   --   --        Glucose (past 48 hours):   Recent Labs     10/10/23  0523 10/10/23  0531 10/10/23  0853 10/10/23  1125  10/10/23  1700 10/10/23  2016 10/10/23  2349 10/11/23  0359 10/11/23  0611 10/11/23  0741   * 235* 273* 300* 255* 312* 240* 254* 261* 272*       Intake/Output (last 24 hours): I/O last 3 completed shifts:  In: 1911.92 [P.O.:1320]  Out: 6150 [Urine:6150]    Estimated CrCl: Estimated Creatinine Clearance: 107.7 mL/min (based on SCr of 0.88 mg/dL).    Assessment:    Continue patient on PN therapy as a continuous central therapy.     Given the patient's current condition/oral intake, PN is still indicated.    Lab results reviewed: Ca and Phos both decreased today, will bump up a bit in TPN.    Lipids have been discontinued.   May wean TPN tomorrow.     Plan:  Rate of PN: 75 mL/hr  Formula:   Amino Acids 100 grams  Dextrose 285 grams  Sodium 40 mEq/day  Potassium 60 mEq/day  Calcium 8 mEq/day  Magnesium 12 mEq/day  Phosphorus 18 mMol/day  Chloride: Acetate Ratio 1:1  Standard Multivitamins w/Vitamin K  Trace Elements  Fat Emulsion: discontinued  Check BMP and Mg and P  labs tomorrow.  Pharmacist will continue to follow the patient's lab results, clinical status and blood glucose results and make adjustments as appropriate.    Thank you for the consult.  Veronica Payan, [facility]  10/11/2023 9:29 AM

## 2023-10-11 NOTE — PROGRESS NOTES
"Nutrition Therapy  Parenteral Nutrition follow-up     Dietitian to Pharmacy    Rate of TPN: 75 mL/hr  Grams Dextrose: 285g  Grams Protein: 100g    Lipids: Lipids discontinued this AM    - Recommend keeping dex and protein the same, will monitor po today in hopes of weaning TPN tomorrow    - Addendum: received katrin from pharmacist @ 10:56AM: \"FYI, the MD and GI PA have decided to wean off of TPN today. I have canceled the bag for tonight.\"         Current Nutrition Intake:  Diet: Full Liquid - advanced this AM  Supplements: Glucerna BID    Nutrition Support: D 285  @ 75 ml/hr plus 250 ml of 20% lipids daily providin Calories, 100 g protein, 285 g CHO, 50 g fat and 1800 ml/day     ESTIMATED NUTRITION NEEDS  Dosing Weight: 93.4 kg, for energy, 84 kg, adjusted for fluid   Energy Needs: 2506-9730 kcals/day (20 - 25 kcals/kg)  Justification: Overweight  Protein Needs:  grams protein/day (0.8 - 1.2 grams of pro/kg)  Justification: Increased needs  Fluid Needs: 2100+ mL/day (25 + mL/kg) adjusted wt.    Justification: Maintenance    Weight Trends  Admission wt:   10/05/23   93.4 kg (206 lb)  Current wt: 92.2 kg (203 lb 4.2 oz)   Wt down 1.3% since admit    GI:  Urine 4500 mL  +BS minimal abdominal tenderness  Vomiting improved since admission    Labs:   -312 (H)    Medications:   Medium sliding scale insulin  Lantus BID    Malnutrition Diagnosis: Patient does not meet two of the above criteria necessary for diagnosing malnutrition     Nutrition Diagnosis  Altered GI function related to acute illness as evidenced by NPO, SBO and need for TPN - no change    Goals   Tolerate TPN at goal rate-met  Meet nutritional needs-met  Electrolytes WDL-met  Transition to po diet when able-met  Wean TPN-new    INTERVENTIONS  Implementation  Lipids discontinued  Continue same dex and protein provisions today    Monitoring/Evaluation  Progress toward goals will be monitored and evaluated per protocol.    Loreto " ANGIE Cohen, LD  Clinical Dietitian

## 2023-10-12 DIAGNOSIS — Z09 HOSPITAL DISCHARGE FOLLOW-UP: ICD-10-CM

## 2023-10-16 ENCOUNTER — OFFICE VISIT (OUTPATIENT)
Dept: INTERNAL MEDICINE | Facility: CLINIC | Age: 68
End: 2023-10-16
Payer: COMMERCIAL

## 2023-10-16 VITALS
RESPIRATION RATE: 16 BRPM | HEIGHT: 72 IN | HEART RATE: 90 BPM | DIASTOLIC BLOOD PRESSURE: 50 MMHG | SYSTOLIC BLOOD PRESSURE: 110 MMHG | TEMPERATURE: 98.1 F | BODY MASS INDEX: 28.3 KG/M2 | WEIGHT: 208.9 LBS | OXYGEN SATURATION: 97 %

## 2023-10-16 DIAGNOSIS — K56.609 SBO (SMALL BOWEL OBSTRUCTION) (H): ICD-10-CM

## 2023-10-16 DIAGNOSIS — I10 PRIMARY HYPERTENSION: ICD-10-CM

## 2023-10-16 DIAGNOSIS — I82.A11 ACUTE DEEP VEIN THROMBOSIS (DVT) OF AXILLARY VEIN OF RIGHT UPPER EXTREMITY (H): ICD-10-CM

## 2023-10-16 DIAGNOSIS — K31.89 MASS OF DUODENUM: Primary | ICD-10-CM

## 2023-10-16 DIAGNOSIS — E11.8 TYPE 2 DIABETES MELLITUS WITH COMPLICATION, WITH LONG-TERM CURRENT USE OF INSULIN (H): ICD-10-CM

## 2023-10-16 DIAGNOSIS — Z79.4 TYPE 2 DIABETES MELLITUS WITH COMPLICATION, WITH LONG-TERM CURRENT USE OF INSULIN (H): ICD-10-CM

## 2023-10-16 PROBLEM — E11.10 DKA (DIABETIC KETOACIDOSIS) (H): Status: RESOLVED | Noted: 2023-10-05 | Resolved: 2023-10-16

## 2023-10-16 LAB
ALBUMIN SERPL BCG-MCNC: 3.7 G/DL (ref 3.5–5.2)
ALP SERPL-CCNC: 112 U/L (ref 40–129)
ALT SERPL W P-5'-P-CCNC: 15 U/L (ref 0–70)
ANION GAP SERPL CALCULATED.3IONS-SCNC: 14 MMOL/L (ref 7–15)
AST SERPL W P-5'-P-CCNC: 27 U/L (ref 0–45)
BILIRUB SERPL-MCNC: 0.5 MG/DL
BUN SERPL-MCNC: 9.4 MG/DL (ref 8–23)
CALCIUM SERPL-MCNC: 9 MG/DL (ref 8.8–10.2)
CHLORIDE SERPL-SCNC: 100 MMOL/L (ref 98–107)
CREAT SERPL-MCNC: 1.19 MG/DL (ref 0.67–1.17)
CREAT UR-MCNC: 224 MG/DL
DEPRECATED HCO3 PLAS-SCNC: 20 MMOL/L (ref 22–29)
EGFRCR SERPLBLD CKD-EPI 2021: 67 ML/MIN/1.73M2
ERYTHROCYTE [DISTWIDTH] IN BLOOD BY AUTOMATED COUNT: 14.2 % (ref 10–15)
GLUCOSE SERPL-MCNC: 250 MG/DL (ref 70–99)
HCT VFR BLD AUTO: 36.8 % (ref 40–53)
HGB BLD-MCNC: 12.2 G/DL (ref 13.3–17.7)
MAGNESIUM SERPL-MCNC: 1.9 MG/DL (ref 1.7–2.3)
MCH RBC QN AUTO: 26.4 PG (ref 26.5–33)
MCHC RBC AUTO-ENTMCNC: 33.2 G/DL (ref 31.5–36.5)
MCV RBC AUTO: 80 FL (ref 78–100)
MICROALBUMIN UR-MCNC: 15.1 MG/L
MICROALBUMIN/CREAT UR: 6.74 MG/G CR (ref 0–17)
PLATELET # BLD AUTO: 220 10E3/UL (ref 150–450)
POTASSIUM SERPL-SCNC: 4.2 MMOL/L (ref 3.4–5.3)
PROT SERPL-MCNC: 6.9 G/DL (ref 6.4–8.3)
RBC # BLD AUTO: 4.62 10E6/UL (ref 4.4–5.9)
SODIUM SERPL-SCNC: 134 MMOL/L (ref 135–145)
WBC # BLD AUTO: 9.9 10E3/UL (ref 4–11)

## 2023-10-16 PROCEDURE — 80053 COMPREHEN METABOLIC PANEL: CPT | Performed by: INTERNAL MEDICINE

## 2023-10-16 PROCEDURE — 82570 ASSAY OF URINE CREATININE: CPT | Performed by: INTERNAL MEDICINE

## 2023-10-16 PROCEDURE — 82043 UR ALBUMIN QUANTITATIVE: CPT | Performed by: INTERNAL MEDICINE

## 2023-10-16 PROCEDURE — 85027 COMPLETE CBC AUTOMATED: CPT | Performed by: INTERNAL MEDICINE

## 2023-10-16 PROCEDURE — 83735 ASSAY OF MAGNESIUM: CPT | Performed by: INTERNAL MEDICINE

## 2023-10-16 PROCEDURE — 99215 OFFICE O/P EST HI 40 MIN: CPT | Performed by: INTERNAL MEDICINE

## 2023-10-16 PROCEDURE — 36415 COLL VENOUS BLD VENIPUNCTURE: CPT | Performed by: INTERNAL MEDICINE

## 2023-10-16 RX ORDER — RESPIRATORY SYNCYTIAL VIRUS VACCINE 120MCG/0.5
0.5 KIT INTRAMUSCULAR ONCE
Qty: 1 EACH | Refills: 0 | Status: CANCELLED | OUTPATIENT
Start: 2023-10-16 | End: 2023-10-16

## 2023-10-16 ASSESSMENT — PATIENT HEALTH QUESTIONNAIRE - PHQ9
10. IF YOU CHECKED OFF ANY PROBLEMS, HOW DIFFICULT HAVE THESE PROBLEMS MADE IT FOR YOU TO DO YOUR WORK, TAKE CARE OF THINGS AT HOME, OR GET ALONG WITH OTHER PEOPLE: SOMEWHAT DIFFICULT
SUM OF ALL RESPONSES TO PHQ QUESTIONS 1-9: 9
SUM OF ALL RESPONSES TO PHQ QUESTIONS 1-9: 9

## 2023-10-16 NOTE — PROGRESS NOTES
Assessment & Plan     Mass of duodenum  68-year-old male experiencing abdominal pain associated with nausea and emesis when I evaluated him in early September.  He does have a history of acute cholecystitis with cholecystectomy in 2022 and symptoms were somewhat reminiscent of what he was experiencing at that time.  However, he had started OTC omeprazole and was feeling significantly better after taking it for 1 week.  He had been using NSAIDs and it was thought that his symptoms could represent peptic ulcer disease.  However, other potential etiologies discussed including gastroparesis with his history of diabetes.  He did undergo a gastric emptying study and this was normal.  I had also referred him to gastroenterology to further evaluate his symptoms and he underwent EGD in late September that did not show any significant findings.  However, his symptoms began to progress and he was instructed to go to the ER on October 5 after he called complaining of severe abdominal pain associated with nausea and recurrent emesis.  Found to have small bowel obstruction as discussed below.  - CBC with platelets; Future    SBO (small bowel obstruction) (H)  Work-up in the ER included CT abdomen/pelvis showing what appeared to be a mass involving either his second duodenum or pancreas causing obstruction of the second portion of his duodenum.  There is regional enlarged lymph nodes.  NG placed providing some relief of symptoms.  Gastroenterology consulted.  Underwent EGD with endoscopic ultrasound on October 6 with biopsies taken.  Those results were pending at the time of discharge on October 11 but when reviewing today it looks like biopsies are nondiagnostic showing only inflammatory changes.  He was discharged on pantoprazole 40 mg twice daily and instructed to follow a soft diet chewing all food really well with plans to follow-up with gastroenterology in 4 weeks for a repeat EGD.  Since his discharge, he has been  tolerating this diet and has had no recurrent abdominal pain, nausea or emesis.  On exam today there is only mild tenderness when palpating across his upper right quadrant of his abdomen.  No masses are felt.  Nevertheless, concerns remain and his presentation and imaging certainly suggest possible malignancy.  He understands that there is a very good chance that his symptoms will recur even if he follows the above restrictions on his diet and if he does start to have any recurrent abdominal pain or nausea/emesis, he needs to return to the ER ASAP.  Hopefully he will remain stable and I emphasized the importance of following up with [facility] in early November as he will need another EGD and I anticipate repeating his CT abdomen/pelvis.  - Comprehensive metabolic panel (BMP + Alb, Alk Phos, ALT, AST, Total. Bili, TP); Future  - Magnesium; Future    Type 2 diabetes mellitus with complication, with long-term current use of insulin (H)  Hyperglycemia at time of admission with concerns for early DKA although never was acidotic.  He reports that his sugars are currently better controlled on his usual insulin regimen  - Albumin Random Urine Quantitative with Creat Ratio; Future    Acute deep vein thrombosis (DVT) of axillary vein of right upper extremity (H)  He had a PICC line in his right upper extremity and developed an acute DVT associated with the line.  Removed and started on anticoagulation and discharged on Eliquis 10 mg twice daily for 7 days followed by 5 mg twice daily with plans to continue for a total of 3 months.  - apixaban ANTICOAGULANT (ELIQUIS) 5 MG tablet; Take 1 tablet (5 mg) by mouth 2 times daily    Primary hypertension  Blood pressure looking adequately controlled with current medication.      53 minutes spent by me on the date of the encounter doing chart review, history and exam, documentation and further activities per the note     MED REC REQUIRED  Post Medication Reconciliation  Status:  Discharge medications reconciled, continue medications without change  BMI:   Estimated body mass index is 28.33 kg/m  as calculated from the following:    Height as of this encounter: 1.829 m (6').    Weight as of this encounter: 94.8 kg (208 lb 14.4 oz).       See Patient Instructions    Aditya Munoz MD  [facility] ARIAN Crockett is a 68 year old, presenting for the following health issues: Here to follow-up after hospitalization with small bowel obstruction found to have obstructing duodenal mass.  See assessment and plan for details.  Hospital F/U      10/16/2023     3:08 PM   Additional Questions   Roomed by [facility]         Hospital Follow-up Visit:    Hospital/Nursing Home/IP Rehab Facility: [facility]  Date of Admission: October 5, 2023  Date of Discharge: October 11, 2023  Reason(s) for Admission: Small bowel obstruction    Was your hospitalization related to COVID-19? No   Problems taking medications regularly:  None  Medication changes since discharge: None  Problems adhering to non-medication therapy:  None    Summary of hospitalization:  [facility] discharge summary reviewed  Diagnostic Tests/Treatments reviewed.  Follow up needed: EGD and follow-up with GI needed  Other Healthcare Providers Involved in Patient s Care:          Gastroenterology  Update since discharge: stable.         Plan of care communicated with patient                   Review of Systems   No melena or hematochezia.  His weight was as low as 196 pounds but currently 208 stable from early September.      Objective    /50 (BP Location: Right arm, Patient Position: Sitting, Cuff Size: Adult Regular)   Pulse 90   Temp 98.1  F (36.7  C) (Oral)   Resp 16   Ht 1.829 m (6')   Wt 94.8 kg (208 lb 14.4 oz)   SpO2 97%   BMI 28.33 kg/m    Body mass index is 28.33 kg/m .  Physical Exam   Well-appearing middle-age male  Abdomen soft  without hepatosplenomegaly masses or tenderness        Answers submitted by the patient for this visit:  Patient Health Questionnaire (Submitted on 10/16/2023)  If you checked off any problems, how difficult have these problems made it for you to do your work, take care of things at home, or get along with other people?: Somewhat difficult  PHQ9 TOTAL SCORE: 9

## 2023-10-16 NOTE — PATIENT INSTRUCTIONS
Contact [facility] at [phone] to schedule follow-up appointment in early November as you will need a repeat EGD and I anticipate that a CT scan will be repeated at that time.    Continue Eliquis for a total of 3 months.  I have sent an additional prescription to your pharmacy that you can  when your current supply is gone.

## 2023-10-17 ENCOUNTER — TELEPHONE (OUTPATIENT)
Dept: INTERNAL MEDICINE | Facility: CLINIC | Age: 68
End: 2023-10-17
Payer: COMMERCIAL

## 2023-10-17 NOTE — TELEPHONE ENCOUNTER
MTM referral from: Transitions of Care (recent hospital discharge or ED visit)    MT referral outreach attempt #2 on October 17, 2023 at 10:08 AM      Outcome: Patient not reachable after several attempts, will route to [facility] Pharmacist/Provider as an FYI.  [facility] scheduling number is [phone].  Thank you for the referral.    Use hp part d map (annemarie) for the carrier/Plan on the flowsheet      [employer] Message Sent    Kyrie Knox  [facility]

## 2023-10-19 ENCOUNTER — TELEPHONE (OUTPATIENT)
Dept: INTERNAL MEDICINE | Facility: CLINIC | Age: 68
End: 2023-10-19
Payer: COMMERCIAL

## 2023-10-19 NOTE — TELEPHONE ENCOUNTER
If he has any recurrent abdominal pain, nausea or vomiting, he needs to go to down to the ED as he has a known partial obstruction of his second duodenum with concerns for possible malignancy despite biopsies being nondiagnostic

## 2023-10-19 NOTE — TELEPHONE ENCOUNTER
"S-(situation): vomiting    B-(background): last night patient states ate a prepacked meal with vegetables, meat, etc. Pt states vomited 12x. Today pt able to keep fluids down and is \"passing them\" pt states [facility] appt is not scheduled until mid November 2023 for EGD and CT scan.     A-(assessment): Currently denies abd pain.   Currently denies nausea, vomiting.     R-(recommendations): remain with a liquid diet, advance to soft diet with smaller meals more frequently throughout day, if going well can try to advance to real food. Per Dr. Munoz's note: \"However, his symptoms began to progress and he was instructed to go to the ER \" If patient experiences vomiting again to go to ED asap. Pt states understanding.     "

## 2023-10-20 ENCOUNTER — NURSE TRIAGE (OUTPATIENT)
Dept: NURSING | Facility: CLINIC | Age: 68
End: 2023-10-20
Payer: COMMERCIAL

## 2023-10-20 NOTE — TELEPHONE ENCOUNTER
Pt returning call, message from Dr. Munoz 10/19/23 read to pt.  Pt verbalized understanding.  Maria Alejandra Lara RN  [facility] Nurse Advisor

## 2023-10-27 ENCOUNTER — APPOINTMENT (OUTPATIENT)
Dept: CT IMAGING | Facility: HOSPITAL | Age: 68
End: 2023-10-27
Attending: EMERGENCY MEDICINE
Payer: COMMERCIAL

## 2023-10-27 ENCOUNTER — NURSE TRIAGE (OUTPATIENT)
Dept: INTERNAL MEDICINE | Facility: CLINIC | Age: 68
End: 2023-10-27

## 2023-10-27 ENCOUNTER — HOSPITAL ENCOUNTER (OUTPATIENT)
Facility: HOSPITAL | Age: 68
Setting detail: OBSERVATION
Discharge: HOME | End: 2023-10-30
Attending: EMERGENCY MEDICINE | Admitting: FAMILY MEDICINE
Payer: COMMERCIAL

## 2023-10-27 DIAGNOSIS — R10.10 PAIN OF UPPER ABDOMEN: ICD-10-CM

## 2023-10-27 DIAGNOSIS — D72.829 LEUKOCYTOSIS, UNSPECIFIED TYPE: ICD-10-CM

## 2023-10-27 DIAGNOSIS — E11.8 TYPE 2 DIABETES MELLITUS WITH COMPLICATION, WITH LONG-TERM CURRENT USE OF INSULIN (H): ICD-10-CM

## 2023-10-27 DIAGNOSIS — Z79.4 TYPE 2 DIABETES MELLITUS WITH COMPLICATION, WITH LONG-TERM CURRENT USE OF INSULIN (H): ICD-10-CM

## 2023-10-27 DIAGNOSIS — K86.3 PSEUDOCYST OF PANCREAS: Primary | ICD-10-CM

## 2023-10-27 LAB
ALBUMIN SERPL BCG-MCNC: 3.5 G/DL (ref 3.5–5.2)
ALBUMIN UR-MCNC: 20 MG/DL
ALP SERPL-CCNC: 116 U/L (ref 40–129)
ALT SERPL W P-5'-P-CCNC: 11 U/L (ref 0–70)
ANION GAP SERPL CALCULATED.3IONS-SCNC: 12 MMOL/L (ref 7–15)
APPEARANCE UR: CLEAR
AST SERPL W P-5'-P-CCNC: 25 U/L (ref 0–45)
BASOPHILS # BLD AUTO: 0.1 10E3/UL (ref 0–0.2)
BASOPHILS NFR BLD AUTO: 0 %
BILIRUB DIRECT SERPL-MCNC: <0.2 MG/DL (ref 0–0.3)
BILIRUB SERPL-MCNC: 0.8 MG/DL
BILIRUB UR QL STRIP: NEGATIVE
BUN SERPL-MCNC: 10.5 MG/DL (ref 8–23)
CALCIUM SERPL-MCNC: 9.4 MG/DL (ref 8.8–10.2)
CHLORIDE SERPL-SCNC: 89 MMOL/L (ref 98–107)
COLOR UR AUTO: ABNORMAL
CREAT SERPL-MCNC: 0.89 MG/DL (ref 0.67–1.17)
DEPRECATED HCO3 PLAS-SCNC: 26 MMOL/L (ref 22–29)
EGFRCR SERPLBLD CKD-EPI 2021: >90 ML/MIN/1.73M2
EOSINOPHIL # BLD AUTO: 0 10E3/UL (ref 0–0.7)
EOSINOPHIL NFR BLD AUTO: 0 %
ERYTHROCYTE [DISTWIDTH] IN BLOOD BY AUTOMATED COUNT: 14.2 % (ref 10–15)
GLUCOSE SERPL-MCNC: 356 MG/DL (ref 70–99)
GLUCOSE UR STRIP-MCNC: 300 MG/DL
HCT VFR BLD AUTO: 40.8 % (ref 40–53)
HGB BLD-MCNC: 13.6 G/DL (ref 13.3–17.7)
HGB UR QL STRIP: ABNORMAL
IMM GRANULOCYTES # BLD: 0.1 10E3/UL
IMM GRANULOCYTES NFR BLD: 1 %
KETONES UR STRIP-MCNC: ABNORMAL MG/DL
LEUKOCYTE ESTERASE UR QL STRIP: NEGATIVE
LIPASE SERPL-CCNC: 17 U/L (ref 13–60)
LYMPHOCYTES # BLD AUTO: 1.5 10E3/UL (ref 0.8–5.3)
LYMPHOCYTES NFR BLD AUTO: 8 %
MCH RBC QN AUTO: 26.2 PG (ref 26.5–33)
MCHC RBC AUTO-ENTMCNC: 33.3 G/DL (ref 31.5–36.5)
MCV RBC AUTO: 79 FL (ref 78–100)
MONOCYTES # BLD AUTO: 1.3 10E3/UL (ref 0–1.3)
MONOCYTES NFR BLD AUTO: 7 %
NEUTROPHILS # BLD AUTO: 16.4 10E3/UL (ref 1.6–8.3)
NEUTROPHILS NFR BLD AUTO: 84 %
NITRATE UR QL: NEGATIVE
NRBC # BLD AUTO: 0 10E3/UL
NRBC BLD AUTO-RTO: 0 /100
PH UR STRIP: 6.5 [PH] (ref 5–7)
PLATELET # BLD AUTO: 256 10E3/UL (ref 150–450)
POTASSIUM SERPL-SCNC: 4.8 MMOL/L (ref 3.4–5.3)
PROCALCITONIN SERPL IA-MCNC: 0.21 NG/ML
PROT SERPL-MCNC: 7.5 G/DL (ref 6.4–8.3)
RBC # BLD AUTO: 5.2 10E6/UL (ref 4.4–5.9)
RBC URINE: 4 /HPF
SODIUM SERPL-SCNC: 127 MMOL/L (ref 135–145)
SP GR UR STRIP: <1.005 (ref 1–1.03)
SPECIMEN STATUS: NORMAL
SQUAMOUS EPITHELIAL: <1 /HPF
TROPONIN T SERPL HS-MCNC: 24 NG/L
UROBILINOGEN UR STRIP-MCNC: 2 MG/DL
WBC # BLD AUTO: 19.4 10E3/UL (ref 4–11)
WBC URINE: 2 /HPF

## 2023-10-27 PROCEDURE — 82248 BILIRUBIN DIRECT: CPT | Performed by: EMERGENCY MEDICINE

## 2023-10-27 PROCEDURE — 84145 PROCALCITONIN (PCT): CPT | Performed by: EMERGENCY MEDICINE

## 2023-10-27 PROCEDURE — 87635 SARS-COV-2 COVID-19 AMP PRB: CPT | Performed by: EMERGENCY MEDICINE

## 2023-10-27 PROCEDURE — 99285 EMERGENCY DEPT VISIT HI MDM: CPT | Mod: 25

## 2023-10-27 PROCEDURE — 83690 ASSAY OF LIPASE: CPT | Performed by: EMERGENCY MEDICINE

## 2023-10-27 PROCEDURE — 81001 URINALYSIS AUTO W/SCOPE: CPT | Performed by: EMERGENCY MEDICINE

## 2023-10-27 PROCEDURE — 70450 CT HEAD/BRAIN W/O DYE: CPT

## 2023-10-27 PROCEDURE — 96375 TX/PRO/DX INJ NEW DRUG ADDON: CPT

## 2023-10-27 PROCEDURE — 80053 COMPREHEN METABOLIC PANEL: CPT | Performed by: EMERGENCY MEDICINE

## 2023-10-27 PROCEDURE — 85025 COMPLETE CBC W/AUTO DIFF WBC: CPT | Performed by: EMERGENCY MEDICINE

## 2023-10-27 PROCEDURE — 99223 1ST HOSP IP/OBS HIGH 75: CPT | Performed by: FAMILY MEDICINE

## 2023-10-27 PROCEDURE — 250N000011 HC RX IP 250 OP 636: Mod: JZ | Performed by: EMERGENCY MEDICINE

## 2023-10-27 PROCEDURE — 93005 ELECTROCARDIOGRAM TRACING: CPT | Performed by: EMERGENCY MEDICINE

## 2023-10-27 PROCEDURE — 250N000011 HC RX IP 250 OP 636: Performed by: EMERGENCY MEDICINE

## 2023-10-27 PROCEDURE — 36415 COLL VENOUS BLD VENIPUNCTURE: CPT | Performed by: EMERGENCY MEDICINE

## 2023-10-27 PROCEDURE — 84484 ASSAY OF TROPONIN QUANT: CPT | Performed by: EMERGENCY MEDICINE

## 2023-10-27 PROCEDURE — 74177 CT ABD & PELVIS W/CONTRAST: CPT

## 2023-10-27 RX ORDER — ONDANSETRON 2 MG/ML
4 INJECTION INTRAMUSCULAR; INTRAVENOUS EVERY 6 HOURS PRN
Status: DISCONTINUED | OUTPATIENT
Start: 2023-10-27 | End: 2023-10-30 | Stop reason: HOSPADM

## 2023-10-27 RX ORDER — ONDANSETRON 4 MG/1
4 TABLET, ORALLY DISINTEGRATING ORAL EVERY 6 HOURS PRN
Status: DISCONTINUED | OUTPATIENT
Start: 2023-10-27 | End: 2023-10-30 | Stop reason: HOSPADM

## 2023-10-27 RX ORDER — MORPHINE SULFATE 4 MG/ML
4 INJECTION, SOLUTION INTRAMUSCULAR; INTRAVENOUS ONCE
Status: COMPLETED | OUTPATIENT
Start: 2023-10-27 | End: 2023-10-27

## 2023-10-27 RX ORDER — IOPAMIDOL 755 MG/ML
90 INJECTION, SOLUTION INTRAVASCULAR ONCE
Status: COMPLETED | OUTPATIENT
Start: 2023-10-27 | End: 2023-10-27

## 2023-10-27 RX ORDER — ONDANSETRON 2 MG/ML
4 INJECTION INTRAMUSCULAR; INTRAVENOUS ONCE
Status: COMPLETED | OUTPATIENT
Start: 2023-10-27 | End: 2023-10-27

## 2023-10-27 RX ADMIN — MORPHINE SULFATE 4 MG: 4 INJECTION, SOLUTION INTRAMUSCULAR; INTRAVENOUS at 20:26

## 2023-10-27 RX ADMIN — ONDANSETRON 4 MG: 2 INJECTION INTRAMUSCULAR; INTRAVENOUS at 20:23

## 2023-10-27 RX ADMIN — IOPAMIDOL 90 ML: 755 INJECTION, SOLUTION INTRAVENOUS at 21:08

## 2023-10-27 ASSESSMENT — ACTIVITIES OF DAILY LIVING (ADL)
ADLS_ACUITY_SCORE: 33
ADLS_ACUITY_SCORE: 35
ADLS_ACUITY_SCORE: 35

## 2023-10-27 NOTE — TELEPHONE ENCOUNTER
Symptoms    Describe your symptoms: Pt is not eating, not sleeping, has no energy. Wife states Pt looks terrible.    Any pain: Unknown    How long have you been having symptoms: 3 days    Have you been seen for this:  No    Appointment offered?: Yes    Triage offered?: Yes: Nurse Triage line did not answer. Transferred the call with high priority.    Home remedies tried: unknown    Preferred Pharmacy:     [id number] - [place] [address] [address] [address] [place] [address] [address]  [facility] [address]  Phone: [phone] Fax: [phone]    Could we send this information to you in G5 or would you prefer to receive a phone call?:   Patient would prefer a phone call     Okay to leave a detailed message?: Yes at Home number on file Dwaine, wife [phone] (home)    Anna Barker

## 2023-10-27 NOTE — TELEPHONE ENCOUNTER
"S-(situation): Call back to Arden. She reports that he's gaunt, he's not really eating, sleeping 3-4 days, not getting out of bed, he says he's got no energy. She reports that Bon does not know that she's calling.    B-(background): recently hospitalized at [facility] with SBO, DKA, numerous other issues    A-(assessment): This situation cannot be managed at home. Wife is stressed and scared. Hospitalization likely needed.    R-(recommendations): Advised 911. Arden reports that she doesn't think that patient would \"go for that,\" and writer advised that she call for paramedics without informing him beforehand. If he is upset, okay to tell him that you (Arden) need help.     Arden agrees, \"I'll make that call right away.\"    Huddled with provider to inform. He agrees with disposition.    Reason for Disposition   Sounds like a life-threatening emergency to the triager    Additional Information   Negative: Shock suspected (e.g., cold/pale/clammy skin, too weak to stand, low BP, rapid pulse)    Protocols used: Weight Loss - [id number]    "

## 2023-10-27 NOTE — ED TRIAGE NOTES
"      Patient reports recent \"bowel blockage\" and was treated for this here has return of sx x4 days without the vomiting he had before. Patient reports that he has abdominal pain, fatigue and a headache for the last 3 days the pain in his abdomen if mostly upper abdomen.   "

## 2023-10-27 NOTE — ED PROVIDER NOTES
EMERGENCY DEPARTMENT NOTE     Name: Bon Luque    Age/Sex: 68 year old male   MRN: [id number]   Evaluation Date & Time:  No admission date for patient encounter.    PCP:    Aditya Munoz   ED Provider: Darron Zimmer D.O.       CHIEF COMPLAINT    Abdominal Pain       DIAGNOSIS & DISPOSITION/MEDICAL DECISION MAKING     1. Pain of upper abdomen    2. Leukocytosis, unspecified type        Bon Luque is a 68 year old male with relevant past history of SBO, DVT on Anticoagulants (Eliquis), DM2, HTN, HLD, who presents to the emergency department for evaluation of abdominal pain.    Differential  diagnosis considered included but not limited to obstruction or perforation, cholecystitis, choledocholithiasis, obstructing urolithiasis, urinary tract infection, referred cardiopulmonary process including pneumonia.  For headache also considered intracranial process including subdural or ICH    Medical Decision Making  Patient on initial exam had tenderness to midepigastric and right upper quadrant.  CT of the head was without acute process.  CT of the abdomen and pelvis without acute process.  No evidence of bowel obstruction or perforation.  Duodenal inflammation has decreased.  Pseudocyst is slightly larger with some inflammatory changes and there was question of the dilated common bile duct 15 mm.  Ultrasound showed normal-appearing bile ducts.  Lipase and LFTs were elevated.  WBC was 19,000  EKG was nonischemic, initial troponin 26 with no significant delta change.  Basic metabolic profile with normal renal function, sodium was 127.  Patient had improvement in pain but remained with moderate pain.  I discussed options with patient and his wife.  After discussion they were most comfortable with admission with observation.  I did discuss case with Dr. Mace gastroenterology regarding leukocytosis and pseudocyst with inflammatory change but with small cyst not markedly enlarged he did not feel this was a source of  leukocytosis and did not recommend antibiotics.  Case was discussed with the hospitalist service.      Interventions: IV morphine  Discharge Vital Signs:BP (!) 143/79   Pulse 91   Temp 97.7  F (36.5  C) (Temporal)   Resp 19   Wt 89.8 kg (198 lb)   SpO2 94%   BMI 26.85 kg/m       DISPOSITION: To [facility]    Diagnostic studies:  Imaging:  CT Abdomen Pelvis w Contrast   Final Result   IMPRESSION:    1. Inflammatory changes involving the region of the pancreatic head and proximal duodenum, overall slightly less prominent than 10/05/2023. Findings could be seen with acute pancreatitis, duodenitis or a combination. An underlying mass or neoplastic    process is not excluded although less likely in light of the interval biopsy findings.   2. A more conspicuous complex cystic structure in the region of the pancreatic head may represent a pancreatic pseudocyst.   3. There may be some associated mild biliary obstruction with mild prominence and mucosal hyperenhancement of the extra hepatic bile duct.   4. No current evidence of bowel obstruction. Prior findings of gastric/proximal duodenal obstruction have resolved.   5. Mild upper abdominal adenopathy, increased, indeterminate although may be inflammatory.      Head CT w/o contrast   Final Result   IMPRESSION:   1.  No acute intracranial process.         Lab:  Labs Ordered and Resulted from Time of ED Arrival to Time of ED Departure   BASIC METABOLIC PANEL - Abnormal       Result Value    Sodium 127 (*)     Potassium 4.8      Chloride 89 (*)     Carbon Dioxide (CO2) 26      Anion Gap 12      Urea Nitrogen 10.5      Creatinine 0.89      GFR Estimate >90      Calcium 9.4      Glucose 356 (*)    TROPONIN T, HIGH SENSITIVITY - Abnormal    Troponin T, High Sensitivity 24 (*)    CBC WITH PLATELETS AND DIFFERENTIAL - Abnormal    WBC Count 19.4 (*)     RBC Count 5.20      Hemoglobin 13.6      Hematocrit 40.8      MCV 79      MCH 26.2 (*)     MCHC 33.3      RDW  "14.2      Platelet Count 256      % Neutrophils 84      % Lymphocytes 8      % Monocytes 7      % Eosinophils 0      % Basophils 0      % Immature Granulocytes 1      NRBCs per 100 WBC 0      Absolute Neutrophils 16.4 (*)     Absolute Lymphocytes 1.5      Absolute Monocytes 1.3      Absolute Eosinophils 0.0      Absolute Basophils 0.1      Absolute Immature Granulocytes 0.1      Absolute NRBCs 0.0     ROUTINE UA WITH MICROSCOPIC REFLEX TO CULTURE - Abnormal    Color Urine Orange (*)     Appearance Urine Clear      Glucose Urine 300 (*)     Bilirubin Urine Negative      Ketones Urine Trace (*)     Specific Gravity Urine <1.005      Blood Urine 0.2 mg/dL (*)     pH Urine 6.5      Protein Albumin Urine 20 (*)     Urobilinogen Urine 2.0 (*)     Nitrite Urine Negative      Leukocyte Esterase Urine Negative      RBC Urine 4 (*)     WBC Urine 2      Squamous Epithelials Urine <1     PROCALCITONIN - Abnormal    Procalcitonin 0.21 (*)    HEPATIC FUNCTION PANEL - Normal    Protein Total 7.5      Albumin 3.5      Bilirubin Total 0.8      Alkaline Phosphatase 116      AST 25      ALT 11      Bilirubin Direct <0.20     LIPASE - Normal    Lipase 17                 Triage note reviewed:      Patient reports recent \"bowel blockage\" and was treated for this here has return of sx x4 days without the vomiting he had before. Patient reports that he has abdominal pain, fatigue and a headache for the last 3 days the pain in his abdomen if mostly upper abdomen.       History:  Supplemental history from: Family Member/Significant Other  External Record(s) reviewed: Inpatient Record: Admission 10/5/2023-10/11/2023. 10/16/2023 office visit follow up    Work Up:  Chart documentation includes differential considered and any EKGs or imaging independently interpreted by provider, where specified.  In additional to work up documented, I considered the following work up: Documented in chart, if applicable.    External consultation:  Discussion of " management with another provider: Hospitalist, gastroenterology    Complicating factors:  Care impacted by chronic illness: Anticoagulated State, Diabetes, Hyperlipidemia, Hypertension, and Peripheral Vascular Disease  Care affected by social determinants of health: Access to Medical Care    Disposition considerations: Admit.    At the conclusion of the encounter I discussed the results of all of the tests and the disposition. The questions were answered. The patient or family acknowledged understanding and was agreeable with the care plan.    TOTAL CRITICAL CARE TIME (EXCLUDING PROCEDURES): Not applicable    PROCEDURES:   None    EMERGENCY DEPARTMENT COURSE   7:29 PM I met with the patient to gather history and to perform my initial exam.  We discussed treatment options and the plan for care while in the Emergency Department.  10:34 PM Rechecked and updated the patient   11:02 PM Spoke with Dr. Mace [facility], Discussed CT, says patient doesn't need antibiotics.   11:35 PM I spoke with Dr Sawyer, Hospitalist       ED INTERVENTIONS     Medications   morphine (PF) injection 4 mg (4 mg Intravenous $Given 10/27/23 2026)   ondansetron (ZOFRAN) injection 4 mg (4 mg Intravenous $Given 10/27/23 2023)   iopamidol (ISOVUE-370) solution 90 mL (90 mLs Intravenous $Given 10/27/23 2108)       DISCHARGE MEDICATIONS        Review of your medicines        UNREVIEWED medicines. Ask your doctor about these medicines        Dose / Directions   apixaban ANTICOAGULANT 5 MG tablet  Commonly known as: ELIQUIS  Indication: DVT-PE Treatment  Used for: Acute deep vein thrombosis (DVT) of axillary vein of right upper extremity (H)      Dose: 5 mg  Take 1 tablet (5 mg) by mouth 2 times daily  Quantity: 60 tablet  Refills: 1     clobetasol 0.05 % external cream  Commonly known as: TEMOVATE      Apply topically 2 times daily as needed For psoriasis on hands.  Refills: 0     DULoxetine 60 MG capsule  Commonly known as: CYMBALTA  Used for: Current  moderate episode of major depressive disorder, unspecified whether recurrent (H)      Dose: 60 mg  Take 1 capsule (60 mg) by mouth daily for 30 days  Quantity: 30 capsule  Refills: 0     gabapentin 300 MG capsule  Commonly known as: NEURONTIN      Dose: 600 mg  [GABAPENTIN (NEURONTIN) 300 MG CAPSULE] Take 600 mg by mouth 3 (three) times a day.  Refills: 0     HumaLOG KWIKpen 100 UNIT/ML (1 unit dial) KWIKPEN  Generic drug: insulin lispro      Inject Subcutaneous 3 times daily (before meals) Sliding scale. Average use is 20 units with each meal  Refills: 0     Insulin Glargine-yfgn 100 UNIT/ML Sopn      Dose: 50 Units  Inject 50 Units Subcutaneous 2 times daily  Refills: 0     metFORMIN 750 MG 24 hr tablet  Commonly known as: GLUCOPHAGE-XR  Used for: Type 2 diabetes mellitus with complication, with long-term current use of insulin (H)      Dose: 750 mg  Take 1 tablet (750 mg) by mouth daily (with dinner)  Quantity: 90 tablet  Refills: 3     morphine 30 MG CR tablet  Commonly known as: MS CONTIN      Dose: 30 mg  Take 30 mg by mouth every 8 hours  Refills: 0     naloxone 4 MG/0.1ML nasal spray  Commonly known as: NARCAN      Dose: 1 spray  Spray 1 spray into one nostril alternating nostrils once as needed for opioid reversal  Refills: 0     pantoprazole 40 MG EC tablet  Commonly known as: PROTONIX  Used for: Abdominal pain, epigastric      Dose: 40 mg  Take 1 tablet (40 mg) by mouth 2 times daily for 30 days  Quantity: 60 tablet  Refills: 0     polyethylene glycol 17 GM/Dose powder  Commonly known as: MIRALAX  Used for: SBO (small bowel obstruction) (H), Chronic pain syndrome      Dose: 17 g  Take 17 g by mouth daily  Quantity: 510 g  Refills: 1     testosterone 20.25 MG/1.25GM (1.62%) topical gel  Commonly known as: ANDROGEL      Dose: 20.25 mg  Place 20.25 mg onto the skin daily  Refills: 0     valACYclovir 500 MG tablet  Commonly known as: VALTREX      Dose: 500 mg  Take 500 mg by mouth 2 times daily as needed Take  "twice daily at onset of symptoms for 3 days. Repeat for flares.  Refills: 0            CONTINUE these medicines which have NOT CHANGED        Dose / Directions   Alcohol Swabs Pads  Used for: Type 2 diabetes mellitus with insulin therapy (H)      Use to swab the area of the injection or sherry as directed Per insurance coverage  Quantity: 100 each  Refills: 0     B-D U/F 31G X 8 MM insulin pen needle  Generic drug: insulin pen needle      FOR ADMINISTERING INSULIN AT HOME  Refills: 0     blood glucose test strip  Commonly known as: NO BRAND SPECIFIED  Used for: Type 2 diabetes mellitus with insulin therapy (H)      To use to test glucose level in the blood Use to test blood sugar  4 times daily as directed. To accompany glucose monitor brands per insurance coverage.  Quantity: 100 strip  Refills: 0     FreeStyle Rita 14 Day Sensor Misc      Refills: 0     Sharps Container Misc  Used for: Type 2 diabetes mellitus with insulin therapy (H)      Use as directed to dispose of needles, lancets and other sharps Per Insurance coverage  Quantity: 1 each  Refills: 0                INFORMATION SOURCE AND LIMITATIONS    History/Exam limitations: None  Patient information was obtained from: patient and wife  Use of : N/A    HISTORY OF PRESENT ILLNESS   Bon Luque is a 68 year old year old male with a relevant past history of SBO, DVT on Anticoagulants (Eliquis), DM2, HTN, HLD, who presents to this ED via walk-in with family for evaluation of abdominal pain.    Patient reports 4 days of persistent upper abdominal pain. He feels like \"something is plugged up.\" He feels like this is similar to his previous SBO. He also associates fatigue, nausea, and headache for the past 3 days. Wife notes that patient has been sleeping constantly for the past 3 days and has not been eating as well. Due to symptoms, she was advised to bring him into the ED to be evaluated.    He otherwise denies associating vomiting and shortness of " breath. He is anticoagulated on Eliquis. There were no other concerns/complaints at this time.    Per chart review, patient was admitted at [facility] from 10/5-10/11/2023 (6 days) for evaluation of abdominal pain. Patient initially presented with nausea, vomiting, and epigastric abdominal pain. CT abdomen showed obstruction or partial obstruction at the second portion of the duodenum. NG tube was placed and GI was consulted and recommended EGD. During admission, patient had an EGD on 10/6 wtih biopsies taken. NG tube fell out but patient was tolerating it well. He was weaned off of TPN and later tolerated solids. He was ultimately discharged with follow up with [facility] and PCP.    REVIEW OF SYSTEMS:   All other systems reviewed and are negative except as noted above in HPI.    PATIENT HISTORY     Past Medical History:   Diagnosis Date    Abdominal pain, epigastric 06/23/2022    Acute cholecystitis 07/12/2022    Arthritis     ASCVD (arteriosclerotic cardiovascular disease) 04/28/2016    Chronic fatigue 06/23/2022    Chronic low back pain 04/28/2016    Chronic right shoulder pain 06/23/2022    Coronary artery disease     Depression 12/26/2017    Diabetic peripheral neuropathy associated with type 2 diabetes mellitus (H) 04/28/2016    Dilated bile duct 07/03/2017    HTN (hypertension)     Hypercholesterolemia     Hypogonadism in male     Ischemic cardiomyopathy     Left upper quadrant pain 06/30/2017    Mass of duodenum 10/16/2023    MI (myocardial infarction) (H)     Obstructive sleep apnea on CPAP     Optic neuritis     Psoriasis     Recurrent vomiting 09/01/2023    Renal cell carcinoma, right (H) 06/23/2022    Right renal mass     Sebaceous cyst 2010    Thoracic radiculopathy 2004    Type 2 diabetes mellitus with insulin therapy (H)      Patient Active Problem List   Diagnosis    Psoriatic arthritis (H)    Chronic low back pain    Diabetic peripheral neuropathy associated with type 2  diabetes mellitus (H)    Psoriasis    Hypercholesterolemia    HTN (hypertension)    Optic neuritis    Obstructive sleep apnea on CPAP    Thoracic radiculopathy    Hypogonadism in male    Erectile dysfunction    ASCVD (arteriosclerotic cardiovascular disease)    Alcohol abuse, in remission    Tobacco abuse, in remission    Ankylosis, sacroiliac joint    Spondylosis of lumbar region without myelopathy or radiculopathy    Ischemic cardiomyopathy    Depression    Type 2 diabetes mellitus with complication, with long-term current use of insulin (H)    Chronic pain syndrome    Lower abdominal pain    Drug-induced constipation    Knee effusion, left    Renal cell carcinoma, right (H)    Chronic fatigue    Chronic right shoulder pain    Jaundice    Transaminitis    Dilated cbd, acquired    Hyponatremia    Recurrent vomiting    Abdominal pain, epigastric    SBO (small bowel obstruction) (H)    Nausea and vomiting    Hypophosphatemia    Deep vein thrombosis (DVT) of upper extremity (H)    Severe protein-calorie malnutrition (H24)    Mass of duodenum     Past Surgical History:   Procedure Laterality Date    APPENDECTOMY      COLONOSCOPY      Colonoscopy September 2017 with hyperplastic polyps, repeat in 10 years    CORONARY ANGIOPLASTY      stent    ENDOSCOPIC RETROGRADE CHOLANGIOPANCREATOGRAM N/A 7/12/2022    Procedure: ENDOSCOPIC RETROGRADE CHOLANGIOPANCREATOGRAPHY, STONE EXTRACTION, BILIARY SPHINCTEROTOMY;  Surgeon: Bon Meyer MD;  Location: [facility] OR    ESOPHAGOSCOPY, GASTROSCOPY, DUODENOSCOPY (EGD), COMBINED N/A 10/6/2023    Procedure: ESOPHAGOGASTRODUODENOSCOPY, WITH ENDOSCOPIC ULTRASOUND WITH FINE NEEDLE ASPIRATION AND DUODENAL BIOPSY;  Surgeon: Bon Meyer MD;  Location: [facility] OR    KNEE SURGERY      LAPAROSCOPIC CHOLECYSTECTOMY N/A 7/12/2022    Procedure: CHOLECYSTECTOMY, LAPAROSCOPIC;  Surgeon: Adam March MD;  Location: [facility] OR    NOSE SURGERY      PICC DOUBLE LUMEN  PLACEMENT  10/9/2023    IL LAP,PARTIAL NEPHRECTOMY Right 1/10/2018    Procedure: RIGHT ROBOTIC PARTIAL NEPHRECTOMY WITH INTRAOPERATIVE ULTRASOUND;  Surgeon: Chase Rodríguez MD;  Location: [facility];  Service: Urology       Allergies   Allergen Reactions    Atorvastatin Muscle Pain (Myalgia)    Valomag [Arthritis Pain Formula] Nausea and Vomiting     gastric ulcer from aspirin use    Ampicillin Rash     Had a reaction to this medication many years ago.    Codeine Rash     It has been about 30 years ago       OUTPATIENT MEDICATIONS     New Prescriptions    No medications on file      Vitals:    10/27/23 2133 10/27/23 2142 10/27/23 2202 10/27/23 2226   BP: 138/66 135/69 (!) 158/85 (!) 143/79   Pulse:  97 94 91   Resp:       Temp:       TempSrc:       SpO2:  96% 97% 94%   Weight:           Physical Exam   Constitutional: Oriented to person, place, and time. Appears well-developed and well-nourished.   HEENT:    Head: Atraumatic.   Neck: Normal range of motion. Neck supple.   Cardiovascular: Normal rate, regular rhythm and normal heart sounds.    Pulmonary/Chest: Normal effort  and breath sounds normal.   Abdominal: Soft. Bowel sounds are normal.   Musculoskeletal: Normal range of motion.   Neurological: Alert and oriented to person, place, and time. Normal strength. No sensory deficit. No cranial nerve deficit.  Skin: Skin is warm and dry.   Psychiatric: Normal mood and affect. Behavior is normal. Thought content normal.     DIAGNOSTICS    LABORATORY FINDINGS (REVIEWED AND INTERPRETED):  Labs Ordered and Resulted from Time of ED Arrival to Time of ED Departure   BASIC METABOLIC PANEL - Abnormal       Result Value    Sodium 127 (*)     Potassium 4.8      Chloride 89 (*)     Carbon Dioxide (CO2) 26      Anion Gap 12      Urea Nitrogen 10.5      Creatinine 0.89      GFR Estimate >90      Calcium 9.4      Glucose 356 (*)    TROPONIN T, HIGH SENSITIVITY - Abnormal    Troponin T, High Sensitivity 24 (*)    CBC  WITH PLATELETS AND DIFFERENTIAL - Abnormal    WBC Count 19.4 (*)     RBC Count 5.20      Hemoglobin 13.6      Hematocrit 40.8      MCV 79      MCH 26.2 (*)     MCHC 33.3      RDW 14.2      Platelet Count 256      % Neutrophils 84      % Lymphocytes 8      % Monocytes 7      % Eosinophils 0      % Basophils 0      % Immature Granulocytes 1      NRBCs per 100 WBC 0      Absolute Neutrophils 16.4 (*)     Absolute Lymphocytes 1.5      Absolute Monocytes 1.3      Absolute Eosinophils 0.0      Absolute Basophils 0.1      Absolute Immature Granulocytes 0.1      Absolute NRBCs 0.0     ROUTINE UA WITH MICROSCOPIC REFLEX TO CULTURE - Abnormal    Color Urine Orange (*)     Appearance Urine Clear      Glucose Urine 300 (*)     Bilirubin Urine Negative      Ketones Urine Trace (*)     Specific Gravity Urine <1.005      Blood Urine 0.2 mg/dL (*)     pH Urine 6.5      Protein Albumin Urine 20 (*)     Urobilinogen Urine 2.0 (*)     Nitrite Urine Negative      Leukocyte Esterase Urine Negative      RBC Urine 4 (*)     WBC Urine 2      Squamous Epithelials Urine <1     PROCALCITONIN - Abnormal    Procalcitonin 0.21 (*)    HEPATIC FUNCTION PANEL - Normal    Protein Total 7.5      Albumin 3.5      Bilirubin Total 0.8      Alkaline Phosphatase 116      AST 25      ALT 11      Bilirubin Direct <0.20     LIPASE - Normal    Lipase 17           IMAGING (REVIEWED AND INTERPRETED):  CT Abdomen Pelvis w Contrast   Final Result   IMPRESSION:    1. Inflammatory changes involving the region of the pancreatic head and proximal duodenum, overall slightly less prominent than 10/05/2023. Findings could be seen with acute pancreatitis, duodenitis or a combination. An underlying mass or neoplastic    process is not excluded although less likely in light of the interval biopsy findings.   2. A more conspicuous complex cystic structure in the region of the pancreatic head may represent a pancreatic pseudocyst.   3. There may be some associated mild  biliary obstruction with mild prominence and mucosal hyperenhancement of the extra hepatic bile duct.   4. No current evidence of bowel obstruction. Prior findings of gastric/proximal duodenal obstruction have resolved.   5. Mild upper abdominal adenopathy, increased, indeterminate although may be inflammatory.      Head CT w/o contrast   Final Result   IMPRESSION:   1.  No acute intracranial process.            ECG (REVIEWED AND INTERPRETED):   ECG:   Performed at: 27-OCT-2023 18:27  HR:  93bpm  Rhythm: Normal sinus  Axis: 48  QRS duration: 90  QTC: 400  ST changes: No ST segment elevation or depression, no T wave inversion,No Q wave  Interpretation: Normal sinus rhythm  No prior for comparison    I have reviewed the patient's ECG, with comments made as listed above. Please see scanned image for full interpretation.         I, Angeles Babb, am serving as a scribe to document services personally performed by Darron Zimmer D.O., based on my observation and the provider s statements to me.    I, Darron Zimmer D.O., attest that Angeles Babb is acting in a scribe capacity, has observed my performance of the services and has documented them in accordance with my direction.    Darron Zimmer D.O.  EMERGENCY MEDICINE   10/27/23  [facility] EMERGENCY DEPARTMENT  [address] [id number] [phone]  Dept: [phone]     Darron Zimmer DO  10/28/23 0318

## 2023-10-28 ENCOUNTER — APPOINTMENT (OUTPATIENT)
Dept: ULTRASOUND IMAGING | Facility: HOSPITAL | Age: 68
End: 2023-10-28
Attending: FAMILY MEDICINE
Payer: COMMERCIAL

## 2023-10-28 PROBLEM — K86.3 PSEUDOCYST OF PANCREAS: Status: ACTIVE | Noted: 2023-10-28

## 2023-10-28 LAB
ALBUMIN SERPL BCG-MCNC: 3.7 G/DL (ref 3.5–5.2)
ALP SERPL-CCNC: 122 U/L (ref 40–129)
ALT SERPL W P-5'-P-CCNC: 10 U/L (ref 0–70)
ANION GAP SERPL CALCULATED.3IONS-SCNC: 14 MMOL/L (ref 7–15)
AST SERPL W P-5'-P-CCNC: 12 U/L (ref 0–45)
ATRIAL RATE - MUSE: 93 BPM
BASOPHILS # BLD AUTO: 0 10E3/UL (ref 0–0.2)
BASOPHILS NFR BLD AUTO: 0 %
BILIRUB SERPL-MCNC: 0.6 MG/DL
BUN SERPL-MCNC: 10.8 MG/DL (ref 8–23)
CALCIUM SERPL-MCNC: 9.5 MG/DL (ref 8.8–10.2)
CHLORIDE SERPL-SCNC: 92 MMOL/L (ref 98–107)
CREAT SERPL-MCNC: 0.93 MG/DL (ref 0.67–1.17)
DEPRECATED HCO3 PLAS-SCNC: 25 MMOL/L (ref 22–29)
DIASTOLIC BLOOD PRESSURE - MUSE: NORMAL MMHG
EGFRCR SERPLBLD CKD-EPI 2021: >90 ML/MIN/1.73M2
EOSINOPHIL # BLD AUTO: 0 10E3/UL (ref 0–0.7)
EOSINOPHIL NFR BLD AUTO: 0 %
ERYTHROCYTE [DISTWIDTH] IN BLOOD BY AUTOMATED COUNT: 14.1 % (ref 10–15)
GLUCOSE BLDC GLUCOMTR-MCNC: 136 MG/DL (ref 70–99)
GLUCOSE BLDC GLUCOMTR-MCNC: 198 MG/DL (ref 70–99)
GLUCOSE BLDC GLUCOMTR-MCNC: 361 MG/DL (ref 70–99)
GLUCOSE BLDC GLUCOMTR-MCNC: 366 MG/DL (ref 70–99)
GLUCOSE BLDC GLUCOMTR-MCNC: 371 MG/DL (ref 70–99)
GLUCOSE BLDC GLUCOMTR-MCNC: 438 MG/DL (ref 70–99)
GLUCOSE SERPL-MCNC: 348 MG/DL (ref 70–99)
HCT VFR BLD AUTO: 39.8 % (ref 40–53)
HGB BLD-MCNC: 13.2 G/DL (ref 13.3–17.7)
IMM GRANULOCYTES # BLD: 0.2 10E3/UL
IMM GRANULOCYTES NFR BLD: 1 %
INTERPRETATION ECG - MUSE: NORMAL
LACTATE SERPL-SCNC: 1.9 MMOL/L (ref 0.7–2)
LYMPHOCYTES # BLD AUTO: 1.5 10E3/UL (ref 0.8–5.3)
LYMPHOCYTES NFR BLD AUTO: 9 %
MAGNESIUM SERPL-MCNC: 1.8 MG/DL (ref 1.7–2.3)
MCH RBC QN AUTO: 26.1 PG (ref 26.5–33)
MCHC RBC AUTO-ENTMCNC: 33.2 G/DL (ref 31.5–36.5)
MCV RBC AUTO: 79 FL (ref 78–100)
MONOCYTES # BLD AUTO: 1.1 10E3/UL (ref 0–1.3)
MONOCYTES NFR BLD AUTO: 6 %
NEUTROPHILS # BLD AUTO: 14.1 10E3/UL (ref 1.6–8.3)
NEUTROPHILS NFR BLD AUTO: 84 %
NRBC # BLD AUTO: 0 10E3/UL
NRBC BLD AUTO-RTO: 0 /100
P AXIS - MUSE: 62 DEGREES
PHOSPHATE SERPL-MCNC: 3.2 MG/DL (ref 2.5–4.5)
PLATELET # BLD AUTO: 267 10E3/UL (ref 150–450)
POTASSIUM SERPL-SCNC: 4.5 MMOL/L (ref 3.4–5.3)
PR INTERVAL - MUSE: 150 MS
PROT SERPL-MCNC: 7.9 G/DL (ref 6.4–8.3)
QRS DURATION - MUSE: 90 MS
QT - MUSE: 322 MS
QTC - MUSE: 400 MS
R AXIS - MUSE: 48 DEGREES
RBC # BLD AUTO: 5.06 10E6/UL (ref 4.4–5.9)
SARS-COV-2 RNA RESP QL NAA+PROBE: NEGATIVE
SODIUM SERPL-SCNC: 131 MMOL/L (ref 135–145)
SYSTOLIC BLOOD PRESSURE - MUSE: NORMAL MMHG
T AXIS - MUSE: 24 DEGREES
VENTRICULAR RATE- MUSE: 93 BPM
WBC # BLD AUTO: 16.9 10E3/UL (ref 4–11)

## 2023-10-28 PROCEDURE — 250N000009 HC RX 250: Performed by: INTERNAL MEDICINE

## 2023-10-28 PROCEDURE — 96366 THER/PROPH/DIAG IV INF ADDON: CPT

## 2023-10-28 PROCEDURE — 84100 ASSAY OF PHOSPHORUS: CPT | Performed by: INTERNAL MEDICINE

## 2023-10-28 PROCEDURE — 96361 HYDRATE IV INFUSION ADD-ON: CPT

## 2023-10-28 PROCEDURE — 85025 COMPLETE CBC W/AUTO DIFF WBC: CPT | Performed by: FAMILY MEDICINE

## 2023-10-28 PROCEDURE — 250N000012 HC RX MED GY IP 250 OP 636 PS 637: Performed by: INTERNAL MEDICINE

## 2023-10-28 PROCEDURE — 250N000012 HC RX MED GY IP 250 OP 636 PS 637: Performed by: FAMILY MEDICINE

## 2023-10-28 PROCEDURE — 82962 GLUCOSE BLOOD TEST: CPT

## 2023-10-28 PROCEDURE — 258N000003 HC RX IP 258 OP 636: Performed by: INTERNAL MEDICINE

## 2023-10-28 PROCEDURE — G0378 HOSPITAL OBSERVATION PER HR: HCPCS

## 2023-10-28 PROCEDURE — 96376 TX/PRO/DX INJ SAME DRUG ADON: CPT

## 2023-10-28 PROCEDURE — 36415 COLL VENOUS BLD VENIPUNCTURE: CPT | Performed by: INTERNAL MEDICINE

## 2023-10-28 PROCEDURE — 83735 ASSAY OF MAGNESIUM: CPT | Performed by: INTERNAL MEDICINE

## 2023-10-28 PROCEDURE — 250N000011 HC RX IP 250 OP 636: Performed by: FAMILY MEDICINE

## 2023-10-28 PROCEDURE — 96365 THER/PROPH/DIAG IV INF INIT: CPT

## 2023-10-28 PROCEDURE — 96367 TX/PROPH/DG ADDL SEQ IV INF: CPT

## 2023-10-28 PROCEDURE — 96375 TX/PRO/DX INJ NEW DRUG ADDON: CPT

## 2023-10-28 PROCEDURE — 99232 SBSQ HOSP IP/OBS MODERATE 35: CPT | Performed by: INTERNAL MEDICINE

## 2023-10-28 PROCEDURE — 80053 COMPREHEN METABOLIC PANEL: CPT | Performed by: FAMILY MEDICINE

## 2023-10-28 PROCEDURE — 83605 ASSAY OF LACTIC ACID: CPT | Performed by: INTERNAL MEDICINE

## 2023-10-28 PROCEDURE — 36415 COLL VENOUS BLD VENIPUNCTURE: CPT | Performed by: FAMILY MEDICINE

## 2023-10-28 PROCEDURE — 250N000013 HC RX MED GY IP 250 OP 250 PS 637: Performed by: INTERNAL MEDICINE

## 2023-10-28 PROCEDURE — 76705 ECHO EXAM OF ABDOMEN: CPT

## 2023-10-28 RX ORDER — METRONIDAZOLE 500 MG/100ML
500 INJECTION, SOLUTION INTRAVENOUS EVERY 8 HOURS
Status: DISCONTINUED | OUTPATIENT
Start: 2023-10-28 | End: 2023-10-29

## 2023-10-28 RX ORDER — GABAPENTIN 300 MG/1
600 CAPSULE ORAL 3 TIMES DAILY
Status: DISCONTINUED | OUTPATIENT
Start: 2023-10-28 | End: 2023-10-30 | Stop reason: HOSPADM

## 2023-10-28 RX ORDER — MORPHINE SULFATE 2 MG/ML
2 INJECTION, SOLUTION INTRAMUSCULAR; INTRAVENOUS
Status: DISCONTINUED | OUTPATIENT
Start: 2023-10-28 | End: 2023-10-30 | Stop reason: HOSPADM

## 2023-10-28 RX ORDER — SODIUM CHLORIDE 9 MG/ML
INJECTION, SOLUTION INTRAVENOUS CONTINUOUS
Status: DISCONTINUED | OUTPATIENT
Start: 2023-10-28 | End: 2023-10-30 | Stop reason: HOSPADM

## 2023-10-28 RX ORDER — DEXTROSE MONOHYDRATE 25 G/50ML
25-50 INJECTION, SOLUTION INTRAVENOUS
Status: DISCONTINUED | OUTPATIENT
Start: 2023-10-28 | End: 2023-10-30 | Stop reason: HOSPADM

## 2023-10-28 RX ORDER — NICOTINE POLACRILEX 4 MG
15-30 LOZENGE BUCCAL
Status: DISCONTINUED | OUTPATIENT
Start: 2023-10-28 | End: 2023-10-30 | Stop reason: HOSPADM

## 2023-10-28 RX ORDER — CEFTRIAXONE 1 G/1
1 INJECTION, POWDER, FOR SOLUTION INTRAMUSCULAR; INTRAVENOUS EVERY 24 HOURS
Status: DISCONTINUED | OUTPATIENT
Start: 2023-10-28 | End: 2023-10-30

## 2023-10-28 RX ORDER — SCOLOPAMINE TRANSDERMAL SYSTEM 1 MG/1
1 PATCH, EXTENDED RELEASE TRANSDERMAL
Status: DISCONTINUED | OUTPATIENT
Start: 2023-10-28 | End: 2023-10-30 | Stop reason: HOSPADM

## 2023-10-28 RX ORDER — POLYETHYLENE GLYCOL 3350 17 G/17G
17 POWDER, FOR SOLUTION ORAL DAILY
Status: DISCONTINUED | OUTPATIENT
Start: 2023-10-28 | End: 2023-10-30 | Stop reason: HOSPADM

## 2023-10-28 RX ORDER — INSULIN GLARGINE-YFGN 100 [IU]/ML
25 INJECTION, SOLUTION SUBCUTANEOUS 2 TIMES DAILY
Status: DISCONTINUED | OUTPATIENT
Start: 2023-10-28 | End: 2023-10-28

## 2023-10-28 RX ORDER — BISACODYL 5 MG
5 TABLET, DELAYED RELEASE (ENTERIC COATED) ORAL DAILY PRN
Status: DISCONTINUED | OUTPATIENT
Start: 2023-10-28 | End: 2023-10-30 | Stop reason: HOSPADM

## 2023-10-28 RX ORDER — LANOLIN ALCOHOL/MO/W.PET/CERES
3 CREAM (GRAM) TOPICAL
Status: DISCONTINUED | OUTPATIENT
Start: 2023-10-28 | End: 2023-10-30 | Stop reason: HOSPADM

## 2023-10-28 RX ORDER — PANTOPRAZOLE SODIUM 40 MG/1
40 TABLET, DELAYED RELEASE ORAL 2 TIMES DAILY
Status: DISCONTINUED | OUTPATIENT
Start: 2023-10-28 | End: 2023-10-30 | Stop reason: HOSPADM

## 2023-10-28 RX ORDER — ACETAMINOPHEN 325 MG/1
650 TABLET ORAL EVERY 4 HOURS PRN
Status: DISCONTINUED | OUTPATIENT
Start: 2023-10-28 | End: 2023-10-30 | Stop reason: HOSPADM

## 2023-10-28 RX ORDER — BISACODYL 10 MG
10 SUPPOSITORY, RECTAL RECTAL DAILY PRN
Status: DISCONTINUED | OUTPATIENT
Start: 2023-10-28 | End: 2023-10-30 | Stop reason: HOSPADM

## 2023-10-28 RX ORDER — DULOXETIN HYDROCHLORIDE 60 MG/1
60 CAPSULE, DELAYED RELEASE ORAL DAILY
Status: DISCONTINUED | OUTPATIENT
Start: 2023-10-29 | End: 2023-10-30 | Stop reason: HOSPADM

## 2023-10-28 RX ORDER — MORPHINE SULFATE 15 MG/1
30 TABLET, FILM COATED, EXTENDED RELEASE ORAL EVERY 8 HOURS
Status: DISCONTINUED | OUTPATIENT
Start: 2023-10-28 | End: 2023-10-30 | Stop reason: HOSPADM

## 2023-10-28 RX ADMIN — PROCHLORPERAZINE EDISYLATE 5 MG: 5 INJECTION INTRAMUSCULAR; INTRAVENOUS at 22:43

## 2023-10-28 RX ADMIN — MORPHINE SULFATE 2 MG: 2 INJECTION, SOLUTION INTRAMUSCULAR; INTRAVENOUS at 08:21

## 2023-10-28 RX ADMIN — PANTOPRAZOLE SODIUM 40 MG: 40 TABLET, DELAYED RELEASE ORAL at 14:40

## 2023-10-28 RX ADMIN — PANTOPRAZOLE SODIUM 40 MG: 40 TABLET, DELAYED RELEASE ORAL at 21:15

## 2023-10-28 RX ADMIN — ONDANSETRON 4 MG: 2 INJECTION INTRAMUSCULAR; INTRAVENOUS at 08:22

## 2023-10-28 RX ADMIN — MORPHINE SULFATE 30 MG: 30 TABLET, FILM COATED, EXTENDED RELEASE ORAL at 21:17

## 2023-10-28 RX ADMIN — SODIUM CHLORIDE, PRESERVATIVE FREE: 5 INJECTION INTRAVENOUS at 15:37

## 2023-10-28 RX ADMIN — METRONIDAZOLE 500 MG: 500 INJECTION, SOLUTION INTRAVENOUS at 17:12

## 2023-10-28 RX ADMIN — POLYETHYLENE GLYCOL 3350 17 G: 17 POWDER, FOR SOLUTION ORAL at 15:41

## 2023-10-28 RX ADMIN — GABAPENTIN 600 MG: 300 CAPSULE ORAL at 14:40

## 2023-10-28 RX ADMIN — GABAPENTIN 600 MG: 300 CAPSULE ORAL at 21:15

## 2023-10-28 RX ADMIN — INSULIN ASPART 5 UNITS: 100 INJECTION, SOLUTION INTRAVENOUS; SUBCUTANEOUS at 08:07

## 2023-10-28 RX ADMIN — INSULIN GLARGINE 25 UNITS: 100 INJECTION, SOLUTION SUBCUTANEOUS at 21:16

## 2023-10-28 RX ADMIN — MORPHINE SULFATE 30 MG: 30 TABLET, FILM COATED, EXTENDED RELEASE ORAL at 14:40

## 2023-10-28 RX ADMIN — INSULIN GLARGINE 25 UNITS: 100 INJECTION, SOLUTION SUBCUTANEOUS at 15:00

## 2023-10-28 RX ADMIN — SCOPALAMINE 1 PATCH: 1 PATCH, EXTENDED RELEASE TRANSDERMAL at 15:36

## 2023-10-28 RX ADMIN — ONDANSETRON 4 MG: 2 INJECTION INTRAMUSCULAR; INTRAVENOUS at 14:29

## 2023-10-28 RX ADMIN — METRONIDAZOLE 500 MG: 500 INJECTION, SOLUTION INTRAVENOUS at 03:13

## 2023-10-28 RX ADMIN — ONDANSETRON 4 MG: 4 TABLET, ORALLY DISINTEGRATING ORAL at 21:41

## 2023-10-28 RX ADMIN — CEFTRIAXONE SODIUM 1 G: 1 INJECTION, POWDER, FOR SOLUTION INTRAMUSCULAR; INTRAVENOUS at 02:08

## 2023-10-28 RX ADMIN — METRONIDAZOLE 500 MG: 500 INJECTION, SOLUTION INTRAVENOUS at 10:46

## 2023-10-28 ASSESSMENT — ACTIVITIES OF DAILY LIVING (ADL)
ADLS_ACUITY_SCORE: 35
ADLS_ACUITY_SCORE: 35
ADLS_ACUITY_SCORE: 20
ADLS_ACUITY_SCORE: 36
ADLS_ACUITY_SCORE: 20
ADLS_ACUITY_SCORE: 35
ADLS_ACUITY_SCORE: 36
ADLS_ACUITY_SCORE: 36
ADLS_ACUITY_SCORE: 35
ADLS_ACUITY_SCORE: 35

## 2023-10-28 NOTE — PROGRESS NOTES
[facility]    PROGRESS NOTE - Hospitalist Service    Assessment and Plan    Principal Problem:    Leukocytosis, unspecified type  Active Problems:    HTN (hypertension)    Type 2 diabetes mellitus with complication, with long-term current use of insulin (H)    Chronic pain syndrome    Hyponatremia    Pain of upper abdomen    Pseudocyst of pancreas    Bon Luque is a 68 year old male with h/o recent SBO (hospitalized 10/05/23- 10/11/23), DM2, CAD, HTN, severe protein calorie malnutrition, previous DVT, Psoriatic arthritis, fibromyalgia and chronic pain who is  admitted on 10/27/2023 2/2 Abdominal Pain.     Abdominal Pain, Possible Pancreatic Pseudocyst-    - recently underwent EUS/FNA  on 10/6/23 which showed few abnormal lymph nodes in the peripancreatic region. Path findings show no malignancy and suggestive of a granuloma.   - CT A/P  Inflammatory changes involving the region of the pancreatic head and proximal duodenum, overall slightly less prominent than 10/05/2023. Findings could be seen with acute pancreatitis, duodenitis or a combination. An underlying mass or neoplastic process is not excluded although less likely in light of the interval biopsy findings. A more conspicuous complex cystic structure in the region of the pancreatic head may represent a pancreatic pseudocyst. There may be some associated mild biliary obstruction with mild prominence and mucosal hyperenhancement of the extra hepatic bile duct. No current evidence of bowel obstruction. Prior findings of gastric/proximal duodenal obstruction have resolved. Mild upper abdominal adenopathy, increased, indeterminate although may be inflammatory.   - US Limited visualization of the pancreas with a complex hypoechoic area in the region of the head of the pancreas which appears to correspond with a complex cystic area seen in this region on CT. This is indeterminate on ultrasound and overall better evaluated on CT. See that  "report for those details and correlation with previous workup in this area.  - Appreciate GI consult discussed with Dr Mace, believes possible smoldering  pancreatitis and developing pseudocyst. Plan for possible repeat EUS early next week with Dr. Meyer but needs to be off DOAC for 72 hrs.  - clears and ADAT   - if persistent pain may possibly need post pyloric FT  - still having nausea ad scopolamine patch     Leukocytosis- Afebrile.   - CT as above  - Possible Pseudocyst GI agrees continue abx for now. Empiric Rocephin and Flagyl. - trend CBC      Hyponatremia likely from hyperglycemia corrected 137  - better control of BG  - Ns for IVF for now   - recheck in am      Ataxia- CT head had no acute findings. Fall precautions. PT evaluate and treat. Consider Neurology evaluation if symptoms persist.     DM2- uncontrolled  - last A1c 8  - Lantus at home 50 units BID, re-ordered 25 units BID and not given this morning unfortunately and BG still high  - change to high resistant sliding scale  - DM diet  - holding metformin      Hx of DVT-   - holding DOAC for now in the event EUS    Chronic Pain  - continue home MS contin   - prn IV morphine  - continue gabapentin and Cymbalta     Clinically Significant Risk Factors      # Overweight: Estimated body mass index is 28.31 kg/m  as calculated from the following:  Height as of this encounter: 1.626 m (5' 4\").  Weight as of this encounter: 74.8 kg (164 lb 14.4 oz)., PRESENT ON ADMISSION    COVID-19 PCR Results          7/12/2022    04:13 10/27/2023    23:57   COVID-19 PCR Results   SARS CoV2 PCR Positive  Negative      COVID-19 Antibody Results, Testing for Immunity           No data to display               Code Status: Full Code  VTE prophylaxis:  Pneumatic Compression Devices  DIET: Orders Placed This Encounter      Advance Diet as Tolerated: Clear Liquid Diet; Low Consistent Carb (45 g CHO per Meal) Diet    Drains/Lines: none  Weight bearing status: WBAt     Expected " Discharge Date: 10/29/2023              Subjective:  Still having abdominal pain. Discussed with GI and possible EUS but will need to be of DOAC for 72 hrs    PHYSICAL EXAM  Temp:  [97.7  F (36.5  C)-98.8  F (37.1  C)] 98.6  F (37  C)  Pulse:  [] 99  Resp:  [18-20] 18  BP: (121-190)/(66-90) 168/81  SpO2:  [94 %-98 %] 96 %  Wt Readings from Last 1 Encounters:   10/27/23 89.8 kg (198 lb)     No intake or output data in the 24 hours ending 10/28/23 1416   Body mass index is 26.85 kg/m .    Physical Exam  Constitutional:       Comments: Uncomfortable but NAD and npntoxic   HENT:      Head: Normocephalic and atraumatic.   Cardiovascular:      Rate and Rhythm: Regular rhythm. Tachycardia present.      Pulses: Normal pulses.      Heart sounds: Normal heart sounds.   Pulmonary:      Effort: Pulmonary effort is normal. No respiratory distress.      Breath sounds: Normal breath sounds. No rales.   Abdominal:      General: Bowel sounds are normal. There is no distension.      Palpations: Abdomen is soft.      Tenderness: There is abdominal tenderness.   Musculoskeletal:         General: Normal range of motion.      Right lower leg: No edema.      Left lower leg: No edema.   Skin:     General: Skin is warm.      Capillary Refill: Capillary refill takes less than 2 seconds.   Neurological:      General: No focal deficit present.      Mental Status: He is alert and oriented to person, place, and time. Mental status is at baseline.       PERTINENT LABS/IMAGING:  Results for orders placed or performed during the hospital encounter of 10/27/23   CT Abdomen Pelvis w Contrast    Impression    IMPRESSION:   1. Inflammatory changes involving the region of the pancreatic head and proximal duodenum, overall slightly less prominent than 10/05/2023. Findings could be seen with acute pancreatitis, duodenitis or a combination. An underlying mass or neoplastic   process is not excluded although less likely in light of the interval biopsy  findings.  2. A more conspicuous complex cystic structure in the region of the pancreatic head may represent a pancreatic pseudocyst.  3. There may be some associated mild biliary obstruction with mild prominence and mucosal hyperenhancement of the extra hepatic bile duct.  4. No current evidence of bowel obstruction. Prior findings of gastric/proximal duodenal obstruction have resolved.  5. Mild upper abdominal adenopathy, increased, indeterminate although may be inflammatory.   Head CT w/o contrast    Impression    IMPRESSION:  1.  No acute intracranial process.   US Abdomen Limited    Impression    IMPRESSION:  1.  Cholecystectomy. Bile ducts within normal limits for the postcholecystectomy state.    2.  Mild diffuse fatty infiltration of the liver.    3.  Limited visualization of the pancreas with a complex hypoechoic area in the region of the head of the pancreas which appears to correspond with a complex cystic area seen in this region on CT. This is indeterminate on ultrasound and overall better   evaluated on CT. See that report for those details and correlation with previous workup in this area.    4.  Focal cortical scarring in the right kidney laterally. No hydronephrosis.       Imaging results reviewed over the past 24 hrs:   Recent Results (from the past 24 hour(s))   Head CT w/o contrast    Narrative    EXAM: CT HEAD W/O CONTRAST  LOCATION: [facility]  DATE: 10/27/2023    INDICATION: headache DOAC  COMPARISON: None.  TECHNIQUE: Routine CT Head without IV contrast. Multiplanar reformats. Dose reduction techniques were used.    FINDINGS:  INTRACRANIAL CONTENTS: No intracranial hemorrhage, extraaxial collection, or mass effect.  No CT evidence of acute infarct. Mild presumed chronic small vessel ischemic changes. Mild generalized volume loss. No hydrocephalus.     VISUALIZED ORBITS/SINUSES/MASTOIDS: No intraorbital abnormality. No paranasal sinus mucosal disease. No middle ear or  mastoid effusion.    BONES/SOFT TISSUES: No acute abnormality.      Impression    IMPRESSION:  1.  No acute intracranial process.   CT Abdomen Pelvis w Contrast    Narrative    EXAM: CT ABDOMEN PELVIS W CONTRAST  LOCATION: [facility]  DATE: 10/27/2023    INDICATION: Upper abdominal pain. History of SBO.  COMPARISON: CT abdomen pelvis 10/05/2023  TECHNIQUE: CT scan of the abdomen and pelvis was performed following injection of IV contrast. Multiplanar reformats were obtained. Dose reduction techniques were used.  CONTRAST: isovue 370 90ml    FINDINGS:   LOWER CHEST: Mild atelectasis in the lingula.    HEPATOBILIARY: Diffuse hepatic steatosis. Cholecystectomy. Mild extrahepatic bile duct dilatation and mucosal hyperenhancement. Common bile duct measures up to 15 mm (series 4, image 20) disease.    PANCREAS: Complex cystic structure in the pancreatic head measures 3.0 x 4.4 x 2.3 cm, more conspicuous than 10/05/2023. Mild surrounding inflammatory changes in this region. Although overall appear slightly improved since 10/05/2023.    SPLEEN: Normal.    ADRENAL GLANDS: Normal.    KIDNEYS/BLADDER: Focal scarring mid right kidney. Tiny benign simple left renal cyst, no follow-up needed. No urinary stone or hydronephrosis.    BOWEL: Duodenal diverticula involving the first and second portions of the duodenum including about the ampulla. There are some areas of mucosal irregularity and thickening in this region although overall less prominent than 10/05/2023. Prior gastric and   proximal duodenal dilatation depicted 10/05/2023 has resolved, with no current evidence of bowel obstruction. No evidence of diverticulitis or colitis.    LYMPH NODES: Numerous mildly enlarged upper abdominal and retroperitoneal lymph nodes, some of which are slightly larger than 10/05/2023, such as a para-aortic node at the level of the kidneys measuring 1.6 x 2.2 cm (series 4, image 40), previously 1.0 x   1.8  cm.    VASCULATURE: Mild atherosclerotic calcifications. Normal caliber abdominal aorta.    PELVIC ORGANS: Normal.    MUSCULOSKELETAL: Mild scattered degenerative changes in the spine.      Impression    IMPRESSION:   1. Inflammatory changes involving the region of the pancreatic head and proximal duodenum, overall slightly less prominent than 10/05/2023. Findings could be seen with acute pancreatitis, duodenitis or a combination. An underlying mass or neoplastic   process is not excluded although less likely in light of the interval biopsy findings.  2. A more conspicuous complex cystic structure in the region of the pancreatic head may represent a pancreatic pseudocyst.  3. There may be some associated mild biliary obstruction with mild prominence and mucosal hyperenhancement of the extra hepatic bile duct.  4. No current evidence of bowel obstruction. Prior findings of gastric/proximal duodenal obstruction have resolved.  5. Mild upper abdominal adenopathy, increased, indeterminate although may be inflammatory.   US Abdomen Limited    Narrative    EXAM: US ABDOMEN LIMITED  LOCATION: [facility]  DATE: 10/28/2023    INDICATION: Epigastric pain.  COMPARISON: CT from 10/27/2023.  TECHNIQUE: Limited abdominal ultrasound.    FINDINGS:    GALLBLADDER: Surgically absent.    BILE DUCTS: No intrahepatic biliary dilatation. Limited visualization of the common bile duct which measures 8 mm where seen.    LIVER: Mild diffuse fatty infiltration of the liver. No focal mass.    RIGHT KIDNEY: No hydronephrosis. Prominent area of focal cortical scarring laterally in the right kidney.    PANCREAS: Limited visualization of the pancreas. Complex hypoechoic area in the region of the head of the pancreas measuring 4.5 x 2.3 x 2.5 cm. This likely corresponds to a complex cystic area seen in this region on the CT scan and was better evaluated   on CT than ultrasound.    No ascites.      Impression     IMPRESSION:  1.  Cholecystectomy. Bile ducts within normal limits for the postcholecystectomy state.    2.  Mild diffuse fatty infiltration of the liver.    3.  Limited visualization of the pancreas with a complex hypoechoic area in the region of the head of the pancreas which appears to correspond with a complex cystic area seen in this region on CT. This is indeterminate on ultrasound and overall better   evaluated on CT. See that report for those details and correlation with previous workup in this area.    4.  Focal cortical scarring in the right kidney laterally. No hydronephrosis.     Recent Labs   Lab 10/28/23  1425 10/28/23  1156 10/28/23  0756 10/28/23  0152 10/27/23  1941   WBC  --   --  16.9*  --  19.4*   HGB  --   --  13.2*  --  13.6   MCV  --   --  79  --  79   PLT  --   --  267  --  256   NA  --   --  131*  --  127*   POTASSIUM  --   --  4.5  --  4.8   CHLORIDE  --   --  92*  --  89*   CO2  --   --  25  --  26   BUN  --   --  10.8  --  10.5   CR  --   --  0.93  --  0.89   ANIONGAP  --   --  14  --  12   MIGUEL  --   --  9.5  --  9.4   * 366* 348*   < > 356*   ALBUMIN  --   --  3.7  --  3.5   PROTTOTAL  --   --  7.9  --  7.5   BILITOTAL  --   --  0.6  --  0.8   ALKPHOS  --   --  122  --  116   ALT  --   --  10  --  11   AST  --   --  12  --  25   LIPASE  --   --   --   --  17    < > = values in this interval not displayed.     Recent Labs   Lab Test 10/09/23  1515 10/02/17  1049   CHOL  --  198   HDL  --  41   LDL  --  137*   TRIG 93 99     Recent Labs   Lab Test 10/28/23  1156 10/28/23  0756   NA  --  131*   POTASSIUM  --  4.5   CHLORIDE  --  92*   CO2  --  25   * 348*   BUN  --  10.8   CR  --  0.93   GFRESTIMATED  --  >90   MIGUEL  --  9.5     Recent Labs   Lab Test 10/05/23  1204 07/12/22  0228 01/10/18  1547   A1C 8.0* 8.2* 8.6*     Recent Labs   Lab Test 10/28/23  0756 10/27/23  1941 10/16/23  1615   HGB 13.2* 13.6 12.2*     Recent Labs   Lab Test 07/12/22 0228   TROPONINI 0.03     Recent  Labs   Lab Test 06/16/22  1838   BNP 11     Recent Labs   Lab Test 06/16/22  1838   TSH 0.70     Recent Labs   Lab Test 10/09/23  0432 10/08/23  0622   INR 1.26* 1.16*       30 MINUTES SPENT BY ME on the date of service doing chart review, history, exam, documentation, discussion with nursing staff and specialist, & further activities per the note.  Viviana Giraldo MD  [facility] Medicine Service  [phone]

## 2023-10-28 NOTE — H&P
[facility]    History and Physical - Hospitalist Service       Date of Admission:  10/27/2023    Assessment & Plan      Bon Luque is a 68 year old male with PMH significant for recent SBO (hospitalized 10/05/23- 10/11/23), DM2, CAD, HTN, severe protein calorie malnutrition, previous DVT, Psoriatic arthritis, fibromyalgia and chronic pain who is  admitted on 10/27/2023 2/2 Abdominal Pain.    # Abdominal Pain-  Possible Biliary obstruction noted on CT abd/pel. Abdominal ultrasound pending. LFTs are WNL. Consult Gastroenterology.    # Possible Pancreatic Pseudocyst- ED Physician spoke with Gastroenterologist on call and patient will be seen in consultation. PCN allergy. Empiric Rocephin and Flagyl.    # Leukocytosis- Afebrile. CT abd/pel reviewed. Awaiting abdominal ultrasound. Possible Pseudocyst. Empiric Rocephin and Flagyl. Repeat CBC in a.m.    # Ataxia- CT head had no acute findings. Fall precautions. PT evaluate and treat. Consider Neurology evaluation if symptoms persist.    # DM2- ISS and accuchecks.    # Hx of DVT- Chronically on Eliquis per chart review. Meds to be verified by Pharmacy staff.        Diet:  CLD, advance as tolerated  DVT Prophylaxis: Pneumatic Compression Devices and chronically on Eliquis  Seals Catheter: Not present  Lines: None     Cardiac Monitoring: None  Code Status:  Full Code    Clinically Significant Risk Factors Present on Admission         # Hyponatremia: Lowest Na = 127 mmol/L in last 2 days, will monitor as appropriate       # Drug Induced Coagulation Defect: home medication list includes an anticoagulant medication    # Hypertension: Noted on problem list     # DMII: A1C = 8.0 % (Ref range: <5.7 %) within past 6 months    # Overweight: Estimated body mass index is 26.85 kg/m  as calculated from the following:    Height as of 10/16/23: 1.829 m (6').    Weight as of this encounter: 89.8 kg (198 lb).              Disposition Plan   Anticipate > 2  midnight Inpatient hospital stay.       Linsey Rocha MD  Hospitalist Service  [facility]  Securely message with [employer] (more info)  Text page via [facility] Paging/Directory     ______________________________________________________________________    Chief Complaint   Epigastric Abdominal pain    History is obtained from the patient, ED Physician and chart review.    History of Present Illness   Bon Luque is a 68 year old male with PMH significant for recent SBO (hospitalized 10/05/23- 10/11/23), DM2, CAD, HTN, severe protein calorie malnutrition, previous DVT, Psoriatic arthritis, fibromyalgia and chronic pain who presents to ED due to epigastric abdominal pain x 3 days. He was recently hospitalized from 10/05/23- 10/11/23 for SBO. He says he was doing fine until 3 days ago when he had sudden onset upper abdominal pain. It is characterized as a dull ache and was initially rated 10/10, but now decreased to 7/10. HE has not eating solid food in 4 days. The pain is alleviated with manual pressure to the epigastric area. Associated symptoms include nausea and difficulty with his balance. He denies vomiting, diarrhea, fever, chills, chest pain or shortness of breath.      Past Medical History    Past Medical History:   Diagnosis Date    Abdominal pain, epigastric 06/23/2022    Normal gastric emptying study.  Peptic ulcer disease or gastritis suspected    Acute cholecystitis 07/12/2022    Hospitalized with acute abdominal pain, abnormal LFTs and CT showing distended gallbladder and bile duct dilatation.  Acute cholecystitis.  Cholecystectomy.    Arthritis     psoriatic    ASCVD (arteriosclerotic cardiovascular disease) 04/28/2016    Coronary artery stent ×2 in RCA following myocardial infarction 2008 , echo June 2014 normal LV function with ejection fraction 50-55% with mild apical inferior hypokinesis    Chronic fatigue 06/23/2022    Chronic low back pain 04/28/2016    Chronic right shoulder  pain 06/23/2022    Coronary artery disease     Depression 12/26/2017    Diabetic peripheral neuropathy associated with type 2 diabetes mellitus (H) 04/28/2016    Abnormal EMG and workup at [facility]    Dilated bile duct 07/03/2017    CT scan with incidental finding of dilated intra-and extrahepatic ducts    HTN (hypertension)     Hypercholesterolemia     Hypogonadism in male     Ischemic cardiomyopathy     Stress Echocardiogram 2017 with decreased LV function with EF 43% worse compared to previous echo.  No change in infarct.  No new ischemia    Left upper quadrant pain 06/30/2017    Mass of duodenum 10/16/2023    Obstruction of second portion of duodenum with abnormal CT scan, biopsies negative for endoscopic ultrasound    MI (myocardial infarction) (H)     Obstructive sleep apnea on CPAP     CPAP    Optic neuritis     Psoriasis     Recurrent vomiting 09/01/2023    Renal cell carcinoma, right (H) 06/23/2022    Right partial nephrectomy 2017    Right renal mass     Sebaceous cyst 2010    Thoracic radiculopathy 2004    right T8 intercostal nerve block 2002    Type 2 diabetes mellitus with insulin therapy (H)        Past Surgical History   Past Surgical History:   Procedure Laterality Date    APPENDECTOMY      COLONOSCOPY      Colonoscopy September 2017 with hyperplastic polyps, repeat in 10 years    CORONARY ANGIOPLASTY      stent    ENDOSCOPIC RETROGRADE CHOLANGIOPANCREATOGRAM N/A 7/12/2022    Procedure: ENDOSCOPIC RETROGRADE CHOLANGIOPANCREATOGRAPHY, STONE EXTRACTION, BILIARY SPHINCTEROTOMY;  Surgeon: Bon Meyer MD;  Location: [facility] [place] OR    ESOPHAGOSCOPY, GASTROSCOPY, DUODENOSCOPY (EGD), COMBINED N/A 10/6/2023    Procedure: ESOPHAGOGASTRODUODENOSCOPY, WITH ENDOSCOPIC ULTRASOUND WITH FINE NEEDLE ASPIRATION AND DUODENAL BIOPSY;  Surgeon: Bon Meyer MD;  Location: [facility] OR    KNEE SURGERY      LAPAROSCOPIC CHOLECYSTECTOMY N/A 7/12/2022    Procedure: CHOLECYSTECTOMY, LAPAROSCOPIC;   Surgeon: Adam March MD;  Location: [facility] [place]    NOSE SURGERY      PICC DOUBLE LUMEN PLACEMENT  10/9/2023    NM LAP,PARTIAL NEPHRECTOMY Right 1/10/2018    Procedure: RIGHT ROBOTIC PARTIAL NEPHRECTOMY WITH INTRAOPERATIVE ULTRASOUND;  Surgeon: Chase Rodríguez MD;  Location: [facility];  Service: Urology       Prior to Admission Medications   Prior to Admission Medications   Prescriptions Last Dose Informant Patient Reported? Taking?   Alcohol Swabs PADS   No No   Sig: Use to swab the area of the injection or sherry as directed Per insurance coverage   B-D U/F 31G X 8 MM insulin pen needle   Yes No   Sig: FOR ADMINISTERING INSULIN AT HOME   Continuous Blood Gluc Sensor (FREESTYLE SHIRA 14 DAY SENSOR) [facility]   Yes No   DULoxetine (CYMBALTA) 60 MG capsule   No No   Sig: Take 1 capsule (60 mg) by mouth daily for 30 days   Insulin Glargine-yfgn 100 UNIT/ML SOPN   Yes No   Sig: Inject 50 Units Subcutaneous 2 times daily   Sharps Container MISC   No No   Sig: Use as directed to dispose of needles, lancets and other sharps Per Insurance coverage   apixaban ANTICOAGULANT (ELIQUIS) 5 MG tablet   No No   Sig: Take 1 tablet (5 mg) by mouth 2 times daily   blood glucose (NO BRAND SPECIFIED) test strip   No No   Sig: To use to test glucose level in the blood Use to test blood sugar  4 times daily as directed. To accompany glucose monitor brands per insurance coverage.   clobetasol (TEMOVATE) 0.05 % external cream   Yes No   Sig: Apply topically 2 times daily as needed For psoriasis on hands.   gabapentin (NEURONTIN) 300 MG capsule   Yes No   Sig: [GABAPENTIN (NEURONTIN) 300 MG CAPSULE] Take 600 mg by mouth 3 (three) times a day.    insulin lispro (HUMALOG KWIKPEN) 100 UNIT/ML (1 unit dial) KWIKPEN   Yes No   Sig: Inject Subcutaneous 3 times daily (before meals) Sliding scale. Average use is 20 units with each meal   metFORMIN (GLUCOPHAGE-XR) 750 MG 24 hr tablet   No No   Sig: Take 1 tablet (750 mg) by  mouth daily (with dinner)   Patient not taking: Reported on 10/16/2023   morphine (MS CONTIN) 30 MG CR tablet   Yes No   Sig: Take 30 mg by mouth every 8 hours   naloxone (NARCAN) 4 MG/0.1ML nasal spray   Yes No   Sig: Spray 1 spray into one nostril alternating nostrils once as needed for opioid reversal   pantoprazole (PROTONIX) 40 MG EC tablet   No No   Sig: Take 1 tablet (40 mg) by mouth 2 times daily for 30 days   polyethylene glycol (MIRALAX) 17 GM/Dose powder   No No   Sig: Take 17 g by mouth daily   testosterone (ANDROGEL) 20.25 MG/1.25GM (1.62%) topical gel   Yes No   Sig: Place 20.25 mg onto the skin daily   Patient not taking: Reported on 10/16/2023   valACYclovir (VALTREX) 500 MG tablet   Yes No   Sig: Take 500 mg by mouth 2 times daily as needed Take twice daily at onset of symptoms for 3 days. Repeat for flares.      Facility-Administered Medications: None        Review of Systems    The 10 point Review of Systems is negative other than noted in the HPI.    Social History   I have reviewed this patient's social history and updated it with pertinent information if needed.  Social History     Tobacco Use    Smoking status: Former     Passive exposure: Never    Smokeless tobacco: Never   Vaping Use    Vaping Use: Never used   Substance Use Topics    Alcohol use: No     Comment: Alcoholic Drinks/day: alcohol abuse - in remission x16 years    Drug use: No         Family History   Father- Leukemia, DM  Mother- CVA, DM    Physical Exam   Vital Signs: Temp: 97.7  F (36.5  C) Temp src: Temporal BP: (!) 143/79 Pulse: 91   Resp: 19 SpO2: 94 % O2 Device: None (Room air)    Weight: 198 lbs 0 oz    General Appearance: AAOx3, NAD  Respiratory: CTAB  Cardiovascular: Regular rate, S1S2  GI: +BS, firm, mild epigastric distension and tenderness  Skin: No jaundice  Other: No pedal edema     Medical Decision Making       55 MINUTES SPENT BY ME on the date of service doing chart review, history, exam, documentation & further  activities per the note.      Data     I have personally reviewed the following data over the past 24 hrs:    19.4 (H)  \   13.6   / 256     127 (L) 89 (L) 10.5 /  356 (H)   4.8 26 0.89 \     ALT: 11 AST: 25 AP: 116 TBILI: 0.8   ALB: 3.5 TOT PROTEIN: 7.5 LIPASE: 17     Trop: 24 (H) BNP: N/A     Procal: 0.21 (H) CRP: N/A Lactic Acid: N/A         Imaging results reviewed over the past 24 hrs:   Recent Results (from the past 24 hour(s))   Head CT w/o contrast    Narrative    EXAM: CT HEAD W/O CONTRAST  LOCATION: [facility]  DATE: 10/27/2023    INDICATION: headache DOAC  COMPARISON: None.  TECHNIQUE: Routine CT Head without IV contrast. Multiplanar reformats. Dose reduction techniques were used.    FINDINGS:  INTRACRANIAL CONTENTS: No intracranial hemorrhage, extraaxial collection, or mass effect.  No CT evidence of acute infarct. Mild presumed chronic small vessel ischemic changes. Mild generalized volume loss. No hydrocephalus.     VISUALIZED ORBITS/SINUSES/MASTOIDS: No intraorbital abnormality. No paranasal sinus mucosal disease. No middle ear or mastoid effusion.    BONES/SOFT TISSUES: No acute abnormality.      Impression    IMPRESSION:  1.  No acute intracranial process.   CT Abdomen Pelvis w Contrast    Narrative    EXAM: CT ABDOMEN PELVIS W CONTRAST  LOCATION: [facility]  DATE: 10/27/2023    INDICATION: Upper abdominal pain. History of SBO.  COMPARISON: CT abdomen pelvis 10/05/2023  TECHNIQUE: CT scan of the abdomen and pelvis was performed following injection of IV contrast. Multiplanar reformats were obtained. Dose reduction techniques were used.  CONTRAST: isovue 370 90ml    FINDINGS:   LOWER CHEST: Mild atelectasis in the lingula.    HEPATOBILIARY: Diffuse hepatic steatosis. Cholecystectomy. Mild extrahepatic bile duct dilatation and mucosal hyperenhancement. Common bile duct measures up to 15 mm (series 4, image 20) disease.    PANCREAS: Complex cystic  structure in the pancreatic head measures 3.0 x 4.4 x 2.3 cm, more conspicuous than 10/05/2023. Mild surrounding inflammatory changes in this region. Although overall appear slightly improved since 10/05/2023.    SPLEEN: Normal.    ADRENAL GLANDS: Normal.    KIDNEYS/BLADDER: Focal scarring mid right kidney. Tiny benign simple left renal cyst, no follow-up needed. No urinary stone or hydronephrosis.    BOWEL: Duodenal diverticula involving the first and second portions of the duodenum including about the ampulla. There are some areas of mucosal irregularity and thickening in this region although overall less prominent than 10/05/2023. Prior gastric and   proximal duodenal dilatation depicted 10/05/2023 has resolved, with no current evidence of bowel obstruction. No evidence of diverticulitis or colitis.    LYMPH NODES: Numerous mildly enlarged upper abdominal and retroperitoneal lymph nodes, some of which are slightly larger than 10/05/2023, such as a para-aortic node at the level of the kidneys measuring 1.6 x 2.2 cm (series 4, image 40), previously 1.0 x   1.8 cm.    VASCULATURE: Mild atherosclerotic calcifications. Normal caliber abdominal aorta.    PELVIC ORGANS: Normal.    MUSCULOSKELETAL: Mild scattered degenerative changes in the spine.      Impression    IMPRESSION:   1. Inflammatory changes involving the region of the pancreatic head and proximal duodenum, overall slightly less prominent than 10/05/2023. Findings could be seen with acute pancreatitis, duodenitis or a combination. An underlying mass or neoplastic   process is not excluded although less likely in light of the interval biopsy findings.  2. A more conspicuous complex cystic structure in the region of the pancreatic head may represent a pancreatic pseudocyst.  3. There may be some associated mild biliary obstruction with mild prominence and mucosal hyperenhancement of the extra hepatic bile duct.  4. No current evidence of bowel obstruction.  Prior findings of gastric/proximal duodenal obstruction have resolved.  5. Mild upper abdominal adenopathy, increased, indeterminate although may be inflammatory.

## 2023-10-28 NOTE — PLAN OF CARE
Goal Outcome Evaluation:      Pt c/o of abdominal pain 8/10 and nausea. Scheduled Q12 Morphine PO given, IV Zofran given for nausea. Pts BG was 361 MD notified. Scheduled insulin and Lantus given. Pt A&O, has been using the urinal at the bedside. Can make his needs know appropriately. On the Clear liquid diet w/ poor appetite. He has only been able to tolerate sips of ice water w/ meds. Pt reported getting up to use the BR w/ x2 episodes of dark greasy diarrhea.  MD aware of this. Pt can run hypertensive, on RA otherwise VSS.

## 2023-10-28 NOTE — MEDICATION SCRIBE - ADMISSION MEDICATION HISTORY
Medication Scribe Admission Medication History    Admission medication history is complete. The information provided in this note is only as accurate as the sources available at the time of the update.    Information Source(s): Patient via in-person    Pertinent Information:     Changes made to PTA medication list:  Added: None  Deleted: Clobetasol 0.05% cream(per patient)  Changed: None    Medication Affordability:  Not including over the counter (OTC) medications, was there a time in the past 3 months when you did not take your medications as prescribed because of cost?: No    Allergies reviewed with patient and updates made in EHR: yes    Medication History Completed By: Isrrael Garcia 10/28/2023 1:25 AM    PTA Med List   Medication Sig Last Dose    apixaban ANTICOAGULANT (ELIQUIS) 5 MG tablet Take 1 tablet (5 mg) by mouth 2 times daily 10/27/2023 at am    DULoxetine (CYMBALTA) 60 MG capsule Take 1 capsule (60 mg) by mouth daily for 30 days 10/27/2023 at am    gabapentin (NEURONTIN) 300 MG capsule [GABAPENTIN (NEURONTIN) 300 MG CAPSULE] Take 600 mg by mouth 3 (three) times a day.  10/27/2023 at am    Insulin Glargine-yfgn 100 UNIT/ML SOPN Inject 50 Units Subcutaneous 2 times daily 10/27/2023 at am    insulin lispro (HUMALOG KWIKPEN) 100 UNIT/ML (1 unit dial) KWIKPEN Inject Subcutaneous 3 times daily (before meals) Sliding scale. Average use is 20 units with each meal 10/25/2023 at ran out    metFORMIN (GLUCOPHAGE-XR) 750 MG 24 hr tablet Take 1 tablet (750 mg) by mouth daily (with dinner) Past Month at unknown    morphine (MS CONTIN) 30 MG CR tablet Take 30 mg by mouth every 8 hours 10/27/2023 at am    naloxone (NARCAN) 4 MG/0.1ML nasal spray Spray 1 spray into one nostril alternating nostrils once as needed for opioid reversal Unknown at prn    pantoprazole (PROTONIX) 40 MG EC tablet Take 1 tablet (40 mg) by mouth 2 times daily for 30 days 10/27/2023 at am    polyethylene glycol (MIRALAX) 17 GM/Dose powder  Take 17 g by mouth daily 10/27/2023 at prn    testosterone (ANDROGEL) 20.25 MG/1.25GM (1.62%) topical gel Place 20.25 mg onto the skin daily Unknown at prn    valACYclovir (VALTREX) 500 MG tablet Take 500 mg by mouth 2 times daily as needed Take twice daily at onset of symptoms for 3 days. Repeat for flares. Unknown at prn

## 2023-10-28 NOTE — PLAN OF CARE
PRIMARY DIAGNOSIS: ACUTE PAIN  OUTPATIENT/OBSERVATION GOALS TO BE MET BEFORE DISCHARGE:  1. Pain Status: Improved-controlled with oral pain medications.    2. Return to near baseline physical activity: Yes    3. Cleared for discharge by consultants (if involved): No    Discharge Planner Nurse   Safe discharge environment identified: Yes  Barriers to discharge: Yes       Entered by: Moody Briscoe RN 10/28/2023 4:18 PM     Please review provider order for any additional goals.   Nurse to notify provider when observation goals have been met and patient is ready for discharge.Goal Outcome Evaluation:       Pt is alert and oriented times 4. Pt is nauseas and receiving IV Zofran for management. Pt is using a urinal to void and ambulates to bathroom for BM. Pt is c/o of 8/10 epigastric pain. Pt had two BM today. Pt is on 100 ml/hour normal saline.    Moody Briscoe RN

## 2023-10-28 NOTE — PROVIDER NOTIFICATION
Patient's  at 1425 Dr. Giraldo notified via [employer] at 1438. Writer treated the pt with scheduled insulin and Lantus.

## 2023-10-28 NOTE — PLAN OF CARE
Problem: Adult Inpatient Plan of Care  Goal: Optimal Comfort and Wellbeing  Outcome: Progressing    Goal Outcome Evaluation:       Assumed cares of pt around 0100. Abd US obtained overnight. Pt was able to consume x2 lemon ice and one orange jello, and denied any nausea or GI upset. Voiding well, urinal at bedside. Tolerating IV abx well.

## 2023-10-28 NOTE — PROGRESS NOTES
Met with patient  to review role of care management, progression of care and possible need for services at discharge, including OP services, home care, or skilled nursing care. Patient alert, oriented and engaged in the conversation.     Assessed, lives w/spouse and no svcs, independent at baseline and no CM needs identified.  Spouse to transport at discharge.

## 2023-10-28 NOTE — CONSULTS
[facility] DIGESTIVE [facility] CONSULTATION    [place]   [address]   [place] [address]  68 year old male  Admission Date/Time: 10/27/2023  7:01 PM    Primary Care Provider:  Aditya Munoz    Requesting Physician: Viviana Giraldo MD      REASON FOR THE CONSULT:  abdominal pain    HPI:   68 year old male with PMH significant for recent duodenal obstruction (hospitalized 10/05/23- 10/11/23), DM2, CAD, HTN, previous DVT, psoriatic arthritis, fibromyalgia and chronic pain who presents to ED due to epigastric abdominal pain x 3 days. He was recently hospitalized from 10/05/23- 10/11/23 for duodenal obstruction. He improved with conservative management and was doing well until 3 days ago when he had sudden onset upper abdominal pain. It is characterized as a dull ache and was initially rated 10/10, but now decreased to 7/10. He has not eating solid food in 4 days. Associated symptoms include nausea and difficulty with his balance. He denies vomiting, diarrhea, fever, chills, chest pain or shortness of breath.      Labs at ED notable for WBC of 19K, normal LFTs and lipase. CT revealed inflammatory changes involving the region of the pancreatic head and proximal duodenum, overall slightly less prominent than 10/05/2023.  A more conspicuous complex cystic structure in the region of the pancreatic head may represent a pancreatic pseudocyst measuring 3.0 x 4.4 x 2.3 cm.    He underwent EUS/FNA  on 10/6/23 which showed few abnormal lymph nodes in the peripancreatic region. Path findings show no malignancy and suggestive of a granuloma.      REVIEW OF SYSTEMS: 13 point review of systems is negative except that noted above.    PAST MEDICAL HISTORY:   Past Medical History:   Diagnosis Date    Abdominal pain, epigastric 06/23/2022    Normal gastric emptying study.  Peptic ulcer disease or gastritis suspected    Acute cholecystitis 07/12/2022    Hospitalized with acute abdominal pain, abnormal LFTs and CT showing  distended gallbladder and bile duct dilatation.  Acute cholecystitis.  Cholecystectomy.    Arthritis     psoriatic    ASCVD (arteriosclerotic cardiovascular disease) 04/28/2016    Coronary artery stent ×2 in RCA following myocardial infarction 2008 , echo June 2014 normal LV function with ejection fraction 50-55% with mild apical inferior hypokinesis    Chronic fatigue 06/23/2022    Chronic low back pain 04/28/2016    Chronic right shoulder pain 06/23/2022    Coronary artery disease     Depression 12/26/2017    Diabetic peripheral neuropathy associated with type 2 diabetes mellitus (H) 04/28/2016    Abnormal EMG and workup at [facility]    Dilated bile duct 07/03/2017    CT scan with incidental finding of dilated intra-and extrahepatic ducts    HTN (hypertension)     Hypercholesterolemia     Hypogonadism in male     Ischemic cardiomyopathy     Stress Echocardiogram 2017 with decreased LV function with EF 43% worse compared to previous echo.  No change in infarct.  No new ischemia    Left upper quadrant pain 06/30/2017    Mass of duodenum 10/16/2023    Obstruction of second portion of duodenum with abnormal CT scan, biopsies negative for endoscopic ultrasound    MI (myocardial infarction) (H)     Obstructive sleep apnea on CPAP     CPAP    Optic neuritis     Psoriasis     Recurrent vomiting 09/01/2023    Renal cell carcinoma, right (H) 06/23/2022    Right partial nephrectomy 2017    Right renal mass     Sebaceous cyst 2010    Thoracic radiculopathy 2004    right T8 intercostal nerve block 2002    Type 2 diabetes mellitus with insulin therapy (H)        PAST SURGICAL HISTORY:   Past Surgical History:   Procedure Laterality Date    APPENDECTOMY      COLONOSCOPY      Colonoscopy September 2017 with hyperplastic polyps, repeat in 10 years    CORONARY ANGIOPLASTY      stent    ENDOSCOPIC RETROGRADE CHOLANGIOPANCREATOGRAM N/A 7/12/2022    Procedure: ENDOSCOPIC RETROGRADE CHOLANGIOPANCREATOGRAPHY, STONE EXTRACTION, BILIARY  SPHINCTEROTOMY;  Surgeon: Bon Meyer MD;  Location: [facility]    ESOPHAGOSCOPY, GASTROSCOPY, DUODENOSCOPY (EGD), COMBINED N/A 10/6/2023    Procedure: ESOPHAGOGASTRODUODENOSCOPY, WITH ENDOSCOPIC ULTRASOUND WITH FINE NEEDLE ASPIRATION AND DUODENAL BIOPSY;  Surgeon: Bon Meyer MD;  [place] [facility] [place]    KNEE SURGERY      LAPAROSCOPIC CHOLECYSTECTOMY N/A 7/12/2022    Procedure: CHOLECYSTECTOMY, LAPAROSCOPIC;  Surgeon: Adam March MD;  [place] [facility] [place]    NOSE SURGERY      PICC DOUBLE LUMEN PLACEMENT  10/9/2023    OK LAP,PARTIAL NEPHRECTOMY Right 1/10/2018    Procedure: RIGHT ROBOTIC PARTIAL NEPHRECTOMY WITH INTRAOPERATIVE ULTRASOUND;  Surgeon: Chase Rodríguez MD;  Location: [facility] [place];  Service: Urology       FAMILY HISTORY: History reviewed. No pertinent family history.    SOCIAL HISTORY:   Social History     Tobacco Use    Smoking status: Former     Passive exposure: Never    Smokeless tobacco: Never   Substance Use Topics    Alcohol use: No     Comment: Alcoholic Drinks/day: alcohol abuse - in remission x16 years        MEDS:  (Not in a hospital admission)      ALLERGIES/SENSITIVITIES: Atorvastatin, Valomag [arthritis pain formula], Ampicillin, and Codeine    MEDICATIONS:  Current Outpatient Medications   Medication Sig Dispense Refill    apixaban ANTICOAGULANT (ELIQUIS) 5 MG tablet Take 1 tablet (5 mg) by mouth 2 times daily 60 tablet 1    DULoxetine (CYMBALTA) 60 MG capsule Take 1 capsule (60 mg) by mouth daily for 30 days 30 capsule 0    gabapentin (NEURONTIN) 300 MG capsule [GABAPENTIN (NEURONTIN) 300 MG CAPSULE] Take 600 mg by mouth 3 (three) times a day.       Insulin Glargine-yfgn 100 UNIT/ML SOPN Inject 50 Units Subcutaneous 2 times daily      insulin lispro (HUMALOG KWIKPEN) 100 UNIT/ML (1 unit dial) KWIKPEN Inject Subcutaneous 3 times daily (before meals) Sliding scale. Average use is 20 units with each meal      metFORMIN (GLUCOPHAGE-XR)  750 MG 24 hr tablet Take 1 tablet (750 mg) by mouth daily (with dinner) 90 tablet 3    morphine (MS CONTIN) 30 MG CR tablet Take 30 mg by mouth every 8 hours      naloxone (NARCAN) 4 MG/0.1ML nasal spray Spray 1 spray into one nostril alternating nostrils once as needed for opioid reversal      pantoprazole (PROTONIX) 40 MG EC tablet Take 1 tablet (40 mg) by mouth 2 times daily for 30 days 60 tablet 0    polyethylene glycol (MIRALAX) 17 GM/Dose powder Take 17 g by mouth daily 510 g 1    testosterone (ANDROGEL) 20.25 MG/1.25GM (1.62%) topical gel Place 20.25 mg onto the skin daily      valACYclovir (VALTREX) 500 MG tablet Take 500 mg by mouth 2 times daily as needed Take twice daily at onset of symptoms for 3 days. Repeat for flares.      Alcohol Swabs PADS Use to swab the area of the injection or sherry as directed Per insurance coverage 100 each 0    B-D U/F 31G X 8 MM insulin pen needle FOR ADMINISTERING INSULIN AT HOME      blood glucose (NO BRAND SPECIFIED) test strip To use to test glucose level in the blood Use to test blood sugar  4 times daily as directed. To accompany glucose monitor brands per insurance coverage. 100 strip 0    Continuous Blood Gluc Sensor (FREESTYLE SHIRA 14 DAY SENSOR) MISC       Sharps Container MISC Use as directed to dispose of needles, lancets and other sharps Per Insurance coverage 1 each 0       PHYSICAL EXAM:  Temp:  [97.7  F (36.5  C)-98.8  F (37.1  C)] 98.8  F (37.1  C)  Pulse:  [] 94  Resp:  [19-20] 20  BP: (121-190)/(66-90) 157/70  SpO2:  [94 %-98 %] 98 %  Body mass index is 26.85 kg/m .  GEN: 68 year old in no acute distress.  HEENT: sclera anicteric, moist mucous membranes.   LYMPH: No cervical lymphadenopathy  PULM: Nonlabored breathing. Breath sounds equal.   CARDIO: Regular rate  GI: Non-distended. Soft.  Mild epigastric tenderness without rebound tenderness.  EXT: warm, no lower extremity edema  NEURO: Alert. No focal defects.   PSYCH: Mental status appropriate,  mood and affect normal.    SKIN: No rashes  MSK: No joint abnormalities    LABORATORY DATA:  CBC RESULTS:   Recent Labs   Lab Test 10/28/23  0756   WBC 16.9*   RBC 5.06   HGB 13.2*   HCT 39.8*   MCV 79   MCH 26.1*   MCHC 33.2   RDW 14.1           CMP Results:   Recent Labs   Lab Test 10/28/23  0756   *   POTASSIUM 4.5   CHLORIDE 92*   CO2 25   ANIONGAP 14   *   BUN 10.8   CR 0.93   BILITOTAL 0.6   ALKPHOS 122   ALT 10   AST 12        INR Results:   Recent Labs   Lab Test 10/09/23  0432   INR 1.26*          RELEVANT IMAGING:      EXAM: CT ABDOMEN PELVIS W CONTRAST  LOCATION: [facility]  DATE: 10/27/2023     INDICATION: Upper abdominal pain. History of SBO.  COMPARISON: CT abdomen pelvis 10/05/2023  TECHNIQUE: CT scan of the abdomen and pelvis was performed following injection of IV contrast. Multiplanar reformats were obtained. Dose reduction techniques were used.  CONTRAST: isovue 370 90ml     FINDINGS:   LOWER CHEST: Mild atelectasis in the lingula.     HEPATOBILIARY: Diffuse hepatic steatosis. Cholecystectomy. Mild extrahepatic bile duct dilatation and mucosal hyperenhancement. Common bile duct measures up to 15 mm (series 4, image 20) disease.     PANCREAS: Complex cystic structure in the pancreatic head measures 3.0 x 4.4 x 2.3 cm, more conspicuous than 10/05/2023. Mild surrounding inflammatory changes in this region. Although overall appear slightly improved since 10/05/2023.     SPLEEN: Normal.     ADRENAL GLANDS: Normal.     KIDNEYS/BLADDER: Focal scarring mid right kidney. Tiny benign simple left renal cyst, no follow-up needed. No urinary stone or hydronephrosis.     BOWEL: Duodenal diverticula involving the first and second portions of the duodenum including about the ampulla. There are some areas of mucosal irregularity and thickening in this region although overall less prominent than 10/05/2023. Prior gastric and   proximal duodenal dilatation depicted  "10/05/2023 has resolved, with no current evidence of bowel obstruction. No evidence of diverticulitis or colitis.     LYMPH NODES: Numerous mildly enlarged upper abdominal and retroperitoneal lymph nodes, some of which are slightly larger than 10/05/2023, such as a para-aortic node at the level of the kidneys measuring 1.6 x 2.2 cm (series 4, image 40), previously 1.0 x   1.8 cm.     VASCULATURE: Mild atherosclerotic calcifications. Normal caliber abdominal aorta.     PELVIC ORGANS: Normal.     MUSCULOSKELETAL: Mild scattered degenerative changes in the spine.                                                                      IMPRESSION:   1. Inflammatory changes involving the region of the pancreatic head and proximal duodenum, overall slightly less prominent than 10/05/2023. Findings could be seen with acute pancreatitis, duodenitis or a combination. An underlying mass or neoplastic   process is not excluded although less likely in light of the interval biopsy findings.  2. A more conspicuous complex cystic structure in the region of the pancreatic head may represent a pancreatic pseudocyst.  3. There may be some associated mild biliary obstruction with mild prominence and mucosal hyperenhancement of the extra hepatic bile duct.  4. No current evidence of bowel obstruction. Prior findings of gastric/proximal duodenal obstruction have resolved.  5. Mild upper abdominal adenopathy, increased, indeterminate although may be inflammatory.    ASSESSMENT:   68-year-old male with recent admission for duodenal obstruction and abnormal CT from 10/5-10/11 who presents with recurrent abdominal pain. Repeat CT this time shows inflammation at the pancreatic head with evolving pseudocyst. I suspect pt had acute pancreatitis all along with a smoldering course and is in process of developing a pseudocyst or \"phlegmon\". Leukocytosis likely reactive but infection cannot be excluded.    PLAN:  Continue empiric antibiotics for " now.  Ok for clears and advance diet as tolerated. May require postpyloric tube feeding if unable to maintain adequate caloric intake.  Will discuss with Dr. Meyer on Monday regarding timing of repeat EUS.    40  minutes of total time providing patient care, including patient evaluation, reviewing documents/test results, and .          Terrence Mace MD  Thank you for the opportunity to participate in the care of this patient.   Please feel free to call me with any questions or concerns.  Phone number [phone].            CC: [facility]

## 2023-10-29 LAB
ALBUMIN SERPL BCG-MCNC: 3.1 G/DL (ref 3.5–5.2)
ALP SERPL-CCNC: 100 U/L (ref 40–129)
ALT SERPL W P-5'-P-CCNC: 11 U/L (ref 0–70)
ANION GAP SERPL CALCULATED.3IONS-SCNC: 13 MMOL/L (ref 7–15)
AST SERPL W P-5'-P-CCNC: 17 U/L (ref 0–45)
BILIRUB DIRECT SERPL-MCNC: <0.2 MG/DL (ref 0–0.3)
BILIRUB SERPL-MCNC: 0.4 MG/DL
BUN SERPL-MCNC: 10.8 MG/DL (ref 8–23)
CALCIUM SERPL-MCNC: 8.9 MG/DL (ref 8.8–10.2)
CHLORIDE SERPL-SCNC: 99 MMOL/L (ref 98–107)
CREAT SERPL-MCNC: 1.02 MG/DL (ref 0.67–1.17)
DEPRECATED HCO3 PLAS-SCNC: 25 MMOL/L (ref 22–29)
EGFRCR SERPLBLD CKD-EPI 2021: 81 ML/MIN/1.73M2
ERYTHROCYTE [DISTWIDTH] IN BLOOD BY AUTOMATED COUNT: 14.1 % (ref 10–15)
GLUCOSE BLDC GLUCOMTR-MCNC: 104 MG/DL (ref 70–99)
GLUCOSE BLDC GLUCOMTR-MCNC: 117 MG/DL (ref 70–99)
GLUCOSE BLDC GLUCOMTR-MCNC: 135 MG/DL (ref 70–99)
GLUCOSE BLDC GLUCOMTR-MCNC: 181 MG/DL (ref 70–99)
GLUCOSE BLDC GLUCOMTR-MCNC: 73 MG/DL (ref 70–99)
GLUCOSE SERPL-MCNC: 84 MG/DL (ref 70–99)
HCT VFR BLD AUTO: 36.1 % (ref 40–53)
HGB BLD-MCNC: 11.6 G/DL (ref 13.3–17.7)
HGB BLD-MCNC: 12.4 G/DL (ref 13.3–17.7)
MAGNESIUM SERPL-MCNC: 1.9 MG/DL (ref 1.7–2.3)
MCH RBC QN AUTO: 25.6 PG (ref 26.5–33)
MCHC RBC AUTO-ENTMCNC: 32.1 G/DL (ref 31.5–36.5)
MCV RBC AUTO: 80 FL (ref 78–100)
PHOSPHATE SERPL-MCNC: 3.5 MG/DL (ref 2.5–4.5)
PLATELET # BLD AUTO: 253 10E3/UL (ref 150–450)
POTASSIUM SERPL-SCNC: 3.5 MMOL/L (ref 3.4–5.3)
PROT SERPL-MCNC: 6.9 G/DL (ref 6.4–8.3)
RBC # BLD AUTO: 4.54 10E6/UL (ref 4.4–5.9)
SODIUM SERPL-SCNC: 137 MMOL/L (ref 135–145)
VIT B12 SERPL-MCNC: 429 PG/ML (ref 232–1245)
WBC # BLD AUTO: 12.1 10E3/UL (ref 4–11)

## 2023-10-29 PROCEDURE — 250N000013 HC RX MED GY IP 250 OP 250 PS 637: Performed by: INTERNAL MEDICINE

## 2023-10-29 PROCEDURE — 84100 ASSAY OF PHOSPHORUS: CPT | Performed by: INTERNAL MEDICINE

## 2023-10-29 PROCEDURE — 82310 ASSAY OF CALCIUM: CPT | Performed by: INTERNAL MEDICINE

## 2023-10-29 PROCEDURE — 250N000011 HC RX IP 250 OP 636: Mod: JZ | Performed by: FAMILY MEDICINE

## 2023-10-29 PROCEDURE — 96376 TX/PRO/DX INJ SAME DRUG ADON: CPT

## 2023-10-29 PROCEDURE — 82607 VITAMIN B-12: CPT | Performed by: INTERNAL MEDICINE

## 2023-10-29 PROCEDURE — 36415 COLL VENOUS BLD VENIPUNCTURE: CPT | Performed by: INTERNAL MEDICINE

## 2023-10-29 PROCEDURE — 83735 ASSAY OF MAGNESIUM: CPT | Performed by: INTERNAL MEDICINE

## 2023-10-29 PROCEDURE — G0378 HOSPITAL OBSERVATION PER HR: HCPCS

## 2023-10-29 PROCEDURE — 96361 HYDRATE IV INFUSION ADD-ON: CPT

## 2023-10-29 PROCEDURE — 85018 HEMOGLOBIN: CPT | Mod: 91 | Performed by: INTERNAL MEDICINE

## 2023-10-29 PROCEDURE — 258N000003 HC RX IP 258 OP 636: Performed by: INTERNAL MEDICINE

## 2023-10-29 PROCEDURE — 82248 BILIRUBIN DIRECT: CPT | Performed by: INTERNAL MEDICINE

## 2023-10-29 PROCEDURE — 85027 COMPLETE CBC AUTOMATED: CPT | Performed by: INTERNAL MEDICINE

## 2023-10-29 PROCEDURE — 82962 GLUCOSE BLOOD TEST: CPT

## 2023-10-29 PROCEDURE — 99233 SBSQ HOSP IP/OBS HIGH 50: CPT | Performed by: INTERNAL MEDICINE

## 2023-10-29 RX ORDER — METRONIDAZOLE 500 MG/100ML
500 INJECTION, SOLUTION INTRAVENOUS EVERY 12 HOURS
Status: DISCONTINUED | OUTPATIENT
Start: 2023-10-29 | End: 2023-10-30

## 2023-10-29 RX ORDER — HEPARIN SODIUM 5000 [USP'U]/.5ML
5000 INJECTION, SOLUTION INTRAVENOUS; SUBCUTANEOUS EVERY 12 HOURS
Status: DISCONTINUED | OUTPATIENT
Start: 2023-10-29 | End: 2023-10-30

## 2023-10-29 RX ADMIN — GABAPENTIN 600 MG: 300 CAPSULE ORAL at 13:58

## 2023-10-29 RX ADMIN — PANTOPRAZOLE SODIUM 40 MG: 40 TABLET, DELAYED RELEASE ORAL at 08:20

## 2023-10-29 RX ADMIN — METRONIDAZOLE 500 MG: 500 INJECTION, SOLUTION INTRAVENOUS at 02:07

## 2023-10-29 RX ADMIN — DULOXETINE HYDROCHLORIDE 60 MG: 60 CAPSULE, DELAYED RELEASE PELLETS ORAL at 08:20

## 2023-10-29 RX ADMIN — CEFTRIAXONE SODIUM 1 G: 1 INJECTION, POWDER, FOR SOLUTION INTRAMUSCULAR; INTRAVENOUS at 00:55

## 2023-10-29 RX ADMIN — INSULIN GLARGINE 25 UNITS: 100 INJECTION, SOLUTION SUBCUTANEOUS at 21:20

## 2023-10-29 RX ADMIN — GABAPENTIN 600 MG: 300 CAPSULE ORAL at 20:26

## 2023-10-29 RX ADMIN — GABAPENTIN 600 MG: 300 CAPSULE ORAL at 08:20

## 2023-10-29 RX ADMIN — MORPHINE SULFATE 30 MG: 30 TABLET, FILM COATED, EXTENDED RELEASE ORAL at 13:58

## 2023-10-29 RX ADMIN — MORPHINE SULFATE 30 MG: 30 TABLET, FILM COATED, EXTENDED RELEASE ORAL at 21:25

## 2023-10-29 RX ADMIN — PANTOPRAZOLE SODIUM 40 MG: 40 TABLET, DELAYED RELEASE ORAL at 20:26

## 2023-10-29 RX ADMIN — METRONIDAZOLE 500 MG: 500 INJECTION, SOLUTION INTRAVENOUS at 11:14

## 2023-10-29 RX ADMIN — POLYETHYLENE GLYCOL 3350 17 G: 17 POWDER, FOR SOLUTION ORAL at 08:20

## 2023-10-29 RX ADMIN — SODIUM CHLORIDE, PRESERVATIVE FREE: 5 INJECTION INTRAVENOUS at 02:06

## 2023-10-29 RX ADMIN — MORPHINE SULFATE 30 MG: 30 TABLET, FILM COATED, EXTENDED RELEASE ORAL at 05:30

## 2023-10-29 RX ADMIN — INSULIN GLARGINE 25 UNITS: 100 INJECTION, SOLUTION SUBCUTANEOUS at 08:20

## 2023-10-29 RX ADMIN — SODIUM CHLORIDE, PRESERVATIVE FREE: 5 INJECTION INTRAVENOUS at 14:01

## 2023-10-29 ASSESSMENT — ACTIVITIES OF DAILY LIVING (ADL)
ADLS_ACUITY_SCORE: 20

## 2023-10-29 NOTE — PLAN OF CARE
"PRIMARY DIAGNOSIS: \"GENERIC\" NURSING  OUTPATIENT/OBSERVATION GOALS TO BE MET BEFORE DISCHARGE:  ADLs back to baseline: Yes    Activity and level of assistance: Ambulating independently.    Pain status: Improved-controlled with oral pain medications.    Return to near baseline physical activity: Yes     Discharge Planner Nurse   Safe discharge environment identified: Yes  Barriers to discharge:  IV abx.Check in with GI tomorrow.       Entered by: Paula Garcia RN 10/29/2023 4:23 PM     Please review provider order for any additional goals.   Nurse to notify provider when observation goals have been met and patient is ready for discharge.Goal Outcome Evaluation:                        "

## 2023-10-29 NOTE — PLAN OF CARE
PRIMARY DIAGNOSIS: ACUTE PAIN  OUTPATIENT/OBSERVATION GOALS TO BE MET BEFORE DISCHARGE:  1. Pain Status: Improved-controlled with oral pain medications.    2. Return to near baseline physical activity: Yes    3. Cleared for discharge by consultants (if involved): No    Discharge Planner Nurse   Safe discharge environment identified: Yes  Barriers to discharge: Yes       Entered by: Pauline Treadwell RN 10/29/2023 7:07 AM     Please review provider order for any additional goals.   Nurse to notify provider when observation goals have been met and patient is ready for discharge.Goal Outcome Evaluation:

## 2023-10-29 NOTE — PROGRESS NOTES
"[facility] DIGESTIVE HEALTH PROGRESS NOTE      Subjective:              Feels much better today, wants to eat     Objective:                  Vital signs in last 24 hrs;  Temp:  [97.6  F (36.4  C)-99.2  F (37.3  C)] 97.8  F (36.6  C)  Pulse:  [60-99] 74  Resp:  [17-18] 18  BP: (119-173)/(59-87) 119/69  SpO2:  [95 %-100 %] 97 %    Physical Exam:   General: Comfortable appearing. Awake.   Cardiovascular: Regular rate. No edema  Chest: Non-labored breathing. Symmetric chest rise.   Abdomen: soft, non-tender, non-distended  Neurologic: Alert, no focal defects.     Current Labs:  CBC RESULTS:   Recent Labs   Lab Test 10/29/23  0629   WBC 12.1*   RBC 4.54   HGB 11.6*   HCT 36.1*   MCV 80   MCH 25.6*   MCHC 32.1   RDW 14.1           CMP Results:   Recent Labs   Lab Test 10/29/23  0803 10/29/23  0629   NA  --  137   POTASSIUM  --  3.5   CHLORIDE  --  99   CO2  --  25   ANIONGAP  --  13   * 84   BUN  --  10.8   CR  --  1.02   BILITOTAL  --  0.4   ALKPHOS  --  100   ALT  --  11   AST  --  17        INR Results:   Recent Labs   Lab Test 10/09/23  0432   INR 1.26*          Assessment:       68-year-old male with recent admission (10/5-11) for duodenal obstruction and abnormal CT who presents with recurrent abdominal pain. Repeat CT this time shows inflammation at the pancreatic head with evolving pseudocyst. I suspect pt has had acute pancreatitis all along with a smoldering course and is in process of developing a pseudocyst or \"phlegmon\". Leukocytosis likely reactive but infection cannot be excluded.  EUS/FNA on 10/6 was negative for malignancy.  WBC down to 12. No signs of GI bleed.    Plan:     Advance diet and observe.  Continue antibiotics for now.  Will likely need EUS but will discuss timing with Dr. Meyer tomorrow.    20 minutes of total time providing patient care, including patient evaluation, reviewing documents/test results, and .          Terrence Mace MD  Thank you for the opportunity to " participate in the care of this patient.   Please feel free to call me with any questions or concerns.  Phone number [phone].

## 2023-10-29 NOTE — PLAN OF CARE
Problem: Adult Inpatient Plan of Care  Goal: Plan of Care Review  Description: The Plan of Care Review/Shift note should be completed every shift.  The Outcome Evaluation is a brief statement about your assessment that the patient is improving, declining, or no change.  This information will be displayed automatically on your shift  note.  10/28/2023 2006 by Moody Briscoe RN  Outcome: Progressing     Problem: Pancreatitis  Goal: Fluid and Electrolyte Balance  10/28/2023 2006 by Moody Briscoe RN  Outcome: Progressing  10/28/2023 1616 by Moody Briscoe RN  Outcome: Progressing  10/28/2023 1616 by Moody Briscoe RN  Outcome: Progressing  Goal: Absence of Infection Signs and Symptoms  10/28/2023 2006 by Moody Briscoe RN  Outcome: Progressing  10/28/2023 1616 by Moody Briscoe RN  Outcome: Progressing  10/28/2023 1616 by Moody Briscoe RN  Outcome: Progressing  Goal: Optimal Nutrition Delivery  10/28/2023 2006 by Moody Briscoe RN  Outcome: Progressing  10/28/2023 1616 by Moody Briscoe RN  Outcome: Progressing  10/28/2023 1616 by Moody Briscoe RN  Outcome: Progressing  Goal: Optimal Pain Control and Function  10/28/2023 2006 by Moody Briscoe RN  Outcome: Progressing  10/28/2023 1616 by Moody Briscoe RN  Outcome: Progressing  10/28/2023 1616 by Moody Briscoe RN  Outcome: Progressing  Intervention: Monitor and Manage Pain  Recent Flowsheet Documentation  Taken 10/28/2023 1650 by Moody Briscoe RN  Pain Management Interventions: medication (see MAR)  Goal: Effective Oxygenation and Ventilation  10/28/2023 2006 by Moody Briscoe RN  Outcome: Progressing  10/28/2023 1616 by Moody Briscoe RN  Outcome: Progressing  10/28/2023 1616 by Moody Briscoe RN  Outcome: Progressing  Intervention: Optimize Oxygenation and Ventilation  Recent Flowsheet Documentation  Taken 10/28/2023 1608 by Moody Briscoe RN  Activity Management: activity adjusted per tolerance   Goal Outcome Evaluation:       Pt is alert and oriented times 4. Pt received abx and has NS at 10 ml  continuous. Pt pain is now at 4/10 and has been transferred to P2.     Moody Briscoe RN

## 2023-10-29 NOTE — PLAN OF CARE
PRIMARY DIAGNOSIS: ACUTE PAIN  OUTPATIENT/OBSERVATION GOALS TO BE MET BEFORE DISCHARGE:  1. Pain Status: Improved-controlled with oral pain medications.    2. Return to near baseline physical activity: No    3. Cleared for discharge by consultants (if involved): No    Discharge Planner Nurse   Safe discharge environment identified: Yes  Barriers to discharge: No       Entered by: Josefa Townsend RN 10/28/2023 10:10 PM     Please review provider order for any additional goals.   Nurse to notify provider when observation goals have been met and patient is ready for discharge.  Goal Outcome Evaluation:  Patient may have a biopsy for pancreatic cyst to rule out cancer, patient has poor oral intake with intermittent nausea, good urine output, pain has subsided with medication, blood glucose has come down and VSS.       Plan of Care Reviewed With: patient    Overall Patient Progress: improvingOverall Patient Progress: improving

## 2023-10-29 NOTE — PLAN OF CARE
"PRIMARY DIAGNOSIS: \"GENERIC\" NURSING  OUTPATIENT/OBSERVATION GOALS TO BE MET BEFORE DISCHARGE:  ADLs back to baseline: Yes    Activity and level of assistance: Ambulating independently.    Pain status: Improved-controlled with oral pain medications.    Return to near baseline physical activity: Yes     Discharge Planner Nurse   Safe discharge environment identified: Yes  Barriers to discharge: Yes, currently on IV antibiotics. Pt is hoping to discharge tomorrow, provider is aware. Potential for having an EUS, unclear if this will be done while here or not. Pt was requesting a COVID booster prior to discharge, per discussion with pharmacy we are only doing these inpatient if required for discharge, pt and provider informed.        Entered by: Irish Hatfield RN 10/29/2023 3:11 PM     Please review provider order for any additional goals.   Nurse to notify provider when observation goals have been met and patient is ready for discharge.                        "

## 2023-10-29 NOTE — PROGRESS NOTES
[facility]    Medicine Progress Note - Hospitalist Service    Date of Admission:  10/27/2023    Assessment & Plan     Bon Luque is a 68 year old male with h/o recent SBO (hospitalized 10/05/23- 10/11/23), DM2, CAD, HTN, severe protein calorie malnutrition, previous DVT(upper picc related-10/9/23) Psoriatic arthritis, fibromyalgia and chronic pain who is  admitted on 10/27/2023 due to Abdominal Pain, concern ongoing pancreatitis      1.Abdominal Pain, Possible Pancreatic Pseudocyst-    - recently underwent EUS/FNA  on 10/6/23 which showed few abnormal lymph nodes in the peripancreatic region. Path findings show no malignancy and suggestive of a granuloma.   - CT A/P  Inflammatory changes involving the region of the pancreatic head and proximal duodenum, overall slightly less prominent than 10/05/2023. Findings could be seen with acute pancreatitis, duodenitis or a combination. An underlying mass or neoplastic process is not excluded although less likely in light of the interval biopsy findings. A more conspicuous complex cystic structure in the region of the pancreatic head may represent a pancreatic pseudocyst. There may be some associated mild biliary obstruction with mild prominence and mucosal hyperenhancement of the extra hepatic bile duct. No current evidence of bowel obstruction. Prior findings of gastric/proximal duodenal obstruction have resolved. Mild upper abdominal adenopathy, increased, indeterminate although may be inflammatory.   - US Limited visualization of the pancreas with a complex hypoechoic area in the region of the head of the pancreas which appears to correspond with a complex cystic area seen in this region on CT. This is indeterminate on ultrasound and overall better evaluated on CT. See that report for those details and correlation with previous workup in this area.  - Appreciate GI consult discussed with Dr Mace, believes possible smoldering  pancreatitis and  developing pseudocyst. Plan for possible repeat EUS--timing yet TBD--Go to check in with  Monday, tomorrow. Please note his DOAC has been held in case he needs procedure early this week  - clears and ADAT --see how diet goes today  -  nausea ad scopolamine patch --seems to help  -if he tolerates diet and if GI post pones procedures, pt is wondering about when he can go home     2.Leukocytosis- Afebrile. 19 on admit -->today 12  - CT as above  - Possible Pseudocyst GI agrees continue abx for now. Empiric Rocephin and Flagyl. - trend CBC   -will check in again with GI tomorrow on planned duration here and if any need po antibiotics when he goes home    3.Reported black stool yesterday  -I did relay this to GI  -has had recent scopes 10/6 with EUS and cx--chronic duodenitis noted--on ppi  -rechecked hgb this afternoon and 12     4.Hyponatremia   - better control of BG  - ivf, now advancing diet, decrease rate now  - recheck in am       5.Ataxia- CT head had no acute findings. Fall precautions. PT evaluate and treat. Improved  Check b12    6.DM2- uncontrolled  - last A1c 8  - Lantus at home 50 units BID, re-ordered 25 units BID and not given this morning unfortunately and BG still high  - change to high resistant sliding scale  - DM diet  - holding metformin      7.Hx of DVT-   - holding DOAC for now in the event EUS  -this DVT was on 10/9/23 and was in arm, picc line associated --it was removed on that side  -would recommend subcutaneous hep now (hold off on full anticoagulation with reported dark stool,monitor hgb--did discuss with pt)     8.Chronic Pain  - continue home MS contin   - prn IV morphine--last does 10/28  - continue gabapentin and Cymbalta      9. Overweight: Estimated body mass index is 28.31 kg/m  as calculated from the following:    I updated wife at bedside at 1628      Diet: Consistent Carbohydrate Diet Moderate Consistent Carb (60 g CHO per Meal) Diet    DVT Prophylaxis: doac held for  possible GI procedure and will use sq hep now,  with report black stool, watch hgb  Seals Catheter: Not present  Lines: None     Cardiac Monitoring: None  Code Status: Full Code      Clinically Significant Risk Factors Present on Admission         # Hyponatremia: Lowest Na = 127 mmol/L in last 2 days, will monitor as appropriate   # Hypercalcemia: corrected calcium is >10.1, will monitor as appropriate    # Hypoalbuminemia: Lowest albumin = 3.1 g/dL at 10/29/2023  6:29 AM, will monitor as appropriate  # Drug Induced Coagulation Defect: home medication list includes an anticoagulant medication    # Hypertension: Noted on problem list     # DMII: A1C = 8.0 % (Ref range: <5.7 %) within past 6 months    # Overweight: Estimated body mass index is 26.85 kg/m  as calculated from the following:    Height as of 10/16/23: 1.829 m (6').    Weight as of this encounter: 89.8 kg (198 lb).              Disposition Plan      Expected Discharge Date: 10/30/2023                    Sharmila Jean MD  Hospitalist Service  [facility]  Securely message with [employer] (more info)  Text page via [employer] Paging/Directory   ______________________________________________________________________    Interval History   He is asking about when he can leave  He notes still some pain upper abd, overall less  Tolerating clears  Has been off DOAC 2 days in hopes of EUS this week, had clot in arm with picc 3 weeks ago  Told me 1 dark stool yesterday in am      Physical Exam   Vital Signs: Temp: 97.7  F (36.5  C) Temp src: Oral BP: 131/60 Pulse: 76   Resp: 16 SpO2: 99 % O2 Device: None (Room air)    Weight: 198 lbs 0 oz    Constitutional: awake, alert, cooperative, and no apparent distress  Respiratory: No increased work of breathing, good air exchange, clear to auscultation bilaterally, no crackles or wheezing  Cardiovascular: Normal apical impulse, regular rate and rhythm, normal S1 and S2, no S3 or S4, and no murmur  noted  GI: normal bowel sounds, soft, non-distended, and tenderness noted in the epigastric region and and just to right of epigastric area  Skin: no bruising or bleeding  Musculoskeletal: no lower extremity pitting edema present  Neurologic: Mental Status Exam:  Level of Alertness:   awake  Neuropsychiatric: General: normal, calm, and normal eye contact    Medical Decision Making       55 MINUTES SPENT BY ME on the date of service doing chart review, history, exam, documentation & further activities per the note.      Data     I have personally reviewed the following data over the past 24 hrs:    12.1 (H)  \   12.4 (L)   / 253     137 99 10.8 /  104 (H)   3.5 25 1.02 \     ALT: 11 AST: 17 AP: 100 TBILI: 0.4   ALB: 3.1 (L) TOT PROTEIN: 6.9 LIPASE: N/A     Procal: N/A CRP: N/A Lactic Acid: 1.9         Imaging results reviewed over the past 24 hrs:   No results found for this or any previous visit (from the past 24 hour(s)).

## 2023-10-29 NOTE — PLAN OF CARE
"PRIMARY DIAGNOSIS: \"GENERIC\" NURSING  OUTPATIENT/OBSERVATION GOALS TO BE MET BEFORE DISCHARGE:  ADLs back to baseline: Yes    Activity and level of assistance: Up with standby assistance.    Pain status: Improved-controlled with oral pain medications.    Return to near baseline physical activity: Yes     Discharge Planner Nurse   Safe discharge environment identified: No  Barriers to discharge: Yes       Entered by: Pauline Treadwell RN 10/29/2023 3:07 AM     Please review provider order for any additional goals.   Nurse to notify provider when observation goals have been met and patient is ready for discharge.Goal Outcome Evaluation:      "

## 2023-10-29 NOTE — PLAN OF CARE
PRIMARY DIAGNOSIS: ACUTE PAIN  OUTPATIENT/OBSERVATION GOALS TO BE MET BEFORE DISCHARGE:  1. Pain Status: Improved-controlled with oral pain medications.    2. Return to near baseline physical activity: Yes    3. Cleared for discharge by consultants (if involved): No    Discharge Planner Nurse   Safe discharge environment identified: Yes  Barriers to discharge: Yes, GI following, possible EUS needed       Entered by: Irish Hatfield RN 10/29/2023 7:56 AM     Please review provider order for any additional goals.   Nurse to notify provider when observation goals have been met and patient is ready for discharge.

## 2023-10-30 VITALS
DIASTOLIC BLOOD PRESSURE: 62 MMHG | TEMPERATURE: 97.7 F | BODY MASS INDEX: 26.85 KG/M2 | RESPIRATION RATE: 18 BRPM | WEIGHT: 198 LBS | OXYGEN SATURATION: 99 % | HEART RATE: 60 BPM | SYSTOLIC BLOOD PRESSURE: 121 MMHG

## 2023-10-30 LAB
ANION GAP SERPL CALCULATED.3IONS-SCNC: 12 MMOL/L (ref 7–15)
BUN SERPL-MCNC: 10.4 MG/DL (ref 8–23)
CALCIUM SERPL-MCNC: 8.4 MG/DL (ref 8.8–10.2)
CHLORIDE SERPL-SCNC: 102 MMOL/L (ref 98–107)
CREAT SERPL-MCNC: 1.06 MG/DL (ref 0.67–1.17)
DEPRECATED HCO3 PLAS-SCNC: 25 MMOL/L (ref 22–29)
EGFRCR SERPLBLD CKD-EPI 2021: 77 ML/MIN/1.73M2
ERYTHROCYTE [DISTWIDTH] IN BLOOD BY AUTOMATED COUNT: 14.2 % (ref 10–15)
GLUCOSE BLDC GLUCOMTR-MCNC: 108 MG/DL (ref 70–99)
GLUCOSE BLDC GLUCOMTR-MCNC: 119 MG/DL (ref 70–99)
GLUCOSE BLDC GLUCOMTR-MCNC: 171 MG/DL (ref 70–99)
GLUCOSE SERPL-MCNC: 111 MG/DL (ref 70–99)
HCT VFR BLD AUTO: 34.1 % (ref 40–53)
HGB BLD-MCNC: 11.1 G/DL (ref 13.3–17.7)
MAGNESIUM SERPL-MCNC: 1.9 MG/DL (ref 1.7–2.3)
MCH RBC QN AUTO: 26.3 PG (ref 26.5–33)
MCHC RBC AUTO-ENTMCNC: 32.6 G/DL (ref 31.5–36.5)
MCV RBC AUTO: 81 FL (ref 78–100)
PATH REPORT.ADDENDUM SPEC: ABNORMAL
PATH REPORT.ADDENDUM SPEC: ABNORMAL
PATH REPORT.COMMENTS IMP SPEC: ABNORMAL
PATH REPORT.COMMENTS IMP SPEC: YES
PATH REPORT.FINAL DX SPEC: ABNORMAL
PATH REPORT.GROSS SPEC: ABNORMAL
PATH REPORT.MICROSCOPIC SPEC OTHER STN: ABNORMAL
PATH REPORT.RELEVANT HX SPEC: ABNORMAL
PLATELET # BLD AUTO: 236 10E3/UL (ref 150–450)
POTASSIUM SERPL-SCNC: 3.8 MMOL/L (ref 3.4–5.3)
RBC # BLD AUTO: 4.22 10E6/UL (ref 4.4–5.9)
SODIUM SERPL-SCNC: 139 MMOL/L (ref 135–145)
WBC # BLD AUTO: 7 10E3/UL (ref 4–11)

## 2023-10-30 PROCEDURE — 36415 COLL VENOUS BLD VENIPUNCTURE: CPT | Performed by: INTERNAL MEDICINE

## 2023-10-30 PROCEDURE — 99239 HOSP IP/OBS DSCHRG MGMT >30: CPT | Performed by: INTERNAL MEDICINE

## 2023-10-30 PROCEDURE — G0378 HOSPITAL OBSERVATION PER HR: HCPCS

## 2023-10-30 PROCEDURE — 80048 BASIC METABOLIC PNL TOTAL CA: CPT | Performed by: INTERNAL MEDICINE

## 2023-10-30 PROCEDURE — 96376 TX/PRO/DX INJ SAME DRUG ADON: CPT

## 2023-10-30 PROCEDURE — 82962 GLUCOSE BLOOD TEST: CPT

## 2023-10-30 PROCEDURE — 83735 ASSAY OF MAGNESIUM: CPT | Performed by: INTERNAL MEDICINE

## 2023-10-30 PROCEDURE — 85018 HEMOGLOBIN: CPT | Performed by: INTERNAL MEDICINE

## 2023-10-30 PROCEDURE — 250N000011 HC RX IP 250 OP 636: Mod: JZ | Performed by: FAMILY MEDICINE

## 2023-10-30 PROCEDURE — 96361 HYDRATE IV INFUSION ADD-ON: CPT

## 2023-10-30 PROCEDURE — 250N000011 HC RX IP 250 OP 636: Mod: JZ | Performed by: INTERNAL MEDICINE

## 2023-10-30 PROCEDURE — 250N000013 HC RX MED GY IP 250 OP 250 PS 637: Performed by: INTERNAL MEDICINE

## 2023-10-30 RX ORDER — CEFDINIR 300 MG/1
300 CAPSULE ORAL EVERY 12 HOURS
Qty: 12 CAPSULE | Refills: 0 | Status: SHIPPED | OUTPATIENT
Start: 2023-10-30 | End: 2023-11-05

## 2023-10-30 RX ORDER — NALOXONE HYDROCHLORIDE 0.4 MG/ML
0.4 INJECTION, SOLUTION INTRAMUSCULAR; INTRAVENOUS; SUBCUTANEOUS
Status: DISCONTINUED | OUTPATIENT
Start: 2023-10-30 | End: 2023-10-30 | Stop reason: HOSPADM

## 2023-10-30 RX ORDER — NALOXONE HYDROCHLORIDE 0.4 MG/ML
0.2 INJECTION, SOLUTION INTRAMUSCULAR; INTRAVENOUS; SUBCUTANEOUS
Status: DISCONTINUED | OUTPATIENT
Start: 2023-10-30 | End: 2023-10-30 | Stop reason: HOSPADM

## 2023-10-30 RX ORDER — METRONIDAZOLE 500 MG/1
500 TABLET ORAL 3 TIMES DAILY
Qty: 18 TABLET | Refills: 0 | Status: SHIPPED | OUTPATIENT
Start: 2023-10-30 | End: 2023-11-05

## 2023-10-30 RX ORDER — METRONIDAZOLE 500 MG/1
500 TABLET ORAL 3 TIMES DAILY
Status: DISCONTINUED | OUTPATIENT
Start: 2023-10-30 | End: 2023-10-30 | Stop reason: HOSPADM

## 2023-10-30 RX ORDER — CEFDINIR 300 MG/1
300 CAPSULE ORAL EVERY 12 HOURS SCHEDULED
Status: DISCONTINUED | OUTPATIENT
Start: 2023-10-30 | End: 2023-10-30 | Stop reason: HOSPADM

## 2023-10-30 RX ORDER — INSULIN GLARGINE-YFGN 100 [IU]/ML
25 INJECTION, SOLUTION SUBCUTANEOUS 2 TIMES DAILY
Start: 2023-10-30 | End: 2023-12-04

## 2023-10-30 RX ADMIN — POLYETHYLENE GLYCOL 3350 17 G: 17 POWDER, FOR SOLUTION ORAL at 09:12

## 2023-10-30 RX ADMIN — PANTOPRAZOLE SODIUM 40 MG: 40 TABLET, DELAYED RELEASE ORAL at 09:10

## 2023-10-30 RX ADMIN — GABAPENTIN 600 MG: 300 CAPSULE ORAL at 09:11

## 2023-10-30 RX ADMIN — MORPHINE SULFATE 30 MG: 30 TABLET, FILM COATED, EXTENDED RELEASE ORAL at 05:46

## 2023-10-30 RX ADMIN — DULOXETINE HYDROCHLORIDE 60 MG: 60 CAPSULE, DELAYED RELEASE PELLETS ORAL at 09:11

## 2023-10-30 RX ADMIN — METRONIDAZOLE 500 MG: 500 INJECTION, SOLUTION INTRAVENOUS at 00:01

## 2023-10-30 RX ADMIN — CEFTRIAXONE SODIUM 1 G: 1 INJECTION, POWDER, FOR SOLUTION INTRAMUSCULAR; INTRAVENOUS at 01:45

## 2023-10-30 RX ADMIN — METRONIDAZOLE 500 MG: 500 INJECTION, SOLUTION INTRAVENOUS at 11:05

## 2023-10-30 RX ADMIN — INSULIN GLARGINE 25 UNITS: 100 INJECTION, SOLUTION SUBCUTANEOUS at 09:12

## 2023-10-30 ASSESSMENT — ACTIVITIES OF DAILY LIVING (ADL)
ADLS_ACUITY_SCORE: 20

## 2023-10-30 NOTE — PLAN OF CARE
RN discussed discharge instructions with patient.  Patient verbalized understanding and voiced no concerns at this time.  AVS provided.  Patient picking medications up at home pharmacy.  Patient will discharge home with family once available.

## 2023-10-30 NOTE — PLAN OF CARE
"PRIMARY DIAGNOSIS: \"GENERIC\" NURSING  OUTPATIENT/OBSERVATION GOALS TO BE MET BEFORE DISCHARGE:  ADLs back to baseline: Yes    Activity and level of assistance: Ambulating independently.    Pain status: Improved-controlled with oral pain medications.    Return to near baseline physical activity: Yes     Discharge Planner Nurse   Safe discharge environment identified: Yes  Barriers to discharge:  Maintenance IVF. Check with GI tomorrow.  IV abx.       Entered by: Paula Garcia RN 10/29/2023 10:58 PM     Please review provider order for any additional goals.   Nurse to notify provider when observation goals have been met and patient is ready for discharge.Goal Outcome Evaluation:                        "

## 2023-10-30 NOTE — PROGRESS NOTES
[facility]    Medicine Progress Note - Hospitalist Service    Date of Admission:  10/27/2023    Assessment & Plan   Bon Luque is a 68 year old male with h/o recent SBO (hospitalized 10/05/23- 10/11/23), DM2, CAD, HTN, severe protein calorie malnutrition, previous DVT(upper picc related-10/9/23) Psoriatic arthritis, fibromyalgia and chronic pain who is  admitted on 10/27/2023 due to Abdominal Pain, concern ongoing pancreatitis      1.Abdominal Pain, Possible Pancreatic Pseudocyst-    - recently underwent EUS/FNA  on 10/6/23 which showed few abnormal lymph nodes in the peripancreatic region. Path findings show no malignancy and suggestive of a granuloma.   - CT A/P  Inflammatory changes involving the region of the pancreatic head and proximal duodenum, overall slightly less prominent than 10/05/2023. Findings could be seen with acute pancreatitis, duodenitis or a combination. An underlying mass or neoplastic process is not excluded although less likely in light of the interval biopsy findings. A more conspicuous complex cystic structure in the region of the pancreatic head may represent a pancreatic pseudocyst. There may be some associated mild biliary obstruction with mild prominence and mucosal hyperenhancement of the extra hepatic bile duct. No current evidence of bowel obstruction. Prior findings of gastric/proximal duodenal obstruction have resolved. Mild upper abdominal adenopathy, increased, indeterminate although may be inflammatory.   - US Limited visualization of the pancreas with a complex hypoechoic area in the region of the head of the pancreas which appears to correspond with a complex cystic area seen in this region on CT. This is indeterminate on ultrasound and overall better evaluated on CT. See that report for those details and correlation with previous workup in this area.  - Appreciate GI consult discussed with Dr Mace, believes possible smoldering  pancreatitis and  developing pseudocyst. Plan for possible repeat EUS--timing yet TBD--Go to check in with  Monday, tomorrow. Please note his DOAC has been held in case he needs procedure early this week  - advanced diet ,doing well  -  nausea ad scopolamine patch --helped here  -per --EUS in 4-6 weeks,ok with  po antibiotics for discharge      2.Leukocytosis- Afebrile. 19 on admit -->12-->7  - CT as above  - Possible Pseudocyst GI agrees continue abx for now. Empiric Rocephin and Flagyl. - trend CBC   -likely course of po antibiotics at discharge-->Omnicef + flagyl    3.Reported black stool 2 days ago  -I did relay this to GI  -has had recent scopes 10/6 with EUS and cx--chronic duodenitis noted--on ppi  -hgb stable and no more reported such stools     4.Hyponatremia   - better control of BG  - ivf--ok stop ivf  -      5.Ataxia- CT head had no acute findings. Fall precautions. PT evaluate and treat. Improved  Check b12--ok  -resolved    6.DM2- uncontrolled  - last A1c 8  - Lantus at home 50 units BID, re-ordered 25 units BID   - change to high resistant sliding scale  - DM diet  - holding metformin -restart at discharge     7.Hx of DVT-   - holding DOAC for now in the event EUS  -this DVT was on 10/9/23 and was in arm, picc line associated --it was removed on that side  -10/29 subcutaneous hep    -10/30--waiting to hear back GI, if no procedures restart DOAC     8.Chronic Pain  - continue home MS contin   - prn IV morphine--last does 10/28  - continue gabapentin and Cymbalta   -not needing any extra pain meds     9. Overweight: Estimated body mass index is 28.31 kg/m  as calculated from the following:      Dispo--waiting to hear back from GI on dispo, timing of future procedures         Diet: Consistent Carbohydrate Diet Moderate Consistent Carb (60 g CHO per Meal) Diet    DVT Prophylaxis: got hep subcutaneous last pm, will restart doac if GI not doing procedures today  Seals Catheter: Not present  Lines:  None     Cardiac Monitoring: None  Code Status: Full Code      Clinically Significant Risk Factors Present on Admission              # Hypoalbuminemia: Lowest albumin = 3.1 g/dL at 10/29/2023  6:29 AM, will monitor as appropriate  # Drug Induced Coagulation Defect: home medication list includes an anticoagulant medication    # Hypertension: Noted on problem list     # DMII: A1C = 8.0 % (Ref range: <5.7 %) within past 6 months    # Overweight: Estimated body mass index is 26.85 kg/m  as calculated from the following:    Height as of 10/16/23: 1.829 m (6').    Weight as of this encounter: 89.8 kg (198 lb).              Disposition Plan      Expected Discharge Date: 11/02/2023                    Sharmila Jean MD  Hospitalist Service  [facility]  Securely message with [employer] (more info)  Text page via [employer] Paging/Directory   ______________________________________________________________________    Interval History   Feels good  No abd pain with advancing diet  Wants to go home if no procedures    Physical Exam   Vital Signs: Temp: 97.6  F (36.4  C) Temp src: Oral BP: 133/74 Pulse: 66   Resp: 20 SpO2: 98 % O2 Device: None (Room air)    Weight: 198 lbs 0 oz    Constitutional: awake, alert, cooperative, and no apparent distress  Respiratory: No increased work of breathing, good air exchange, clear to auscultation bilaterally, no crackles or wheezing  Cardiovascular: Normal apical impulse, regular rate and rhythm, normal S1 and S2, no S3 or S4, and no murmur noted  GI: normal bowel sounds, soft, non-distended, and non-tender  Skin: no bruising or bleeding  Musculoskeletal: no lower extremity pitting edema present  Neurologic: Mental Status Exam:  Level of Alertness:   awake  Neuropsychiatric: General: normal, calm, and normal eye contact        Data     I have personally reviewed the following data over the past 24 hrs:    7.0  \   11.1 (L)   / 236     139 102 10.4 /  108 (H)   3.8 25 1.06 \        Imaging results reviewed over the past 24 hrs:   No results found for this or any previous visit (from the past 24 hour(s)).

## 2023-10-30 NOTE — DISCHARGE SUMMARY
[facility] MEDICINE  DISCHARGE SUMMARY     Primary Care Physician: Aditya Munoz  Admission Date: 10/27/2023   Discharge Provider: Sharmila Jean MD Discharge Date: 10/30/2023   Diet:   Active Diet and Nourishment Order   Procedures    Consistent Carbohydrate Diet Moderate Consistent Carb (60 g CHO per Meal) Diet    Diet       Code Status: Full Code   Activity: DCACTIVITY: Activity as tolerated        Condition at Discharge: Fair     REASON FOR PRESENTATION(See Admission Note for Details)   Abd pain    PRINCIPAL & ACTIVE DISCHARGE DIAGNOSES     Principal Problem:    Leukocytosis, unspecified type  Active Problems:    HTN (hypertension)    Type 2 diabetes mellitus with complication, with long-term current use of insulin (H)    Chronic pain syndrome    Hyponatremia    Pain of upper abdomen    Pseudocyst of pancreas      PENDING LABS     Unresulted Labs Ordered in the Past 30 Days of this Admission       No orders found from 9/27/2023 to 10/28/2023.              PROCEDURES ( this hospitalization only)          RECOMMENDATIONS TO OUTPATIENT PROVIDER FOR F/U VISIT     Follow-up Appointments     Follow-up and recommended labs and tests       Follow up with primary care provider, Aditya Munoz, within 4-5   days for hospital follow- up.  The following labs/tests are recommended:   bmp, cbc.    Follow up with Dr. Meyer 4-6 weeks for EUS                DISPOSITION     Home    SUMMARY OF HOSPITAL COURSE:      Bon Luque is a 68 year old male with h/o recent SBO (hospitalized 10/05/23- 10/11/23), DM2, CAD, HTN, severe protein calorie malnutrition, previous DVT(upper picc related-10/9/23) Psoriatic arthritis, fibromyalgia and chronic pain who is  admitted on 10/27/2023 due to Abdominal Pain, concern ongoing pancreatitis      1.Abdominal Pain, Possible Pancreatic Pseudocyst-    - recently underwent EUS/FNA  on 10/6/23 which showed few abnormal lymph nodes in the  peripancreatic region. Path findings show no malignancy and suggestive of a granuloma.   - CT A/P  Inflammatory changes involving the region of the pancreatic head and proximal duodenum, overall slightly less prominent than 10/05/2023. Findings could be seen with acute pancreatitis, duodenitis or a combination. An underlying mass or neoplastic process is not excluded although less likely in light of the interval biopsy findings. A more conspicuous complex cystic structure in the region of the pancreatic head may represent a pancreatic pseudocyst. There may be some associated mild biliary obstruction with mild prominence and mucosal hyperenhancement of the extra hepatic bile duct. No current evidence of bowel obstruction. Prior findings of gastric/proximal duodenal obstruction have resolved. Mild upper abdominal adenopathy, increased, indeterminate although may be inflammatory.   - US Limited visualization of the pancreas with a complex hypoechoic area in the region of the head of the pancreas which appears to correspond with a complex cystic area seen in this region on CT. This is indeterminate on ultrasound and overall better evaluated on CT. See that report for those details and correlation with previous workup in this area.  - Appreciate GI consult discussed with Dr Mace, believes possible smoldering  pancreatitis and developing pseudocyst. Plan for possible repeat EUS--timing yet TBD-- to check in with  Monday, tomorrow. Please note his DOAC has been held in case he needs procedure early this week  - advanced diet ,doing well  -  nausea ad scopolamine patch --helped here  -per --EUS in 4-6 weeks,ok with  po antibiotics for discharge      2.Leukocytosis- Afebrile. 19 on admit -->12-->7  - CT as above  - Possible Pseudocyst GI agrees continue abx for now. Empiric Rocephin and Flagyl. - trend CBC   -likely course of po antibiotics at discharge-->Omnicef + flagyl     3.Reported black stool 2  days ago  -I did relay this to GI  -has had recent scopes 10/6 with EUS and cx--chronic duodenitis noted--on ppi  -hgb stable and no more reported such stools     4.Hyponatremia   - better control of BG  - ivf--ok stop ivf  -      5.Ataxia- CT head had no acute findings. Fall precautions. PT evaluate and treat. Improved  Check b12--ok  -resolved     6.DM2- uncontrolled  - last A1c 8  - Lantus at home 50 units BID, re-ordered 25 units BID   - change to high resistant sliding scale  - DM diet  - holding metformin -restart at discharge     7.Hx of DVT-   - holding DOAC for now in the event EUS  -this DVT was on 10/9/23 and was in arm, picc line associated --it was removed on that side  -10/29 subcutaneous hep    -10/30--waiting to hear back GI, if no procedures restart DOAC     8.Chronic Pain  - continue home MS contin   - prn IV morphine--last does 10/28  - continue gabapentin and Cymbalta   -not needing any extra pain meds     9. Overweight: Estimated body mass index is 28.31 kg/m  as calculated from the following:          Discharge Medications with Med changes:     Current Discharge Medication List        START taking these medications    Details   cefdinir (OMNICEF) 300 MG capsule Take 1 capsule (300 mg) by mouth every 12 hours for 6 days  Qty: 12 capsule, Refills: 0    Associated Diagnoses: Pseudocyst of pancreas      metroNIDAZOLE (FLAGYL) 500 MG tablet Take 1 tablet (500 mg) by mouth 3 times daily for 6 days  Qty: 18 tablet, Refills: 0    Associated Diagnoses: Pseudocyst of pancreas           CONTINUE these medications which have CHANGED    Details   Insulin Glargine-yfgn 100 UNIT/ML SOPN Inject 25 Units Subcutaneous 2 times daily    Associated Diagnoses: Type 2 diabetes mellitus with complication, with long-term current use of insulin (H)           CONTINUE these medications which have NOT CHANGED    Details   apixaban ANTICOAGULANT (ELIQUIS) 5 MG tablet Take 1 tablet (5 mg) by mouth 2 times daily  Qty:  60 tablet, Refills: 1    Associated Diagnoses: Acute deep vein thrombosis (DVT) of axillary vein of right upper extremity (H)      DULoxetine (CYMBALTA) 60 MG capsule Take 1 capsule (60 mg) by mouth daily for 30 days  Qty: 30 capsule, Refills: 0    Associated Diagnoses: Current moderate episode of major depressive disorder, unspecified whether recurrent (H)      gabapentin (NEURONTIN) 300 MG capsule [GABAPENTIN (NEURONTIN) 300 MG CAPSULE] Take 600 mg by mouth 3 (three) times a day.       insulin lispro (HUMALOG KWIKPEN) 100 UNIT/ML (1 unit dial) KWIKPEN Inject Subcutaneous 3 times daily (before meals) Sliding scale. Average use is 20 units with each meal      metFORMIN (GLUCOPHAGE-XR) 750 MG 24 hr tablet Take 1 tablet (750 mg) by mouth daily (with dinner)  Qty: 90 tablet, Refills: 3    Associated Diagnoses: Type 2 diabetes mellitus with complication, with long-term current use of insulin (H)      morphine (MS CONTIN) 30 MG CR tablet Take 30 mg by mouth every 8 hours      naloxone (NARCAN) 4 MG/0.1ML nasal spray Spray 1 spray into one nostril alternating nostrils once as needed for opioid reversal      pantoprazole (PROTONIX) 40 MG EC tablet Take 1 tablet (40 mg) by mouth 2 times daily for 30 days  Qty: 60 tablet, Refills: 0    Associated Diagnoses: Abdominal pain, epigastric      polyethylene glycol (MIRALAX) 17 GM/Dose powder Take 17 g by mouth daily  Qty: 510 g, Refills: 1    Comments: Hold for more than 3 stools in 24 hrs. Goal of 1-2 soft stools daily  Associated Diagnoses: SBO (small bowel obstruction) (H); Chronic pain syndrome      testosterone (ANDROGEL) 20.25 MG/1.25GM (1.62%) topical gel Place 20.25 mg onto the skin daily      valACYclovir (VALTREX) 500 MG tablet Take 500 mg by mouth 2 times daily as needed Take twice daily at onset of symptoms for 3 days. Repeat for flares.      Alcohol Swabs PADS Use to swab the area of the injection or sherry as directed Per insurance coverage  Qty: 100 each, Refills: 0  "   Associated Diagnoses: Type 2 diabetes mellitus with insulin therapy (H)      B-D U/F 31G X 8 MM insulin pen needle FOR ADMINISTERING INSULIN AT HOME      blood glucose (NO BRAND SPECIFIED) test strip To use to test glucose level in the blood Use to test blood sugar  4 times daily as directed. To accompany glucose monitor brands per insurance coverage.  Qty: 100 strip, Refills: 0    Associated Diagnoses: Type 2 diabetes mellitus with insulin therapy (H)      Continuous Blood Gluc Sensor (FREESTYLE SHIRA 14 DAY SENSOR) MISC       Sharps Container MISC Use as directed to dispose of needles, lancets and other sharps Per Insurance coverage  Qty: 1 each, Refills: 0    Associated Diagnoses: Type 2 diabetes mellitus with insulin therapy (H)                   Rationale for medication changes:      Omnicef x 6 more days  Flagyl x 6 more days        Consults       GASTROENTEROLOGY IP CONSULT    Immunizations given this encounter     Most Recent Immunizations   Administered Date(s) Administered    COVID-19 MONOVALENT 12+ (Pfizer) 01/14/2022    Flu, Unspecified 10/25/2016    Influenza (IIV3) PF 12/09/2013    Influenza Vaccine 65+ (Fluzone HD) 09/20/2023    Influenza Vaccine >6 months (Alfuria,Fluzone) 01/14/2022    RSV Vaccine (Arexvy) 09/16/2023    TDAP (Adacel,Boostrix) 10/02/2017           Anticoagulation Information      Recent INR results: No results for input(s): \"INR\" in the last 168 hours.  Warfarin doses (if applicable) or name of other anticoagulant: doac--this is for upper arm DVT--that was picc related 3 weeks ago      SIGNIFICANT IMAGING FINDINGS     Results for orders placed or performed during the hospital encounter of 10/27/23   CT Abdomen Pelvis w Contrast    Impression    IMPRESSION:   1. Inflammatory changes involving the region of the pancreatic head and proximal duodenum, overall slightly less prominent than 10/05/2023. Findings could be seen with acute pancreatitis, duodenitis or a combination. An " underlying mass or neoplastic   process is not excluded although less likely in light of the interval biopsy findings.  2. A more conspicuous complex cystic structure in the region of the pancreatic head may represent a pancreatic pseudocyst.  3. There may be some associated mild biliary obstruction with mild prominence and mucosal hyperenhancement of the extra hepatic bile duct.  4. No current evidence of bowel obstruction. Prior findings of gastric/proximal duodenal obstruction have resolved.  5. Mild upper abdominal adenopathy, increased, indeterminate although may be inflammatory.   Head CT w/o contrast    Impression    IMPRESSION:  1.  No acute intracranial process.   US Abdomen Limited    Impression    IMPRESSION:  1.  Cholecystectomy. Bile ducts within normal limits for the postcholecystectomy state.    2.  Mild diffuse fatty infiltration of the liver.    3.  Limited visualization of the pancreas with a complex hypoechoic area in the region of the head of the pancreas which appears to correspond with a complex cystic area seen in this region on CT. This is indeterminate on ultrasound and overall better   evaluated on CT. See that report for those details and correlation with previous workup in this area.    4.  Focal cortical scarring in the right kidney laterally. No hydronephrosis.       SIGNIFICANT LABORATORY FINDINGS     Most Recent 3 CBC's:  Recent Labs   Lab Test 10/30/23  0642 10/29/23  1411 10/29/23  0629 10/28/23  0756   WBC 7.0  --  12.1* 16.9*   HGB 11.1* 12.4* 11.6* 13.2*   MCV 81  --  80 79     --  253 267     Most Recent 3 BMP's:  Recent Labs   Lab Test 10/30/23  0812 10/30/23  0642 10/30/23  0443 10/29/23  0803 10/29/23  0629 10/28/23  1156 10/28/23  0756   NA  --  139  --   --  137  --  131*   POTASSIUM  --  3.8  --   --  3.5  --  4.5   CHLORIDE  --  102  --   --  99  --  92*   CO2  --  25  --   --  25  --  25   BUN  --  10.4  --   --  10.8  --  10.8   CR  --  1.06  --   --  1.02  --   0.93   ANIONGAP  --  12  --   --  13  --  14   MIGUEL  --  8.4*  --   --  8.9  --  9.5   * 111* 119*   < > 84   < > 348*    < > = values in this interval not displayed.       US Abdomen Limited    Result Date: 10/28/2023  EXAM: US ABDOMEN LIMITED LOCATION: [facility] DATE: 10/28/2023 INDICATION: Epigastric pain. COMPARISON: CT from 10/27/2023. TECHNIQUE: Limited abdominal ultrasound. FINDINGS: GALLBLADDER: Surgically absent. BILE DUCTS: No intrahepatic biliary dilatation. Limited visualization of the common bile duct which measures 8 mm where seen. LIVER: Mild diffuse fatty infiltration of the liver. No focal mass. RIGHT KIDNEY: No hydronephrosis. Prominent area of focal cortical scarring laterally in the right kidney. PANCREAS: Limited visualization of the pancreas. Complex hypoechoic area in the region of the head of the pancreas measuring 4.5 x 2.3 x 2.5 cm. This likely corresponds to a complex cystic area seen in this region on the CT scan and was better evaluated on CT than ultrasound. No ascites.     IMPRESSION: 1.  Cholecystectomy. Bile ducts within normal limits for the postcholecystectomy state. 2.  Mild diffuse fatty infiltration of the liver. 3.  Limited visualization of the pancreas with a complex hypoechoic area in the region of the head of the pancreas which appears to correspond with a complex cystic area seen in this region on CT. This is indeterminate on ultrasound and overall better evaluated on CT. See that report for those details and correlation with previous workup in this area. 4.  Focal cortical scarring in the right kidney laterally. No hydronephrosis.    Head CT w/o contrast    Result Date: 10/27/2023  EXAM: CT HEAD W/O CONTRAST LOCATION: [facility] DATE: 10/27/2023 INDICATION: headache DOAC COMPARISON: None. TECHNIQUE: Routine CT Head without IV contrast. Multiplanar reformats. Dose reduction techniques were used. FINDINGS: INTRACRANIAL  CONTENTS: No intracranial hemorrhage, extraaxial collection, or mass effect.  No CT evidence of acute infarct. Mild presumed chronic small vessel ischemic changes. Mild generalized volume loss. No hydrocephalus. VISUALIZED ORBITS/SINUSES/MASTOIDS: No intraorbital abnormality. No paranasal sinus mucosal disease. No middle ear or mastoid effusion. BONES/SOFT TISSUES: No acute abnormality.     IMPRESSION: 1.  No acute intracranial process.    CT Abdomen Pelvis w Contrast    Result Date: 10/27/2023  EXAM: CT ABDOMEN PELVIS W CONTRAST LOCATION: [facility] DATE: 10/27/2023 INDICATION: Upper abdominal pain. History of SBO. COMPARISON: CT abdomen pelvis 10/05/2023 TECHNIQUE: CT scan of the abdomen and pelvis was performed following injection of IV contrast. Multiplanar reformats were obtained. Dose reduction techniques were used. CONTRAST: isovue 370 90ml FINDINGS: LOWER CHEST: Mild atelectasis in the lingula. HEPATOBILIARY: Diffuse hepatic steatosis. Cholecystectomy. Mild extrahepatic bile duct dilatation and mucosal hyperenhancement. Common bile duct measures up to 15 mm (series 4, image 20) disease. PANCREAS: Complex cystic structure in the pancreatic head measures 3.0 x 4.4 x 2.3 cm, more conspicuous than 10/05/2023. Mild surrounding inflammatory changes in this region. Although overall appear slightly improved since 10/05/2023. SPLEEN: Normal. ADRENAL GLANDS: Normal. KIDNEYS/BLADDER: Focal scarring mid right kidney. Tiny benign simple left renal cyst, no follow-up needed. No urinary stone or hydronephrosis. BOWEL: Duodenal diverticula involving the first and second portions of the duodenum including about the ampulla. There are some areas of mucosal irregularity and thickening in this region although overall less prominent than 10/05/2023. Prior gastric and  proximal duodenal dilatation depicted 10/05/2023 has resolved, with no current evidence of bowel obstruction. No evidence of  diverticulitis or colitis. LYMPH NODES: Numerous mildly enlarged upper abdominal and retroperitoneal lymph nodes, some of which are slightly larger than 10/05/2023, such as a para-aortic node at the level of the kidneys measuring 1.6 x 2.2 cm (series 4, image 40), previously 1.0 x  1.8 cm. VASCULATURE: Mild atherosclerotic calcifications. Normal caliber abdominal aorta. PELVIC ORGANS: Normal. MUSCULOSKELETAL: Mild scattered degenerative changes in the spine.     IMPRESSION: 1. Inflammatory changes involving the region of the pancreatic head and proximal duodenum, overall slightly less prominent than 10/05/2023. Findings could be seen with acute pancreatitis, duodenitis or a combination. An underlying mass or neoplastic process is not excluded although less likely in light of the interval biopsy findings. 2. A more conspicuous complex cystic structure in the region of the pancreatic head may represent a pancreatic pseudocyst. 3. There may be some associated mild biliary obstruction with mild prominence and mucosal hyperenhancement of the extra hepatic bile duct. 4. No current evidence of bowel obstruction. Prior findings of gastric/proximal duodenal obstruction have resolved. 5. Mild upper abdominal adenopathy, increased, indeterminate although may be inflammatory.    XR Upper GI without KUB    Result Date: 10/10/2023  EXAM: XR UPPER GI WITHOUT KUB LOCATION: [facility] DATE: 10/10/2023 INDICATION: duodenal wall thickening. EGD 10/06/2023. COMPARISON: CT 10/05/2023 TECHNIQUE: Limited single contrast barium study.     FINDINGS AND IMPRESSION: FLUOROSCOPIC TIME: 1.8 NUMBER OF IMAGES: 11 Thin consistency barium was ingested. There is significant short segment narrowing of the second portion of the duodenum. Measurements cannot be performed for technical reasons. Contrast traverses the area of duodenal narrowing without delay. The stomach  is not distended.    US Upper Extremity Venous Duplex  Right    Result Date: 10/9/2023  EXAM: US UPPER EXTREMITY VENOUS DUPLEX RIGHT LOCATION: [facility] DATE: 10/9/2023 INDICATION: Right arm swelling and redness. PICC placed 10/07/2023. COMPARISON: None. TECHNIQUE: Venous Duplex ultrasound of the right upper extremity with (when possible) and without compression, augmentation, and duplex. Color flow and spectral Doppler with waveform analysis performed. FINDINGS: Ultrasound includes evaluation of the internal jugular vein, innominate vein, subclavian vein, axillary vein, and brachial vein. The superficial cephalic and basilic veins were also evaluated where seen. RIGHT: There is partially occlusive DVT in the axillary vein and occlusive DVT in the brachial vein of the proximal through distal upper arm. PICC is present in the brachial vein. There is superficial thrombus in the basilic vein of the mid upper arm through the antecubital fossa.     IMPRESSION: 1.  DVT in the axillary and brachial veins. 2.  Superficial thrombus in the basilic vein.    XR Abdomen Port 1 View    Result Date: 10/5/2023  EXAM: XR ABDOMEN PORT 1 VIEW LOCATION: [facility] DATE: 10/5/2023 INDICATION: NG tube location? COMPARISON: 10/05/2023. CT abdomen pelvis 10/05/2023.     IMPRESSION: Nasogastric tube terminates in the stomach. Stomach is mildly distended. Prior cholecystectomy. Imaged portions lower chest unremarkable.     XR Abdomen Port 1 View    Result Date: 10/5/2023  EXAM: XR ABDOMEN PORT 1 VIEW LOCATION: [facility] DATE: 10/5/2023 INDICATION: confirm NG placement COMPARISON: 10/05/2023 CTA AP.     IMPRESSION: Nasogastric tube is looped in the mid and distal esophagus. Cholecystectomy clips. Visualized upper abdomen otherwise unremarkable.    CTA Abdomen Pelvis with Contrast    Result Date: 10/5/2023  EXAM: CTA ABDOMEN PELVIS WITH CONTRAST LOCATION: [facility] DATE: 10/5/2023 INDICATION:  abdominal pain, vomiting COMPARISON: 07/12/2022 TECHNIQUE: CT angiogram abdomen pelvis during arterial phase of injection of IV contrast. 2D and 3D MIP reconstructions were performed by the CT technologist. Dose reduction techniques were used. CONTRAST: 90 mL Isovue 370 FINDINGS: ANGIOGRAM ABDOMEN/PELVIS: The aorta is normal in caliber. Mild atherosclerosis without high-grade stenosis. No active extravasation. LOWER CHEST: Trace right pleural effusion. Patchy bibasilar atelectasis/scarring. Mild degenerative interval thickening of the esophagus. HEPATOBILIARY: Hepatic steatosis. Cholecystectomy. No convincing pathologic biliary ductal dilation. PANCREAS: No pancreatic ductal dilation. SPLEEN: Normal. ADRENAL GLANDS: Normal. KIDNEYS/BLADDER: Partial right nephrectomy with residual scarring and volume loss in the right mid kidney. No hydronephrosis or genitourinary calculi. BOWEL: There is severe fluid-filled distention of the stomach. There is also marked diffuse circumferential wall thickening of the second portion of the duodenum and duodenal bulb. There is ill-defined soft tissue thickening extending from the second portion towards the portal caval region and encasing the common hepatic and proximal gastroduodenal arteries on series 4 image 74. There is minimal surrounding fat stranding. No free air. There is a small duodenal diverticulum at the second-third portion  of the duodenum. No evidence of appendicitis. LYMPH NODES: There are numerous borderline to mildly enlarged lymph nodes in the gastrohepatic, paola hepatis, peripancreatic, and retroperitoneal chains. The largest is in the retrocaval chain measuring 2.8 x 1.3 cm on series 4 image 83. PELVIC ORGANS: Prostate calcifications. MUSCULOSKELETAL: No suspicious osseous lesions. Degenerative change of the spine. Old, healed right inferior pubic ramus fracture.     IMPRESSION: 1.  Findings highly concerning for obstruction or partial obstruction at the second  portion the duodenum where there is marked circumferential wall thickening. Additionally, there is soft tissue stranding extending towards the paola hepatis/portacaval region with encasement of the common hepatic and gastroduodenal arteries. The constellation of findings are concerning for a malignant process likely from the duodenum versus pancreas. An inflammatory process such as duodenitis or peptic ulcer disease are in the differential but felt to be less likely the primary process. EGD is recommended for further evaluation. 2.  The stomach is markedly distended with fluid-filled contents. Additionally, there is diffuse circumferential wall thickening of the esophagus which may be related to vomiting and/or esophagitis. 3.  There are numerous enlarged upper abdominal and retroperitoneal lymph nodes which are suspicious. 4.  Hepatic steatosis.        Discharge Orders        Reason for your hospital stay    Abd pain     Follow-up and recommended labs and tests     Follow up with primary care provider, Aditya Munoz, within 4-5 days for hospital follow- up.  The following labs/tests are recommended: bmp, cbc.    Follow up with Dr. Meyer 4-6 weeks for EUS     Activity    Your activity upon discharge: activity as tolerated     Diet    Follow this diet upon discharge: Orders Placed This Encounter      Consistent Carbohydrate Diet Moderate Consistent Carb (60 g CHO per Meal) Diet       Examination   Physical Exam   Temp:  [97.4  F (36.3  C)-98.2  F (36.8  C)] 97.6  F (36.4  C)  Pulse:  [66-77] 66  Resp:  [16-20] 20  BP: (121-135)/(62-74) 133/74  SpO2:  [97 %-98 %] 98 %  Wt Readings from Last 1 Encounters:   10/27/23 89.8 kg (198 lb)       See today's note    Please see EMR for more detailed significant labs, imaging, consultant notes etc.    I, Sharmila Jean MD, personally saw the patient today and spent greater than 30 minutes discharging this patient.    Sharmila Jean MD  [facility]  Hospital    CC:Aditya Munoz

## 2023-10-30 NOTE — PLAN OF CARE
PRIMARY DIAGNOSIS: ACUTE PAIN  OUTPATIENT/OBSERVATION GOALS TO BE MET BEFORE DISCHARGE:  1. Pain Status: Improved-controlled with oral pain medications.    2. Return to near baseline physical activity: Yes    3. Cleared for discharge by consultants (if involved): No    Discharge Planner Nurse   Safe discharge environment identified: Yes  Barriers to discharge: Yes , GI will see in the morning.        Entered by: Pauline Treadwell RN 10/30/2023 1:28 AM     Please review provider order for any additional goals.   Nurse to notify provider when observation goals have been met and patient is ready for discharge.Goal Outcome Evaluation:

## 2023-10-30 NOTE — PROGRESS NOTES
[facility] Progress Note       SUBJECTIVE:  Patient denies abdominal pain. Tolerating diet. He would like to go home.        OBJECTIVE:  /74 (BP Location: Left arm)   Pulse 66   Temp 97.6  F (36.4  C) (Oral)   Resp 20   Wt 89.8 kg (198 lb)   SpO2 98%   BMI 26.85 kg/m    Temp (24hrs), Av.7  F (36.5  C), Min:97.4  F (36.3  C), Max:98.2  F (36.8  C)    Patient Vitals for the past 72 hrs:   Weight   10/27/23 1814 89.8 kg (198 lb)       Intake/Output Summary (Last 24 hours) at 10/30/2023 1135  Last data filed at 10/30/2023 0900  Gross per 24 hour   Intake 1779 ml   Output 2375 ml   Net -596 ml        PHYSICAL EXAM  GEN: NAD, male appears stated age sitting up in bed  HRT: no LE edema  RESP: unlabored  ABD: nondistended  SKIN: No rash or jaundice      Additional Data:  I have reviewed the patient's new clinical lab results:     Recent Labs   Lab Test 10/30/23  0642 10/29/23  1411 10/29/23  0629 10/28/23  0756 10/09/23  1136 10/09/23  0432 10/08/23  06   WBC 7.0  --  12.1* 16.9*   < >  --   --    HGB 11.1* 12.4* 11.6* 13.2*   < > 11.2*  --    MCV 81  --  80 79   < >  --   --      --  253 267   < >  --   --    INR  --   --   --   --   --  1.26* 1.16*    < > = values in this interval not displayed.     Recent Labs   Lab Test 10/30/23  0642 10/29/23  0629 10/28/23  0756   POTASSIUM 3.8 3.5 4.5   CHLORIDE 102 99 92*   CO2 25 25 25   BUN 10.4 10.8 10.8   ANIONGAP 12 13 14     Recent Labs   Lab Test 10/29/23  0629 10/28/23  0756 10/27/23  2250 10/27/23  1941 10/08/23  0622 10/05/23  1404 10/05/23  1204 23  1158 22  0606 22  0245   ALBUMIN 3.1* 3.7  --  3.5   < >  --  4.2  4.2 3.7   < >  --    BILITOTAL 0.4 0.6  --  0.8   < >  --  1.0  1.0 0.6   < >  --    ALT 11 10  --  11   < >  --  19  19 12   < >  --    AST 17 12  --  25   < >  --  19  19 28   < >  --    PROTEIN  --   --  20*  --   --  Negative  --   --   --  50*   LIPASE  --   --   --  17  --   --  46 31  --   --      < > = values in this interval not displayed.         Imaging results:  RUQ ultrasound 10/28/23:  IMPRESSION:  1.  Cholecystectomy. Bile ducts within normal limits for the postcholecystectomy state.  2.  Mild diffuse fatty infiltration of the liver.  3.  Limited visualization of the pancreas with a complex hypoechoic area in the region of the head of the pancreas which appears to correspond with a complex cystic area seen in this region on CT. This is indeterminate on ultrasound and overall better evaluated on CT. See that report for those details and correlation with previous workup in this area.  4.  Focal cortical scarring in the right kidney laterally. No hydronephrosis.    CT abdomen/pelvis 10/27/23:  IMPRESSION:   1. Inflammatory changes involving the region of the pancreatic head and proximal duodenum, overall slightly less prominent than 10/05/2023. Findings could be seen with acute pancreatitis, duodenitis or a combination. An underlying mass or neoplastic process is not excluded although less likely in light of the interval biopsy findings.  2. A more conspicuous complex cystic structure in the region of the pancreatic head may represent a pancreatic pseudocyst.  3. There may be some associated mild biliary obstruction with mild prominence and mucosal hyperenhancement of the extra hepatic bile duct.  4. No current evidence of bowel obstruction. Prior findings of gastric/proximal duodenal obstruction have resolved.  5. Mild upper abdominal adenopathy, increased, indeterminate although may be inflammatory.      Procedure results:  EGD/EUS 10/6/23 ([facility]):  Findings:       ENDOSONOGRAPHIC FINDING: :        A few abnormal lymph nodes were visualized in the peripancreatic region.        The largest measured 17 mm by 12 mm in maximal cross-sectional diameter.        The nodes were oval, hypoechoic and had well defined margins. Fine        needle aspiration for cytology was performed. Color Doppler imaging was         utilized prior to needle puncture to confirm a lack of significant        vascular structures within the needle path. Seven passes were made with        the 22 gauge needle using a transduodenal approach. No stylet was used.        A cytologist was present and performed a preliminary cytologic        examination. The cellularity of the specimen was adequate. Final        cytology results are pending.        ENDOSCOPIC FINDING: :        Diffuse severely congested mucosa without active bleeding and with no        stigmata of bleeding was found in the first portion of the duodenum and        in the second portion of the duodenum. Biopsies were taken with a cold        forceps for histology.                                                                                    Impression:   - A few abnormal lymph nodes were visualized in the                          peripancreatic region. Fine needle aspiration                          performed.                          - Congested duodenal mucosa. Biopsied.                          - Linear ulcer in the gastric cardia (likely related                          to NG tube.   Recommendation:        - Await pathology results.                          - [facility].                          - NG tube to low intermittent suction.                          - NPO diet status.      DUODENUM, BIOPSIES:        1.  MILD EDEMA AND CHRONIC DUODENITIS, PEPTIC TYPE        2.  NO EVIDENCE OF ULCERATION, ACTIVE ENTERITIS, DYSPLASIA OR MALIGNANCY    IMPRESSION:  Abdominal pain  Pancreatitis with pseudocyst vs phlegmon  69 y/o male with PMH HTN, type 2 diabetes, psoriatic arthritis, CAD, malnutrition, fibromyalgia and chronic pain, recent [facility] admitted 10/27 for abdominal pain and abnormal imaging with CT showing pancreatic pseudocyst vs phlegmon. He was started on abx for leukocytosis, WBC normalized. Weekend provider discussed with Biliary MD who did not  think repeat EUS needed this admission given he just had EUS 10/6. Discussed with Biliary team today and a repeat EUS is recommended in 4-6 weeks. He is feeling better and tolerating diet. He would like to discharge today.       PLAN:  - Okay to discharge today per GI  - Discharge on course of abx  - Outpatient EUS in 4-6 weeks ([facility] will call pt to arrange)      (Dr. Stevens)  Sharonda Garcia PA-C  [facility]  10/30/2023 11:35 AM  [phone] (office)    45 minutes of total time was spent providing patient care, including patient evaluation, reviewing documentation/test results, , and documentation.  ________________________________________________________________________

## 2023-10-30 NOTE — PLAN OF CARE
PRIMARY DIAGNOSIS: ACUTE PAIN  OUTPATIENT/OBSERVATION GOALS TO BE MET BEFORE DISCHARGE:  1. Pain Status: Improved-controlled with oral pain medications.    2. Return to near baseline physical activity: Yes    3. Cleared for discharge by consultants (if involved): No    Discharge Planner Nurse   Safe discharge environment identified: Yes  Barriers to discharge: Yes       Entered by: Pauline Treadwell RN 10/30/2023 6:05 AM     Please review provider order for any additional goals.   Nurse to notify provider when observation goals have been met and patient is ready for discharge.Goal Outcome Evaluation:    Pt slept between cares. Rates pain at 2/10. Denies nausea/vomiting. Scheduled MS contin given. Up independently. Iv antibiotics given per orders. GI provider to follow up with patient today. Patient hoping to be able to discharge today.

## 2023-10-30 NOTE — CARE PLAN
PRIMARY DIAGNOSIS: epigastric pain, leukocytosis  OUTPATIENT/OBSERVATION GOALS TO BE MET BEFORE DISCHARGE:  ADLs back to baseline: yes    Activity and level of assistance: independent, ambulating wit steady gait    Pain status: denied    Return to near baseline physical activity: yes     Discharge Planner Nurse   Safe discharge environment identified: yes  Barriers to discharge: waiting for GI to clear patient for discharge       Entered by: Gabriella García RN 10/30/2023 10:43 AM     Please review provider order for any additional goals.   Nurse to notify provider when observation goals have been met and patient is ready for discharge.

## 2023-11-01 ENCOUNTER — TELEPHONE (OUTPATIENT)
Dept: INTERNAL MEDICINE | Facility: CLINIC | Age: 68
End: 2023-11-01
Payer: COMMERCIAL

## 2023-11-01 ENCOUNTER — PATIENT OUTREACH (OUTPATIENT)
Dept: CARE COORDINATION | Facility: CLINIC | Age: 68
End: 2023-11-01
Payer: COMMERCIAL

## 2023-11-01 DIAGNOSIS — K86.3 PSEUDOCYST OF PANCREAS: ICD-10-CM

## 2023-11-01 DIAGNOSIS — K56.609 SBO (SMALL BOWEL OBSTRUCTION) (H): Primary | ICD-10-CM

## 2023-11-01 NOTE — TELEPHONE ENCOUNTER
"  Order/Referral Request    Who is requesting: Patient    Orders being requested: Pt is requesting a referral for a GI specialist at either [facility] or [facility].     Reason service is needed/diagnosis: blockage in small intestine, ulcer & growth on his pancreas.    When are orders needed by: as soon as possible    Has this been discussed with Provider: Yes Pt states he was just in [facility] for 2 weeks and they have done all they can for him. Pt states he needs to see if he can get a referral for a GI specialist at either the [facility] or [facility]. Pt states he needs to know what PCP wants to do for the \"next Steps\"? Pt states he just can't keep living like this.     Does patient have a preference on a Group/Provider/Facility? [facility] or [facility] (wherever PCP wants).    Does patient have an appointment scheduled?: No    Where to send orders: Place orders within Epic    Could we send this information to you in [facility] or would you prefer to receive a phone call?:   Patient would prefer a phone call     Okay to leave a detailed message?: Yes at Cell number on file:    Telephone Information:   Mobile [phone]     Anna Barker"

## 2023-11-01 NOTE — PROGRESS NOTES
Connected Care Resource Center Contact  [facility]/Voicemail     Clinical Data: Post-Discharge Outreach     Outreach attempted x 2.  Left message on patient's voicemail, providing [facility]'s central phone number of [phone] ([phone]) for questions/concerns and/or to schedule an appt with an [facility] provider, if they do not have a PCP.      Plan:  [facility] will do no further outreaches at this time.       Gabriella Heard RN  Connected Care Resource Center, [facility]    *Connected Care Resource Team does NOT follow patient ongoing. Referrals are identified based on internal discharge reports and the outreach is to ensure patient has an understanding of their discharge instructions.

## 2023-11-02 NOTE — TELEPHONE ENCOUNTER
S-(situation): Call back to patient at [phone] to inform that referral has been placed. No answer, left message with phone number to schedule with GI, invited call back to clinic if needed    B-(background): See previous.     A-(assessment): NA     R-(recommendations): No further actions/recommendations at this time.

## 2023-11-08 ENCOUNTER — TELEPHONE (OUTPATIENT)
Dept: INTERNAL MEDICINE | Facility: CLINIC | Age: 68
End: 2023-11-08
Payer: COMMERCIAL

## 2023-11-08 NOTE — TELEPHONE ENCOUNTER
Patient appears to have just had a DVT within the past month.  It would be a higher risk scenario for recurrent DVT to be off the Eliquis blood thinner for colonoscopy.  It would need to be determined how emergent the colonoscopy need was.    I would recommend the patient have a discussion with his primary care provider who is out of clinic this week.  Message will be held until next week for primary care provider.

## 2023-11-08 NOTE — TELEPHONE ENCOUNTER
Incoming call from [facility]. Requesting hold order for 3 days hold of Eliquis prior to colonoscopy on 12/7. Please specify when okay to resume.     Routed to pcp for verbal order. Please route back to nursing.

## 2023-11-13 NOTE — TELEPHONE ENCOUNTER
"Left message on confidential VM relaying Dr. Munoz's message:   \"DVT was involving upper extremity associate with PICC line and risk of pulmonary embolus is generally low.  Okay to hold Eliquis prior to the procedure but holding for 2 days prior to the procedure should be adequate and is preferred.  He may resume Eliquis the the night after his procedure or the following day if any biopsy is performed.\"     To call clinic back at [phone] if further questions.       "

## 2023-11-15 ENCOUNTER — TELEPHONE (OUTPATIENT)
Dept: INTERNAL MEDICINE | Facility: CLINIC | Age: 68
End: 2023-11-15
Payer: COMMERCIAL

## 2023-11-15 NOTE — TELEPHONE ENCOUNTER
General Call    Contacts         Type Contact Phone/Fax    11/15/2023 11:23 AM CST Phone (Incoming) [place] (Self) [phone] (M)          Reason for Call:  Pt was in the hospital and was taken off eliquis, haven't gone back on it since then and wondering if Dr Munoz wanted him back on eliquis? If so, please send a prescription.    Could we send this information to you in [place] or would you prefer to receive a phone call?:   Patient would prefer a phone call   Okay to leave a detailed message?: No at Cell number on file:    Telephone Information:   Mobile [phone]   Mobile [phone]

## 2023-11-15 NOTE — TELEPHONE ENCOUNTER
Arrange a hospital follow-up appointment with available provider is Dr. Munoz will be out of the clinic for the next 2 weeks.    According to the discharge summary from the hospital, he was to continue on the Eliquis 5 mg twice a day.  But there was also notation that there was possible plan for endoscopic ultrasound and pancreas biopsy, which does not appear to have been determined.    I would recommend a hospital follow-up appointment with available provider as soon as possible as Dr. Munoz is out of the clinic

## 2023-11-16 NOTE — TELEPHONE ENCOUNTER
S-(situation): Recommendation from covering provider that patient be seen for hospital follow up. Patient has not been taking Eliquis. Called home phone number and reached wife, Arden.    B-(background): [facility] 10/27-10/30. Per discharge summary:          Per 11/10/23 Telephone encounter with [facility]:    Patient is scheduled with Dr. Munoz 12/4/2023    A-(assessment): Outside optimal hospital follow up window, but patient should be seen. BMP, CBC should be drawn, need provider advice on resuming Eliquis at this time as patient has not been taking.    R-(recommendations): Schedule ASAP for follow up to bridge to upcoming appointment with PCP.  Future Appointments   Date Time Provider Department Center   11/17/2023  9:30 AM Tiara Montgomery APRN CNP [id number]   12/4/2023  1:40 PM Aditya Munoz MD [id number]

## 2023-11-17 ENCOUNTER — OFFICE VISIT (OUTPATIENT)
Dept: FAMILY MEDICINE | Facility: CLINIC | Age: 68
End: 2023-11-17
Payer: COMMERCIAL

## 2023-11-17 VITALS
RESPIRATION RATE: 18 BRPM | HEART RATE: 86 BPM | TEMPERATURE: 98 F | OXYGEN SATURATION: 98 % | HEIGHT: 72 IN | WEIGHT: 199.6 LBS | BODY MASS INDEX: 27.04 KG/M2 | SYSTOLIC BLOOD PRESSURE: 122 MMHG | DIASTOLIC BLOOD PRESSURE: 74 MMHG

## 2023-11-17 DIAGNOSIS — M25.532 LEFT WRIST PAIN: ICD-10-CM

## 2023-11-17 DIAGNOSIS — I82.A11 ACUTE DEEP VEIN THROMBOSIS (DVT) OF AXILLARY VEIN OF RIGHT UPPER EXTREMITY (H): Primary | ICD-10-CM

## 2023-11-17 DIAGNOSIS — E11.8 TYPE 2 DIABETES MELLITUS WITH COMPLICATION, WITH LONG-TERM CURRENT USE OF INSULIN (H): ICD-10-CM

## 2023-11-17 DIAGNOSIS — K86.3 PSEUDOCYST OF PANCREAS: ICD-10-CM

## 2023-11-17 DIAGNOSIS — Z79.4 TYPE 2 DIABETES MELLITUS WITH COMPLICATION, WITH LONG-TERM CURRENT USE OF INSULIN (H): ICD-10-CM

## 2023-11-17 DIAGNOSIS — E29.1 HYPOGONADISM MALE: Primary | ICD-10-CM

## 2023-11-17 DIAGNOSIS — E87.1 HYPONATREMIA: ICD-10-CM

## 2023-11-17 DIAGNOSIS — D72.829 LEUKOCYTOSIS, UNSPECIFIED TYPE: ICD-10-CM

## 2023-11-17 LAB
ANION GAP SERPL CALCULATED.3IONS-SCNC: 11 MMOL/L (ref 7–15)
BUN SERPL-MCNC: 9.6 MG/DL (ref 8–23)
CALCIUM SERPL-MCNC: 8.9 MG/DL (ref 8.8–10.2)
CHLORIDE SERPL-SCNC: 99 MMOL/L (ref 98–107)
CREAT SERPL-MCNC: 0.86 MG/DL (ref 0.67–1.17)
DEPRECATED HCO3 PLAS-SCNC: 27 MMOL/L (ref 22–29)
EGFRCR SERPLBLD CKD-EPI 2021: >90 ML/MIN/1.73M2
ERYTHROCYTE [DISTWIDTH] IN BLOOD BY AUTOMATED COUNT: 14.5 % (ref 10–15)
GLUCOSE SERPL-MCNC: 263 MG/DL (ref 70–99)
HCT VFR BLD AUTO: 39.6 % (ref 40–53)
HGB BLD-MCNC: 12.8 G/DL (ref 13.3–17.7)
MCH RBC QN AUTO: 26.5 PG (ref 26.5–33)
MCHC RBC AUTO-ENTMCNC: 32.3 G/DL (ref 31.5–36.5)
MCV RBC AUTO: 82 FL (ref 78–100)
PLATELET # BLD AUTO: 194 10E3/UL (ref 150–450)
POTASSIUM SERPL-SCNC: 4.4 MMOL/L (ref 3.4–5.3)
RBC # BLD AUTO: 4.83 10E6/UL (ref 4.4–5.9)
SODIUM SERPL-SCNC: 137 MMOL/L (ref 135–145)
WBC # BLD AUTO: 9.2 10E3/UL (ref 4–11)

## 2023-11-17 PROCEDURE — 80048 BASIC METABOLIC PNL TOTAL CA: CPT | Performed by: NURSE PRACTITIONER

## 2023-11-17 PROCEDURE — 36415 COLL VENOUS BLD VENIPUNCTURE: CPT | Performed by: NURSE PRACTITIONER

## 2023-11-17 PROCEDURE — 85027 COMPLETE CBC AUTOMATED: CPT | Performed by: NURSE PRACTITIONER

## 2023-11-17 PROCEDURE — 99214 OFFICE O/P EST MOD 30 MIN: CPT | Performed by: NURSE PRACTITIONER

## 2023-11-17 RX ORDER — TESTOSTERONE 20.25 MG/1.25G
20.25 GEL TOPICAL DAILY
Qty: 375 G | Refills: 1 | Status: SHIPPED | OUTPATIENT
Start: 2023-11-17 | End: 2024-05-21

## 2023-11-17 NOTE — PROGRESS NOTES
"  Assessment & Plan   Acute DVT of axillary vein or right upper extremity  Patient to restart eliquis at 5mg twice a day dosing. May need to be held again with upcoming EUS but patient does have a preop scheduled and can determine then when to start holding.    Leukocytosis, unspecified type  WBCs were as high as 19.4 in the hospital. Down to 7 on discharge. Currently 9.2 today. No concern for worsening infection.    - Basic metabolic panel; Future  - CBC with platelets; Future  - Basic metabolic panel  - CBC with platelets    Pseudocyst of pancreas  To have repeat EUS to evaluate further.     Hyponatremia  Resolved. 137 on recheck today     Type 2 diabetes mellitus with complication, with long-term current use of insulin (H)  A1c recently 8. Glucose is 263. Encouraged patient to restart his metformin. I also recommend he follow up with endocrine provider for further recommendations. Discussed typically we like A1c less than 7.     Chronic pain syndrome  Patient not currently taking cymbalta. He believes this was prescribed by his pain doctor-I recommended he follow up with them regarding additional plans and refills    Left wrist pain  I believe this is overuse injury from raking. Recommend tylenol and needed. Can do aleve if severe pain.                  MED REC REQUIRED  Post Medication Reconciliation Status: discharge medications reconciled and changed, per note/orders  BMI:   Estimated body mass index is 27.06 kg/m  as calculated from the following:    Height as of this encounter: 1.829 m (6' 0.01\").    Weight as of this encounter: 90.5 kg (199 lb 9.6 oz).           CRISTINA BRIGGS CNP  [facility]    Florencio Crockett is a 68 year old, presenting for the following health issues:  Hospital F/U      11/17/2023     8:52 AM   Additional Questions   Roomed by LIMA Jones   Patient hospitalized 10/5-10/11 for small bowel obstruction and again on 10/27-10/29 for abdominal pain. He " "was to follow up in 4-5 days for recheck and BMP and CBC. (He did not make that appointment)  He had EUS on 10/6 showing possible pancreatic psuedocyst-he has repeat scheduled for December.  Patient did have elevated WBCs in the hospital. He was treated with rocephin and flagl and discharged on antibiotics.  He had a provoked DVT from PICC line won 10/9. Eliquis was held in the hospital Patient has been off eliquis since at least 10/29. (Patient unsure of exact date). Per patient report has not taken since hospitalized.   Got sick day after got out of hospital-nausea but since than has been fine.  Constant abdominal pain has gone away. Patient reports has had this problem for about 15 years.   Stopped drinking diet coke recently this seemed to help with pain. Stopped drinking alcohol 20 years ago.     Patient has type 2 diabetes and is followed by Endocrine. He takes insulin. Patient has metformin but is not currently taking, Patient reports has not been taking for months.  Last A1c 8% on 10/5/23    Has been eating smaller meals. Overall feels better after the hospital visit.     Left wrist pain after raking. Patient has been icing.     HPI               Review of Systems         Objective    /74   Pulse 86   Temp 98  F (36.7  C) (Oral)   Resp 18   Ht 1.829 m (6' 0.01\")   Wt 90.5 kg (199 lb 9.6 oz)   SpO2 98%   BMI 27.06 kg/m    Body mass index is 27.06 kg/m .  Physical Exam  Constitutional:       Appearance: Normal appearance.   Cardiovascular:      Rate and Rhythm: Normal rate and regular rhythm.   Pulmonary:      Effort: Pulmonary effort is normal.   Musculoskeletal:         General: Normal range of motion.      Left wrist: Tenderness present. No swelling. Normal range of motion.   Neurological:      Mental Status: He is alert.                              "

## 2023-11-17 NOTE — PATIENT INSTRUCTIONS
Start eliquis 5 mg twice a day-discuss with Dr. Munoz at preop visit on 12/4 when you can stop.    Metformin 750 mg once a day. I would start this again. Hemoglobin A1c 8.0%. if having increased GI concerns -should stop.    Readdress cymbalta with pain medicine doctor.

## 2023-11-24 NOTE — TELEPHONE ENCOUNTER
Patient was seen in clinic on 11/17/23, instructed to resume Eliquis at that time. Preop with PCP scheduled for 12/4/2023.    Closing encounter.

## 2023-11-24 NOTE — TELEPHONE ENCOUNTER
Patient should continue taking Eliquis but should hold for 2 days prior to any procedure that is scheduled   none

## 2023-11-28 ENCOUNTER — TRANSFERRED RECORDS (OUTPATIENT)
Dept: HEALTH INFORMATION MANAGEMENT | Facility: CLINIC | Age: 68
End: 2023-11-28
Payer: COMMERCIAL

## 2023-12-04 ENCOUNTER — IMMUNIZATION (OUTPATIENT)
Dept: NURSING | Facility: CLINIC | Age: 68
End: 2023-12-04
Payer: COMMERCIAL

## 2023-12-04 ENCOUNTER — OFFICE VISIT (OUTPATIENT)
Dept: INTERNAL MEDICINE | Facility: CLINIC | Age: 68
End: 2023-12-04
Payer: COMMERCIAL

## 2023-12-04 VITALS
OXYGEN SATURATION: 98 % | BODY MASS INDEX: 28.65 KG/M2 | HEART RATE: 92 BPM | TEMPERATURE: 98 F | DIASTOLIC BLOOD PRESSURE: 58 MMHG | SYSTOLIC BLOOD PRESSURE: 118 MMHG | HEIGHT: 72 IN | WEIGHT: 211.5 LBS | RESPIRATION RATE: 16 BRPM

## 2023-12-04 DIAGNOSIS — G89.4 CHRONIC PAIN SYNDROME: ICD-10-CM

## 2023-12-04 DIAGNOSIS — I25.10 ASCVD (ARTERIOSCLEROTIC CARDIOVASCULAR DISEASE): ICD-10-CM

## 2023-12-04 DIAGNOSIS — L40.50 PSORIATIC ARTHRITIS (H): ICD-10-CM

## 2023-12-04 DIAGNOSIS — G47.33 OBSTRUCTIVE SLEEP APNEA ON CPAP: ICD-10-CM

## 2023-12-04 DIAGNOSIS — Z79.4 TYPE 2 DIABETES MELLITUS WITH COMPLICATION, WITH LONG-TERM CURRENT USE OF INSULIN (H): ICD-10-CM

## 2023-12-04 DIAGNOSIS — K86.3 PSEUDOCYST OF PANCREAS: ICD-10-CM

## 2023-12-04 DIAGNOSIS — K26.9 DUODENAL ULCER WITHOUT HEMORRHAGE, PERFORATION, OR OBSTRUCTION: ICD-10-CM

## 2023-12-04 DIAGNOSIS — I10 PRIMARY HYPERTENSION: ICD-10-CM

## 2023-12-04 DIAGNOSIS — Z01.818 PREOP GENERAL PHYSICAL EXAM: Primary | ICD-10-CM

## 2023-12-04 DIAGNOSIS — I82.A11 ACUTE DEEP VEIN THROMBOSIS (DVT) OF AXILLARY VEIN OF RIGHT UPPER EXTREMITY (H): ICD-10-CM

## 2023-12-04 DIAGNOSIS — E11.8 TYPE 2 DIABETES MELLITUS WITH COMPLICATION, WITH LONG-TERM CURRENT USE OF INSULIN (H): ICD-10-CM

## 2023-12-04 PROBLEM — R11.10 RECURRENT VOMITING: Status: RESOLVED | Noted: 2023-09-01 | Resolved: 2023-12-04

## 2023-12-04 PROCEDURE — 91320 SARSCV2 VAC 30MCG TRS-SUC IM: CPT

## 2023-12-04 PROCEDURE — 90480 ADMN SARSCOV2 VAC 1/ONLY CMP: CPT

## 2023-12-04 PROCEDURE — 99215 OFFICE O/P EST HI 40 MIN: CPT | Performed by: INTERNAL MEDICINE

## 2023-12-04 RX ORDER — INSULIN GLARGINE-YFGN 100 [IU]/ML
50 INJECTION, SOLUTION SUBCUTANEOUS 2 TIMES DAILY
Start: 2023-12-04 | End: 2024-03-28 | Stop reason: ALTCHOICE

## 2023-12-04 RX ORDER — DULOXETIN HYDROCHLORIDE 60 MG/1
60 CAPSULE, DELAYED RELEASE ORAL DAILY
Qty: 90 CAPSULE | Refills: 3 | Status: SHIPPED | OUTPATIENT
Start: 2023-12-04

## 2023-12-04 ASSESSMENT — ENCOUNTER SYMPTOMS
EYE PAIN: 0
COUGH: 0
JOINT SWELLING: 1
CHILLS: 0
WEAKNESS: 1
CONSTIPATION: 0
MYALGIAS: 0
ABDOMINAL PAIN: 0
HEMATURIA: 0
FEVER: 0
PARESTHESIAS: 0
NERVOUS/ANXIOUS: 0
HEADACHES: 1
ARTHRALGIAS: 1
SORE THROAT: 0
HEARTBURN: 0
HEMATOCHEZIA: 0
NAUSEA: 0
DIARRHEA: 0
PALPITATIONS: 0
DIZZINESS: 0
FREQUENCY: 0
DYSURIA: 0

## 2023-12-04 ASSESSMENT — PATIENT HEALTH QUESTIONNAIRE - PHQ9
SUM OF ALL RESPONSES TO PHQ QUESTIONS 1-9: 6
10. IF YOU CHECKED OFF ANY PROBLEMS, HOW DIFFICULT HAVE THESE PROBLEMS MADE IT FOR YOU TO DO YOUR WORK, TAKE CARE OF THINGS AT HOME, OR GET ALONG WITH OTHER PEOPLE: SOMEWHAT DIFFICULT
SUM OF ALL RESPONSES TO PHQ QUESTIONS 1-9: 6

## 2023-12-04 ASSESSMENT — ACTIVITIES OF DAILY LIVING (ADL): CURRENT_FUNCTION: NO ASSISTANCE NEEDED

## 2023-12-04 NOTE — PATIENT INSTRUCTIONS
Preparing for Your Surgery  Getting started  A nurse will call you to review your health history and instructions. They will give you an arrival time based on your scheduled surgery time. Please be ready to share:  Your doctor's clinic name and phone number  Your medical, surgical, and anesthesia history  A list of allergies and sensitivities  A list of medicines, including herbal treatments and over-the-counter drugs  Whether the patient has a legal guardian (ask how to send us the papers in advance)  Please tell us if you're pregnant--or if there's any chance you might be pregnant. Some surgeries may injure a fetus (unborn baby), so they require a pregnancy test. Surgeries that are safe for a fetus don't always need a test, and you can choose whether to have one.   If you have a child who's having surgery, please ask for a copy of Preparing for Your Child's Surgery.    Preparing for surgery  Within 10 to 30 days of surgery: Have a pre-op exam (sometimes called an H&P, or History and Physical). This can be done at a clinic or pre-operative center.  If you're having a , you may not need this exam. Talk to your care team.  At your pre-op exam, talk to your care team about all medicines you take. If you need to stop any medicines before surgery, ask when to start taking them again.  We do this for your safety. Many medicines can make you bleed too much during surgery. Some change how well surgery (anesthesia) drugs work.  Call your insurance company to let them know you're having surgery. (If you don't have insurance, call [phone].)  Call your clinic if there's any change in your health. This includes signs of a cold or flu (sore throat, runny nose, cough, rash, fever). It also includes a scrape or scratch near the surgery site.  If you have questions on the day of surgery, call your hospital or surgery center.  Eating and drinking guidelines  For your safety: Unless your surgeon tells you otherwise,  follow the guidelines below.  Eat and drink as usual until 8 hours before you arrive for surgery. After that, no food or milk.  Drink clear liquids until 2 hours before you arrive. These are liquids you can see through, like water, Gatorade, and Propel Water. They also include plain black coffee and tea (no cream or milk), candy, and breath mints. You can spit out gum when you arrive.  If you drink alcohol: Stop drinking it the night before surgery.  If your care team tells you to take medicine on the morning of surgery, it's okay to take it with a sip of water.  Preventing infection  Shower or bathe the night before and morning of your surgery. Follow the instructions your clinic gave you. (If no instructions, use regular soap.)  Don't shave or clip hair near your surgery site. We'll remove the hair if needed.  Don't smoke or vape the morning of surgery. You may chew nicotine gum up to 2 hours before surgery. A nicotine patch is okay.  Note: Some surgeries require you to completely quit smoking and nicotine. Check with your surgeon.  Your care team will make every effort to keep you safe from infection. We will:  Clean our hands often with soap and water (or an alcohol-based hand rub).  Clean the skin at your surgery site with a special soap that kills germs.  Give you a special gown to keep you warm. (Cold raises the risk of infection.)  Wear special hair covers, masks, gowns and gloves during surgery.  Give antibiotic medicine, if prescribed. Not all surgeries need antibiotics.  What to bring on the day of surgery  Photo ID and insurance card  Copy of your health care directive, if you have one  Glasses and hearing aids (bring cases)  You can't wear contacts during surgery  Inhaler and eye drops, if you use them (tell us about these when you arrive)  CPAP machine or breathing device, if you use them  A few personal items, if spending the night  If you have . . .  A pacemaker, ICD (cardiac defibrillator) or other  implant: Bring the ID card.  An implanted stimulator: Bring the remote control.  A legal guardian: Bring a copy of the certified (court-stamped) guardianship papers.  Please remove any jewelry, including body piercings. Leave jewelry and other valuables at home.  If you're going home the day of surgery  You must have a responsible adult drive you home. They should stay with you overnight as well.  If you don't have someone to stay with you, and you aren't safe to go home alone, we may keep you overnight. Insurance often won't pay for this.  After surgery  If it's hard to control your pain or you need more pain medicine, please call your surgeon's office.  Questions?   If you have any questions for your care team, list them here: _________________________________________________________________________________________________________________________________________________________________________ ____________________________________ ____________________________________ ____________________________________  For informational purposes only. Not to replace the advice of your health care provider. Copyright   2003, 2019 [employer]. All rights reserved. Clinically reviewed by Beba Milian MD. [employer] 639161 - REV 12/22.    How to Take Your Medication Before Surgery    Avoid aspirin and over-the-counter NSAIDs including ibuprofen, Motrin, Advil and Aleve during the week prior to your procedure    Hold Eliquis for 2 days prior to your procedure and do not take on the day of your procedure.  Your last dose will be this evening Monday, December 4.  You may resume Friday, December 8 if okay with your surgeon.    On the morning of your procedure, take your usual doses of omeprazole and MS Contin with a small sip of water but hold all of your other medications until later in the day    Only take 25 units of Lantus on the evening prior to your procedure and only take 25 units of Lantus on the morning of your  procedure.    Do not take any Humalog on the morning of your procedure

## 2023-12-04 NOTE — PROGRESS NOTES
[facility]  [address] [facility] [address]  Phone: [phone]  Fax: [phone]  Primary Provider: Aditya Munoz  Pre-op Performing Provider: ADITYA MUNOZ      PREOPERATIVE EVALUATION:  Today's date: 12/4/2023    Bon is a 68 year old, presenting for the following:  Pre-Op Exam        12/4/2023     1:19 PM   Additional Questions   Roomed by Mery HERRERA       Surgical Information:  Surgery/Procedure: endoscopic ultrasound, Upper GI Tract  Surgery Location: [facility]  Surgeon: Dr. Meyer  Surgery Date: 12/7/23  Time of Surgery:   Where patient plans to recover: At home with family  Fax number for surgical facility: Note does not need to be faxed, will be available electronically in Epic.    Assessment & Plan     The proposed surgical procedure is considered LOW risk.    Preop general physical exam  68-year-old male here for preop clearance prior to upcoming endoscopic ultrasound under general anesthesia.  He has tolerated previous surgery and anesthesia without any complications.  Overall risk is low and he is cleared to proceed with surgery/procedure and anesthesia as planned.  He will avoid aspirin and NSAIDs during the week prior to his procedure.  He will also hold Eliquis for 2 days prior to the procedure.  See below for further details.    Pseudocyst of pancreas  Endoscopic ultrasound plan to follow-up presumed pseudocyst of pancreas    Duodenal ulcer without hemorrhage, perforation, or obstruction  He continues on omeprazole twice daily with resolution of epigastric pain.  He will take his usual dose of omeprazole on the morning of his procedure with a small sip of water  - omeprazole (PRILOSEC) 20 MG DR capsule; Take 1 capsule (20 mg) by mouth 2 times daily    Type 2 diabetes mellitus with complication, with long-term current use of insulin (H)  Most recent A1c 8%.  He is currently taking 50 units of Lantus twice daily.  He is instructed to take  only half of his usual dose on the evening prior to his procedure and only half of his usual dose (25 units) on the morning of the procedure.  He is instructed to take no short acting Humalog on the morning of procedure.  He will also hold metformin.  - Insulin Glargine-yfgn 100 UNIT/ML SOPN; Inject 50 Units Subcutaneous 2 times daily    ASCVD (arteriosclerotic cardiovascular disease)  History of multiple coronary artery stents, stable at this time.    Primary hypertension  Blood pressure well-controlled    Chronic pain syndrome  He uses MS Contin to control his chronic pain taking 30 mg 3 times daily and will take his usual morning dose with a small sip of water on the morning of his procedure  - DULoxetine (CYMBALTA) 60 MG capsule; Take 1 capsule (60 mg) by mouth daily    Acute deep vein thrombosis (DVT) of axillary vein of right upper extremity (H)  He is completing a 3-month course of Eliquis.  Minimal risk of holding his Eliquis for the 2 days prior to his procedure.  He can resume on the morning of December 8 if okay with surgery.    Psoriatic arthritis (H)  Follow-up with rheumatology.  Will get him some betamethasone cream for rash on his legs.  Left wrist pain.  Recommending following up with orthopedic clinic for possible cortisone injection.  - betamethasone valerate (VALISONE) 0.1 % external ointment; Apply topically 2 times daily    Obstructive sleep apnea on CPAP  He does use a CPAP nightly for sleep apnea.               Antiplatelet or Anticoagulation Medication Instructions:   - apixaban (Eliquis), edoxaban (Savaysa), rivaroxaban (Xarelto): Bleeding risk is moderate or high for this procedure AND CrCl  (>=) 50 mL/min. HOLD 2 days before surgery.     Additional Medication Instructions:  Avoid aspirin and over-the-counter NSAIDs including ibuprofen, Motrin, Advil and Aleve during the week prior to your procedure    Hold Eliquis for 2 days prior to your procedure and do not take on the day of your  procedure.  Your last dose will be this evening Monday, December 4.  You may resume Friday, December 8 if okay with your surgeon.    On the morning of your procedure, take your usual doses of omeprazole and MS Contin with a small sip of water but hold all of your other medications until later in the day    Only take 25 units of Lantus on the evening prior to your procedure and only take 25 units of Lantus on the morning of your procedure.    Do not take any Humalog on the morning of your procedure    RECOMMENDATION:  APPROVAL GIVEN to proceed with proposed procedure, without further diagnostic evaluation.      47 minutes spent by me on the date of the encounter doing chart review, history and exam, documentation and further activities per the note      Subjective       HPI related to upcoming procedure: 68-year-old male here for preop prior to endoscopic ultrasound under anesthesia.  See assessment and plan for details        12/4/2023     1:08 PM   Preop Questions   1. Have you ever had a heart attack or stroke? YES -MI in 2008   2. Have you ever had surgery on your heart or blood vessels, such as a stent placement, a coronary artery bypass, or surgery on an artery in your head, neck, heart, or legs? YES -placement of multiple coronary artery stents   3. Do you have chest pain with activity? No   4. Do you have a history of  heart failure? No   5. Do you currently have a cold, bronchitis or symptoms of other infection? No   6. Do you have a cough, shortness of breath, or wheezing? No   7. Do you or anyone in your family have previous history of blood clots? YES -recent acute DVT involving upper extremity related to PICC line   8. Do you or does anyone in your family have a serious bleeding problem such as prolonged bleeding following surgeries or cuts? No   9. Have you ever had problems with anemia or been told to take iron pills? No   10. Have you had any abnormal blood loss such as black, tarry or bloody stools?  No   11. Have you ever had a blood transfusion? No   12. Are you willing to have a blood transfusion if it is medically needed before, during, or after your surgery? Yes   13. Have you or any of your relatives ever had problems with anesthesia? No   14. Do you have sleep apnea, excessive snoring or daytime drowsiness? YES -he does have sleep apnea and uses CPAP   14a. Do you have a CPAP machine? Yes   15. Do you have any artifical heart valves or other implanted medical devices like a pacemaker, defibrillator, or continuous glucose monitor? No   16. Do you have artificial joints? No   17. Are you allergic to latex? No       Health Care Directive:  Patient does not have a Health Care Directive or Living Will:     Preoperative Review of :   reviewed - controlled substances reflected in medication list.          Review of Systems   Constitutional:  Negative for chills and fever.   HENT:  Negative for congestion, ear pain, hearing loss and sore throat.    Eyes:  Positive for visual disturbance. Negative for pain.   Respiratory:  Negative for cough.    Cardiovascular:  Negative for chest pain, palpitations and peripheral edema.   Gastrointestinal:  Negative for abdominal pain, constipation, diarrhea, heartburn, hematochezia and nausea.   Genitourinary:  Positive for genital sores and impotence. Negative for dysuria, frequency, hematuria and urgency.   Musculoskeletal:  Positive for arthralgias and joint swelling. Negative for myalgias.   Skin:  Positive for rash.   Neurological:  Positive for weakness and headaches. Negative for dizziness and paresthesias.   Psychiatric/Behavioral:  Negative for mood changes. The patient is not nervous/anxious.          Patient Active Problem List    Diagnosis Date Noted    Pseudocyst of pancreas 10/28/2023     Priority: Medium    Pain of upper abdomen 10/27/2023     Priority: Medium    Leukocytosis, unspecified type 10/27/2023     Priority: Medium    Mass of duodenum 10/16/2023      Priority: Medium     Obstruction of second portion of duodenum with abnormal CT scan, biopsies negative for endoscopic ultrasound      Severe protein-calorie malnutrition (H24) 10/10/2023     Priority: Medium    Deep vein thrombosis (DVT) of upper extremity (H) 10/09/2023     Priority: Medium    SBO (small bowel obstruction) (H) 10/05/2023     Priority: Medium    Nausea and vomiting 10/05/2023     Priority: Medium    Hypophosphatemia 10/05/2023     Priority: Medium    Recurrent vomiting 09/01/2023     Priority: Medium    Jaundice 07/12/2022     Priority: Medium    Transaminitis 07/12/2022     Priority: Medium    Dilated cbd, acquired 07/12/2022     Priority: Medium    Hyponatremia 07/12/2022     Priority: Medium    Renal cell carcinoma, right (H) 06/23/2022     Priority: Medium    Chronic fatigue 06/23/2022     Priority: Medium    Chronic right shoulder pain 06/23/2022     Priority: Medium    Abdominal pain, epigastric 06/23/2022     Priority: Medium     Normal gastric emptying study.  Peptic ulcer disease or gastritis suspected      Knee effusion, left 11/06/2018     Priority: Medium    Drug-induced constipation      Priority: Medium    Type 2 diabetes mellitus with complication, with long-term current use of insulin (H)      Priority: Medium    Chronic pain syndrome      Priority: Medium    Lower abdominal pain      Priority: Medium    Depression 12/26/2017     Priority: Medium    Ischemic cardiomyopathy      Priority: Medium     Stress Echocardiogram 2017 with decreased LV function with EF 43% worse   compared to previous echo.  No change in infarct.  No new ischemia        Ankylosis, sacroiliac joint 07/11/2016     Priority: Medium    Spondylosis of lumbar region without myelopathy or radiculopathy 07/11/2016     Priority: Medium    Psoriatic arthritis (H) 04/28/2016     Priority: Medium    Chronic low back pain 04/28/2016     Priority: Medium    Diabetic peripheral neuropathy associated with type 2 diabetes  mellitus (H) 04/28/2016     Priority: Medium    ASCVD (arteriosclerotic cardiovascular disease) 04/28/2016     Priority: Medium     Coronary artery stent ×2 in RCA following myocardial infarction 2008 ,   echo June 2014 normal LV function with ejection fraction 50-55% with mild   apical inferior hypokinesis        Alcohol abuse, in remission 04/28/2016     Priority: Medium    Tobacco abuse, in remission 04/28/2016     Priority: Medium    Psoriasis      Priority: Medium    Hypercholesterolemia      Priority: Medium    HTN (hypertension)      Priority: Medium    Optic neuritis      Priority: Medium    Obstructive sleep apnea on CPAP      Priority: Medium    Hypogonadism in male      Priority: Medium    Erectile dysfunction      Priority: Medium     Urologic evaluation July 2015, hypogonadism        Thoracic radiculopathy 01/01/2004     Priority: Medium     right T8 intercostal nerve block 2002          Past Medical History:   Diagnosis Date    Abdominal pain, epigastric 06/23/2022    Normal gastric emptying study.  Peptic ulcer disease or gastritis suspected    Acute cholecystitis 07/12/2022    Hospitalized with acute abdominal pain, abnormal LFTs and CT showing distended gallbladder and bile duct dilatation.  Acute cholecystitis.  Cholecystectomy.    Arthritis     psoriatic    ASCVD (arteriosclerotic cardiovascular disease) 04/28/2016    Coronary artery stent ×2 in RCA following myocardial infarction 2008 , echo June 2014 normal LV function with ejection fraction 50-55% with mild apical inferior hypokinesis    Chronic fatigue 06/23/2022    Chronic low back pain 04/28/2016    Chronic right shoulder pain 06/23/2022    Coronary artery disease     Depression 12/26/2017    Diabetic peripheral neuropathy associated with type 2 diabetes mellitus (H) 04/28/2016    Abnormal EMG and workup at [facility]    Dilated bile duct 07/03/2017    CT scan with incidental finding of dilated intra-and extrahepatic ducts    HTN  (hypertension)     Hypercholesterolemia     Hypogonadism in male     Ischemic cardiomyopathy     Stress Echocardiogram 2017 with decreased LV function with EF 43% worse compared to previous echo.  No change in infarct.  No new ischemia    Left upper quadrant pain 06/30/2017    Mass of duodenum 10/16/2023    Obstruction of second portion of duodenum with abnormal CT scan, biopsies negative for endoscopic ultrasound    MI (myocardial infarction) (H)     Obstructive sleep apnea on CPAP     CPAP    Optic neuritis     Psoriasis     Recurrent vomiting 09/01/2023    Renal cell carcinoma, right (H) 06/23/2022    Right partial nephrectomy 2017    Right renal mass     Sebaceous cyst 2010    Thoracic radiculopathy 2004    right T8 intercostal nerve block 2002    Type 2 diabetes mellitus with insulin therapy (H)      Past Surgical History:   Procedure Laterality Date    APPENDECTOMY      COLONOSCOPY      Colonoscopy September 2017 with hyperplastic polyps, repeat in 10 years    CORONARY ANGIOPLASTY      stent    ENDOSCOPIC RETROGRADE CHOLANGIOPANCREATOGRAM N/A 7/12/2022    Procedure: ENDOSCOPIC RETROGRADE CHOLANGIOPANCREATOGRAPHY, STONE EXTRACTION, BILIARY SPHINCTEROTOMY;  Surgeon: Bon Meyer MD;  Location: [facility] [place], [place]    ESOPHAGOSCOPY, GASTROSCOPY, DUODENOSCOPY (EGD), COMBINED N/A 10/6/2023    Procedure: ESOPHAGOGASTRODUODENOSCOPY, WITH ENDOSCOPIC ULTRASOUND WITH FINE NEEDLE ASPIRATION AND DUODENAL BIOPSY;  Surgeon: Bon Meyer MD;  Location: [facility] OR    KNEE SURGERY      LAPAROSCOPIC CHOLECYSTECTOMY N/A 7/12/2022    Procedure: CHOLECYSTECTOMY, LAPAROSCOPIC;  Surgeon: Adam March MD;  Location: [facility] OR    NOSE SURGERY      PICC DOUBLE LUMEN PLACEMENT  10/9/2023    NV LAP,PARTIAL NEPHRECTOMY Right 1/10/2018    Procedure: RIGHT ROBOTIC PARTIAL NEPHRECTOMY WITH INTRAOPERATIVE ULTRASOUND;  Surgeon: Chase Rodríguez MD;  Location: [facility];  Service: Urology      Current Outpatient Medications   Medication Sig Dispense Refill    Alcohol Swabs PADS Use to swab the area of the injection or sherry as directed Per insurance coverage 100 each 0    apixaban ANTICOAGULANT (ELIQUIS) 5 MG tablet Take 1 tablet (5 mg) by mouth 2 times daily 60 tablet 1    B-D U/F 31G X 8 MM insulin pen needle FOR ADMINISTERING INSULIN AT HOME      blood glucose (NO BRAND SPECIFIED) test strip To use to test glucose level in the blood Use to test blood sugar  4 times daily as directed. To accompany glucose monitor brands per insurance coverage. 100 strip 0    Continuous Blood Gluc Sensor (FREESTYLE SHIRA 14 DAY SENSOR) [facility]       DULoxetine (CYMBALTA) 60 MG capsule Take 1 capsule (60 mg) by mouth daily for 30 days 30 capsule 0    gabapentin (NEURONTIN) 300 MG capsule [GABAPENTIN (NEURONTIN) 300 MG CAPSULE] Take 600 mg by mouth 3 (three) times a day.       Insulin Glargine-yfgn 100 UNIT/ML SOPN Inject 25 Units Subcutaneous 2 times daily (Patient taking differently: Inject 25 Units Subcutaneous 2 times daily Lantus)      insulin lispro (HUMALOG KWIKPEN) 100 UNIT/ML (1 unit dial) KWIKPEN Inject Subcutaneous 3 times daily (before meals) Sliding scale. Average use is 20 units with each meal      morphine (MS CONTIN) 30 MG CR tablet Take 30 mg by mouth every 8 hours      naloxone (NARCAN) 4 MG/0.1ML nasal spray Spray 1 spray into one nostril alternating nostrils once as needed for opioid reversal      polyethylene glycol (MIRALAX) 17 GM/Dose powder Take 17 g by mouth daily 510 g 1    Sharps Container MISC Use as directed to dispose of needles, lancets and other sharps Per Insurance coverage 1 each 0    valACYclovir (VALTREX) 500 MG tablet Take 500 mg by mouth 2 times daily as needed Take twice daily at onset of symptoms for 3 days. Repeat for flares.      metFORMIN (GLUCOPHAGE-XR) 750 MG 24 hr tablet Take 1 tablet (750 mg) by mouth daily (with dinner) (Patient not taking: Reported on 12/4/2023) 90 tablet 3     testosterone (ANDROGEL) 20.25 MG/1.25GM (1.62%) topical gel Place 1 packet (20.25 mg) onto the skin daily (Patient not taking: Reported on 12/4/2023) 375 g 1       Allergies   Allergen Reactions    Atorvastatin Muscle Pain (Myalgia)    Valomag [Arthritis Pain Formula] Nausea and Vomiting     gastric ulcer from aspirin use    Ampicillin Rash     Had a reaction to this medication many years ago.    Codeine Rash     It has been about 30 years ago        Social History     Tobacco Use    Smoking status: Former     Passive exposure: Past    Smokeless tobacco: Never   Substance Use Topics    Alcohol use: No     Comment: Alcoholic Drinks/day: alcohol abuse - in remission x16 years     History reviewed. No pertinent family history.  History   Drug Use No         Objective     /58 (BP Location: Right arm, Patient Position: Sitting, Cuff Size: Adult Regular)   Pulse 92   Temp 98  F (36.7  C) (Oral)   Resp 16   Ht 1.829 m (6')   Wt 95.9 kg (211 lb 8 oz)   SpO2 98%   BMI 28.68 kg/m      Physical Exam    GENERAL APPEARANCE: healthy, alert and no distress     HEENT: Normal     RESP: lungs clear to auscultation - no rales, rhonchi or wheezes     CV: regular rates and rhythm, normal S1 S2, no S3 or S4 and no murmur, click or rub     ABDOMEN:  soft, nontender, no HSM or masses and bowel sounds normal     SKIN: Psoriatic rash     NEURO: Normal strength and tone, sensory exam grossly normal, mentation intact and speech normal     PSYCH: mentation appears normal. and affect normal/bright     LYMPHATICS: No cervical adenopathy    Recent Labs   Lab Test 11/17/23  0948 10/30/23  0642 10/09/23  1136 10/09/23  0432 10/08/23  0622 10/05/23  1431 10/05/23  1204 07/12/22  1749 07/12/22  0228   HGB 12.8* 11.1*   < > 11.2*  --    < > 14.6   < > 14.5    236   < >  --   --    < > 316   < > 150   INR  --   --   --  1.26* 1.16*  --   --   --   --     139   < > 140 138   < > 138   < > 132*   POTASSIUM 4.4 3.8   < > 3.4  "3.8   < > 3.7   < > 3.5   CR 0.86 1.06   < > 0.95 1.00   < > 0.77   < > 1.08   A1C  --   --   --   --   --   --  8.0*  --  8.2*    < > = values in this interval not displayed.        Diagnostics:  No labs were ordered during this visit.   No EKG required for low risk surgery (cataract, skin procedure, breast biopsy, etc).    Revised Cardiac Risk Index (RCRI):  The patient has the following serious cardiovascular risks for perioperative complications:   - Coronary Artery Disease (MI, positive stress test, angina, Qs on EKG) = 1 point   - Diabetes Mellitus (on Insulin) = 1 point     RCRI Interpretation: 2 points: Class III (moderate risk - 6.6% complication rate)     Estimated Functional Capacity: Performs 4 METS exercise without symptoms (e.g., light housework, stairs, 4 mph walk, 7 mph bike, slow step dance)           Signed Electronically by: Aditya Munoz MD  Copy of this evaluation report is provided to requesting physician.      Answers submitted by the patient for this visit:  Patient Health Questionnaire (Submitted on 12/4/2023)  If you checked off any problems, how difficult have these problems made it for you to do your work, take care of things at home, or get along with other people?: Somewhat difficult  PHQ9 TOTAL SCORE: 6  Annual Preventive Visit (Submitted on 12/4/2023)  Chief Complaint: Annual Exam:  In general, how would you rate your overall physical health?: fair  Do you usually eat at least 4 servings of fruit and vegetables a day, include whole grains & fiber, and avoid regularly eating high fat or \"junk\" foods? : Yes  Taking medications regularly:: Yes  Medication side effects:: None  Activities of Daily Living: no assistance needed  Home safety: throw rugs in the hallway, lack of grab bars in the bathroom  Hearing Impairment:: difficulty following dialogue in the theater, difficulty understanding soft or whispered speech  In the past 6 months, have you been bothered by leaking of urine?: " No  In general, how would you rate your overall mental or emotional health?: good  Additional concerns today:: Yes  Exercise outside of work (Submitted on 12/4/2023)  Chief Complaint: Annual Exam:  Duration of exercise:: N/A

## 2024-01-25 ENCOUNTER — TELEPHONE (OUTPATIENT)
Dept: INTERNAL MEDICINE | Facility: CLINIC | Age: 69
End: 2024-01-25
Payer: COMMERCIAL

## 2024-01-25 NOTE — TELEPHONE ENCOUNTER
"Patient called and said that he's discussed his gout symptoms with his PCP and was wondering if an Rx could be sent in.  ---    Symptoms  Describe your symptoms: Patient said he's having \"gout symptoms with his hip\"    Any pain: Yes: \"walking is nearly impossible\"    Have you been seen for this:  Yes:     Triage offered?: No    Home remedies tried: OTC pain meds    Preferred Pharmacy:   [id number] - [place] [address] & [address] [address]  Phone: [phone] Fax: [phone]      Could we send this information to you in "[employer] or would you prefer to receive a phone call?:     Patient would prefer a phone call     Okay to leave a detailed message?: Yes at Cell number on file:    Telephone Information:   Mobile [phone]   Mobile [phone]     "

## 2024-01-25 NOTE — TELEPHONE ENCOUNTER
Called to speak with pt to gather more information. Do not see Hx of gout or gout medication after briefly reviewing chart. Will need more information before sending off to provider. LMTCB.

## 2024-01-26 NOTE — TELEPHONE ENCOUNTER
Attempted to call #2, LMTCB.     Do not see Hx of gout or gout medication after briefly reviewing chart. Will need more information before sending off to provider.

## 2024-01-29 NOTE — TELEPHONE ENCOUNTER
Patient returing call regarding gout symptoms.  Patient states he experienced gout in his 20's.  Patient spoke with pharmacy and was told not OTC medications for gout.  Patient reports symptoms started 2 months ago. Patient has experienced pain in both feet (top of arches) and left hip, today patient has left hip and left knee pain.  Denies edema, and redness.   Patient requesting medication. Advised office visit needed to further evaluate.  Patient's PCP is out of clinic this week. Scheduled patient with a provider at  tomorrow, 1/30/2024.

## 2024-01-30 ENCOUNTER — ANCILLARY PROCEDURE (OUTPATIENT)
Dept: GENERAL RADIOLOGY | Facility: CLINIC | Age: 69
End: 2024-01-30
Attending: STUDENT IN AN ORGANIZED HEALTH CARE EDUCATION/TRAINING PROGRAM
Payer: COMMERCIAL

## 2024-01-30 ENCOUNTER — OFFICE VISIT (OUTPATIENT)
Dept: FAMILY MEDICINE | Facility: CLINIC | Age: 69
End: 2024-01-30
Payer: COMMERCIAL

## 2024-01-30 VITALS
HEART RATE: 73 BPM | RESPIRATION RATE: 16 BRPM | BODY MASS INDEX: 28.38 KG/M2 | HEIGHT: 72 IN | SYSTOLIC BLOOD PRESSURE: 116 MMHG | DIASTOLIC BLOOD PRESSURE: 60 MMHG | TEMPERATURE: 98.1 F | WEIGHT: 209.5 LBS | OXYGEN SATURATION: 99 %

## 2024-01-30 DIAGNOSIS — M13.0 POLYARTHRITIS: Primary | ICD-10-CM

## 2024-01-30 DIAGNOSIS — M13.0 POLYARTHRITIS: ICD-10-CM

## 2024-01-30 DIAGNOSIS — L40.50 PSORIATIC ARTHRITIS (H): ICD-10-CM

## 2024-01-30 DIAGNOSIS — D62 ANEMIA DUE TO BLOOD LOSS, ACUTE: ICD-10-CM

## 2024-01-30 LAB
ALBUMIN UR-MCNC: NEGATIVE MG/DL
APPEARANCE UR: CLEAR
BASOPHILS # BLD AUTO: 0.1 10E3/UL (ref 0–0.2)
BASOPHILS NFR BLD AUTO: 1 %
BILIRUB UR QL STRIP: NEGATIVE
COLOR UR AUTO: YELLOW
CRP SERPL-MCNC: 5.49 MG/L
EOSINOPHIL # BLD AUTO: 0.1 10E3/UL (ref 0–0.7)
EOSINOPHIL NFR BLD AUTO: 2 %
ERYTHROCYTE [DISTWIDTH] IN BLOOD BY AUTOMATED COUNT: 13.8 % (ref 10–15)
GLUCOSE UR STRIP-MCNC: 100 MG/DL
HCT VFR BLD AUTO: 44.9 % (ref 40–53)
HGB BLD-MCNC: 14.9 G/DL (ref 13.3–17.7)
HGB UR QL STRIP: ABNORMAL
IMM GRANULOCYTES # BLD: 0 10E3/UL
IMM GRANULOCYTES NFR BLD: 0 %
KETONES UR STRIP-MCNC: NEGATIVE MG/DL
LEUKOCYTE ESTERASE UR QL STRIP: NEGATIVE
LYMPHOCYTES # BLD AUTO: 1.9 10E3/UL (ref 0.8–5.3)
LYMPHOCYTES NFR BLD AUTO: 23 %
MCH RBC QN AUTO: 27.5 PG (ref 26.5–33)
MCHC RBC AUTO-ENTMCNC: 33.2 G/DL (ref 31.5–36.5)
MCV RBC AUTO: 83 FL (ref 78–100)
MONOCYTES # BLD AUTO: 0.5 10E3/UL (ref 0–1.3)
MONOCYTES NFR BLD AUTO: 6 %
MUCOUS THREADS #/AREA URNS LPF: PRESENT /LPF
NEUTROPHILS # BLD AUTO: 5.9 10E3/UL (ref 1.6–8.3)
NEUTROPHILS NFR BLD AUTO: 69 %
NITRATE UR QL: NEGATIVE
PH UR STRIP: 5.5 [PH] (ref 5–8)
PLATELET # BLD AUTO: 244 10E3/UL (ref 150–450)
RBC # BLD AUTO: 5.42 10E6/UL (ref 4.4–5.9)
RBC #/AREA URNS AUTO: ABNORMAL /HPF
RHEUMATOID FACT SERPL-ACNC: <10 IU/ML
SP GR UR STRIP: 1.02 (ref 1–1.03)
URATE SERPL-MCNC: 6 MG/DL (ref 3.4–7)
UROBILINOGEN UR STRIP-ACNC: 0.2 E.U./DL
WBC # BLD AUTO: 8.6 10E3/UL (ref 4–11)
WBC #/AREA URNS AUTO: ABNORMAL /HPF

## 2024-01-30 PROCEDURE — 73502 X-RAY EXAM HIP UNI 2-3 VIEWS: CPT | Mod: TC | Performed by: RADIOLOGY

## 2024-01-30 PROCEDURE — 36415 COLL VENOUS BLD VENIPUNCTURE: CPT | Performed by: STUDENT IN AN ORGANIZED HEALTH CARE EDUCATION/TRAINING PROGRAM

## 2024-01-30 PROCEDURE — 81001 URINALYSIS AUTO W/SCOPE: CPT | Performed by: STUDENT IN AN ORGANIZED HEALTH CARE EDUCATION/TRAINING PROGRAM

## 2024-01-30 PROCEDURE — 84550 ASSAY OF BLOOD/URIC ACID: CPT | Performed by: STUDENT IN AN ORGANIZED HEALTH CARE EDUCATION/TRAINING PROGRAM

## 2024-01-30 PROCEDURE — 86140 C-REACTIVE PROTEIN: CPT | Performed by: STUDENT IN AN ORGANIZED HEALTH CARE EDUCATION/TRAINING PROGRAM

## 2024-01-30 PROCEDURE — 73630 X-RAY EXAM OF FOOT: CPT | Mod: TC | Performed by: RADIOLOGY

## 2024-01-30 PROCEDURE — 86200 CCP ANTIBODY: CPT | Performed by: STUDENT IN AN ORGANIZED HEALTH CARE EDUCATION/TRAINING PROGRAM

## 2024-01-30 PROCEDURE — 99214 OFFICE O/P EST MOD 30 MIN: CPT | Performed by: STUDENT IN AN ORGANIZED HEALTH CARE EDUCATION/TRAINING PROGRAM

## 2024-01-30 PROCEDURE — 86431 RHEUMATOID FACTOR QUANT: CPT | Performed by: STUDENT IN AN ORGANIZED HEALTH CARE EDUCATION/TRAINING PROGRAM

## 2024-01-30 PROCEDURE — 85025 COMPLETE CBC W/AUTO DIFF WBC: CPT | Performed by: STUDENT IN AN ORGANIZED HEALTH CARE EDUCATION/TRAINING PROGRAM

## 2024-01-30 RX ORDER — PREDNISONE 10 MG/1
TABLET ORAL
Qty: 31 TABLET | Refills: 0 | Status: SHIPPED | OUTPATIENT
Start: 2024-01-30 | End: 2024-02-13

## 2024-01-30 ASSESSMENT — PATIENT HEALTH QUESTIONNAIRE - PHQ9
10. IF YOU CHECKED OFF ANY PROBLEMS, HOW DIFFICULT HAVE THESE PROBLEMS MADE IT FOR YOU TO DO YOUR WORK, TAKE CARE OF THINGS AT HOME, OR GET ALONG WITH OTHER PEOPLE: SOMEWHAT DIFFICULT
SUM OF ALL RESPONSES TO PHQ QUESTIONS 1-9: 7
SUM OF ALL RESPONSES TO PHQ QUESTIONS 1-9: 7

## 2024-01-30 NOTE — PROGRESS NOTES
Assessment & Plan     Polyarthritis  Bon is a 68-year-old male with psoriatic arthritis, chronic pain syndrome with chronic use of opiate, type 2 diabetes, hypertension, history of DVT, history of peptic ulcer, history of renal cell carcinoma on the right who presents for polyarthralgia, specifically pain in the left hip and dorsal midfoot bilaterally which is intermittent since the beginning of January, and may be severe enough to cause him to not be able to walk for brief periods.  It is significantly improved since his last flare started 3 days ago in the left hip, initially unable to walk and now is able to walk but with some pain.  Pain is on the anterior lateral hip, but does not seem to be tender.  On examination, mild tenderness over the lateral proximal femu distal to the greater trochanter.      Most likely that patient's pain is coming from psoriatic arthritis.  He was previously on Enbrel until 1 year ago when it became too expensive.  He is now seeing a dermatologist who is about to put him on Skyrizi for psoriasis and psoriatic arthritis starting next week.  Believe that this is most likely to be helpful to reduce episodes of recurrent pain.    Also considered gout, as patient reports having gout arthritis in his 20s, but has not had it since then, believe this is unlikely.  I will obtain uric acid to further evaluate.    Considered osteoarthritis, we will obtain x-rays today to further evaluate.    Considered rheumatoid arthritis, will obtain anti-CCP, rheumatoid factor.    We will treat with prednisone taper, starting at 40 mg for 5 days, then tapering down by 50% dose every 3 days until down to 5 mg and then off.  In this way we we will attempt to avoid flare of psoriasis.  Unable to use NSAIDs due to recent peptic ulcer.  Patient reports understanding of plan.  He will follow-up in 1 week.    - Uric acid  - CRP inflammation  - Rheumatoid factor  - XR Foot Bilateral G/E 3 Views  - XR Hip Left 2-3  Views  - Cyclic Citrullinated Peptide Antibody IgG  - UA Macroscopic with reflex to Microscopic and Culture - Lab Collect  - predniSONE (DELTASONE) 10 MG tablet  Dispense: 31 tablet; Refill: 0  - UA Macroscopic with reflex to Microscopic and Culture - Lab Collect  - Uric acid  - CRP inflammation  - Rheumatoid factor  - Cyclic Citrullinated Peptide Antibody IgG  - UA Microscopic with Reflex to Culture    Psoriatic arthritis (H)  See above, we will attempt treatment with prednisone, with slow taper.  He will be on Skyrizi starting next week.    Anemia due to blood loss, acute  Previously had anemia, likely secondary to blood loss from ulcer, will repeat CBC to monitor.  Patient aware he should avoid NSAIDs.  - CBC with platelets and differential  - CBC with platelets and differential              BMI  Estimated body mass index is 28.41 kg/m  as calculated from the following:    Height as of this encounter: 1.829 m (6').    Weight as of this encounter: 95 kg (209 lb 8 oz).       Follow-up in 1 week    Subjective   Bon is a 68 year old, presenting for the following health issues:  Gout Symptoms (Left knee/hip. Both feet, on and off.) and Shoulder Pain        1/30/2024     9:53 AM   Additional Questions   Roomed by Elva SAGASTUME     History of Present Illness       Reason for visit:  Gout  Symptom onset:  More than a month  Symptoms include:  Terrible pain  Symptom intensity:  Severe  Symptom progression:  Improving  Had these symptoms before:  Yes  Has tried/received treatment for these symptoms:  Yes  Previous treatment was successful:  Yes  Prior treatment description:  Pills  What makes it worse:  Heat  What makes it better:  No    He eats 2-3 servings of fruits and vegetables daily.He consumes 0 sweetened beverage(s) daily.He exercises with enough effort to increase his heart rate 9 or less minutes per day.  He exercises with enough effort to increase his heart rate 3 or less days per week.   He is taking medications  regularly.       Reports he intermittent pain that will come and go, which came back about 3 days ago and is severe in the left hip.  Was not able to walk initially, but this has improved and now able to walk. Feels like shooting pain when walking. Pain is always present just worsens with walking. Pain is about a 2/10 while sitting. Pain will go up to 4-5/10 with walking now. In the past two months, he has had 5 to 6 attacks of pain.  These areas are varied, left  hip has been effected, the dorsal mid foot on either side.  Only one area is effected at a time.     Prior to January this was not an issue.               Review of Systems  Constitutional, neuro, ENT, endocrine, pulmonary, cardiac, gastrointestinal, genitourinary, musculoskeletal, integument and psychiatric systems are negative, except as otherwise noted.      Objective    /60 (BP Location: Right arm, Patient Position: Sitting, Cuff Size: Adult Regular)   Pulse 73   Temp 98.1  F (36.7  C) (Oral)   Resp 16   Ht 1.829 m (6')   Wt 95 kg (209 lb 8 oz)   SpO2 99%   BMI 28.41 kg/m    Body mass index is 28.41 kg/m .  Physical Exam   GENERAL: alert and no distress  MS: no gross musculoskeletal defects noted, no edema  MS: normal muscle tone, normal range of motion, no cyanosis, clubbing, or edema, tenderness to palpation at the lateral proximal femar, distal to the greater trochanter, mildly tender, and LLE exam shows normal strength and muscle mass, no evidence of joint effusion, ROM of all joints is normal, and pain with all ranges of motion with passive motion.  SKIN: Psoriatic plaques present on bilateral hands and upper extremities.    Office Visit on 11/17/2023   Component Date Value Ref Range Status    Sodium 11/17/2023 137  135 - 145 mmol/L Final    Reference intervals for this test were updated on 09/26/2023 to more accurately reflect our healthy population. There may be differences in the flagging of prior results with similar values  performed with this method. Interpretation of those prior results can be made in the context of the updated reference intervals.     Potassium 11/17/2023 4.4  3.4 - 5.3 mmol/L Final    Chloride 11/17/2023 99  98 - 107 mmol/L Final    Carbon Dioxide (CO2) 11/17/2023 27  22 - 29 mmol/L Final    Anion Gap 11/17/2023 11  7 - 15 mmol/L Final    Urea Nitrogen 11/17/2023 9.6  8.0 - 23.0 mg/dL Final    Creatinine 11/17/2023 0.86  0.67 - 1.17 mg/dL Final    GFR Estimate 11/17/2023 >90  >60 mL/min/1.73m2 Final    Calcium 11/17/2023 8.9  8.8 - 10.2 mg/dL Final    Glucose 11/17/2023 263 (H)  70 - 99 mg/dL Final    WBC Count 11/17/2023 9.2  4.0 - 11.0 10e3/uL Final    RBC Count 11/17/2023 4.83  4.40 - 5.90 10e6/uL Final    Hemoglobin 11/17/2023 12.8 (L)  13.3 - 17.7 g/dL Final    Hematocrit 11/17/2023 39.6 (L)  40.0 - 53.0 % Final    MCV 11/17/2023 82  78 - 100 fL Final    MCH 11/17/2023 26.5  26.5 - 33.0 pg Final    MCHC 11/17/2023 32.3  31.5 - 36.5 g/dL Final    RDW 11/17/2023 14.5  10.0 - 15.0 % Final    Platelet Count 11/17/2023 194  150 - 450 10e3/uL Final           Signed Electronically by: Franko Diggs MD

## 2024-02-01 LAB — CCP AB SER IA-ACNC: 2.6 U/ML

## 2024-02-27 ENCOUNTER — OFFICE VISIT (OUTPATIENT)
Dept: RHEUMATOLOGY | Facility: CLINIC | Age: 69
End: 2024-02-27
Payer: COMMERCIAL

## 2024-02-27 VITALS
WEIGHT: 214.5 LBS | BODY MASS INDEX: 29.09 KG/M2 | OXYGEN SATURATION: 98 % | HEART RATE: 76 BPM | DIASTOLIC BLOOD PRESSURE: 60 MMHG | SYSTOLIC BLOOD PRESSURE: 130 MMHG

## 2024-02-27 DIAGNOSIS — M43.28 ANKYLOSIS, SACROILIAC JOINT: ICD-10-CM

## 2024-02-27 DIAGNOSIS — M19.011 OSTEOARTHRITIS OF BILATERAL GLENOHUMERAL JOINTS: ICD-10-CM

## 2024-02-27 DIAGNOSIS — M19.012 OSTEOARTHRITIS OF BILATERAL GLENOHUMERAL JOINTS: ICD-10-CM

## 2024-02-27 DIAGNOSIS — L40.50 PSORIATIC ARTHRITIS (H): Primary | ICD-10-CM

## 2024-02-27 DIAGNOSIS — L40.9 PSORIASIS: ICD-10-CM

## 2024-02-27 DIAGNOSIS — M70.62 GREATER TROCHANTERIC BURSITIS OF LEFT HIP: ICD-10-CM

## 2024-02-27 PROCEDURE — 20610 DRAIN/INJ JOINT/BURSA W/O US: CPT | Mod: LT | Performed by: INTERNAL MEDICINE

## 2024-02-27 PROCEDURE — 99214 OFFICE O/P EST MOD 30 MIN: CPT | Mod: 25 | Performed by: INTERNAL MEDICINE

## 2024-02-27 RX ORDER — TRIAMCINOLONE ACETONIDE 40 MG/ML
40 INJECTION, SUSPENSION INTRA-ARTICULAR; INTRAMUSCULAR ONCE
Status: COMPLETED | OUTPATIENT
Start: 2024-02-27 | End: 2024-02-27

## 2024-02-27 RX ADMIN — TRIAMCINOLONE ACETONIDE 40 MG: 40 INJECTION, SUSPENSION INTRA-ARTICULAR; INTRAMUSCULAR at 13:27

## 2024-02-27 NOTE — PROGRESS NOTES
Rheumatology follow-up visit note     Bon is a 68 year old male presents today for follow-up.    Bon was seen today for recheck.    Diagnoses and all orders for this visit:    Psoriatic arthritis (H)  -     ASPIRATION/INJECTION MAJOR JOINT  -     triamcinolone (KENALOG-40) injection 40 mg    Psoriasis    Osteoarthritis of bilateral glenohumeral joints    Ankylosis, sacroiliac joint    Greater trochanteric bursitis of left hip  -     ASPIRATION/INJECTION MAJOR JOINT  -     triamcinolone (KENALOG-40) injection 40 mg        This patient presents after a long hiatus.  He has psoriatic arthritis, psoriasis.  He is experience severe exacerbation of psoriatic arthritis and psoriasis.  He had been on Enbrel well-controlled but insurance and the manufacture were not able to continue.  He has been off any treatment for a long time.  He flared up with both psoriasis and psoriatic arthritis.  Was started on Skyrizi at the dermatology which is beginning to help.  He has residual pain such as left trochanteric area.  Where he has evidence of trochanteric bursitis like to proceed with local injection pros and cons outlined 40 mg Kenalog injected.  There were no psoriatic lesions there.  He is scheduled to go for a cruise and would like to take some prednisone with.  Will revisit this issue near to the time meanwhile I have outlined to him that short course is definitely reasonable 1 but long-term prednisone as he had been at 1 point elsewhere would not be an appropriate treatment.  Depending upon how he does with Skyrizi methotrexate might be an option he was on it many years ago elsewhere..    Follow up in 3 months.    HPI    Bon Luque is a 68 year old male is here for follow-up of psoriatic arthritis, psoriasis after a long interval.  He was last seen here in May 2022.  In the interim he was not able to get Enbrel as the  stopped paying for it and prior to that the insurance had discontinued anyway.   Meanwhile he went on to have Medicare.  His psoriasis got worse as his joint pains.  He was given prednisone which helped his joints very well, he also got Skyrizi.  Followed by the  through the dermatologist and has had first injection which is already beginning to help.  He noted pain in his hip areas bilaterally but worse on the left side that keeps him up at night.  This interferes with activity.  He is also had pain in the shoulders, hands that improved so much with the course of prednisone that he got from elsewhere.  He does have osteoarthritis in addition.  This is outlined.     Following is the excerpt from a previous note:  ... extensive psoriasis now that he has not been on Enbrel since April 2022 because of the cost, $1700 per month since he can 1 Medicare.  This is led to recurrence of psoriasis the [facility] advised many years ago when he first started Enbrel at the beginning of and george availability.  He has extensive lesions on upper and lower extremities.  Fortunately joints and appendicular systems have not flared up.  He has not had any significant joint pains at all with the exception of shoulder discomfort that is associated with glenohumeral degenerative changes as documented on the MRI of June 2022 and the x-rays were before that.  He narrated how went Enbrel was started his psoriasis had improved so much and prior to that it was such a extensive eruption 85% of body area and had such social implications for him as well.  He has noted worsening pain in his shoulder especially in the left side which keeps him up at night there is no radiation down arm.  No history of trauma.    DETAILED EXAMINATION  02/27/24  :    Vitals:    02/27/24 1230   BP: 130/60   BP Location: Right arm   Patient Position: Sitting   Cuff Size: Adult Regular   Pulse: 76   SpO2: 98%   Weight: 97.3 kg (214 lb 8 oz)     Alert oriented. Head including the face is examined for malar rash, heliotropes, scarring, lupus  pernio. Eyes examined for redness such as in episcleritis/scleritis, periorbital lesions.   Neck/ Face examined for parotid gland swelling, range of motion of neck.  Left upper and lower and right upper and lower extremities examined for tenderness, swelling, warmth of the appendicular joints, range of motion, edema, rash.  Some of the important findings included: he does not have evidence of synovitis in the palpable joints of the upper extremities.  No significant deformities of the digits.  He is markedly tender in the left trochanteric area.  Does not have dactylitis of digits.  Patient Active Problem List    Diagnosis Date Noted    Pseudocyst of pancreas 10/28/2023     Priority: Medium    Pain of upper abdomen 10/27/2023     Priority: Medium    Leukocytosis, unspecified type 10/27/2023     Priority: Medium    Mass of duodenum 10/16/2023     Priority: Medium     Obstruction of second portion of duodenum with abnormal CT scan, biopsies negative for endoscopic ultrasound      Duodenal ulcer without hemorrhage, perforation, or obstruction 10/16/2023     Priority: Medium    Severe protein-calorie malnutrition (H24) 10/10/2023     Priority: Medium    Deep vein thrombosis (DVT) of upper extremity (H) 10/09/2023     Priority: Medium    SBO (small bowel obstruction) (H) 10/05/2023     Priority: Medium    Nausea and vomiting 10/05/2023     Priority: Medium    Hypophosphatemia 10/05/2023     Priority: Medium    Jaundice 07/12/2022     Priority: Medium    Transaminitis 07/12/2022     Priority: Medium    Dilated cbd, acquired 07/12/2022     Priority: Medium    Hyponatremia 07/12/2022     Priority: Medium    Renal cell carcinoma, right (H) 06/23/2022     Priority: Medium    Chronic fatigue 06/23/2022     Priority: Medium    Chronic right shoulder pain 06/23/2022     Priority: Medium    Abdominal pain, epigastric 06/23/2022     Priority: Medium     Normal gastric emptying study.  Peptic ulcer disease or gastritis suspected       Knee effusion, left 11/06/2018     Priority: Medium    Drug-induced constipation      Priority: Medium    Type 2 diabetes mellitus with complication, with long-term current use of insulin (H)      Priority: Medium    Chronic pain syndrome      Priority: Medium    Lower abdominal pain      Priority: Medium    Depression 12/26/2017     Priority: Medium    Ischemic cardiomyopathy      Priority: Medium     Stress Echocardiogram 2017 with decreased LV function with EF 43% worse   compared to previous echo.  No change in infarct.  No new ischemia        Ankylosis, sacroiliac joint 07/11/2016     Priority: Medium    Spondylosis of lumbar region without myelopathy or radiculopathy 07/11/2016     Priority: Medium    Psoriatic arthritis (H) 04/28/2016     Priority: Medium    Chronic low back pain 04/28/2016     Priority: Medium    Diabetic peripheral neuropathy associated with type 2 diabetes mellitus (H) 04/28/2016     Priority: Medium    ASCVD (arteriosclerotic cardiovascular disease) 04/28/2016     Priority: Medium     Coronary artery stent ×2 in RCA following myocardial infarction 2008 ,   echo June 2014 normal LV function with ejection fraction 50-55% with mild   apical inferior hypokinesis        Alcohol abuse, in remission 04/28/2016     Priority: Medium    Tobacco abuse, in remission 04/28/2016     Priority: Medium    Psoriasis      Priority: Medium    Hypercholesterolemia      Priority: Medium    HTN (hypertension)      Priority: Medium    Optic neuritis      Priority: Medium    Obstructive sleep apnea on CPAP      Priority: Medium    Hypogonadism in male      Priority: Medium    Erectile dysfunction      Priority: Medium     Urologic evaluation July 2015, hypogonadism        Thoracic radiculopathy 01/01/2004     Priority: Medium     right T8 intercostal nerve block 2002         Past Surgical History:   Procedure Laterality Date    APPENDECTOMY      COLONOSCOPY      Colonoscopy September 2017 with hyperplastic  polyps, repeat in 10 years    CORONARY ANGIOPLASTY      stent    ENDOSCOPIC RETROGRADE CHOLANGIOPANCREATOGRAM N/A 7/12/2022    Procedure: ENDOSCOPIC RETROGRADE CHOLANGIOPANCREATOGRAPHY, STONE EXTRACTION, BILIARY SPHINCTEROTOMY;  Surgeon: Bon Meyer MD;  Location: [facility]    ESOPHAGOSCOPY, GASTROSCOPY, DUODENOSCOPY (EGD), COMBINED N/A 10/6/2023    Procedure: ESOPHAGOGASTRODUODENOSCOPY, WITH ENDOSCOPIC ULTRASOUND WITH FINE NEEDLE ASPIRATION AND DUODENAL BIOPSY;  Surgeon: Bon Meyer MD;  Location: [facility]    KNEE SURGERY      LAPAROSCOPIC CHOLECYSTECTOMY N/A 7/12/2022    Procedure: CHOLECYSTECTOMY, LAPAROSCOPIC;  Surgeon: Adam March MD;  Location: [facility] OR    NOSE SURGERY      PICC DOUBLE LUMEN PLACEMENT  10/9/2023    RI LAP,PARTIAL NEPHRECTOMY Right 1/10/2018    Procedure: RIGHT ROBOTIC PARTIAL NEPHRECTOMY WITH INTRAOPERATIVE ULTRASOUND;  Surgeon: Chase Rodríguez MD;  Location: [facility];  Service: Urology      Past Medical History:   Diagnosis Date    Abdominal pain, epigastric 06/23/2022    Normal gastric emptying study.  Peptic ulcer disease or gastritis suspected    Acute cholecystitis 07/12/2022    Hospitalized with acute abdominal pain, abnormal LFTs and CT showing distended gallbladder and bile duct dilatation.  Acute cholecystitis.  Cholecystectomy.    Arthritis     psoriatic    ASCVD (arteriosclerotic cardiovascular disease) 04/28/2016    Coronary artery stent ×2 in RCA following myocardial infarction 2008 , echo June 2014 normal LV function with ejection fraction 50-55% with mild apical inferior hypokinesis    Chronic fatigue 06/23/2022    Chronic low back pain 04/28/2016    Chronic right shoulder pain 06/23/2022    Coronary artery disease     Depression 12/26/2017    Diabetic peripheral neuropathy associated with type 2 diabetes mellitus (H) 04/28/2016    Abnormal EMG and workup at [facility]    Dilated bile duct 07/03/2017    CT scan  with incidental finding of dilated intra-and extrahepatic ducts    HTN (hypertension)     Hypercholesterolemia     Hypogonadism in male     Ischemic cardiomyopathy     Stress Echocardiogram 2017 with decreased LV function with EF 43% worse compared to previous echo.  No change in infarct.  No new ischemia    Left upper quadrant pain 06/30/2017    Mass of duodenum 10/16/2023    Obstruction of second portion of duodenum with abnormal CT scan, biopsies negative for endoscopic ultrasound    MI (myocardial infarction) (H)     Obstructive sleep apnea on CPAP     CPAP    Optic neuritis     Psoriasis     Recurrent vomiting 09/01/2023    Renal cell carcinoma, right (H) 06/23/2022    Right partial nephrectomy 2017    Right renal mass     Sebaceous cyst 2010    Thoracic radiculopathy 2004    right T8 intercostal nerve block 2002    Type 2 diabetes mellitus with insulin therapy (H)      Allergies   Allergen Reactions    Atorvastatin Muscle Pain (Myalgia)    Valomag [Arthritis Pain Formula] Nausea and Vomiting     gastric ulcer from aspirin use    Ampicillin Rash     Had a reaction to this medication many years ago.    Codeine Rash     It has been about 30 years ago     Current Outpatient Medications   Medication Sig Dispense Refill    Alcohol Swabs PADS Use to swab the area of the injection or sherry as directed Per insurance coverage 100 each 0    apixaban ANTICOAGULANT (ELIQUIS) 5 MG tablet Take 1 tablet (5 mg) by mouth 2 times daily (Patient not taking: Reported on 1/30/2024) 60 tablet 1    B-D U/F 31G X 8 MM insulin pen needle FOR ADMINISTERING INSULIN AT HOME      betamethasone valerate (VALISONE) 0.1 % external ointment Apply topically 2 times daily 45 g 3    blood glucose (NO BRAND SPECIFIED) test strip To use to test glucose level in the blood Use to test blood sugar  4 times daily as directed. To accompany glucose monitor brands per insurance coverage. 100 strip 0    Continuous Blood Gluc Sensor (FREESTYLE SHIRA 14 DAY  SENSOR) MISC       DULoxetine (CYMBALTA) 60 MG capsule Take 1 capsule (60 mg) by mouth daily 90 capsule 3    gabapentin (NEURONTIN) 300 MG capsule [GABAPENTIN (NEURONTIN) 300 MG CAPSULE] Take 600 mg by mouth 3 (three) times a day.       Insulin Glargine-yfgn 100 UNIT/ML SOPN Inject 50 Units Subcutaneous 2 times daily      insulin lispro (HUMALOG KWIKPEN) 100 UNIT/ML (1 unit dial) KWIKPEN Inject Subcutaneous 3 times daily (before meals) Sliding scale. Average use is 20 units with each meal      metFORMIN (GLUCOPHAGE-XR) 750 MG 24 hr tablet Take 1 tablet (750 mg) by mouth daily (with dinner) 90 tablet 3    morphine (MS CONTIN) 30 MG CR tablet Take 30 mg by mouth every 8 hours      naloxone (NARCAN) 4 MG/0.1ML nasal spray Spray 1 spray into one nostril alternating nostrils once as needed for opioid reversal      omeprazole (PRILOSEC) 20 MG DR capsule Take 1 capsule (20 mg) by mouth 2 times daily      polyethylene glycol (MIRALAX) 17 GM/Dose powder Take 17 g by mouth daily 510 g 1    Sharps Container MISC Use as directed to dispose of needles, lancets and other sharps Per Insurance coverage 1 each 0    testosterone (ANDROGEL) 20.25 MG/1.25GM (1.62%) topical gel Place 1 packet (20.25 mg) onto the skin daily (Patient not taking: Reported on 12/4/2023) 375 g 1    valACYclovir (VALTREX) 500 MG tablet Take 500 mg by mouth 2 times daily as needed Take twice daily at onset of symptoms for 3 days. Repeat for flares. (Patient not taking: Reported on 1/30/2024)       family history is not on file.  Social Connections: Not on file          WBC Count   Date Value Ref Range Status   01/30/2024 8.6 4.0 - 11.0 10e3/uL Final     RBC Count   Date Value Ref Range Status   01/30/2024 5.42 4.40 - 5.90 10e6/uL Final     Hemoglobin   Date Value Ref Range Status   01/30/2024 14.9 13.3 - 17.7 g/dL Final     Hematocrit   Date Value Ref Range Status   01/30/2024 44.9 40.0 - 53.0 % Final     MCV   Date Value Ref Range Status   01/30/2024 83 78 -  100 fL Final     MCH   Date Value Ref Range Status   01/30/2024 27.5 26.5 - 33.0 pg Final     Platelet Count   Date Value Ref Range Status   01/30/2024 244 150 - 450 10e3/uL Final     % Lymphocytes   Date Value Ref Range Status   01/30/2024 23 % Final     AST   Date Value Ref Range Status   10/29/2023 17 0 - 45 U/L Final     Comment:     Reference intervals for this test were updated on 6/12/2023 to more accurately reflect our healthy population. There may be differences in the flagging of prior results with similar values performed with this method. Interpretation of those prior results can be made in the context of the updated reference intervals.     ALT   Date Value Ref Range Status   10/29/2023 11 0 - 70 U/L Final     Comment:     Reference intervals for this test were updated on 6/12/2023 to more accurately reflect our healthy population. There may be differences in the flagging of prior results with similar values performed with this method. Interpretation of those prior results can be made in the context of the updated reference intervals.       Albumin   Date Value Ref Range Status   10/29/2023 3.1 (L) 3.5 - 5.2 g/dL Final   07/13/2022 3.1 (L) 3.5 - 5.0 g/dL Final     Alkaline Phosphatase   Date Value Ref Range Status   10/29/2023 100 40 - 129 U/L Final     Creatinine   Date Value Ref Range Status   11/17/2023 0.86 0.67 - 1.17 mg/dL Final     GFR Estimate   Date Value Ref Range Status   11/17/2023 >90 >60 mL/min/1.73m2 Final   01/13/2021 >60 >60 mL/min/1.73m2 Final     GFR, ESTIMATED POCT   Date Value Ref Range Status   06/16/2022 >60 >60 mL/min/1.73m2 Final     GFR Estimate If Black   Date Value Ref Range Status   01/13/2021 >60 >60 mL/min/1.73m2 Final     Creatinine Urine mg/dL   Date Value Ref Range Status   10/16/2023 224.0 mg/dL Final     Comment:     The reference ranges have not been established in urine creatinine. The results should be integrated into the clinical context for interpretation.

## 2024-03-28 ENCOUNTER — HOSPITAL ENCOUNTER (OUTPATIENT)
Dept: GENERAL RADIOLOGY | Facility: HOSPITAL | Age: 69
Discharge: HOME | End: 2024-03-28
Attending: INTERNAL MEDICINE | Admitting: INTERNAL MEDICINE
Payer: COMMERCIAL

## 2024-03-28 ENCOUNTER — OFFICE VISIT (OUTPATIENT)
Dept: RHEUMATOLOGY | Facility: CLINIC | Age: 69
End: 2024-03-28
Payer: COMMERCIAL

## 2024-03-28 VITALS
OXYGEN SATURATION: 95 % | DIASTOLIC BLOOD PRESSURE: 62 MMHG | WEIGHT: 208 LBS | SYSTOLIC BLOOD PRESSURE: 126 MMHG | BODY MASS INDEX: 28.21 KG/M2 | HEART RATE: 78 BPM

## 2024-03-28 DIAGNOSIS — M43.28 ANKYLOSIS, SACROILIAC JOINT: ICD-10-CM

## 2024-03-28 DIAGNOSIS — L40.50 PSORIATIC ARTHRITIS (H): ICD-10-CM

## 2024-03-28 DIAGNOSIS — L40.50 PSORIATIC ARTHRITIS (H): Primary | ICD-10-CM

## 2024-03-28 DIAGNOSIS — L40.9 PSORIASIS: ICD-10-CM

## 2024-03-28 PROCEDURE — 73610 X-RAY EXAM OF ANKLE: CPT | Mod: 50

## 2024-03-28 PROCEDURE — G2211 COMPLEX E/M VISIT ADD ON: HCPCS | Performed by: INTERNAL MEDICINE

## 2024-03-28 PROCEDURE — 99214 OFFICE O/P EST MOD 30 MIN: CPT | Performed by: INTERNAL MEDICINE

## 2024-03-28 RX ORDER — PREDNISONE 10 MG/1
10 TABLET ORAL DAILY
Qty: 15 TABLET | Refills: 0 | Status: SHIPPED | OUTPATIENT
Start: 2024-03-28 | End: 2024-04-12

## 2024-03-28 RX ORDER — KETOCONAZOLE 20 MG/G
CREAM TOPICAL
COMMUNITY
Start: 2024-03-21 | End: 2024-05-09

## 2024-03-28 RX ORDER — INSULIN GLARGINE 100 [IU]/ML
50 INJECTION, SOLUTION SUBCUTANEOUS 2 TIMES DAILY
COMMUNITY
Start: 2023-11-17

## 2024-03-28 NOTE — PROGRESS NOTES
Rheumatology follow-up visit note     Bon is a 68 year old male presents today for follow-up.    Bon was seen today for recheck.    Diagnoses and all orders for this visit:    Psoriatic arthritis (H)  -     predniSONE (DELTASONE) 10 MG tablet; Take 1 tablet (10 mg) by mouth daily for 15 days  -     XR Ankle Bilateral G/E 3 Views; Future    Psoriasis    Ankylosis, sacroiliac joint      The longitudinal plan of care for the diagnosis(es)/condition(s) as documented were addressed during this visit. Due to the added complexity in care, I will continue to support Bon in the subsequent management and with ongoing continuity of care.      Follow up in 3 months.    HPI    Bon Luque is a 68 year old male is here for follow-up of   psoriatic arthritis, psoriasis, was seen here recently after a long interval.  He feels that Skyrizi has helped both psoriasis and joint symptoms.  He is going for [employer] there will be quite a bit of walking involved, furthermore he is concerned that he might get a flareup there which potentially might review in his ability to enjoy the cruise that he is so much looking forward to.  He has longstanding painful feet, this is typically on ambulation he had presumed that this is all due to neuropathy however recent x-ray of the foot has shown degenerative joint disease and he wonders if that same might be happening in the ankles and wonders if potential for corticosteroid injections exist.  Will take x-rays of the ankles today.  Will get together here in near future.      Following is the excerpt from a previous note:     He was last seen here in May 2022. In the interim he was not able to get Enbrel as the  stopped paying for it and prior to that the insurance had discontinued anyway. Meanwhile he went on to have Medicare. His psoriasis got worse as his joint pains. He was given prednisone which helped his joints very well, he also got Skyrizi. Followed by the   through the dermatologist and has had first injection which is already beginning to help. He noted pain in his hip areas bilaterally but worse on the left side that keeps him up at night. This interferes with activity. He is also had pain in the shoulders, hands that improved so much with the course of prednisone that he got from elsewhere. He does have osteoarthritis in addition. This is outlined.     DETAILED EXAMINATION  03/28/24  :    Vitals:    03/28/24 1143   BP: 126/62   BP Location: Right arm   Patient Position: Sitting   Cuff Size: Adult Regular   Pulse: 78   SpO2: 95%   Weight: 94.3 kg (208 lb)     Alert oriented. Head including the face is examined for malar rash, heliotropes, scarring, lupus pernio. Eyes examined for redness such as in episcleritis/scleritis, periorbital lesions.   Neck/ Face examined for parotid gland swelling, range of motion of neck.  Left upper and lower and right upper and lower extremities examined for tenderness, swelling, warmth of the appendicular joints, range of motion, edema, rash.  Some of the important findings included: he does not have evidence of synovitis in the palpable joints of the upper extremities.  No significant deformities of the digits.  no Heberden nodes.  Range of motion of the shoulders  show full abduction.  No JLT effusion or warmth of the knees.  he does not have dactylitis of the digits.     Patient Active Problem List    Diagnosis Date Noted    Pseudocyst of pancreas 10/28/2023     Priority: Medium    Pain of upper abdomen 10/27/2023     Priority: Medium    Leukocytosis, unspecified type 10/27/2023     Priority: Medium    Mass of duodenum 10/16/2023     Priority: Medium     Obstruction of second portion of duodenum with abnormal CT scan, biopsies negative for endoscopic ultrasound      Duodenal ulcer without hemorrhage, perforation, or obstruction 10/16/2023     Priority: Medium    Severe protein-calorie malnutrition (H24) 10/10/2023      Priority: Medium    Deep vein thrombosis (DVT) of upper extremity (H) 10/09/2023     Priority: Medium    SBO (small bowel obstruction) (H) 10/05/2023     Priority: Medium    Nausea and vomiting 10/05/2023     Priority: Medium    Hypophosphatemia 10/05/2023     Priority: Medium    Jaundice 07/12/2022     Priority: Medium    Transaminitis 07/12/2022     Priority: Medium    Dilated cbd, acquired 07/12/2022     Priority: Medium    Hyponatremia 07/12/2022     Priority: Medium    Renal cell carcinoma, right (H) 06/23/2022     Priority: Medium    Chronic fatigue 06/23/2022     Priority: Medium    Chronic right shoulder pain 06/23/2022     Priority: Medium    Abdominal pain, epigastric 06/23/2022     Priority: Medium     Normal gastric emptying study.  Peptic ulcer disease or gastritis suspected      Knee effusion, left 11/06/2018     Priority: Medium    Drug-induced constipation      Priority: Medium    Type 2 diabetes mellitus with complication, with long-term current use of insulin (H)      Priority: Medium    Chronic pain syndrome      Priority: Medium    Lower abdominal pain      Priority: Medium    Depression 12/26/2017     Priority: Medium    Ischemic cardiomyopathy      Priority: Medium     Stress Echocardiogram 2017 with decreased LV function with EF 43% worse   compared to previous echo.  No change in infarct.  No new ischemia        Ankylosis, sacroiliac joint 07/11/2016     Priority: Medium    Spondylosis of lumbar region without myelopathy or radiculopathy 07/11/2016     Priority: Medium    Psoriatic arthritis (H) 04/28/2016     Priority: Medium    Chronic low back pain 04/28/2016     Priority: Medium    Diabetic peripheral neuropathy associated with type 2 diabetes mellitus (H) 04/28/2016     Priority: Medium    ASCVD (arteriosclerotic cardiovascular disease) 04/28/2016     Priority: Medium     Coronary artery stent ×2 in RCA following myocardial infarction 2008 ,   echo June 2014 normal LV function with  ejection fraction 50-55% with mild   apical inferior hypokinesis        Alcohol abuse, in remission 04/28/2016     Priority: Medium    Tobacco abuse, in remission 04/28/2016     Priority: Medium    Psoriasis      Priority: Medium    Hypercholesterolemia      Priority: Medium    HTN (hypertension)      Priority: Medium    Optic neuritis      Priority: Medium    Obstructive sleep apnea on CPAP      Priority: Medium    Hypogonadism in male      Priority: Medium    Erectile dysfunction      Priority: Medium     Urologic evaluation July 2015, hypogonadism        Thoracic radiculopathy 01/01/2004     Priority: Medium     right T8 intercostal nerve block 2002         Past Surgical History:   Procedure Laterality Date    APPENDECTOMY      COLONOSCOPY      Colonoscopy September 2017 with hyperplastic polyps, repeat in 10 years    CORONARY ANGIOPLASTY      stent    ENDOSCOPIC RETROGRADE CHOLANGIOPANCREATOGRAM N/A 7/12/2022    Procedure: ENDOSCOPIC RETROGRADE CHOLANGIOPANCREATOGRAPHY, STONE EXTRACTION, BILIARY SPHINCTEROTOMY;  Surgeon: Bon Meyer MD;  Location: [facility]    ESOPHAGOSCOPY, GASTROSCOPY, DUODENOSCOPY (EGD), COMBINED N/A 10/6/2023    Procedure: ESOPHAGOGASTRODUODENOSCOPY, WITH ENDOSCOPIC ULTRASOUND WITH FINE NEEDLE ASPIRATION AND DUODENAL BIOPSY;  Surgeon: Bon Meyer MD;  Location: [facility] [place] OR    KNEE SURGERY      LAPAROSCOPIC CHOLECYSTECTOMY N/A 7/12/2022    Procedure: CHOLECYSTECTOMY, LAPAROSCOPIC;  Surgeon: Adam March MD;  Location: [facility] [place] OR    NOSE SURGERY      PICC DOUBLE LUMEN PLACEMENT  10/9/2023    IA LAP,PARTIAL NEPHRECTOMY Right 1/10/2018    Procedure: RIGHT ROBOTIC PARTIAL NEPHRECTOMY WITH INTRAOPERATIVE ULTRASOUND;  Surgeon: Chase Rodríguez MD;  Location: [facility] [place];  Service: Urology      Past Medical History:   Diagnosis Date    Abdominal pain, epigastric 06/23/2022    Normal gastric emptying study.  Peptic ulcer disease or gastritis  suspected    Acute cholecystitis 07/12/2022    Hospitalized with acute abdominal pain, abnormal LFTs and CT showing distended gallbladder and bile duct dilatation.  Acute cholecystitis.  Cholecystectomy.    Arthritis     psoriatic    ASCVD (arteriosclerotic cardiovascular disease) 04/28/2016    Coronary artery stent ×2 in RCA following myocardial infarction 2008 , echo June 2014 normal LV function with ejection fraction 50-55% with mild apical inferior hypokinesis    Chronic fatigue 06/23/2022    Chronic low back pain 04/28/2016    Chronic right shoulder pain 06/23/2022    Coronary artery disease     Depression 12/26/2017    Diabetic peripheral neuropathy associated with type 2 diabetes mellitus (H) 04/28/2016    Abnormal EMG and [facility]up at [facility]    Dilated bile duct 07/03/2017    CT scan with incidental finding of dilated intra-and extrahepatic ducts    HTN (hypertension)     Hypercholesterolemia     Hypogonadism in male     Ischemic cardiomyopathy     Stress Echocardiogram 2017 with decreased LV function with EF 43% worse compared to previous echo.  No change in infarct.  No new ischemia    Left upper quadrant pain 06/30/2017    Mass of duodenum 10/16/2023    Obstruction of second portion of duodenum with abnormal CT scan, biopsies negative for endoscopic ultrasound    MI (myocardial infarction) (H)     Obstructive sleep apnea on CPAP     CPAP    Optic neuritis     Psoriasis     Recurrent vomiting 09/01/2023    Renal cell carcinoma, right (H) 06/23/2022    Right partial nephrectomy 2017    Right renal mass     Sebaceous cyst 2010    Thoracic radiculopathy 2004    right T8 intercostal nerve block 2002    Type 2 diabetes mellitus with insulin therapy (H)      Allergies   Allergen Reactions    Atorvastatin Muscle Pain (Myalgia)    Valomag [Arthritis Pain Formula] Nausea and Vomiting     gastric ulcer from aspirin use    Ampicillin Rash     Had a reaction to this medication many years ago.    Codeine Rash     It  has been about 30 years ago     Current Outpatient Medications   Medication Sig Dispense Refill    Alcohol Swabs PADS Use to swab the area of the injection or sherry as directed Per insurance coverage 100 each 0    B-D U/F 31G X 8 MM insulin pen needle FOR ADMINISTERING INSULIN AT HOME      betamethasone valerate (VALISONE) 0.1 % external ointment Apply topically 2 times daily 45 g 3    blood glucose (NO BRAND SPECIFIED) test strip To use to test glucose level in the blood Use to test blood sugar  4 times daily as directed. To accompany glucose monitor brands per insurance coverage. 100 strip 0    Continuous Blood Gluc Sensor (FREESTYLE SHIRA 14 DAY SENSOR) MISC       DULoxetine (CYMBALTA) 60 MG capsule Take 1 capsule (60 mg) by mouth daily 90 capsule 3    gabapentin (NEURONTIN) 300 MG capsule [GABAPENTIN (NEURONTIN) 300 MG CAPSULE] Take 600 mg by mouth 3 (three) times a day.       Insulin Glargine-yfgn 100 UNIT/ML SOPN Inject 50 Units Subcutaneous 2 times daily      insulin lispro (HUMALOG KWIKPEN) 100 UNIT/ML (1 unit dial) KWIKPEN Inject Subcutaneous 3 times daily (before meals) Sliding scale. Average use is 20 units with each meal      metFORMIN (GLUCOPHAGE-XR) 750 MG 24 hr tablet Take 1 tablet (750 mg) by mouth daily (with dinner) (Patient not taking: Reported on 2/27/2024) 90 tablet 3    morphine (MS CONTIN) 30 MG CR tablet Take 30 mg by mouth every 8 hours      naloxone (NARCAN) 4 MG/0.1ML nasal spray Spray 1 spray into one nostril alternating nostrils once as needed for opioid reversal (Patient not taking: Reported on 2/27/2024)      omeprazole (PRILOSEC) 20 MG DR capsule Take 1 capsule (20 mg) by mouth 2 times daily (Patient not taking: Reported on 2/27/2024)      polyethylene glycol (MIRALAX) 17 GM/Dose powder Take 17 g by mouth daily (Patient not taking: Reported on 2/27/2024) 510 g 1    Risankizumab-rzaa (SKYRIZI SC)       Sharps Container MISC Use as directed to dispose of needles, lancets and other  sharps Per Insurance coverage 1 each 0    testosterone (ANDROGEL) 20.25 MG/1.25GM (1.62%) topical gel Place 1 packet (20.25 mg) onto the skin daily (Patient not taking: Reported on 12/4/2023) 375 g 1    valACYclovir (VALTREX) 500 MG tablet Take 500 mg by mouth 2 times daily as needed Take twice daily at onset of symptoms for 3 days. Repeat for flares. (Patient not taking: Reported on 1/30/2024)       family history is not on file.  Social Connections: Not on file          WBC Count   Date Value Ref Range Status   01/30/2024 8.6 4.0 - 11.0 10e3/uL Final     RBC Count   Date Value Ref Range Status   01/30/2024 5.42 4.40 - 5.90 10e6/uL Final     Hemoglobin   Date Value Ref Range Status   01/30/2024 14.9 13.3 - 17.7 g/dL Final     Hematocrit   Date Value Ref Range Status   01/30/2024 44.9 40.0 - 53.0 % Final     MCV   Date Value Ref Range Status   01/30/2024 83 78 - 100 fL Final     MCH   Date Value Ref Range Status   01/30/2024 27.5 26.5 - 33.0 pg Final     Platelet Count   Date Value Ref Range Status   01/30/2024 244 150 - 450 10e3/uL Final     % Lymphocytes   Date Value Ref Range Status   01/30/2024 23 % Final     AST   Date Value Ref Range Status   10/29/2023 17 0 - 45 U/L Final     Comment:     Reference intervals for this test were updated on 6/12/2023 to more accurately reflect our healthy population. There may be differences in the flagging of prior results with similar values performed with this method. Interpretation of those prior results can be made in the context of the updated reference intervals.     ALT   Date Value Ref Range Status   10/29/2023 11 0 - 70 U/L Final     Comment:     Reference intervals for this test were updated on 6/12/2023 to more accurately reflect our healthy population. There may be differences in the flagging of prior results with similar values performed with this method. Interpretation of those prior results can be made in the context of the updated reference intervals.        Albumin   Date Value Ref Range Status   10/29/2023 3.1 (L) 3.5 - 5.2 g/dL Final   07/13/2022 3.1 (L) 3.5 - 5.0 g/dL Final     Alkaline Phosphatase   Date Value Ref Range Status   10/29/2023 100 40 - 129 U/L Final     Creatinine   Date Value Ref Range Status   11/17/2023 0.86 0.67 - 1.17 mg/dL Final     GFR Estimate   Date Value Ref Range Status   11/17/2023 >90 >60 mL/min/1.73m2 Final   01/13/2021 >60 >60 mL/min/1.73m2 Final     GFR, ESTIMATED POCT   Date Value Ref Range Status   06/16/2022 >60 >60 mL/min/1.73m2 Final     GFR Estimate If Black   Date Value Ref Range Status   01/13/2021 >60 >60 mL/min/1.73m2 Final     Creatinine Urine mg/dL   Date Value Ref Range Status   10/16/2023 224.0 mg/dL Final     Comment:     The reference ranges have not been established in urine creatinine. The results should be integrated into the clinical context for interpretation.

## 2024-04-15 ENCOUNTER — VIRTUAL VISIT (OUTPATIENT)
Dept: PHARMACY | Facility: CLINIC | Age: 69
End: 2024-04-15
Payer: COMMERCIAL

## 2024-04-15 DIAGNOSIS — L40.9 PSORIASIS: ICD-10-CM

## 2024-04-15 DIAGNOSIS — E29.1 HYPOGONADISM IN MALE: ICD-10-CM

## 2024-04-15 DIAGNOSIS — E78.00 HYPERCHOLESTEROLEMIA: ICD-10-CM

## 2024-04-15 DIAGNOSIS — G89.4 CHRONIC PAIN SYNDROME: ICD-10-CM

## 2024-04-15 DIAGNOSIS — G62.9 NEUROPATHY: ICD-10-CM

## 2024-04-15 DIAGNOSIS — B00.9 HERPES SIMPLEX VIRUS INFECTION: ICD-10-CM

## 2024-04-15 DIAGNOSIS — B35.6 JOCK ITCH: ICD-10-CM

## 2024-04-15 DIAGNOSIS — E11.8 TYPE 2 DIABETES MELLITUS WITH COMPLICATION, WITH LONG-TERM CURRENT USE OF INSULIN (H): Primary | ICD-10-CM

## 2024-04-15 DIAGNOSIS — L40.50 PSORIATIC ARTHRITIS (H): ICD-10-CM

## 2024-04-15 DIAGNOSIS — Z79.4 TYPE 2 DIABETES MELLITUS WITH COMPLICATION, WITH LONG-TERM CURRENT USE OF INSULIN (H): Primary | ICD-10-CM

## 2024-04-15 PROCEDURE — 99607 MTMS BY PHARM ADDL 15 MIN: CPT | Mod: 93

## 2024-04-15 PROCEDURE — 99605 MTMS BY PHARM NP 15 MIN: CPT | Mod: 93

## 2024-04-15 RX ORDER — MULTIPLE VITAMINS W/ MINERALS TAB 9MG-400MCG
1 TAB ORAL EVERY EVENING
COMMUNITY

## 2024-04-15 NOTE — LETTER
_  Medication List        Prepared on: 04/18/2024     Bring your Medication List when you go to the doctor, hospital, or   emergency room. And, share it with your family or caregivers.     Note any changes to how you take your medications.  Cross out medications when you no longer use them.    Medication How I take it Why I use it Prescriber   betamethasone valerate (VALISONE) 0.1 % external ointment Apply topically 2 times daily Psoriatic Arthritis  Aditya Munoz MD   DULoxetine (CYMBALTA) 60 MG capsule Take 1 capsule (60 mg) by mouth daily Chronic Pain Syndrome Aditya Munoz MD   gabapentin (NEURONTIN) 300 MG capsule Take 600 mg by mouth 3 (three) times a day.   Neuropathy  HUBRE LADD    ketoconazole (NIZORAL) 2 % external cream APPLY TO THE AFFECTED AREAS ON GROIN 2X DAILY UNTIL RESOLVED.  Jock Itch  Patient Reported   LANTUS SOLOSTAR 100 UNIT/ML soln Inject 50 Units Subcutaneous 2 times daily  Diabetes  [place], Luke E   melatonin 5 MG CAPS Take 5-10 mg by mouth nightly as needed (sleep)  Sleep  Patient Reported   morphine (MS CONTIN) 30 MG CR tablet Take 30 mg by mouth every 8 hours  Pain Norma Garcia APRN CNP    multivitamin w/minerals (THERA-VIT-M) tablet Take 1 tablet by mouth daily  General Health  Patient Reported   naloxone (NARCAN) 4 MG/0.1ML nasal spray Spray 1 spray into one nostril alternating nostrils once as needed for opioid reversal  Opioid overdose  Norma Garcia APRN CNP    Risankizumab-rzaa (SKYRIZI SC) Inject Subcutaneous every 4 months  Psoriasis Patient Reported   testosterone (ANDROGEL) 20.25 MG/1.25GM (1.62%) topical gel Place 1 packet (20.25 mg) onto the skin daily Hypogonadism  Frankie Terrazas MD   valACYclovir (VALTREX) 500 MG tablet Take 500 mg by mouth 2 times daily as needed Take twice daily at onset of symptoms for 3 days. Repeat for flares.  Herpes virus  Patient Reported         Add new medications, over-the-counter drugs, herbals, vitamins, or  minerals in the  blank rows below.    Medication How I take it Why I use it Prescriber                                      Allergies:      - Atorvastatin - Muscle Pain (Myalgia)  - Valomag [arthritis Pain Formula] - Nausea and Vomiting  - Ampicillin - Rash  - Codeine - Rash        Side effects I have had:      Not on File        Other Information:              My notes and questions:

## 2024-04-15 NOTE — PROGRESS NOTES
Medication Therapy Management (MTM) Encounter    ASSESSMENT:                            Medication Adherence/Access: No issues identified    Diabetes: Patient reported time in target range per CGM report is below goal of >70% likely because patient is not using mealtime/fast acting insulin and instead are using Semglee and Lantus which are the same long acting insulin. Educated today that patient should consult with endocrinologist for a prescription for Humalog/Novolog to use before meals following sliding scale. Recommend patient discontinue using Lantus on sliding scale and continue to use Semglee regularly as they have been in addition to a mealtime/fast acting insulin such as Humalog or Novolog. Would benefit from follow up with endocrine provider to obtain an updated A1c.     Hyperlipidemia: Would benefit from obtaining updated lipid levels, if LDL remains elevated above goal may consider trial of alternative statin such as rosuvastatin or an alternative non-statin medication such as Zetia.     Chronic pain/Neuropathy: Stable, continues to follow with pain management outside of [place].     Psoriasis/Psoriatic arthritis: Stable.     Jock Itch: Stable.     Herpes: Stable.     Hypogonadism: Stable, continues to follow with Dr. Keyes for ongoing management.     PLAN:                            Semglee and Lantus are both long acting insulin products called insulin glargine. Please reach out to your endocrinologist to start a mealtime/short acting insulin such as Humalog or Novolog in addition to Semglee. Stop using Lantus on a sliding scale.     Follow-up: MTM as needed     SUBJECTIVE/OBJECTIVE:                          Bon Luque is a 68 year old male called for an initial visit. He was referred to me from their insurance.      Reason for visit: Comprehensive medication review.    Allergies/ADRs: Reviewed in chart  Past Medical History: Reviewed in chart  Tobacco: He reports that he has quit smoking. He has  been exposed to tobacco smoke. He has never used smokeless tobacco.  Alcohol: not currently using    Medication Adherence/Access: Patient takes medications directly from bottles.  Patient takes medications 3 time(s) per day.   Per patient, misses medication 0 times per week.   Medication barriers: none, on a program for [employer] currently to help with financial assistance.     Diabetes   Patient notes that he has been taking Semglee 50 units twice daily and then Lantus as needed for elevated blood sugars. He did not notice that Semglee and Lantus were the same type of insulin. Notes that there was confusion when he filled this medication with the pharmacy in the past and he thought Lantus replaced his mealtime/short acting insulin. Patient notes that the Lantus has been effective to lower his blood sugars in a quick manner despite being a long acting insulin. He notes that he usually uses 40 units of Lantus if his blood sugars are around 400. Notes that in a day he is probably using 200-300 units of long acting throughout the day by using both Semglee and Lantus. He has not been using Humalog or Novolog for about a year due to confusion with the pharmacy.   Patient notes that metformin has been discontinued by endocrinology.   Follows with endocrinology outside of [facility]  Blood sugar monitoring: Continuous Glucose Monitor (patient reported):  Last 7 days:   Above: 65%  In target 30%  Below 5%  Current diabetes symptoms: none- patient notes that when they have hypoglycemic event it usually occurs overnight/in the early morning.      Eye exam is up to date  Foot exam: due  Urine Albumin:   Lab Results   Component Value Date    UMALCR 6.74 10/16/2023      Lab Results   Component Value Date    A1C 8.0 (H) 10/05/2023     Hyperlipidemia   no current medications, intolerance to atorvastatin in the past.      Recent Labs   Lab Test 10/09/23  1515 10/02/17  1049 04/28/16  1406   CHOL  --  198 180   HDL  --  41 32*   LDL  --   137* 109   TRIG 93 99 193*     Chronic pain/Neuropathy:  Duloxetine 60mg daily   Gabapentin 600mg three times daily   Morphone 30mg three times daily   Patient has a history of NSAID induced ulcer thus avoids NSAIDs.   Follows with pain provider outside of [place].   Patient notes that overall regimen is effective.     Psoriasis/Psoriatic arthritis:  Skyrizi every 4 months. Patient notes that this has been effective.   Betamethasone ointment - patient notes that he uses occasionally for flares and that this is effective when needed.     Jock Itch:  Ketoconazole cream as needed to for groin area, patient notes that this is effective for symptoms when needing.    Herpes:  Valacyclovir as needed for outbreaks, patient notes no recent use.     Hypogonadism:  Testosterone 20.25mg daily - patient notes that he can not notice much benefit from taking this, notes that he was prescribed this to help with his heart.   Patient follows with endocrinologist Dr. Keyes with [facility]     Today's Vitals: There were no vitals taken for this visit.  ----------------  I spent 40 minutes with this patient today (an extra 15 minutes was spent creating the Medication Action Plan). I offer these suggestions for consideration by patient's endocrinologist. A copy of the visit note was provided to the patient's provider(s).    A summary of these recommendations was sent via [employer].    Nasreen Hamm, PharmD  Medication Therapy Management Pharmacist   [facility]     Telemedicine Visit Details  Type of service:  Telephone visit  Start Time:  12:34 PM   End Time: 1:14 PM     Medication Therapy Recommendations  Diabetic peripheral neuropathy associated with type 2 diabetes mellitus (H)    Current Medication: LANTUS SOLOSTAR 100 UNIT/ML soln   Rationale: Unsafe medication for the patient - Adverse medication event - Safety   Recommendation: Change Medication - HUMALOG KWIKPEN SC   Status: Patient  Agreed - Adherence/Education

## 2024-04-15 NOTE — LETTER
April 18, 2024  Bon Luque  [address] [place] [address]    Dear Jeanette Luque, DYLAN [facility]     Thank you for talking with me on Apr 15, 2024 about your health and medications. As a follow-up to our conversation, I have included two documents:      Your Recommended To-Do List has steps you should take to get the best results from your medications.  Your Medication List will help you keep track of your medications and how to take them.    If you want to talk about these documents, please call Candy Hamm RPH at phone: [phone], Monday-Friday 8-4:30pm.    I look forward to working with you and your doctors to make sure your medications work well for you.    Sincerely,  Candy Hamm RPH  [facility] Pharmacist, [facility]

## 2024-04-18 NOTE — PATIENT INSTRUCTIONS
"Recommendations from today's MTM visit:                                                      Semglee and Lantus are both long acting insulin products called insulin glargine. Please reach out to your endocrinologist to start a mealtime/short acting insulin such as Humalog or Novolog in addition to Semglee. Stop using Lantus on a sliding scale.     Follow-up: MTM as needed     It was great speaking with you today.  I value your experience and would be very thankful for your time in providing feedback in our clinic survey. In the next few days, you may receive an email or text message from [facility] with a link to a survey related to your  clinical pharmacist.\"     To schedule another MTM appointment, please call the clinic directly or you may call the MTM scheduling line at [phone].    My Clinical Pharmacist's contact information:                                                      Please feel free to contact me with any questions or concerns you have.      Nasreen Hamm, PharmD  Medication Therapy Management Pharmacist   [facility] and [facility]    "

## 2024-04-24 NOTE — TELEPHONE ENCOUNTER
Called [employer] ([phone]):  07.22 - Office was closed.  07.27 - Patient portion still had not been received.   Phone: [phone]                 [facility]    Fax: [phone]                       Outpatient Occupational Therapy                 DAILY TREATMENT NOTE    Date: 4/24/2024  Patient’s Name:  Alexi Baird  YOB: 2011 (12 y.o.)  Gender:  male  MRN:  [id number]  [facility] #: [id number]  Referring Provider (secondary): Syl Solis MD  Diagnosis: Diagnosis: Cerebral Palsy (G80.8)    Precautions:      INSURANCE  OT Insurance Information: Primary [employer]; Secondary [facility] (through 01/07/2024)      Total # of Visits Approved: 50   Total # of Visits to Date: 13     PAIN  [x]No     []Yes      Location: N/A  Pain Rating (0-10 pain scale): 0  Pain Description: N/A    SUBJECTIVE  Patient present to clinic with mother, no new reports at this time.      GOALS/ TREATMENT SESSION:    Current Progress   Long Term Goal 1: Patient will demonstrate improved BUE coordination AEB his ability to complete functional play tasks with [place].    See Short Term Goal Notes Below for Present Levels []Met  [x]Partially met  []Not met   Long Term Goal 2: Patient will demonstrate improved use of RUE & LUE AEB his ability to appropriately manipulate objects/items with minimal prompting. See Short Term Goal Notes Below for Present Levels    []Met  [x]Partially met  []Not met   Short Term Goals:  Time Frame for Short Term Goals: 90 days    Short Term Goal 1: Patient will demonstrate improved shoulder strength as measured by his ability to reach for objects with decreased shoulder abduction with minimal assistance in 5 trials.  Patient engaged in BUE reaching task while in long sitting with support provided for reduced should abduction. Child completed x10 repetitions with BUE reaching; and x10 attempts per side to reach and grasp squig. Therapist provided moderate-maximum assistance for reaching tasks for use of thumb placement on squigz.  []Met  [x]Partially met  []Not met   Short Term Goal 2: Patient will demonstrate and

## 2024-05-09 ENCOUNTER — APPOINTMENT (OUTPATIENT)
Dept: CT IMAGING | Facility: HOSPITAL | Age: 69
DRG: 176 | End: 2024-05-09
Attending: EMERGENCY MEDICINE
Payer: COMMERCIAL

## 2024-05-09 ENCOUNTER — HOSPITAL ENCOUNTER (INPATIENT)
Facility: HOSPITAL | Age: 69
LOS: 3 days | Discharge: HOME | DRG: 176 | End: 2024-05-12
Attending: EMERGENCY MEDICINE | Admitting: INTERNAL MEDICINE
Payer: COMMERCIAL

## 2024-05-09 ENCOUNTER — APPOINTMENT (OUTPATIENT)
Dept: ULTRASOUND IMAGING | Facility: HOSPITAL | Age: 69
DRG: 176 | End: 2024-05-09
Attending: EMERGENCY MEDICINE
Payer: COMMERCIAL

## 2024-05-09 DIAGNOSIS — I82.411 ACUTE DEEP VEIN THROMBOSIS (DVT) OF FEMORAL VEIN OF RIGHT LOWER EXTREMITY (H): ICD-10-CM

## 2024-05-09 DIAGNOSIS — I26.99 OTHER ACUTE PULMONARY EMBOLISM WITHOUT ACUTE COR PULMONALE (H): ICD-10-CM

## 2024-05-09 LAB
ANION GAP SERPL CALCULATED.3IONS-SCNC: 11 MMOL/L (ref 7–15)
APTT PPP: 27 SECONDS (ref 22–38)
BASOPHILS # BLD AUTO: 0.1 10E3/UL (ref 0–0.2)
BASOPHILS NFR BLD AUTO: 1 %
BUN SERPL-MCNC: 15.4 MG/DL (ref 8–23)
CALCIUM SERPL-MCNC: 9.8 MG/DL (ref 8.8–10.2)
CHLORIDE SERPL-SCNC: 102 MMOL/L (ref 98–107)
CREAT SERPL-MCNC: 0.88 MG/DL (ref 0.67–1.17)
DEPRECATED HCO3 PLAS-SCNC: 27 MMOL/L (ref 22–29)
EGFRCR SERPLBLD CKD-EPI 2021: >90 ML/MIN/1.73M2
EOSINOPHIL # BLD AUTO: 0.1 10E3/UL (ref 0–0.7)
EOSINOPHIL NFR BLD AUTO: 1 %
ERYTHROCYTE [DISTWIDTH] IN BLOOD BY AUTOMATED COUNT: 13.2 % (ref 10–15)
ERYTHROCYTE [DISTWIDTH] IN BLOOD BY AUTOMATED COUNT: 13.2 % (ref 10–15)
GLUCOSE SERPL-MCNC: 94 MG/DL (ref 70–99)
HCT VFR BLD AUTO: 50.7 % (ref 40–53)
HCT VFR BLD AUTO: 50.7 % (ref 40–53)
HGB BLD-MCNC: 16.8 G/DL (ref 13.3–17.7)
HGB BLD-MCNC: 16.8 G/DL (ref 13.3–17.7)
IMM GRANULOCYTES # BLD: 0.1 10E3/UL
IMM GRANULOCYTES NFR BLD: 1 %
INR PPP: 1.23 (ref 0.85–1.15)
LYMPHOCYTES # BLD AUTO: 2.8 10E3/UL (ref 0.8–5.3)
LYMPHOCYTES NFR BLD AUTO: 21 %
MCH RBC QN AUTO: 27.2 PG (ref 26.5–33)
MCH RBC QN AUTO: 27.2 PG (ref 26.5–33)
MCHC RBC AUTO-ENTMCNC: 33.1 G/DL (ref 31.5–36.5)
MCHC RBC AUTO-ENTMCNC: 33.1 G/DL (ref 31.5–36.5)
MCV RBC AUTO: 82 FL (ref 78–100)
MCV RBC AUTO: 82 FL (ref 78–100)
MONOCYTES # BLD AUTO: 0.9 10E3/UL (ref 0–1.3)
MONOCYTES NFR BLD AUTO: 7 %
NEUTROPHILS # BLD AUTO: 9.2 10E3/UL (ref 1.6–8.3)
NEUTROPHILS NFR BLD AUTO: 69 %
NRBC # BLD AUTO: 0 10E3/UL
NRBC BLD AUTO-RTO: 0 /100
PLATELET # BLD AUTO: 143 10E3/UL (ref 150–450)
PLATELET # BLD AUTO: 143 10E3/UL (ref 150–450)
POTASSIUM SERPL-SCNC: 4.3 MMOL/L (ref 3.4–5.3)
RADIOLOGIST FLAGS: ABNORMAL
RBC # BLD AUTO: 6.17 10E6/UL (ref 4.4–5.9)
RBC # BLD AUTO: 6.17 10E6/UL (ref 4.4–5.9)
SODIUM SERPL-SCNC: 140 MMOL/L (ref 135–145)
WBC # BLD AUTO: 13.1 10E3/UL (ref 4–11)
WBC # BLD AUTO: 13.1 10E3/UL (ref 4–11)

## 2024-05-09 PROCEDURE — 85025 COMPLETE CBC W/AUTO DIFF WBC: CPT | Performed by: EMERGENCY MEDICINE

## 2024-05-09 PROCEDURE — 82962 GLUCOSE BLOOD TEST: CPT

## 2024-05-09 PROCEDURE — 120N000001 HC R&B MED SURG/OB

## 2024-05-09 PROCEDURE — 80048 BASIC METABOLIC PNL TOTAL CA: CPT | Performed by: EMERGENCY MEDICINE

## 2024-05-09 PROCEDURE — 93971 EXTREMITY STUDY: CPT | Mod: RT

## 2024-05-09 PROCEDURE — 250N000011 HC RX IP 250 OP 636: Performed by: EMERGENCY MEDICINE

## 2024-05-09 PROCEDURE — 36415 COLL VENOUS BLD VENIPUNCTURE: CPT | Performed by: EMERGENCY MEDICINE

## 2024-05-09 PROCEDURE — 99223 1ST HOSP IP/OBS HIGH 75: CPT | Performed by: INTERNAL MEDICINE

## 2024-05-09 PROCEDURE — 250N000013 HC RX MED GY IP 250 OP 250 PS 637: Performed by: INTERNAL MEDICINE

## 2024-05-09 PROCEDURE — 99285 EMERGENCY DEPT VISIT HI MDM: CPT | Mod: 25

## 2024-05-09 PROCEDURE — 85730 THROMBOPLASTIN TIME PARTIAL: CPT | Performed by: EMERGENCY MEDICINE

## 2024-05-09 PROCEDURE — 74177 CT ABD & PELVIS W/CONTRAST: CPT

## 2024-05-09 PROCEDURE — 85610 PROTHROMBIN TIME: CPT | Performed by: EMERGENCY MEDICINE

## 2024-05-09 RX ORDER — AMOXICILLIN 250 MG
1 CAPSULE ORAL 2 TIMES DAILY PRN
Status: DISCONTINUED | OUTPATIENT
Start: 2024-05-09 | End: 2024-05-12 | Stop reason: HOSPADM

## 2024-05-09 RX ORDER — CALCIUM CARBONATE 500 MG/1
1000 TABLET, CHEWABLE ORAL 4 TIMES DAILY PRN
Status: DISCONTINUED | OUTPATIENT
Start: 2024-05-09 | End: 2024-05-12 | Stop reason: HOSPADM

## 2024-05-09 RX ORDER — ONDANSETRON 2 MG/ML
4 INJECTION INTRAMUSCULAR; INTRAVENOUS EVERY 6 HOURS PRN
Status: DISCONTINUED | OUTPATIENT
Start: 2024-05-09 | End: 2024-05-12 | Stop reason: HOSPADM

## 2024-05-09 RX ORDER — LIDOCAINE 40 MG/G
CREAM TOPICAL
Status: DISCONTINUED | OUTPATIENT
Start: 2024-05-09 | End: 2024-05-12 | Stop reason: HOSPADM

## 2024-05-09 RX ORDER — ONDANSETRON 4 MG/1
4 TABLET, ORALLY DISINTEGRATING ORAL EVERY 6 HOURS PRN
Status: DISCONTINUED | OUTPATIENT
Start: 2024-05-09 | End: 2024-05-09

## 2024-05-09 RX ORDER — AMOXICILLIN 250 MG
2 CAPSULE ORAL 2 TIMES DAILY PRN
Status: DISCONTINUED | OUTPATIENT
Start: 2024-05-09 | End: 2024-05-12 | Stop reason: HOSPADM

## 2024-05-09 RX ORDER — HYDROMORPHONE HCL IN WATER/PF 6 MG/30 ML
0.4 PATIENT CONTROLLED ANALGESIA SYRINGE INTRAVENOUS
Status: DISCONTINUED | OUTPATIENT
Start: 2024-05-09 | End: 2024-05-12 | Stop reason: HOSPADM

## 2024-05-09 RX ORDER — AMOXICILLIN 250 MG
2 CAPSULE ORAL 2 TIMES DAILY PRN
Status: DISCONTINUED | OUTPATIENT
Start: 2024-05-09 | End: 2024-05-09

## 2024-05-09 RX ORDER — ONDANSETRON 2 MG/ML
4 INJECTION INTRAMUSCULAR; INTRAVENOUS EVERY 6 HOURS PRN
Status: DISCONTINUED | OUTPATIENT
Start: 2024-05-09 | End: 2024-05-09

## 2024-05-09 RX ORDER — AMOXICILLIN 250 MG
1 CAPSULE ORAL 2 TIMES DAILY PRN
Status: DISCONTINUED | OUTPATIENT
Start: 2024-05-09 | End: 2024-05-09

## 2024-05-09 RX ORDER — DOCUSATE SODIUM 100 MG/1
100 CAPSULE, LIQUID FILLED ORAL 2 TIMES DAILY
Status: DISCONTINUED | OUTPATIENT
Start: 2024-05-09 | End: 2024-05-12 | Stop reason: HOSPADM

## 2024-05-09 RX ORDER — IOPAMIDOL 755 MG/ML
90 INJECTION, SOLUTION INTRAVASCULAR ONCE
Status: COMPLETED | OUTPATIENT
Start: 2024-05-09 | End: 2024-05-09

## 2024-05-09 RX ORDER — LIDOCAINE 40 MG/G
CREAM TOPICAL
Status: DISCONTINUED | OUTPATIENT
Start: 2024-05-09 | End: 2024-05-09

## 2024-05-09 RX ORDER — APIXABAN 5 MG (74)
KIT ORAL
Qty: 1 EACH | Refills: 0 | Status: SHIPPED | OUTPATIENT
Start: 2024-05-09 | End: 2024-05-21

## 2024-05-09 RX ORDER — HYDROMORPHONE HCL IN WATER/PF 6 MG/30 ML
0.2 PATIENT CONTROLLED ANALGESIA SYRINGE INTRAVENOUS
Status: DISCONTINUED | OUTPATIENT
Start: 2024-05-09 | End: 2024-05-12 | Stop reason: HOSPADM

## 2024-05-09 RX ORDER — CALCIUM CARBONATE 500 MG/1
1000 TABLET, CHEWABLE ORAL 4 TIMES DAILY PRN
Status: DISCONTINUED | OUTPATIENT
Start: 2024-05-09 | End: 2024-05-09

## 2024-05-09 RX ORDER — ONDANSETRON 4 MG/1
4 TABLET, ORALLY DISINTEGRATING ORAL EVERY 6 HOURS PRN
Status: DISCONTINUED | OUTPATIENT
Start: 2024-05-09 | End: 2024-05-12 | Stop reason: HOSPADM

## 2024-05-09 RX ORDER — HEPARIN SODIUM 10000 [USP'U]/100ML
0-5000 INJECTION, SOLUTION INTRAVENOUS CONTINUOUS
Status: DISCONTINUED | OUTPATIENT
Start: 2024-05-10 | End: 2024-05-12

## 2024-05-09 RX ADMIN — IOPAMIDOL 90 ML: 755 INJECTION, SOLUTION INTRAVENOUS at 19:57

## 2024-05-09 RX ADMIN — DOCUSATE SODIUM 100 MG: 100 CAPSULE, LIQUID FILLED ORAL at 23:55

## 2024-05-09 ASSESSMENT — ACTIVITIES OF DAILY LIVING (ADL)
ADLS_ACUITY_SCORE: 37

## 2024-05-09 ASSESSMENT — COLUMBIA-SUICIDE SEVERITY RATING SCALE - C-SSRS
1. IN THE PAST MONTH, HAVE YOU WISHED YOU WERE DEAD OR WISHED YOU COULD GO TO SLEEP AND NOT WAKE UP?: NO
6. HAVE YOU EVER DONE ANYTHING, STARTED TO DO ANYTHING, OR PREPARED TO DO ANYTHING TO END YOUR LIFE?: NO
2. HAVE YOU ACTUALLY HAD ANY THOUGHTS OF KILLING YOURSELF IN THE PAST MONTH?: NO

## 2024-05-09 NOTE — ED NOTES
EMERGENCY DEPARTMENT SIGN OUT NOTE        ED COURSE AND MEDICAL DECISION MAKING  Patient was signed out to me by Dr Chio Ellsi at 6:14 PM   6:14 PM Spoke with Dr. Acosta, Radiology, who reports that the patient has an extensive DVT in the right leg which will require IR intervention.   6:20 PM Introduced myself to the patient and updated them on the plan.   6:32 PM Spoke with Dr. Molina, IR.   7:32 PM Spoke with Pharmacist to determine how long the eliquis would be in the patient's system.   8:08 PM Spoke with Dr. Rosales, Radiology, who reports the patient's CT abdomen pelvis shows a partially visualized right lower lobe segmental PE.   8:15 PM I rechecked and updated the patient.   9:37 PM I discussed the case with hospitalist, Dr Barger, who accepts the patient for admission       In brief, Bon Luque is a 69 year old male who initially presented right leg swelling    ED Course as of 05/09/24 2139   Thu May 09, 2024   1527 Pt with RLE calf swelling and discomfort with h/o DVT not currently on eliquis, amrasheedle to US and to continue eliquis, neurovascularly intact without phlegmasia reassuringly   1704 Pt still awaiting US   1752 US with right DVT in right fem to lower calf, patient neurovascularly intact ok with plan to discharge on eliquis therapy and with PMD and hematology f/u. Patient discharged after being provided with extensive anticipatory guidance and given return precautions, importance of PMD follow-up emphasized.    1835 I spoke with IR who recommends CT abdomen pelvis to look for extension of the DVT proximately.  Recommends heparin.  Recommends n.p.o. at midnight.     1835 Pt took 10 mg eliquis at 130 PM from old script   1932 I spoke with pharmacy regarding when to initiate heparin recommend,  12 hours from last dose which would be at 130 AM   2008 [facility] called regarding patient   2008 He does have a PE right lower lobe, no right heart strain.       After patient was discharged  radiology called stating patient has significant clot burden and recommended IR evaluation    Spoke with IR who recommended heparin, n.p.o. at midnight, CT abdomen pelvis    Radiology called stating patient has a small pulm emboli on the right without any RV strain    Hemodynamically stable.    Work with pharmacy since patient took Eliquis at 1:30 PM.  Plan to start heparin infusion at 1:30 AM      The patient is critically ill and has required 35 minutes of critical care time exclusive of procedures. This includes time spent interviewing the patient, ordering tests and medications, monitoring vital signs, reviewing results, patient updates, discussing the case with family and consultants, and admission.      FINAL IMPRESSION    1. Acute deep vein thrombosis (DVT) of femoral vein of right lower extremity (H)    2. Other acute pulmonary embolism without acute cor pulmonale (H)        ED MEDS  Medications   heparin ANTICOAGULANT Loading dose for HIGH INTENSITY TREATMENT * Give BEFORE starting heparin infusion (has no administration in time range)   heparin 25,000 units in 0.45% NaCl 250 mL ANTICOAGULANT infusion (has no administration in time range)   iopamidol (ISOVUE-370) solution 90 mL (90 mLs Intravenous $Given 5/1955)       LAB  Labs Ordered and Resulted from Time of ED Arrival to Time of ED Departure   INR - Abnormal       Result Value    INR 1.23 (*)    CBC WITH PLATELETS AND DIFFERENTIAL - Abnormal    WBC Count 13.1 (*)     RBC Count 6.17 (*)     Hemoglobin 16.8      Hematocrit 50.7      MCV 82      MCH 27.2      MCHC 33.1      RDW 13.2      Platelet Count 143 (*)     % Neutrophils 69      % Lymphocytes 21      % Monocytes 7      % Eosinophils 1      % Basophils 1      % Immature Granulocytes 1      NRBCs per 100 WBC 0      Absolute Neutrophils 9.2 (*)     Absolute Lymphocytes 2.8      Absolute Monocytes 0.9      Absolute Eosinophils 0.1      Absolute Basophils 0.1      Absolute Immature Granulocytes 0.1       Absolute NRBCs 0.0     CBC WITH PLATELETS - Abnormal    WBC Count 13.1 (*)     RBC Count 6.17 (*)     Hemoglobin 16.8      Hematocrit 50.7      MCV 82      MCH 27.2      MCHC 33.1      RDW 13.2      Platelet Count 143 (*)    BASIC METABOLIC PANEL - Normal    Sodium 140      Potassium 4.3      Chloride 102      Carbon Dioxide (CO2) 27      Anion Gap 11      Urea Nitrogen 15.4      Creatinine 0.88      GFR Estimate >90      Calcium 9.8      Glucose 94     PARTIAL THROMBOPLASTIN TIME - Normal    aPTT 27     PARTIAL THROMBOPLASTIN TIME       EKG  None    RADIOLOGY    CT Abdomen Pelvis w Contrast   Final Result   Abnormal   IMPRESSION:    1.  No evidence of extension of known right lower extremity deep vein thrombus into the pelvis.   2.  Partially visualized right lower lobe segmental pulmonary emboli. No evidence of right heart strain.         [Critical Result: New diagnosis of pulmonary embolism]      Finding was identified on 5/9/2024 7:59 PM CDT.       Dr. Del Cid was contacted by me on 5/9/2024 8:09 PM CDT and verbalized understanding of the critical result.       US Lower Extremity Venous Duplex Right   Final Result   IMPRESSION:      1.  Extensive right leg DVT. Recommend Interventional Radiology consultation.      Critical Result: Extensive right leg DVT      Finding was identified on 5/9/2024 at 6:04 PM.      Physician Assistant in the ER was contacted by me on 5/9/2024 6:04 PM CDT and verbalized understanding of the critical result.              DISCHARGE MEDS  New Prescriptions    APIXABAN STARTER PACK (ELIQUIS DVT/PE STARTER PACK) 5 MG TBPK    Take 10 mg by mouth 2 times daily for 7 days, THEN 5 mg 2 times daily for 23 days.       Jacques Del Cid MD  [facility] EMERGENCY DEPARTMENT  [address] [place] [id number]  [phone]         Jacques Del Cid MD  05/09/24 2782

## 2024-05-09 NOTE — ED TRIAGE NOTES
Patient with right leg swelling and pain since 05/06.  Had previous upper extremity DVT about 6 months ago.  Took Eliquis x 3 months but then was told to discontinue.  Took flight from [place] to [place] on Sunday.

## 2024-05-09 NOTE — ED PROVIDER NOTES
EMERGENCY DEPARTMENT ENCOUNTER      NAME: Bon Luque  AGE: 69 year old male  YOB: 1955  MRN: [id number]  EVALUATION DATE & TIME: No admission date for patient encounter.    PCP: Aditya Munoz    ED PROVIDER: Chio Ellis M.D.      Chief Complaint   Patient presents with    Leg Swelling         FINAL IMPRESSION:  1. Acute deep vein thrombosis (DVT) of femoral vein of right lower extremity (H)          ED COURSE & MEDICAL DECISION MAKING:    ED Course as of 05/09/24 1801   Thu May 09, 2024   1527 Pt with RLE calf swelling and discomfort with h/o DVT not currently on eliquis, ameanble to US and to continue eliquis, neurovascularly intact without phlegmasia reassuringly   1704 Pt still awaiting US   1752 US with right DVT in right fem to lower calf, patient neurovascularly intact ok with plan to discharge on eliquis therapy and with PMD and hematology f/u. Patient discharged after being provided with extensive anticipatory guidance and given return precautions, importance of PMD follow-up emphasized.        3:20 PM I met with the patient, obtained history, performed an initial exam, and discussed options and plan for diagnostics and treatment here in the ED.     Pertinent Labs & Imaging studies reviewed. (See chart for details)      At the conclusion of the encounter I discussed the results of all of the tests and the disposition. The questions were answered. The patient or family acknowledged understanding and was agreeable with the care plan.     MEDICATIONS GIVEN IN THE EMERGENCY:  Medications - No data to display    NEW PRESCRIPTIONS STARTED AT TODAY'S ER VISIT  New Prescriptions    APIXABAN STARTER PACK (ELIQUIS DVT/PE STARTER PACK) 5 MG TBPK    Take 10 mg by mouth 2 times daily for 7 days, THEN 5 mg 2 times daily for 23 days.          =================================================================    HPI      Bon Luque is a 69 year old male with PMHx of DVT remotely no longer on blood  thinners who presents to the ED today via walking with leg swelling.    Patient reports he and his wife flew home from [place] after a cruise vacation 4 days ago and he started to notice swelling and pain in his right calf. The flight was around 3.5 hours. His symptoms have persisted. Patient has a history of a DVT that happened after a heart catheter procedure. He was on Eliquis for 3 months afterwards but hasn't been on it for about 6 months. This morning he took 2 Eliquis that he had leftover at home.  Patient denies any chest pain or shortness of breath.      REVIEW OF SYSTEMS   All other systems reviewed and are negative except as noted above in HPI.    PAST MEDICAL HISTORY:  Past Medical History:   Diagnosis Date    Abdominal pain, epigastric 06/23/2022    Normal gastric emptying study.  Peptic ulcer disease or gastritis suspected    Acute cholecystitis 07/12/2022    Hospitalized with acute abdominal pain, abnormal LFTs and CT showing distended gallbladder and bile duct dilatation.  Acute cholecystitis.  Cholecystectomy.    Arthritis     psoriatic    ASCVD (arteriosclerotic cardiovascular disease) 04/28/2016    Coronary artery stent ×2 in RCA following myocardial infarction 2008 , echo June 2014 normal LV function with ejection fraction 50-55% with mild apical inferior hypokinesis    Chronic fatigue 06/23/2022    Chronic low back pain 04/28/2016    Chronic right shoulder pain 06/23/2022    Coronary artery disease     Depression 12/26/2017    Diabetic peripheral neuropathy associated with type 2 diabetes mellitus (H) 04/28/2016    Abnormal EMG and workup at [facility]    Dilated bile duct 07/03/2017    CT scan with incidental finding of dilated intra-and extrahepatic ducts    HTN (hypertension)     Hypercholesterolemia     Hypogonadism in male     Ischemic cardiomyopathy     Stress Echocardiogram 2017 with decreased LV function with EF 43% worse compared to previous echo.  No change in infarct.  No new  ischemia    Left upper quadrant pain 06/30/2017    Mass of duodenum 10/16/2023    Obstruction of second portion of duodenum with abnormal CT scan, biopsies negative for endoscopic ultrasound    MI (myocardial infarction) (H)     Obstructive sleep apnea on CPAP     CPAP    Optic neuritis     Psoriasis     Recurrent vomiting 09/01/2023    Renal cell carcinoma, right (H) 06/23/2022    Right partial nephrectomy 2017    Right renal mass     Sebaceous cyst 2010    Thoracic radiculopathy 2004    right T8 intercostal nerve block 2002    Type 2 diabetes mellitus with insulin therapy (H)        PAST SURGICAL HISTORY:  Past Surgical History:   Procedure Laterality Date    APPENDECTOMY      COLONOSCOPY      Colonoscopy September 2017 with hyperplastic polyps, repeat in 10 years    CORONARY ANGIOPLASTY      stent    ENDOSCOPIC RETROGRADE CHOLANGIOPANCREATOGRAM N/A 7/12/2022    Procedure: ENDOSCOPIC RETROGRADE CHOLANGIOPANCREATOGRAPHY, STONE EXTRACTION, BILIARY SPHINCTEROTOMY;  Surgeon: Bon Meyer MD;  Location: [facility]    ESOPHAGOSCOPY, GASTROSCOPY, DUODENOSCOPY (EGD), COMBINED N/A 10/6/2023    Procedure: ESOPHAGOGASTRODUODENOSCOPY, WITH ENDOSCOPIC ULTRASOUND WITH FINE NEEDLE ASPIRATION AND DUODENAL BIOPSY;  Surgeon: Bon Meyer MD;  Location: [facility] OR    KNEE SURGERY      LAPAROSCOPIC CHOLECYSTECTOMY N/A 7/12/2022    Procedure: CHOLECYSTECTOMY, LAPAROSCOPIC;  Surgeon: Adam March MD;  Location: [facility] OR    NOSE SURGERY      PICC DOUBLE LUMEN PLACEMENT  10/9/2023    TX LAP,PARTIAL NEPHRECTOMY Right 1/10/2018    Procedure: RIGHT ROBOTIC PARTIAL NEPHRECTOMY WITH INTRAOPERATIVE ULTRASOUND;  Surgeon: Chase Rodríguez MD;  [place] [facility];  Service: Urology       CURRENT MEDICATIONS:    Apixaban Starter Pack (ELIQUIS DVT/PE STARTER PACK) 5 MG TBPK  betamethasone valerate (VALISONE) 0.1 % external ointment  Continuous Blood Gluc Sensor (FREESTYLE SHIRA 14 DAY  SENSOR) MISC  DULoxetine (CYMBALTA) 60 MG capsule  gabapentin (NEURONTIN) 300 MG capsule  insulin lispro (HUMALOG KWIKPEN) 100 UNIT/ML (1 unit dial) KWIKPEN  ketoconazole (NIZORAL) 2 % external cream  LANTUS SOLOSTAR 100 UNIT/ML soln  melatonin 5 MG CAPS  morphine (MS CONTIN) 30 MG CR tablet  multivitamin w/minerals (THERA-VIT-M) tablet  naloxone (NARCAN) 4 MG/0.1ML nasal spray  Risankizumab-rzaa (SKYRIZI SC)  testosterone (ANDROGEL) 20.25 MG/1.25GM (1.62%) topical gel  valACYclovir (VALTREX) 500 MG tablet        ALLERGIES:  Allergies   Allergen Reactions    Atorvastatin Muscle Pain (Myalgia)    Valomag [Arthritis Pain Formula] Nausea and Vomiting     gastric ulcer from aspirin use    Ampicillin Rash     Had a reaction to this medication many years ago.    Codeine Rash     It has been about 30 years ago       FAMILY HISTORY:  No family history on file.    SOCIAL HISTORY:   Social History     Socioeconomic History    Marital status:    Tobacco Use    Smoking status: Former     Passive exposure: Past    Smokeless tobacco: Never   Vaping Use    Vaping status: Never Used   Substance and Sexual Activity    Alcohol use: No     Comment: Alcoholic Drinks/day: alcohol abuse - in remission x16 years    Drug use: No     Social Determinants of Health     Financial Resource Strain: Low Risk  (1/30/2024)    Financial Resource Strain     Within the past 12 months, have you or your family members you live with been unable to get utilities (heat, electricity) when it was really needed?: No   Food Insecurity: Low Risk  (1/30/2024)    Food Insecurity     Within the past 12 months, did you worry that your food would run out before you got money to buy more?: No     Within the past 12 months, did the food you bought just not last and you didn t have money to get more?: No   Transportation Needs: Low Risk  (1/30/2024)    Transportation Needs     Within the past 12 months, has lack of transportation kept you from medical  appointments, getting your medicines, non-medical meetings or appointments, work, or from getting things that you need?: No   Social Connections: Socially Integrated (12/7/2023)    Received from [facility], [facility] [employer] [facility]    Social Connections     Frequency of Communication with Friends and Family: 0   Interpersonal Safety: Low Risk  (10/16/2023)    Interpersonal Safety     Do you feel physically and emotionally safe where you currently live?: Yes     Within the past 12 months, have you been hit, slapped, kicked or otherwise physically hurt by someone?: No     Within the past 12 months, have you been humiliated or emotionally abused in other ways by your partner or ex-partner?: No   Housing Stability: Low Risk  (1/30/2024)    Housing Stability     Do you have housing? : Yes     Are you worried about losing your housing?: No       VITALS:  Patient Vitals for the past 24 hrs:   BP Temp Temp src Pulse Resp SpO2 Weight   05/09/24 1716 -- -- -- 90 -- 97 % --   05/09/24 1714 -- -- -- -- -- 98 % --   05/09/24 1713 -- -- -- 89 -- 99 % --   05/09/24 1712 -- -- -- 87 -- 98 % --   05/09/24 1711 -- -- -- 90 -- 98 % --   05/09/24 1710 -- -- -- 92 -- 98 % --   05/09/24 1709 -- -- -- 94 -- 97 % --   05/09/24 1708 -- -- -- 91 -- 96 % --   05/09/24 1706 -- -- -- 82 -- 97 % --   05/09/24 1705 (!) 182/97 -- -- 84 -- 97 % --   05/09/24 1656 -- -- -- 88 -- 95 % --   05/09/24 1655 -- -- -- 88 -- 96 % --   05/09/24 1646 -- -- -- 84 -- 94 % --   05/09/24 1645 (!) 176/87 -- -- 83 -- 95 % --   05/09/24 1635 137/70 -- -- 80 -- 98 % --   05/09/24 1625 (!) 141/67 -- -- -- -- -- --   05/09/24 1512 (!) 184/88 97  F (36.1  C) Temporal 89 19 97 % 98 kg (216 lb)       PHYSICAL EXAM    GENERAL: Awake, alert.  In no acute distress.   HEENT: Normocephalic, atraumatic.  Pupils equal, round and reactive.  Conjunctiva normal.  EOMI.  NECK: No stridor or apparent deformity.  PULMONARY:  Symmetrical breath sounds without distress.  Lungs clear to auscultation bilaterally without wheezes, rhonchi or rales.  CARDIO: Regular rate and rhythm.  No significant murmur, rub or gallop.  Radial pulses strong and symmetrical. Right pedal pulse 2+ and bounding.  ABDOMINAL: Abdomen soft, non-distended and non-tender to palpation.  No CVAT, no palpable hepatosplenomegaly.  EXTREMITIES: Right calf tender and swollen.  NEURO: Alert and oriented to person, place and time.  Cranial nerves grossly intact.  No focal motor deficit.  PSYCH: Normal mood and affect  SKIN: No rashes            I, Dalila Haji, am serving as a scribe to document services personally performed by Dr. Chio Ellis based on my observation and the provider's statements to me. IChio MD attest that Dalila Haji is acting in a scribe capacity, has observed my performance of the services and has documented them in accordance with my direction.       Chio Ellis MD  05/09/24 1270

## 2024-05-10 ENCOUNTER — APPOINTMENT (OUTPATIENT)
Dept: ULTRASOUND IMAGING | Facility: HOSPITAL | Age: 69
DRG: 176 | End: 2024-05-10
Attending: INTERNAL MEDICINE
Payer: COMMERCIAL

## 2024-05-10 LAB
ALBUMIN SERPL BCG-MCNC: 3.9 G/DL (ref 3.5–5.2)
ALP SERPL-CCNC: 122 U/L (ref 40–150)
ALT SERPL W P-5'-P-CCNC: 26 U/L (ref 0–70)
ANION GAP SERPL CALCULATED.3IONS-SCNC: 11 MMOL/L (ref 7–15)
APTT PPP: 59 SECONDS (ref 22–38)
APTT PPP: 71 SECONDS (ref 22–38)
APTT PPP: 83 SECONDS (ref 22–38)
AST SERPL W P-5'-P-CCNC: 26 U/L (ref 0–45)
BASOPHILS # BLD AUTO: 0.1 10E3/UL (ref 0–0.2)
BASOPHILS NFR BLD AUTO: 0 %
BILIRUB SERPL-MCNC: 0.8 MG/DL
BUN SERPL-MCNC: 12.8 MG/DL (ref 8–23)
CALCIUM SERPL-MCNC: 9 MG/DL (ref 8.8–10.2)
CHLORIDE SERPL-SCNC: 103 MMOL/L (ref 98–107)
CREAT SERPL-MCNC: 0.86 MG/DL (ref 0.67–1.17)
DEPRECATED HCO3 PLAS-SCNC: 25 MMOL/L (ref 22–29)
EGFRCR SERPLBLD CKD-EPI 2021: >90 ML/MIN/1.73M2
EOSINOPHIL # BLD AUTO: 0.2 10E3/UL (ref 0–0.7)
EOSINOPHIL NFR BLD AUTO: 2 %
ERYTHROCYTE [DISTWIDTH] IN BLOOD BY AUTOMATED COUNT: 13.2 % (ref 10–15)
GLUCOSE BLDC GLUCOMTR-MCNC: 109 MG/DL (ref 70–99)
GLUCOSE BLDC GLUCOMTR-MCNC: 163 MG/DL (ref 70–99)
GLUCOSE BLDC GLUCOMTR-MCNC: 239 MG/DL (ref 70–99)
GLUCOSE BLDC GLUCOMTR-MCNC: 243 MG/DL (ref 70–99)
GLUCOSE BLDC GLUCOMTR-MCNC: 272 MG/DL (ref 70–99)
GLUCOSE BLDC GLUCOMTR-MCNC: 88 MG/DL (ref 70–99)
GLUCOSE SERPL-MCNC: 118 MG/DL (ref 70–99)
HCT VFR BLD AUTO: 45.5 % (ref 40–53)
HGB BLD-MCNC: 15.2 G/DL (ref 13.3–17.7)
HOLD SPECIMEN: NORMAL
HOLD SPECIMEN: NORMAL
IMM GRANULOCYTES # BLD: 0.1 10E3/UL
IMM GRANULOCYTES NFR BLD: 1 %
LYMPHOCYTES # BLD AUTO: 3.6 10E3/UL (ref 0.8–5.3)
LYMPHOCYTES NFR BLD AUTO: 26 %
MCH RBC QN AUTO: 27.5 PG (ref 26.5–33)
MCHC RBC AUTO-ENTMCNC: 33.4 G/DL (ref 31.5–36.5)
MCV RBC AUTO: 82 FL (ref 78–100)
MONOCYTES # BLD AUTO: 0.9 10E3/UL (ref 0–1.3)
MONOCYTES NFR BLD AUTO: 7 %
NEUTROPHILS # BLD AUTO: 8.6 10E3/UL (ref 1.6–8.3)
NEUTROPHILS NFR BLD AUTO: 64 %
NRBC # BLD AUTO: 0 10E3/UL
NRBC BLD AUTO-RTO: 0 /100
PLATELET # BLD AUTO: 144 10E3/UL (ref 150–450)
POTASSIUM SERPL-SCNC: 3.8 MMOL/L (ref 3.4–5.3)
PROT SERPL-MCNC: 6.7 G/DL (ref 6.4–8.3)
RBC # BLD AUTO: 5.52 10E6/UL (ref 4.4–5.9)
SODIUM SERPL-SCNC: 139 MMOL/L (ref 135–145)
WBC # BLD AUTO: 13.5 10E3/UL (ref 4–11)

## 2024-05-10 PROCEDURE — 250N000011 HC RX IP 250 OP 636: Performed by: EMERGENCY MEDICINE

## 2024-05-10 PROCEDURE — 82962 GLUCOSE BLOOD TEST: CPT

## 2024-05-10 PROCEDURE — 85730 THROMBOPLASTIN TIME PARTIAL: CPT | Performed by: STUDENT IN AN ORGANIZED HEALTH CARE EDUCATION/TRAINING PROGRAM

## 2024-05-10 PROCEDURE — 85025 COMPLETE CBC W/AUTO DIFF WBC: CPT | Performed by: INTERNAL MEDICINE

## 2024-05-10 PROCEDURE — 36415 COLL VENOUS BLD VENIPUNCTURE: CPT | Performed by: STUDENT IN AN ORGANIZED HEALTH CARE EDUCATION/TRAINING PROGRAM

## 2024-05-10 PROCEDURE — 250N000013 HC RX MED GY IP 250 OP 250 PS 637: Performed by: STUDENT IN AN ORGANIZED HEALTH CARE EDUCATION/TRAINING PROGRAM

## 2024-05-10 PROCEDURE — 93971 EXTREMITY STUDY: CPT | Mod: LT

## 2024-05-10 PROCEDURE — 250N000011 HC RX IP 250 OP 636: Performed by: INTERNAL MEDICINE

## 2024-05-10 PROCEDURE — 120N000001 HC R&B MED SURG/OB

## 2024-05-10 PROCEDURE — 99233 SBSQ HOSP IP/OBS HIGH 50: CPT | Performed by: STUDENT IN AN ORGANIZED HEALTH CARE EDUCATION/TRAINING PROGRAM

## 2024-05-10 PROCEDURE — 85730 THROMBOPLASTIN TIME PARTIAL: CPT | Performed by: INTERNAL MEDICINE

## 2024-05-10 PROCEDURE — 250N000011 HC RX IP 250 OP 636: Performed by: STUDENT IN AN ORGANIZED HEALTH CARE EDUCATION/TRAINING PROGRAM

## 2024-05-10 PROCEDURE — 250N000013 HC RX MED GY IP 250 OP 250 PS 637: Performed by: INTERNAL MEDICINE

## 2024-05-10 PROCEDURE — 250N000012 HC RX MED GY IP 250 OP 636 PS 637: Performed by: STUDENT IN AN ORGANIZED HEALTH CARE EDUCATION/TRAINING PROGRAM

## 2024-05-10 PROCEDURE — 80053 COMPREHEN METABOLIC PANEL: CPT | Performed by: INTERNAL MEDICINE

## 2024-05-10 PROCEDURE — 36415 COLL VENOUS BLD VENIPUNCTURE: CPT | Performed by: INTERNAL MEDICINE

## 2024-05-10 RX ORDER — NALOXONE HYDROCHLORIDE 0.4 MG/ML
0.4 INJECTION, SOLUTION INTRAMUSCULAR; INTRAVENOUS; SUBCUTANEOUS
Status: DISCONTINUED | OUTPATIENT
Start: 2024-05-10 | End: 2024-05-12 | Stop reason: HOSPADM

## 2024-05-10 RX ORDER — HYDRALAZINE HYDROCHLORIDE 20 MG/ML
5 INJECTION INTRAMUSCULAR; INTRAVENOUS EVERY 4 HOURS PRN
Status: DISCONTINUED | OUTPATIENT
Start: 2024-05-10 | End: 2024-05-12 | Stop reason: HOSPADM

## 2024-05-10 RX ORDER — NALOXONE HYDROCHLORIDE 0.4 MG/ML
0.2 INJECTION, SOLUTION INTRAMUSCULAR; INTRAVENOUS; SUBCUTANEOUS
Status: DISCONTINUED | OUTPATIENT
Start: 2024-05-10 | End: 2024-05-12 | Stop reason: HOSPADM

## 2024-05-10 RX ORDER — GABAPENTIN 300 MG/1
600 CAPSULE ORAL 3 TIMES DAILY
Status: DISCONTINUED | OUTPATIENT
Start: 2024-05-10 | End: 2024-05-12 | Stop reason: HOSPADM

## 2024-05-10 RX ORDER — HYDRALAZINE HYDROCHLORIDE 20 MG/ML
5 INJECTION INTRAMUSCULAR; INTRAVENOUS EVERY 4 HOURS PRN
Status: DISCONTINUED | OUTPATIENT
Start: 2024-05-10 | End: 2024-05-10

## 2024-05-10 RX ORDER — DULOXETIN HYDROCHLORIDE 60 MG/1
60 CAPSULE, DELAYED RELEASE ORAL DAILY
Status: DISCONTINUED | OUTPATIENT
Start: 2024-05-10 | End: 2024-05-12 | Stop reason: HOSPADM

## 2024-05-10 RX ORDER — DEXTROSE, SODIUM CHLORIDE, SODIUM LACTATE, POTASSIUM CHLORIDE, AND CALCIUM CHLORIDE 5; .6; .31; .03; .02 G/100ML; G/100ML; G/100ML; G/100ML; G/100ML
INJECTION, SOLUTION INTRAVENOUS CONTINUOUS
Status: DISCONTINUED | OUTPATIENT
Start: 2024-05-10 | End: 2024-05-10

## 2024-05-10 RX ORDER — DEXTROSE MONOHYDRATE 25 G/50ML
25-50 INJECTION, SOLUTION INTRAVENOUS
Status: DISCONTINUED | OUTPATIENT
Start: 2024-05-10 | End: 2024-05-12 | Stop reason: HOSPADM

## 2024-05-10 RX ORDER — MORPHINE SULFATE 30 MG/1
30 TABLET, FILM COATED, EXTENDED RELEASE ORAL EVERY 8 HOURS
Status: DISCONTINUED | OUTPATIENT
Start: 2024-05-10 | End: 2024-05-12 | Stop reason: HOSPADM

## 2024-05-10 RX ORDER — NICOTINE POLACRILEX 4 MG
15-30 LOZENGE BUCCAL
Status: DISCONTINUED | OUTPATIENT
Start: 2024-05-10 | End: 2024-05-12 | Stop reason: HOSPADM

## 2024-05-10 RX ORDER — MULTIPLE VITAMINS W/ MINERALS TAB 9MG-400MCG
1 TAB ORAL EVERY EVENING
Status: DISCONTINUED | OUTPATIENT
Start: 2024-05-10 | End: 2024-05-12 | Stop reason: HOSPADM

## 2024-05-10 RX ADMIN — HEPARIN SODIUM 1900 UNITS/HR: 10000 INJECTION, SOLUTION INTRAVENOUS at 23:55

## 2024-05-10 RX ADMIN — DOCUSATE SODIUM 100 MG: 100 CAPSULE, LIQUID FILLED ORAL at 19:22

## 2024-05-10 RX ADMIN — HYDROMORPHONE HYDROCHLORIDE 0.2 MG: 0.2 INJECTION, SOLUTION INTRAMUSCULAR; INTRAVENOUS; SUBCUTANEOUS at 17:37

## 2024-05-10 RX ADMIN — GABAPENTIN 600 MG: 300 CAPSULE ORAL at 15:11

## 2024-05-10 RX ADMIN — HYDRALAZINE HYDROCHLORIDE 5 MG: 20 INJECTION INTRAMUSCULAR; INTRAVENOUS at 16:08

## 2024-05-10 RX ADMIN — HEPARIN SODIUM 1750 UNITS/HR: 10000 INJECTION, SOLUTION INTRAVENOUS at 01:42

## 2024-05-10 RX ADMIN — GABAPENTIN 600 MG: 300 CAPSULE ORAL at 09:42

## 2024-05-10 RX ADMIN — INSULIN GLARGINE 20 UNITS: 100 INJECTION, SOLUTION SUBCUTANEOUS at 22:11

## 2024-05-10 RX ADMIN — DULOXETINE HYDROCHLORIDE 60 MG: 60 CAPSULE, DELAYED RELEASE PELLETS ORAL at 09:42

## 2024-05-10 RX ADMIN — HYDRALAZINE HYDROCHLORIDE 5 MG: 20 INJECTION INTRAMUSCULAR; INTRAVENOUS at 19:22

## 2024-05-10 RX ADMIN — MORPHINE SULFATE 30 MG: 30 TABLET, FILM COATED, EXTENDED RELEASE ORAL at 16:08

## 2024-05-10 RX ADMIN — HYDROMORPHONE HYDROCHLORIDE 0.2 MG: 0.2 INJECTION, SOLUTION INTRAMUSCULAR; INTRAVENOUS; SUBCUTANEOUS at 22:52

## 2024-05-10 RX ADMIN — Medication 1 TABLET: at 19:22

## 2024-05-10 RX ADMIN — MORPHINE SULFATE 30 MG: 30 TABLET, FILM COATED, EXTENDED RELEASE ORAL at 09:42

## 2024-05-10 RX ADMIN — SODIUM CHLORIDE, SODIUM LACTATE, POTASSIUM CHLORIDE, CALCIUM CHLORIDE AND DEXTROSE MONOHYDRATE: 5; 600; 310; 30; 20 INJECTION, SOLUTION INTRAVENOUS at 03:41

## 2024-05-10 RX ADMIN — HEPARIN SODIUM 1750 UNITS/HR: 10000 INJECTION, SOLUTION INTRAVENOUS at 11:57

## 2024-05-10 RX ADMIN — GABAPENTIN 600 MG: 300 CAPSULE ORAL at 19:22

## 2024-05-10 RX ADMIN — DOCUSATE SODIUM 100 MG: 100 CAPSULE, LIQUID FILLED ORAL at 08:09

## 2024-05-10 ASSESSMENT — ACTIVITIES OF DAILY LIVING (ADL)
ADLS_ACUITY_SCORE: 38
ADLS_ACUITY_SCORE: 37
ADLS_ACUITY_SCORE: 37
ADLS_ACUITY_SCORE: 38
ADLS_ACUITY_SCORE: 37
ADLS_ACUITY_SCORE: 38
ADLS_ACUITY_SCORE: 37
ADLS_ACUITY_SCORE: 38
ADLS_ACUITY_SCORE: 38
ADLS_ACUITY_SCORE: 37
ADLS_ACUITY_SCORE: 38
ADLS_ACUITY_SCORE: 37
ADLS_ACUITY_SCORE: 38
ADLS_ACUITY_SCORE: 37

## 2024-05-10 NOTE — PLAN OF CARE
Problem: VTE (Venous Thromboembolism)  Goal: Tissue Perfusion  Outcome: Progressing   Pt NPO with plans for thrombectomy today.  Heparin gtt running at 1750u/hr with next PTT at 0745.  Pt reports dull/deep pain to right calf although denies the need for medication.  RLE swelling noted.  Pt has hx neuropathy - states the sensation to his right toes is decreased than normal.  Pedal pulses intact.      Problem: Comorbidity Management  Goal: Blood Glucose Levels Within Targeted Range  Outcome: Progressing   Pt has D5LR running at 50cc/hr and blood sugar checks Q4H.  Blood sugar within normal range.

## 2024-05-10 NOTE — H&P
[facility]    History and Physical - Hospitalist Service       Date of Admission:  5/9/2024    Assessment & Plan       Bon Luque is a 69 year old male with a medical history of multiple medical cor morbidities which includes a history of coronary artery disease status post stent placement, chronic back pain, type II DM with peripheral neuropathy, hypertension hyperlipidemia ischemic cardiomyopathy, obstructive sleep apnea, renal cell cancer and other medical comorbidities including a prior history of upper extremity DVT who is being admitted with an extensive right lower extremity DVT precipitated by recent trip.  It is noted that patient was already on Eliquis.    Patient Active Problem List   Diagnosis    Psoriatic arthritis (H)    Chronic low back pain    Diabetic peripheral neuropathy associated with type 2 diabetes mellitus (H)    Psoriasis    Hypercholesterolemia    HTN (hypertension)    Optic neuritis    Obstructive sleep apnea on CPAP    Thoracic radiculopathy    Hypogonadism in male    Erectile dysfunction    ASCVD (arteriosclerotic cardiovascular disease)    Alcohol abuse, in remission    Tobacco abuse, in remission    Ankylosis, sacroiliac joint    Spondylosis of lumbar region without myelopathy or radiculopathy    Ischemic cardiomyopathy    Depression    Type 2 diabetes mellitus with complication, with long-term current use of insulin (H)    Chronic pain syndrome    Lower abdominal pain    Drug-induced constipation    Knee effusion, left    Renal cell carcinoma, right (H)    Chronic fatigue    Chronic right shoulder pain    Jaundice    Transaminitis    Dilated cbd, acquired    Hyponatremia    Abdominal pain, epigastric    SBO (small bowel obstruction) (H)    Nausea and vomiting    Hypophosphatemia    Deep vein thrombosis (DVT) of upper extremity (H)    Severe protein-calorie malnutrition (H24)    Mass of duodenum    Pain of upper abdomen    Leukocytosis, unspecified type     Pseudocyst of pancreas    Duodenal ulcer without hemorrhage, perforation, or obstruction    Acute deep vein thrombosis (DVT) of femoral vein of right lower extremity (H)    Other acute pulmonary embolism without acute cor pulmonale (H)        Acute right lower extremity DVT-after traveling to [place].  This appears to be patient's second DVT.  He took Eliquis at home.  Heparin will be started 12 hours later.  Interventional radiology has been consulted for thrombectomy in the morning.  CT was positive for PE as well.  Age-appropriate cancer screening outpatient needed.      Obstructive sleep apnea-patient was not interested in using a CPAP machine tonight.  He does admits to snoring.  He does have a deviated nasal septum.  Pulse oximetry for now oxygen as needed.    Renal cell mass-nonmalignant after resection per patient    RLQ pain with intermittent diarrhea-per patient this has been ongoing.  He recalls having a history of peptic ulcer which led to a 40 pound weight loss.  He subsequently has gained about 15 pounds of the of the weight back.  CT abdomen and pelvis done tonight's did not show any abnormal lesions.  Patient may benefit from outpatient colonoscopy.    Type II DM with peripheral neuropathy-Accu-Cheks, sliding scale insulin, blood sugar goal 100-1 40    Ischemic [id number]-[id number]-gs on TTE  for  ejection fraction.    Coronary artery disease-slightly elevated troponin likely from type II MI.  Echocardiogram ordered for tomorrow    History of PE/DVT-continue heparin for now.  Thrombectomy in AM.    Chronic pain syndrome-degenerative changes in the lumbosacral spine.  Pain control as needed    Rest of patient's medical problems management remains unchanged     Diet: NPO per Anesthesia Guidelines for Procedure/Surgery Except for: Meds    DVT Prophylaxis: Pneumatic Compression Devices  Seals Catheter: Not present  Lines: Peripheral  Cardiac Monitoring: Yes  Code Status:  Full code    Clinically  Significant Risk Factors Present on Admission               # Coagulation Defect: INR = 1.23 (Ref range: 0.85 - 1.15) and/or PTT = 27 Seconds (Ref range: 22 - 38 Seconds), will monitor for bleeding    # Hypertension: Noted on problem list                 Disposition Plan     Medically Ready for Discharge: Anticipated in 2-4 Days           Adri Barger MD  Hospitalist Service  [facility]  Securely message with [employer] (more info)  Text page via [employer] Paging/Directory     ______________________________________________________________________    Chief Complaint   DVT        History of Present Illness   Bon Luque is a 69 year old male with a medical history of multiple medical cor morbidities which includes a history of coronary artery disease status post stent placement, chronic back pain, type II DM with peripheral neuropathy, hypertension hyperlipidemia ischemic cardiomyopathy, obstructive sleep apnea, renal cell cancer and other medical comorbidities including a prior history of upper extremity DVT who is being admitted with an extensive right lower extremity DVT precipitated by recent trip.  It is noted that patient was already on Eliquis.      Patient was seen in the ER on admission.  He was awake alert oriented to place person and time and could provide a history.  Per history, he and his wife flew home from [place] after cruise vacation 4 days ago and he started to notice swelling and pain in his right calf.  The flight was about 3.5 hours.  His symptoms persisted and so he decided to come to the ER for further evaluation.  Of notable in his history is the fact that patient developed a right upper extremity DVT after heart catheter procedure.  He was on Eliquis for 3 months after which it was discontinued for 6 months.  This morning he took 2 Eliquis that he had left over at home.  Review of systems on presentation was negative for Chest pain, shortness of breath, fevers, chills,  abdominal pain, nausea vomiting or diarrhea.  Patient denied any urinary symptoms.    Preliminary workup done in the ER was unremarkable for BMP, CBC showed a white count of 13.1, hemoglobin 16.8, platelets 143, INR 1.23.  CT abdomen and pelvis showed results below:      Narrative & Impression   EXAM: CT ABDOMEN PELVIS W CONTRAST  LOCATION: [facility]  DATE: 5/9/2024     INDICATION: Extensive right lower extremity DVT, IR recommnding to see extent of thrombus  COMPARISON: Same date right lower extremity ultrasound, CT abdomen/pelvis 10/27/2023  TECHNIQUE: CT scan of the abdomen and pelvis was performed following injection of IV contrast. Multiplanar reformats were obtained. Dose reduction techniques were used.  CONTRAST: isovue 370 90ml     FINDINGS:   LOWER CHEST: Small pulmonary emboli visualized in segmental branches of the right lower lobe (series 3 image 1). No evidence of right heart strain. No focal airspace consolidation or pleural effusion.     HEPATOBILIARY: Prior cholecystectomy. Mild extrahepatic biliary ductal dilation, likely reservoir effect. No focal liver lesion visualized.     PANCREAS: No ductal dilation or surrounding fat stranding. Interval resolution of adjacent fluid collection. No focal mass lesion visualized.     SPLEEN: Normal.     ADRENAL GLANDS: Normal.     KIDNEYS/BLADDER: Prior partial right nephrectomy without definite recurrent or enlarging mass. No hydronephrosis. Urinary bladder is unremarkable.     BOWEL: No obstruction or inflammatory change.     LYMPH NODES: Prominent but subcentimeter upper abdominal retroperitoneal lymph nodes, decreased in size in comparison to the prior CT. No definite new or enlarging lymphadenopathy.     VASCULATURE: Moderate calcified atherosclerosis. While there is incomplete opacification and mixing artifact in the common iliac, external iliac and common femoral veins bilaterally, no definite thrombus is visualized.     PELVIC  ORGANS: Unremarkable.     MUSCULOSKELETAL: No acute bony abnormality.                                                                      IMPRESSION:   1.  No evidence of extension of known right lower extremity deep vein thrombus into the pelvis.  2.  Partially visualized right lower lobe segmental pulmonary emboli. No evidence of right heart strain.          Lower extremity showed results below:    Narrative & Impression   EXAM: US LOWER EXTREMITY VENOUS DUPLEX RIGHT  LOCATION: [facility]  DATE: 5/9/2024     INDICATION: Right leg and calf swelling.  COMPARISON: None.  TECHNIQUE: Venous Duplex ultrasound of the right lower extremity with and without compression, augmentation and duplex. Color flow and spectral Doppler with waveform analysis performed.     FINDINGS: Exam includes the common femoral, femoral, popliteal, and contralateral common femoral veins as well as segmentally visualized deep calf veins and greater saphenous vein.      RIGHT: Extensive right sided DVT, extending from the proximal femoral vein through the distal calf, including the popliteal, posterior tibial and peroneal veins. Some portions are partially occlusive, while other areas appear to be occlusive / nearly   occlusive. No superficial thrombophlebitis. No popliteal cyst.                                                                      IMPRESSION:     1.  Extensive right leg DVT. Recommend Interventional Radiology consultation.          Case was discussed with interventional radiology.  The recommendation was to start heparin 12 hours after the Eliquis.  IR has been consulted in the morning for thrombectomy.      Patient remains a full code at this time.    Past Medical History    Past Medical History:   Diagnosis Date    Abdominal pain, epigastric 06/23/2022    Normal gastric emptying study.  Peptic ulcer disease or gastritis suspected    Acute cholecystitis 07/12/2022    Hospitalized with acute abdominal  pain, abnormal LFTs and CT showing distended gallbladder and bile duct dilatation.  Acute cholecystitis.  Cholecystectomy.    Arthritis     psoriatic    ASCVD (arteriosclerotic cardiovascular disease) 04/28/2016    Coronary artery stent ×2 in RCA following myocardial infarction 2008 , echo June 2014 normal LV function with ejection fraction 50-55% with mild apical inferior hypokinesis    Chronic fatigue 06/23/2022    Chronic low back pain 04/28/2016    Chronic right shoulder pain 06/23/2022    Coronary artery disease     Depression 12/26/2017    Diabetic peripheral neuropathy associated with type 2 diabetes mellitus (H) 04/28/2016    Abnormal EMG and workup at [facility]    Dilated bile duct 07/03/2017    CT scan with incidental finding of dilated intra-and extrahepatic ducts    HTN (hypertension)     Hypercholesterolemia     Hypogonadism in male     Ischemic cardiomyopathy     Stress Echocardiogram 2017 with decreased LV function with EF 43% worse compared to previous echo.  No change in infarct.  No new ischemia    Left upper quadrant pain 06/30/2017    Mass of duodenum 10/16/2023    Obstruction of second portion of duodenum with abnormal CT scan, biopsies negative for endoscopic ultrasound    MI (myocardial infarction) (H)     Obstructive sleep apnea on CPAP     CPAP    Optic neuritis     Psoriasis     Recurrent vomiting 09/01/2023    Renal cell carcinoma, right (H) 06/23/2022    Right partial nephrectomy 2017    Right renal mass     Sebaceous cyst 2010    Thoracic radiculopathy 2004    right T8 intercostal nerve block 2002    Type 2 diabetes mellitus with insulin therapy (H)        Past Surgical History   Past Surgical History:   Procedure Laterality Date    APPENDECTOMY      COLONOSCOPY      Colonoscopy September 2017 with hyperplastic polyps, repeat in 10 years    CORONARY ANGIOPLASTY      stent    ENDOSCOPIC RETROGRADE CHOLANGIOPANCREATOGRAM N/A 7/12/2022    Procedure: ENDOSCOPIC RETROGRADE  CHOLANGIOPANCREATOGRAPHY, STONE EXTRACTION, BILIARY SPHINCTEROTOMY;  Surgeon: Bon Meyer MD;  Location: [facility] [place]    ESOPHAGOSCOPY, GASTROSCOPY, DUODENOSCOPY (EGD), COMBINED N/A 10/6/2023    Procedure: ESOPHAGOGASTRODUODENOSCOPY, WITH ENDOSCOPIC ULTRASOUND WITH FINE NEEDLE ASPIRATION AND DUODENAL BIOPSY;  Surgeon: Bon Meyer MD;  [place] [facility] [place]    KNEE SURGERY      LAPAROSCOPIC CHOLECYSTECTOMY N/A 7/12/2022    Procedure: CHOLECYSTECTOMY, LAPAROSCOPIC;  Surgeon: Adam March MD;  Location: [facility] [place]    NOSE SURGERY      PICC DOUBLE LUMEN PLACEMENT  10/9/2023    WA LAP,PARTIAL NEPHRECTOMY Right 1/10/2018    Procedure: RIGHT ROBOTIC PARTIAL NEPHRECTOMY WITH INTRAOPERATIVE ULTRASOUND;  Surgeon: Chase Rodríguez MD;  Location: [facility];  Service: Urology       Prior to Admission Medications   Prior to Admission Medications   Prescriptions Last Dose Informant Patient Reported? Taking?   Continuous Blood Gluc Sensor (FREESTYLE SHIRA 14 DAY SENSOR) [facility] n/a at n/a Self Yes No   DULoxetine (CYMBALTA) 60 MG capsule 5/9/2024 at am  No Yes   Sig: Take 1 capsule (60 mg) by mouth daily   LANTUS SOLOSTAR 100 UNIT/ML soln 5/9/2024 at am  Yes Yes   Sig: Inject 50 Units Subcutaneous 2 times daily   Risankizumab-rzaa (SKYRIZI SC) More than a month at about 3 months ago  Yes Yes   Sig: Inject Subcutaneous every 4 months   gabapentin (NEURONTIN) 300 MG capsule 5/9/2024 at 1800 (2nd dose) Self Yes Yes   Sig: [GABAPENTIN (NEURONTIN) 300 MG CAPSULE] Take 600 mg by mouth 3 (three) times a day.    insulin lispro (HUMALOG KWIKPEN) 100 UNIT/ML (1 unit dial) KWIKPEN 5/9/2024 at 1400 (36 units) Self Yes Yes   Sig: Inject Subcutaneous 3 times daily (before meals) Sliding scale. Average use is 30 units with each meal   melatonin 5 MG CAPS Past Month at prn  Yes Yes   Sig: Take 5-10 mg by mouth nightly as needed (sleep)   morphine (MS CONTIN) 30 MG CR tablet 5/9/2024 at 1800 (2nd  dose) Self Yes Yes   Sig: Take 30 mg by mouth every 8 hours   multivitamin w/minerals (THERA-VIT-M) tablet 5/8/2024 at pm  Yes Yes   Sig: Take 1 tablet by mouth every evening   naloxone (NARCAN) 4 MG/0.1ML nasal spray n/a at n/a Self Yes No   Sig: Spray 1 spray into one nostril alternating nostrils once as needed for opioid reversal   testosterone (ANDROGEL) 20.25 MG/1.25GM (1.62%) topical gel 5/9/2024 at am  No Yes   Sig: Place 1 packet (20.25 mg) onto the skin daily      Facility-Administered Medications: None        Review of Systems    The 10 point Review of Systems is negative other than noted in the HPI or here.     Social History   I have reviewed this patient's social history and updated it with pertinent information if needed.  Social History     Tobacco Use    Smoking status: Former     Passive exposure: Past    Smokeless tobacco: Never   Vaping Use    Vaping status: Never Used   Substance Use Topics    Alcohol use: No     Comment: Alcoholic Drinks/day: alcohol abuse - in remission x16 years    Drug use: No         Family History           Allergies   Allergies   Allergen Reactions    Atorvastatin Muscle Pain (Myalgia)    Valomag [Arthritis Pain Formula] Nausea and Vomiting     gastric ulcer from aspirin use    Ampicillin Rash     Had a reaction to this medication many years ago.    Codeine Rash     It has been about 30 years ago        Physical Exam   Vital Signs: Temp: 97  F (36.1  C) Temp src: Temporal BP: (!) 186/98 Pulse: 94   Resp: 19 SpO2: 96 %      Weight: 216 lbs 0 oz      General Aox3, appropriate affect, NAD, on RA  HEENT  MMM, EOMI, PERRL  Deviated nasal septum  Chest Adeq E b/l, No wheezing  Heart RRR, No M/R/G  Abd- Soft, NT, BS+  - Deferred,   Extremity- Moving all extremities, No digital clubbing,  +edema  Neuro- Aox3, moving all extremities gait not checked  Skin   No skin rash     Medical Decision Making       90 MINUTES SPENT BY ME on the date of service doing chart review, history,  exam, documentation & further activities per the note.      ------------------ MEDICAL DECISION MAKING ------------------------------------------------------------------------------------------------------  MANAGEMENT DISCUSSED with the following over the past 24 hours: patient and care team       Data   ------------------------- PAST 24 HR DATA REVIEWED -----------------------------------------------    I have personally reviewed the following data over the past 24 hrs:    13.1 (H); 13.1 (H)  \   16.8; 16.8   / 143 (L); 143 (L)     140 102 15.4 /  163 (H)   4.3 27 0.88 \     INR:  1.23 (H) PTT:  27   D-dimer:  N/A Fibrinogen:  N/A       Imaging results reviewed over the past 24 hrs:   Recent Results (from the past 24 hour(s))   US Lower Extremity Venous Duplex Right    Narrative    EXAM: US LOWER EXTREMITY VENOUS DUPLEX RIGHT  LOCATION: [facility]  DATE: 5/9/2024    INDICATION: Right leg and calf swelling.  COMPARISON: None.  TECHNIQUE: Venous Duplex ultrasound of the right lower extremity with and without compression, augmentation and duplex. Color flow and spectral Doppler with waveform analysis performed.    FINDINGS: Exam includes the common femoral, femoral, popliteal, and contralateral common femoral veins as well as segmentally visualized deep calf veins and greater saphenous vein.     RIGHT: Extensive right sided DVT, extending from the proximal femoral vein through the distal calf, including the popliteal, posterior tibial and peroneal veins. Some portions are partially occlusive, while other areas appear to be occlusive / nearly   occlusive. No superficial thrombophlebitis. No popliteal cyst.      Impression    IMPRESSION:    1.  Extensive right leg DVT. Recommend Interventional Radiology consultation.    Critical Result: Extensive right leg DVT    Finding was identified on 5/9/2024 at 6:04 PM.    Physician Assistant in the ER was contacted by me on 5/9/2024 6:04 PM CDT and  verbalized understanding of the critical result.      CT Abdomen Pelvis w Contrast   Result Value    Radiologist flags New diagnosis of pulmonary embolism (AA)    Narrative    EXAM: CT ABDOMEN PELVIS W CONTRAST  LOCATION: [facility]  DATE: 5/9/2024    INDICATION: Extensive right lower extremity DVT, IR recommnding to see extent of thrombus  COMPARISON: Same date right lower extremity ultrasound, CT abdomen/pelvis 10/27/2023  TECHNIQUE: CT scan of the abdomen and pelvis was performed following injection of IV contrast. Multiplanar reformats were obtained. Dose reduction techniques were used.  CONTRAST: isovue 370 90ml    FINDINGS:   LOWER CHEST: Small pulmonary emboli visualized in segmental branches of the right lower lobe (series 3 image 1). No evidence of right heart strain. No focal airspace consolidation or pleural effusion.    HEPATOBILIARY: Prior cholecystectomy. Mild extrahepatic biliary ductal dilation, likely reservoir effect. No focal liver lesion visualized.    PANCREAS: No ductal dilation or surrounding fat stranding. Interval resolution of adjacent fluid collection. No focal mass lesion visualized.    SPLEEN: Normal.    ADRENAL GLANDS: Normal.    KIDNEYS/BLADDER: Prior partial right nephrectomy without definite recurrent or enlarging mass. No hydronephrosis. Urinary bladder is unremarkable.    BOWEL: No obstruction or inflammatory change.    LYMPH NODES: Prominent but subcentimeter upper abdominal retroperitoneal lymph nodes, decreased in size in comparison to the prior CT. No definite new or enlarging lymphadenopathy.    VASCULATURE: Moderate calcified atherosclerosis. While there is incomplete opacification and mixing artifact in the common iliac, external iliac and common femoral veins bilaterally, no definite thrombus is visualized.    PELVIC ORGANS: Unremarkable.    MUSCULOSKELETAL: No acute bony abnormality.      Impression    IMPRESSION:   1.  No evidence of extension of  known right lower extremity deep vein thrombus into the pelvis.  2.  Partially visualized right lower lobe segmental pulmonary emboli. No evidence of right heart strain.      [Critical Result: New diagnosis of pulmonary embolism]    Finding was identified on 5/9/2024 7:59 PM CDT.     Dr. Del Cid was contacted by me on 5/9/2024 8:09 PM CDT and verbalized understanding of the critical result.

## 2024-05-10 NOTE — UTILIZATION REVIEW
Admission Status; Secondary Review Determination   Under the authority of the Utilization Management Committee, the utilization review process indicated a secondary review on Bon Luque. The review outcome is based on review of the medical records, discussions with staff, and applying clinical experience noted on the date of the review.   (x) Inpatient Status Appropriate - This patient's medical care is consistent with medical management for inpatient care and reasonable inpatient medical practice.     RATIONALE FOR DETERMINATION   Bon Luque is a 69 yr old male with CAD, DM2, ICM, MARÍA ELENA, RCC, hx DVT who presented with RLE and acute PE.  Extensive clot burden and started on heparin.  IR initially discussed with ED possible intervention however today are going to watch while on heparin infusion for any decompensation.  Discussed with Dr. Perdue and patient going to remain in hospital a second night with ongoing IV heparin and close monitoring.  At the time of admission with the information available to the attending physician more than 2 nights Hospital complex care was anticipated, based on patient risk of adverse outcome if treated as outpatient and complex care required. Inpatient admission is appropriate based on the Medicare guidelines.   The information on this document is developed by the utilization review team in order for the business office to ensure compliance. This only denotes the appropriateness of proper admission status and does not reflect the quality of care rendered.   The definitions of Inpatient Status and Observation Status used in making the determination above are those provided in the CMS Coverage Manual, Chapter 1 and Chapter 6, section 70.4.   Sincerely,   Latosha Valencia MD  Utilization Review  Physician Advisor  [facility]

## 2024-05-10 NOTE — CONSULTS
"  Interventional Radiology - Consultation Note:  Inpatient - [facility]  05/10/2024     Reason for Consult:  \"extensive RLE DVT\"   Requesting Provider:  Adri Barger MD     HPI:  Bon Luque is a 69 year old male with a PMH of CAD s/p PCI, DM II, ICM, MARÍA ELENA on CPAP, RCC s/p RIGHT partial nephrectomy, and previous RUE DVT fall 2023 (completed his course of eliquis) who was admitted to [facility] on 05/09/2024 with RLE DVT precipitated by a recent trip. Patient took two of his left over eliquis at home prior to coming to the ED. US done, demonstrated \"right sided DVT, extending from the proximal femoral vein through the distal calf, including the popliteal, posterior tibial and peroneal veins,\" see imaging below. CT also done, demonstrated \"No evidence of extension of known right lower extremity deep vein thrombus into the pelvis\", see imaging below. IR consulted for possible intervention.    IMAGING:   CT Abdomen Pelvis w Contrast 05/09/2024   EXAM: CT ABDOMEN PELVIS W CONTRAST  LOCATION: [facility]  DATE: 5/9/2024     INDICATION: Extensive right lower extremity DVT, IR recommnding to see extent of thrombus  COMPARISON: Same date right lower extremity ultrasound, CT abdomen/pelvis 10/27/2023  TECHNIQUE: CT scan of the abdomen and pelvis was performed following injection of IV contrast. Multiplanar reformats were obtained. Dose reduction techniques were used.  CONTRAST: isovue 370 90ml     FINDINGS:   LOWER CHEST: Small pulmonary emboli visualized in segmental branches of the right lower lobe (series 3 image 1). No evidence of right heart strain. No focal airspace consolidation or pleural effusion.     HEPATOBILIARY: Prior cholecystectomy. Mild extrahepatic biliary ductal dilation, likely reservoir effect. No focal liver lesion visualized.     PANCREAS: No ductal dilation or surrounding fat stranding. Interval resolution of adjacent fluid collection. No focal mass lesion " visualized.     SPLEEN: Normal.     ADRENAL GLANDS: Normal.     KIDNEYS/BLADDER: Prior partial right nephrectomy without definite recurrent or enlarging mass. No hydronephrosis. Urinary bladder is unremarkable.     BOWEL: No obstruction or inflammatory change.     LYMPH NODES: Prominent but subcentimeter upper abdominal retroperitoneal lymph nodes, decreased in size in comparison to the prior CT. No definite new or enlarging lymphadenopathy.     VASCULATURE: Moderate calcified atherosclerosis. While there is incomplete opacification and mixing artifact in the common iliac, external iliac and common femoral veins bilaterally, no definite thrombus is visualized.     PELVIC ORGANS: Unremarkable.     MUSCULOSKELETAL: No acute bony abnormality.                                                                      IMPRESSION:   1.  No evidence of extension of known right lower extremity deep vein thrombus into the pelvis.  2.  Partially visualized right lower lobe segmental pulmonary emboli. No evidence of right heart strain.    US Lower Extremity Venous Duplex Right 05/09/2024  EXAM: US LOWER EXTREMITY VENOUS DUPLEX RIGHT  LOCATION: [facility]  DATE: 5/9/2024     INDICATION: Right leg and calf swelling.  COMPARISON: None.  TECHNIQUE: Venous Duplex ultrasound of the right lower extremity with and without compression, augmentation and duplex. Color flow and spectral Doppler with waveform analysis performed.     FINDINGS: Exam includes the common femoral, femoral, popliteal, and contralateral common femoral veins as well as segmentally visualized deep calf veins and greater saphenous vein.      RIGHT: Extensive right sided DVT, extending from the proximal femoral vein through the distal calf, including the popliteal, posterior tibial and peroneal veins. Some portions are partially occlusive, while other areas appear to be occlusive / nearly   occlusive. No superficial thrombophlebitis. No popliteal  cyst.                                                                      IMPRESSION:     1.  Extensive right leg DVT. Recommend Interventional Radiology consultation.     PAST MEDICAL HISTORY:  Past Medical History:   Diagnosis Date    Abdominal pain, epigastric 06/23/2022    Normal gastric emptying study.  Peptic ulcer disease or gastritis suspected    Acute cholecystitis 07/12/2022    Hospitalized with acute abdominal pain, abnormal LFTs and CT showing distended gallbladder and bile duct dilatation.  Acute cholecystitis.  Cholecystectomy.    Arthritis     psoriatic    ASCVD (arteriosclerotic cardiovascular disease) 04/28/2016    Coronary artery stent ×2 in RCA following myocardial infarction 2008 , echo June 2014 normal LV function with ejection fraction 50-55% with mild apical inferior hypokinesis    Chronic fatigue 06/23/2022    Chronic low back pain 04/28/2016    Chronic right shoulder pain 06/23/2022    Coronary artery disease     Depression 12/26/2017    Diabetic peripheral neuropathy associated with type 2 diabetes mellitus (H) 04/28/2016    Abnormal EMG and workup at [facility]    Dilated bile duct 07/03/2017    CT scan with incidental finding of dilated intra-and extrahepatic ducts    HTN (hypertension)     Hypercholesterolemia     Hypogonadism in male     Ischemic cardiomyopathy     Stress Echocardiogram 2017 with decreased LV function with EF 43% worse compared to previous echo.  No change in infarct.  No new ischemia    Left upper quadrant pain 06/30/2017    Mass of duodenum 10/16/2023    Obstruction of second portion of duodenum with abnormal CT scan, biopsies negative for endoscopic ultrasound    MI (myocardial infarction) (H)     Obstructive sleep apnea on CPAP     CPAP    Optic neuritis     Psoriasis     Recurrent vomiting 09/01/2023    Renal cell carcinoma, right (H) 06/23/2022    Right partial nephrectomy 2017    Right renal mass     Sebaceous cyst 2010    Thoracic radiculopathy 2004    right  T8 intercostal nerve block 2002    Type 2 diabetes mellitus with insulin therapy (H)        PAST SURGICAL HISTORY:  Past Surgical History:   Procedure Laterality Date    APPENDECTOMY      COLONOSCOPY      Colonoscopy September 2017 with hyperplastic polyps, repeat in 10 years    CORONARY ANGIOPLASTY      stent    ENDOSCOPIC RETROGRADE CHOLANGIOPANCREATOGRAM N/A 7/12/2022    Procedure: ENDOSCOPIC RETROGRADE CHOLANGIOPANCREATOGRAPHY, STONE EXTRACTION, BILIARY SPHINCTEROTOMY;  Surgeon: Bon Meyer MD;  Location: [facility]    ESOPHAGOSCOPY, GASTROSCOPY, DUODENOSCOPY (EGD), COMBINED N/A 10/6/2023    Procedure: ESOPHAGOGASTRODUODENOSCOPY, WITH ENDOSCOPIC ULTRASOUND WITH FINE NEEDLE ASPIRATION AND DUODENAL BIOPSY;  Surgeon: Bon Meyer MD;  Location: [facility] OR    KNEE SURGERY      LAPAROSCOPIC CHOLECYSTECTOMY N/A 7/12/2022    Procedure: CHOLECYSTECTOMY, LAPAROSCOPIC;  Surgeon: Adam March MD;  Location: [facility] OR    NOSE SURGERY      PICC DOUBLE LUMEN PLACEMENT  10/9/2023    GA LAP,PARTIAL NEPHRECTOMY Right 1/10/2018    Procedure: RIGHT ROBOTIC PARTIAL NEPHRECTOMY WITH INTRAOPERATIVE ULTRASOUND;  Surgeon: Chase Rodríguez MD;  Location: [facility];  Service: Urology       FAMILY HISTORY:  No family history on file.     SOCIAL HISTORY:  Social History     Socioeconomic History    Marital status:    Tobacco Use    Smoking status: Former     Passive exposure: Past    Smokeless tobacco: Never   Vaping Use    Vaping status: Never Used   Substance and Sexual Activity    Alcohol use: No     Comment: Alcoholic Drinks/day: alcohol abuse - in remission x16 years    Drug use: No     Social Determinants of Health     Financial Resource Strain: Low Risk  (1/30/2024)    Financial Resource Strain     Within the past 12 months, have you or your family members you live with been unable to get utilities (heat, electricity) when it was really needed?: No   Food Insecurity:  Low Risk  (1/30/2024)    Food Insecurity     Within the past 12 months, did you worry that your food would run out before you got money to buy more?: No     Within the past 12 months, did the food you bought just not last and you didn t have money to get more?: No   Transportation Needs: Low Risk  (1/30/2024)    Transportation Needs     Within the past 12 months, has lack of transportation kept you from medical appointments, getting your medicines, non-medical meetings or appointments, work, or from getting things that you need?: No   Social Connections: Socially Integrated (12/7/2023)    Received from [facility], [facility]    Social Connections     Frequency of Communication with Friends and Family: 0   Interpersonal Safety: Low Risk  (10/16/2023)    Interpersonal Safety     Do you feel physically and emotionally safe where you currently live?: Yes     Within the past 12 months, have you been hit, slapped, kicked or otherwise physically hurt by someone?: No     Within the past 12 months, have you been humiliated or emotionally abused in other ways by your partner or ex-partner?: No   Housing Stability: Low Risk  (1/30/2024)    Housing Stability     Do you have housing? : Yes     Are you worried about losing your housing?: No        ALLERGIES:  Allergies   Allergen Reactions    Atorvastatin Muscle Pain (Myalgia)    Valomag [Arthritis Pain Formula] Nausea and Vomiting     gastric ulcer from aspirin use    Ampicillin Rash     Had a reaction to this medication many years ago.    Codeine Rash     It has been about 30 years ago        MEDICATIONS:  Current Facility-Administered Medications   Medication Dose Route Frequency Provider Last Rate Last Admin    calcium carbonate (TUMS) chewable tablet 1,000 mg  1,000 mg Oral 4x Daily PRN Adri Barger MD        dextrose 5% in lactated ringers infusion   Intravenous Continuous Adri Barger MD 50 mL/hr at  05/10/24 0341 New Bag at 05/10/24 0341    glucose gel 15-30 g  15-30 g Oral Q15 Min PRN Adri Barger MD        Or    dextrose 50 % injection 25-50 mL  25-50 mL Intravenous Q15 Min PRN Adri Barger MD        Or    glucagon injection 1 mg  1 mg Subcutaneous Q15 Min PRN dAri Barger MD        glucose gel 15-30 g  15-30 g Oral Q15 Min PRN Adri Barger MD        Or    dextrose 50 % injection 25-50 mL  25-50 mL Intravenous Q15 Min PRN Adri Barger MD        Or    glucagon injection 1 mg  1 mg Subcutaneous Q15 Min PRN Adri Barger MD        docusate sodium (COLACE) capsule 100 mg  100 mg Oral BID Adri Barger MD   100 mg at 05/09/24 2355    heparin 25,000 units in 0.45% NaCl 250 mL ANTICOAGULANT infusion  0-5,000 Units/hr Intravenous Continuous PalmJacques MD 17.5 mL/hr at 05/10/24 0532 1,750 Units/hr at 05/10/24 0532    hydrALAZINE (APRESOLINE) injection 5 mg  5 mg Intravenous Q4H PRN Adri Barger MD        HYDROmorphone (DILAUDID) injection 0.2 mg  0.2 mg Intravenous Q2H PRN Adri Barger MD        HYDROmorphone (DILAUDID) injection 0.4 mg  0.4 mg Intravenous Q2H PRN Adri Barger MD        insulin aspart (NovoLOG) injection (RAPID ACTING)  1-4 Units Subcutaneous Q4H Adri Barger MD        lidocaine (LMX4) cream   Topical Q1H PRN Adri Barger MD        lidocaine 1 % 0.1-1 mL  0.1-1 mL Other Q1H PRN Adri Barger MD        ondansetron (ZOFRAN ODT) ODT tab 4 mg  4 mg Oral Q6H PRN Adri Barger MD        Or    ondansetron (ZOFRAN) injection 4 mg  4 mg Intravenous Q6H PRN Adri Barger MD        Patient is already receiving anticoagulation with heparin, enoxaparin (LOVENOX), warfarin (COUMADIN)  or other anticoagulant medication   Does not apply Continuous PRN Adri Barger MD        senna-docusate (SENOKOT-S/PERICOLACE) 8.6-50 MG per tablet 1 tablet  1 tablet Oral BID PRN Adri Barger MD        Or    senna-docusate (SENOKOT-S/PERICOLACE) 8.6-50 MG per tablet 2 tablet  2  tablet Oral BID PRN Adri Barger MD        sodium chloride (PF) 0.9% PF flush 3 mL  3 mL Intracatheter Q8H Adri Barger MD   3 mL at 05/09/24 2356    sodium chloride (PF) 0.9% PF flush 3 mL  3 mL Intracatheter q1 min prn Adri Barger MD         Current Outpatient Medications   Medication Sig Dispense Refill    Apixaban Starter Pack (ELIQUIS DVT/PE STARTER PACK) 5 MG TBPK Take 10 mg by mouth 2 times daily for 7 days, THEN 5 mg 2 times daily for 23 days. 1 each 0    DULoxetine (CYMBALTA) 60 MG capsule Take 1 capsule (60 mg) by mouth daily 90 capsule 3    gabapentin (NEURONTIN) 300 MG capsule [GABAPENTIN (NEURONTIN) 300 MG CAPSULE] Take 600 mg by mouth 3 (three) times a day.       insulin lispro (HUMALOG KWIKPEN) 100 UNIT/ML (1 unit dial) KWIKPEN Inject Subcutaneous 3 times daily (before meals) Sliding scale. Average use is 30 units with each meal      LANTUS SOLOSTAR 100 UNIT/ML soln Inject 50 Units Subcutaneous 2 times daily      melatonin 5 MG CAPS Take 5-10 mg by mouth nightly as needed (sleep)      morphine (MS CONTIN) 30 MG CR tablet Take 30 mg by mouth every 8 hours      multivitamin w/minerals (THERA-VIT-M) tablet Take 1 tablet by mouth every evening      Risankizumab-rzaa (SKYRIZI SC) Inject Subcutaneous every 4 months      testosterone (ANDROGEL) 20.25 MG/1.25GM (1.62%) topical gel Place 1 packet (20.25 mg) onto the skin daily 375 g 1    Continuous Blood Gluc Sensor (FREESTYLE SHIRA 14 DAY SENSOR) MISC       naloxone (NARCAN) 4 MG/0.1ML nasal spray Spray 1 spray into one nostril alternating nostrils once as needed for opioid reversal          LABS:  INR   Date Value Ref Range Status   05/09/2024 1.23 (H) 0.85 - 1.15 Final      Hemoglobin   Date Value Ref Range Status   05/09/2024 16.8 13.3 - 17.7 g/dL Final   05/09/2024 16.8 13.3 - 17.7 g/dL Final     Platelet Count   Date Value Ref Range Status   05/09/2024 143 (L) 150 - 450 10e3/uL Final   05/09/2024 143 (L) 150 - 450 10e3/uL Final     Creatinine    Date Value Ref Range Status   05/09/2024 0.88 0.67 - 1.17 mg/dL Final     Potassium   Date Value Ref Range Status   05/09/2024 4.3 3.4 - 5.3 mmol/L Final   07/13/2022 4.2 3.5 - 5.0 mmol/L Final       EXAM:  BP (!) 153/74 (BP Location: Left arm)   Pulse 72   Temp 98.2  F (36.8  C) (Oral)   Resp 20   Wt 98 kg (216 lb)   SpO2 93%   BMI 29.29 kg/m    General: Stable. In no acute distress.    Neurologic: Alert and oriented x 3. No focal deficits.  Psychiatric: Appropriate mood and affect. Cooperative. Answering questions appropriately. Linear/coherent thought process.   Respiratory: Room air. Breathing comfortably.  Extremities: RLE 1+ edema calf and below, thigh with nonpitting edema. No edema in LLE.  Skin: Warm and dry. Without excoriations, ecchymosis, erythema, lesions or open sores.    ASSESSMENT/PLAN:    D/w Dr. Marcus, who reviewed imaging. Recommend medical management and continuing anticoagulation at this time, as patient's clot is too distal to warrant IR intervention. D/w Dr. Perdue with Medicine team, who is in agreement with plan.    Thank you for kindly for this consultation. Please re-consult IR if patient worsens or should the need arise.    Total time spent on the date of the encounter: 40 minutes.      CRISTINA CAMPOS CNP  Interventional Radiology  [phone]

## 2024-05-10 NOTE — ED NOTES
[facility] ED Handoff Report    ED Chief Complaint: Leg Swelling, Blood clot R lower leg     ED Diagnosis:  (I82.411) Acute deep vein thrombosis (DVT) of femoral vein of right lower extremity (H)  Comment:   Plan:     (I26.99) Other acute pulmonary embolism without acute cor pulmonale (H)  Comment:   Plan:        PMH:    Past Medical History:   Diagnosis Date    Abdominal pain, epigastric 06/23/2022    Normal gastric emptying study.  Peptic ulcer disease or gastritis suspected    Acute cholecystitis 07/12/2022    Hospitalized with acute abdominal pain, abnormal LFTs and CT showing distended gallbladder and bile duct dilatation.  Acute cholecystitis.  Cholecystectomy.    Arthritis     psoriatic    ASCVD (arteriosclerotic cardiovascular disease) 04/28/2016    Coronary artery stent ×2 in RCA following myocardial infarction 2008 , echo June 2014 normal LV function with ejection fraction 50-55% with mild apical inferior hypokinesis    Chronic fatigue 06/23/2022    Chronic low back pain 04/28/2016    Chronic right shoulder pain 06/23/2022    Coronary artery disease     Depression 12/26/2017    Diabetic peripheral neuropathy associated with type 2 diabetes mellitus (H) 04/28/2016    Abnormal EMG and workup at [facility]    Dilated bile duct 07/03/2017    CT scan with incidental finding of dilated intra-and extrahepatic ducts    HTN (hypertension)     Hypercholesterolemia     Hypogonadism in male     Ischemic cardiomyopathy     Stress Echocardiogram 2017 with decreased LV function with EF 43% worse compared to previous echo.  No change in infarct.  No new ischemia    Left upper quadrant pain 06/30/2017    Mass of duodenum 10/16/2023    Obstruction of second portion of duodenum with abnormal CT scan, biopsies negative for endoscopic ultrasound    MI (myocardial infarction) (H)     Obstructive sleep apnea on CPAP     CPAP    Optic neuritis     Psoriasis     Recurrent vomiting 09/01/2023    Renal cell carcinoma,  right (H) 06/23/2022    Right partial nephrectomy 2017    Right renal mass     Sebaceous cyst 2010    Thoracic radiculopathy 2004    right T8 intercostal nerve block 2002    Type 2 diabetes mellitus with insulin therapy (H)         Code Status:  Full Code     Falls Risk: No Band: Not applicable    Current Living Situation/Residence: lives with a significant other     Elimination Status: Continent: Yes     Activity Level: SBA    Patients Preferred Language:  English     Needed: No    Vital Signs:  BP (!) 149/84   Pulse 87   Temp 97  F (36.1  C) (Temporal)   Resp 18   Wt 98 kg (216 lb)   SpO2 95%   BMI 29.29 kg/m       Cardiac Rhythm: NSR    Pain Score: 5/10    Is the Patient Confused:  No    Last Food or Drink: 5/9/24 at 1700    Focused Assessment:  Pt just was on cruise vacation where he took multiple flights. Came in with leg pain, and swelling. CT showed large blood clot in right lower leg. Currently pt is on heparin, continuous running into PIC 18 RAC. Pt is diabetic, hx of neuropathy as well. Last sugar was 88 held insulin at 0200. Currently running d5 dextrose 5% in LR at 50mL/hr in PIC 20 L hand. PTT draw at 0745, neuro q 4, last neuro was at 0130. Pt is NPO. Plan is for IR to see pt in the morning, time has not been confirmed.     Tests Performed: Done: Labs and Imaging    Treatments Provided:  labs, meds, fluids, anticoags    Family Dynamics/Concerns: No    Family Updated On Visitor Policy: Yes    Plan of Care Communicated to Family: Yes    Who Was Updated about Plan of Care: wife    Belongings Checklist Done and Signed by Patient: Yes    Belongings Sent with Patient: shoes, belt, jeans, shirt    Medications sent with patient: insulin     Covid: asymptomatic ,     Additional Information: NPO! IR in morning     RN: Evelyn Felix RN   5/10/2024 4:27 AM

## 2024-05-10 NOTE — MEDICATION SCRIBE - ADMISSION MEDICATION HISTORY
Medication Scribe Admission Medication History    Admission medication history is complete. The information provided in this note is only as accurate as the sources available at the time of the update.    Information Source(s): Patient via in-person      Pertinent Information: patient reports self management of medications.     Patient reports no longer taking: Valisone, Nizoral, Valtrex    (Note: Not taking Eliquis)    Changes made to PTA medication list:  Added: None  Deleted: Valisone, Nizoral, Valtrex  Changed: None    Allergies reviewed with patient and updates made in EHR: yes    Medication History Completed By: Javi Roque 5/9/2024 8:36 PM    PTA Med List   Medication Sig Last Dose    Apixaban Starter Pack (ELIQUIS DVT/PE STARTER PACK) 5 MG TBPK Take 10 mg by mouth 2 times daily for 7 days, THEN 5 mg 2 times daily for 23 days.     DULoxetine (CYMBALTA) 60 MG capsule Take 1 capsule (60 mg) by mouth daily 5/9/2024 at am    gabapentin (NEURONTIN) 300 MG capsule [GABAPENTIN (NEURONTIN) 300 MG CAPSULE] Take 600 mg by mouth 3 (three) times a day.  5/9/2024 at 1800 (2nd dose)    insulin lispro (HUMALOG KWIKPEN) 100 UNIT/ML (1 unit dial) KWIKPEN Inject Subcutaneous 3 times daily (before meals) Sliding scale. Average use is 30 units with each meal 5/9/2024 at 1400 (36 units)    LANTUS SOLOSTAR 100 UNIT/ML soln Inject 50 Units Subcutaneous 2 times daily 5/9/2024 at am    melatonin 5 MG CAPS Take 5-10 mg by mouth nightly as needed (sleep) Past Month at prn    morphine (MS CONTIN) 30 MG CR tablet Take 30 mg by mouth every 8 hours 5/9/2024 at 1800 (2nd dose)    multivitamin w/minerals (THERA-VIT-M) tablet Take 1 tablet by mouth every evening 5/8/2024 at pm    Risankizumab-rzaa (SKYRIZI SC) Inject Subcutaneous every 4 months More than a month at about 3 months ago    testosterone (ANDROGEL) 20.25 MG/1.25GM (1.62%) topical gel Place 1 packet (20.25 mg) onto the skin daily 5/9/2024 at am

## 2024-05-10 NOTE — PLAN OF CARE
Goal Outcome Evaluation:      Plan of Care Reviewed With: patient          Problem: VTE (Venous Thromboembolism)  Goal: Tissue Perfusion  Outcome: Progressing   Pt. On continuous heparin drip. Protocol done.   Pt. Declines as needed pain medication for right calf pain.  Pt. Able to get up with stand by assistance when going to the bathroom.       Problem: Comorbidity Management  Goal: Blood Glucose Levels Within Targeted Range  Outcome: Progressing  Intervention: Monitor and Manage Glycemia  Recent Flowsheet Documentation  Taken 5/10/2024 1200 by Chio Patton, RN  Medication Review/Management: medications reviewed  D5 LR discontinued later morning. Blood sugar changed to prior to meals and bedtime.     Report given to Giovanny on P1. Pt. Will leave floor by wheelchair with belongings and medications.   Chio Patton, RN

## 2024-05-10 NOTE — ED NOTES
Pt resting in bed, hospitalist at bedside. , current pain in R leg is 3/10. VS rechecked, will continue to monitor.

## 2024-05-10 NOTE — PLAN OF CARE
Problem: Adult Inpatient Plan of Care  Goal: Absence of Hospital-Acquired Illness or Injury  Outcome: Progressing     Problem: Comorbidity Management  Goal: Blood Glucose Levels Within Targeted Range  Outcome: Progressing   Goal Outcome Evaluation:   Patient is A/O x4. BP elevated in 160's systolic upon arrival to unit. Patient laying calmly in bed. Heparin drip continued at 1750 unit(s)/hr.

## 2024-05-10 NOTE — PROGRESS NOTES
[facility]    Medicine Progress Note - Hospitalist Service    Date of Admission:  5/9/2024    Assessment & Plan   Bon Luque is a 69 year old male with prior history of upper extremity DVT who is being admitted with an extensive right lower extremity DVT precipitated by recent trip.  It is noted that patient was already on Eliquis.     Acute RLE DVT, extensive  Acute PE  -- recent trip to [place]  -- US-duplex: extensive right leg DVT  -- CTA-abd/pelvis: Partially visualized right lower lobe segmental PE. No e/o right heart strain.  -- heparin gtt  -- IR consulted: pt's clot is too distal to warrant IR intervention. Discussed with IR. Medical management and observe. Re-consult IR if pt' condition changes.    DM-II   -- home regimen: lantus 50 units bid, lisprol 30 unit tid-ac  -- accu-checsk, high sliding scale, I:C 1:15,     Psoriatic arthritis  -- on Skyrizi  -- follows with OP Rheumatologist    Sacroilial joint ankylosis  -- following with OP Rheumatologist    Chronic pain syndrome   Chronic opiate use  -- c/w PTA MS contin    RLQ pain with intermittent diarrhea, ongoing- once a week  -- CT-abd/pelvis: no acute issues  -- Colonosocopy as OP    Essential hypertension  -- not on meds at home  -- Hydralazin iv prn    H/o PUD  -- PPI. Non NSAIDs    H/o renal cell ca, right  -- s/p surgery 5 years ago    Pseudocyst pancreas  -- resolved on CT-abd    H/o DVT of axillary vein of right UE  -- completed 3 months of eliquis            Diet: NPO per Anesthesia Guidelines for Procedure/Surgery Except for: Meds    DVT Prophylaxis: heparin gtt  Seals Catheter: Not present  Lines: None     Cardiac Monitoring: ACTIVE order. Indication: Tachyarrhythmias, acute (48 hours)  Code Status: Full Code      Clinically Significant Risk Factors Present on Admission               # Coagulation Defect: INR = 1.23 (Ref range: 0.85 - 1.15) and/or PTT = 27 Seconds (Ref range: 22 - 38 Seconds), will monitor for  bleeding    # Hypertension: Noted on problem list                 Disposition Plan     Medically Ready for Discharge:              Kati Perdue MD  Hospitalist Service  [facility]  Securely message with [employer] (more info)  Text page via [employer] Paging/Directory   ______________________________________________________________________    Interval History   Patient is new to me today. Chart reviewed.  Patient is seen and examined at bedside.  No shortness of breath    Physical Exam   Vital Signs: Temp: 98.2  F (36.8  C) Temp src: Oral BP: (!) 153/74 Pulse: 72   Resp: 20 SpO2: 93 % O2 Device: None (Room air)    Weight: 216 lbs 0 oz    GEN: Alert and oriented. Not in acute distress.  HEENT: Atraumatic, mucous membrane- moist and pink.  Chest: Bilateral air entry.  CVS: S1S2 regular.   Abdomen: Soft. Non-tender, non-distended. No organomegaly. No guarding or rigidity. Bowel sounds active.   Extremities: RLE warm, swolle  CNS: No involuntary movements.  Skin: no cyanosis or clubbing.     Medical Decision Making       52 MINUTES SPENT BY ME on the date of service doing chart review, history, exam, documentation & further activities per the note.      Data

## 2024-05-11 ENCOUNTER — APPOINTMENT (OUTPATIENT)
Dept: CARDIOLOGY | Facility: HOSPITAL | Age: 69
DRG: 176 | End: 2024-05-11
Attending: STUDENT IN AN ORGANIZED HEALTH CARE EDUCATION/TRAINING PROGRAM
Payer: COMMERCIAL

## 2024-05-11 LAB
ANION GAP SERPL CALCULATED.3IONS-SCNC: 13 MMOL/L (ref 7–15)
APTT PPP: 56 SECONDS (ref 22–38)
APTT PPP: 64 SECONDS (ref 22–38)
APTT PPP: 78 SECONDS (ref 22–38)
BASOPHILS # BLD AUTO: 0 10E3/UL (ref 0–0.2)
BASOPHILS NFR BLD AUTO: 0 %
BUN SERPL-MCNC: 10.4 MG/DL (ref 8–23)
CALCIUM SERPL-MCNC: 9.1 MG/DL (ref 8.8–10.2)
CHLORIDE SERPL-SCNC: 100 MMOL/L (ref 98–107)
CREAT SERPL-MCNC: 0.86 MG/DL (ref 0.67–1.17)
DEPRECATED HCO3 PLAS-SCNC: 23 MMOL/L (ref 22–29)
EGFRCR SERPLBLD CKD-EPI 2021: >90 ML/MIN/1.73M2
EOSINOPHIL # BLD AUTO: 0.1 10E3/UL (ref 0–0.7)
EOSINOPHIL NFR BLD AUTO: 1 %
ERYTHROCYTE [DISTWIDTH] IN BLOOD BY AUTOMATED COUNT: 13.3 % (ref 10–15)
GLUCOSE BLDC GLUCOMTR-MCNC: 222 MG/DL (ref 70–99)
GLUCOSE BLDC GLUCOMTR-MCNC: 245 MG/DL (ref 70–99)
GLUCOSE BLDC GLUCOMTR-MCNC: 247 MG/DL (ref 70–99)
GLUCOSE BLDC GLUCOMTR-MCNC: 283 MG/DL (ref 70–99)
GLUCOSE SERPL-MCNC: 238 MG/DL (ref 70–99)
HCT VFR BLD AUTO: 47 % (ref 40–53)
HGB BLD-MCNC: 15.6 G/DL (ref 13.3–17.7)
IMM GRANULOCYTES # BLD: 0.1 10E3/UL
IMM GRANULOCYTES NFR BLD: 1 %
LVEF ECHO: NORMAL
LYMPHOCYTES # BLD AUTO: 3 10E3/UL (ref 0.8–5.3)
LYMPHOCYTES NFR BLD AUTO: 25 %
MCH RBC QN AUTO: 27.2 PG (ref 26.5–33)
MCHC RBC AUTO-ENTMCNC: 33.2 G/DL (ref 31.5–36.5)
MCV RBC AUTO: 82 FL (ref 78–100)
MONOCYTES # BLD AUTO: 0.9 10E3/UL (ref 0–1.3)
MONOCYTES NFR BLD AUTO: 7 %
NEUTROPHILS # BLD AUTO: 8.2 10E3/UL (ref 1.6–8.3)
NEUTROPHILS NFR BLD AUTO: 66 %
NRBC # BLD AUTO: 0 10E3/UL
NRBC BLD AUTO-RTO: 0 /100
PLATELET # BLD AUTO: 170 10E3/UL (ref 150–450)
POTASSIUM SERPL-SCNC: 4.3 MMOL/L (ref 3.4–5.3)
RBC # BLD AUTO: 5.74 10E6/UL (ref 4.4–5.9)
SARS-COV-2 RNA RESP QL NAA+PROBE: NEGATIVE
SODIUM SERPL-SCNC: 136 MMOL/L (ref 135–145)
WBC # BLD AUTO: 12.4 10E3/UL (ref 4–11)

## 2024-05-11 PROCEDURE — 99233 SBSQ HOSP IP/OBS HIGH 50: CPT | Performed by: STUDENT IN AN ORGANIZED HEALTH CARE EDUCATION/TRAINING PROGRAM

## 2024-05-11 PROCEDURE — 85004 AUTOMATED DIFF WBC COUNT: CPT | Performed by: STUDENT IN AN ORGANIZED HEALTH CARE EDUCATION/TRAINING PROGRAM

## 2024-05-11 PROCEDURE — 36415 COLL VENOUS BLD VENIPUNCTURE: CPT | Performed by: STUDENT IN AN ORGANIZED HEALTH CARE EDUCATION/TRAINING PROGRAM

## 2024-05-11 PROCEDURE — C8929 TTE W OR WO FOL WCON,DOPPLER: HCPCS

## 2024-05-11 PROCEDURE — 80048 BASIC METABOLIC PNL TOTAL CA: CPT | Performed by: STUDENT IN AN ORGANIZED HEALTH CARE EDUCATION/TRAINING PROGRAM

## 2024-05-11 PROCEDURE — 250N000011 HC RX IP 250 OP 636: Performed by: STUDENT IN AN ORGANIZED HEALTH CARE EDUCATION/TRAINING PROGRAM

## 2024-05-11 PROCEDURE — 85730 THROMBOPLASTIN TIME PARTIAL: CPT | Performed by: STUDENT IN AN ORGANIZED HEALTH CARE EDUCATION/TRAINING PROGRAM

## 2024-05-11 PROCEDURE — 250N000011 HC RX IP 250 OP 636: Performed by: INTERNAL MEDICINE

## 2024-05-11 PROCEDURE — 120N000001 HC R&B MED SURG/OB

## 2024-05-11 PROCEDURE — 250N000013 HC RX MED GY IP 250 OP 250 PS 637: Performed by: STUDENT IN AN ORGANIZED HEALTH CARE EDUCATION/TRAINING PROGRAM

## 2024-05-11 PROCEDURE — 250N000013 HC RX MED GY IP 250 OP 250 PS 637: Performed by: INTERNAL MEDICINE

## 2024-05-11 PROCEDURE — 255N000002 HC RX 255 OP 636: Performed by: STUDENT IN AN ORGANIZED HEALTH CARE EDUCATION/TRAINING PROGRAM

## 2024-05-11 PROCEDURE — 93306 TTE W/DOPPLER COMPLETE: CPT | Mod: 26 | Performed by: INTERNAL MEDICINE

## 2024-05-11 PROCEDURE — 87635 SARS-COV-2 COVID-19 AMP PRB: CPT | Performed by: STUDENT IN AN ORGANIZED HEALTH CARE EDUCATION/TRAINING PROGRAM

## 2024-05-11 PROCEDURE — 87581 M.PNEUMON DNA AMP PROBE: CPT | Performed by: STUDENT IN AN ORGANIZED HEALTH CARE EDUCATION/TRAINING PROGRAM

## 2024-05-11 RX ADMIN — MORPHINE SULFATE 30 MG: 30 TABLET, FILM COATED, EXTENDED RELEASE ORAL at 08:03

## 2024-05-11 RX ADMIN — GABAPENTIN 600 MG: 300 CAPSULE ORAL at 21:31

## 2024-05-11 RX ADMIN — MORPHINE SULFATE 30 MG: 30 TABLET, FILM COATED, EXTENDED RELEASE ORAL at 00:03

## 2024-05-11 RX ADMIN — DOCUSATE SODIUM 100 MG: 100 CAPSULE, LIQUID FILLED ORAL at 21:31

## 2024-05-11 RX ADMIN — GABAPENTIN 600 MG: 300 CAPSULE ORAL at 14:14

## 2024-05-11 RX ADMIN — PERFLUTREN 4 ML: 6.52 INJECTION, SUSPENSION INTRAVENOUS at 13:14

## 2024-05-11 RX ADMIN — HYDRALAZINE HYDROCHLORIDE 5 MG: 20 INJECTION INTRAMUSCULAR; INTRAVENOUS at 00:03

## 2024-05-11 RX ADMIN — HYDROMORPHONE HYDROCHLORIDE 0.4 MG: 0.2 INJECTION, SOLUTION INTRAMUSCULAR; INTRAVENOUS; SUBCUTANEOUS at 01:06

## 2024-05-11 RX ADMIN — DULOXETINE HYDROCHLORIDE 60 MG: 60 CAPSULE, DELAYED RELEASE PELLETS ORAL at 08:03

## 2024-05-11 RX ADMIN — HYDROMORPHONE HYDROCHLORIDE 0.2 MG: 0.2 INJECTION, SOLUTION INTRAMUSCULAR; INTRAVENOUS; SUBCUTANEOUS at 08:08

## 2024-05-11 RX ADMIN — GABAPENTIN 600 MG: 300 CAPSULE ORAL at 08:03

## 2024-05-11 RX ADMIN — Medication 1 TABLET: at 21:31

## 2024-05-11 RX ADMIN — MORPHINE SULFATE 30 MG: 30 TABLET, FILM COATED, EXTENDED RELEASE ORAL at 17:20

## 2024-05-11 RX ADMIN — DOCUSATE SODIUM 100 MG: 100 CAPSULE, LIQUID FILLED ORAL at 08:03

## 2024-05-11 ASSESSMENT — ACTIVITIES OF DAILY LIVING (ADL)
ADLS_ACUITY_SCORE: 37
ADLS_ACUITY_SCORE: 38
ADLS_ACUITY_SCORE: 37
ADLS_ACUITY_SCORE: 38
ADLS_ACUITY_SCORE: 37
ADLS_ACUITY_SCORE: 38
ADLS_ACUITY_SCORE: 37
ADLS_ACUITY_SCORE: 38
ADLS_ACUITY_SCORE: 37
ADLS_ACUITY_SCORE: 38
ADLS_ACUITY_SCORE: 37
ADLS_ACUITY_SCORE: 38

## 2024-05-11 NOTE — PLAN OF CARE
Problem: VTE (Venous Thromboembolism)  Goal: Tissue Perfusion  Intervention: Optimize Tissue Perfusion  Recent Flowsheet Documentation  Taken 5/11/2024 0800 by Ginny Alarcon RN  Bleeding Precautions:   coagulation study results reviewed   monitored for signs of bleeding   Goal Outcome Evaluation:    On heparin drip for DVT and PE.  No signs of bleeding.  PTT sub-therapeutic.  Heparin protocol followed.  Education regarding side effects of heparin.      Blood sugars 222 and 283 before meals.  Sliding scale insulin plus meal time coverage for carb counting.  Didn't want lunch.  Resting.

## 2024-05-11 NOTE — PLAN OF CARE
Problem: VTE (Venous Thromboembolism)  Goal: Tissue Perfusion  Outcome: Progressing   Goal Outcome Evaluation:        Pt alert/oriented. RLE pain controlled with prn dilaudid and scheduled morphine with reported effectiveness.  Heparin gtt infusing at 1900 unit(s)/hr. Call light within reach.

## 2024-05-11 NOTE — PLAN OF CARE
Problem: VTE (Venous Thromboembolism)  Goal: Tissue Perfusion  Outcome: Progressing     Problem: Comorbidity Management  Goal: Blood Glucose Levels Within Targeted Range  Outcome: Progressing   Goal Outcome Evaluation:       Heparin gtt increased to 1900 unit(s)/hr and is now within goal range. Gave bolus of 2950 units per order. Pt is ambulatory with IV assistance. Reports R leg pain d/t known DVT, not new. VSS. LS clear.

## 2024-05-11 NOTE — PROGRESS NOTES
[facility]    Medicine Progress Note - Hospitalist Service    Date of Admission:  5/9/2024    Assessment & Plan   Bon Luque is a 69 year old male with prior history of upper extremity DVT who is being admitted with an extensive right lower extremity DVT precipitated by recent trip.  It is noted that patient was already on Eliquis.     Acute RLE DVT, extensive  Acute PE  -- recent trip to [place]  -- US-duplex: extensive right leg DVT  -- CTA-abd/pelvis: Partially visualized right lower lobe segmental PE. No e/o right heart strain.  -- heparin gtt  -- IR consulted: pt's clot is too distal to warrant IR intervention. Discussed with IR. Medical management and observe. Re-consult IR if pt' condition changes.  -- 05/11: pt felt leg tightness is improving but has some shortness of breath today. Discussed with him regarding PE management. CTA-chest will tell us the burden of PE but management will remain the same (anticoagulation) unless he is hemodynamically unstable in which case either thrombolytics or embolectomy would be indicated. Pt declined repeat contrast exposure with CTA-chest. Will do Echo.    Leukocytosis, mild  -- likely 2/2 above. No fever or e/o infection  -- trend cbc and temp curve    DM-II   -- home regimen: lantus 50 units bid, lisprol 30 unit tid-ac  -- accu-checks, high sliding scale, I:C 1:15, lantus 20am/30qhs, titrate as indicated    Psoriatic arthritis  -- on Skyrizi  -- follows with OP Rheumatologist    Sacroilial joint ankylosis  -- following with OP Rheumatologist    Chronic pain syndrome   Chronic opiate use  -- c/w PTA MS contin    RLQ pain with intermittent diarrhea, ongoing- once a week  -- CT-abd/pelvis: no acute issues  -- Colonosocopy as OP    Essential hypertension  -- not on meds at home  -- Hydralazin iv prn    H/o PUD  -- PPI. Non NSAIDs    H/o renal cell ca, right  -- s/p surgery 5 years ago    Pseudocyst pancreas  -- resolved on CT-abd    H/o DVT of  axillary vein of right UE  -- completed 3 months of eliquis            Diet: Combination Diet Low Consistent Carb (45 g CHO per Meal) Diet    DVT Prophylaxis: heparin gtt  Seals Catheter: Not present  Lines: None     Cardiac Monitoring: ACTIVE order. Indication: Tachyarrhythmias, acute (48 hours)  Code Status: Full Code      Clinically Significant Risk Factors               # Coagulation Defect: INR = 1.23 (Ref range: 0.85 - 1.15) and/or PTT = 56 Seconds (Ref range: 22 - 38 Seconds), will monitor for bleeding    # Hypertension: Noted on problem list                   Disposition Plan     Medically Ready for Discharge:              Kati Perdue MD  Hospitalist Service  [facility]  Securely message with [employer] (more info)  Text page via [employer] Paging/Directory   ______________________________________________________________________    Interval History   Patient is seen and examined at bedside.  Has shortness of breath today.    Physical Exam   Vital Signs: Temp: 98.1  F (36.7  C) Temp src: Oral BP: 121/58 Pulse: 91   Resp: 18 SpO2: 95 % O2 Device: None (Room air)    Weight: 216 lbs 0 oz    GEN: Alert and oriented. Not in acute distress.  HEENT: Atraumatic, mucous membrane- moist and pink.  Chest: Bilateral air entry.  CVS: S1S2 regular.   Abdomen: Soft. Non-tender, non-distended. No organomegaly. No guarding or rigidity. Bowel sounds active.   Extremities: RLE warm, swolle  CNS: No involuntary movements.  Skin: no cyanosis or clubbing.     Medical Decision Making       50 MINUTES SPENT BY ME on the date of service doing chart review, history, exam, documentation & further activities per the note.      Data

## 2024-05-11 NOTE — PLAN OF CARE
Problem: Adult Inpatient Plan of Care  Goal: Optimal Comfort and Wellbeing  Intervention: Monitor Pain and Promote Comfort  Recent Flowsheet Documentation  Taken 5/11/2024 1540 by Nasreen Enriquez RN  Pain Management Interventions: medication (see MAR)     Problem: VTE (Venous Thromboembolism)  Goal: Tissue Perfusion  Outcome: Progressing     Problem: Comorbidity Management  Goal: Blood Glucose Levels Within Targeted Range  Outcome: Progressing   Goal Outcome Evaluation:       Heparin gtt running at 2050 unit(s)/hr. Pt reports he has a cold and recent cruise ship he was on had a covid outbreak. Covid swab negitive. Pt reports ongoing leg pain. VSS. Pt's nose has purple hue, provider aware, pt states it is baseline rosacea. Blood sugars 245 and 247, covered with sliding scale insulin. PTT at 2100 was within goal range, no rate change at this time. Recheck PTT in the AM.

## 2024-05-12 VITALS
WEIGHT: 216 LBS | HEART RATE: 86 BPM | BODY MASS INDEX: 29.29 KG/M2 | SYSTOLIC BLOOD PRESSURE: 146 MMHG | OXYGEN SATURATION: 96 % | DIASTOLIC BLOOD PRESSURE: 78 MMHG | RESPIRATION RATE: 20 BRPM | TEMPERATURE: 98.3 F

## 2024-05-12 LAB
ANION GAP SERPL CALCULATED.3IONS-SCNC: 9 MMOL/L (ref 7–15)
APTT PPP: 68 SECONDS (ref 22–38)
BASOPHILS # BLD AUTO: 0.1 10E3/UL (ref 0–0.2)
BASOPHILS NFR BLD AUTO: 1 %
BUN SERPL-MCNC: 11.7 MG/DL (ref 8–23)
C PNEUM DNA SPEC QL NAA+PROBE: NOT DETECTED
CALCIUM SERPL-MCNC: 9.4 MG/DL (ref 8.8–10.2)
CHLORIDE SERPL-SCNC: 104 MMOL/L (ref 98–107)
CREAT SERPL-MCNC: 0.92 MG/DL (ref 0.67–1.17)
DEPRECATED HCO3 PLAS-SCNC: 25 MMOL/L (ref 22–29)
EGFRCR SERPLBLD CKD-EPI 2021: >90 ML/MIN/1.73M2
EOSINOPHIL # BLD AUTO: 0.2 10E3/UL (ref 0–0.7)
EOSINOPHIL NFR BLD AUTO: 2 %
ERYTHROCYTE [DISTWIDTH] IN BLOOD BY AUTOMATED COUNT: 13.2 % (ref 10–15)
FLUAV H1 2009 PAND RNA SPEC QL NAA+PROBE: NOT DETECTED
FLUAV H1 RNA SPEC QL NAA+PROBE: NOT DETECTED
FLUAV H3 RNA SPEC QL NAA+PROBE: NOT DETECTED
FLUAV RNA SPEC QL NAA+PROBE: NOT DETECTED
FLUBV RNA SPEC QL NAA+PROBE: NOT DETECTED
GLUCOSE BLDC GLUCOMTR-MCNC: 199 MG/DL (ref 70–99)
GLUCOSE SERPL-MCNC: 200 MG/DL (ref 70–99)
HADV DNA SPEC QL NAA+PROBE: NOT DETECTED
HCOV PNL SPEC NAA+PROBE: NOT DETECTED
HCT VFR BLD AUTO: 46.6 % (ref 40–53)
HGB BLD-MCNC: 15.3 G/DL (ref 13.3–17.7)
HMPV RNA SPEC QL NAA+PROBE: NOT DETECTED
HPIV1 RNA SPEC QL NAA+PROBE: NOT DETECTED
HPIV2 RNA SPEC QL NAA+PROBE: NOT DETECTED
HPIV3 RNA SPEC QL NAA+PROBE: NOT DETECTED
HPIV4 RNA SPEC QL NAA+PROBE: NOT DETECTED
IMM GRANULOCYTES # BLD: 0.1 10E3/UL
IMM GRANULOCYTES NFR BLD: 0 %
LYMPHOCYTES # BLD AUTO: 3.1 10E3/UL (ref 0.8–5.3)
LYMPHOCYTES NFR BLD AUTO: 28 %
M PNEUMO DNA SPEC QL NAA+PROBE: NOT DETECTED
MCH RBC QN AUTO: 26.9 PG (ref 26.5–33)
MCHC RBC AUTO-ENTMCNC: 32.8 G/DL (ref 31.5–36.5)
MCV RBC AUTO: 82 FL (ref 78–100)
MONOCYTES # BLD AUTO: 0.9 10E3/UL (ref 0–1.3)
MONOCYTES NFR BLD AUTO: 8 %
NEUTROPHILS # BLD AUTO: 6.9 10E3/UL (ref 1.6–8.3)
NEUTROPHILS NFR BLD AUTO: 61 %
NRBC # BLD AUTO: 0 10E3/UL
NRBC BLD AUTO-RTO: 0 /100
PLATELET # BLD AUTO: 180 10E3/UL (ref 150–450)
POTASSIUM SERPL-SCNC: 4.3 MMOL/L (ref 3.4–5.3)
RBC # BLD AUTO: 5.68 10E6/UL (ref 4.4–5.9)
RSV RNA SPEC QL NAA+PROBE: NOT DETECTED
RSV RNA SPEC QL NAA+PROBE: NOT DETECTED
RV+EV RNA SPEC QL NAA+PROBE: DETECTED
SODIUM SERPL-SCNC: 138 MMOL/L (ref 135–145)
WBC # BLD AUTO: 11.1 10E3/UL (ref 4–11)

## 2024-05-12 PROCEDURE — 99239 HOSP IP/OBS DSCHRG MGMT >30: CPT | Performed by: STUDENT IN AN ORGANIZED HEALTH CARE EDUCATION/TRAINING PROGRAM

## 2024-05-12 PROCEDURE — 36415 COLL VENOUS BLD VENIPUNCTURE: CPT | Performed by: STUDENT IN AN ORGANIZED HEALTH CARE EDUCATION/TRAINING PROGRAM

## 2024-05-12 PROCEDURE — 250N000013 HC RX MED GY IP 250 OP 250 PS 637: Performed by: STUDENT IN AN ORGANIZED HEALTH CARE EDUCATION/TRAINING PROGRAM

## 2024-05-12 PROCEDURE — 85025 COMPLETE CBC W/AUTO DIFF WBC: CPT | Performed by: STUDENT IN AN ORGANIZED HEALTH CARE EDUCATION/TRAINING PROGRAM

## 2024-05-12 PROCEDURE — 250N000013 HC RX MED GY IP 250 OP 250 PS 637: Performed by: INTERNAL MEDICINE

## 2024-05-12 PROCEDURE — 250N000011 HC RX IP 250 OP 636: Performed by: EMERGENCY MEDICINE

## 2024-05-12 PROCEDURE — 85730 THROMBOPLASTIN TIME PARTIAL: CPT | Performed by: STUDENT IN AN ORGANIZED HEALTH CARE EDUCATION/TRAINING PROGRAM

## 2024-05-12 PROCEDURE — 80048 BASIC METABOLIC PNL TOTAL CA: CPT | Performed by: STUDENT IN AN ORGANIZED HEALTH CARE EDUCATION/TRAINING PROGRAM

## 2024-05-12 RX ADMIN — HEPARIN SODIUM 2050 UNITS/HR: 10000 INJECTION, SOLUTION INTRAVENOUS at 00:48

## 2024-05-12 RX ADMIN — MORPHINE SULFATE 30 MG: 30 TABLET, FILM COATED, EXTENDED RELEASE ORAL at 09:05

## 2024-05-12 RX ADMIN — DOCUSATE SODIUM 100 MG: 100 CAPSULE, LIQUID FILLED ORAL at 09:06

## 2024-05-12 RX ADMIN — MORPHINE SULFATE 30 MG: 30 TABLET, FILM COATED, EXTENDED RELEASE ORAL at 00:18

## 2024-05-12 RX ADMIN — DULOXETINE HYDROCHLORIDE 60 MG: 60 CAPSULE, DELAYED RELEASE PELLETS ORAL at 09:06

## 2024-05-12 RX ADMIN — GABAPENTIN 600 MG: 300 CAPSULE ORAL at 09:05

## 2024-05-12 RX ADMIN — APIXABAN 10 MG: 5 TABLET, FILM COATED ORAL at 10:01

## 2024-05-12 ASSESSMENT — ACTIVITIES OF DAILY LIVING (ADL)
ADLS_ACUITY_SCORE: 39
ADLS_ACUITY_SCORE: 22
ADLS_ACUITY_SCORE: 39
ADLS_ACUITY_SCORE: 22
ADLS_ACUITY_SCORE: 22
ADLS_ACUITY_SCORE: 39
ADLS_ACUITY_SCORE: 22
ADLS_ACUITY_SCORE: 39

## 2024-05-12 NOTE — DISCHARGE SUMMARY
[facility]  Hospitalist Discharge Summary      Date of Admission:  5/9/2024  Date of Discharge:  5/12/2024  Discharging Provider: Kati Perdue MD  Discharge Service: Hospitalist Service    Discharge Diagnoses   Extensive RLE DVT, Acute PE    Clinically Significant Risk Factors          Follow-ups Needed After Discharge   Follow-up Appointments     Follow-up and recommended labs and tests       Follow up with primary care provider (PCP), Aditya Munoz, within   5 days for hospital follow- up.  No follow up labs or test are needed.   Length of Anticoagulation, Hypercoagulation workup and/or referral to   hematology deferred to PCP. Colonoscopy deferred to PCP.            Unresulted Labs Ordered in the Past 30 Days of this Admission       No orders found from 4/9/2024 to 5/10/2024.            Discharge Disposition   Discharged to home  Condition at discharge: Stable    Hospital Course   Bon Luque is a 69 year old male with prior history of upper extremity DVT who is being admitted with an extensive right lower extremity DVT precipitated by recent trip.  It is noted that patient was already on Eliquis.   Acute RLE DVT, extensive  Acute PE  -- recent trip to [place]  -- US-duplex: extensive right leg DVT  -- CTA-abd/pelvis: Partially visualized right lower lobe segmental PE. No e/o right heart strain.  -- heparin gtt  -- IR consulted: pt's clot is too distal to warrant IR intervention. Discussed with IR. Medical management and observe. Re-consult IR if pt' condition changes.  -- 05/11: pt felt leg tightness is improving but has some shortness of breath today. Discussed with him regarding PE management. CTA-chest will tell us the burden of PE but management will remain the same (anticoagulation) unless he is hemodynamically unstable in which case either thrombolytics or embolectomy would be indicated. Pt declined repeat contrast exposure with CTA-chest. Will do Echo.  -- 05/11:  Echo did not show strain. Shortness of breath resolved. Leg swelling and pain improving significantly.  -- Discussed with patient regarding AC. He probably needs to be on it for life given recurrence. He will have to follow with PCP for further workup, hematology referral if indicated, and for determination of total duration of AC.  Enterovirus infection  Leukocytosis, mild  -- likely 2/2 above. No fever or e/o infection  -- trend cbc and temp curve  DM-II   -- home regimen: lantus 50 units bid, lisprol 30 unit tid-ac  -- accu-checks, high sliding scale, I:C 1:15, lantus 20am/30qhs, titrate as indicated  Psoriatic arthritis  -- on Skyrizi  -- follows with OP Rheumatologist  Sacroilial joint ankylosis  -- following with OP Rheumatologist  Chronic pain syndrome   Chronic opiate use  -- c/w PTA MS contin    RLQ pain with intermittent diarrhea, ongoing- once a week  -- CT-abd/pelvis: no acute issues  -- Colonosocopy as OP- deferred to PCP  Essential hypertension  -- not on meds at home  -- Hydralazin iv prn  H/o PUD  -- PPI. Non NSAIDs  H/o renal cell ca, right  -- s/p surgery 5 years ago  Pseudocyst pancreas  -- resolved on CT-abd  H/o DVT of axillary vein of right UE  -- completed 3 months of eliquis  Patient is clinically and hemodynamically stable for discharge to home. Medication reconciliation was done. Follow up appointments and recommendations as shown below. Patient verbalized understanding and agreed to plan of care. All questions answered.      Consultations This Hospital Stay   PHARMACY IP CONSULT  INTERVENTIONAL RADIOLOGY ADULT/PEDS IP CONSULT    Code Status   Full Code    Time Spent on this Encounter   I, Kati Perdue MD, personally saw the patient today and spent greater than 30 minutes discharging this patient.       Kati Perdue MD  [address] [address]  Phone: [phone]  Fax:  [phone]  ______________________________________________________________________    Physical Exam   Vital Signs: Temp: 98.3  F (36.8  C) Temp src: Oral BP: (!) 146/78 Pulse: 86   Resp: 20 SpO2: 96 % O2 Device: None (Room air)    Weight: 216 lbs 0 oz  GEN: Alert and oriented. Not in acute distress.  HEENT: Atraumatic, mucous membrane- moist and pink.  Chest: Bilateral air entry.  CVS: S1S2 regular.   Abdomen: Soft. Non-tender, non-distended. No organomegaly. No guarding or rigidity. Bowel sounds active.   Extremities: No pedal edema.  CNS: No involuntary movements.  Skin: no cyanosis or clubbing.        Primary Care Physician   Aditya Munoz    Discharge Orders      Reason for your hospital stay    Acute PE, extensive RLE DVT     Follow-up and recommended labs and tests     Follow up with primary care provider (PCP), Aditya Munoz, within 5 days for hospital follow- up.  No follow up labs or test are needed. Length of Anticoagulation, Hypercoagulation workup and/or referral to hematology deferred to PCP. Colonoscopy deferred to PCP.     Activity    Your activity upon discharge: activity as tolerated     Discharge Instructions     Diet    Follow this diet upon discharge: Orders Placed This Encounter      Combination Diet Low Consistent Carb (45 g CHO per Meal) Diet       Significant Results and Procedures       Discharge Medications   Current Discharge Medication List        START taking these medications    Details   Apixaban Starter Pack (ELIQUIS DVT/PE STARTER PACK) 5 MG TBPK Take 10 mg by mouth 2 times daily for 7 days, THEN 5 mg 2 times daily for 23 days.  Qty: 1 each, Refills: 0           CONTINUE these medications which have NOT CHANGED    Details   DULoxetine (CYMBALTA) 60 MG capsule Take 1 capsule (60 mg) by mouth daily  Qty: 90 capsule, Refills: 3    Associated Diagnoses: Chronic pain syndrome      gabapentin (NEURONTIN) 300 MG capsule [GABAPENTIN (NEURONTIN) 300 MG CAPSULE] Take 600 mg by  mouth 3 (three) times a day.       insulin lispro (HUMALOG KWIKPEN) 100 UNIT/ML (1 unit dial) KWIKPEN Inject Subcutaneous 3 times daily (before meals) Sliding scale. Average use is 30 units with each meal      LANTUS SOLOSTAR 100 UNIT/ML soln Inject 50 Units Subcutaneous 2 times daily      melatonin 5 MG CAPS Take 5-10 mg by mouth nightly as needed (sleep)      morphine (MS CONTIN) 30 MG CR tablet Take 30 mg by mouth every 8 hours      multivitamin w/minerals (THERA-VIT-M) tablet Take 1 tablet by mouth every evening      Risankizumab-rzaa (SKYRIZI SC) Inject Subcutaneous every 4 months      testosterone (ANDROGEL) 20.25 MG/1.25GM (1.62%) topical gel Place 1 packet (20.25 mg) onto the skin daily  Qty: 375 g, Refills: 1    Associated Diagnoses: Hypogonadism male      Continuous Blood Gluc Sensor (FREESTYLE SHIRA 14 DAY SENSOR) MISC       naloxone (NARCAN) 4 MG/0.1ML nasal spray Spray 1 spray into one nostril alternating nostrils once as needed for opioid reversal           Allergies   Allergies   Allergen Reactions    Atorvastatin Muscle Pain (Myalgia)    Valomag [Arthritis Pain Formula] Nausea and Vomiting     gastric ulcer from aspirin use    Ampicillin Rash     Had a reaction to this medication many years ago.    Codeine Rash     It has been about 30 years ago

## 2024-05-12 NOTE — PLAN OF CARE
Problem: Adult Inpatient Plan of Care  Goal: Optimal Comfort and Wellbeing  Outcome: Progressing     Problem: VTE (Venous Thromboembolism)  Goal: Tissue Perfusion  Outcome: Progressing   Goal Outcome Evaluation:       Alert/oriented, denies pain. Heparin gtt infusing at 2050 units/hr. Tested positive for rhinovirus and enterovirus, now on droplet precaution. NSR on tele. Call light within reach. . Recheck PTT in the AM.

## 2024-05-12 NOTE — PLAN OF CARE
Problem: Adult Inpatient Plan of Care  Goal: Readiness for Transition of Care  Outcome: Progressing   Goal Outcome Evaluation:    Feels well today, minimal pain to right calf.  No difficulty breathing today.  Understands discharge info in AVS including new medication and plan to follow up.       Problem: Comorbidity Management  Goal: Blood Glucose Levels Within Targeted Range  Outcome: Progressing  Intervention: Monitor and Manage Glycemia  Recent Flowsheet Documentation  Taken 5/12/2024 0800 by Ginny Alarcon RN  Medication Review/Management: medications reviewed     Didn't understand why diet was restricted to low carb.  Explained need to limit carbs.  Asked why he can't just have more insulin to off set.   Education done around controlling blood sugar.

## 2024-05-12 NOTE — PROGRESS NOTES
Care Management Discharge Note    Discharge Date: 05/12/2024     Discharge Disposition:  Home    Discharge Services:  none    Discharge DME:  NA    Discharge Transportation:  Family will provide    Private pay costs discussed: Not applicable    Does the patient's insurance plan have a 3 day qualifying hospital stay waiver?  Yes     Which insurance plan 3 day waiver is available? Alternative insurance waiver    Will the waiver be used for post-acute placement? No    PAS Confirmation Code:  NA  Patient/family educated on Medicare website which has current facility and service quality ratings:  NA    Education Provided on the Discharge Plan:  Yes  Persons Notified of Discharge Plans: Nursing;  Patient/Family in Agreement with the Plan:  Yes    Handoff Referral Completed: No    Additional Information:  Discharge orders noted and reviewed.No CM needs identified.  Chart closed and refreshed. BPA did not fire.     Ciera Arambula RN

## 2024-05-16 DIAGNOSIS — I82.409 RECURRENT DEEP VEIN THROMBOSIS (DVT) (H): Primary | ICD-10-CM

## 2024-05-21 ENCOUNTER — OFFICE VISIT (OUTPATIENT)
Dept: INTERNAL MEDICINE | Facility: CLINIC | Age: 69
End: 2024-05-21
Payer: COMMERCIAL

## 2024-05-21 VITALS
SYSTOLIC BLOOD PRESSURE: 138 MMHG | WEIGHT: 217.2 LBS | RESPIRATION RATE: 18 BRPM | BODY MASS INDEX: 29.42 KG/M2 | OXYGEN SATURATION: 99 % | DIASTOLIC BLOOD PRESSURE: 70 MMHG | HEIGHT: 72 IN | HEART RATE: 82 BPM | TEMPERATURE: 98 F

## 2024-05-21 DIAGNOSIS — Z79.4 TYPE 2 DIABETES MELLITUS WITH COMPLICATION, WITH LONG-TERM CURRENT USE OF INSULIN (H): ICD-10-CM

## 2024-05-21 DIAGNOSIS — L40.50 PSORIATIC ARTHRITIS (H): ICD-10-CM

## 2024-05-21 DIAGNOSIS — G89.4 CHRONIC PAIN SYNDROME: ICD-10-CM

## 2024-05-21 DIAGNOSIS — K86.3 PSEUDOCYST OF PANCREAS: ICD-10-CM

## 2024-05-21 DIAGNOSIS — E11.8 TYPE 2 DIABETES MELLITUS WITH COMPLICATION, WITH LONG-TERM CURRENT USE OF INSULIN (H): ICD-10-CM

## 2024-05-21 DIAGNOSIS — E29.1 HYPOGONADISM IN MALE: ICD-10-CM

## 2024-05-21 DIAGNOSIS — I10 PRIMARY HYPERTENSION: ICD-10-CM

## 2024-05-21 DIAGNOSIS — C64.1 RENAL CELL CARCINOMA, RIGHT (H): ICD-10-CM

## 2024-05-21 DIAGNOSIS — I82.411 ACUTE DEEP VEIN THROMBOSIS (DVT) OF FEMORAL VEIN OF RIGHT LOWER EXTREMITY (H): ICD-10-CM

## 2024-05-21 DIAGNOSIS — I26.99 ACUTE PULMONARY EMBOLISM WITHOUT ACUTE COR PULMONALE, UNSPECIFIED PULMONARY EMBOLISM TYPE (H): Primary | ICD-10-CM

## 2024-05-21 PROBLEM — R10.13 ABDOMINAL PAIN, EPIGASTRIC: Status: RESOLVED | Noted: 2022-06-23 | Resolved: 2024-05-21

## 2024-05-21 PROBLEM — R10.10 PAIN OF UPPER ABDOMEN: Status: RESOLVED | Noted: 2023-10-27 | Resolved: 2024-05-21

## 2024-05-21 PROCEDURE — 99215 OFFICE O/P EST HI 40 MIN: CPT | Performed by: INTERNAL MEDICINE

## 2024-05-21 PROCEDURE — G2211 COMPLEX E/M VISIT ADD ON: HCPCS | Performed by: INTERNAL MEDICINE

## 2024-05-21 RX ORDER — APIXABAN 5 MG/1
TABLET, FILM COATED ORAL
COMMUNITY
Start: 2024-05-09 | End: 2024-05-21

## 2024-05-21 RX ORDER — APIXABAN 5 MG/1
5 TABLET, FILM COATED ORAL 2 TIMES DAILY
Qty: 180 TABLET | Refills: 3 | Status: SHIPPED | OUTPATIENT
Start: 2024-05-21

## 2024-05-21 ASSESSMENT — PATIENT HEALTH QUESTIONNAIRE - PHQ9
SUM OF ALL RESPONSES TO PHQ QUESTIONS 1-9: 8
10. IF YOU CHECKED OFF ANY PROBLEMS, HOW DIFFICULT HAVE THESE PROBLEMS MADE IT FOR YOU TO DO YOUR WORK, TAKE CARE OF THINGS AT HOME, OR GET ALONG WITH OTHER PEOPLE: SOMEWHAT DIFFICULT
SUM OF ALL RESPONSES TO PHQ QUESTIONS 1-9: 8

## 2024-05-21 NOTE — PATIENT INSTRUCTIONS
Please call [phone] to schedule your appointment with the hematology clinic.  A referral was placed

## 2024-05-21 NOTE — PROGRESS NOTES
Assessment & Plan     Acute pulmonary embolism without acute cor pulmonale, unspecified pulmonary embolism type (H)  69-year-old male here to follow-up after hospitalization with extensive right lower extremity DVT associated with acute pulmonary embolus.  He had returned from a trip to [place] with a 3.5-hour flight.  No other obvious precipitating factors that led to this episode.  Ultrasound showed extensive right leg DVT.  Pulmonary embolus involving right lower lobe confirmed on CTA.  Echocardiogram showing no right heart strain.  Interventional radiology consulted but clot was too distal to warrant any intervention.  Treated with IV heparin.  Transition to Eliquis.  Symptoms of shortness of breath resolving at discharge.  He had previous upper extremity DVT associated with PICC line and was on Eliquis for 3 months last year.  At this point, we will continue Eliquis and he will likely need lifelong given recurrence of DVT.  I would like him to be evaluated by hematology to help determine if he has underlying predisposition for clotting.  We discussed other possible precipitating factors including underlying malignancy.  CT abdomen/pelvis showing no abnormalities.  Overdue for colonoscopy but this would need to be postponed while anticoagulated following PE.  He has also been on testosterone replacement and we discussed that this could increase his risk for DVT.  I am recommending discontinuing for now until further workup can be completed.  - ELIQUIS ANTICOAGULANT 5 MG tablet; Take 1 tablet (5 mg) by mouth 2 times daily    Acute deep vein thrombosis (DVT) of femoral vein of right lower extremity (H)  As above, extensive DVT involving right lower extremity now anticoagulated with Eliquis    Type 2 diabetes mellitus with complication, with long-term current use of insulin (H)  Followed by endocrinology.  Most recent A1c 7.3%.  Continues on insulin    Psoriatic arthritis (H)  Followed by rheumatology and  continues on Skyrizi    Hypogonadism in male  Recommending holding testosterone replacement in light of recurrent DVT and pulmonary embolus as testosterone replacement can be associated with increased risk    Primary hypertension  Blood pressure initially elevated but rechecked and is good at 138/70    Renal cell carcinoma, right (H)  History of nephrectomy.  No recurrence on recent imaging    Chronic pain syndrome  Followed by pain clinic and continues on MS Contin    Pseudocyst of pancreas  Extensive evaluation this past winter by gastroenterology with resolution of pseudocyst and biopsies negative for malignancy    The longitudinal plan of care for the diagnosis(es)/condition(s) as documented were addressed during this visit. Due to the added complexity in care, I will continue to support Bon in the subsequent management and with ongoing continuity of care.         45 minutes spent in the management of this patient including reviewing records from recent hospitalization, obtaining history, performing exam, ordering appropriate tests and documenting visit.      MED REC REQUIRED  Post Medication Reconciliation Status:  Discharge medications reconciled, continue medications without change  BMI  Estimated body mass index is 29.46 kg/m  as calculated from the following:    Height as of this encounter: 1.829 m (6').    Weight as of this encounter: 98.5 kg (217 lb 3.2 oz).         Follow-up in 3 months for annual wellness visit    Subjective   Bon is a 69 year old, presenting for the following health issues: Here to follow-up after hospitalization with acute pulmonary embolus and extensive DVT involving right lower extremity.  Other chronic medical problems discussed as well.  See assessment and plan for details  Hospital F/U (Blood clot in right leg)      5/21/2024     7:25 AM   Additional Questions   Roomed by VIKRAM MILLER           Review of Systems  Constitutional, HEENT, cardiovascular, pulmonary, GI, ,  musculoskeletal, neuro, skin, endocrine and psych systems are negative, except as otherwise noted.      Objective    BP (!) 170/77 (BP Location: Right arm, Patient Position: Sitting, Cuff Size: Adult Regular)   Pulse 82   Temp 98  F (36.7  C) (Oral)   Resp 18   Ht 1.829 m (6')   Wt 98.5 kg (217 lb 3.2 oz)   SpO2 99%   BMI 29.46 kg/m    Body mass index is 29.46 kg/m .  Physical Exam   Well-appearing middle-age male  Lungs clear bilaterally no rales or wheezes  Heart regular rate and rhythm  No peripheral edema        Signed Electronically by: Aditya Munoz MD    Answers submitted by the patient for this visit:  Patient Health Questionnaire (Submitted on 5/21/2024)  If you checked off any problems, how difficult have these problems made it for you to do your work, take care of things at home, or get along with other people?: Somewhat difficult  PHQ9 TOTAL SCORE: 8

## 2024-05-25 ENCOUNTER — HEALTH MAINTENANCE LETTER (OUTPATIENT)
Age: 69
End: 2024-05-25

## 2024-06-14 NOTE — PROGRESS NOTES
[facility]  [address] [place] [address]  Main: [phone], Fax: [phone]    Patient seen at: [facility] at [address] [place] [address]    Outpatient Visit Note:    Patient: Bon Luque  MRN: [id number]  : 1955  LISSA: 2024  Location of this writer at the time of this clinic visit was conducted: [facility].  Location of the patient at the time of this clinic visit was conducted: [facility].     Reason for visit:  History of PICC line associated right upper extremity DVT back in Oct 2023. Now with extensive right leg DVT with pulmonary embolism on 2024.     HPI:  Bon is a 69 year old male with a history of small bowel obstruction in Oct 2023, CAD S/P stent x 2 in , right sided renal cell carcinoma S/P partial right nephrectomy in , HTN, Diabetes with neuropathy, hypercholesterolemia, hypogonadism on testosterone replacement, and psoriasis, who also has a history of PICC line associated right upper extremity DVT in Oct 2023, who is referred by Dr. Aditya Munoz of [facility] for consultation in regard to his recent finding of extensive right leg DVT and pulmonary embolism on 2024.     On 10/5/2023, he presented to the emergency department with a complaint of acute onset of vomiting and epigastric abdominal pain. CT abdomen pelvis at the time showed small bowel obstruction and he was admitted for further evaluation. At the time, there was concern of a possible duodenal mass vs pancreatic mass. GI was consulted and upper endoscopy was performed on 10/6/2023 with biopsy. He had an NG tube placed and subsequent TPN. Biopsy path report subsequently showed no evidence of malignancy but mild edema and chronic duodenitits. He had a PICC line placed on 10/7/2023. On  10/9/2023, he developed right arm swelling and redness. Right upper extremity venous ultrasound at the time showed occlusive DVT in the axillary vein and occlusive DVT in the brachial vein of the proximal through distal upper arm. PICC line present in the brachial vein. There is also a superficial thrombus in the basilic vein of the mid upper arm through the antecubital fossa. Started on heparin and the right upper extremity PICC was removed replaced with left upper extremity PICC at the time. He eventually discharged on apixaban on 10/11/2023 and continued anticoagulation therapy (apixaban) for a total of 3 months.     12/7/2023, he had a repeat upper GI endoscopy showing gastritis which also confirmed by biopsy.     Bon also reports that he has long standing history of psoriatic arthritis for which he does have occasional flares. He reports that he recently started on Risankizumab-rzaa (Skyrizi) subcutaneous injection back at the end of Jan 2024.     Additionally, Bon also known to have low testosterone and he has been using a topical testosterone cream on and off. So about one week prior to his trip to [place] then going on a cruise from [facility] to [place] in May 2024, he started back on daily testosterone topical cream in preparation of him having to walk long distance. In addition, he was also prescribed a one week course of prednisone for his psoriatic arthritis to use during the trip.     About 3 weeks prior to his departure to [place], he started to experience some right posterior calf pain. He did not immediately seek medical attention at the time as he did not think too much of it due to his history of neuropathy.     5/7/2024, he and his wife flew back to [place] from [place] after a cruise vacation to [place]. This flight was around 3.5 hours one way. Then on 5/8/2024, he woke up with right leg pain and swelling. He recalls that the pain was bad to the point where he was not able to walk on it and thus  he decided to present to the emergency department for evaluation on 5/9/2024.     5/9/2024, he presented to the emergency department with complaint of ongoing right leg pain and swelling. Right leg venous ultrasound was done showing extensive right leg DVT, extending from the proximal femoral vein through the distal calf, including the popliteal, posterior tivial and peroneal veins. Some portions are partially occlusive, while other areas appear to be occlusive / nearly occlusive. No superficial thrombophlebitis. At Abdomen and Pelvis showed no evidence of extension of known right leg DVT into the pelvis. Partially visualized right lower lobe segmental pulmonary emboli. No evidence of right heart strain. IR was consulted about his right leg DVT and recommended medical management without any intervention. Echocardiogram on 5/11/2024 showed mild concentric left ventricular hypertrophy. EF of 60-65%. Normal right ventricular size and systolic function. He was discharged on 5/12/2024 on apixaban.     Saw Dr. Munoz on 5/21/2024 and is referred to our clinic for consultation. Noted that Dr. Munoz indicates that this patient is overdue for screening colonoscopy. The patient was also told to hold testosterone.     Currently he is on apixaban at 5 mg PO BID dosing. He continues to have some right leg pain and swelling but has improved from 5/9/2024. He denies any bleeding issues.     ROS:  Denies any bleeding complications. Specifically, no frequent epistaxis. No issues with oral mucosal bleeding. Denies any hematuria or blood in stools. Denies any shortness of breath. No chest pain. No cough. No fever.    Medications:  Current Outpatient Medications   Medication Sig Dispense Refill    Continuous Blood Gluc Sensor (FREESTYLE SHIRA 14 DAY SENSOR) MISC       DULoxetine (CYMBALTA) 60 MG capsule Take 1 capsule (60 mg) by mouth daily 90 capsule 3    ELIQUIS ANTICOAGULANT 5 MG tablet Take 1 tablet (5 mg) by mouth 2 times  daily 180 tablet 3    gabapentin (NEURONTIN) 300 MG capsule [GABAPENTIN (NEURONTIN) 300 MG CAPSULE] Take 600 mg by mouth 3 (three) times a day.       insulin lispro (HUMALOG KWIKPEN) 100 UNIT/ML (1 unit dial) KWIKPEN Inject Subcutaneous 3 times daily (before meals) Sliding scale. Average use is 30 units with each meal      LANTUS SOLOSTAR 100 UNIT/ML soln Inject 50 Units Subcutaneous 2 times daily      melatonin 5 MG CAPS Take 5-10 mg by mouth nightly as needed (sleep)      morphine (MS CONTIN) 30 MG CR tablet Take 30 mg by mouth every 8 hours      multivitamin w/minerals (THERA-VIT-M) tablet Take 1 tablet by mouth every evening      naloxone (NARCAN) 4 MG/0.1ML nasal spray Spray 1 spray into one nostril alternating nostrils once as needed for opioid reversal      Risankizumab-rzaa (SKYRIZI SC) Inject Subcutaneous every 4 months       No current facility-administered medications for this visit.     Allergies:  Allergies   Allergen Reactions    Atorvastatin Muscle Pain (Myalgia)    Valomag [Arthritis Pain Formula] Nausea and Vomiting     gastric ulcer from aspirin use    Ampicillin Rash     Had a reaction to this medication many years ago.    Codeine Rash     It has been about 30 years ago     PmHx:  Past Medical History:   Diagnosis Date    Abdominal pain, epigastric 06/23/2022    Normal gastric emptying study.  Peptic ulcer disease or gastritis suspected    Acute cholecystitis 07/12/2022    Hospitalized with acute abdominal pain, abnormal LFTs and CT showing distended gallbladder and bile duct dilatation.  Acute cholecystitis.  Cholecystectomy.    Acute pulmonary embolism without acute cor pulmonale (H) 05/21/2024    Arthritis     psoriatic    ASCVD (arteriosclerotic cardiovascular disease) 04/28/2016    Coronary artery stent ×2 in RCA following myocardial infarction 2008 , echo June 2014 normal LV function with ejection fraction 50-55% with mild apical inferior hypokinesis    Chronic fatigue 06/23/2022    Chronic  "low back pain 04/28/2016    Chronic right shoulder pain 06/23/2022    Coronary artery disease     Depression 12/26/2017    Diabetic peripheral neuropathy associated with type 2 diabetes mellitus (H) 04/28/2016    Abnormal EMG and workup at [facility]    Dilated bile duct 07/03/2017    CT scan with incidental finding of dilated intra-and extrahepatic ducts    HTN (hypertension)     Hypercholesterolemia     Hypogonadism in male     Ischemic cardiomyopathy     Stress Echocardiogram 2017 with decreased LV function with EF 43% worse compared to previous echo.  No change in infarct.  No new ischemia    Left upper quadrant pain 06/30/2017    Mass of duodenum 10/16/2023    Obstruction of second portion of duodenum with abnormal CT scan, biopsies negative for endoscopic ultrasound    MI (myocardial infarction) (H)     Obstructive sleep apnea on CPAP     CPAP    Optic neuritis     Psoriasis     Recurrent vomiting 09/01/2023    Renal cell carcinoma, right (H) 06/23/2022    Right partial nephrectomy 2017    Right renal mass     Sebaceous cyst 2010    Thoracic radiculopathy 2004    right T8 intercostal nerve block 2002    Type 2 diabetes mellitus with insulin therapy (H)        Social History:   Deferred    Family History:  He denies any family history of venous thromboembolism.  Parents with no history of venous thromboembolism.  He has 2 brothers, who both are passed away. Both did not have any issues with venous thromboembolism.  He has one 32 year old daughter with no history of DVT/PE.     Objective:  Vitals: BP (!) 183/91 (BP Location: Left arm, Patient Position: Sitting, Cuff Size: Adult Large)   Pulse 59   Temp 98  F (36.7  C) (Oral)   Ht 1.829 m (6' 0.01\")   Wt 100.3 kg (221 lb 3.2 oz)   SpO2 97%   BMI 29.99 kg/m    Exam:   He does have some swelling and discoloration of the right distal lower extremity and the right pre-tibial area. The discoloration is consistent with some venous insufficiency. "     Labs:  Component      Latest Ref Rng 5/10/2024  7:58 AM 5/12/2024  7:48 AM   WBC      4.0 - 11.0 10e3/uL  11.1 (H)    RBC Count      4.40 - 5.90 10e6/uL  5.68    Hemoglobin      13.3 - 17.7 g/dL  15.3    Hematocrit      40.0 - 53.0 %  46.6    MCV      78 - 100 fL  82    MCH      26.5 - 33.0 pg  26.9    MCHC      31.5 - 36.5 g/dL  32.8    RDW      10.0 - 15.0 %  13.2    Platelet Count      150 - 450 10e3/uL  180    % Neutrophils      %  61    % Lymphocytes      %  28    % Monocytes      %  8    % Eosinophils      %  2    % Basophils      %  1    % Immature Granulocytes      %  0    NRBCs per 100 WBC      <1 /100  0    Absolute Neutrophils      1.6 - 8.3 10e3/uL  6.9    Absolute Lymphocytes      0.8 - 5.3 10e3/uL  3.1    Absolute Monocytes      0.0 - 1.3 10e3/uL  0.9    Absolute Eosinophils      0.0 - 0.7 10e3/uL  0.2    Absolute Basophils      0.0 - 0.2 10e3/uL  0.1    Absolute Immature Granulocytes      <=0.4 10e3/uL  0.1    Absolute NRBCs      10e3/uL  0.0    Sodium      135 - 145 mmol/L 139  138    Potassium      3.4 - 5.3 mmol/L 3.8  4.3    Carbon Dioxide (CO2)      22 - 29 mmol/L 25  25    Anion Gap      7 - 15 mmol/L 11  9    Urea Nitrogen      8.0 - 23.0 mg/dL 12.8  11.7    Creatinine      0.67 - 1.17 mg/dL 0.86  0.92    GFR Estimate      >60 mL/min/1.73m2 >90  >90    Calcium      8.8 - 10.2 mg/dL 9.0  9.4    Chloride      98 - 107 mmol/L 103  104    Glucose      70 - 99 mg/dL 118 (H)  200 (H)    Alkaline Phosphatase      40 - 150 U/L 122     AST      0 - 45 U/L 26     ALT      0 - 70 U/L 26     Protein Total      6.4 - 8.3 g/dL 6.7     Albumin      3.5 - 5.2 g/dL 3.9     Bilirubin Total      <=1.2 mg/dL 0.8        Imaging:  Reviewed and are as described above.     Assessment:  In summary, Bon is a 69 year old male with a history of small bowel obstruction in Oct 2023, CAD S/P stent x 2 in 2016, right sided renal cell carcinoma S/P partial right nephrectomy in 2017, HTN, Diabetes with neuropathy,  hypercholesterolemia, hypogonadism on testosterone replacement, and psoriasis with arthritis, who also has a history of PICC line associated right upper extremity DVT in Oct 2023, who is referred by Dr. Aditya Munoz of [facility] for consultation in regard to his recent finding of extensive right leg DVT and pulmonary embolism on 5/9/2024.     Bon's right upper extremity DVT back in Oct 2023 was a PICC line provoked venous thromboembolic event for which he was appropriately treated with 3 months of anticoagulation therapy.     His recent right leg DVT and pulmonary embolism is rather complicated. He actually recalls that his right calf pain started 3 weeks prior to his flight to [place] and [facility]. His symptoms gotten worse after his trip and that was why he presented himself to the emergency department. Thus his DVT/PE on 5/9/2024 was not likely related to his flight to and back from [place]. Also his symptoms of right calf pain started before he started to use his testosterone cream daily (about 1 week prior to his trip). Thus it is also unlikely that testosterone cream use had anything to do with his DVT/PE. Most study in regard to testosterone and correlation to DVT/PE were done on injectable testosterone and thus it is unclear about topical testosterone and its effect or correlation to venous thromboembolism. For injectable testosterone, some literature suggest that risk for venous thromboembolism is at the highest during the first 6 months of testosterone replacement therapy and then it plateaus off. So it is unclear in this particular case if testosterone has anything to do with his venous thromboembolic event.     I also have done some literature search about risankizumab-rzaa (Skyrizi) and correlation to venous thromboembolic events but I found none.     Finally, as mentioned, Bon has underlying chronic inflammatory condition of psoriasis with arthritis, which by itself could increase  someone risks of venous thromboembolic event.     Thus I do think that Bon's right leg DVT and pulmonary embolic event back on 5/9/2024 was likely an unprovoked event or a provoked event caused by an irreversible chronic inflammatory condition.     Diagnosis:  History of PICC line associated Right upper extremity DVT back in Oct 2023. S/P 3 months of anticoagulation therapy.   Likely unprovoked (or provoked by his chronic inflammatory condition of psoriasis with arthritis) right leg DVT and pulmonary embolism on 5/9/2024.   Psoriasis   Psoriatic arthritis.   Low testosterone.     Plan:  I have a long discussion with Bon today in regard to our plan and recommendation.     I spent quite a bit of time today to educate Bon on DVT/PE, provoked vs unprovoked venous thromboembolic event, and general approach in regard to anticoagulation therapy management and duration. I also answered all his questions to his satisfaction.     I explain to Bon that his right upper extremity DVT back in Oct 2023 was a PICC line provoked event and he was appropriately treated with 3 months of anticoagulation therapy at the time.     However, I explain to him that his right lower extremity DVT with associated pulmonary embolism back on 5/9/2024 was an unprovoked event or it was provoked by the fact that he has underlying chronic inflammatory condition and thus in accordance to current American Society of Hematology (CHANO) guidelines, he should maintain on indefinite anticoagulation therapy. At this time, Bon will need at least 3-6 months of anticoagulation therapy and then we will likely keep him on indefinite anticoagulation therapy thereafter.     I have no opposition for him to restart testosterone replacement if found indicated as he is now in theory protected from a recurrent venous thromboembolism while on anticoagulation therapy.     Finally, we discuss about inherit thrombophilia workup or acquired thrombophilia workup. I do not  feel that an extensive inherit thrombophilia workup is necessary as this will not change my treatment recommendation. Additionally the yield of such workup is low as he has no family history of DVT/PE. However, I do think that we should check cardiolipin Abys and beta 2 glycoprotein Abys as he does have underlying autoimmune disorder and might influence the choices of anticoagulation agent if he is found to have antiphospholipid antibody syndrome (APS). I am not able to check lupus anticoagulant today as he is on apixaban which will cause a false positive lupus anticoagulant test.     So my plan summary is as follow:  Continue apixaban at 5 mg PO BID as his mainstay of anticoagulation therapy.  Check Cardiolipin Abys and beta 2 glycoprotein Abys today.   Will plan to repeat his right leg venous ultrasound in 4 months. Will also recheck his right upper extremity venous ultrasound mostly for future reference. I have placed orders for both of these and will have one of our nursing coordinator assist in arranging for him to get these done in 4 months. I will contact him once the reports are back.   I will then plan to see him back annually for follow up.   I do not have any opposition for him to restart testosterone replacement if found indicated as he is now in theory protected from a recurrent venous thromboembolism while on anticoagulation therapy.     Patient is instructed to call our clinic if he should experience any unusual bleeding complications or if he should need any invasive or surgical procedures in the future. Otherwise, will plan to see him back in one year for routine follow up. Virtual visit or inperson visit is fine.         Geovany Yu PA-C, MPAS  Physician Assistant  [facility].     The longitudinal plan of care for these conditions below were addressed during this visit. Due to the added complexity in care, I will continue to support  Bon Luque  in the subsequent management of these conditions and with the ongoing continuity of care of these conditions.    Problem List Items Addressed This Visit as of 2/19/2024   History of PICC line associated Right upper extremity DVT back in Oct 2023. S/P 3 months of anticoagulation therapy.   Likely unprovoked (or provoked by his chronic inflammatory condition of psoriasis with arthritis) right leg DVT and pulmonary embolism on 5/9/2024.   Psoriasis   Psoriatic arthritis.   Low testosterone.     70 minutes spent by me on the date of the encounter doing chart review, history and exam, documentation and further activities per the note.    Time IN: 11:04  Time OUT: 12:05

## 2024-06-25 ENCOUNTER — OFFICE VISIT (OUTPATIENT)
Dept: HEMATOLOGY | Facility: CLINIC | Age: 69
End: 2024-06-25
Attending: INTERNAL MEDICINE
Payer: COMMERCIAL

## 2024-06-25 VITALS
DIASTOLIC BLOOD PRESSURE: 91 MMHG | SYSTOLIC BLOOD PRESSURE: 183 MMHG | HEART RATE: 59 BPM | BODY MASS INDEX: 29.96 KG/M2 | OXYGEN SATURATION: 97 % | TEMPERATURE: 98 F | WEIGHT: 221.2 LBS | HEIGHT: 72 IN

## 2024-06-25 DIAGNOSIS — I25.10 ASCVD (ARTERIOSCLEROTIC CARDIOVASCULAR DISEASE): ICD-10-CM

## 2024-06-25 DIAGNOSIS — I82.409 RECURRENT DEEP VEIN THROMBOSIS (DVT) (H): Primary | ICD-10-CM

## 2024-06-25 DIAGNOSIS — I25.5 ISCHEMIC CARDIOMYOPATHY: ICD-10-CM

## 2024-06-25 DIAGNOSIS — L40.50 PSORIATIC ARTHRITIS (H): ICD-10-CM

## 2024-06-25 DIAGNOSIS — I82.621 DEEP VEIN THROMBOSIS (DVT) OF OTHER VEIN OF RIGHT UPPER EXTREMITY, UNSPECIFIED CHRONICITY (H): ICD-10-CM

## 2024-06-25 DIAGNOSIS — Z86.718 HISTORY OF DVT (DEEP VEIN THROMBOSIS): ICD-10-CM

## 2024-06-25 DIAGNOSIS — Z79.4 TYPE 2 DIABETES MELLITUS WITH COMPLICATION, WITH LONG-TERM CURRENT USE OF INSULIN (H): ICD-10-CM

## 2024-06-25 DIAGNOSIS — E11.8 TYPE 2 DIABETES MELLITUS WITH COMPLICATION, WITH LONG-TERM CURRENT USE OF INSULIN (H): ICD-10-CM

## 2024-06-25 PROCEDURE — 99205 OFFICE O/P NEW HI 60 MIN: CPT | Performed by: PHYSICIAN ASSISTANT

## 2024-06-25 PROCEDURE — G0463 HOSPITAL OUTPT CLINIC VISIT: HCPCS | Performed by: PHYSICIAN ASSISTANT

## 2024-06-25 PROCEDURE — 86147 CARDIOLIPIN ANTIBODY EA IG: CPT | Performed by: PHYSICIAN ASSISTANT

## 2024-06-25 PROCEDURE — 86146 BETA-2 GLYCOPROTEIN ANTIBODY: CPT | Performed by: PHYSICIAN ASSISTANT

## 2024-06-25 PROCEDURE — G2211 COMPLEX E/M VISIT ADD ON: HCPCS | Performed by: PHYSICIAN ASSISTANT

## 2024-06-25 PROCEDURE — 36415 COLL VENOUS BLD VENIPUNCTURE: CPT | Performed by: PHYSICIAN ASSISTANT

## 2024-06-25 NOTE — PATIENT INSTRUCTIONS
[facility]  [facility]  [facility], [place] [address]  Main: [phone], Fax: [phone]    It was a pleasure seeing you today.  Thank you for allowing us to be involved in your care. Please let us know if there is anything else we can do for you, so that we can be sure you are leaving completely satisfied with your care experience.    Labs today.  Our lab is located within our clinic space.  We will communicate these to you by [employer]. With your permission we may leave a detailed voicemail, please see below for our contact information should you have any questions about your results. Also, please note that some lab results may take a week or so to get back. Feel free to call or message if you have not heard back from us within 7-10 days.     Please stay on your blood thinner:  Eliquis 5 mg every 12 hours.  Please call us with any bleeding issues that you may have.    We would like you to repeat your imaging in 4 months.      Wear compression stockings if you have swelling in your leg. Take them off while you are sleeping. You may need to get new stockings every 3-6 months with regular wear.    Patient Resources:   For additional information, please see the following web link:  [email or web address]    Call the [facility]  at [phone].     -If surgeries or procedures are planned (for holding instructions).     -If off anticoagulation, please call during high risk times (long-distance travel, broken bones or trauma, immobilization, surgery, pregnancy, or taking estrogen).     -Any new symptoms of DVT (deep vein thrombosis) or PE (pulmonary embolism)    -pain     -swelling     -redness    -warmth    -shortness of breath    -chest pain    -coughing up blood    Return to clinic in  1 year.  Please schedule return appointment with Geovany Yu PA-C .    Your nurse clinician is Ariadna Baez, RN, MSN, PHN  [phone].   If she is unavailable and you have immediate concerns, please call [phone] and ask for a nurse.

## 2024-06-25 NOTE — LETTER
[facility]  [address] [place] [id number]  Main: [phone], Fax: [phone]    Patient seen at: [facility] at [address] [place] [address]    Outpatient Visit Note:    Patient: Bon Luque  MRN: [id number]  : 1955  LISSA: 2024  Location of this writer at the time of this clinic visit was conducted: [facility].  Location of the patient at the time of this clinic visit was conducted: [facility].     Reason for visit:  History of PICC line associated right upper extremity DVT back in Oct 2023. Now with extensive right leg DVT with pulmonary embolism on 2024.     HPI:  Bon is a 69 year old male with a history of small bowel obstruction in Oct 2023, CAD S/P stent x 2 in , right sided renal cell carcinoma S/P partial right nephrectomy in , HTN, Diabetes with neuropathy, hypercholesterolemia, hypogonadism on testosterone replacement, and psoriasis, who also has a history of PICC line associated right upper extremity DVT in Oct 2023, who is referred by Dr. Aditya Munoz of [facility] for consultation in regard to his recent finding of extensive right leg DVT and pulmonary embolism on 2024.     On 10/5/2023, he presented to the emergency department with a complaint of acute onset of vomiting and epigastric abdominal pain. CT abdomen pelvis at the time showed small bowel obstruction and he was admitted for further evaluation. At the time, there was concern of a possible duodenal mass vs pancreatic mass. GI was consulted and upper endoscopy was performed on 10/6/2023 with biopsy. He had an NG tube placed and subsequent TPN. Biopsy path report subsequently showed no evidence of malignancy but mild edema and chronic duodenitits. He had a PICC line placed on 10/7/2023. On  10/9/2023, he developed right arm swelling and redness. Right upper extremity venous ultrasound at the time showed occlusive DVT in the axillary vein and occlusive DVT in the brachial vein of the proximal through distal upper arm. PICC line present in the brachial vein. There is also a superficial thrombus in the basilic vein of the mid upper arm through the antecubital fossa. Started on heparin and the right upper extremity PICC was removed replaced with left upper extremity PICC at the time. He eventually discharged on apixaban on 10/11/2023 and continued anticoagulation therapy (apixaban) for a total of 3 months.     12/7/2023, he had a repeat upper GI endoscopy showing gastritis which also confirmed by biopsy.     Bon also reports that he has long standing history of psoriatic arthritis for which he does have occasional flares. He reports that he recently started on Risankizumab-rzaa (Skyrizi) subcutaneous injection back at the end of Jan 2024.     Additionally, Bon also known to have low testosterone and he has been using a topical testosterone cream on and off. So about one week prior to his trip to [place] then going on a cruise from [facility] to [place] in May 2024, he started back on daily testosterone topical cream in preparation of him having to walk long distance. In addition, he was also prescribed a one week course of prednisone for his psoriatic arthritis to use during the trip.     About 3 weeks prior to his departure to [place], he started to experience some right posterior calf pain. He did not immediately seek medical attention at the time as he did not think too much of it due to his history of neuropathy.     5/7/2024, he and his wife flew back to [place] from [place] after a cruise vacation to [place]. This flight was around 3.5 hours one way. Then on 5/8/2024, he woke up with right leg pain and swelling. He recalls that the pain was bad to the point where he was not able to walk on it and thus  he decided to present to the emergency department for evaluation on 5/9/2024.     5/9/2024, he presented to the emergency department with complaint of ongoing right leg pain and swelling. Right leg venous ultrasound was done showing extensive right leg DVT, extending from the proximal femoral vein through the distal calf, including the popliteal, posterior tivial and peroneal veins. Some portions are partially occlusive, while other areas appear to be occlusive / nearly occlusive. No superficial thrombophlebitis. At Abdomen and Pelvis showed no evidence of extension of known right leg DVT into the pelvis. Partially visualized right lower lobe segmental pulmonary emboli. No evidence of right heart strain. IR was consulted about his right leg DVT and recommended medical management without any intervention. Echocardiogram on 5/11/2024 showed mild concentric left ventricular hypertrophy. EF of 60-65%. Normal right ventricular size and systolic function. He was discharged on 5/12/2024 on apixaban.     Saw Dr. Munoz on 5/21/2024 and is referred to our clinic for consultation. Noted that Dr. Munoz indicates that this patient is overdue for screening colonoscopy. The patient was also told to hold testosterone.     Currently he is on apixaban at 5 mg PO BID dosing. He continues to have some right leg pain and swelling but has improved from 5/9/2024. He denies any bleeding issues.     ROS:  Denies any bleeding complications. Specifically, no frequent epistaxis. No issues with oral mucosal bleeding. Denies any hematuria or blood in stools. Denies any shortness of breath. No chest pain. No cough. No fever.    Medications:  Current Outpatient Medications   Medication Sig Dispense Refill     Continuous Blood Gluc Sensor (FREESTYLE SHIRA 14 DAY SENSOR) MISC        DULoxetine (CYMBALTA) 60 MG capsule Take 1 capsule (60 mg) by mouth daily 90 capsule 3     ELIQUIS ANTICOAGULANT 5 MG tablet Take 1 tablet (5 mg) by mouth 2  times daily 180 tablet 3     gabapentin (NEURONTIN) 300 MG capsule [GABAPENTIN (NEURONTIN) 300 MG CAPSULE] Take 600 mg by mouth 3 (three) times a day.        insulin lispro (HUMALOG KWIKPEN) 100 UNIT/ML (1 unit dial) KWIKPEN Inject Subcutaneous 3 times daily (before meals) Sliding scale. Average use is 30 units with each meal       LANTUS SOLOSTAR 100 UNIT/ML soln Inject 50 Units Subcutaneous 2 times daily       melatonin 5 MG CAPS Take 5-10 mg by mouth nightly as needed (sleep)       morphine (MS CONTIN) 30 MG CR tablet Take 30 mg by mouth every 8 hours       multivitamin w/minerals (THERA-VIT-M) tablet Take 1 tablet by mouth every evening       naloxone (NARCAN) 4 MG/0.1ML nasal spray Spray 1 spray into one nostril alternating nostrils once as needed for opioid reversal       Risankizumab-rzaa (SKYRIZI SC) Inject Subcutaneous every 4 months       No current facility-administered medications for this visit.     Allergies:  Allergies   Allergen Reactions     Atorvastatin Muscle Pain (Myalgia)     Valomag [Arthritis Pain Formula] Nausea and Vomiting     gastric ulcer from aspirin use     Ampicillin Rash     Had a reaction to this medication many years ago.     Codeine Rash     It has been about 30 years ago     PmHx:  Past Medical History:   Diagnosis Date     Abdominal pain, epigastric 06/23/2022    Normal gastric emptying study.  Peptic ulcer disease or gastritis suspected     Acute cholecystitis 07/12/2022    Hospitalized with acute abdominal pain, abnormal LFTs and CT showing distended gallbladder and bile duct dilatation.  Acute cholecystitis.  Cholecystectomy.     Acute pulmonary embolism without acute cor pulmonale (H) 05/21/2024     Arthritis     psoriatic     ASCVD (arteriosclerotic cardiovascular disease) 04/28/2016    Coronary artery stent ×2 in RCA following myocardial infarction 2008 , echo June 2014 normal LV function with ejection fraction 50-55% with mild apical inferior hypokinesis     Chronic  "fatigue 06/23/2022     Chronic low back pain 04/28/2016     Chronic right shoulder pain 06/23/2022     Coronary artery disease      Depression 12/26/2017     Diabetic peripheral neuropathy associated with type 2 diabetes mellitus (H) 04/28/2016    Abnormal EMG and workup at [facility]     Dilated bile duct 07/03/2017    CT scan with incidental finding of dilated intra-and extrahepatic ducts     HTN (hypertension)      Hypercholesterolemia      Hypogonadism in male      Ischemic cardiomyopathy     Stress Echocardiogram 2017 with decreased LV function with EF 43% worse compared to previous echo.  No change in infarct.  No new ischemia     Left upper quadrant pain 06/30/2017     Mass of duodenum 10/16/2023    Obstruction of second portion of duodenum with abnormal CT scan, biopsies negative for endoscopic ultrasound     MI (myocardial infarction) (H)      Obstructive sleep apnea on CPAP     CPAP     Optic neuritis      Psoriasis      Recurrent vomiting 09/01/2023     Renal cell carcinoma, right (H) 06/23/2022    Right partial nephrectomy 2017     Right renal mass      Sebaceous cyst 2010     Thoracic radiculopathy 2004    right T8 intercostal nerve block 2002     Type 2 diabetes mellitus with insulin therapy (H)        Social History:   Deferred    Family History:  He denies any family history of venous thromboembolism.  Parents with no history of venous thromboembolism.  He has 2 brothers, who both are passed away. Both did not have any issues with venous thromboembolism.  He has one 32 year old daughter with no history of DVT/PE.     Objective:  Vitals: BP (!) 183/91 (BP Location: Left arm, Patient Position: Sitting, Cuff Size: Adult Large)   Pulse 59   Temp 98  F (36.7  C) (Oral)   Ht 1.829 m (6' 0.01\")   Wt 100.3 kg (221 lb 3.2 oz)   SpO2 97%   BMI 29.99 kg/m    Exam:   He does have some swelling and discoloration of the right distal lower extremity and the right pre-tibial area. The discoloration is " consistent with some venous insufficiency.     Labs:  Component      Latest Ref Rng 5/10/2024  7:58 AM 5/12/2024  7:48 AM   WBC      4.0 - 11.0 10e3/uL  11.1 (H)    RBC Count      4.40 - 5.90 10e6/uL  5.68    Hemoglobin      13.3 - 17.7 g/dL  15.3    Hematocrit      40.0 - 53.0 %  46.6    MCV      78 - 100 fL  82    MCH      26.5 - 33.0 pg  26.9    MCHC      31.5 - 36.5 g/dL  32.8    RDW      10.0 - 15.0 %  13.2    Platelet Count      150 - 450 10e3/uL  180    % Neutrophils      %  61    % Lymphocytes      %  28    % Monocytes      %  8    % Eosinophils      %  2    % Basophils      %  1    % Immature Granulocytes      %  0    NRBCs per 100 WBC      <1 /100  0    Absolute Neutrophils      1.6 - 8.3 10e3/uL  6.9    Absolute Lymphocytes      0.8 - 5.3 10e3/uL  3.1    Absolute Monocytes      0.0 - 1.3 10e3/uL  0.9    Absolute Eosinophils      0.0 - 0.7 10e3/uL  0.2    Absolute Basophils      0.0 - 0.2 10e3/uL  0.1    Absolute Immature Granulocytes      <=0.4 10e3/uL  0.1    Absolute NRBCs      10e3/uL  0.0    Sodium      135 - 145 mmol/L 139  138    Potassium      3.4 - 5.3 mmol/L 3.8  4.3    Carbon Dioxide (CO2)      22 - 29 mmol/L 25  25    Anion Gap      7 - 15 mmol/L 11  9    Urea Nitrogen      8.0 - 23.0 mg/dL 12.8  11.7    Creatinine      0.67 - 1.17 mg/dL 0.86  0.92    GFR Estimate      >60 mL/min/1.73m2 >90  >90    Calcium      8.8 - 10.2 mg/dL 9.0  9.4    Chloride      98 - 107 mmol/L 103  104    Glucose      70 - 99 mg/dL 118 (H)  200 (H)    Alkaline Phosphatase      40 - 150 U/L 122     AST      0 - 45 U/L 26     ALT      0 - 70 U/L 26     Protein Total      6.4 - 8.3 g/dL 6.7     Albumin      3.5 - 5.2 g/dL 3.9     Bilirubin Total      <=1.2 mg/dL 0.8        Imaging:  Reviewed and are as described above.     Assessment:  In summary, Bon is a 69 year old male with a history of small bowel obstruction in Oct 2023, CAD S/P stent x 2 in 2016, right sided renal cell carcinoma S/P partial right nephrectomy in  2017, HTN, Diabetes with neuropathy, hypercholesterolemia, hypogonadism on testosterone replacement, and psoriasis with arthritis, who also has a history of PICC line associated right upper extremity DVT in Oct 2023, who is referred by Dr. Aditya Munoz of [facility] for consultation in regard to his recent finding of extensive right leg DVT and pulmonary embolism on 5/9/2024.     Bon's right upper extremity DVT back in Oct 2023 was a PICC line provoked venous thromboembolic event for which he was appropriately treated with 3 months of anticoagulation therapy.     His recent right leg DVT and pulmonary embolism is rather complicated. He actually recalls that his right calf pain started 3 weeks prior to his flight to [place] and back. His symptoms gotten worse after his trip and that was why he presented himself to the emergency department. Thus his DVT/PE on 5/9/2024 was not likely related to his flight to and back from [place]. Also his symptoms of right calf pain started before he started to use his testosterone cream daily (about 1 week prior to his trip). Thus it is also unlikely that testosterone cream use had anything to do with his DVT/PE. Most study in regard to testosterone and correlation to DVT/PE were done on injectable testosterone and thus it is unclear about topical testosterone and its effect or correlation to venous thromboembolism. For injectable testosterone, some literature suggest that risk for venous thromboembolism is at the highest during the first 6 months of testosterone replacement therapy and then it plateaus off. So it is unclear in this particular case if testosterone has anything to do with his venous thromboembolic event.     I also have done some literature search about risankizumab-rzaa (Skyrizi) and correlation to venous thromboembolic events but I found none.     Finally, as mentioned, Bon has underlying chronic inflammatory condition of psoriasis with  arthritis, which by itself could increase someone risks of venous thromboembolic event.     Thus I do think that Bon's right leg DVT and pulmonary embolic event back on 5/9/2024 was likely an unprovoked event or a provoked event caused by an irreversible chronic inflammatory condition.     Diagnosis:  History of PICC line associated Right upper extremity DVT back in Oct 2023. S/P 3 months of anticoagulation therapy.   Likely unprovoked (or provoked by his chronic inflammatory condition of psoriasis with arthritis) right leg DVT and pulmonary embolism on 5/9/2024.   Psoriasis   Psoriatic arthritis.   Low testosterone.     Plan:  I have a long discussion with Bon today in regard to our plan and recommendation.     I spent quite a bit of time today to educate Bon on DVT/PE, provoked vs unprovoked venous thromboembolic event, and general approach in regard to anticoagulation therapy management and duration. I also answered all his questions to his satisfaction.     I explain to Bon that his right upper extremity DVT back in Oct 2023 was a PICC line provoked event and he was appropriately treated with 3 months of anticoagulation therapy at the time.     However, I explain to him that his right lower extremity DVT with associated pulmonary embolism back on 5/9/2024 was an unprovoked event or it was provoked by the fact that he has underlying chronic inflammatory condition and thus in accordance to current American Society of Hematology (CHANO) guidelines, he should maintain on indefinite anticoagulation therapy. At this time, Bon will need at least 3-6 months of anticoagulation therapy and then we will likely keep him on indefinite anticoagulation therapy thereafter.     I have no opposition for him to restart testosterone replacement if found indicated as he is now in theory protected from a recurrent venous thromboembolism while on anticoagulation therapy.     Finally, we discuss about inherit thrombophilia workup or  acquired thrombophilia workup. I do not feel that an extensive inherit thrombophilia workup is necessary as this will not change my treatment recommendation. Additionally the yield of such workup is low as he has no family history of DVT/PE. However, I do think that we should check cardiolipin Abys and beta 2 glycoprotein Abys as he does have underlying autoimmune disorder and might influence the choices of anticoagulation agent if he is found to have antiphospholipid antibody syndrome (APS). I am not able to check lupus anticoagulant today as he is on apixaban which will cause a false positive lupus anticoagulant test.     So my plan summary is as follow:  Continue apixaban at 5 mg PO BID as his mainstay of anticoagulation therapy.  Check Cardiolipin Abys and beta 2 glycoprotein Abys today.   Will plan to repeat his right leg venous ultrasound in 4 months. Will also recheck his right upper extremity venous ultrasound mostly for future reference. I have placed orders for both of these and will have one of our nursing coordinator assist in arranging for him to get these done in 4 months. I will contact him once the reports are back.   I will then plan to see him back annually for follow up.   I do not have any opposition for him to restart testosterone replacement if found indicated as he is now in theory protected from a recurrent venous thromboembolism while on anticoagulation therapy.     Patient is instructed to call our clinic if he should experience any unusual bleeding complications or if he should need any invasive or surgical procedures in the future. Otherwise, will plan to see him back in one year for routine follow up. Virtual visit or inperson visit is fine.         Geovany Yu PA-C, MPAS  Physician Assistant  [facility].     The longitudinal plan of care for these conditions below were addressed during this visit. Due to the added  complexity in care, I will continue to support Bon ANGELES Ene  in the subsequent management of these conditions and with the ongoing continuity of care of these conditions.    Problem List Items Addressed This Visit as of 2/19/2024   History of PICC line associated Right upper extremity DVT back in Oct 2023. S/P 3 months of anticoagulation therapy.   Likely unprovoked (or provoked by his chronic inflammatory condition of psoriasis with arthritis) right leg DVT and pulmonary embolism on 5/9/2024.   Psoriasis   Psoriatic arthritis.   Low testosterone.     70 minutes spent by me on the date of the encounter doing chart review, history and exam, documentation and further activities per the note.    Time IN: 11:04  Time OUT: 12:05

## 2024-06-26 LAB
B2 GLYCOPROT1 IGG SERPL IA-ACNC: <0.8 U/ML
B2 GLYCOPROT1 IGM SERPL IA-ACNC: 3.4 U/ML
CARDIOLIPIN IGG SER IA-ACNC: <2 GPL-U/ML
CARDIOLIPIN IGG SER IA-ACNC: NEGATIVE
CARDIOLIPIN IGM SER IA-ACNC: 6.3 MPL-U/ML
CARDIOLIPIN IGM SER IA-ACNC: NEGATIVE

## 2024-08-03 ENCOUNTER — HEALTH MAINTENANCE LETTER (OUTPATIENT)
Age: 69
End: 2024-08-03

## 2024-08-23 ENCOUNTER — OFFICE VISIT (OUTPATIENT)
Dept: INTERNAL MEDICINE | Facility: CLINIC | Age: 69
End: 2024-08-23
Payer: COMMERCIAL

## 2024-08-23 VITALS
HEIGHT: 72 IN | DIASTOLIC BLOOD PRESSURE: 80 MMHG | WEIGHT: 223.9 LBS | HEART RATE: 93 BPM | TEMPERATURE: 98.1 F | RESPIRATION RATE: 18 BRPM | OXYGEN SATURATION: 97 % | SYSTOLIC BLOOD PRESSURE: 138 MMHG | BODY MASS INDEX: 30.33 KG/M2

## 2024-08-23 DIAGNOSIS — I25.10 ASCVD (ARTERIOSCLEROTIC CARDIOVASCULAR DISEASE): ICD-10-CM

## 2024-08-23 DIAGNOSIS — E11.42 DIABETIC PERIPHERAL NEUROPATHY ASSOCIATED WITH TYPE 2 DIABETES MELLITUS (H): ICD-10-CM

## 2024-08-23 DIAGNOSIS — I10 PRIMARY HYPERTENSION: ICD-10-CM

## 2024-08-23 DIAGNOSIS — Z87.19 HISTORY OF DUODENAL ULCER: ICD-10-CM

## 2024-08-23 DIAGNOSIS — R79.89 ABNORMAL LFTS: ICD-10-CM

## 2024-08-23 DIAGNOSIS — R79.89 ELEVATED TSH: ICD-10-CM

## 2024-08-23 DIAGNOSIS — G89.4 CHRONIC PAIN SYNDROME: ICD-10-CM

## 2024-08-23 DIAGNOSIS — R79.89 ABNORMAL TSH: Primary | ICD-10-CM

## 2024-08-23 DIAGNOSIS — Z51.81 ENCOUNTER FOR THERAPEUTIC DRUG MONITORING: ICD-10-CM

## 2024-08-23 DIAGNOSIS — F41.9 ANXIETY: ICD-10-CM

## 2024-08-23 DIAGNOSIS — Z12.5 SCREENING FOR PROSTATE CANCER: ICD-10-CM

## 2024-08-23 DIAGNOSIS — I26.99 ACUTE PULMONARY EMBOLISM WITHOUT ACUTE COR PULMONALE, UNSPECIFIED PULMONARY EMBOLISM TYPE (H): ICD-10-CM

## 2024-08-23 DIAGNOSIS — L40.9 PSORIASIS: ICD-10-CM

## 2024-08-23 DIAGNOSIS — R53.82 CHRONIC FATIGUE: ICD-10-CM

## 2024-08-23 DIAGNOSIS — E11.8 TYPE 2 DIABETES MELLITUS WITH COMPLICATION, WITH LONG-TERM CURRENT USE OF INSULIN (H): ICD-10-CM

## 2024-08-23 DIAGNOSIS — E78.00 HYPERCHOLESTEROLEMIA: ICD-10-CM

## 2024-08-23 DIAGNOSIS — M79.604 BILATERAL LEG PAIN: ICD-10-CM

## 2024-08-23 DIAGNOSIS — E29.1 HYPOGONADISM IN MALE: ICD-10-CM

## 2024-08-23 DIAGNOSIS — Z79.4 TYPE 2 DIABETES MELLITUS WITH COMPLICATION, WITH LONG-TERM CURRENT USE OF INSULIN (H): ICD-10-CM

## 2024-08-23 DIAGNOSIS — M79.605 BILATERAL LEG PAIN: ICD-10-CM

## 2024-08-23 DIAGNOSIS — E03.8 SUBCLINICAL HYPOTHYROIDISM: ICD-10-CM

## 2024-08-23 DIAGNOSIS — K86.3 PSEUDOCYST OF PANCREAS: ICD-10-CM

## 2024-08-23 DIAGNOSIS — I82.411 ACUTE DEEP VEIN THROMBOSIS (DVT) OF FEMORAL VEIN OF RIGHT LOWER EXTREMITY (H): ICD-10-CM

## 2024-08-23 DIAGNOSIS — F32.5 MAJOR DEPRESSIVE DISORDER IN FULL REMISSION, UNSPECIFIED WHETHER RECURRENT (H): ICD-10-CM

## 2024-08-23 DIAGNOSIS — H90.3 BILATERAL SENSORINEURAL HEARING LOSS: ICD-10-CM

## 2024-08-23 DIAGNOSIS — C64.1 RENAL CELL CARCINOMA, RIGHT (H): ICD-10-CM

## 2024-08-23 DIAGNOSIS — G47.33 OBSTRUCTIVE SLEEP APNEA ON CPAP: ICD-10-CM

## 2024-08-23 DIAGNOSIS — L40.50 PSORIATIC ARTHRITIS (H): ICD-10-CM

## 2024-08-23 DIAGNOSIS — Z00.00 ENCOUNTER FOR MEDICARE ANNUAL WELLNESS EXAM: Primary | ICD-10-CM

## 2024-08-23 PROBLEM — R11.2 NAUSEA AND VOMITING: Status: RESOLVED | Noted: 2023-10-05 | Resolved: 2024-08-23

## 2024-08-23 PROBLEM — R74.01 TRANSAMINITIS: Status: RESOLVED | Noted: 2022-07-12 | Resolved: 2024-08-23

## 2024-08-23 PROBLEM — E83.39 HYPOPHOSPHATEMIA: Status: RESOLVED | Noted: 2023-10-05 | Resolved: 2024-08-23

## 2024-08-23 PROBLEM — D72.829 LEUKOCYTOSIS, UNSPECIFIED TYPE: Status: RESOLVED | Noted: 2023-10-27 | Resolved: 2024-08-23

## 2024-08-23 PROBLEM — I82.629 DEEP VEIN THROMBOSIS (DVT) OF UPPER EXTREMITY (H): Status: RESOLVED | Noted: 2023-10-09 | Resolved: 2024-08-23

## 2024-08-23 PROBLEM — M25.462 KNEE EFFUSION, LEFT: Status: RESOLVED | Noted: 2018-11-06 | Resolved: 2024-08-23

## 2024-08-23 PROBLEM — E87.1 HYPONATREMIA: Status: RESOLVED | Noted: 2022-07-12 | Resolved: 2024-08-23

## 2024-08-23 PROBLEM — K26.9 DUODENAL ULCER WITHOUT HEMORRHAGE, PERFORATION, OR OBSTRUCTION: Status: RESOLVED | Noted: 2023-10-16 | Resolved: 2024-08-23

## 2024-08-23 PROBLEM — K31.89 MASS OF DUODENUM: Status: RESOLVED | Noted: 2023-10-16 | Resolved: 2024-08-23

## 2024-08-23 PROBLEM — K56.609 SBO (SMALL BOWEL OBSTRUCTION) (H): Status: RESOLVED | Noted: 2023-10-05 | Resolved: 2024-08-23

## 2024-08-23 PROBLEM — E43 SEVERE PROTEIN-CALORIE MALNUTRITION (H): Status: RESOLVED | Noted: 2023-10-10 | Resolved: 2024-08-23

## 2024-08-23 PROBLEM — R17 JAUNDICE: Status: RESOLVED | Noted: 2022-07-12 | Resolved: 2024-08-23

## 2024-08-23 LAB
ALBUMIN SERPL BCG-MCNC: 4.3 G/DL (ref 3.5–5.2)
ALBUMIN UR-MCNC: NEGATIVE MG/DL
ALP SERPL-CCNC: 168 U/L (ref 40–150)
ALT SERPL W P-5'-P-CCNC: 27 U/L (ref 0–70)
ANION GAP SERPL CALCULATED.3IONS-SCNC: 13 MMOL/L (ref 7–15)
APPEARANCE UR: CLEAR
AST SERPL W P-5'-P-CCNC: 30 U/L (ref 0–45)
BACTERIA #/AREA URNS HPF: ABNORMAL /HPF
BILIRUB SERPL-MCNC: 0.5 MG/DL
BILIRUB UR QL STRIP: NEGATIVE
BUN SERPL-MCNC: 10.2 MG/DL (ref 8–23)
CALCIUM SERPL-MCNC: 9.2 MG/DL (ref 8.8–10.4)
CHLORIDE SERPL-SCNC: 101 MMOL/L (ref 98–107)
CHOLEST SERPL-MCNC: 233 MG/DL
COLOR UR AUTO: YELLOW
CREAT SERPL-MCNC: 0.9 MG/DL (ref 0.67–1.17)
CREAT UR-MCNC: 47.2 MG/DL
EGFRCR SERPLBLD CKD-EPI 2021: >90 ML/MIN/1.73M2
ERYTHROCYTE [DISTWIDTH] IN BLOOD BY AUTOMATED COUNT: 12.8 % (ref 10–15)
FASTING STATUS PATIENT QL REPORTED: NO
FASTING STATUS PATIENT QL REPORTED: NO
GLUCOSE SERPL-MCNC: 138 MG/DL (ref 70–99)
GLUCOSE UR STRIP-MCNC: NEGATIVE MG/DL
HBA1C MFR BLD: 9.1 % (ref 0–5.6)
HCO3 SERPL-SCNC: 25 MMOL/L (ref 22–29)
HCT VFR BLD AUTO: 44.5 % (ref 40–53)
HDLC SERPL-MCNC: 38 MG/DL
HGB BLD-MCNC: 15 G/DL (ref 13.3–17.7)
HGB UR QL STRIP: ABNORMAL
KETONES UR STRIP-MCNC: NEGATIVE MG/DL
LDLC SERPL CALC-MCNC: 156 MG/DL
LEUKOCYTE ESTERASE UR QL STRIP: NEGATIVE
MCH RBC QN AUTO: 27.9 PG (ref 26.5–33)
MCHC RBC AUTO-ENTMCNC: 33.7 G/DL (ref 31.5–36.5)
MCV RBC AUTO: 83 FL (ref 78–100)
MICROALBUMIN UR-MCNC: <12 MG/L
MICROALBUMIN/CREAT UR: NORMAL MG/G{CREAT}
NITRATE UR QL: NEGATIVE
NONHDLC SERPL-MCNC: 195 MG/DL
PH UR STRIP: 5.5 [PH] (ref 5–8)
PLATELET # BLD AUTO: 218 10E3/UL (ref 150–450)
POTASSIUM SERPL-SCNC: 4 MMOL/L (ref 3.4–5.3)
PROT SERPL-MCNC: 7.3 G/DL (ref 6.4–8.3)
PSA SERPL DL<=0.01 NG/ML-MCNC: 0.84 NG/ML (ref 0–4.5)
RBC # BLD AUTO: 5.38 10E6/UL (ref 4.4–5.9)
RBC #/AREA URNS AUTO: ABNORMAL /HPF
SHBG SERPL-SCNC: 54 NMOL/L (ref 11–80)
SODIUM SERPL-SCNC: 139 MMOL/L (ref 135–145)
SP GR UR STRIP: 1.01 (ref 1–1.03)
SQUAMOUS #/AREA URNS AUTO: ABNORMAL /LPF
T4 FREE SERPL-MCNC: 1.02 NG/DL (ref 0.9–1.7)
TRIGL SERPL-MCNC: 195 MG/DL
TSH SERPL DL<=0.005 MIU/L-ACNC: 4.41 UIU/ML (ref 0.3–4.2)
UROBILINOGEN UR STRIP-ACNC: 0.2 E.U./DL
WBC # BLD AUTO: 8.9 10E3/UL (ref 4–11)
WBC #/AREA URNS AUTO: ABNORMAL /HPF

## 2024-08-23 PROCEDURE — 83036 HEMOGLOBIN GLYCOSYLATED A1C: CPT | Performed by: INTERNAL MEDICINE

## 2024-08-23 PROCEDURE — 80061 LIPID PANEL: CPT | Performed by: INTERNAL MEDICINE

## 2024-08-23 PROCEDURE — 84443 ASSAY THYROID STIM HORMONE: CPT | Performed by: INTERNAL MEDICINE

## 2024-08-23 PROCEDURE — 84439 ASSAY OF FREE THYROXINE: CPT | Performed by: INTERNAL MEDICINE

## 2024-08-23 PROCEDURE — G0009 ADMIN PNEUMOCOCCAL VACCINE: HCPCS | Performed by: INTERNAL MEDICINE

## 2024-08-23 PROCEDURE — 90677 PCV20 VACCINE IM: CPT | Performed by: INTERNAL MEDICINE

## 2024-08-23 PROCEDURE — 84403 ASSAY OF TOTAL TESTOSTERONE: CPT | Performed by: INTERNAL MEDICINE

## 2024-08-23 PROCEDURE — 80053 COMPREHEN METABOLIC PANEL: CPT | Performed by: INTERNAL MEDICINE

## 2024-08-23 PROCEDURE — 99215 OFFICE O/P EST HI 40 MIN: CPT | Mod: 25 | Performed by: INTERNAL MEDICINE

## 2024-08-23 PROCEDURE — 81001 URINALYSIS AUTO W/SCOPE: CPT | Performed by: INTERNAL MEDICINE

## 2024-08-23 PROCEDURE — 82570 ASSAY OF URINE CREATININE: CPT | Performed by: INTERNAL MEDICINE

## 2024-08-23 PROCEDURE — 82043 UR ALBUMIN QUANTITATIVE: CPT | Performed by: INTERNAL MEDICINE

## 2024-08-23 PROCEDURE — 85027 COMPLETE CBC AUTOMATED: CPT | Performed by: INTERNAL MEDICINE

## 2024-08-23 PROCEDURE — G0438 PPPS, INITIAL VISIT: HCPCS | Performed by: INTERNAL MEDICINE

## 2024-08-23 PROCEDURE — 84270 ASSAY OF SEX HORMONE GLOBUL: CPT | Performed by: INTERNAL MEDICINE

## 2024-08-23 PROCEDURE — G0103 PSA SCREENING: HCPCS | Performed by: INTERNAL MEDICINE

## 2024-08-23 PROCEDURE — 36415 COLL VENOUS BLD VENIPUNCTURE: CPT | Performed by: INTERNAL MEDICINE

## 2024-08-23 RX ORDER — CALCIUM CARBONATE/VITAMIN D3 500-10/5ML
400 LIQUID (ML) ORAL DAILY
COMMUNITY

## 2024-08-23 RX ORDER — ROSUVASTATIN CALCIUM 10 MG/1
10 TABLET, COATED ORAL DAILY
Qty: 90 TABLET | Refills: 3 | Status: SHIPPED | OUTPATIENT
Start: 2024-08-23

## 2024-08-23 SDOH — HEALTH STABILITY: PHYSICAL HEALTH: ON AVERAGE, HOW MANY MINUTES DO YOU ENGAGE IN EXERCISE AT THIS LEVEL?: 20 MIN

## 2024-08-23 SDOH — HEALTH STABILITY: PHYSICAL HEALTH: ON AVERAGE, HOW MANY DAYS PER WEEK DO YOU ENGAGE IN MODERATE TO STRENUOUS EXERCISE (LIKE A BRISK WALK)?: 1 DAY

## 2024-08-23 ASSESSMENT — PATIENT HEALTH QUESTIONNAIRE - PHQ9
SUM OF ALL RESPONSES TO PHQ QUESTIONS 1-9: 5
SUM OF ALL RESPONSES TO PHQ QUESTIONS 1-9: 5
10. IF YOU CHECKED OFF ANY PROBLEMS, HOW DIFFICULT HAVE THESE PROBLEMS MADE IT FOR YOU TO DO YOUR WORK, TAKE CARE OF THINGS AT HOME, OR GET ALONG WITH OTHER PEOPLE: NOT DIFFICULT AT ALL

## 2024-08-23 ASSESSMENT — SOCIAL DETERMINANTS OF HEALTH (SDOH): HOW OFTEN DO YOU GET TOGETHER WITH FRIENDS OR RELATIVES?: ONCE A WEEK

## 2024-08-23 NOTE — PROGRESS NOTES
Preventive Care Visit  [facility]  Aditya Munoz MD, Internal Medicine  Aug 23, 2024      Assessment & Plan     Encounter for Medicare annual wellness exam  Immunizations are reviewed and providing Prevnar 20.  Recommending Shingrix.  Also discussed getting flu shot and COVID-vaccine this fall when available.  Living will discussed.  Former smoker with less than 20-pack-year history.  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  Regular exercise and good diet habits to maintain a healthy weight discussed.  Up to date with colonoscopies and this should be repeated in 2027.  Prostate exam is deferred but I will check a PSA for prostate cancer screening.  Dementia and depression screening completed.  Recommending getting an annual eye exam completed.  Seeing a dentist every 6 months recommended.  Skin exam performed and recommending regular use of sunblock.  Hepatitis C antibody for screening was normal.  No AAA on previous imaging earlier this year    Acute pulmonary embolism without acute cor pulmonale, unspecified pulmonary embolism type (H)  Hospitalized earlier this year with acute pulmonary embolus associated with extensive DVT involving right lower extremity.  This occurred after a 3.5-hour airline flight but no other obvious precipitating factors.  Treated with IV heparin and transition to Eliquis and he continues taking 5 mg twice daily.  He was referred to hematology.  Pulmonary embolism and DVT should be considered as unprovoked and likely related to underlying inflammation related to psoriatic arthritis.  For this reason it is recommended that he continue on lifelong anticoagulation and he will continue Eliquis 5 mg twice daily.  I had some concerns about his testosterone use and its increased risk for thromboembolic events.  As suggested by hematology, Dr. Yu, may be reasonable to resume testosterone replacement now that he is fully anticoagulated.    Acute deep vein  thrombosis (DVT) of femoral vein of right lower extremity (H)  As above, plans for ultrasound of right lower extremity in October    Type 2 diabetes mellitus with complication, with long-term current use of insulin (H)  Followed by endocrinology.  Last A1c 8.5%.  He continues on Lantus 50 units twice daily and is using Humalog before meals.  Rechecking A1c today.  Discussed the importance of getting a diabetic eye exam completed as he is overdue.  Diabetic foot exam performed and he does have peripheral neuropathy.  Recommending starting rosuvastatin.  Aspirin is contraindicated while on Eliquis.  Obtain microalbumin.  - HEMOGLOBIN A1C; Future  - Albumin Random Urine Quantitative with Creat Ratio; Future    ASCVD (arteriosclerotic cardiovascular disease)  History of placement of coronary artery stents.  Denies any exertional chest pain.  Recommending starting rosuvastatin to lower his cardiovascular risk.  Eating better control of diabetes.  - rosuvastatin (CRESTOR) 10 MG tablet; Take 1 tablet (10 mg) by mouth daily.    Chronic pain syndrome  Followed by pain clinic and continues on MS Contin    Psoriatic arthritis (H)  Followed by rheumatology and stable with current dose of Skyrizi    Diabetic peripheral neuropathy associated with type 2 diabetes mellitus (H)  Using gabapentin to manage neuropathy symptoms.  Needing better diabetes control    Renal cell carcinoma, right (H)  Diagnosed renal cell carcinoma 2017.  History of nephrectomy.  No recurrence on recent imaging    Major depressive disorder in full remission, unspecified whether recurrent (H24)  PHQ-9 score is 4.  Continues on duloxetine and doing generally well    Primary hypertension  Blood pressure is fairly well-controlled.  Consider adding ACE inhibitor or ARB if microalbumin is elevated  - Comprehensive metabolic panel (BMP + Alb, Alk Phos, ALT, AST, Total. Bili, TP); Future  - UA Macroscopic with reflex to Microscopic and Culture - Lab Collect;  Future    Hypercholesterolemia  Rechecking lipid profile.  I am recommending that he start rosuvastatin to lower his cardiovascular risk in the setting of diabetes and history of ASCVD.  He previously was not able to tolerate atorvastatin causing muscle aches and will need to monitor for any such recurrence with the rosuvastatin  - rosuvastatin (CRESTOR) 10 MG tablet; Take 1 tablet (10 mg) by mouth daily.  - Lipid Profile (Chol, Trig, HDL, LDL calc); Future    Bilateral leg pain  Experiencing worsening bilateral leg pain that is most bothersome when he tries to stand or walk.  It is limiting his walking.  Good pedal pulses.  Concern for possible lumbar spinal stenosis and I am recommending getting an MRI lumbar spine to further evaluate  - MR Lumbar Spine w/o Contrast; Future    Hypogonadism in male  He has been off of testosterone since his DVT and pulmonary embolus earlier this year.  Rechecking testosterone level and reasonable to resume now that he is fully anticoagulated  - Testosterone Free and Total; Future    Obstructive sleep apnea on CPAP  He is not wearing CPAP currently and I am encouraging him to resume use    History of duodenal ulcer  He avoids aspirin and NSAIDs with his history of duodenal ulcer.  No recurrent symptoms    Chronic fatigue  He is feeling more tired throughout the day.  This could be related due to uncontrolled diabetes.  Untreated sleep apnea certainly a contributing factor as well.  Will check additional metabolic studies.  - TSH with free T4 reflex; Future    Psoriasis  Followed by dermatology and very well-controlled with Skyrizi    Pseudocyst of pancreas  Extensive evaluation last winter by gastroenterology with resolution of pseudocyst and biopsies negative for malignancy    Anxiety  Continue duloxetine    Bilateral sensorineural hearing loss  We discussed getting hearing aids at some point    Screening for prostate cancer  Exam deferred but will check PSA for prostate cancer  "screening  - PSA, screen; Future    Encounter for therapeutic drug monitoring  Monitor CBC while anticoagulated and monitor LFTs when starting statin  - CBC with platelets; Future  - Comprehensive metabolic panel (BMP + Alb, Alk Phos, ALT, AST, Total. Bili, TP); Future    Patient has been advised of split billing requirements and indicates understanding: Yes    72 minutes spent in the management of this patient including reviewing records, obtaining history, performing exam, ordering appropriate tests and documenting visit.    20 of those minutes spent addressing wellness and 52 minutes spent addressing chronic and new medical problems.  See assessment plan for details.    BMI  Estimated body mass index is 30.36 kg/m  as calculated from the following:    Height as of this encounter: 1.829 m (6' 0.01\").    Weight as of this encounter: 101.6 kg (223 lb 14.4 oz).       Counseling  Appropriate preventive services were addressed with this patient via screening, questionnaire, or discussion as appropriate for fall prevention, nutrition, physical activity, Tobacco-use cessation, social engagement, weight loss and cognition.  Checklist reviewing preventive services available has been given to the patient.  Reviewed patient's diet, addressing concerns and/or questions.   He is at risk for lack of exercise and has been provided with information to increase physical activity for the benefit of his well-being.   Discussed possible causes of fatigue. The patient was provided with written information regarding signs of hearing loss.   The patient's PHQ-9 score is consistent with mild depression. He was provided with information regarding depression.   I have reviewed Opioid Use Disorder and Substance Use Disorder risk factors and made any needed referrals.       Follow-up in 4 months    Florencio Crockett is a 69 year old, presenting for the following: Here for an annual wellness visit and to follow-up multiple chronic medical " problems including recent acute pulmonary embolus, ASCVD, type 2 diabetes, psoriatic arthritis and multiple other issues.  See assessment and plan for details  Annual Visit (AWV. Not fasting today. Fatigued very easily lately. Tired all the time, napping and then not sleeping well at night always. Probably for about the last year, especially bad since having a blood clot.)        8/23/2024    12:44 PM   Additional Questions   Roomed by [facility] Care Directive  Patient does not have a Health Care Directive or Living Will: Discussed advance care planning with patient; information given to patient to review.          12/4/2023   General Health   How would you rate your overall physical health? Fair            12/4/2023   Nutrition   At least 4 servings of fruits and vegetables/day Yes            9/22/2022   Exercise   Frequency of exercise: None            1/30/2024   Social Factors   Worry food won't last until get money to buy more No   Food not last or not have enough money for food? No   Do you have housing? (Housing is defined as stable permanent housing and does not include staying ouside in a car, in a tent, in an abandoned building, in an overnight shelter, or couch-surfing.) Yes   Are you worried about losing your housing? No   Lack of transportation? No   Unable to get utilities (heat,electricity)? No            12/4/2023   Activities of Daily Living- Home Safety   Needs help with the following daily activites NO assistance is needed   Safety concerns in the home Throw rugs in the hallway    No grab bars in the bathroom       Multiple values from one day are sorted in reverse-chronological order          No data to display                  12/4/2023   Hearing Screening   Hearing concerns? Difficulty following dialogue in the theater    Difficulty understanding soft or whispered speech       Multiple values from one day are sorted in reverse-chronological order            No data to display                      Today's PHQ-9 Score:       8/23/2024    12:06 PM   PHQ-9 SCORE   PHQ-9 Total Score MyChart 5 (Mild depression)   PHQ-9 Total Score 5           12/4/2023   Substance Use   Alcohol more than 3/day or more than 7/wk Not Applicable        Social History     Tobacco Use    Smoking status: Former     Current packs/day: 0.50     Average packs/day: 0.5 packs/day for 5.0 years (2.5 ttl pk-yrs)     Types: Cigarettes     Passive exposure: Past    Smokeless tobacco: Never   Vaping Use    Vaping status: Never Used   Substance Use Topics    Alcohol use: No     Comment: Alcoholic Drinks/day: alcohol abuse - in remission x16 years    Drug use: No       Last PSA:   Prostate Specific Antigen Screen   Date Value Ref Range Status   10/02/2017 0.8 0.0 - 4.5 ng/mL Final     ASCVD Risk   The ASCVD Risk score (Brigitte JOHANSEN, et al., 2019) failed to calculate for the following reasons:    Cannot find a previous HDL lab    Cannot find a previous total cholesterol lab            Reviewed and updated as needed this visit by Provider   Tobacco  Allergies  Meds  Problems  Med Hx  Surg Hx  Fam Hx            Past Medical History:   Diagnosis Date    Abdominal pain, epigastric 06/23/2022    Normal gastric emptying study.  Peptic ulcer disease or gastritis suspected    Acute cholecystitis 07/12/2022    Hospitalized with acute abdominal pain, abnormal LFTs and CT showing distended gallbladder and bile duct dilatation.  Acute cholecystitis.  Cholecystectomy.    Acute pulmonary embolism without acute cor pulmonale (H) 05/21/2024    Anxiety 08/23/2024    Arthritis     psoriatic    ASCVD (arteriosclerotic cardiovascular disease) 04/28/2016    Coronary artery stent ×2 in RCA following myocardial infarction 2008 , echo June 2014 normal LV function with ejection fraction 50-55% with mild apical inferior hypokinesis    Bilateral leg pain 08/23/2024    Bilateral sensorineural hearing loss 08/23/2024    Chronic fatigue 06/23/2022     Chronic low back pain 04/28/2016    Chronic right shoulder pain 06/23/2022    Coronary artery disease     Deep vein thrombosis (DVT) of upper extremity (H) 10/09/2023    Depression 12/26/2017    Depressive disorder 2002    Diabetic peripheral neuropathy associated with type 2 diabetes mellitus (H) 04/28/2016    Abnormal EMG and workup at [facility]    Dilated bile duct 07/03/2017    CT scan with incidental finding of dilated intra-and extrahepatic ducts    Duodenal ulcer without hemorrhage, perforation, or obstruction 10/16/2023    History of duodenal ulcer 08/23/2024    HTN (hypertension)     Hypercholesterolemia     Hypogonadism in male     Hyponatremia 07/12/2022    Hypophosphatemia 10/05/2023    Ischemic cardiomyopathy     Stress Echocardiogram 2017 with decreased LV function with EF 43% worse compared to previous echo.  No change in infarct.  No new ischemia    Knee effusion, left 11/06/2018    Left upper quadrant pain 06/30/2017    Leukocytosis, unspecified type 10/27/2023    Lower abdominal pain     Mass of duodenum 10/16/2023    Obstruction of second portion of duodenum with abnormal CT scan, biopsies negative for endoscopic ultrasound    MI (myocardial infarction) (H)     Nausea and vomiting 10/05/2023    Obstructive sleep apnea on CPAP     CPAP    Optic neuritis     Psoriasis     Recurrent vomiting 09/01/2023    Renal cell carcinoma, right (H) 06/23/2022    Right partial nephrectomy 2017    Right renal mass     SBO (small bowel obstruction) (H) 10/05/2023    Screening for AAA (abdominal aortic aneurysm)     No AAA on imaging in 2024    Sebaceous cyst 2010    Thoracic radiculopathy 2004    right T8 intercostal nerve block 2002    Tobacco abuse, in remission 04/28/2016    Transaminitis 07/12/2022    Type 2 diabetes mellitus with insulin therapy (H)      Past Surgical History:   Procedure Laterality Date    APPENDECTOMY      COLONOSCOPY      Colonoscopy September 2017 with hyperplastic polyps, repeat in 10  years    CORONARY ANGIOPLASTY      stent    ENDOSCOPIC RETROGRADE CHOLANGIOPANCREATOGRAM N/A 7/12/2022    Procedure: ENDOSCOPIC RETROGRADE CHOLANGIOPANCREATOGRAPHY, STONE EXTRACTION, BILIARY SPHINCTEROTOMY;  Surgeon: Bon Meyer MD;  [place] [facility] [place]OPHAGOSCOPY, GASTROSCOPY, DUODENOSCOPY (EGD), COMBINED N/A 10/6/2023    Procedure: ESOPHAGOGASTRODUODENOSCOPY, WITH ENDOSCOPIC ULTRASOUND WITH FINE NEEDLE ASPIRATION AND DUODENAL BIOPSY;  Surgeon: Bon Meyer MD;  Location: [facility] [place]    KNEE SURGERY      LAPAROSCOPIC CHOLECYSTECTOMY N/A 7/12/2022    Procedure: CHOLECYSTECTOMY, LAPAROSCOPIC;  Surgeon: Adam March MD;  Location: [facility] [place]    NOSE SURGERY      PICC DOUBLE LUMEN PLACEMENT  10/9/2023    IN LAP,PARTIAL NEPHRECTOMY Right 1/10/2018    Procedure: RIGHT ROBOTIC PARTIAL NEPHRECTOMY WITH INTRAOPERATIVE ULTRASOUND;  Surgeon: Chase Rodríguez MD;  Location: [facility];  Service: Urology     Current providers sharing in care for this patient include:  Patient Care Team:  Aditya Munoz MD as PCP - General (Internal Medicine)  Merlin Soni MBBS as Assigned Rheumatology Provider  Aditya Munoz MD as Assigned PCP  Leventhal, Thomas Michael, MD as MD (Gastroenterology)  Candy Hamm RPH as Pharmacist (Pharmacist)  Candy Hamm RPH as Assigned [place] Pharmacist  Geovany Yu PA-C as Assigned Cancer Care Provider    The following health maintenance items are reviewed in Epic and correct as of today:  Health Maintenance   Topic Date Due    DEPRESSION ACTION PLAN  Never done    Pneumococcal Vaccine: 65+ Years (1 of 2 - PCV) Never done    ZOSTER IMMUNIZATION (1 of 2) Never done    LIPID  10/02/2018    COVID-19 Vaccine (5 - 2023-24 season) 01/29/2024    A1C  04/05/2024    EYE EXAM  06/10/2024    INFLUENZA VACCINE (1) 09/01/2024    MICROALBUMIN  10/16/2024    PHQ-9  02/23/2025    BMP  05/12/2025    MEDICARE ANNUAL WELLNESS VISIT  08/23/2025  "   DIABETIC FOOT EXAM  08/23/2025    ANNUAL REVIEW OF  ORDERS  08/23/2025    FALL RISK ASSESSMENT  08/23/2025    COLORECTAL CANCER SCREENING  09/14/2027    DTAP/TDAP/TD IMMUNIZATION (2 - Td or Tdap) 10/02/2027    ADVANCE CARE PLANNING  08/23/2029    HEPATITIS C SCREENING  Completed    RSV VACCINE (Pregnancy & 60+)  Completed    AORTIC ANEURYSM SCREENING (SYSTEM ASSIGNED)  Completed    Medicare Annual [place] Pharmacist Visit (once per calendar year)  Completed    HPV IMMUNIZATION  Aged Out    MENINGITIS IMMUNIZATION  Aged Out    RSV MONOCLONAL ANTIBODY  Aged Out    LUNG CANCER SCREENING  Discontinued         Review of Systems  Constitutional, HEENT, cardiovascular, pulmonary, GI, , musculoskeletal, neuro, skin, endocrine and psych systems are negative, except as otherwise noted.     Objective    Exam  /80   Pulse 93   Temp 98.1  F (36.7  C) (Oral)   Resp 18   Ht 1.829 m (6' 0.01\")   Wt 101.6 kg (223 lb 14.4 oz)   SpO2 97%   BMI 30.36 kg/m     Estimated body mass index is 30.36 kg/m  as calculated from the following:    Height as of this encounter: 1.829 m (6' 0.01\").    Weight as of this encounter: 101.6 kg (223 lb 14.4 oz).    Physical Exam  EYES: Eyelids, conjunctiva, and sclera were normal. Pupils were normal. Cornea, iris, and lens were normal bilaterally.  HEAD, EARS, NOSE, MOUTH, AND THROAT: Head and face were normal. TMs and external auditory canals are normal.  Oropharynx normal  NECK: Neck appearance was normal. There were no neck masses and the thyroid was not enlarged and no nodules are felt.  No lymphadenopathy.  RESPIRATORY: Breathing pattern was normal and the chest moved symmetrically.   Lung sounds were normal and there were no rales or wheezes.  CARDIOVASCULAR: Heart rate and rhythm were normal.  S1 and S2 were normal and there were no extra sounds or murmurs. Peripheral pulses in arms and legs were normal.  There was no peripheral edema.  No carotid bruits.  GASTROINTESTINAL:  Bowel " sounds were present.   Palpation detected no tenderness, mass, or enlarged organs.   RECTAL/PROSTATE: Deferred  MUSCULOSKELETAL: Skeletal configuration was normal and muscle mass was normal for age. Joint appearance was overall normal.  LYMPHATIC: There were no enlarged nodes.  SKIN/HAIR/NAILS: Skin color was normal.  There were no concerning skin lesions.  NEUROLOGIC: The patient was alert and oriented to person, place, time, and circumstance. Speech was normal. Cranial nerves were normal. Motor strength was normal for age. The patient was normally coordinated.    Diabetic foot exam: Abnormal monofilament exam and absent vibratory sensation.  Good pedal pulses.  No open sores on his feet  PSYCHIATRIC:  Mood and affect were normal and the patient had normal recent and remote memory. The patient's judgment and insight were normal.  PHQ-9 score is 4         8/23/2024   Mini Cog   Clock Draw Score 2 Normal   3 Item Recall 2 objects recalled   Mini Cog Total Score 4                 Signed Electronically by: Aditya Munoz MD

## 2024-08-23 NOTE — PATIENT INSTRUCTIONS
Patient Education   Preventive Care Advice   This is general advice given by our system to help you stay healthy. However, your care team may have specific advice just for you. Please talk to your care team about your preventive care needs.  Nutrition  Eat 5 or more servings of fruits and vegetables each day.  Try wheat bread, brown rice and whole grain pasta (instead of white bread, rice, and pasta).  Get enough calcium and vitamin D. Check the label on foods and aim for 100% of the RDA (recommended daily allowance).  Lifestyle  Exercise at least 150 minutes each week  (30 minutes a day, 5 days a week).  Do muscle strengthening activities 2 days a week. These help control your weight and prevent disease.  No smoking.  Wear sunscreen to prevent skin cancer.  Have a dental exam and cleaning every 6 months.  Annual diabetic eye exam.  You are overdue.  Please schedule  Yearly exams  See your health care team every year to talk about:  Any changes in your health.  Any medicines your care team has prescribed.  Preventive care, family planning, and ways to prevent chronic diseases.  Shots (vaccines)   COVID-19 shot: Get this shot when it's available this fall  Flu shot: Get a flu shot every year.  Tetanus shot: Get a tetanus shot every 10 years.  Pneumonia vaccine Prevnar 20 is being provided today  Shingles shot (for age 50 and up).  Recommending Shingrix.  This can be scheduled at your pharmacy  General health tests  Diabetes screening:  Cholesterol test: Annually  Hepatitis C: Get tested at least once in your life.  STIs (sexually transmitted infections)  Before age 24: Ask your care team if you should be screened for STIs.  After age 24: Get screened for STIs if you're at risk. You are at risk for STIs (including HIV) if:  You are sexually active with more than one person.  You don't use condoms every time.  You or a partner was diagnosed with a sexually transmitted infection.  If you are at risk for HIV, ask about  PrEP medicine to prevent HIV.  Get tested for HIV at least once in your life, whether you are at risk for HIV or not.  Cancer screening tests  Colon cancer screening: It is important to start screening for colon cancer at age 45.  Colonoscopy will be due 2027  Prostate cancer screening test: Annual PSA  For informational purposes only. Not to replace the advice of your health care provider. Copyright   2023 [facility]. All rights reserved. Clinically reviewed by the  [facility]. [employer] 487210 - REV 01/24.  Preventing Falls: Care Instructions  Injuries and health problems such as trouble walking or poor eyesight can increase your risk of falling. So can some medicines. But there are things you can do to help prevent falls. You can exercise to get stronger. You can also arrange your home to make it safer.    Talk to your doctor about the medicines you take. Ask if any of them increase the risk of falls and whether they can be changed or stopped.   Try to exercise regularly. It can help improve your strength and balance. This can help lower your risk of falling.     Practice fall safety and prevention.    Wear low-heeled shoes that fit well and give your feet good support. Talk to your doctor if you have foot problems that make this hard.  Carry a cellphone or wear a medical alert device that you can use to call for help.  Use stepladders instead of chairs to reach high objects. Don't climb if you're at risk for falls. Ask for help, if needed.  Wear the correct eyeglasses, if you need them.    Make your home safer.    Remove rugs, cords, clutter, and furniture from walkways.  Keep your house well lit. Use night-lights in hallways and bathrooms.  Install and use sturdy handrails on stairways.  Wear nonskid footwear, even inside. Don't walk barefoot or in socks without shoes.    Be safe outside.    Use handrails, curb cuts, and ramps whenever possible.  Keep your hands free by  "using a shoulder bag or backpack.  Try to walk in well-lit areas. Watch out for uneven ground, changes in pavement, and debris.  Be careful in the winter. Walk on the grass or gravel when sidewalks are slippery. Use de-icer on steps and walkways. Add non-slip devices to shoes.    Put grab bars and nonskid mats in your shower or tub and near the toilet. Try to use a shower chair or bath bench when bathing.   Get into a tub or shower by putting in your weaker leg first. Get out with your strong side first. Have a phone or medical alert device in the bathroom with you.   Where can you learn more?  Go to https://www.[employer].net/patiented  Enter G117 in the search box to learn more about \"Preventing Falls: Care Instructions.\"  Current as of: July 17, 2023               Content Version: 14.0    1717-4568 [employer].   Care instructions adapted under license by your healthcare professional. If you have questions about a medical condition or this instruction, always ask your healthcare professional. [employer]ent disclaims any warranty or liability for your use of this information.      Hearing Loss: Care Instructions  Overview     Hearing loss is a sudden or slow decrease in how well you hear. It can range from slight to profound. Permanent hearing loss can occur with aging. It also can happen when you are exposed long-term to loud noise. Examples include listening to loud music, riding motorcycles, or being around other loud machines.  Hearing loss can affect your work and home life. It can make you feel lonely or depressed. You may feel that you have lost your independence. But hearing aids and other devices can help you hear better and feel connected to others.  Follow-up care is a key part of your treatment and safety. Be sure to make and go to all appointments, and call your doctor if you are having problems. It's also a good idea to know your test results and keep a list of the medicines " you take.  How can you care for yourself at home?  Avoid loud noises whenever possible. This helps keep your hearing from getting worse.  Always wear hearing protection around loud noises.  Wear a hearing aid as directed.  A professional can help you pick a hearing aid that will work best for you.  You can also get hearing aids over the counter for mild to moderate hearing loss.  Have hearing tests as your doctor suggests. They can show whether your hearing has changed. Your hearing aid may need to be adjusted.  Use other devices as needed. These may include:  Telephone amplifiers and hearing aids that can connect to a television, stereo, radio, or microphone.  Devices that use lights or vibrations. These alert you to the doorbell, a ringing telephone, or a baby monitor.  Television closed-captioning. This shows the words at the bottom of the screen. Most new TVs can do this.  TTY (text telephone). This lets you type messages back and forth on the telephone instead of talking or listening. These devices are also called TDD. When messages are typed on the keyboard, they are sent over the phone line to a receiving TTY. The message is shown on a monitor.  Use text messaging, social media, and email if it is hard for you to communicate by telephone.  Try to learn a listening technique called speechreading. It is not lipreading. You pay attention to people's gestures, expressions, posture, and tone of voice. These clues can help you understand what a person is saying. Face the person you are talking to, and have them face you. Make sure the lighting is good. You need to see the other person's face clearly.  Think about counseling if you need help to adjust to your hearing loss.  When should you call for help?  Watch closely for changes in your health, and be sure to contact your doctor if:    You think your hearing is getting worse.     You have new symptoms, such as dizziness or nausea.   Where can you learn more?  Go  "to https://www.[employer].net/patiented  Enter R798 in the search box to learn more about \"Hearing Loss: Care Instructions.\"  Current as of: September 27, 2023               Content Version: 14.0    7194-8605 [employer].   Care instructions adapted under license by your healthcare professional. If you have questions about a medical condition or this instruction, always ask your healthcare professional. [employer] disclaims any warranty or liability for your use of this information.      Chronic Pain: Care Instructions  Your Care Instructions     Chronic pain is pain that lasts a long time (months or even years) and may or may not have a clear cause. It is different from acute pain, which usually does have a clear cause--like an injury or illness--and gets better over time. Chronic pain:  Lasts over time but may vary from day to day.  Does not go away despite efforts to end it.  May disrupt your sleep and lead to fatigue.  May cause depression or anxiety.  May make your muscles tense, causing more pain.  Can disrupt your work, hobbies, home life, and relationships with friends and family.  Chronic pain is a very real condition. It is not just in your head. Treatment can help and usually includes several methods used together, such as medicines, physical therapy, exercise, and other treatments. Learning how to relax and changing negative thought patterns can also help you cope.  Chronic pain is complex. Taking an active role in your treatment will help you better manage your pain. Tell your doctor if you have trouble dealing with your pain. You may have to try several things before you find what works best for you.  Follow-up care is a key part of your treatment and safety. Be sure to make and go to all appointments, and call your doctor if you are having problems. It's also a good idea to know your test results and keep a list of the medicines you take.  How can you care for yourself at " home?  Pace yourself. Break up large jobs into smaller tasks. Save harder tasks for days when you have less pain, or go back and forth between hard tasks and easier ones. Take rest breaks.  Relax, and reduce stress. Relaxation techniques such as deep breathing or meditation can help.  Keep moving. Gentle, daily exercise can help reduce pain over the long run. Try low- or no-impact exercises such as walking, swimming, and stationary biking. Do stretches to stay flexible.  Try heat, cold packs, and massage.  Get enough sleep. Chronic pain can make you tired and drain your energy. Talk with your doctor if you have trouble sleeping because of pain.  Think positive. Your thoughts can affect your pain level. Do things that you enjoy to distract yourself when you have pain instead of focusing on the pain. See a movie, read a book, listen to music, or spend time with a friend.  If you think you are depressed, talk to your doctor about treatment.  Keep a daily pain diary. Record how your moods, thoughts, sleep patterns, activities, and medicine affect your pain. You may find that your pain is worse during or after certain activities or when you are feeling a certain emotion. Having a record of your pain can help you and your doctor find the best ways to treat your pain.  Take pain medicines exactly as directed.  If the doctor gave you a prescription medicine for pain, take it as prescribed.  If you are not taking a prescription pain medicine, ask your doctor if you can take an over-the-counter medicine.  Reducing constipation caused by pain medicine  Talk to your doctor about a laxative. If a laxative doesn't work, your doctor may suggest a prescription medicine.  Include fruits, vegetables, beans, and whole grains in your diet each day. These foods are high in fiber.  If your doctor recommends it, get more exercise. Walking is a good choice. Bit by bit, increase the amount you walk every day. Try for at least 30 minutes on  "most days of the week.  Schedule time each day for a bowel movement. A daily routine may help. Take your time and do not strain when having a bowel movement.  When should you call for help?   Call your doctor now or seek immediate medical care if:    Your pain gets worse or is out of control.     You feel down or blue, or you do not enjoy things like you once did. You may be depressed, which is common in people with chronic pain. Depression can be treated.     You have vomiting or cramps for more than 2 hours.   Watch closely for changes in your health, and be sure to contact your doctor if:    You cannot sleep because of pain.     You are very worried or anxious about your pain.     You have trouble taking your pain medicine.     You have any concerns about your pain medicine.     You have trouble with bowel movements, such as:  No bowel movement in 3 days.  Blood in the anal area, in your stool, or on the toilet paper.  Diarrhea for more than 24 hours.   Where can you learn more?  Go to https://www.[employer].net/patiented  Enter N004 in the search box to learn more about \"Chronic Pain: Care Instructions.\"  Current as of: July 10, 2023               Content Version: 14.0    2411-3527 DreamNotes.   Care instructions adapted under license by your healthcare professional. If you have questions about a medical condition or this instruction, always ask your healthcare professional. [employer] disclaims any warranty or liability for your use of this information.      Eating Healthy Foods: Care Instructions  With every meal, you can make healthy food choices. Try to eat a variety of fruits, vegetables, whole grains, lean proteins, and low-fat dairy products. This can help you get the right balance of nutrients, including vitamins and minerals. Small changes add up over time. You can start by adding one healthy food to your meals each day.    Try to make half your plate fruits and vegetables, " "one-fourth whole grains, and one-fourth lean proteins. Try including dairy with your meals.   Eat more fruits and vegetables. Try to have them with most meals and snacks.   Foods for healthy eating    Fruits    These can be fresh, frozen, canned, or dried.  Try to choose whole fruit rather than fruit juice.  Eat a variety of colors.    Vegetables    These can be fresh, frozen, canned, or dried.  Beans, peas, and lentils count too.    Whole grains    Choose whole-grain breads, cereals, and noodles.  Try brown rice.    Lean proteins    These can include lean meat, poultry, fish, and eggs.  You can also have tofu, beans, peas, lentils, nuts, and seeds.    Dairy    Try milk, yogurt, and cheese.  Choose low-fat or fat-free when you can.  If you need to, use lactose-free milk or fortified plant-based milk products, such as soy milk.    Water    Drink water when you're thirsty.  Limit sugar-sweetened drinks, including soda, fruit drinks, and sports drinks.  Where can you learn more?  Go to https://www.[employer].net/patiented  Enter T756 in the search box to learn more about \"Eating Healthy Foods: Care Instructions.\"  Current as of: September 20, 2023               Content Version: 14.0    7178-6608 [employer].   Care instructions adapted under license by your healthcare professional. If you have questions about a medical condition or this instruction, always ask your healthcare professional. [employer] disclaims any warranty or liability for your use of this information.      Learning About Being Physically Active  What is physical activity?     Being physically active means doing any kind of activity that gets your body moving.  The types of physical activity that can help you get fit and stay healthy include:  Aerobic or \"cardio\" activities. These make your heart beat faster and make you breathe harder, such as brisk walking, riding a bike, or running. They strengthen your heart and lungs and " "build up your endurance.  Strength training activities. These make your muscles work against, or \"resist,\" something. Examples include lifting weights or doing push-ups. These activities help tone and strengthen your muscles and bones.  Stretches. These let you move your joints and muscles through their full range of motion. Stretching helps you be more flexible.  Reaching a balance between these three types of physical activity is important because each one contributes to your overall fitness.  What are the benefits of being active?  Being active is one of the best things you can do for your health. It helps you to:  Feel stronger and have more energy to do all the things you like to do.  Focus better at school or work.  Feel, think, and sleep better.  Reach and stay at a healthy weight.  Lose fat and build lean muscle.  Lower your risk for serious health problems, including diabetes, heart attack, high blood pressure, and some cancers.  Keep your heart, lungs, bones, muscles, and joints strong and healthy.  How can you make being active part of your life?  Start slowly. Make it your long-term goal to get at least 30 minutes of exercise on most days of the week. Walking is a good choice. You also may want to do other activities, such as running, swimming, cycling, or playing tennis or team sports.  Pick activities that you like--ones that make your heart beat faster, your muscles stronger, and your muscles and joints more flexible. If you find more than one thing you like doing, do them all. You don't have to do the same thing every day.  Get your heart pumping every day. Any activity that makes your heart beat faster and keeps it at that rate for a while counts.  Here are some great ways to get your heart beating faster:  Go for a brisk walk, run, or hike.  Go for a swim or bike ride.  Take an online exercise class or dance.  Play a game of touch football, basketball, or soccer.  Play tennis, pickleball, or " "racquetball.  Climb stairs.  Even some household chores can be aerobic. Just do them at a faster pace. Raking or mowing the lawn, sweeping the garage, and vacuuming and cleaning your home all can help get your heart rate up.  Strengthen your muscles during the week. You don't have to lift heavy weights or grow big, bulky muscles to get stronger. Doing a few simple activities that make your muscles work against, or \"resist,\" something can help you get stronger. Aim for at least twice a week.  For example, you can:  Do push-ups or sit-ups, which use your own body weight as resistance.  Lift weights or dumbbells or use stretch bands at home or in a gym or community center.  Stretch your muscles often. Stretching will help you as you become more active. It can help you stay flexible and loosen tight muscles. It can also help improve your balance and posture and can be a great way to relax.  Be sure to stretch the muscles you'll be using when you work out. It's best to warm your muscles slightly before you stretch them. Walk or do some other light aerobic activity for a few minutes. Then start stretching.  When you stretch your muscles:  Do it slowly. Stretching is not about going fast or making sudden movements.  Don't push or bounce during a stretch.  Hold each stretch for at least 15 to 30 seconds, if you can. You should feel a stretch in the muscle, but not pain.  Breathe out as you do the stretch. Then breathe in as you hold the stretch. Don't hold your breath.  If you're worried about how more activity might affect your health, have a checkup before you start. Follow any special advice your doctor gives you for getting a smart start.  Where can you learn more?  Go to https://www.[employer].net/patiented  Enter W332 in the search box to learn more about \"Learning About Being Physically Active.\"  Current as of: June 5, 2023  Content Version: 14.1    7530-6409 [employer].   Care instructions adapted " under license by your healthcare professional. If you have questions about a medical condition or this instruction, always ask your healthcare professional. [employer] disclaims any warranty or liability for your use of this information.

## 2024-08-28 LAB
TESTOST FREE SERPL-MCNC: 0.95 NG/DL
TESTOST SERPL-MCNC: 72 NG/DL (ref 240–950)

## 2024-08-29 ENCOUNTER — TELEPHONE (OUTPATIENT)
Dept: INTERNAL MEDICINE | Facility: CLINIC | Age: 69
End: 2024-08-29
Payer: COMMERCIAL

## 2024-08-29 DIAGNOSIS — E29.1 HYPOGONADISM IN MALE: Primary | ICD-10-CM

## 2024-08-29 RX ORDER — TESTOSTERONE 20.25 MG/1.25G
20.25 GEL TOPICAL DAILY
Qty: 90 PACKET | Refills: 1 | Status: SHIPPED | OUTPATIENT
Start: 2024-08-29

## 2024-08-29 NOTE — TELEPHONE ENCOUNTER
Attempt #1 to reach patient. Left message to call back. Also instructed in message to check [email or web address] for lab result comments.     Diabetes control has worsened over the last year with your A1c now at 9.1% and you need to make adjustments in your insulin regimen.  Please discuss further with your endocrinologist   Written by Aditya Munoz MD on 8/23/2024  7:28 PM CDT    Your testosterone level has dropped to very low levels off of replacement and now that you are on chronic anticoagulation, I think it would be reasonable to resume as suggested by the hematologist you saw.   Written by Aditya Munoz MD on 8/29/2024 11:57 AM CDT       Kidney function is normal.  Liver studies are okay except for the alkaline phosphatase which is mildly elevated.  This is sometimes an indication that something is obstructing your bile duct.  As a precaution, I would suggest having this lab rechecked in 4-6 weeks.  Please schedule a lab appointment for late September   Written by Aditya Munoz MD on 8/23/2024 10:59 PM CDT       Your TSH is slightly out of the normal range suggesting that you may be developing an underactive thyroid.  It would be premature to make this diagnosis but I would recommend rechecking this lab also in 4-6 weeks   Written by Aditya Munoz MD on 8/23/2024 11:00 PM CDT      Your cholesterol is elevated and I would certainly recommend starting the rosuvastatin as we discussed   Written by Aditya Munoz MD on 8/23/2024 11:00 PM CDT        ----- Message from Aditya Munoz sent at 8/29/2024 11:39 AM CDT -----  Please call patient and remind him to look at all of his lab results that are available on [facility] including the recommendation to return for labs in 6-8 weeks

## 2024-08-29 NOTE — TELEPHONE ENCOUNTER
Patient called back. He will review though his mychart. He did request I change his password and I assisted with that. Gave beSUCCESSt help line # if needed

## 2024-09-04 ENCOUNTER — TELEPHONE (OUTPATIENT)
Dept: INTERNAL MEDICINE | Facility: CLINIC | Age: 69
End: 2024-09-04
Payer: COMMERCIAL

## 2024-09-04 NOTE — TELEPHONE ENCOUNTER
Prior Authorization Retail Medication Request    Medication/Dose: Testosterone 20.25MG/1.25GM (1.62%) GEL  Diagnosis and ICD code (if different than what is on RX):    New/renewal/insurance change PA/secondary ins. PA:  Previously Tried and Failed:    Rationale:  Lab result note: Your testosterone level has dropped to very low levels off of replacement and now that you are on chronic anticoagulation, I think it would be reasonable to resume as suggested by the hematologist you saw.     Insurance   Primary: /Medicare  Insurance ID:  [id number]     Secondary (if applicable):  Insurance ID:      Pharmacy Information (if different than what is on RX)  Name:  [id number]  Phone:    Fax:

## 2024-09-06 NOTE — TELEPHONE ENCOUNTER
Retail Pharmacy Prior Authorization Team   Phone: [phone]    PA Initiation    Medication: TESTOSTERONE 20.25 MG/ACT (1.62%) TD GEL  Insurance Company: [employer] - Phone [phone] Fax [phone]  Pharmacy Filling the Rx: [id number] - [place], [place] - [address] [place] [address] [address] [facility] [address] & [facility]  Filling Pharmacy Phone: [phone]  Filling Pharmacy Fax:    Start Date: 9/6/2024

## 2024-09-06 NOTE — TELEPHONE ENCOUNTER
Prior Authorization Approval    Authorization Effective Date: 11/20/2023  Authorization Expiration Date: 9/6/2025  Medication: Testosterone 20.25MG/1.25GM (1.62%) GEL-APPROVED  Reference #:     Insurance Company: [employer] - Phone [phone] Fax [phone]  Which Pharmacy is filling the prescription (Not needed for infusion/clinic administered): [facility] #[id number] - [place], [place] - [address] [place] [address] E AT [place] [address] & [facility]  Pharmacy Notified: Yes  Patient Notified: Instructed pharmacy to notify patient when script is ready to /ship.

## 2024-09-17 ENCOUNTER — TRANSFERRED RECORDS (OUTPATIENT)
Dept: HEALTH INFORMATION MANAGEMENT | Facility: CLINIC | Age: 69
End: 2024-09-17
Payer: COMMERCIAL

## 2024-09-17 LAB
ALT SERPL-CCNC: 23 IU/L (ref 0–44)
AST SERPL-CCNC: 29 IU/L (ref 0–40)
CREATININE (EXTERNAL): 0.99 MG/DL (ref 0.76–1.27)
GFR ESTIMATED (EXTERNAL): 83 ML/MIN/1.73
POTASSIUM (EXTERNAL): 4.3 MMOL/L (ref 3.5–5.2)

## 2024-11-08 ENCOUNTER — HOSPITAL ENCOUNTER (OUTPATIENT)
Dept: ULTRASOUND IMAGING | Facility: HOSPITAL | Age: 69
Discharge: HOME | End: 2024-11-08
Attending: PHYSICIAN ASSISTANT
Payer: COMMERCIAL

## 2024-11-08 ENCOUNTER — HOSPITAL ENCOUNTER (OUTPATIENT)
Dept: CT IMAGING | Facility: HOSPITAL | Age: 69
Discharge: HOME | End: 2024-11-08
Attending: PHYSICIAN ASSISTANT
Payer: COMMERCIAL

## 2024-11-08 DIAGNOSIS — R10.13 EPIGASTRIC PAIN: ICD-10-CM

## 2024-11-08 DIAGNOSIS — Z86.718 HISTORY OF DVT (DEEP VEIN THROMBOSIS): ICD-10-CM

## 2024-11-08 DIAGNOSIS — L40.50 PSORIATIC ARTHRITIS (H): ICD-10-CM

## 2024-11-08 DIAGNOSIS — I25.5 ISCHEMIC CARDIOMYOPATHY: ICD-10-CM

## 2024-11-08 DIAGNOSIS — I82.409 RECURRENT DEEP VEIN THROMBOSIS (DVT) (H): ICD-10-CM

## 2024-11-08 DIAGNOSIS — Z79.4 TYPE 2 DIABETES MELLITUS WITH COMPLICATION, WITH LONG-TERM CURRENT USE OF INSULIN (H): ICD-10-CM

## 2024-11-08 DIAGNOSIS — E11.8 TYPE 2 DIABETES MELLITUS WITH COMPLICATION, WITH LONG-TERM CURRENT USE OF INSULIN (H): ICD-10-CM

## 2024-11-08 DIAGNOSIS — I25.10 ASCVD (ARTERIOSCLEROTIC CARDIOVASCULAR DISEASE): ICD-10-CM

## 2024-11-08 DIAGNOSIS — R10.30 LOWER ABDOMINAL PAIN: ICD-10-CM

## 2024-11-08 DIAGNOSIS — I82.621 DEEP VEIN THROMBOSIS (DVT) OF OTHER VEIN OF RIGHT UPPER EXTREMITY, UNSPECIFIED CHRONICITY (H): ICD-10-CM

## 2024-11-08 LAB
CREAT BLD-MCNC: 0.9 MG/DL (ref 0.7–1.3)
EGFRCR SERPLBLD CKD-EPI 2021: >60 ML/MIN/1.73M2

## 2024-11-08 PROCEDURE — 93971 EXTREMITY STUDY: CPT | Mod: RT,XS

## 2024-11-08 PROCEDURE — 74177 CT ABD & PELVIS W/CONTRAST: CPT

## 2024-11-08 PROCEDURE — 250N000011 HC RX IP 250 OP 636: Performed by: PHYSICIAN ASSISTANT

## 2024-11-08 PROCEDURE — 93971 EXTREMITY STUDY: CPT | Mod: RT

## 2024-11-08 PROCEDURE — 82565 ASSAY OF CREATININE: CPT

## 2024-11-08 RX ORDER — IOPAMIDOL 755 MG/ML
90 INJECTION, SOLUTION INTRAVASCULAR ONCE
Status: COMPLETED | OUTPATIENT
Start: 2024-11-08 | End: 2024-11-08

## 2024-11-08 RX ADMIN — IOPAMIDOL 90 ML: 755 INJECTION, SOLUTION INTRAVENOUS at 17:33

## 2024-12-23 NOTE — PROGRESS NOTES
[facility]  [address] [place] [id number]  Main: [phone], Fax: [phone]    Patient seen at: [facility] at [address] [place] [address]    Outpatient Visit Note:    Patient: Bon Luque  MRN: [id number]  : 1955  LISSA: 2025  Location of this writer at the time of this clinic visit was conducted: [facility].  Location of the patient at the time of this clinic visit was conducted: [facility], [place], operated by [facility].     Reason for visit:  History of PICC line associated right upper extremity DVT back in Oct 2023. Now with extensive right leg DVT with pulmonary embolism on 2024.      Clinical History Summary:  Bon is a 69 year old male with a history of small bowel obstruction in Oct 2023, CAD S/P stent x 2 in , right sided renal cell carcinoma S/P partial right nephrectomy in , HTN, Diabetes with neuropathy, hypercholesterolemia, hypogonadism on testosterone replacement, and psoriasis, who also has a history of PICC line associated right upper extremity DVT in Oct 2023, who then is found to have extensive right leg DVT and pulmonary embolism on 2024, currently on long term anticoagulation therapy with apixaban at 5 mg PO BID dosing, returns to clinic today for his follow up visit. He was last seen by this writer back on 2024 and was told to return in one year for follow up at that time.     Thrombosis History Summary:  On 10/5/2023, he presented to the emergency department with a complaint of acute onset of vomiting and epigastric abdominal pain. CT abdomen pelvis at the time showed small bowel obstruction and he was admitted for further evaluation. At the time, there was concern of a possible duodenal mass vs pancreatic mass. GI was consulted and upper endoscopy was performed on 10/6/2023 with biopsy.  He had an NG tube placed and subsequent TPN. Biopsy path report subsequently showed no evidence of malignancy but mild edema and chronic duodenitits. He had a PICC line placed on 10/7/2023. On 10/9/2023, he developed right arm swelling and redness. Right upper extremity venous ultrasound at the time showed occlusive DVT in the axillary vein and occlusive DVT in the brachial vein of the proximal through distal upper arm. PICC line present in the brachial vein. There is also a superficial thrombus in the basilic vein of the mid upper arm through the antecubital fossa. Started on heparin and the right upper extremity PICC was removed replaced with left upper extremity PICC at the time. He eventually discharged on apixaban on 10/11/2023 and continued anticoagulation therapy (apixaban) for a total of 3 months.   12/7/2023, he had a repeat upper GI endoscopy showing gastritis which also confirmed by biopsy.   Bon also reports that he has long standing history of psoriatic arthritis for which he does have occasional flares. He reports that he recently started on Risankizumab-rzaa (Skyrizi) subcutaneous injection back at the end of Jan 2024.   Additionally, Bon also known to have low testosterone and he has been using a topical testosterone cream on and off. So about one week prior to his trip to [place] then going on a cruise from [facility] to [place] in May 2024, he started back on daily testosterone topical cream in preparation of him having to walk long distance. In addition, he was also prescribed a one week course of prednisone for his psoriatic arthritis to use during the trip.   About 3 weeks prior to his departure to [place], he started to experience some right posterior calf pain. He did not immediately seek medical attention at the time as he did not think too much of it due to his history of neuropathy.   5/7/2024, he and his wife flew back to [place] from [place] after a cruise vacation to [place]. This flight  was around 3.5 hours one way. Then on 5/8/2024, he woke up with right leg pain and swelling. He recalls that the pain was bad to the point where he was not able to walk on it and thus he decided to present to the emergency department for evaluation on 5/9/2024.   5/9/2024, he presented to the emergency department with complaint of ongoing right leg pain and swelling. Right leg venous ultrasound was done showing extensive right leg DVT, extending from the proximal femoral vein through the distal calf, including the popliteal, posterior tivial and peroneal veins. Some portions are partially occlusive, while other areas appear to be occlusive / nearly occlusive. No superficial thrombophlebitis. At Abdomen and Pelvis showed no evidence of extension of known right leg DVT into the pelvis. Partially visualized right lower lobe segmental pulmonary emboli. No evidence of right heart strain. IR was consulted about his right leg DVT and recommended medical management without any intervention. Echocardiogram on 5/11/2024 showed mild concentric left ventricular hypertrophy. EF of 60-65%. Normal right ventricular size and systolic function. He was discharged on 5/12/2024 on apixaban.   Saw Dr. Munoz on 5/21/2024 and is referred to our clinic for consultation. Noted that Dr. Munoz indicates that this patient is overdue for screening colonoscopy. The patient was also told to hold testosterone.      Interim History:  6/25/2024, he established care with this writer for which I determined that his right leg DVT and pulmonary embolism occurred on 5/9/2024 was an unprovoked event and thus it was my recommendation at that time to keep him on indefinite anticoagulation therapy. I also checked his cardiolipin Abys and beta 2 glycoprotein Abys which were negative.   11/8/2024, repeat right leg venous ultrasound showed short segment occlusive DVT at the right leg involving the distal femoral vein through the proximal popliteal vein.  When compared to 5/9/2024, the thrombus extent is significantly smaller.   11/8/2024, repeat right upper extremity venous ultrasound showed no DVT.     Currently, he is on apixaban at 5 mg PO BID dosing. He denies any bleeding complications. He reports that he does have chronic shoulder pain and was told that he does have osteoarthritis of his shoulder. But at this time, he is not planning to undergo any surgical interventions. He also reports that he will be flying to [place] and get on a cruise ship in March 2025 on vacation to visit about 10 countries. He is reporting some right distal thigh pain on occasion but no lower extremity swelling.     ROS:  Denies any bleeding complications. Specifically, no frequent epistaxis. No issues with oral mucosal bleeding. Denies any hematuria or blood in stools. Denies any shortness of breath. No chest pain. No cough. No fever.      Medications, Allergies and PmHx:  All are reviewed by this writer today via electronic medical records    Social History:   Deferred.    Family History:  Deferred.    Objective:  Vitals: BP (!) 172/92 (BP Location: Right arm)   Pulse 93   Temp 97  F (36.1  C) (Tympanic)   Wt 101.4 kg (223 lb 9.6 oz)   SpO2 98%   BMI 30.32 kg/m    Exam:   Complete exam is not performed today.       Labs:  Component      Latest Ref Rng 9/17/2024  4:19 PM   Creatinine (External)      0.76 - 1.27 mg/dL 0.99 (E)   GFR Estimated (External)      >59 mL/min/1.73 83 (E)   ALT (External)      0 - 44 IU/L 23 (E)   AST (External)      0 - 40 IU/L 29 (E)      Imaging:  Reviewed and are as described above.     Assessment:  In summary, Bon is a 69 year old male with a history of small bowel obstruction in Oct 2023, CAD S/P stent x 2 in 2016, right sided renal cell carcinoma S/P partial right nephrectomy in 2017, HTN, Diabetes with neuropathy, hypercholesterolemia, hypogonadism on testosterone replacement, and psoriasis, who also has a history of PICC line associated right  upper extremity DVT in Oct 2023, who then is found to have extensive right leg DVT and pulmonary embolism on 5/9/2024, currently on long term anticoagulation therapy with apixaban at 5 mg PO BID dosing, returns to clinic today for his follow up visit     Bon's right upper extremity DVT back in Oct 2023 was a PICC line provoked venous thromboembolic event for which he was appropriately treated with 3 months of anticoagulation therapy.      His recent right leg DVT and pulmonary embolism is rather complicated. He actually recalls that his right calf pain started 3 weeks prior to his flight to [place] and [facility]. His symptoms gotten worse after his trip and that was why he presented himself to the emergency department. Thus his DVT/PE on 5/9/2024 was not likely related to his flight to and back from [place]. Also his symptoms of right calf pain started before he started to use his testosterone cream daily (about 1 week prior to his trip). Thus it is also unlikely that testosterone cream use had anything to do with his DVT/PE. Most study in regard to testosterone and correlation to DVT/PE were done on injectable testosterone and thus it is unclear about topical testosterone and its effect or correlation to venous thromboembolism. For injectable testosterone, some literature suggest that risk for venous thromboembolism is at the highest during the first 6 months of testosterone replacement therapy and then it plateaus off. So it is unclear in this particular case if testosterone has anything to do with his venous thromboembolic event.      I also have done some literature search about risankizumab-rzaa (Skyrizi) and correlation to venous thromboembolic events but I found none.      Finally, as mentioned, Bon has underlying chronic inflammatory condition of psoriasis with arthritis, which by itself could increase someone risks of venous thromboembolic event.      Thus I do think that Bon's right leg DVT and pulmonary  embolic event back on 5/9/2024 was likely an unprovoked event or a provoked event caused by an irreversible chronic inflammatory condition.      Diagnosis:  History of PICC line associated Right upper extremity DVT back in Oct 2023. S/P 3 months of anticoagulation therapy.   Likely unprovoked (or provoked by his chronic inflammatory condition of psoriasis with arthritis) right leg DVT and pulmonary embolism on 5/9/2024.   Chronic anticoagulation therapy.   Psoriasis   Psoriatic arthritis.   Low testosterone.     Plan:  No change in regard to his anticoagulation therapy. He remains to be a good candidate to stay on indefinite anticoagulation therapy with apixaban at 5 mg PO BID dosing. I have renewed his apixaban prescription today with a one year refill.     Today, I have provided him with a prescription for knee high compression stockings with a rating of 20-30 mmHg. He is instructed to wear these to both legs most time during the day and take them off at night.     As mentioned above, Bon has chronic shoulder osteoarthritis but no plans to proceed with surgical intervention at this time.     He also is noted to have elevated blood pressure today in clinic. I encourage him to discuss his blood pressure with his primary care provider.     He also ask about testosterone use. I have no opposition for him to restart the use of testosterone cream, especially now that he is on anticoagulation therapy.     He is instructed to call our clinic if he should experience any unusual bleeding issues or if he should need any invasive procedures or surgical procedures in the future. Otherwise, I will plan to see him back in one year for follow up. Virtual or in-person visit is fine.        Geovany Yu PA-C, MPAS  Physician Assistant  [facility].     The longitudinal plan of care for these conditions below were addressed during this visit. Due to the added complexity  in care, I will continue to support Bon ANGELES Ene  in the subsequent management of these conditions and with the ongoing continuity of care of these conditions.    Problem List Items Addressed This Visit as of 2/19/2024   History of PICC line associated Right upper extremity DVT back in Oct 2023. S/P 3 months of anticoagulation therapy.   Likely unprovoked (or provoked by his chronic inflammatory condition of psoriasis with arthritis) right leg DVT and pulmonary embolism on 5/9/2024.   Chronic anticoagulation therapy.   Psoriasis   Psoriatic arthritis.   Low testosterone.     30 minutes spent by me on the date of the encounter doing chart review, history and exam, documentation and further activities per the note.    Time IN: 13:32  Time OUT: 13:55

## 2025-01-02 ENCOUNTER — OFFICE VISIT (OUTPATIENT)
Dept: HEMATOLOGY | Facility: CLINIC | Age: 70
End: 2025-01-02
Attending: PHYSICIAN ASSISTANT
Payer: COMMERCIAL

## 2025-01-02 VITALS
WEIGHT: 223.6 LBS | BODY MASS INDEX: 30.32 KG/M2 | SYSTOLIC BLOOD PRESSURE: 172 MMHG | HEART RATE: 93 BPM | DIASTOLIC BLOOD PRESSURE: 92 MMHG | TEMPERATURE: 97 F | OXYGEN SATURATION: 98 %

## 2025-01-02 DIAGNOSIS — E11.8 TYPE 2 DIABETES MELLITUS WITH COMPLICATION, WITH LONG-TERM CURRENT USE OF INSULIN (H): ICD-10-CM

## 2025-01-02 DIAGNOSIS — Z86.718 HISTORY OF DVT (DEEP VEIN THROMBOSIS): Primary | ICD-10-CM

## 2025-01-02 DIAGNOSIS — I82.409 RECURRENT DEEP VEIN THROMBOSIS (DVT) (H): ICD-10-CM

## 2025-01-02 DIAGNOSIS — Z79.01 CHRONIC ANTICOAGULATION: ICD-10-CM

## 2025-01-02 DIAGNOSIS — Z79.4 TYPE 2 DIABETES MELLITUS WITH COMPLICATION, WITH LONG-TERM CURRENT USE OF INSULIN (H): ICD-10-CM

## 2025-01-02 DIAGNOSIS — L40.50 PSORIATIC ARTHRITIS (H): ICD-10-CM

## 2025-01-02 DIAGNOSIS — I25.10 ASCVD (ARTERIOSCLEROTIC CARDIOVASCULAR DISEASE): ICD-10-CM

## 2025-01-02 DIAGNOSIS — I26.99 ACUTE PULMONARY EMBOLISM WITHOUT ACUTE COR PULMONALE, UNSPECIFIED PULMONARY EMBOLISM TYPE (H): ICD-10-CM

## 2025-01-02 DIAGNOSIS — I25.5 ISCHEMIC CARDIOMYOPATHY: ICD-10-CM

## 2025-01-02 PROCEDURE — G0463 HOSPITAL OUTPT CLINIC VISIT: HCPCS | Performed by: PHYSICIAN ASSISTANT

## 2025-01-02 RX ORDER — APIXABAN 5 MG/1
5 TABLET, FILM COATED ORAL 2 TIMES DAILY
Qty: 180 TABLET | Refills: 3 | Status: SHIPPED | OUTPATIENT
Start: 2025-01-02

## 2025-01-04 DIAGNOSIS — G89.4 CHRONIC PAIN SYNDROME: ICD-10-CM

## 2025-01-06 RX ORDER — DULOXETIN HYDROCHLORIDE 60 MG/1
60 CAPSULE, DELAYED RELEASE ORAL DAILY
Qty: 90 CAPSULE | Refills: 3 | Status: SHIPPED | OUTPATIENT
Start: 2025-01-06

## 2025-01-13 ENCOUNTER — APPOINTMENT (OUTPATIENT)
Dept: CT IMAGING | Facility: CLINIC | Age: 70
End: 2025-01-13
Attending: FAMILY MEDICINE
Payer: COMMERCIAL

## 2025-01-13 ENCOUNTER — HOSPITAL ENCOUNTER (EMERGENCY)
Facility: CLINIC | Age: 70
Discharge: HOME | End: 2025-01-13
Attending: FAMILY MEDICINE
Payer: COMMERCIAL

## 2025-01-13 VITALS
WEIGHT: 220 LBS | HEART RATE: 99 BPM | HEIGHT: 72 IN | BODY MASS INDEX: 29.8 KG/M2 | RESPIRATION RATE: 12 BRPM | TEMPERATURE: 98.3 F | SYSTOLIC BLOOD PRESSURE: 157 MMHG | OXYGEN SATURATION: 99 % | DIASTOLIC BLOOD PRESSURE: 76 MMHG

## 2025-01-13 DIAGNOSIS — R10.32 LEFT LOWER QUADRANT ABDOMINAL PAIN: ICD-10-CM

## 2025-01-13 DIAGNOSIS — R16.0 LIVER MASS: ICD-10-CM

## 2025-01-13 DIAGNOSIS — D72.829 LEUKOCYTOSIS, UNSPECIFIED TYPE: ICD-10-CM

## 2025-01-13 DIAGNOSIS — R53.83 OTHER FATIGUE: ICD-10-CM

## 2025-01-13 DIAGNOSIS — R19.7 DIARRHEA, UNSPECIFIED TYPE: ICD-10-CM

## 2025-01-13 DIAGNOSIS — R11.0 NAUSEA: ICD-10-CM

## 2025-01-13 LAB
ALBUMIN SERPL BCG-MCNC: 3.3 G/DL (ref 3.5–5.2)
ALBUMIN UR-MCNC: 30 MG/DL
ALP SERPL-CCNC: 140 U/L (ref 40–150)
ALT SERPL W P-5'-P-CCNC: 58 U/L (ref 0–70)
ANION GAP SERPL CALCULATED.3IONS-SCNC: 14 MMOL/L (ref 7–15)
APPEARANCE UR: CLEAR
AST SERPL W P-5'-P-CCNC: 92 U/L (ref 0–45)
BASOPHILS # BLD AUTO: 0 10E3/UL (ref 0–0.2)
BASOPHILS NFR BLD AUTO: 0 %
BILIRUB SERPL-MCNC: 1 MG/DL
BILIRUB UR QL STRIP: NEGATIVE
BUN SERPL-MCNC: 26.4 MG/DL (ref 8–23)
CALCIUM SERPL-MCNC: 9.3 MG/DL (ref 8.8–10.4)
CHLORIDE SERPL-SCNC: 91 MMOL/L (ref 98–107)
COLOR UR AUTO: YELLOW
CREAT SERPL-MCNC: 0.93 MG/DL (ref 0.67–1.17)
EGFRCR SERPLBLD CKD-EPI 2021: 90 ML/MIN/1.73M2
EOSINOPHIL # BLD AUTO: 0 10E3/UL (ref 0–0.7)
EOSINOPHIL NFR BLD AUTO: 0 %
ERYTHROCYTE [DISTWIDTH] IN BLOOD BY AUTOMATED COUNT: 12.3 % (ref 10–15)
GLUCOSE SERPL-MCNC: 254 MG/DL (ref 70–99)
GLUCOSE UR STRIP-MCNC: 50 MG/DL
HCO3 SERPL-SCNC: 24 MMOL/L (ref 22–29)
HCT VFR BLD AUTO: 40.4 % (ref 40–53)
HGB BLD-MCNC: 13.7 G/DL (ref 13.3–17.7)
HGB UR QL STRIP: ABNORMAL
IMM GRANULOCYTES # BLD: 0.1 10E3/UL
IMM GRANULOCYTES NFR BLD: 1 %
KETONES UR STRIP-MCNC: 20 MG/DL
LEUKOCYTE ESTERASE UR QL STRIP: NEGATIVE
LIPASE SERPL-CCNC: 54 U/L (ref 13–60)
LYMPHOCYTES # BLD AUTO: 0.7 10E3/UL (ref 0.8–5.3)
LYMPHOCYTES NFR BLD AUTO: 4 %
MAGNESIUM SERPL-MCNC: 2.1 MG/DL (ref 1.7–2.3)
MCH RBC QN AUTO: 28.7 PG (ref 26.5–33)
MCHC RBC AUTO-ENTMCNC: 33.9 G/DL (ref 31.5–36.5)
MCV RBC AUTO: 85 FL (ref 78–100)
MONOCYTES # BLD AUTO: 1.3 10E3/UL (ref 0–1.3)
MONOCYTES NFR BLD AUTO: 8 %
MUCOUS THREADS #/AREA URNS LPF: PRESENT /LPF
NEUTROPHILS # BLD AUTO: 14.6 10E3/UL (ref 1.6–8.3)
NEUTROPHILS NFR BLD AUTO: 87 %
NITRATE UR QL: NEGATIVE
NRBC # BLD AUTO: 0 10E3/UL
NRBC BLD AUTO-RTO: 0 /100
PH UR STRIP: 6 [PH] (ref 5–7)
PLATELET # BLD AUTO: 237 10E3/UL (ref 150–450)
POTASSIUM SERPL-SCNC: 4.5 MMOL/L (ref 3.4–5.3)
PROT SERPL-MCNC: 7.1 G/DL (ref 6.4–8.3)
RBC # BLD AUTO: 4.78 10E6/UL (ref 4.4–5.9)
RBC URINE: 11 /HPF
SODIUM SERPL-SCNC: 129 MMOL/L (ref 135–145)
SP GR UR STRIP: 1.03 (ref 1–1.03)
TROPONIN T SERPL HS-MCNC: 18 NG/L
TSH SERPL DL<=0.005 MIU/L-ACNC: 0.94 UIU/ML (ref 0.3–4.2)
UROBILINOGEN UR STRIP-MCNC: 2 MG/DL
WBC # BLD AUTO: 16.7 10E3/UL (ref 4–11)
WBC URINE: 6 /HPF

## 2025-01-13 PROCEDURE — 36415 COLL VENOUS BLD VENIPUNCTURE: CPT | Performed by: FAMILY MEDICINE

## 2025-01-13 PROCEDURE — 84443 ASSAY THYROID STIM HORMONE: CPT | Performed by: FAMILY MEDICINE

## 2025-01-13 PROCEDURE — 250N000011 HC RX IP 250 OP 636: Performed by: FAMILY MEDICINE

## 2025-01-13 PROCEDURE — 85004 AUTOMATED DIFF WBC COUNT: CPT | Performed by: FAMILY MEDICINE

## 2025-01-13 PROCEDURE — 84484 ASSAY OF TROPONIN QUANT: CPT | Performed by: FAMILY MEDICINE

## 2025-01-13 PROCEDURE — 250N000009 HC RX 250: Performed by: FAMILY MEDICINE

## 2025-01-13 PROCEDURE — 93010 ELECTROCARDIOGRAM REPORT: CPT | Performed by: FAMILY MEDICINE

## 2025-01-13 PROCEDURE — 83690 ASSAY OF LIPASE: CPT | Performed by: FAMILY MEDICINE

## 2025-01-13 PROCEDURE — 83735 ASSAY OF MAGNESIUM: CPT | Performed by: FAMILY MEDICINE

## 2025-01-13 PROCEDURE — 96361 HYDRATE IV INFUSION ADD-ON: CPT | Performed by: FAMILY MEDICINE

## 2025-01-13 PROCEDURE — 80048 BASIC METABOLIC PNL TOTAL CA: CPT | Performed by: FAMILY MEDICINE

## 2025-01-13 PROCEDURE — 99285 EMERGENCY DEPT VISIT HI MDM: CPT | Mod: 25 | Performed by: FAMILY MEDICINE

## 2025-01-13 PROCEDURE — 96374 THER/PROPH/DIAG INJ IV PUSH: CPT | Performed by: FAMILY MEDICINE

## 2025-01-13 PROCEDURE — 93005 ELECTROCARDIOGRAM TRACING: CPT | Performed by: FAMILY MEDICINE

## 2025-01-13 PROCEDURE — 258N000003 HC RX IP 258 OP 636: Performed by: FAMILY MEDICINE

## 2025-01-13 PROCEDURE — 84450 TRANSFERASE (AST) (SGOT): CPT | Performed by: FAMILY MEDICINE

## 2025-01-13 PROCEDURE — 81001 URINALYSIS AUTO W/SCOPE: CPT | Performed by: FAMILY MEDICINE

## 2025-01-13 PROCEDURE — 99284 EMERGENCY DEPT VISIT MOD MDM: CPT | Performed by: FAMILY MEDICINE

## 2025-01-13 PROCEDURE — 74177 CT ABD & PELVIS W/CONTRAST: CPT

## 2025-01-13 RX ORDER — ONDANSETRON 4 MG/1
4 TABLET, ORALLY DISINTEGRATING ORAL EVERY 8 HOURS PRN
Qty: 10 TABLET | Refills: 0 | Status: ON HOLD | OUTPATIENT
Start: 2025-01-13

## 2025-01-13 RX ORDER — IOPAMIDOL 755 MG/ML
107 INJECTION, SOLUTION INTRAVASCULAR ONCE
Status: COMPLETED | OUTPATIENT
Start: 2025-01-13 | End: 2025-01-13

## 2025-01-13 RX ORDER — SODIUM CHLORIDE 9 MG/ML
1000 INJECTION, SOLUTION INTRAVENOUS CONTINUOUS
Status: DISCONTINUED | OUTPATIENT
Start: 2025-01-13 | End: 2025-01-13 | Stop reason: HOSPADM

## 2025-01-13 RX ORDER — ONDANSETRON 2 MG/ML
4 INJECTION INTRAMUSCULAR; INTRAVENOUS ONCE
Status: COMPLETED | OUTPATIENT
Start: 2025-01-13 | End: 2025-01-13

## 2025-01-13 RX ADMIN — SODIUM CHLORIDE 1000 ML: 9 INJECTION, SOLUTION INTRAVENOUS at 14:27

## 2025-01-13 RX ADMIN — SODIUM CHLORIDE 67 ML: 9 INJECTION, SOLUTION INTRAVENOUS at 14:57

## 2025-01-13 RX ADMIN — IOPAMIDOL 107 ML: 755 INJECTION, SOLUTION INTRAVENOUS at 14:57

## 2025-01-13 RX ADMIN — ONDANSETRON 4 MG: 2 INJECTION, SOLUTION INTRAMUSCULAR; INTRAVENOUS at 14:28

## 2025-01-13 RX ADMIN — SODIUM CHLORIDE 500 ML: 9 INJECTION, SOLUTION INTRAVENOUS at 15:10

## 2025-01-13 ASSESSMENT — ENCOUNTER SYMPTOMS
DIAPHORESIS: 0
CHILLS: 0
FREQUENCY: 0
SHORTNESS OF BREATH: 0
WHEEZING: 0
HEADACHES: 0
SORE THROAT: 0
WEAKNESS: 1
COUGH: 0
DIARRHEA: 1
ABDOMINAL PAIN: 1
DYSURIA: 0
PALPITATIONS: 0
BLOOD IN STOOL: 0
SINUS PRESSURE: 0
CONSTIPATION: 0
NAUSEA: 1
VOMITING: 0
FEVER: 0

## 2025-01-13 ASSESSMENT — ACTIVITIES OF DAILY LIVING (ADL)
ADLS_ACUITY_SCORE: 58
ADLS_ACUITY_SCORE: 64
ADLS_ACUITY_SCORE: 64

## 2025-01-13 ASSESSMENT — COLUMBIA-SUICIDE SEVERITY RATING SCALE - C-SSRS
1. IN THE PAST MONTH, HAVE YOU WISHED YOU WERE DEAD OR WISHED YOU COULD GO TO SLEEP AND NOT WAKE UP?: NO
2. HAVE YOU ACTUALLY HAD ANY THOUGHTS OF KILLING YOURSELF IN THE PAST MONTH?: NO
6. HAVE YOU EVER DONE ANYTHING, STARTED TO DO ANYTHING, OR PREPARED TO DO ANYTHING TO END YOUR LIFE?: NO

## 2025-01-13 NOTE — DISCHARGE INSTRUCTIONS
ICD-10-CM    1. Liver mass  R16.0 ED To Primary Care Referral    this will need follow-up with your oncologist.        2. Left lower quadrant abdominal pain  R10.32 ED To Primary Care Referral    no signs of infection on imaging.  new liver lesions need follow-up in oncology      3. Other fatigue  R53.83 ED To Primary Care Referral    unclear cause.  no serious findings on evaluation.      4. Nausea  R11.0 ED To Primary Care Referral    unclear cause.  take zofran as needed for nausea, vomiting. maintain 64 oz fluid per day.  replace any diarrheal stools with equivalent      5. Diarrhea, unspecified type  R19.7 C. difficile Toxin B PCR with reflex to C. difficile Antigen and Toxins A/B EIA     Enteric Bacteria and Virus Panel by DAX Stool     ED To Primary Care Referral    obtan stool samples for C,. diff and enteric bacteria      6. Leukocytosis, unspecified type  D72.829 ED To Primary Care Referral

## 2025-01-13 NOTE — ED PROVIDER NOTES
History     Chief Complaint   Patient presents with    Fatigue    Diarrhea     States onset 1 week ago with diarrhea - has had sx for over a week - today here with generalized weakness and continued sx     HPI  Bon Luque is a 69 year old male who   nausea, diarrhea for 9 days.  generalized weakness. light headed. initial fever. no cough or congestion  2 small diarrhea episodes yesterday.   upper abd pain - persists  2-3 days of urinary incontinence.    no bloody stool.  Today he was particularly weak and tired.    Allergies:  Allergies   Allergen Reactions    Atorvastatin Muscle Pain (Myalgia)    Valomag [Arthritis Pain Formula] Nausea and Vomiting     gastric ulcer from aspirin use    Ampicillin Rash     Had a reaction to this medication many years ago.    Codeine Rash     It has been about 30 years ago       Problem List:    Patient Active Problem List    Diagnosis Date Noted    Anxiety 08/23/2024     Priority: Medium    Bilateral sensorineural hearing loss 08/23/2024     Priority: Medium    History of duodenal ulcer 08/23/2024     Priority: Medium    Bilateral leg pain 08/23/2024     Priority: Medium    Acute pulmonary embolism without acute cor pulmonale (H) 05/21/2024     Priority: Medium    Acute deep vein thrombosis (DVT) of femoral vein of right lower extremity (H) 05/09/2024     Priority: Medium    Pseudocyst of pancreas 10/28/2023     Priority: Medium    Dilated cbd, acquired 07/12/2022     Priority: Medium    Renal cell carcinoma, right (H) 06/23/2022     Priority: Medium    Chronic fatigue 06/23/2022     Priority: Medium    Chronic right shoulder pain 06/23/2022     Priority: Medium    Drug-induced constipation      Priority: Medium    Type 2 diabetes mellitus with complication, with long-term current use of insulin (H)      Priority: Medium    Chronic pain syndrome      Priority: Medium    Depression 12/26/2017     Priority: Medium    Ischemic cardiomyopathy      Priority: Medium     Stress  Echocardiogram 2017 with decreased LV function with EF 43% worse   compared to previous echo.  No change in infarct.  No new ischemia        Ankylosis, sacroiliac joint 07/11/2016     Priority: Medium    Spondylosis of lumbar region without myelopathy or radiculopathy 07/11/2016     Priority: Medium    Psoriatic arthritis (H) 04/28/2016     Priority: Medium    Chronic low back pain 04/28/2016     Priority: Medium    Diabetic peripheral neuropathy associated with type 2 diabetes mellitus (H) 04/28/2016     Priority: Medium    ASCVD (arteriosclerotic cardiovascular disease) 04/28/2016     Priority: Medium     Coronary artery stent ×2 in RCA following myocardial infarction 2008 ,   echo June 2014 normal LV function with ejection fraction 50-55% with mild   apical inferior hypokinesis        Alcohol abuse, in remission 04/28/2016     Priority: Medium    Psoriasis      Priority: Medium    Hypercholesterolemia      Priority: Medium    HTN (hypertension)      Priority: Medium    Optic neuritis      Priority: Medium    Obstructive sleep apnea on CPAP      Priority: Medium    Hypogonadism in male      Priority: Medium    Erectile dysfunction      Priority: Medium     Urologic evaluation July 2015, hypogonadism        Thoracic radiculopathy 01/01/2004     Priority: Medium     right T8 intercostal nerve block 2002            Past Medical History:    Past Medical History:   Diagnosis Date    Abdominal pain, epigastric 06/23/2022    Acute cholecystitis 07/12/2022    Acute pulmonary embolism without acute cor pulmonale (H) 05/21/2024    Anxiety 08/23/2024    Arthritis     ASCVD (arteriosclerotic cardiovascular disease) 04/28/2016    Bilateral leg pain 08/23/2024    Bilateral sensorineural hearing loss 08/23/2024    Chronic fatigue 06/23/2022    Chronic low back pain 04/28/2016    Chronic right shoulder pain 06/23/2022    Coronary artery disease     Deep vein thrombosis (DVT) of upper extremity (H) 10/09/2023    Depression  12/26/2017    Depressive disorder 2002    Diabetic peripheral neuropathy associated with type 2 diabetes mellitus (H) 04/28/2016    Dilated bile duct 07/03/2017    Duodenal ulcer without hemorrhage, perforation, or obstruction 10/16/2023    History of duodenal ulcer 08/23/2024    HTN (hypertension)     Hypercholesterolemia     Hypogonadism in male     Hyponatremia 07/12/2022    Hypophosphatemia 10/05/2023    Ischemic cardiomyopathy     Knee effusion, left 11/06/2018    Left upper quadrant pain 06/30/2017    Leukocytosis, unspecified type 10/27/2023    Lower abdominal pain     Mass of duodenum 10/16/2023    MI (myocardial infarction) (H)     Nausea and vomiting 10/05/2023    Obstructive sleep apnea on CPAP     Optic neuritis     Psoriasis     Recurrent vomiting 09/01/2023    Renal cell carcinoma, right (H) 06/23/2022    Right renal mass     SBO (small bowel obstruction) (H) 10/05/2023    Screening for AAA (abdominal aortic aneurysm)     Sebaceous cyst 2010    Thoracic radiculopathy 2004    Tobacco abuse, in remission 04/28/2016    Transaminitis 07/12/2022    Type 2 diabetes mellitus with insulin therapy (H)        Past Surgical History:    Past Surgical History:   Procedure Laterality Date    APPENDECTOMY      COLONOSCOPY      Colonoscopy September 2017 with hyperplastic polyps, repeat in 10 years    CORONARY ANGIOPLASTY      stent    ENDOSCOPIC RETROGRADE CHOLANGIOPANCREATOGRAM N/A 7/12/2022    Procedure: ENDOSCOPIC RETROGRADE CHOLANGIOPANCREATOGRAPHY, STONE EXTRACTION, BILIARY SPHINCTEROTOMY;  Surgeon: Bon Meyer MD;  [place] [facility] [place]    ESOPHAGOSCOPY, GASTROSCOPY, DUODENOSCOPY (EGD), COMBINED N/A 10/6/2023    Procedure: ESOPHAGOGASTRODUODENOSCOPY, WITH ENDOSCOPIC ULTRASOUND WITH FINE NEEDLE ASPIRATION AND DUODENAL BIOPSY;  Surgeon: Bon Meyer MD;  [place] [facility] [place]    KNEE SURGERY      LAPAROSCOPIC CHOLECYSTECTOMY N/A 7/12/2022    Procedure: CHOLECYSTECTOMY, LAPAROSCOPIC;   Surgeon: Adam March MD;  Location: [facility] OR    NOSE SURGERY      PICC DOUBLE LUMEN PLACEMENT  10/9/2023    NV LAP,PARTIAL NEPHRECTOMY Right 1/10/2018    Procedure: RIGHT ROBOTIC PARTIAL NEPHRECTOMY WITH INTRAOPERATIVE ULTRASOUND;  Surgeon: Chase Rodríguez MD;  [place] [facility] [place]       Family History:    Family History   Problem Relation Age of Onset    Diabetes Mother     Other Cancer Mother     Diabetes Father     Depression Father     Diabetes Brother     Depression Sister     Substance Abuse Brother         Alcohol       Social History:  Marital Status:   [2]  Social History     Tobacco Use    Smoking status: Former     Current packs/day: 0.50     Average packs/day: 0.5 packs/day for 5.0 years (2.5 ttl pk-yrs)     Types: Cigarettes     Passive exposure: Past    Smokeless tobacco: Never   Vaping Use    Vaping status: Never Used   Substance Use Topics    Alcohol use: No     Comment: Alcoholic Drinks/day: alcohol abuse - in remission x16 years    Drug use: No        Medications:    Continuous Blood Gluc Sensor (InsightsOneSTYLE SHIRA 14 DAY SENSOR) MISC  DULoxetine (CYMBALTA) 60 MG capsule  ELIQUIS ANTICOAGULANT 5 MG tablet  gabapentin (NEURONTIN) 300 MG capsule  insulin lispro (HUMALOG KWIKPEN) 100 UNIT/ML (1 unit dial) KWIKPEN  LANTUS SOLOSTAR 100 UNIT/ML soln  magnesium oxide 400 MG CAPS  melatonin 5 MG CAPS  morphine (MS CONTIN) 30 MG CR tablet  multivitamin w/minerals (THERA-VIT-M) tablet  naloxone (NARCAN) 4 MG/0.1ML nasal spray  Risankizumab-rzaa (SKYRIZI SC)  rosuvastatin (CRESTOR) 10 MG tablet  testosterone (ANDROGEL) 20.25 MG/1.25GM (1.62%) topical gel          Review of Systems   Constitutional:  Negative for chills, diaphoresis and fever.   HENT:  Negative for ear pain, sinus pressure and sore throat.    Eyes:  Negative for visual disturbance.   Respiratory:  Negative for cough, shortness of breath and wheezing.    Cardiovascular:  Negative for  chest pain and palpitations.   Gastrointestinal:  Positive for abdominal pain, diarrhea and nausea. Negative for blood in stool, constipation and vomiting.   Genitourinary:  Negative for dysuria, frequency and urgency.   Skin:  Negative for rash.   Neurological:  Positive for weakness. Negative for headaches.   All other systems reviewed and are negative.      Physical Exam   BP: 129/67  Pulse: 105  Temp: 98.3  F (36.8  C)  Resp: 12  Height: 182.9 cm (6')  Weight: 99.8 kg (220 lb)  SpO2: 97 %      Physical Exam  Constitutional:       General: He is in acute distress.      Appearance: He is not diaphoretic.   Eyes:      Conjunctiva/sclera: Conjunctivae normal.   Cardiovascular:      Rate and Rhythm: Regular rhythm. Tachycardia present.   Pulmonary:      Effort: Pulmonary effort is normal. No respiratory distress.      Breath sounds: Normal breath sounds. No stridor. No wheezing or rhonchi.   Abdominal:      General: There is no distension.      Palpations: There is no mass.      Tenderness: There is abdominal tenderness in the left lower quadrant. There is no guarding.   Musculoskeletal:      Cervical back: Neck supple.      Right lower leg: No edema.      Left lower leg: No edema.   Skin:     Coloration: Skin is not pale.      Findings: No rash.   Neurological:      Mental Status: He is alert.      Motor: No weakness.         ED Course        Procedures                EKG Interpretation:      Interpreted by Marco Null MD  EKG done at 1432 hrs. demonstrates a sinus rhythm at 100 bpm with normal axis.  There is no ST change.  There is T wave flattening overall.  There is a normal R progression and there are Q waves inferiorly in leads III and aVF.  There is a normal R progression.  No ectopy.  Impression sinus rhythm 100 bpm with a inferior Q's and no other changes  EKG changes were seen on ocyt 2023 EKG    Critical Care time:  none              Results for orders placed or performed during the hospital encounter  of 01/13/25 (from the past 24 hours)   Comprehensive metabolic panel   Result Value Ref Range    Sodium 129 (L) 135 - 145 mmol/L    Potassium 4.5 3.4 - 5.3 mmol/L    Carbon Dioxide (CO2) 24 22 - 29 mmol/L    Anion Gap 14 7 - 15 mmol/L    Urea Nitrogen 26.4 (H) 8.0 - 23.0 mg/dL    Creatinine 0.93 0.67 - 1.17 mg/dL    GFR Estimate 90 >60 mL/min/1.73m2    Calcium 9.3 8.8 - 10.4 mg/dL    Chloride 91 (L) 98 - 107 mmol/L    Glucose 254 (H) 70 - 99 mg/dL    Alkaline Phosphatase 140 40 - 150 U/L    AST 92 (H) 0 - 45 U/L    ALT 58 0 - 70 U/L    Protein Total 7.1 6.4 - 8.3 g/dL    Albumin 3.3 (L) 3.5 - 5.2 g/dL    Bilirubin Total 1.0 <=1.2 mg/dL   CBC with platelets, differential    Narrative    The following orders were created for panel order CBC with platelets, differential.  Procedure                               Abnormality         Status                     ---------                               -----------         ------                     CBC with platelets and d...[[id number]]  Abnormal            Final result                 Please view results for these tests on the individual orders.   CBC with platelets and differential   Result Value Ref Range    WBC Count 16.7 (H) 4.0 - 11.0 10e3/uL    RBC Count 4.78 4.40 - 5.90 10e6/uL    Hemoglobin 13.7 13.3 - 17.7 g/dL    Hematocrit 40.4 40.0 - 53.0 %    MCV 85 78 - 100 fL    MCH 28.7 26.5 - 33.0 pg    MCHC 33.9 31.5 - 36.5 g/dL    RDW 12.3 10.0 - 15.0 %    Platelet Count 237 150 - 450 10e3/uL    % Neutrophils 87 %    % Lymphocytes 4 %    % Monocytes 8 %    % Eosinophils 0 %    % Basophils 0 %    % Immature Granulocytes 1 %    NRBCs per 100 WBC 0 <1 /100    Absolute Neutrophils 14.6 (H) 1.6 - 8.3 10e3/uL    Absolute Lymphocytes 0.7 (L) 0.8 - 5.3 10e3/uL    Absolute Monocytes 1.3 0.0 - 1.3 10e3/uL    Absolute Eosinophils 0.0 0.0 - 0.7 10e3/uL    Absolute Basophils 0.0 0.0 - 0.2 10e3/uL    Absolute Immature Granulocytes 0.1 <=0.4 10e3/uL    Absolute NRBCs 0.0 10e3/uL    Lipase   Result Value Ref Range    Lipase 54 13 - 60 U/L   Magnesium   Result Value Ref Range    Magnesium 2.1 1.7 - 2.3 mg/dL   EKG 12 lead   Result Value Ref Range    Systolic Blood Pressure  mmHg    Diastolic Blood Pressure  mmHg    Ventricular Rate 100 BPM    Atrial Rate 100 BPM    MA Interval 154 ms    QRS Duration 92 ms     ms    QTc 425 ms    P Axis 54 degrees    R AXIS 44 degrees    T Axis 51 degrees    Interpretation ECG       Sinus rhythm  Inferior infarct (cited on or before 26-Dec-2017)  Abnormal ECG  When compared with ECG of 27-Oct-2023 18:27,  No significant change was found         Medications   sodium chloride 0.9% BOLUS 500 mL (0 mLs Intravenous Stopped 1/13/25 1556)   ondansetron (ZOFRAN) injection 4 mg (4 mg Intravenous $Given 1/13/25 1428)   iopamidol (ISOVUE-370) solution 107 mL (107 mLs Intravenous $Given 1/13/25 1457)   sodium chloride 0.9 % bag 500mL for CT scan flush use (67 mLs As instructed $Given 1/13/25 2247)   sodium chloride 0.9% BOLUS 500 mL (500 mLs Intravenous Not Given 1/13/25 3531)       Assessments & Plan (with Medical Decision Making)     MDM: Bon Luque is a 69 year old male presenting with fatigue and weakness after experiencing profound nausea for the last 9 days abdominal pain mostly left lower quadrant minimal diarrhea per day unlikely to be gastroenteritis.  No vomiting.  No blood in the stool black tarry stools.  No chest pain shortness of breath other cardiopulmonary symptoms profoundly more weak decreased urine out.  Broad differential include intra-abdominal processes such as small bowel obstruction or diverticulitis.    Testing is reassuring.  No red flag findings.  The urine was with minimal changes for UTI and was sent for culture but low suspicion for infection.  White count is elevated.  Unclear cause.  Discussed management as below precautions for return await urine culture.    I have reviewed the nursing notes.    I have reviewed the findings,  diagnosis, plan and need for follow up with the patient.           Medical Decision Making  The patient's presentation was of moderate complexity (an acute illness with systemic symptoms).    The patient's evaluation involved:  ordering and/or review of 3+ test(s) in this encounter (see separate area of note for details)    The patient's management necessitated moderate risk (prescription drug management including medications given in the ED).        New Prescriptions    No medications on file       Final diagnoses:   Liver mass - this will need follow-up with your oncologist.     Left lower quadrant abdominal pain - no signs of infection on imaging.  new liver lesions need follow-up in oncology   Other fatigue - unclear cause.  no serious findings on evaluation.   Nausea - unclear cause.  take zofran as needed for nausea, vomiting. maintain 64 oz fluid per day.  replace any diarrheal stools with equivalent   Diarrhea, unspecified type - obtan stool samples for C,. diff and enteric bacteria   Leukocytosis, unspecified type       1/13/2025   [facility] EMERGENCY DEPT       Marco Null MD  01/13/25 1936

## 2025-01-14 ENCOUNTER — VIRTUAL VISIT (OUTPATIENT)
Dept: INTERNAL MEDICINE | Facility: CLINIC | Age: 70
End: 2025-01-14
Payer: COMMERCIAL

## 2025-01-14 ENCOUNTER — PATIENT OUTREACH (OUTPATIENT)
Dept: INTERNAL MEDICINE | Facility: CLINIC | Age: 70
End: 2025-01-14

## 2025-01-14 DIAGNOSIS — E11.8 TYPE 2 DIABETES MELLITUS WITH COMPLICATION, WITH LONG-TERM CURRENT USE OF INSULIN (H): ICD-10-CM

## 2025-01-14 DIAGNOSIS — R10.10 UPPER ABDOMINAL PAIN: Primary | ICD-10-CM

## 2025-01-14 DIAGNOSIS — Z79.4 TYPE 2 DIABETES MELLITUS WITH COMPLICATION, WITH LONG-TERM CURRENT USE OF INSULIN (H): ICD-10-CM

## 2025-01-14 DIAGNOSIS — R19.7 DIARRHEA, UNSPECIFIED TYPE: ICD-10-CM

## 2025-01-14 DIAGNOSIS — K76.9 HEPATIC LESION: ICD-10-CM

## 2025-01-14 DIAGNOSIS — R11.0 NAUSEA: ICD-10-CM

## 2025-01-14 DIAGNOSIS — R19.7 ACUTE DIARRHEA: ICD-10-CM

## 2025-01-14 LAB
ATRIAL RATE - MUSE: 100 BPM
DIASTOLIC BLOOD PRESSURE - MUSE: NORMAL MMHG
INTERPRETATION ECG - MUSE: NORMAL
P AXIS - MUSE: 54 DEGREES
PR INTERVAL - MUSE: 154 MS
QRS DURATION - MUSE: 92 MS
QT - MUSE: 330 MS
QTC - MUSE: 425 MS
R AXIS - MUSE: 44 DEGREES
SYSTOLIC BLOOD PRESSURE - MUSE: NORMAL MMHG
T AXIS - MUSE: 51 DEGREES
VENTRICULAR RATE- MUSE: 100 BPM

## 2025-01-14 PROCEDURE — 98007 SYNCH AUDIO-VIDEO EST HI 40: CPT | Performed by: INTERNAL MEDICINE

## 2025-01-14 RX ORDER — MORPHINE SULFATE 15 MG/1
15 TABLET, FILM COATED, EXTENDED RELEASE ORAL
Status: ON HOLD | COMMUNITY
Start: 2024-12-18

## 2025-01-14 NOTE — TELEPHONE ENCOUNTER
Transitions of Care Outreach  Chief Complaint   Patient presents with    Hospital F/U     TCM       Most Recent Admission Date: 1/13/2025   Most Recent Admission Diagnosis:  Liver mass - R16.0  Left lower quadrant abdominal pain - R10.32  Other fatigue - R53.83  Nausea - R11.0  Diarrhea, unspecified type - R19.7  Leukocytosis, unspecified type - D72.829     Most Recent Discharge Date: 1/13/2025   Most Recent Discharge Diagnosis: Liver mass - R16.0  Left lower quadrant abdominal pain - R10.32  Other fatigue - R53.83  Nausea - R11.0  Diarrhea, unspecified type - R19.7  Leukocytosis, unspecified type - D72.829     Transitions of Care Assessment    Discharge Assessment  How are you doing now that you are home?: He is laying now. Condition has been the same. Will see PCP in about 2 hours.  How are your symptoms? (Red Flag symptoms escalate to triage hotline per guidelines): Unchanged  Do you know how to contact your clinic care team if you have future questions or changes to your health status? : Yes  Does the patient have their discharge instructions? : Yes  Does the patient have questions regarding their discharge instructions? : No  Were you started on any new medications or were there changes to any of your previous medications? : Yes (Zofran, but patient has not yet pick it up. No more nausea at this time.)  Does the patient have all of their medications?: No (see comment) (Zofran, but patient has not yet pick it up. No more nausea at this time.)  Do you have questions regarding any of your medications? : Yes (see comment) (Patient is unsure if he should  the zofran since he is not nauseated anymore.)  Do you have all of your needed medical supplies or equipment (DME)?  (i.e. oxygen tank, CPAP, cane, etc.): Yes    Follow up Plan     Discharge Follow-Up  Discharge follow up appointment scheduled in alignment with recommended follow up timeframe or Transitions of Risk Category? (Low = within 30 days;  Moderate= within 14 days; High= within 7 days): Yes  Discharge Follow Up Appointment Date: 01/14/25  Discharge Follow Up Appointment Scheduled with?: Primary Care Provider    Future Appointments   Date Time Provider Department Center   1/14/2025  4:20 PM Aditya Munoz MD [facility]   1/5/2026 12:30 PM Geovany Yu, [facility]       Outpatient Plan as outlined on AVS reviewed with patient.    For any urgent concerns, please contact our 24 hour nurse triage line: [phone] ([phone])       Griffin Disla RN

## 2025-01-14 NOTE — PROGRESS NOTES
Bon is a 69 year old who is being evaluated via a billable video visit.    How would you like to obtain your AVS? [email or web address]  If the video visit is dropped, the invitation should be resent by: Text to cell phone: [phone]  Will anyone else be joining your video visit? No      Assessment & Plan     Upper abdominal pain with diarrhea, nausea, emesis, malaise, fevers and chills  69-year-old male with type 2 diabetes, ASCVD, chronic pain, history of DVT with acute pulmonary embolus, psoriatic arthritis immunosuppressed with Skyrizi, and history of renal cell carcinoma with nephrectomy.  He was evaluated in the ER yesterday with 10 days of experiencing fever and chills with upper abdominal pain and diarrhea.  There has been no nausea with malaise and generalized weakness.  Workup included labs showing leukocytosis with left shift.  Urinalysis not suggestive of UTI.  Normal lipase, hyperglycemia with glucose 254, elevated AST of 92 and hyponatremia with sodium 129.  CT abdomen/pelvis demonstrating wall thickening involving a few small bowel loops raising possibility of enteritis.  However, also noted to have 3 prominent indeterminate hypodense masses within the liver as discussed below.  Stool cultures including C. difficile ordered but not collected nor was he sent home with supplies needed to perform home collection.  He is trying to maintain hydration and I am encouraging him to drink Gatorade 0 so as not to worsen diabetes.  Unable to establish diagnosis.  Recent antibiotic use raises concerns for C. difficile colitis and this needs to be checked ASAP with appropriate treatment started if confirmed.  Viral enteritis is a consideration but leukocytosis with left shift suggest this is more bacterial infection rather than viral.  Hepatic lesions could represent metastatic disease or liver abscesses.  I stressed to him the need to return to the ER if he is having any worsening of his symptoms including worsening  abdominal pain, increasing lethargy or weakness or if he is not able to hold down any fluids.  He will be picking up Zofran ODT which should help with nausea.      Hepatic lesion  As above, further evaluation of hepatic lesions needed.  Recommending MRI liver  - MR Liver wo & w Contrast; Future    Type 2 diabetes mellitus with complication, with long-term current use of insulin (H)  Hyperglycemia secondary to acute illness    40 minutes spent in the management this patient including reviewing records, discussing current symptoms, and documenting visit.    The longitudinal plan of care for the diagnosis(es)/condition(s) as documented were addressed during this visit. Due to the added complexity in care, I will continue to support Bon in the subsequent management and with ongoing continuity of care.         MED REC REQUIRED  Post Medication Reconciliation Status:   BMI  Estimated body mass index is 29.84 kg/m  as calculated from the following:    Height as of 1/13/25: 1.829 m (6').    Weight as of 1/13/25: 99.8 kg (220 lb).         See Patient Instructions    Subjective   Bon is a 69 year old, presenting for the following health issues: Video visit completed today to address ongoing symptoms of abdominal pain, nausea, fever and chills after ER evaluation yesterday.  See assessment and plan for details  Follow Up (ER, [facility], yesterday, today weak, nauseated, fatigue/sleeping a lot, sweats, incontinence, loss of appetite)      1/14/2025     4:06 PM   Additional Questions   Roomed by          Review of Systems  Constitutional, HEENT, cardiovascular, pulmonary, gi and gu systems are negative, except as otherwise noted.      Objective           Vitals:  No vitals were obtained today due to virtual visit.    Physical Exam     Middle-age male who appears tired but no obvious distress.  Alert and oriented and answering questions appropriately        Video-Visit Details    Type of service:  Video Visit     Originating  Location (pt. Location): Home    Distant Location (provider location):  On-site  Platform used for Video Visit: Kristofer  Signed Electronically by: Aditya Munoz MD     4

## 2025-01-15 ENCOUNTER — APPOINTMENT (OUTPATIENT)
Dept: MRI IMAGING | Facility: HOSPITAL | Age: 70
End: 2025-01-15
Attending: STUDENT IN AN ORGANIZED HEALTH CARE EDUCATION/TRAINING PROGRAM
Payer: COMMERCIAL

## 2025-01-15 ENCOUNTER — HOSPITAL ENCOUNTER (INPATIENT)
Facility: HOSPITAL | Age: 70
End: 2025-01-15
Attending: EMERGENCY MEDICINE
Payer: COMMERCIAL

## 2025-01-15 DIAGNOSIS — R16.0 LIVER MASS: Primary | ICD-10-CM

## 2025-01-15 DIAGNOSIS — G89.29 CHRONIC LOW BACK PAIN, UNSPECIFIED BACK PAIN LATERALITY, UNSPECIFIED WHETHER SCIATICA PRESENT: ICD-10-CM

## 2025-01-15 DIAGNOSIS — A41.9 SEPSIS, DUE TO UNSPECIFIED ORGANISM, UNSPECIFIED WHETHER ACUTE ORGAN DYSFUNCTION PRESENT (H): ICD-10-CM

## 2025-01-15 DIAGNOSIS — K75.0 LIVER ABSCESS: ICD-10-CM

## 2025-01-15 DIAGNOSIS — R19.7 DIARRHEA, UNSPECIFIED TYPE: ICD-10-CM

## 2025-01-15 DIAGNOSIS — E11.8 TYPE 2 DIABETES MELLITUS WITH COMPLICATION, WITH LONG-TERM CURRENT USE OF INSULIN (H): ICD-10-CM

## 2025-01-15 DIAGNOSIS — Z79.4 TYPE 2 DIABETES MELLITUS WITH COMPLICATION, WITH LONG-TERM CURRENT USE OF INSULIN (H): ICD-10-CM

## 2025-01-15 DIAGNOSIS — M54.50 CHRONIC LOW BACK PAIN, UNSPECIFIED BACK PAIN LATERALITY, UNSPECIFIED WHETHER SCIATICA PRESENT: ICD-10-CM

## 2025-01-15 LAB
ALBUMIN SERPL BCG-MCNC: 3 G/DL (ref 3.5–5.2)
ALBUMIN UR-MCNC: 50 MG/DL
ALP SERPL-CCNC: 150 U/L (ref 40–150)
ALT SERPL W P-5'-P-CCNC: 101 U/L (ref 0–70)
ANION GAP SERPL CALCULATED.3IONS-SCNC: 14 MMOL/L (ref 7–15)
APPEARANCE UR: CLEAR
AST SERPL W P-5'-P-CCNC: 219 U/L (ref 0–45)
BACTERIA #/AREA URNS HPF: ABNORMAL /HPF
BASOPHILS # BLD AUTO: 0.1 10E3/UL (ref 0–0.2)
BASOPHILS NFR BLD AUTO: 0 %
BILIRUB DIRECT SERPL-MCNC: 1.28 MG/DL (ref 0–0.3)
BILIRUB SERPL-MCNC: 1.8 MG/DL
BILIRUB UR QL STRIP: NEGATIVE
BUN SERPL-MCNC: 20.2 MG/DL (ref 8–23)
CALCIUM SERPL-MCNC: 8.8 MG/DL (ref 8.8–10.4)
CHLORIDE SERPL-SCNC: 88 MMOL/L (ref 98–107)
COLOR UR AUTO: YELLOW
CREAT SERPL-MCNC: 0.85 MG/DL (ref 0.67–1.17)
EGFRCR SERPLBLD CKD-EPI 2021: >90 ML/MIN/1.73M2
EOSINOPHIL # BLD AUTO: 0 10E3/UL (ref 0–0.7)
EOSINOPHIL NFR BLD AUTO: 0 %
ERYTHROCYTE [DISTWIDTH] IN BLOOD BY AUTOMATED COUNT: 12.9 % (ref 10–15)
FLUAV RNA SPEC QL NAA+PROBE: NEGATIVE
FLUBV RNA RESP QL NAA+PROBE: NEGATIVE
GLUCOSE SERPL-MCNC: 330 MG/DL (ref 70–99)
GLUCOSE UR STRIP-MCNC: 200 MG/DL
HCO3 SERPL-SCNC: 24 MMOL/L (ref 22–29)
HCT VFR BLD AUTO: 39.2 % (ref 40–53)
HGB BLD-MCNC: 13.2 G/DL (ref 13.3–17.7)
HGB UR QL STRIP: ABNORMAL
HOLD SPECIMEN: NORMAL
IMM GRANULOCYTES # BLD: 0.2 10E3/UL
IMM GRANULOCYTES NFR BLD: 1 %
KETONES UR STRIP-MCNC: 40 MG/DL
LACTATE SERPL-SCNC: 2.3 MMOL/L (ref 0.7–2)
LACTATE SERPL-SCNC: 2.6 MMOL/L (ref 0.7–2)
LEUKOCYTE ESTERASE UR QL STRIP: NEGATIVE
LYMPHOCYTES # BLD AUTO: 0.9 10E3/UL (ref 0.8–5.3)
LYMPHOCYTES NFR BLD AUTO: 6 %
MCH RBC QN AUTO: 28.2 PG (ref 26.5–33)
MCHC RBC AUTO-ENTMCNC: 33.7 G/DL (ref 31.5–36.5)
MCV RBC AUTO: 84 FL (ref 78–100)
MONOCYTES # BLD AUTO: 0.8 10E3/UL (ref 0–1.3)
MONOCYTES NFR BLD AUTO: 6 %
MUCOUS THREADS #/AREA URNS LPF: PRESENT /LPF
NEUTROPHILS # BLD AUTO: 12.9 10E3/UL (ref 1.6–8.3)
NEUTROPHILS NFR BLD AUTO: 87 %
NITRATE UR QL: NEGATIVE
NRBC # BLD AUTO: 0 10E3/UL
NRBC BLD AUTO-RTO: 0 /100
PH UR STRIP: 6 [PH] (ref 5–7)
PLATELET # BLD AUTO: 214 10E3/UL (ref 150–450)
POTASSIUM SERPL-SCNC: 4.2 MMOL/L (ref 3.4–5.3)
PROCALCITONIN SERPL IA-MCNC: 3.36 NG/ML
PROT SERPL-MCNC: 6.5 G/DL (ref 6.4–8.3)
RBC # BLD AUTO: 4.68 10E6/UL (ref 4.4–5.9)
RBC URINE: 3 /HPF
RSV RNA SPEC NAA+PROBE: NEGATIVE
SARS-COV-2 RNA RESP QL NAA+PROBE: NEGATIVE
SODIUM SERPL-SCNC: 125 MMOL/L (ref 135–145)
SODIUM SERPL-SCNC: 126 MMOL/L (ref 135–145)
SP GR UR STRIP: 1.03 (ref 1–1.03)
TROPONIN T SERPL HS-MCNC: 15 NG/L
TROPONIN T SERPL HS-MCNC: 16 NG/L
UROBILINOGEN UR STRIP-MCNC: 4 MG/DL
WBC # BLD AUTO: 14.7 10E3/UL (ref 4–11)
WBC URINE: 1 /HPF

## 2025-01-15 PROCEDURE — 93005 ELECTROCARDIOGRAM TRACING: CPT | Performed by: STUDENT IN AN ORGANIZED HEALTH CARE EDUCATION/TRAINING PROGRAM

## 2025-01-15 PROCEDURE — 258N000003 HC RX IP 258 OP 636: Performed by: INTERNAL MEDICINE

## 2025-01-15 PROCEDURE — 250N000013 HC RX MED GY IP 250 OP 250 PS 637: Performed by: INTERNAL MEDICINE

## 2025-01-15 PROCEDURE — 83605 ASSAY OF LACTIC ACID: CPT | Performed by: EMERGENCY MEDICINE

## 2025-01-15 PROCEDURE — A9585 GADOBUTROL INJECTION: HCPCS | Performed by: STUDENT IN AN ORGANIZED HEALTH CARE EDUCATION/TRAINING PROGRAM

## 2025-01-15 PROCEDURE — 87040 BLOOD CULTURE FOR BACTERIA: CPT | Performed by: EMERGENCY MEDICINE

## 2025-01-15 PROCEDURE — 80048 BASIC METABOLIC PNL TOTAL CA: CPT | Performed by: EMERGENCY MEDICINE

## 2025-01-15 PROCEDURE — 80053 COMPREHEN METABOLIC PANEL: CPT | Performed by: STUDENT IN AN ORGANIZED HEALTH CARE EDUCATION/TRAINING PROGRAM

## 2025-01-15 PROCEDURE — 87149 DNA/RNA DIRECT PROBE: CPT | Performed by: EMERGENCY MEDICINE

## 2025-01-15 PROCEDURE — 255N000002 HC RX 255 OP 636: Performed by: STUDENT IN AN ORGANIZED HEALTH CARE EDUCATION/TRAINING PROGRAM

## 2025-01-15 PROCEDURE — 84295 ASSAY OF SERUM SODIUM: CPT | Performed by: INTERNAL MEDICINE

## 2025-01-15 PROCEDURE — 84484 ASSAY OF TROPONIN QUANT: CPT | Performed by: STUDENT IN AN ORGANIZED HEALTH CARE EDUCATION/TRAINING PROGRAM

## 2025-01-15 PROCEDURE — 87637 SARSCOV2&INF A&B&RSV AMP PRB: CPT | Performed by: STUDENT IN AN ORGANIZED HEALTH CARE EDUCATION/TRAINING PROGRAM

## 2025-01-15 PROCEDURE — 82248 BILIRUBIN DIRECT: CPT | Performed by: STUDENT IN AN ORGANIZED HEALTH CARE EDUCATION/TRAINING PROGRAM

## 2025-01-15 PROCEDURE — 82435 ASSAY OF BLOOD CHLORIDE: CPT | Performed by: STUDENT IN AN ORGANIZED HEALTH CARE EDUCATION/TRAINING PROGRAM

## 2025-01-15 PROCEDURE — 96367 TX/PROPH/DG ADDL SEQ IV INF: CPT

## 2025-01-15 PROCEDURE — 999N000157 HC STATISTIC RCP TIME EA 10 MIN

## 2025-01-15 PROCEDURE — 99223 1ST HOSP IP/OBS HIGH 75: CPT | Performed by: INTERNAL MEDICINE

## 2025-01-15 PROCEDURE — 74183 MRI ABD W/O CNTR FLWD CNTR: CPT

## 2025-01-15 PROCEDURE — 87637 SARSCOV2&INF A&B&RSV AMP PRB: CPT | Performed by: EMERGENCY MEDICINE

## 2025-01-15 PROCEDURE — 83605 ASSAY OF LACTIC ACID: CPT | Performed by: STUDENT IN AN ORGANIZED HEALTH CARE EDUCATION/TRAINING PROGRAM

## 2025-01-15 PROCEDURE — 250N000011 HC RX IP 250 OP 636: Mod: JZ | Performed by: INTERNAL MEDICINE

## 2025-01-15 PROCEDURE — 250N000011 HC RX IP 250 OP 636: Performed by: STUDENT IN AN ORGANIZED HEALTH CARE EDUCATION/TRAINING PROGRAM

## 2025-01-15 PROCEDURE — 84145 PROCALCITONIN (PCT): CPT | Performed by: STUDENT IN AN ORGANIZED HEALTH CARE EDUCATION/TRAINING PROGRAM

## 2025-01-15 PROCEDURE — 36415 COLL VENOUS BLD VENIPUNCTURE: CPT | Performed by: STUDENT IN AN ORGANIZED HEALTH CARE EDUCATION/TRAINING PROGRAM

## 2025-01-15 PROCEDURE — 250N000012 HC RX MED GY IP 250 OP 636 PS 637: Performed by: INTERNAL MEDICINE

## 2025-01-15 PROCEDURE — 96365 THER/PROPH/DIAG IV INF INIT: CPT

## 2025-01-15 PROCEDURE — 258N000003 HC RX IP 258 OP 636: Performed by: STUDENT IN AN ORGANIZED HEALTH CARE EDUCATION/TRAINING PROGRAM

## 2025-01-15 PROCEDURE — 82040 ASSAY OF SERUM ALBUMIN: CPT | Performed by: STUDENT IN AN ORGANIZED HEALTH CARE EDUCATION/TRAINING PROGRAM

## 2025-01-15 PROCEDURE — 120N000001 HC R&B MED SURG/OB

## 2025-01-15 PROCEDURE — 85025 COMPLETE CBC W/AUTO DIFF WBC: CPT | Performed by: STUDENT IN AN ORGANIZED HEALTH CARE EDUCATION/TRAINING PROGRAM

## 2025-01-15 PROCEDURE — 36415 COLL VENOUS BLD VENIPUNCTURE: CPT | Performed by: INTERNAL MEDICINE

## 2025-01-15 PROCEDURE — 99291 CRITICAL CARE FIRST HOUR: CPT | Mod: 25

## 2025-01-15 PROCEDURE — 85025 COMPLETE CBC W/AUTO DIFF WBC: CPT | Performed by: EMERGENCY MEDICINE

## 2025-01-15 PROCEDURE — 81001 URINALYSIS AUTO W/SCOPE: CPT | Performed by: INTERNAL MEDICINE

## 2025-01-15 RX ORDER — GABAPENTIN 300 MG/1
600 CAPSULE ORAL 3 TIMES DAILY
Status: DISCONTINUED | OUTPATIENT
Start: 2025-01-15 | End: 2025-02-05 | Stop reason: HOSPADM

## 2025-01-15 RX ORDER — DEXTROSE MONOHYDRATE 25 G/50ML
25-50 INJECTION, SOLUTION INTRAVENOUS
Status: DISCONTINUED | OUTPATIENT
Start: 2025-01-15 | End: 2025-01-17

## 2025-01-15 RX ORDER — POLYETHYLENE GLYCOL 3350 17 G/17G
17 POWDER, FOR SOLUTION ORAL 2 TIMES DAILY PRN
Status: DISCONTINUED | OUTPATIENT
Start: 2025-01-15 | End: 2025-02-05 | Stop reason: HOSPADM

## 2025-01-15 RX ORDER — CEFEPIME HYDROCHLORIDE 2 G/1
2 INJECTION, POWDER, FOR SOLUTION INTRAVENOUS EVERY 8 HOURS
Status: COMPLETED | OUTPATIENT
Start: 2025-01-15 | End: 2025-01-20

## 2025-01-15 RX ORDER — AMOXICILLIN 250 MG
2 CAPSULE ORAL 2 TIMES DAILY PRN
Status: DISCONTINUED | OUTPATIENT
Start: 2025-01-15 | End: 2025-02-05 | Stop reason: HOSPADM

## 2025-01-15 RX ORDER — NALOXONE HYDROCHLORIDE 0.4 MG/ML
0.4 INJECTION, SOLUTION INTRAMUSCULAR; INTRAVENOUS; SUBCUTANEOUS
Status: DISCONTINUED | OUTPATIENT
Start: 2025-01-15 | End: 2025-02-05 | Stop reason: HOSPADM

## 2025-01-15 RX ORDER — OXYCODONE HYDROCHLORIDE 5 MG/1
5 TABLET ORAL EVERY 4 HOURS PRN
Status: DISCONTINUED | OUTPATIENT
Start: 2025-01-15 | End: 2025-01-28

## 2025-01-15 RX ORDER — ACETAMINOPHEN 650 MG/1
650 SUPPOSITORY RECTAL EVERY 4 HOURS PRN
Status: DISCONTINUED | OUTPATIENT
Start: 2025-01-15 | End: 2025-01-28

## 2025-01-15 RX ORDER — AMOXICILLIN 250 MG
1 CAPSULE ORAL 2 TIMES DAILY PRN
Status: DISCONTINUED | OUTPATIENT
Start: 2025-01-15 | End: 2025-02-05 | Stop reason: HOSPADM

## 2025-01-15 RX ORDER — GADOBUTROL 604.72 MG/ML
10 INJECTION INTRAVENOUS ONCE
Status: COMPLETED | OUTPATIENT
Start: 2025-01-15 | End: 2025-01-15

## 2025-01-15 RX ORDER — ONDANSETRON 2 MG/ML
4 INJECTION INTRAMUSCULAR; INTRAVENOUS EVERY 6 HOURS PRN
Status: DISCONTINUED | OUTPATIENT
Start: 2025-01-15 | End: 2025-02-05 | Stop reason: HOSPADM

## 2025-01-15 RX ORDER — DULOXETIN HYDROCHLORIDE 60 MG/1
60 CAPSULE, DELAYED RELEASE ORAL EVERY MORNING
Status: DISCONTINUED | OUTPATIENT
Start: 2025-01-16 | End: 2025-02-05 | Stop reason: HOSPADM

## 2025-01-15 RX ORDER — CEFEPIME HYDROCHLORIDE 2 G/1
2 INJECTION, POWDER, FOR SOLUTION INTRAVENOUS ONCE
Status: COMPLETED | OUTPATIENT
Start: 2025-01-15 | End: 2025-01-15

## 2025-01-15 RX ORDER — ONDANSETRON 4 MG/1
4 TABLET, ORALLY DISINTEGRATING ORAL EVERY 6 HOURS PRN
Status: DISCONTINUED | OUTPATIENT
Start: 2025-01-15 | End: 2025-02-05 | Stop reason: HOSPADM

## 2025-01-15 RX ORDER — NALOXONE HYDROCHLORIDE 0.4 MG/ML
0.2 INJECTION, SOLUTION INTRAMUSCULAR; INTRAVENOUS; SUBCUTANEOUS
Status: DISCONTINUED | OUTPATIENT
Start: 2025-01-15 | End: 2025-02-05 | Stop reason: HOSPADM

## 2025-01-15 RX ORDER — LIDOCAINE 40 MG/G
CREAM TOPICAL
Status: DISCONTINUED | OUTPATIENT
Start: 2025-01-15 | End: 2025-02-05 | Stop reason: HOSPADM

## 2025-01-15 RX ORDER — PROCHLORPERAZINE MALEATE 5 MG/1
5 TABLET ORAL EVERY 6 HOURS PRN
Status: DISCONTINUED | OUTPATIENT
Start: 2025-01-15 | End: 2025-02-05 | Stop reason: HOSPADM

## 2025-01-15 RX ORDER — MORPHINE SULFATE 30 MG/1
30 TABLET, FILM COATED, EXTENDED RELEASE ORAL 2 TIMES DAILY
Status: DISCONTINUED | OUTPATIENT
Start: 2025-01-15 | End: 2025-01-17

## 2025-01-15 RX ORDER — HYDRALAZINE HYDROCHLORIDE 10 MG/1
10 TABLET, FILM COATED ORAL EVERY 4 HOURS PRN
Status: DISCONTINUED | OUTPATIENT
Start: 2025-01-15 | End: 2025-02-05 | Stop reason: HOSPADM

## 2025-01-15 RX ORDER — CALCIUM CARBONATE 500 MG/1
1000 TABLET, CHEWABLE ORAL 4 TIMES DAILY PRN
Status: DISCONTINUED | OUTPATIENT
Start: 2025-01-15 | End: 2025-02-05 | Stop reason: HOSPADM

## 2025-01-15 RX ORDER — SODIUM CHLORIDE 9 MG/ML
INJECTION, SOLUTION INTRAVENOUS CONTINUOUS
Status: DISCONTINUED | OUTPATIENT
Start: 2025-01-15 | End: 2025-01-18

## 2025-01-15 RX ORDER — HYDRALAZINE HYDROCHLORIDE 20 MG/ML
10 INJECTION INTRAMUSCULAR; INTRAVENOUS EVERY 4 HOURS PRN
Status: DISCONTINUED | OUTPATIENT
Start: 2025-01-15 | End: 2025-02-05 | Stop reason: HOSPADM

## 2025-01-15 RX ORDER — HYDROMORPHONE HCL IN WATER/PF 6 MG/30 ML
0.2 PATIENT CONTROLLED ANALGESIA SYRINGE INTRAVENOUS
Status: DISCONTINUED | OUTPATIENT
Start: 2025-01-15 | End: 2025-01-28

## 2025-01-15 RX ORDER — NICOTINE POLACRILEX 4 MG
15-30 LOZENGE BUCCAL
Status: DISCONTINUED | OUTPATIENT
Start: 2025-01-15 | End: 2025-01-17

## 2025-01-15 RX ORDER — DULOXETIN HYDROCHLORIDE 60 MG/1
60 CAPSULE, DELAYED RELEASE ORAL EVERY MORNING
COMMUNITY

## 2025-01-15 RX ORDER — TESTOSTERONE 1.62 MG/G
1 GEL TRANSDERMAL
COMMUNITY

## 2025-01-15 RX ORDER — HYDROMORPHONE HCL IN WATER/PF 6 MG/30 ML
0.4 PATIENT CONTROLLED ANALGESIA SYRINGE INTRAVENOUS
Status: DISCONTINUED | OUTPATIENT
Start: 2025-01-15 | End: 2025-01-28

## 2025-01-15 RX ORDER — ACETAMINOPHEN 325 MG/1
650 TABLET ORAL EVERY 4 HOURS PRN
Status: DISCONTINUED | OUTPATIENT
Start: 2025-01-15 | End: 2025-01-28

## 2025-01-15 RX ORDER — MORPHINE SULFATE 15 MG/1
15 TABLET, FILM COATED, EXTENDED RELEASE ORAL DAILY
Status: DISCONTINUED | OUTPATIENT
Start: 2025-01-15 | End: 2025-01-28

## 2025-01-15 RX ORDER — SODIUM FLUORIDE 5 MG/G
GEL, DENTIFRICE DENTAL
COMMUNITY

## 2025-01-15 RX ORDER — TESTOSTERONE 1.62 MG/G
1 GEL TRANSDERMAL
Status: DISCONTINUED | OUTPATIENT
Start: 2025-01-17 | End: 2025-01-18

## 2025-01-15 RX ORDER — METRONIDAZOLE 500 MG/100ML
500 INJECTION, SOLUTION INTRAVENOUS EVERY 12 HOURS
Status: DISCONTINUED | OUTPATIENT
Start: 2025-01-15 | End: 2025-01-20

## 2025-01-15 RX ORDER — PANTOPRAZOLE SODIUM 40 MG/1
40 TABLET, DELAYED RELEASE ORAL
Status: DISCONTINUED | OUTPATIENT
Start: 2025-01-16 | End: 2025-01-29

## 2025-01-15 RX ADMIN — GABAPENTIN 600 MG: 300 CAPSULE ORAL at 21:30

## 2025-01-15 RX ADMIN — METRONIDAZOLE 500 MG: 500 INJECTION, SOLUTION INTRAVENOUS at 21:39

## 2025-01-15 RX ADMIN — SODIUM CHLORIDE 2994 ML: 9 INJECTION, SOLUTION INTRAVENOUS at 15:07

## 2025-01-15 RX ADMIN — MORPHINE SULFATE 30 MG: 30 TABLET, FILM COATED, EXTENDED RELEASE ORAL at 21:29

## 2025-01-15 RX ADMIN — SODIUM CHLORIDE 2000 MG: 9 INJECTION, SOLUTION INTRAVENOUS at 16:22

## 2025-01-15 RX ADMIN — GADOBUTROL 10 ML: 604.72 INJECTION INTRAVENOUS at 18:59

## 2025-01-15 RX ADMIN — CEFEPIME HYDROCHLORIDE 2 G: 2 INJECTION, POWDER, FOR SOLUTION INTRAVENOUS at 15:25

## 2025-01-15 RX ADMIN — INSULIN GLARGINE 40 UNITS: 100 INJECTION, SOLUTION SUBCUTANEOUS at 21:30

## 2025-01-15 RX ADMIN — SODIUM CHLORIDE: 9 INJECTION, SOLUTION INTRAVENOUS at 21:10

## 2025-01-15 ASSESSMENT — ACTIVITIES OF DAILY LIVING (ADL)
ADLS_ACUITY_SCORE: 58
ADLS_ACUITY_SCORE: 48
ADLS_ACUITY_SCORE: 48
ADLS_ACUITY_SCORE: 58

## 2025-01-15 NOTE — ED NOTES
Pt reported pain at IV site.  Pt arm swollen full of fluid due to IV not in vein.  Fluid infiltrated all in right upper arm.  Pt too sick to notify sooner.

## 2025-01-15 NOTE — H&P
[facility]    History and Physical - Hospitalist Service       Date of Admission:  1/15/2025    Assessment & Plan      Bon Luque is a 69 year old male with past medical history significant for CAD, renal cell carcinoma status post nephrectomy 2017, hypertension, diabetes with neuropathy, HLD, hypogonadism on testosterone, psoriasis, and small bowel obstruction who was admitted on 1/15/2025 due to abdominal pain, diarrhea, nausea, dizziness, generalized weakness, and poor appetite for 11 days PTA.     Concern for liver abscess  Sepsis on admission  Fever 100.1 on 1/15  -SIRS: Tachycardia, leukocytosis, fever  -CT 1/13: 3 prominent indeterminate complex hypodense masses in the liver concerning for neoplasm versus infectious process  -MRI 1/15: 3 heterogeneous masses in the liver new from 11/8/2024 concerning for infection, possible rapidly progressive malignancy/metastases  -Initially received cefepime and vancomycin in the ED  -Started on cefepime and metronidazole  -IR consulted  -Holding apixaban in anticipation of possible abscess drainage  -NPO at midnight    Psoriasis  Immunocompromised  -On Skyrizi outpatient    Lactic acidosis  -In the setting of infection and severe dehydration  -Lactic acid 2.6 > 2.3  -Received almost 3 L normal saline in the ED  -Started on IVF with normal saline at 50 mL/h  -Will recheck in the morning    History of DVT and PE  -Had PICC line associated right upper extremity DVT 10/2023, extensive right leg DVT and PE diagnosed 5/9/2024  -Continue Eliquis due to concerns for abscess and potential drainage as above    Chronic pain syndrome  -Has chronic pain in the legs, shoulders, low back, and headaches  -Sees pain and palliative outpatient  -Continue PTA MS Contin 30 mg twice daily and 15 mg at 1400 daily, duloxetine 60 mg every morning, gabapentin 600 mg 3 times daily    History of renal cell carcinoma status post nephrectomy  -Has normal creatinine  clearance at baseline, Cr 0.85 on admission  -Monitor kidney function and avoid nephrotoxic agents    Hypogonadism  -Continue PTA testosterone    GERD  -Substitute pantoprazole for omeprazole while inpatient        Diet: Combination Diet Regular Diet Adult  DVT Prophylaxis: Pneumatic Compression Devices  Seals Catheter: Not present  Lines: None     Cardiac Monitoring: ACTIVE order. Indication: Tachyarrhythmias, acute (48 hours)  Code Status: Full Code    Clinically Significant Risk Factors Present on Admission         # Hyponatremia: Lowest Na = 126 mmol/L in last 2 days, will monitor as appropriate  # Hypochloremia: Lowest Cl = 88 mmol/L in last 2 days, will monitor as appropriate      # Hypoalbuminemia: Lowest albumin = 3 g/dL at 1/15/2025 12:53 PM, will monitor as appropriate    # Drug Induced Coagulation Defect: home medication list includes an anticoagulant medication    # Hypertension: Noted on problem list          # DMII: A1C = N/A within past 6 months   # Overweight: Estimated body mass index is 29.84 kg/m  as calculated from the following:    Height as of this encounter: 1.829 m (6').    Weight as of this encounter: 99.8 kg (220 lb).              Disposition Plan     Medically Ready for Discharge: Anticipated in 2-4 Days           Cheyenne Lopez MD  Hospitalist Service  [facility]  Securely message with [employer] (more info)  Text page via [employer] Paging/Directory     ______________________________________________________________________    Chief Complaint   Abdominal Pain    History is obtained from the patient, electronic health record, and emergency department physician    History of Present Illness   Bon Luque is a 69 year old male with past medical history significant for CAD, renal cell carcinoma status post nephrectomy 2017, hypertension, diabetes with neuropathy, HLD, hypogonadism on testosterone, psoriasis, and small bowel obstruction who was admitted on 1/15/2025 due  to abdominal pain.  He has been experiencing nausea, diarrhea, and abdominal pain for the past 11 days.  He has had associated fatigue, generalized weakness, and dizziness.  He has also had a decreased appetite.  He was seen at the [place] ED 1/13 and had a CT that showed possible liver mass versus infection.  He was discharged with follow-up with his primary care provider for the 20th.  His abdominal pain and symptoms significantly worsened bringing him to the emergency department today.  He appears acutely ill.  He is having difficulty answering questions.  He had a DVT and PE in March 2020 and is on anticoagulation for this.  This was his second episode of blood clotting, the first was in 2023 when he had a PICC line associated for extremity DVT.  He is immunocompromised due to being on Skyrizi for psoriasis.      Past Medical History    Past Medical History:   Diagnosis Date    Abdominal pain, epigastric 06/23/2022    Normal gastric emptying study.  Peptic ulcer disease or gastritis suspected    Acute cholecystitis 07/12/2022    Hospitalized with acute abdominal pain, abnormal LFTs and CT showing distended gallbladder and bile duct dilatation.  Acute cholecystitis.  Cholecystectomy.    Acute pulmonary embolism without acute cor pulmonale (H) 05/21/2024    Anxiety 08/23/2024    Arthritis     psoriatic    ASCVD (arteriosclerotic cardiovascular disease) 04/28/2016    Coronary artery stent ×2 in RCA following myocardial infarction 2008 , echo June 2014 normal LV function with ejection fraction 50-55% with mild apical inferior hypokinesis    Bilateral leg pain 08/23/2024    Bilateral sensorineural hearing loss 08/23/2024    Chronic fatigue 06/23/2022    Chronic low back pain 04/28/2016    Chronic right shoulder pain 06/23/2022    Coronary artery disease     Deep vein thrombosis (DVT) of upper extremity (H) 10/09/2023    Depression 12/26/2017    Depressive disorder 2002    Diabetic peripheral neuropathy associated  with type 2 diabetes mellitus (H) 04/28/2016    Ab[facility] and [facility]    Dilated bile duct 07/03/2017    CT scan with incidental finding of dilated intra-and extrahepatic ducts    Duodenal ulcer without hemorrhage, perforation, or obstruction 10/16/2023    History of duodenal ulcer 08/23/2024    HTN (hypertension)     Hypercholesterolemia     Hypogonadism in male     Hyponatremia 07/12/2022    Hypophosphatemia 10/05/2023    Ischemic cardiomyopathy     Stress Echocardiogram 2017 with decreased LV function with EF 43% worse compared to previous echo.  No change in infarct.  No new ischemia    Knee effusion, left 11/06/2018    Left upper quadrant pain 06/30/2017    Leukocytosis, unspecified type 10/27/2023    Lower abdominal pain     Mass of duodenum 10/16/2023    Obstruction of second portion of duodenum with abnormal CT scan, biopsies negative for endoscopic ultrasound    MI (myocardial infarction) (H)     Nausea and vomiting 10/05/2023    Obstructive sleep apnea on CPAP     CPAP    Optic neuritis     Psoriasis     Recurrent vomiting 09/01/2023    Renal cell carcinoma, right (H) 06/23/2022    Right partial nephrectomy 2017    Right renal mass     SBO (small bowel obstruction) (H) 10/05/2023    Screening for AAA (abdominal aortic aneurysm)     No AAA on imaging in 2024    Sebaceous cyst 2010    Thoracic radiculopathy 2004    right T8 intercostal nerve block 2002    Tobacco abuse, in remission 04/28/2016    Transaminitis 07/12/2022    Type 2 diabetes mellitus with insulin therapy (H)        Past Surgical History   Past Surgical History:   Procedure Laterality Date    APPENDECTOMY      COLONOSCOPY      Colonoscopy September 2017 with hyperplastic polyps, repeat in 10 years    CORONARY ANGIOPLASTY      stent    ENDOSCOPIC RETROGRADE CHOLANGIOPANCREATOGRAM N/A 7/12/2022    Procedure: ENDOSCOPIC RETROGRADE CHOLANGIOPANCREATOGRAPHY, STONE EXTRACTION, BILIARY SPHINCTEROTOMY;  Surgeon: Bon Meyer MD;   Location: [facility] [place]    ESOPHAGOSCOPY, GASTROSCOPY, DUODENOSCOPY (EGD), COMBINED N/A 10/6/2023    Procedure: ESOPHAGOGASTRODUODENOSCOPY, WITH ENDOSCOPIC ULTRASOUND WITH FINE NEEDLE ASPIRATION AND DUODENAL BIOPSY;  Surgeon: Bon Meyer MD;  Location: [facility] OR    KNEE SURGERY      LAPAROSCOPIC CHOLECYSTECTOMY N/A 7/12/2022    Procedure: CHOLECYSTECTOMY, LAPAROSCOPIC;  Surgeon: Adam March MD;  Location: [facility] OR    NOSE SURGERY      PICC DOUBLE LUMEN PLACEMENT  10/9/2023    ME LAP,PARTIAL NEPHRECTOMY Right 1/10/2018    Procedure: RIGHT ROBOTIC PARTIAL NEPHRECTOMY WITH INTRAOPERATIVE ULTRASOUND;  Surgeon: Chase Rodríguez MD;  Location: [facility] [place];  Service: Urology       Prior to Admission Medications   Prior to Admission Medications   Prescriptions Last Dose Informant Patient Reported? Taking?   Continuous Blood Gluc Sensor (FREESTYLE SHIRA 14 DAY SENSOR) [facility]  Self Yes Yes   DULoxetine (CYMBALTA) 60 MG capsule 1/15/2025 Morning  Yes Yes   Sig: Take 60 mg by mouth every morning.   ELIQUIS ANTICOAGULANT 5 MG tablet 1/15/2025 Morning  No Yes   Sig: Take 1 tablet (5 mg) by mouth 2 times daily.   LANTUS SOLOSTAR 100 UNIT/ML soln 1/15/2025 Morning  Yes Yes   Sig: Inject 50 Units Subcutaneous 2 times daily   Risankizumab-rzaa (SKYRIZI SC) More than a month  Yes Yes   Sig: Inject subcutaneously every 4 months. Last dose administered on 10/23/24   gabapentin (NEURONTIN) 300 MG capsule 1/15/2025 Morning Self Yes Yes   Sig: [GABAPENTIN (NEURONTIN) 300 MG CAPSULE] Take 600 mg by mouth 3 (three) times a day.    insulin lispro (HUMALOG KWIKPEN) 100 UNIT/ML (1 unit dial) KWIKPEN 1/14/2025 Noon Self Yes Yes   Sig: Inject subcutaneously 3 times daily (before meals). Sliding scale. Average use is 50 units with each meal   magnesium oxide 400 MG CAPS   Yes Yes   Sig: Take 400 mg by mouth nightly as needed.   melatonin 5 MG CAPS   Yes Yes   Sig: Take 5-10 mg by mouth nightly as  needed (sleep)   morphine (MS CONTIN) 15 MG CR tablet 1/14/2025 at  2:00 PM  Yes Yes   Sig: Take 15 mg by mouth daily at 2 pm.   morphine (MS CONTIN) 30 MG CR tablet 1/15/2025 Morning Self Yes Yes   Sig: Take 30 mg by mouth 2 times daily.   multivitamin w/minerals (THERA-VIT-M) tablet   Yes Yes   Sig: Take 1 tablet by mouth daily as needed.   naloxone (NARCAN) 4 MG/0.1ML nasal spray  Self Yes Yes   Sig: Spray 1 spray into one nostril alternating nostrils once as needed for opioid reversal   omeprazole (PRILOSEC) 20 MG DR capsule 1/15/2025 Morning  Yes Yes   Sig: Take 20 mg by mouth 2 times daily.   ondansetron (ZOFRAN ODT) 4 MG ODT tab   No Yes   Sig: Take 1 tablet (4 mg) by mouth every 8 hours as needed for nausea.   sodium fluoride dental gel (PREVIDENT) 1.1 % GEL topical gel   Yes Yes   Sig: Apply to affected area nightly as needed.   testosterone (ANDROGEL 1.62 % PUMP) 20.25 MG/ACT gel 1/14/2025 Morning  Yes Yes   Sig: Place 1 Pump onto the skin every 72 hours.      Facility-Administered Medications: None        Review of Systems    The 10 point Review of Systems is negative other than noted in the HPI or here.     Social History   I have reviewed this patient's social history and updated it with pertinent information if needed.  Social History     Tobacco Use    Smoking status: Former     Current packs/day: 0.50     Average packs/day: 0.5 packs/day for 5.0 years (2.5 ttl pk-yrs)     Types: Cigarettes     Passive exposure: Past    Smokeless tobacco: Never   Vaping Use    Vaping status: Never Used   Substance Use Topics    Alcohol use: No     Comment: Alcoholic Drinks/day: alcohol abuse - in remission x16 years    Drug use: No         Family History   I have reviewed this patient's family history and updated it with pertinent information if needed.  Family History   Problem Relation Age of Onset    Diabetes Mother     Other Cancer Mother     Diabetes Father     Depression Father     Diabetes Brother      Depression Sister     Substance Abuse Brother         Alcohol         Allergies   Allergies   Allergen Reactions    Atorvastatin Muscle Pain (Myalgia)    Valomag [Arthritis Pain Formula] Nausea and Vomiting     gastric ulcer from aspirin use    Ampicillin Rash     Had a reaction to this medication many years ago.    Codeine Rash     It has been about 30 years ago        Physical Exam   Vital Signs: Temp: 97.8  F (36.6  C) Temp src: Temporal BP: (!) 179/76 Pulse: 115   Resp: 16 SpO2: 95 % O2 Device: None (Room air)    Weight: 220 lbs 0 oz    Constitutional: awake, alert, appears acutely ill  Eyes: Lids and lashes normal, pupils equal, round, extra ocular muscles intact, sclera clear, conjunctiva normal  Respiratory: No increased work of breathing, good air exchange, clear to auscultation bilaterally, no crackles or wheezing  Cardiovascular: Tachycardia  GI: normal bowel sounds, soft, non-distended, non-tender  Skin: normal skin color, texture, turgor, no rashes and no jaundice  Musculoskeletal: no lower extremity pitting edema present, tone is normal, generalized weakness noted  Neurologic: Awake, alert, no gross focal abnormalities   Neuropsychiatric: Appropriate mood and affect      Medical Decision Making       75 MINUTES SPENT BY ME on the date of service doing chart review, history, exam, documentation & further activities per the note.      Data     I have personally reviewed the following data over the past 24 hrs:    14.7 (H)  \   13.2 (L)   / 214     126 (L) 88 (L) 20.2 /  330 (H)   4.2 24 0.85 \     ALT: 101 (H) AST: 219 (H) AP: 150 TBILI: 1.8 (H)   ALB: 3.0 (L) TOT PROTEIN: 6.5 LIPASE: N/A     Trop: 15 BNP: N/A     Procal: 3.36 (H) CRP: N/A Lactic Acid: 2.3 (H)         Imaging results reviewed over the past 24 hrs:   Recent Results (from the past 24 hours)   MRI Abdomen w & w/o contrast*    Narrative    EXAM: MR ABDOMEN W/O and W CONTRAST  LOCATION: [facility]  DATE:  1/15/2025    INDICATION: Follow up liver lesions.  COMPARISON: CT abdomen and pelvis from 1/13/2025, CT abdomen and pelvis from 11/8/2024  TECHNIQUE: Routine MRI abdomen protocol including T1 in/out phase, diffusion, multiplane T2, and dynamic T1 without and with IV contrast.   CONTRAST: 10ml Gadavist    FINDINGS: Image quality is moderately degraded by motion artifact limiting evaluation.    HEPATOBILIARY: Hepatic steatosis. No biliary ductal dilation postcholecystectomy. Again seen are the 3 heterogeneous masses in the liver measuring approximately 11 x 7 cm in the left lateral segment, 5.4 x 5.4 cm in the right anterior segment, and 5.7 x   4.5 cm in the left medial segment on series 5 image 12. These demonstrate predominantly peripheral reticular enhancement and have mild perilesional edema. The diffusion weighted images are not available at the time of dictation.    PANCREAS: Normal.    SPLEEN: Normal.    ADRENAL GLANDS: Normal.    KIDNEYS: Partial right nephrectomy. No suspicious renal mass or hydronephrosis.    ADDITIONAL FINDINGS: No ascites. There are a few borderline to mildly enlarged retroperitoneal lymph nodes with the largest in the left para-aortic chain measuring 1.7 x 1.2 cm on series 5 image 24. These may be reactive. Continued attention on   follow-up. Small periampullary duodenal diverticulum. The stomach is incompletely distended which accentuates wall thickening and limits evaluation.      Impression    IMPRESSION: There are 3 heterogeneous masses in the liver. These are new from 11/8/2024. While a rapidly progressive malignancy/metastatic disease is in the differential the most likely etiology still remains infectious/inflammatory such as multifocal   abscesses. Nonemergent interventional radiology consultation is recommended.

## 2025-01-15 NOTE — ED PROVIDER NOTES
Emergency Department Encounter   NAME: Bon Luque ; AGE: 69 year old male ; YOB: 1955 ; MRN: [id number] ; PCP: Aditya Munoz   ED PROVIDER: Liliana Riddle PA-C    Evaluation Date & Time:   No admission date for patient encounter.    CHIEF COMPLAINT:  Generalized Weakness and Fatigue        Impression and Plan   FINAL IMPRESSION:    ICD-10-CM    1. Sepsis, due to unspecified organism, unspecified whether acute organ dysfunction present (H)  A41.9       2. Diarrhea, unspecified type  R19.7       3. Liver mass  R16.0           ED Course and Medical Decision Making  Bon is a 69 year old male with PMH of type 2 diabetes, HTN, history of renal cell carcinoma s/p nephrectomy, and psoriatic arthritis immunosuppressed with skyrizi presenting to the emergency department for evaluation of generalized weakness, upper abdominal pain, diarrhea, feeling feverish, and fatigue for the past 11 days. Denies any dark stools or blood in his stool.  No vomiting.     Per chart review he was seen 2 days ago at [facility]. WBC coutnt 16. CT abdomen found complex hypodense masses within the liver.  They were considered intermediate with neoplasm and could possibly represent metastatic disease.  Could also represent abscess.  He also had evidence of mild bladder wall thickening and bowel loops, raising concern for enteritis. UA was negative. He was discharged home with zofran. He states he has continued to have abdominal pain and diarrhea.  His nausea has improved with the Zofran but he is generally very fatigued and weak.  He fell into his dresser last night due to weakness, did not hit his head or injure any body areas.    Vitals reviewed, he is slightly tachycardic in triage with pulse of 102.  Afebrile temp 97.8 F without antipyretics. /73. On exam he appears weak and uncomfortable.  Differential diagnosis/emergent conditions considered and evaluated for includes but not limited to COVID, influenza,  viral gastroenteritis, C. Difficile colitis, UTI, liver abscess, liver mass/metastasis.     He is afebrile. Slightly tachycardic with pulse of 102. Troponin 16, repeat 15. EKG shows sinus tachycardia with rate of 109 bpm.  No evidence of prolonged QT or ischemia.  Low suspicion for cardiac etiology for his symptoms. Today he has a leukocytosis of 14.7, this was 16.7 two days ago. BMP finds hyponatremia with sodium of 126. Glucose 330. His procalcitonin is elevated at 3.36. Lactic 2.6.  His clinical picture is concerning for sepsis.  Given the CT scan of his abdomen pelvis from 2 days ago could not rule out that the liver lesions were due to infectious process such as abscess I do think this could be a potential source of infection. Sepsis protocol was initiated with 30 mL/kg of normal saline, blood cultures were obtained, and he was started on cefepime and vancomycin as he has a penicillin allergy.    I called radiology to see what imaging would be recommended at this point, they recommended MRI of the liver with and without contrast to further classify the indeterminate liver lesions. Will plan to admit patient at this point for IV fluids, antibiotics, and further workup. UA pending. I have also added on enteric bacteria and C. difficile testing as patient has had diarrhea for the last 10 days, however, patient has not yet been able to provide a stool sample here in the emergency department.          Supplemental history:  Obtained supplemental history:Supplemental history obtained?: No  Reviewed external records: External records reviewed?: Documented in chart and Inpatient Record: ED visit from 1/13/2025  Patient information was obtained from: patient  Use of Intrepreter: N/A     Complicating Factors:  Care impacted by chronic illness:Hypertension  Care significantly affected by social determinants of health:N/A    Work Up:  Did you consider but not order tests?: Work up considered but not performed and  documented in chart, if applicable  Did you interpret images independently?: Independent interpretation of ECG and images noted in documentation, when applicable.    External Consults:  Consultation discussion with other provider: Dr. Mckee  Admit.          ED COURSE:  1408 I met and introduced myself to the patient. I gathered initial history and performed my physical exam. We discussed plan for initial workup.   1529 I have staffed the patient with Dr. Mckee, ED MD, who has evaluated the patient and agrees with all aspects of today's care.   4:48 PM I rechecked the patient and discussed results, discharge, follow up, and reasons to return to the ED.     At the conclusion of the encounter I discussed the results of all the tests and the disposition. The questions were answered. The patient or family acknowledged understanding and was agreeable with the care plan.      Critical Care     Performed by: Liliana Riddle PA-C  Authorized by: Dr Christopher Mckee  Total critical care time: 45 minutes  Critical care was necessary to treat or prevent imminent or life-threatening deterioration of the following conditions: sepsis requiring 2 abx and elevated lactate  Critical care was time spent personally by me on the following activities: development of treatment plan with patient or surrogate, discussions with consultants, examination of patient, evaluation of patient's response to treatment, obtaining history from patient or surrogate, ordering and performing treatments and interventions, ordering and review of laboratory studies, ordering and review of radiographic studies, re-evaluation of patient's condition and monitoring for potential decompensation.  Critical care time was exclusive of separately billable procedures and treating other patients.        MEDICATIONS GIVEN IN THE EMERGENCY DEPARTMENT:  Medications   vancomycin (VANCOCIN) 2,000 mg in 0.9% NaCl 520 mL intermittent infusion (has no administration in time  range)   sodium chloride 0.9% BOLUS 2,994 mL (2,994 mLs Intravenous $New Bag 1/15/25 1507)   ceFEPIme (MAXIPIME) 2 g vial to attach to  mL bag for ADULTS or NS 50 mL bag for PEDS (2 g Intravenous $New Bag 1/15/25 1525)         NEW PRESCRIPTIONS STARTED AT TODAY'S ED VISIT:  New Prescriptions    No medications on file         HPI     Bon is a 69 year old male with PMH of type 2 diabetes, HTN, history of renal cell carcinoma s/p nephrectomy, and psoriatic arthritis immunosuppressed with skyrizi presenting to the emergency department for evaluation of generalized weakness, upper abdominal pain, diarrhea, feeling feverish, and fatigue for the past 11 days. Denies any dark stools or blood in his stool.  No vomiting.     Per chart review he was seen 2 days ago at [facility]. WBC coutnt 16. CT abdomen found complex hypodense masses within the liver.  They were considered intermediate with neoplasm and could possibly represent metastatic disease.  Could also represent abscess.  He also had evidence of mild bladder wall thickening and bowel loops, raising concern for enteritis. UA was negative. He was discharged home with zofran. He states he has continued to have abdominal pain and diarrhea.  His nausea has improved with the Zofran but he is generally very fatigued and weak.  He fell into his dresser last night due to weakness, did not hit his head or injure any body areas.        Physical Exam     First Vitals:  Patient Vitals for the past 24 hrs:   BP Temp Temp src Pulse Resp SpO2 Height Weight   01/15/25 1220 (!) 158/73 97.8  F (36.6  C) Temporal 102 20 98 % 1.829 m (6') 99.8 kg (220 lb)         PHYSICAL EXAM    General Appearance:  Alert, cooperative, no distress, appears stated age. Appears uncomfortable and sick.  HENT: Normocephalic without obvious deformity, atraumatic. Mucous membranes moist   Eyes: Conjunctiva clear, Lids normal. No discharge.   Respiratory: No distress. Lungs clear to ausculation  bilaterally. No wheezes, rhonchi or stridor  Cardiovascular: Regular rate and rhythm, no murmur. Normal cap refill. No peripheral edema  GI: Abdomen soft, nontender  : No CVA tenderness  Musculoskeletal: Moving all extremities. No gross deformities  Integument: Warm, dry, no rashes or lesions  Neurologic: Alert and orientated x3. No focal deficits.  Psych: Normal mood and affect        Results     LAB:  All pertinent labs reviewed and interpreted  Labs Ordered and Resulted from Time of ED Arrival to Time of ED Departure   LACTIC ACID WHOLE BLOOD WITH 1X REPEAT IN 2 HR WHEN >2 - Abnormal       Result Value    Lactic Acid, Initial 2.6 (*)    BASIC METABOLIC PANEL - Abnormal    Sodium 126 (*)     Potassium 4.2      Chloride 88 (*)     Carbon Dioxide (CO2) 24      Anion Gap 14      Urea Nitrogen 20.2      Creatinine 0.85      GFR Estimate >90      Calcium 8.8      Glucose 330 (*)    CBC WITH PLATELETS AND DIFFERENTIAL - Abnormal    WBC Count 14.7 (*)     RBC Count 4.68      Hemoglobin 13.2 (*)     Hematocrit 39.2 (*)     MCV 84      MCH 28.2      MCHC 33.7      RDW 12.9      Platelet Count 214      % Neutrophils 87      % Lymphocytes 6      % Monocytes 6      % Eosinophils 0      % Basophils 0      % Immature Granulocytes 1      NRBCs per 100 WBC 0      Absolute Neutrophils 12.9 (*)     Absolute Lymphocytes 0.9      Absolute Monocytes 0.8      Absolute Eosinophils 0.0      Absolute Basophils 0.1      Absolute Immature Granulocytes 0.2      Absolute NRBCs 0.0     LACTIC ACID WHOLE BLOOD - Abnormal    Lactic Acid 2.3 (*)    PROCALCITONIN - Abnormal    Procalcitonin 3.36 (*)    HEPATIC FUNCTION PANEL - Abnormal    Protein Total 6.5      Albumin 3.0 (*)     Bilirubin Total 1.8 (*)     Alkaline Phosphatase 150       (*)      (*)     Bilirubin Direct 1.28 (*)    INFLUENZA A/B, RSV AND SARS-COV2 PCR - Normal    Influenza A PCR Negative      Influenza B PCR Negative      RSV PCR Negative      SARS CoV2 PCR  Negative     TROPONIN T, HIGH SENSITIVITY - Normal    Troponin T, High Sensitivity 16     ROUTINE UA WITH MICROSCOPIC REFLEX TO CULTURE   TROPONIN T, HIGH SENSITIVITY   BLOOD CULTURE   BLOOD CULTURE   C. DIFFICILE TOXIN B PCR WITH REFLEX TO C. DIFFICILE ANTIGEN AND TOXINS A/B EIA   ENTERIC BACTERIA AND VIRUS PANEL BY PCR       RADIOLOGY:  MRI Abdomen w & w/o contrast*    (Results Pending)         ECG:  Performed at: 1519    Impression: sinus tachycardia    Rate: 109  Rhythm: sinus tach  Axis: normal  HI Interval: 152  QRS Interval: 96  QTc Interval: 398  ST Changes: none  Comparison: no significant change    EKG results reviewed and interpreted by Dr. Mckee, ED MD.       PROCEDURES:  N/A      Liliana Riddle PA-C   Emergency Medicine   [facility] EMERGENCY DEPARTMENT       Liliana Riddle PA-C  01/15/25 2027

## 2025-01-15 NOTE — MEDICATION SCRIBE - ADMISSION MEDICATION HISTORY
Medication Scribe Admission Medication History    Admission medication history is complete. The information provided in this note is only as accurate as the sources available at the time of the update.    Information Source(s): Patient via in-person    Pertinent Information: Patient reports self management of medications.    Patient reports no longer taking: Crestor and completed Clindamycin course in December     Changes made to PTA medication list:  Added: Prevident   Deleted:  Crestor  Changed: All patient reported: Humalog to average dose of 50 units, Morphine 15 to every day at 14:00, Morphine 30 to BID (morning and evening), Mag Ox to PRN, Multivitamin to PRN, Testosterone to every 3 days     Allergies reviewed with patient and updates made in EHR: yes    Medication History Completed By: Javi Roque 1/15/2025 4:10 PM    PTA Med List   Medication Sig Last Dose/Taking    Continuous Blood Gluc Sensor (FREESTYLE SHIRA 14 DAY SENSOR) MISC  Taking    DULoxetine (CYMBALTA) 60 MG capsule Take 60 mg by mouth every morning. 1/15/2025 Morning    ELIQUIS ANTICOAGULANT 5 MG tablet Take 1 tablet (5 mg) by mouth 2 times daily. 1/15/2025 Morning    gabapentin (NEURONTIN) 300 MG capsule [GABAPENTIN (NEURONTIN) 300 MG CAPSULE] Take 600 mg by mouth 3 (three) times a day.  1/15/2025 Morning    insulin lispro (HUMALOG KWIKPEN) 100 UNIT/ML (1 unit dial) KWIKPEN Inject subcutaneously 3 times daily (before meals). Sliding scale. Average use is 50 units with each meal 1/14/2025 Noon    LANTUS SOLOSTAR 100 UNIT/ML soln Inject 50 Units Subcutaneous 2 times daily 1/15/2025 Morning    magnesium oxide 400 MG CAPS Take 400 mg by mouth nightly as needed. Taking As Needed    melatonin 5 MG CAPS Take 5-10 mg by mouth nightly as needed (sleep) Taking As Needed    morphine (MS CONTIN) 15 MG CR tablet Take 15 mg by mouth daily at 2 pm. 1/14/2025 at  2:00 PM    morphine (MS CONTIN) 30 MG CR tablet Take 30 mg by mouth 2 times daily. 1/15/2025  Morning    multivitamin w/minerals (THERA-VIT-M) tablet Take 1 tablet by mouth daily as needed. Taking As Needed    naloxone (NARCAN) 4 MG/0.1ML nasal spray Spray 1 spray into one nostril alternating nostrils once as needed for opioid reversal Taking As Needed    omeprazole (PRILOSEC) 20 MG DR capsule Take 20 mg by mouth 2 times daily. 1/15/2025 Morning    ondansetron (ZOFRAN ODT) 4 MG ODT tab Take 1 tablet (4 mg) by mouth every 8 hours as needed for nausea. Taking As Needed    Risankizumab-rzaa (SKYRIZI SC) Inject subcutaneously every 4 months. Last dose administered on 10/23/24 More than a month    sodium fluoride dental gel (PREVIDENT) 1.1 % GEL topical gel Apply to affected area nightly as needed. Taking As Needed    testosterone (ANDROGEL 1.62 % PUMP) 20.25 MG/ACT gel Place 1 Pump onto the skin every 72 hours. 1/14/2025 Morning

## 2025-01-15 NOTE — ED TRIAGE NOTES
Weakness, fever, chills, nausea, headache, fatigue for past 11 days. Day before yesterday was at [facility], had testing done, but no beds, so discharged with antiemetic. Saw spots on liver, inflammation of bowels. COVID, flu, RSV was negative. Yesterday had consult with Dr. Leach and was told to come in if things get worse. This morning diarrhea, first BM in 3 days. All other symptoms also seem to be worse.

## 2025-01-15 NOTE — PHARMACY-VANCOMYCIN DOSING SERVICE
Pharmacy Vancomycin Initial Note  Date of Service January 15, 2025  Patient's  1955  69 year old, male    Indication: Sepsis    Current estimated CrCl = Estimated Creatinine Clearance: 103.2 mL/min (based on SCr of 0.85 mg/dL).    Creatinine for last 3 days  2025:  2:08 PM Creatinine 0.93 mg/dL  1/15/2025: 12:53 PM Creatinine 0.85 mg/dL    Recent Vancomycin Level(s) for last 3 days  No results found for requested labs within last 3 days.      Vancomycin IV Administrations (past 72 hours)        No vancomycin orders with administrations in past 72 hours.                    Nephrotoxins and other renal medications (From now, onward)      Start     Dose/Rate Route Frequency Ordered Stop    01/15/25 1530  vancomycin (VANCOCIN) 2,000 mg in 0.9% NaCl 520 mL intermittent infusion         2,000 mg  over 2 Hours Intravenous ONCE 01/15/25 1518              Contrast Orders - past 72 hours (72h ago, onward)      None                Plan:  Load vancomycin 2000 mg IV x1.   Please reconsult pharmacy if vancomycin is continued.     Leela Magdaleno, PharmD

## 2025-01-16 ENCOUNTER — APPOINTMENT (OUTPATIENT)
Dept: CT IMAGING | Facility: HOSPITAL | Age: 70
End: 2025-01-16
Attending: RADIOLOGY
Payer: COMMERCIAL

## 2025-01-16 ENCOUNTER — APPOINTMENT (OUTPATIENT)
Dept: CARDIOLOGY | Facility: HOSPITAL | Age: 70
DRG: 871 | End: 2025-01-16
Attending: INTERNAL MEDICINE
Payer: COMMERCIAL

## 2025-01-16 LAB
ALBUMIN SERPL BCG-MCNC: 2.6 G/DL (ref 3.5–5.2)
ALP SERPL-CCNC: 135 U/L (ref 40–150)
ALT SERPL W P-5'-P-CCNC: 93 U/L (ref 0–70)
ANION GAP SERPL CALCULATED.3IONS-SCNC: 11 MMOL/L (ref 7–15)
AST SERPL W P-5'-P-CCNC: 178 U/L (ref 0–45)
BACTERIA BLD CULT: ABNORMAL
BILIRUB SERPL-MCNC: 1.4 MG/DL
BUN SERPL-MCNC: 16 MG/DL (ref 8–23)
CALCIUM SERPL-MCNC: 8.4 MG/DL (ref 8.8–10.4)
CHLORIDE SERPL-SCNC: 96 MMOL/L (ref 98–107)
CREAT SERPL-MCNC: 0.79 MG/DL (ref 0.67–1.17)
EGFRCR SERPLBLD CKD-EPI 2021: >90 ML/MIN/1.73M2
ENTEROCOCCUS FAECALIS: NOT DETECTED
ENTEROCOCCUS FAECIUM: NOT DETECTED
ERYTHROCYTE [DISTWIDTH] IN BLOOD BY AUTOMATED COUNT: 13.1 % (ref 10–15)
GLUCOSE BLDC GLUCOMTR-MCNC: 243 MG/DL (ref 70–99)
GLUCOSE BLDC GLUCOMTR-MCNC: 371 MG/DL (ref 70–99)
GLUCOSE SERPL-MCNC: 214 MG/DL (ref 70–99)
GRAM STAIN RESULT: ABNORMAL
HCO3 SERPL-SCNC: 24 MMOL/L (ref 22–29)
HCT VFR BLD AUTO: 36.2 % (ref 40–53)
HGB BLD-MCNC: 12.1 G/DL (ref 13.3–17.7)
INR PPP: 1.26 (ref 0.85–1.15)
LACTATE SERPL-SCNC: 2 MMOL/L (ref 0.7–2)
LISTERIA SPECIES (DETECTED/NOT DETECTED): NOT DETECTED
LVEF ECHO: NORMAL
MCH RBC QN AUTO: 28.1 PG (ref 26.5–33)
MCHC RBC AUTO-ENTMCNC: 33.4 G/DL (ref 31.5–36.5)
MCV RBC AUTO: 84 FL (ref 78–100)
PLATELET # BLD AUTO: 206 10E3/UL (ref 150–450)
POTASSIUM SERPL-SCNC: 4.2 MMOL/L (ref 3.4–5.3)
PROT SERPL-MCNC: 6 G/DL (ref 6.4–8.3)
RBC # BLD AUTO: 4.31 10E6/UL (ref 4.4–5.9)
SODIUM SERPL-SCNC: 127 MMOL/L (ref 135–145)
SODIUM SERPL-SCNC: 128 MMOL/L (ref 135–145)
SODIUM SERPL-SCNC: 128 MMOL/L (ref 135–145)
SODIUM SERPL-SCNC: 129 MMOL/L (ref 135–145)
SODIUM SERPL-SCNC: 129 MMOL/L (ref 135–145)
SODIUM SERPL-SCNC: 131 MMOL/L (ref 135–145)
SODIUM SERPL-SCNC: 131 MMOL/L (ref 135–145)
STAPHYLOCOCCUS AUREUS: NOT DETECTED
STAPHYLOCOCCUS EPIDERMIDIS: NOT DETECTED
STAPHYLOCOCCUS LUGDUNENSIS: NOT DETECTED
STAPHYLOCOCCUS SPECIES: NOT DETECTED
STREPTOCOCCUS AGALACTIAE: NOT DETECTED
STREPTOCOCCUS ANGINOSUS GROUP: DETECTED
STREPTOCOCCUS PNEUMONIAE: NOT DETECTED
STREPTOCOCCUS PYOGENES: NOT DETECTED
WBC # BLD AUTO: 15.1 10E3/UL (ref 4–11)

## 2025-01-16 PROCEDURE — 36415 COLL VENOUS BLD VENIPUNCTURE: CPT | Performed by: RADIOLOGY

## 2025-01-16 PROCEDURE — 87070 CULTURE OTHR SPECIMN AEROBIC: CPT | Performed by: RADIOLOGY

## 2025-01-16 PROCEDURE — 250N000013 HC RX MED GY IP 250 OP 250 PS 637: Performed by: INTERNAL MEDICINE

## 2025-01-16 PROCEDURE — 255N000002 HC RX 255 OP 636: Performed by: INTERNAL MEDICINE

## 2025-01-16 PROCEDURE — 0F9230Z DRAINAGE OF LEFT LOBE LIVER WITH DRAINAGE DEVICE, PERCUTANEOUS APPROACH: ICD-10-PCS | Performed by: RADIOLOGY

## 2025-01-16 PROCEDURE — 272N000431 CT LIVER ABSCESS DRAIN W CATH PLACE

## 2025-01-16 PROCEDURE — 36415 COLL VENOUS BLD VENIPUNCTURE: CPT | Performed by: INTERNAL MEDICINE

## 2025-01-16 PROCEDURE — 0F9130Z DRAINAGE OF RIGHT LOBE LIVER WITH DRAINAGE DEVICE, PERCUTANEOUS APPROACH: ICD-10-PCS | Performed by: RADIOLOGY

## 2025-01-16 PROCEDURE — 85610 PROTHROMBIN TIME: CPT | Performed by: RADIOLOGY

## 2025-01-16 PROCEDURE — 258N000003 HC RX IP 258 OP 636: Performed by: INTERNAL MEDICINE

## 2025-01-16 PROCEDURE — C1769 GUIDE WIRE: HCPCS

## 2025-01-16 PROCEDURE — 83605 ASSAY OF LACTIC ACID: CPT | Performed by: INTERNAL MEDICINE

## 2025-01-16 PROCEDURE — 99233 SBSQ HOSP IP/OBS HIGH 50: CPT | Performed by: INTERNAL MEDICINE

## 2025-01-16 PROCEDURE — 250N000009 HC RX 250: Performed by: RADIOLOGY

## 2025-01-16 PROCEDURE — 85014 HEMATOCRIT: CPT | Performed by: INTERNAL MEDICINE

## 2025-01-16 PROCEDURE — 99222 1ST HOSP IP/OBS MODERATE 55: CPT | Performed by: INTERNAL MEDICINE

## 2025-01-16 PROCEDURE — 250N000011 HC RX IP 250 OP 636: Performed by: INTERNAL MEDICINE

## 2025-01-16 PROCEDURE — 84295 ASSAY OF SERUM SODIUM: CPT | Performed by: INTERNAL MEDICINE

## 2025-01-16 PROCEDURE — 120N000001 HC R&B MED SURG/OB

## 2025-01-16 PROCEDURE — 82374 ASSAY BLOOD CARBON DIOXIDE: CPT | Performed by: INTERNAL MEDICINE

## 2025-01-16 PROCEDURE — 80053 COMPREHEN METABOLIC PANEL: CPT | Performed by: INTERNAL MEDICINE

## 2025-01-16 PROCEDURE — 85041 AUTOMATED RBC COUNT: CPT | Performed by: INTERNAL MEDICINE

## 2025-01-16 PROCEDURE — 87075 CULTR BACTERIA EXCEPT BLOOD: CPT | Performed by: RADIOLOGY

## 2025-01-16 PROCEDURE — 87040 BLOOD CULTURE FOR BACTERIA: CPT | Performed by: INTERNAL MEDICINE

## 2025-01-16 PROCEDURE — 93306 TTE W/DOPPLER COMPLETE: CPT | Mod: 26 | Performed by: GENERAL ACUTE CARE HOSPITAL

## 2025-01-16 PROCEDURE — 82947 ASSAY GLUCOSE BLOOD QUANT: CPT | Performed by: INTERNAL MEDICINE

## 2025-01-16 PROCEDURE — 250N000011 HC RX IP 250 OP 636: Performed by: RADIOLOGY

## 2025-01-16 PROCEDURE — 999N000208 ECHOCARDIOGRAM COMPLETE

## 2025-01-16 RX ORDER — NALOXONE HYDROCHLORIDE 0.4 MG/ML
0.4 INJECTION, SOLUTION INTRAMUSCULAR; INTRAVENOUS; SUBCUTANEOUS
Status: DISCONTINUED | OUTPATIENT
Start: 2025-01-16 | End: 2025-01-16 | Stop reason: HOSPADM

## 2025-01-16 RX ORDER — NALOXONE HYDROCHLORIDE 0.4 MG/ML
0.2 INJECTION, SOLUTION INTRAMUSCULAR; INTRAVENOUS; SUBCUTANEOUS
Status: DISCONTINUED | OUTPATIENT
Start: 2025-01-16 | End: 2025-01-16 | Stop reason: HOSPADM

## 2025-01-16 RX ORDER — FLUMAZENIL 0.1 MG/ML
0.2 INJECTION, SOLUTION INTRAVENOUS
Status: DISCONTINUED | OUTPATIENT
Start: 2025-01-16 | End: 2025-01-16 | Stop reason: HOSPADM

## 2025-01-16 RX ORDER — FENTANYL CITRATE 50 UG/ML
25-50 INJECTION, SOLUTION INTRAMUSCULAR; INTRAVENOUS EVERY 5 MIN PRN
Status: DISCONTINUED | OUTPATIENT
Start: 2025-01-16 | End: 2025-01-16 | Stop reason: HOSPADM

## 2025-01-16 RX ADMIN — PERFLUTREN 5.5 ML: 6.52 INJECTION, SUSPENSION INTRAVENOUS at 14:35

## 2025-01-16 RX ADMIN — MORPHINE SULFATE 30 MG: 30 TABLET, FILM COATED, EXTENDED RELEASE ORAL at 08:12

## 2025-01-16 RX ADMIN — LIDOCAINE HYDROCHLORIDE 15 ML: 10 INJECTION, SOLUTION INFILTRATION; PERINEURAL at 09:27

## 2025-01-16 RX ADMIN — CEFEPIME HYDROCHLORIDE 2 G: 2 INJECTION, POWDER, FOR SOLUTION INTRAVENOUS at 16:24

## 2025-01-16 RX ADMIN — METRONIDAZOLE 500 MG: 500 INJECTION, SOLUTION INTRAVENOUS at 22:06

## 2025-01-16 RX ADMIN — MORPHINE SULFATE 15 MG: 15 TABLET, FILM COATED, EXTENDED RELEASE ORAL at 13:53

## 2025-01-16 RX ADMIN — CEFEPIME HYDROCHLORIDE 2 G: 2 INJECTION, POWDER, FOR SOLUTION INTRAVENOUS at 00:19

## 2025-01-16 RX ADMIN — MIDAZOLAM HYDROCHLORIDE 0.5 MG: 1 INJECTION, SOLUTION INTRAMUSCULAR; INTRAVENOUS at 09:20

## 2025-01-16 RX ADMIN — DULOXETINE HYDROCHLORIDE 60 MG: 60 CAPSULE, DELAYED RELEASE ORAL at 08:12

## 2025-01-16 RX ADMIN — INSULIN GLARGINE 40 UNITS: 100 INJECTION, SOLUTION SUBCUTANEOUS at 12:20

## 2025-01-16 RX ADMIN — SODIUM CHLORIDE 1250 MG: 9 INJECTION, SOLUTION INTRAVENOUS at 16:48

## 2025-01-16 RX ADMIN — FENTANYL CITRATE 25 MCG: 50 INJECTION, SOLUTION INTRAMUSCULAR; INTRAVENOUS at 09:43

## 2025-01-16 RX ADMIN — GABAPENTIN 600 MG: 300 CAPSULE ORAL at 13:53

## 2025-01-16 RX ADMIN — INSULIN GLARGINE 40 UNITS: 100 INJECTION, SOLUTION SUBCUTANEOUS at 22:16

## 2025-01-16 RX ADMIN — GABAPENTIN 600 MG: 300 CAPSULE ORAL at 08:11

## 2025-01-16 RX ADMIN — GABAPENTIN 600 MG: 300 CAPSULE ORAL at 22:07

## 2025-01-16 RX ADMIN — METRONIDAZOLE 500 MG: 500 INJECTION, SOLUTION INTRAVENOUS at 10:28

## 2025-01-16 RX ADMIN — PANTOPRAZOLE SODIUM 40 MG: 40 TABLET, DELAYED RELEASE ORAL at 08:11

## 2025-01-16 RX ADMIN — MORPHINE SULFATE 30 MG: 30 TABLET, FILM COATED, EXTENDED RELEASE ORAL at 22:07

## 2025-01-16 RX ADMIN — CEFEPIME HYDROCHLORIDE 2 G: 2 INJECTION, POWDER, FOR SOLUTION INTRAVENOUS at 10:29

## 2025-01-16 RX ADMIN — ONDANSETRON 4 MG: 4 TABLET, ORALLY DISINTEGRATING ORAL at 22:16

## 2025-01-16 ASSESSMENT — ACTIVITIES OF DAILY LIVING (ADL)
ADLS_ACUITY_SCORE: 55
ADLS_ACUITY_SCORE: 54
ADLS_ACUITY_SCORE: 55
ADLS_ACUITY_SCORE: 56
ADLS_ACUITY_SCORE: 55
ADLS_ACUITY_SCORE: 58
ADLS_ACUITY_SCORE: 54
ADLS_ACUITY_SCORE: 56
ADLS_ACUITY_SCORE: 56
ADLS_ACUITY_SCORE: 58
ADLS_ACUITY_SCORE: 63
ADLS_ACUITY_SCORE: 54
ADLS_ACUITY_SCORE: 58
ADLS_ACUITY_SCORE: 54
ADLS_ACUITY_SCORE: 55
DEPENDENT_IADLS:: INDEPENDENT
ADLS_ACUITY_SCORE: 56
ADLS_ACUITY_SCORE: 63
ADLS_ACUITY_SCORE: 54
ADLS_ACUITY_SCORE: 58
ADLS_ACUITY_SCORE: 55
ADLS_ACUITY_SCORE: 55

## 2025-01-16 NOTE — PLAN OF CARE
Problem: Adult Inpatient Plan of Care  Goal: Plan of Care Review  Description: The Plan of Care Review/Shift note should be completed every shift.  The Outcome Evaluation is a brief statement about your assessment that the patient is improving, declining, or no change.  This information will be displayed automatically on your shift  note.  Outcome: Progressing     Problem: Adult Inpatient Plan of Care  Goal: Optimal Comfort and Wellbeing  Outcome: Progressing   Goal Outcome Evaluation:    - Patient is alert and oriented x4. Vitally stable on RA. Denies nausea  - ST on tele HR low 100s  - IVF infusing. IV abx administered   - NPO since midnight   - No BM this shift. Still need stool sample. Incontinent of bladder, incontinent care provided.   - Able to make needs known. Call light within reach

## 2025-01-16 NOTE — ED NOTES
[facility] ED Handoff Report    ED Chief Complaint: Sepsis    ED Diagnosis:  (A41.9) Sepsis, due to unspecified organism, unspecified whether acute organ dysfunction present (H)    (R19.7) Diarrhea, unspecified type    (R16.0) Liver mass    PMH:    Past Medical History:   Diagnosis Date    Abdominal pain, epigastric 06/23/2022    Normal gastric emptying study.  Peptic ulcer disease or gastritis suspected    Acute cholecystitis 07/12/2022    Hospitalized with acute abdominal pain, abnormal LFTs and CT showing distended gallbladder and bile duct dilatation.  Acute cholecystitis.  Cholecystectomy.    Acute pulmonary embolism without acute cor pulmonale (H) 05/21/2024    Anxiety 08/23/2024    Arthritis     psoriatic    ASCVD (arteriosclerotic cardiovascular disease) 04/28/2016    Coronary artery stent ×2 in RCA following myocardial infarction 2008 , echo June 2014 normal LV function with ejection fraction 50-55% with mild apical inferior hypokinesis    Bilateral leg pain 08/23/2024    Bilateral sensorineural hearing loss 08/23/2024    Chronic fatigue 06/23/2022    Chronic low back pain 04/28/2016    Chronic right shoulder pain 06/23/2022    Coronary artery disease     Deep vein thrombosis (DVT) of upper extremity (H) 10/09/2023    Depression 12/26/2017    Depressive disorder 2002    Diabetic peripheral neuropathy associated with type 2 diabetes mellitus (H) 04/28/2016    Abnormal EMG and workup at [facility]    Dilated bile duct 07/03/2017    CT scan with incidental finding of dilated intra-and extrahepatic ducts    Duodenal ulcer without hemorrhage, perforation, or obstruction 10/16/2023    History of duodenal ulcer 08/23/2024    HTN (hypertension)     Hypercholesterolemia     Hypogonadism in male     Hyponatremia 07/12/2022    Hypophosphatemia 10/05/2023    Ischemic cardiomyopathy     Stress Echocardiogram 2017 with decreased LV function with EF 43% worse compared to previous echo.  No change in infarct.  No  new ischemia    Knee effusion, left 11/06/2018    Left upper quadrant pain 06/30/2017    Leukocytosis, unspecified type 10/27/2023    Lower abdominal pain     Mass of duodenum 10/16/2023    Obstruction of second portion of duodenum with abnormal CT scan, biopsies negative for endoscopic ultrasound    MI (myocardial infarction) (H)     Nausea and vomiting 10/05/2023    Obstructive sleep apnea on CPAP     CPAP    Optic neuritis     Psoriasis     Recurrent vomiting 09/01/2023    Renal cell carcinoma, right (H) 06/23/2022    Right partial nephrectomy 2017    Right renal mass     SBO (small bowel obstruction) (H) 10/05/2023    Screening for AAA (abdominal aortic aneurysm)     No AAA on imaging in 2024    Sebaceous cyst 2010    Thoracic radiculopathy 2004    right T8 intercostal nerve block 2002    Tobacco abuse, in remission 04/28/2016    Transaminitis 07/12/2022    Type 2 diabetes mellitus with insulin therapy (H)         Code Status:  Full Code     Falls Risk: Yes Band: Applied    Current Living Situation/Residence: lives with a significant other and lives in a house     Elimination Status: Continent: Yes     Activity Level: SBA w/ walker    Patients Preferred Language:  English     Needed: No    Vital Signs:  BP (!) 179/76   Pulse 115   Temp 97.8  F (36.6  C) (Temporal)   Resp 16   Ht 1.829 m (6')   Wt 99.8 kg (220 lb)   SpO2 95%   BMI 29.84 kg/m       Cardiac Rhythm: SR    Pain Score: 3/10    Is the Patient Confused:  No    Last Food or Drink: 01/15/25    Focused Assessment:  Pt has been having general malaise for the past 11 days.  Pt has spots on liver that require further evaluation.  Pt starting having diarrhea this morning.  No loose stool while here in ER.  Pt lactate elevated.  Received IV antibiotics and fluids.  Had extravasation in right arm with first IV.  Hot packed right upper extremity due to swelling and hard to the touch.  Pulses intact.  Placed new IV in the left.  Paused fluids and  vanco while pt went to MRI.  Just restarted fluids.    Tests Performed: Done: Labs and Imaging    Treatments Provided:  See MAR    Family Dynamics/Concerns: No    Family Updated On Visitor Policy: Yes    Plan of Care Communicated to Family: Yes    Who Was Updated about Plan of Care: Wife.  Called her.    Belongings Checklist Done and Signed by Patient: Yes    Medications sent with patient: NA    Covid: symptomatic, negative    Additional Information: Wife took majority of belongings.  Pt has pj pants on.    Anita Naranjo RN 1/15/2025 8:10 PM

## 2025-01-16 NOTE — CONSULTS
Care Management Initial Consult    General Information  Assessment completed with: Spouse or significant other, Patient, wife (pt sleeping)  Type of CM/SW Visit: Initial Assessment    Primary Care Provider verified and updated as needed: Yes   Readmission within the last 30 days:        Reason for Consult: discharge planning  Advance Care Planning:            Communication Assessment  Patient's communication style: spoken language (English or Bilingual)    Hearing Difficulty or Deaf: no   Wear Glasses or Blind: no    Cognitive  Cognitive/Neuro/Behavioral: WDL  Level of Consciousness: somnolent  Arousal Level: arouses to voice  Orientation: oriented x 4     Best Language: 0 - No aphasia  Speech: clear    Living Environment:   People in home: spouse     Current living Arrangements: house      Able to return to prior arrangements: yes       Family/Social Support:  Care provided by: self  Provides care for: no one  Marital Status:   Support system: Wife  Dwaine       Description of Support System: Supportive, Involved         Current Resources:   Patient receiving home care services: No        Community Resources: None  Equipment currently used at home: glucometer  Supplies currently used at home: Diabetic Supplies    Employment/Financial:  Employment Status: retired        Financial Concerns:             Does the patient's insurance plan have a 3 day qualifying hospital stay waiver?  Yes     Which insurance plan 3 day waiver is available? Alternative insurance waiver    Will the waiver be used for post-acute placement? Undetermined at this time    Lifestyle & Psychosocial Needs:  Social Drivers of Health     Food Insecurity: Low Risk  (1/16/2025)    Food Insecurity     Within the past 12 months, did you worry that your food would run out before you got money to buy more?: No     Within the past 12 months, did the food you bought just not last and you didn t have money to get more?: No   Depression: Not at risk  (8/23/2024)    PHQ-2     PHQ-2 Score: 0   Housing Stability: Low Risk  (1/16/2025)    Housing Stability     Do you have housing? : Yes     Are you worried about losing your housing?: No   Tobacco Use: Medium Risk (1/14/2025)    Patient History     Smoking Tobacco Use: Former     Smokeless Tobacco Use: Never     Passive Exposure: Past   Financial Resource Strain: Low Risk  (1/16/2025)    Financial Resource Strain     Within the past 12 months, have you or your family members you live with been unable to get utilities (heat, electricity) when it was really needed?: No   Alcohol Use: Not At Risk (9/25/2019)    [facility]     Frequency of Alcohol Consumption: Never     Average Number of Drinks: Not on file     Frequency of Binge Drinking: Not on file   Transportation Needs: Low Risk  (1/16/2025)    Transportation Needs     Within the past 12 months, has lack of transportation kept you from medical appointments, getting your medicines, non-medical meetings or appointments, work, or from getting things that you need?: No   Physical Activity: Insufficiently Active (8/23/2024)    Exercise Vital Sign     Days of Exercise per Week: 1 day     Minutes of Exercise per Session: 20 min   Interpersonal Safety: Low Risk  (1/16/2025)    Interpersonal Safety     Do you feel physically and emotionally safe where you currently live?: Yes     Within the past 12 months, have you been hit, slapped, kicked or otherwise physically hurt by someone?: No     Within the past 12 months, have you been humiliated or emotionally abused in other ways by your partner or ex-partner?: No   Stress: No Stress Concern Present (8/23/2024)    [employer] [place] [employer] - Occupational Stress Questionnaire     Feeling of Stress : Not at all   Social Connections: Unknown (8/23/2024)    Social Connection and Isolation Panel [NHANES]     Frequency of Communication with Friends and Family: Not on file      Frequency of Social Gatherings with Friends and Family: Once a week     Attends Gnosticism Services: Not on file     Active Member of Clubs or Organizations: Not on file     Attends Club or Organization Meetings: Not on file     Marital Status: Not on file   Health Literacy: Not on file       Functional Status:  Prior to admission patient needed assistance:   Dependent ADLs:: Independent  Dependent IADLs:: Independent                 Discussed  Partnership in Safe Discharge Planning  document with patient/family: No    Additional Information:  Met with patient and wife at the bedside. Patient sleeping. Wife says they live together and patient is independent at baseline. No services. No DME use. Anticipate return home at discharge     Next Steps: continue to follow     Ciera Bhatti RN

## 2025-01-16 NOTE — CONSULTS
Infectious Disease Consultation:  Requesting MD:  Reason for consult:      HISTORY:    Pasted from hospitalist:  Bon Luque is a 69 year old male with past medical history significant for CAD, renal cell carcinoma status post nephrectomy 2017, hypertension, diabetes with neuropathy, HLD, hypogonadism on testosterone, psoriasis, and small bowel obstruction who was admitted on 1/15/2025 due to abdominal pain.  He has been experiencing nausea, diarrhea, and abdominal pain for the past 11 days.  He has had associated fatigue, generalized weakness, and dizziness.  He has also had a decreased appetite.  He was seen at the [place] ED 1/13 and had a CT that showed possible liver mass versus infection.  He was discharged with follow-up with his primary care provider for the 20th.  His abdominal pain and symptoms significantly worsened bringing him to the emergency department today.  He appears acutely ill.  He is having difficulty answering questions.  He had a DVT and PE in March 2020 and is on anticoagulation for this.  This was his second episode of blood clotting, the first was in 2023 when he had a PICC line associated for extremity DVT.  He is immunocompromised due to being on Skyrizi for psoriasis     Asked to see for liver abscess, early report of BC with GPpairs/chains often this is strep intermedius.  On cefepime, vanco, flagyl.    Here are multiple slices of the liver pathology found:      Pertinent past history, past infectious disease history:       Past Medical History       Past Medical History:   Diagnosis Date    Abdominal pain, epigastric 06/23/2022     Normal gastric emptying study.  Peptic ulcer disease or gastritis suspected    Acute cholecystitis 07/12/2022     Hospitalized with acute abdominal pain, abnormal LFTs and CT showing distended gallbladder and bile duct dilatation.  Acute cholecystitis.  Cholecystectomy.    Acute pulmonary embolism without acute cor pulmonale (H) 05/21/2024    Anxiety  08/23/2024    Arthritis       psoriatic    ASCVD (arteriosclerotic cardiovascular disease) 04/28/2016     Coronary artery stent ×2 in RCA following myocardial infarction 2008 , echo June 2014 normal LV function with ejection fraction 50-55% with mild apical inferior hypokinesis    Bilateral leg pain 08/23/2024    Bilateral sensorineural hearing loss 08/23/2024    Chronic fatigue 06/23/2022    Chronic low back pain 04/28/2016    Chronic right shoulder pain 06/23/2022    Coronary artery disease      Deep vein thrombosis (DVT) of upper extremity (H) 10/09/2023    Depression 12/26/2017    Depressive disorder 2002    Diabetic peripheral neuropathy associated with type 2 diabetes mellitus (H) 04/28/2016     Abnormal EMG and workup at [facility]    Dilated bile duct 07/03/2017     CT scan with incidental finding of dilated intra-and extrahepatic ducts    Duodenal ulcer without hemorrhage, perforation, or obstruction 10/16/2023    History of duodenal ulcer 08/23/2024    HTN (hypertension)      Hypercholesterolemia      Hypogonadism in male      Hyponatremia 07/12/2022    Hypophosphatemia 10/05/2023    Ischemic cardiomyopathy       Stress Echocardiogram 2017 with decreased LV function with EF 43% worse compared to previous echo.  No change in infarct.  No new ischemia    Knee effusion, left 11/06/2018    Left upper quadrant pain 06/30/2017    Leukocytosis, unspecified type 10/27/2023    Lower abdominal pain      Mass of duodenum 10/16/2023     Obstruction of second portion of duodenum with abnormal CT scan, biopsies negative for endoscopic ultrasound    MI (myocardial infarction) (H)      Nausea and vomiting 10/05/2023    Obstructive sleep apnea on CPAP       CPAP    Optic neuritis      Psoriasis      Recurrent vomiting 09/01/2023    Renal cell carcinoma, right (H) 06/23/2022     Right partial nephrectomy 2017    Right renal mass      SBO (small bowel obstruction) (H) 10/05/2023    Screening for AAA (abdominal aortic  aneurysm)       No AAA on imaging in 2024    Sebaceous cyst 2010    Thoracic radiculopathy 2004     right T8 intercostal nerve block 2002    Tobacco abuse, in remission 04/28/2016    Transaminitis 07/12/2022    Type 2 diabetes mellitus with insulin therapy (H)              Past Surgical History        Past Surgical History:   Procedure Laterality Date    APPENDECTOMY        COLONOSCOPY         Colonoscopy September 2017 with hyperplastic polyps, repeat in 10 years    CORONARY ANGIOPLASTY         stent    ENDOSCOPIC RETROGRADE CHOLANGIOPANCREATOGRAM N/A 7/12/2022     Procedure: ENDOSCOPIC RETROGRADE CHOLANGIOPANCREATOGRAPHY, STONE EXTRACTION, BILIARY SPHINCTEROTOMY;  Surgeon: Bon Meyer MD;  Location: [facility]    ESOPHAGOSCOPY, GASTROSCOPY, DUODENOSCOPY (EGD), COMBINED N/A 10/6/2023     Procedure: ESOPHAGOGASTRODUODENOSCOPY, WITH ENDOSCOPIC ULTRASOUND WITH FINE NEEDLE ASPIRATION AND DUODENAL BIOPSY;  Surgeon: Bon Meyer MD;  Location: [facility] [place] OR    KNEE SURGERY        LAPAROSCOPIC CHOLECYSTECTOMY N/A 7/12/2022     Procedure: CHOLECYSTECTOMY, LAPAROSCOPIC;  Surgeon: Adam March MD;  Location: [facility] [place] [facility]    NOSE SURGERY        PICC DOUBLE LUMEN PLACEMENT   10/9/2023    GA LAP,PARTIAL NEPHRECTOMY Right 1/10/2018     Procedure: RIGHT ROBOTIC PARTIAL NEPHRECTOMY WITH INTRAOPERATIVE ULTRASOUND;  Surgeon: Chase Rodríguez MD;  [place] [facility] [place]  Service: Urology            Prior to Admission Medications  Prior to Admission Medications   Prescriptions Last Dose Informant Patient Reported? Taking?   Continuous Blood Gluc Sensor (FREESTYLE SHIRA 14 DAY SENSOR) MISC   Self Yes Yes   DULoxetine (CYMBALTA) 60 MG capsule 1/15/2025 Morning   Yes Yes   Sig: Take 60 mg by mouth every morning.   ELIQUIS ANTICOAGULANT 5 MG tablet 1/15/2025 Morning   No Yes   Sig: Take 1 tablet (5 mg) by mouth 2 times daily.   LANTUS SOLOSTAR 100 UNIT/ML soln 1/15/2025 Morning   Yes Yes    Sig: Inject 50 Units Subcutaneous 2 times daily   Risankizumab-rzaa (SKYRIZI SC) More than a month   Yes Yes   Sig: Inject subcutaneously every 4 months. Last dose administered on 10/23/24   gabapentin (NEURONTIN) 300 MG capsule 1/15/2025 Morning Self Yes Yes   Sig: [GABAPENTIN (NEURONTIN) 300 MG CAPSULE] Take 600 mg by mouth 3 (three) times a day.    insulin lispro (HUMALOG KWIKPEN) 100 UNIT/ML (1 unit dial) KWIKPEN 1/14/2025 Noon Self Yes Yes   Sig: Inject subcutaneously 3 times daily (before meals). Sliding scale. Average use is 50 units with each meal   magnesium oxide 400 MG CAPS     Yes Yes   Sig: Take 400 mg by mouth nightly as needed.   melatonin 5 MG CAPS     Yes Yes   Sig: Take 5-10 mg by mouth nightly as needed (sleep)   morphine (MS CONTIN) 15 MG CR tablet 1/14/2025 at  2:00 PM   Yes Yes   Sig: Take 15 mg by mouth daily at 2 pm.   morphine (MS CONTIN) 30 MG CR tablet 1/15/2025 Morning Self Yes Yes   Sig: Take 30 mg by mouth 2 times daily.   multivitamin w/minerals (THERA-VIT-M) tablet     Yes Yes   Sig: Take 1 tablet by mouth daily as needed.   naloxone (NARCAN) 4 MG/0.1ML nasal spray   Self Yes Yes   Sig: Spray 1 spray into one nostril alternating nostrils once as needed for opioid reversal   omeprazole (PRILOSEC) 20 MG DR capsule 1/15/2025 Morning   Yes Yes   Sig: Take 20 mg by mouth 2 times daily.   ondansetron (ZOFRAN ODT) 4 MG ODT tab     No Yes   Sig: Take 1 tablet (4 mg) by mouth every 8 hours as needed for nausea.   sodium fluoride dental gel (PREVIDENT) 1.1 % GEL topical gel     Yes Yes   Sig: Apply to affected area nightly as needed.   testosterone (ANDROGEL 1.62 % PUMP) 20.25 MG/ACT gel 1/14/2025 Morning   Yes Yes   Sig: Place 1 Pump onto the skin every 72 hours.      Facility-Administered Medications: None         Review of Systems  The 10 point Review of Systems is negative other than noted in the HPI or here.         Social History  I have reviewed this patient's social history and  updated it with pertinent information if needed.  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        Family History   Problem Relation Age of Onset    Diabetes Mother      Other Cancer Mother      Diabetes Father      Depression Father      Diabetes Brother      Depression Sister      Substance Abuse Brother           Alcohol                 Medications:  Reviewed prior to admission meds as applicable in chart review.  Current meds are reviewed in the EMR listed MAR.     ANTIBIOTICS:    Current:   Prior:   Allergy to:    SH/FH and  travel history(if applicable to consult):    REVIEW OF SYSTEMS:  All other systems negative    EXAMINATION:  BP (!) 155/70   Pulse 109   Temp 98.5  F (36.9  C) (Oral)   Resp 18   Ht 1.829 m (6')   Wt 99.8 kg (220 lb)   SpO2 94%   BMI 29.84 kg/m    Alert, awake  Vitals tabulated above, reviewed  HEENT:nl  Neck supple without lymphadenopathy  Sclera clear  CARDIOVASCULAR regular rate and rhythm, no murmur  Lungs CLEAR TO AUSCULTATION   Abdomen soft, NT/ND, absent HEPATOSPLENOMEGALY  2 drains in place on lateral one mid gut  Skin normal  Joints normal  Neurologic exam non focal  Wound:  NA        CLINICAL DATABASE FOR---LAB/MICRO/CULTURES/IMAGING STUDIES:  Lab Results   Component Value Date    WBC 15.1 01/16/2025     Lab Results   Component Value Date    RBC 4.31 01/16/2025     Lab Results   Component Value Date    HGB 12.1 01/16/2025     Lab Results   Component Value Date    HCT 36.2  "01/16/2025     No components found for: \"MCT\"  Lab Results   Component Value Date    MCV 84 01/16/2025     Lab Results   Component Value Date    MCH 28.1 01/16/2025     Lab Results   Component Value Date    MCHC 33.4 01/16/2025     Lab Results   Component Value Date    RDW 13.1 01/16/2025     Lab Results   Component Value Date     01/16/2025       Last Comprehensive Metabolic Panel:  Sodium   Date Value Ref Range Status   01/16/2025 129 (L) 135 - 145 mmol/L Final     Potassium   Date Value Ref Range Status   01/16/2025 4.2 3.4 - 5.3 mmol/L Final   07/13/2022 4.2 3.5 - 5.0 mmol/L Final     Chloride   Date Value Ref Range Status   01/16/2025 96 (L) 98 - 107 mmol/L Final   07/13/2022 104 98 - 107 mmol/L Final     Carbon Dioxide (CO2)   Date Value Ref Range Status   01/16/2025 24 22 - 29 mmol/L Final   07/13/2022 22 22 - 31 mmol/L Final     Anion Gap   Date Value Ref Range Status   01/16/2025 11 7 - 15 mmol/L Final   07/13/2022 8 5 - 18 mmol/L Final     Glucose   Date Value Ref Range Status   01/16/2025 214 (H) 70 - 99 mg/dL Final   07/13/2022 286 (H) 70 - 125 mg/dL Final     GLUCOSE BY METER POCT   Date Value Ref Range Status   01/16/2025 243 (H) 70 - 99 mg/dL Final     Urea Nitrogen   Date Value Ref Range Status   01/16/2025 16.0 8.0 - 23.0 mg/dL Final   07/13/2022 17 8 - 22 mg/dL Final     Creatinine   Date Value Ref Range Status   01/16/2025 0.79 0.67 - 1.17 mg/dL Final     GFR Estimate   Date Value Ref Range Status   01/16/2025 >90 >60 mL/min/1.73m2 Final     Comment:     eGFR calculated using 2021 CKD-EPI equation.   01/13/2021 >60 >60 mL/min/1.73m2 Final     GFR, ESTIMATED POCT   Date Value Ref Range Status   11/08/2024 >60 >60 mL/min/1.73m2 Final     Calcium   Date Value Ref Range Status   01/16/2025 8.4 (L) 8.8 - 10.4 mg/dL Final     Comment:     Reference intervals for this test were updated on 7/16/2024 to reflect our healthy population more accurately. There may be differences in the flagging of prior " "results with similar values performed with this method. Those prior results can be interpreted in the context of the updated reference intervals.       Liver Function Studies -   Recent Labs   Lab Test 01/16/25  0644   PROTTOTAL 6.0*   ALBUMIN 2.6*   BILITOTAL 1.4*   ALKPHOS 135   *   ALT 93*       No results found for: \"SED\"        Visible to patient: No (not released)    Specimen Information: Line, venous; Blood   0 Result Notes  Culture Positive on the 1st day of incubation Abnormal    Gram positive cocci in pairs and chains Panic    1 of 2 bottles        Resulting Agency: [facility]             IMPRESSION:  Bacteremic liver abscess in host with DM and immune suppression due to biologic for psoriasis  Drained, micro pending (2 drains in on exam)    PLAN:  Same abx  Likely month IV CTX if bacteriology fits what I think this is.           SILVIA RG MD  [facility]  Office [phone]      "

## 2025-01-16 NOTE — PHARMACY-VANCOMYCIN DOSING SERVICE
Pharmacy Vancomycin Initial Note  Date of Service 2025  Patient's  1955  69 year old, male    Indication: Sepsis    Current estimated CrCl = Estimated Creatinine Clearance: 111 mL/min (based on SCr of 0.79 mg/dL).    Creatinine for last 3 days  1/15/2025: 12:53 PM Creatinine 0.85 mg/dL  2025:  6:44 AM Creatinine 0.79 mg/dL    Recent Vancomycin Level(s) for last 3 days  No results found for requested labs within last 3 days.      Vancomycin IV Administrations (past 72 hours)                     vancomycin (VANCOCIN) 2,000 mg in 0.9% NaCl 520 mL intermittent infusion ()  Restarted 01/15/25 2006      Restarted  1716     2,000 mg New Bag  1622                    Nephrotoxins and other renal medications (From now, onward)      Start     Dose/Rate Route Frequency Ordered Stop    25 1500  vancomycin (VANCOCIN) 1,250 mg in 0.9% NaCl 262.5 mL intermittent infusion         1,250 mg  over 90 Minutes Intravenous EVERY 12 HOURS 25 1436              Contrast Orders - past 72 hours (72h ago, onward)      Start     Dose/Rate Route Frequency Stop    01/15/25 1900  gadobutrol (GADAVIST) injection 10 mL         10 mL Intravenous ONCE 01/15/25 1859            [employer] Prediction of Planned Initial Vancomycin Regimen  Regimen: 1250 mg IV every 12 hours.  Start time: 14:35 on 2025  Exposure target: AUC24 (range)400-600 mg/L.hr   AUC24,ss: 522 mg/L.hr  Probability of AUC24 > 400: 77 %  Ctrough,ss: 16.6 mg/L  Probability of Ctrough,ss > 20: 35 %  Probability of nephrotoxicity (Lodise RUSSELL ): 12 %          Plan:  Start vancomycin  1250 mg IV q12h.   Vancomycin monitoring method: AUC  Vancomycin therapeutic monitoring goal: 400-600 mg*h/L  Pharmacy will check vancomycin levels as appropriate in 1-3 Days.    Serum creatinine levels will be ordered every 48 hours.      Edna Campbell, [facility]

## 2025-01-16 NOTE — PLAN OF CARE
Goal Outcome Evaluation:       Bon reported abdominal discomfort, he states he has had difficulty eating recently due to nausea. He is somnolent this evening. He had difficulty standing at the bedside and using the urinal. SCD's and bed alarm on, call light in reach. Urine clear, orange colored. He is NPO at midnight.

## 2025-01-16 NOTE — PLAN OF CARE
Problem: Infection  Goal: Absence of Infection Signs and Symptoms  Outcome: Progressing     Problem: Pain Acute  Goal: Optimal Pain Control and Function  Outcome: Progressing  Intervention: Develop Pain Management Plan  Recent Flowsheet Documentation  Taken 1/16/2025 1350 by Mack Molina, RN  Pain Management Interventions: (meds given as ordered & per pt request despite current pain rating)   medication (see MAR)   breathing exercises   care clustered   distraction   emotional support   pillow support provided   quiet environment facilitated   relaxation techniques promoted   repositioned   rest   therapeutic presence  Taken 1/16/2025 0800 by Mack Molina, RN  Pain Management Interventions: (meds given as scheduled & per pt request, despite pain rating)   medication (see MAR)   breathing exercises   care clustered   distraction   emotional support   pillow support provided   quiet environment facilitated   relaxation techniques promoted   repositioned   rest   therapeutic presence  Intervention: Prevent or Manage Pain  Recent Flowsheet Documentation  Taken 1/16/2025 0800 by Mack Molina RN  Medication Review/Management: medications reviewed   Goal Outcome Evaluation:       Went down & back to IR for liver abscess drainage- VSS on RA. SR/ST on tele. A&Ox4. Abd pain managed w/ sched PTA morphine. Up w/ 1 & W. DAMI #1 w/ high OP, DAMI #2 w/ low OP, both OP red/thick, both flushing w/o issue, irrigant subtracted from total OP. IVF infusing 50 ml/hr. IV abx given per order, added vanco. Tolerating reg diet. Spouse visiting at bedside.

## 2025-01-16 NOTE — PROGRESS NOTES
[facility]    Medicine Progress Note - Hospitalist Service    Date of Admission:  1/15/2025    Assessment & Plan         Bon Luque is a 69 year old male with past medical history significant for CAD, renal cell carcinoma status post nephrectomy 2017, hypertension, diabetes with neuropathy, HLD, hypogonadism on testosterone, psoriasis, and small bowel obstruction who was admitted on 1/15/2025 due to abdominal pain, diarrhea, nausea, dizziness, generalized weakness, and poor appetite for 11 days PTA.        1/16 :     IR consulted  S/p CT-guided liver abscess drainage catheter placement x2 with moderate sedation      No fevers  Blood cultures 1/15: GPC  Repeat BC 1/16 sent  On iv cefepime, flagyl, vancomycin  Check echo  ID consulted  On iv fluids NS@ 50/hr    Holding eliquis as patient got procedure by IR today : for h/o right upper extremity DVT 10/2023, extensive right leg DVT and PE diagnosed 5/9/2024    Not medically ready for discharge at this time.  Multi day stay  Ongoing infection work up        A/p :     Concern for liver abscess  Sepsis on admission  Fever 100.1 on 1/15  -SIRS: Tachycardia, leukocytosis, fever  -CT 1/13: 3 prominent indeterminate complex hypodense masses in the liver concerning for neoplasm versus infectious process  -MRI 1/15: 3 heterogeneous masses in the liver new from 11/8/2024 concerning for infection, possible rapidly progressive malignancy/metastases  -Initially received cefepime and vancomycin in the ED  -Started on cefepime and metronidazole  -IR consulted  -Holding apixaban in anticipation of possible abscess drainage  -NPO at midnight     Psoriasis  Immunocompromised  -On Skyrizi outpatient     Lactic acidosis  -In the setting of infection and severe dehydration  -Lactic acid 2.6 > 2.3  -Received almost 3 L normal saline in the ED  -Started on IVF with normal saline at 50 mL/h  -Will recheck in the morning     History of DVT and PE  -Had PICC line  associated right upper extremity DVT 10/2023, extensive right leg DVT and PE diagnosed 5/9/2024  -Continue Eliquis due to concerns for abscess and potential drainage as above     Chronic pain syndrome  -Has chronic pain in the legs, shoulders, low back, and headaches  -Sees pain and palliative outpatient  -Continue PTA MS Contin 30 mg twice daily and 15 mg at 1400 daily, duloxetine 60 mg every morning, gabapentin 600 mg 3 times daily     History of renal cell carcinoma status post nephrectomy  -Has normal creatinine clearance at baseline, Cr 0.85 on admission  -Monitor kidney function and avoid nephrotoxic agents     Hypogonadism  -Continue PTA testosterone     GERD  -Substitute pantoprazole for omeprazole while inpatient                Diet: Regular Diet Adult    DVT Prophylaxis: DOAC  Seals Catheter: Not present  Lines: None     Cardiac Monitoring: ACTIVE order. Indication: Tachyarrhythmias, acute (48 hours)  Code Status: Full Code      Clinically Significant Risk Factors Present on Admission         # Hyponatremia: Lowest Na = 125 mmol/L in last 2 days, will monitor as appropriate  # Hypochloremia: Lowest Cl = 88 mmol/L in last 2 days, will monitor as appropriate      # Hypoalbuminemia: Lowest albumin = 2.6 g/dL at 1/16/2025  6:44 AM, will monitor as appropriate  # Drug Induced Coagulation Defect: home medication list includes an anticoagulant medication    # Hypertension: Noted on problem list          # DMII: A1C = N/A within past 6 months   # Overweight: Estimated body mass index is 29.84 kg/m  as calculated from the following:    Height as of this encounter: 1.829 m (6').    Weight as of this encounter: 99.8 kg (220 lb).              Social Drivers of Health    Tobacco Use: Medium Risk (1/14/2025)    Patient History     Smoking Tobacco Use: Former     Smokeless Tobacco Use: Never     Passive Exposure: Past   Physical Activity: Insufficiently Active (8/23/2024)    Exercise Vital Sign     Days of Exercise per  Week: 1 day     Minutes of Exercise per Session: 20 min   Social Connections: Unknown (8/23/2024)    Social Connection and Isolation Panel [NHANES]     Frequency of Social Gatherings with Friends and Family: Once a week          Disposition Plan     Medically Ready for Discharge: Anticipated in 2-4 Days             Schuyler Hughes MD  Hospitalist Service  [facility]  Securely message with [employer] (more info)  Text page via [employer] Paging/Directory   ______________________________________________________________________      Physical Exam   Vital Signs: Temp: 98.5  F (36.9  C) Temp src: Oral BP: (!) 155/70 (notified nurse) Pulse: 109   Resp: 18 SpO2: 94 % O2 Device: None (Room air)    Weight: 220 lbs 0 oz       Constitutional: awake, alert, appears acutely ill  Eyes: Lids and lashes normal, pupils equal, round, extra ocular muscles intact, sclera clear, conjunctiva normal  Respiratory: No increased work of breathing, good air exchange, clear to auscultation bilaterally, no crackles or wheezing  Cardiovascular: Tachycardia  GI: normal bowel sounds, soft, non-distended, non-tender  Skin: normal skin color, texture, turgor, no rashes and no jaundice  Musculoskeletal: no lower extremity pitting edema present, tone is normal, generalized weakness noted  Neurologic: Awake, alert, no gross focal abnormalities   Neuropsychiatric: Appropriate mood and affect      Medical Decision Making       60 MINUTES SPENT BY ME on the date of service doing chart review, history, exam, documentation & further activities per the note.  MANAGEMENT DISCUSSED with the following over the past 24 hours: rn, patient       Data     I have personally reviewed the following data over the past 24 hrs:    15.1 (H)  \   12.1 (L)   / 206     129 (L) 96 (L) 16.0 /  243 (H)   4.2 24 0.79 \     ALT: 93 (H) AST: 178 (H) AP: 135 TBILI: 1.4 (H)   ALB: 2.6 (L) TOT PROTEIN: 6.0 (L) LIPASE: N/A     Trop: N/A BNP: N/A     Procal: N/A CRP: N/A  Lactic Acid: 2.0       INR:  1.26 (H) PTT:  N/A   D-dimer:  N/A Fibrinogen:  N/A

## 2025-01-16 NOTE — PRE-PROCEDURE
GENERAL PRE-PROCEDURE:   Procedure:  Imaging guided liver fluid collection aspiration and possible drainage catheter placement x2 with moderate sedation  Date/Time:  1/16/2025 8:33 AM    Verbal consent obtained?: Yes    Written consent obtained?: Yes    Risks and benefits: Risks, benefits and alternatives were discussed    DC Plan: Appropriate discharge home plan in place for patients who are going home after procedure   Consent given by:  Patient  Patient states understanding of procedure being performed: Yes    Patient's understanding of procedure matches consent: Yes    Procedure consent matches procedure scheduled: Yes    Expected level of sedation:  Moderate  Appropriately NPO:  Yes  ASA Class:  2  Mallampati  :  Grade 2- soft palate, base of uvula, tonsillar pillars, and portion of posterior pharyngeal wall visible  Lungs:  Lungs clear with good breath sounds bilaterally  Heart:  Normal heart sounds with tachycardia  History & Physical reviewed:  History and physical reviewed and no updates needed  Statement of review:  I have reviewed the lab findings, diagnostic data, medications, and the plan for sedation

## 2025-01-16 NOTE — IR NOTE
Patient Name: Bon Luque  Medical Record Number: [id number]  Today's Date: 1/16/2025    Procedure: Drain placement  Proceduralist: Fahrner    Procedure Start: 0921  Procedure end: 0953  Sedation medications administered: 32 mg midazolam and .5 mcg fentanyl   Sedation time: 25 minutes    Report given to: Mack AMATO

## 2025-01-16 NOTE — CONSULTS
Imaging reviewed. Will proceed with imaging guided abscess drainage catheter placement with moderate sedation.

## 2025-01-16 NOTE — PROCEDURES
[facility]    Procedure: CT-guided liver abscess drainage catheter placement x2 with moderate sedation    Date/Time: 1/16/2025 10:35 AM    Performed by: Fahrner, Lester, MD  Authorized by: Fahrner, Lester, MD  IR Fellow Physician:    Pre Procedure Diagnosis: liver abscess  Post Procedure Diagnosis: liver abscess    UNIVERSAL PROTOCOL   Site Marked: Yes  Prior Images Obtained and Reviewed:  Yes  Required items: Required blood products, implants, devices and special equipment available    Patient identity confirmed:  Verbally with patient and provided demographic data  Patient was reevaluated immediately before administering moderate or deep sedation or anesthesia  Confirmation Checklist:  Patient's identity using two indicators, relevant allergies, procedure was appropriate and matched the consent or emergent situation and correct equipment/implants were available  Time out: Immediately prior to the procedure a time out was called    Universal Protocol: the Joint Commission Universal Protocol was followed    Preparation: Patient was prepped and draped in usual sterile fashion    ESBL (mL):  0     ANESTHESIA    Anesthesia:  Local infiltration  Local Anesthetic:  Lidocaine 1% without epinephrine  Anesthetic Total (mL):  7      SEDATION  Patient Sedated: Yes    Sedation Type:  Moderate (conscious) sedation  Sedation:  Fentanyl, midazolam and see MAR for details  Vital signs: Vital signs monitored during sedation    Findings: See the CT report for details.    Specimens: fluid and/or tissue for gram stain and culture    Procedural Complications: None    Condition: Stable      PROCEDURE    Patient Tolerance:  Patient tolerated the procedure well with no immediate complications  Length of time physician/provider present for 1:1 monitoring during sedation:  23-37 min

## 2025-01-17 VITALS
BODY MASS INDEX: 29.8 KG/M2 | OXYGEN SATURATION: 94 % | RESPIRATION RATE: 20 BRPM | SYSTOLIC BLOOD PRESSURE: 126 MMHG | HEART RATE: 102 BPM | TEMPERATURE: 99.3 F | DIASTOLIC BLOOD PRESSURE: 68 MMHG | HEIGHT: 72 IN | WEIGHT: 220 LBS

## 2025-01-17 LAB
ALBUMIN SERPL BCG-MCNC: 2.2 G/DL (ref 3.5–5.2)
ALP SERPL-CCNC: 144 U/L (ref 40–150)
ALT SERPL W P-5'-P-CCNC: 65 U/L (ref 0–70)
ANION GAP SERPL CALCULATED.3IONS-SCNC: 8 MMOL/L (ref 7–15)
AST SERPL W P-5'-P-CCNC: 83 U/L (ref 0–45)
BASOPHILS # BLD AUTO: 0 10E3/UL (ref 0–0.2)
BASOPHILS NFR BLD AUTO: 0 %
BILIRUB DIRECT SERPL-MCNC: 0.57 MG/DL (ref 0–0.3)
BILIRUB SERPL-MCNC: 0.9 MG/DL
BUN SERPL-MCNC: 11.6 MG/DL (ref 8–23)
CALCIUM SERPL-MCNC: 7.8 MG/DL (ref 8.8–10.4)
CHLORIDE SERPL-SCNC: 96 MMOL/L (ref 98–107)
CREAT SERPL-MCNC: 0.75 MG/DL (ref 0.67–1.17)
EGFRCR SERPLBLD CKD-EPI 2021: >90 ML/MIN/1.73M2
EOSINOPHIL # BLD AUTO: 0.1 10E3/UL (ref 0–0.7)
EOSINOPHIL NFR BLD AUTO: 1 %
ERYTHROCYTE [DISTWIDTH] IN BLOOD BY AUTOMATED COUNT: 13.1 % (ref 10–15)
EST. AVERAGE GLUCOSE BLD GHB EST-MCNC: 180 MG/DL
GLUCOSE BLDC GLUCOMTR-MCNC: 168 MG/DL (ref 70–99)
GLUCOSE BLDC GLUCOMTR-MCNC: 230 MG/DL (ref 70–99)
GLUCOSE BLDC GLUCOMTR-MCNC: 232 MG/DL (ref 70–99)
GLUCOSE BLDC GLUCOMTR-MCNC: 243 MG/DL (ref 70–99)
GLUCOSE BLDC GLUCOMTR-MCNC: 243 MG/DL (ref 70–99)
GLUCOSE BLDC GLUCOMTR-MCNC: 276 MG/DL (ref 70–99)
GLUCOSE SERPL-MCNC: 180 MG/DL (ref 70–99)
HBA1C MFR BLD: 7.9 %
HCO3 SERPL-SCNC: 25 MMOL/L (ref 22–29)
HCT VFR BLD AUTO: 30.6 % (ref 40–53)
HGB BLD-MCNC: 10.5 G/DL (ref 13.3–17.7)
IMM GRANULOCYTES # BLD: 0.2 10E3/UL
IMM GRANULOCYTES NFR BLD: 2 %
LYMPHOCYTES # BLD AUTO: 1.3 10E3/UL (ref 0.8–5.3)
LYMPHOCYTES NFR BLD AUTO: 11 %
MCH RBC QN AUTO: 28.1 PG (ref 26.5–33)
MCHC RBC AUTO-ENTMCNC: 34.3 G/DL (ref 31.5–36.5)
MCV RBC AUTO: 82 FL (ref 78–100)
MONOCYTES # BLD AUTO: 0.8 10E3/UL (ref 0–1.3)
MONOCYTES NFR BLD AUTO: 7 %
NEUTROPHILS # BLD AUTO: 9.4 10E3/UL (ref 1.6–8.3)
NEUTROPHILS NFR BLD AUTO: 80 %
NRBC # BLD AUTO: 0 10E3/UL
NRBC BLD AUTO-RTO: 0 /100
PLAT MORPH BLD: NORMAL
PLATELET # BLD AUTO: 213 10E3/UL (ref 150–450)
POTASSIUM SERPL-SCNC: 3.4 MMOL/L (ref 3.4–5.3)
PROT SERPL-MCNC: 5.4 G/DL (ref 6.4–8.3)
RBC # BLD AUTO: 3.74 10E6/UL (ref 4.4–5.9)
RBC MORPH BLD: NORMAL
SODIUM SERPL-SCNC: 128 MMOL/L (ref 135–145)
SODIUM SERPL-SCNC: 129 MMOL/L (ref 135–145)
VANCOMYCIN SERPL-MCNC: 9.2 UG/ML
WBC # BLD AUTO: 11.8 10E3/UL (ref 4–11)

## 2025-01-17 PROCEDURE — 36415 COLL VENOUS BLD VENIPUNCTURE: CPT | Performed by: HOSPITALIST

## 2025-01-17 PROCEDURE — 83036 HEMOGLOBIN GLYCOSYLATED A1C: CPT | Performed by: INTERNAL MEDICINE

## 2025-01-17 PROCEDURE — 250N000011 HC RX IP 250 OP 636: Performed by: INTERNAL MEDICINE

## 2025-01-17 PROCEDURE — 84295 ASSAY OF SERUM SODIUM: CPT | Performed by: INTERNAL MEDICINE

## 2025-01-17 PROCEDURE — 99233 SBSQ HOSP IP/OBS HIGH 50: CPT | Performed by: HOSPITALIST

## 2025-01-17 PROCEDURE — 36415 COLL VENOUS BLD VENIPUNCTURE: CPT | Performed by: INTERNAL MEDICINE

## 2025-01-17 PROCEDURE — 258N000003 HC RX IP 258 OP 636: Performed by: INTERNAL MEDICINE

## 2025-01-17 PROCEDURE — 80053 COMPREHEN METABOLIC PANEL: CPT | Performed by: INTERNAL MEDICINE

## 2025-01-17 PROCEDURE — 250N000012 HC RX MED GY IP 250 OP 636 PS 637: Performed by: INTERNAL MEDICINE

## 2025-01-17 PROCEDURE — 84520 ASSAY OF UREA NITROGEN: CPT | Performed by: INTERNAL MEDICINE

## 2025-01-17 PROCEDURE — 85004 AUTOMATED DIFF WBC COUNT: CPT | Performed by: INTERNAL MEDICINE

## 2025-01-17 PROCEDURE — 82947 ASSAY GLUCOSE BLOOD QUANT: CPT | Performed by: INTERNAL MEDICINE

## 2025-01-17 PROCEDURE — 80202 ASSAY OF VANCOMYCIN: CPT | Performed by: HOSPITALIST

## 2025-01-17 PROCEDURE — 120N000001 HC R&B MED SURG/OB

## 2025-01-17 PROCEDURE — 82248 BILIRUBIN DIRECT: CPT | Performed by: INTERNAL MEDICINE

## 2025-01-17 PROCEDURE — 250N000013 HC RX MED GY IP 250 OP 250 PS 637: Performed by: INTERNAL MEDICINE

## 2025-01-17 PROCEDURE — 250N000013 HC RX MED GY IP 250 OP 250 PS 637: Performed by: HOSPITALIST

## 2025-01-17 PROCEDURE — 99232 SBSQ HOSP IP/OBS MODERATE 35: CPT | Performed by: INTERNAL MEDICINE

## 2025-01-17 RX ORDER — POTASSIUM CHLORIDE 20MEQ/15ML
20 LIQUID (ML) ORAL
Status: COMPLETED | OUTPATIENT
Start: 2025-01-17 | End: 2025-01-17

## 2025-01-17 RX ORDER — MORPHINE SULFATE 15 MG/1
15 TABLET, FILM COATED, EXTENDED RELEASE ORAL 2 TIMES DAILY
Status: DISCONTINUED | OUTPATIENT
Start: 2025-01-17 | End: 2025-01-28

## 2025-01-17 RX ORDER — DEXTROSE MONOHYDRATE 25 G/50ML
25-50 INJECTION, SOLUTION INTRAVENOUS
Status: DISCONTINUED | OUTPATIENT
Start: 2025-01-17 | End: 2025-02-05 | Stop reason: HOSPADM

## 2025-01-17 RX ORDER — LIDOCAINE 40 MG/G
CREAM TOPICAL
Status: ACTIVE | OUTPATIENT
Start: 2025-01-17 | End: 2025-01-20

## 2025-01-17 RX ORDER — NICOTINE POLACRILEX 4 MG
15-30 LOZENGE BUCCAL
Status: DISCONTINUED | OUTPATIENT
Start: 2025-01-17 | End: 2025-02-05 | Stop reason: HOSPADM

## 2025-01-17 RX ADMIN — CEFEPIME HYDROCHLORIDE 2 G: 2 INJECTION, POWDER, FOR SOLUTION INTRAVENOUS at 17:58

## 2025-01-17 RX ADMIN — POLYETHYLENE GLYCOL 3350 17 G: 17 POWDER, FOR SOLUTION ORAL at 12:25

## 2025-01-17 RX ADMIN — POTASSIUM CHLORIDE 20 MEQ: 20 SOLUTION ORAL at 12:24

## 2025-01-17 RX ADMIN — GABAPENTIN 600 MG: 300 CAPSULE ORAL at 13:50

## 2025-01-17 RX ADMIN — INSULIN ASPART 3 UNITS: 100 INJECTION, SOLUTION INTRAVENOUS; SUBCUTANEOUS at 12:25

## 2025-01-17 RX ADMIN — MORPHINE SULFATE 30 MG: 30 TABLET, FILM COATED, EXTENDED RELEASE ORAL at 08:52

## 2025-01-17 RX ADMIN — DULOXETINE HYDROCHLORIDE 60 MG: 60 CAPSULE, DELAYED RELEASE ORAL at 08:51

## 2025-01-17 RX ADMIN — POTASSIUM CHLORIDE 20 MEQ: 20 SOLUTION ORAL at 17:58

## 2025-01-17 RX ADMIN — MORPHINE SULFATE 15 MG: 15 TABLET, FILM COATED, EXTENDED RELEASE ORAL at 13:50

## 2025-01-17 RX ADMIN — SENNOSIDES, DOCUSATE SODIUM 2 TABLET: 50; 8.6 TABLET, FILM COATED ORAL at 12:25

## 2025-01-17 RX ADMIN — METRONIDAZOLE 500 MG: 500 INJECTION, SOLUTION INTRAVENOUS at 21:44

## 2025-01-17 RX ADMIN — SODIUM CHLORIDE 1250 MG: 9 INJECTION, SOLUTION INTRAVENOUS at 02:04

## 2025-01-17 RX ADMIN — PANTOPRAZOLE SODIUM 40 MG: 40 TABLET, DELAYED RELEASE ORAL at 05:57

## 2025-01-17 RX ADMIN — GABAPENTIN 600 MG: 300 CAPSULE ORAL at 08:51

## 2025-01-17 RX ADMIN — CEFEPIME HYDROCHLORIDE 2 G: 2 INJECTION, POWDER, FOR SOLUTION INTRAVENOUS at 00:13

## 2025-01-17 RX ADMIN — INSULIN ASPART 3 UNITS: 100 INJECTION, SOLUTION INTRAVENOUS; SUBCUTANEOUS at 17:58

## 2025-01-17 RX ADMIN — POTASSIUM CHLORIDE 20 MEQ: 20 SOLUTION ORAL at 08:52

## 2025-01-17 RX ADMIN — CEFEPIME HYDROCHLORIDE 2 G: 2 INJECTION, POWDER, FOR SOLUTION INTRAVENOUS at 08:52

## 2025-01-17 RX ADMIN — INSULIN GLARGINE 40 UNITS: 100 INJECTION, SOLUTION SUBCUTANEOUS at 08:53

## 2025-01-17 RX ADMIN — GABAPENTIN 600 MG: 300 CAPSULE ORAL at 21:44

## 2025-01-17 RX ADMIN — METRONIDAZOLE 500 MG: 500 INJECTION, SOLUTION INTRAVENOUS at 10:35

## 2025-01-17 RX ADMIN — SODIUM CHLORIDE 1750 MG: 9 INJECTION, SOLUTION INTRAVENOUS at 15:51

## 2025-01-17 RX ADMIN — APIXABAN 5 MG: 5 TABLET, FILM COATED ORAL at 21:44

## 2025-01-17 RX ADMIN — MORPHINE SULFATE 15 MG: 15 TABLET, FILM COATED, EXTENDED RELEASE ORAL at 21:44

## 2025-01-17 ASSESSMENT — ACTIVITIES OF DAILY LIVING (ADL)
ADLS_ACUITY_SCORE: 57
ADLS_ACUITY_SCORE: 59
ADLS_ACUITY_SCORE: 57
ADLS_ACUITY_SCORE: 54
ADLS_ACUITY_SCORE: 57
ADLS_ACUITY_SCORE: 59
ADLS_ACUITY_SCORE: 57
ADLS_ACUITY_SCORE: 59
ADLS_ACUITY_SCORE: 59
ADLS_ACUITY_SCORE: 57
ADLS_ACUITY_SCORE: 57
ADLS_ACUITY_SCORE: 58
ADLS_ACUITY_SCORE: 59
ADLS_ACUITY_SCORE: 59
ADLS_ACUITY_SCORE: 57
ADLS_ACUITY_SCORE: 54
ADLS_ACUITY_SCORE: 59
ADLS_ACUITY_SCORE: 59
ADLS_ACUITY_SCORE: 54
ADLS_ACUITY_SCORE: 59

## 2025-01-17 NOTE — PROGRESS NOTES
[facility] Vascular Access  Re: PICC placement    S: Vascular Access consult for PICC insertion by a PICC RN.  B: After discharge, a central line is required for long-term antibiotic administration.  A:The patient previously had a PICC line. Verbally agree to the procedure, but is not yet ready for PICC insertion. (Eating and waiting for therapy). The expected discharge date is scheduled on 1/18/25, however we are still waiting for the final abx plan.  R:We'll hold placement for the time being, possibly late this afternoon or tomorrow morning, until the IV therapy plan is finalized. Mack, the primary RN, has been notified and agrees to the plan.

## 2025-01-17 NOTE — PLAN OF CARE
Problem: Adult Inpatient Plan of Care  Goal: Optimal Comfort and Wellbeing  Outcome: Progressing  Intervention: Monitor Pain and Promote Comfort  Recent Flowsheet Documentation  Taken 1/17/2025 1345 by Mack Molina, RN  Pain Management Interventions: (sched meds given as ordered & by pt request, despite pain rating, MD aware)   medication (see MAR)   breathing exercises   care clustered   distraction   emotional support   pillow support provided   quiet environment facilitated   relaxation techniques promoted   repositioned   rest   therapeutic presence  Taken 1/17/2025 0830 by Mack Molina, RN  Pain Management Interventions: (sched meds given as ordered & by pt request, despite pain rating, MD aware)   medication (see MAR)   breathing exercises   care clustered   distraction   emotional support   pillow support provided   quiet environment facilitated   relaxation techniques promoted   repositioned   rest   therapeutic presence     Problem: Infection  Goal: Absence of Infection Signs and Symptoms  Outcome: Progressing   Goal Outcome Evaluation:       VSS on RA. SR/ST on tele. A&Ox4, but forgetful, lethargic, & slurred speech. Abd pain managed w/ sched PTA morphine. Up w/ 1 & W. DAMI #1 w/ high OP, DAMI #2 w/ low OP, both flushing w/o issue, irrigant subtracted from total OP. IV abx given per order. Up to chair for meals. PICC to be placed tomorrow for clarification from ID. Drain teaching done w/ patient & spouse regarding flushing, emptying, & maintenance. Spouse visiting at bedside.

## 2025-01-17 NOTE — PROGRESS NOTES
Care Management Follow Up    Length of Stay (days): 2    Expected Discharge Date: 01/18/2025     Concerns to be Addressed:     discharge planning, final abx plan  Patient plan of care discussed at interdisciplinary rounds: Yes    Anticipated Discharge Disposition: Home              Anticipated Discharge Services:  home infusion for IV abx  Anticipated Discharge DME:      Patient/family educated on Medicare website which has current facility and service quality ratings:    Education Provided on the Discharge Plan:    Patient/Family in Agreement with the Plan:      Referrals Placed by CM/SW:  home infusion  Private pay costs discussed: Not applicable, at this time    Discussed  Partnership in Safe Discharge Planning  document with patient/family: No     Handoff Completed: Yes, [facility] PCP: Internal handoff referral completed    Additional Information:  Social Hx:  Patient lives with wife is independent at baseline. No services. No DME use.     Per ID, anticipate need for IV antibiotics at discharge, final abx plan pending.  CM sent referral to [facility] ([facility]) to run insurance benefit check.    Benefit check back.  Patient has no coverage for home IV antibiotics.  Total cost for Vancomycin, Cefepime, and Flagyl is $214.12/day.  Since patient is requiring antibiotics more than once a day, patient likely has coverage for TCU.       Next Steps: Discuss home infusion private pay and TCU option with patient.    Shruthi Chávez RN

## 2025-01-17 NOTE — PROGRESS NOTES
[facility]    Medicine Progress Note - Hospitalist Service    Date of Admission:  1/15/2025    Assessment & Plan   Bon Luque is a 69 year old male with past medical history significant for CAD, renal cell carcinoma status post nephrectomy 2017, hypertension, diabetes with neuropathy, HLD, hypogonadism on testosterone, psoriasis, and small bowel obstruction who was admitted on 1/15/2025 due to abdominal pain, diarrhea, nausea, dizziness, generalized weakness, and poor appetite for 11 days PTA. Found to have liver abscess, sepsis, Blood cultures growing strep intermedius.    #Sepsis from  #Strep intermedius bacteremia  #Multiple liver abscesses  #Immunocompromised host  -CT 1/13 w/ 3 prominent indeterminate complex hypodense masses in the liver concerning for neoplasm versus infectious process  -MRI 1/15: 3 heterogeneous masses in the liver new from 11/8/2024 concerning for infection, possible rapidly progressive malignancy/metastases  -Initially received cefepime and vancomycin in the ED  -Started on cefepime and metronidazole  -IR consulted, underwent CT-guided liver biopsy x 2 1/16  -Cultures growing strep intermedius  -Continue vancomycin, cefepime, Flagyl per ID  -PICC line placed 1/17 in anticipation of extended IV antibiotic course  -Defer antibiotic selection to ID, anticipate narrowing soon  -Care manager unable to find home health for outpatient antibiotics, may need to pursue TCU to facilitate parenteral antibiotics recommended by ID    #Psoriasis  -No obvious flaring lesions, remotely on Skyrizi which has been held recently  -Outpatient follow-up    #Lactic acidosis  -Mild on admission at 2.6, normalized with fluids    #Remote DVT and PE  -Routinely manages on Eliquis, held prior to drain placement, resume 1/17, monitor for bleeding    #Chronic pain  -Pain overall controlled, resume usual MS Contin, gabapentin, duloxetine, oxycodone    #History of renal cell carcinoma  -Remote  nephrectomy, follow creatinine carefully    #Hypogonadism  -Resume usual testosterone supplement    #Insulin-dependent type 2 diabetes mellitus  -Blood sugars acceptable  -A1c 7.9%  -Typically on 50 units of Lantus twice daily  -Initially on 40 units of Lantus twice daily with correctional NovoLog, increased to 45 units 1/18 for hyperglycemia  -Titrate as needed            Diet: Regular Diet Adult    DVT Prophylaxis: DOAC  Seals Catheter: Not present  Lines: None     Cardiac Monitoring: ACTIVE order. Indication: Tachyarrhythmias, acute (48 hours)  Code Status: Full Code      Clinically Significant Risk Factors         # Hyponatremia: Lowest Na = 125 mmol/L in last 2 days, will monitor as appropriate  # Hypochloremia: Lowest Cl = 96 mmol/L in last 2 days, will monitor as appropriate      # Hypoalbuminemia: Lowest albumin = 2.2 g/dL at 1/17/2025  6:41 AM, will monitor as appropriate  # Coagulation Defect: INR = 1.26 (Ref range: 0.85 - 1.15) and/or PTT = N/A, will monitor for bleeding    # Hypertension: Noted on problem list           # DMII: A1C = 7.9 % (Ref range: <5.7 %) within past 6 months   # Overweight: Estimated body mass index is 29.84 kg/m  as calculated from the following:    Height as of this encounter: 1.829 m (6').    Weight as of this encounter: 99.8 kg (220 lb)., PRESENT ON ADMISSION            Social Drivers of Health    Tobacco Use: Medium Risk (1/14/2025)    Patient History     Smoking Tobacco Use: Former     Smokeless Tobacco Use: Never     Passive Exposure: Past   Physical Activity: Insufficiently Active (8/23/2024)    Exercise Vital Sign     Days of Exercise per Week: 1 day     Minutes of Exercise per Session: 20 min   Social Connections: Unknown (8/23/2024)    Social Connection and Isolation Panel [NHANES]     Frequency of Social Gatherings with Friends and Family: Once a week          Disposition Plan     Medically Ready for Discharge: Anticipated in 2-4 Days             Charan Park  MD  Hospitalist Service  [facility]  Securely message with [employer] (more info)  Text page via [employer] Paging/Directory   ______________________________________________________________________    Interval History   No overnight events, this morning patient feels okay overall, tired, minimal pain, minimal nausea, denies chest pain.    Physical Exam   Vital Signs: Temp: 97.7  F (36.5  C) Temp src: Oral BP: 132/73 Pulse: 90   Resp: 18 SpO2: 97 % O2 Device: None (Room air)    Weight: 220 lbs 0 oz    Tired appearing, no distress, membranes moist, heart rate borderline tachycardic, breathing unlabored, abdomen minimally tender, benign abscess drains, no leg edema    Medical Decision Making       53 MINUTES SPENT BY ME on the date of service doing chart review, history, exam, documentation & further activities per the note.  NOTE(S)/MEDICAL RECORDS REVIEWED over the past 24 hours: Vitals, labs, new imaging, documentation and medication administrations       Data     I have personally reviewed the following data over the past 24 hrs:    11.8 (H)  \   10.5 (L)   / 213     129 (L) 96 (L) 11.6 /  276 (H)   3.4 25 0.75 \     ALT: 65 AST: 83 (H) AP: 144 TBILI: 0.9   ALB: 2.2 (L) TOT PROTEIN: 5.4 (L) LIPASE: N/A     TSH: N/A T4: N/A A1C: 7.9 (H)       Imaging results reviewed over the past 24 hrs:   No results found for this or any previous visit (from the past 24 hours).

## 2025-01-17 NOTE — PROGRESS NOTES
Interventional Radiology - Progress Note  Inpatient - [facility]  01/17/2025     S:  EMR reviewed. No acute issues overnight. Drains functioning well. Discussed plan of care and follow up with IR drains. All questions answered.        O:  /73 (BP Location: Right arm)   Pulse 86   Temp 97.7  F (36.5  C) (Oral)   Resp 18   Ht 1.829 m (6')   Wt 99.8 kg (220 lb)   SpO2 97%   BMI 29.84 kg/m    General: Stable. In no acute distress.    Neuro: Alert and oriented x 3. No focal deficits.  Psych: Appropriate mood and affect. Linear/coherent thought process.   Resp: Normal respirations. RA  Cardio: S1S2, regular rate and rhythm,   Abdomen: Soft, non-distended, non-tender. Abd drains x2. Serosanguinous OP. Dressings CDI  Extremities: without edema.  Skin:     MSK: No gross motor weakness. Moves all extremities equally. Sensation intact.       IMAGING:  EXAM:  1. PERCUTANEOUS DRAIN PLACEMENT LATERAL LEFT HEPATIC LOBE  2. PERCUTANEOUS DRAIN PLACEMENT MEDIAL LEFT AND RIGHT HEPATIC LOBES  3. CT GUIDANCE  4. CONSCIOUS SEDATION  LOCATION: [facility]  DATE: 1/16/2025     INDICATION: abscesses  TECHNIQUE: Dose reduction techniques were used.     PROCEDURE: Informed consent obtained. Site marked. Prior images reviewed. Required items made available. Patient identity confirmed verbally and with arm band. Patient reevaluated immediately before administering sedation. Universal protocol was   followed. Time out performed. The site was prepped and draped in sterile fashion. 3 mL of 1% lidocaine was infused into the local soft tissues near the lateral left hepatic lobe lesion. Using standard technique and under direct CT guidance, a 10 Frisian   drainage catheter was inserted into the fluid collection.  Subsequently, 3 mL of 1% lidocaine was infused into the local soft tissues near the medial left hepatic lobe and right hepatic lobe lesions. Using standard technique and under  direct CT guidance,   a 10 Slovak drainage catheter was inserted into the fluid collections.     SPECIMEN: 60 mL of opaque brown fluid was aspirated from the lateral left hepatic lobe collection and sent to lab for cultures and Gram stain. 38 mL of opaque brown fluid was aspirated from the right hepatic lobe collection and sent to lab for cultures   and Gram stain.     BLOOD LOSS: Minimal.     The patient tolerated the procedure well. No immediate complications.     SEDATION: Versed 0.5 mg. Fentanyl 25 mcg. The procedure was performed with administration intravenous conscious sedation with appropriate preoperative, intraoperative, and postoperative evaluation.     32 minutes of supervised face to face conscious sedation time was provided by a radiology nurse under my direct supervision.                                                                      IMPRESSION:  1.  Successful CT-guided drain placement into the lateral left hepatic collection.  2.  Successful CT-guided drain placement into the medial left hepatic and right hepatic collections.  3.  Moderate sedation.    LABS:  Recent Labs   Lab 01/17/25  0641 01/16/25  0803 01/16/25  0644 01/15/25  1253 01/13/25  1408   WBC 11.8*  --  15.1* 14.7* 16.7*   HGB 10.5*  --  12.1* 13.2* 13.7     --  206 214 237   INR  --  1.26*  --   --   --    CR 0.75  --  0.79 0.85 0.93   BILITOTAL 0.9  --  1.4* 1.8* 1.0   ALKPHOS 144  --  135 150 140   AST 83*  --  178* 219* 92*   ALT 65  --  93* 101* 58       Culture:  1/16/25  Gram Stain Result  Abnormal   4+ Gram positive cocci   4+ WBC seen   Predominantly PMNs             Gram Stain Result  Abnormal   4+ Gram positive cocci   4+ WBC seen   Predominantly PMNs           Resulting Agency: [facility]     Antibiotic: cefepime IV, flagyl IV  Flushing: 10cc each shift    Drain Outputs (in mL):  Drain #1 L-245/40cc in last 24 hours  Drain #2 R-78/15cc in last 24 hours        A:  69 year old male  past medical history significant for  CAD, renal cell carcinoma status post nephrectomy 2017, hypertension, diabetes with neuropathy, HLD, hypogonadism on testosterone, psoriasis, and small bowel obstruction who was admitted on 1/15/2025 with liver abscess. S/p CT guided drains x2. Drains functioning well.     P:    - Continue to follow WBC and Cultures, drain OPs and patient's clinical signs/symptoms for improvement.   - Continue present drain cares. Continue drain to DAMI drainage. Flush drain as ordered.  - Antibiotics per primary team.   - Anticipate that patient will likely discharge with drain in place.  - Drain care education provided to patient. All questions answered.   - Drain discharge instructions entered into D/C navigator.  - Patient to flush drain with 10mL NS daily upon discharge. NS flushes ordered in D/C navigator.  - Will continue to follow patient's clinical status, imaging, drain(s) function and drain(s) OPs to determine drain follow up plan. Anticipating follow-up CT/abscessogram approximately 7-14 days from drain placement date, once drain outputs are <20mL/day for a consecutive 2 days, and/or pending patient's clinical status and drain function.   - Drain follow up can occur as inpatient or outpatient, most often the first drain check will occur as outpatient. Outpatient drain follow up orders have been entered.   - IR will continue to follow loosely. Please contact IR with drain related questions or concerns.       Total time spent on the date of the encounter: 45 minutes.    CRISTINA HICKMAN CNP  Interventional Radiology  [phone]

## 2025-01-17 NOTE — PROGRESS NOTES
"[employer] [facility]ious [facility] Progress Note    SUBJECTIVE:  Stable.  Both drains in.  In the short time since I saw, there is not an identification of the blood cx yet.        REVIEW OF SYSTEMS:  Negative unless as listed above.  Social history, Family history, Medications: reviewed.    OBJECTIVE:  /64   Pulse 102   Temp 98.4  F (36.9  C) (Oral)   Resp 18   Ht 1.829 m (6')   Wt 99.8 kg (220 lb)   SpO2 93%   BMI 29.84 kg/m                PHYSICAL EXAM:  Alert, awake  Vitals tabulated above, reviewed  Neck supple without lymphadenopathy  Sclera normal color, not injected  CARDIOVASCULAR regular rate and rhythm, no murmur  Lungs CLEAR TO AUSCULTATION   Abdomen soft, NT/ND, absent HEPATOSPLENOMEGALY  Skin normal  Joints normal  Neurologic exam non focal  2 drains in the liver abscess    Antibiotics:cefepime, vanco, flagyl    Pertinent labs:    Recent Labs   Lab Test 01/17/25  0641 01/16/25  0644 01/15/25  1253   WBC 11.8* 15.1* 14.7*   HGB 10.5* 12.1* 13.2*   HCT 30.6* 36.2* 39.2*   MCV 82 84 84    206 214       Lab Results   Component Value Date    RBC 3.74 01/17/2025     Lab Results   Component Value Date    HGB 10.5 01/17/2025     Lab Results   Component Value Date    HCT 30.6 01/17/2025     No components found for: \"MCT\"  Lab Results   Component Value Date    MCV 82 01/17/2025     Lab Results   Component Value Date    MCH 28.1 01/17/2025     Lab Results   Component Value Date    MCHC 34.3 01/17/2025     Lab Results   Component Value Date    RDW 13.1 01/17/2025     Lab Results   Component Value Date     01/17/2025       Last Comprehensive Metabolic Panel:  Sodium   Date Value Ref Range Status   01/17/2025 129 (L) 135 - 145 mmol/L Final     Potassium   Date Value Ref Range Status   01/17/2025 3.4 3.4 - 5.3 mmol/L Final   07/13/2022 4.2 3.5 - 5.0 mmol/L Final     Chloride   Date Value Ref Range Status   01/17/2025 96 (L) 98 - 107 mmol/L Final   07/13/2022 104 98 - 107 mmol/L Final     Carbon " "Dioxide (CO2)   Date Value Ref Range Status   01/17/2025 25 22 - 29 mmol/L Final   07/13/2022 22 22 - 31 mmol/L Final     Anion Gap   Date Value Ref Range Status   01/17/2025 8 7 - 15 mmol/L Final   07/13/2022 8 5 - 18 mmol/L Final     Glucose   Date Value Ref Range Status   01/17/2025 180 (H) 70 - 99 mg/dL Final   07/13/2022 286 (H) 70 - 125 mg/dL Final     GLUCOSE BY METER POCT   Date Value Ref Range Status   01/17/2025 230 (H) 70 - 99 mg/dL Final     Urea Nitrogen   Date Value Ref Range Status   01/17/2025 11.6 8.0 - 23.0 mg/dL Final   07/13/2022 17 8 - 22 mg/dL Final     Creatinine   Date Value Ref Range Status   01/17/2025 0.75 0.67 - 1.17 mg/dL Final     GFR Estimate   Date Value Ref Range Status   01/17/2025 >90 >60 mL/min/1.73m2 Final     Comment:     eGFR calculated using 2021 CKD-EPI equation.   01/13/2021 >60 >60 mL/min/1.73m2 Final     GFR, ESTIMATED POCT   Date Value Ref Range Status   11/08/2024 >60 >60 mL/min/1.73m2 Final     Calcium   Date Value Ref Range Status   01/17/2025 7.8 (L) 8.8 - 10.4 mg/dL Final     Comment:     Reference intervals for this test were updated on 7/16/2024 to reflect our healthy population more accurately. There may be differences in the flagging of prior results with similar values performed with this method. Those prior results can be interpreted in the context of the updated reference intervals.       Liver Function Studies -   Recent Labs   Lab Test 01/17/25  0641   PROTTOTAL 5.4*   ALBUMIN 2.2*   BILITOTAL 0.9   ALKPHOS 144   AST 83*   ALT 65       No results found for: \"SED\"    No results found for: \"CRP\"            MICROBIOLOGY DATA:    GPC in blood  Gm stain liver pus also heavy GPC    IMAGING/RADIOLOGY:          ASSESSMENT:  Liver abscess, favor strep intermedius, less likely enterococcal  Drained  On biologic for skin    RECOMMENDATION:  Same abx for today  Should have isolation of org by tomorrow and a final plan from us  PICC line due to large, multifocal abscess, " and immune compromised host.  Oral not likely to be as effective in this pt.    This was d/w pt.    SILVIA Bui MD  Office [phone] option 2 to desk staff

## 2025-01-17 NOTE — PLAN OF CARE
Problem: Comorbidity Management  Goal: Blood Glucose Levels Within Targeted Range  Outcome: Progressing     Problem: Infection  Goal: Absence of Infection Signs and Symptoms  Outcome: Progressing   Goal Outcome Evaluation:  This is my first night with Bon. Bon was fairly drowsy this shift. He seems to be developing some intermittent confusion or delirium. He made a couple comments about feeling confused or not knowing exactly what was going on. He said he kept hearing different sounds in the hallway and thought it was his cat. He also said he didn't know at times if he was dreaming or just confused. Very pleasant throughout the shift and redirectable but just drowsy and slightly confused.    He said his pain is much better than the previous days. He stood at the edge of the bed while we changed his bedding and was unsteady standing with his walker. Afebrile but diaphoretic. He was thirsty asking for fluids frequently. Remained on room air, continuous pulse in place. Sinus to sinus tach on telemetry. Rated abdominal pain 3/10, scheduled Morphine given. Glucose elevated, 371 and 230.  sites x2 WNL.

## 2025-01-17 NOTE — PROGRESS NOTES
Care Management Follow Up    Length of Stay (days): 2    Expected Discharge Date: 01/18/2025    Anticipated Discharge Plan:  TCU     Transportation: TBD    PT Recommendations:    OT Recommendations:        Barriers to Discharge: medical stability, placement, IV abx    Prior Living Situation: house with spouse    Discussed  Partnership in Safe Discharge Planning  document with patient/family: No     Handoff Completed: Yes, [facility] PCP: Internal handoff referral completed    Patient/Spokesperson Updated: Yes. Who? Patient and spouse     Additional Information:    Updated by RNCM, patient does not have benefits for home IV antibiotics.  Met with patient, also discussed w/spouse Arden via telephone.  Reviewed possibly TCU benefits, they both are open and accepting of this.  Provided choices, referrals sent to [facility], [facility], and [facility].  Updated hospitalist who anticipates discharge Mon/Tue?     Next Steps:     Waiting accepting TCU.  Will need PAS, transportation TBD.     TREMAINE GarcíaW

## 2025-01-17 NOTE — ED PROVIDER NOTES
Emergency Department Midlevel Supervisory Note     I had a face to face encounter with this patient seen by the Advanced Practice Provider (ABRAHAM). I personally made/approved the management plan and take responsibility for the patient management. I personally saw patient and performed a substantive portion of the visit including all aspects of the medical decision making.     ED Course:  3:00 PM Liliana Riddle PA-C staffed patient with me. I agree with their assessment and plan of management, and I will see the patient.  3:30 PM I met with the patient to introduce myself, gather additional history, perform my initial exam, and discuss the plan.     Brief HPI:     Bon Luque is a 69 year old male who presents for evaluation of weakness, fatigue, malaise.  Patient seen in the emergency department at [facility] 2 days previous, with an abnormal CT suggestive of liver microabscesses versus metastatic lesions.  Patient presenting with worsening fatigue, generalized weakness, malaise.  He has some continued abdominal discomfort.      Brief Physical Exam: /73 (BP Location: Right arm)   Pulse 86   Temp 97.7  F (36.5  C) (Oral)   Resp 18   Ht 1.829 m (6')   Wt 99.8 kg (220 lb)   SpO2 97%   BMI 29.84 kg/m    Constitutional:  Alert, in no acute distress  EYES: Conjunctivae clear  HENT:  Atraumatic  Respiratory:  Respirations even, unlabored, in no acute respiratory distress  Cardiovascular:  Regular rate and rhythm, good peripheral perfusion  GI: Soft, mildly distended, mild tenderness more pronounced on the right abdomen.  Musculoskeletal:  Moves all 4 extremities equally, grossly symmetrical strength  Integument: Warm & dry. No appreciable rash, erythema.  Neurologic:  Alert & oriented, speech clear and fluent, no focal deficits noted  Psych: Normal mood and affect       MDM:  Patient is a 69-year-old male who presents to the emergency department with abdominal pain, diarrhea, malaise.  He had a CT from a  couple days ago that was suggestive of micro abscesses versus metastatic lesions in his liver.  His labs today look concerning for worsening systemic infection with an elevated white blood cell count of procalcitonin level.  He was given IV fluids and blood cultures were obtained.  He was started on broad-spectrum antibiotics with vancomycin and cefepime.  Liliana discussed case with radiology and we will proceed with liver MRI to further evaluate the lesions in his liver which could be abscesses or related to his systemic illness.  Will plan for admission.  Agree with remainder of ED course and plan as documented by ABRAHAM.        1. Liver mass    2. Sepsis, due to unspecified organism, unspecified whether acute organ dysfunction present (H)    3. Diarrhea, unspecified type          Labs and Imaging:  Results for orders placed or performed during the hospital encounter of 01/15/25   MRI Abdomen w & w/o contrast*    Impression    IMPRESSION: There are 3 heterogeneous masses in the liver. These are new from 11/8/2024. While a rapidly progressive malignancy/metastatic disease is in the differential the most likely etiology still remains infectious/inflammatory such as multifocal   abscesses. Nonemergent interventional radiology consultation is recommended.   CT Liver Abscess Drainage    Impression    IMPRESSION:  1.  Successful CT-guided drain placement into the lateral left hepatic collection.  2.  Successful CT-guided drain placement into the medial left hepatic and right hepatic collections.  3.  Moderate sedation.   CT Liver Abscess Drainage    Impression    IMPRESSION:  1.  Successful CT-guided drain placement into the lateral left hepatic collection.  2.  Successful CT-guided drain placement into the medial left hepatic and right hepatic collections.  3.  Moderate sedation.   Lactic Acid Whole Blood w/ 1x repeat in 2 hrs when >2   Result Value Ref Range    Lactic Acid, Initial 2.6 (H) 0.7 - 2.0 mmol/L   Basic  metabolic panel   Result Value Ref Range    Sodium 126 (L) 135 - 145 mmol/L    Potassium 4.2 3.4 - 5.3 mmol/L    Chloride 88 (L) 98 - 107 mmol/L    Carbon Dioxide (CO2) 24 22 - 29 mmol/L    Anion Gap 14 7 - 15 mmol/L    Urea Nitrogen 20.2 8.0 - 23.0 mg/dL    Creatinine 0.85 0.67 - 1.17 mg/dL    GFR Estimate >90 >60 mL/min/1.73m2    Calcium 8.8 8.8 - 10.4 mg/dL    Glucose 330 (H) 70 - 99 mg/dL   CBC with platelets and differential   Result Value Ref Range    WBC Count 14.7 (H) 4.0 - 11.0 10e3/uL    RBC Count 4.68 4.40 - 5.90 10e6/uL    Hemoglobin 13.2 (L) 13.3 - 17.7 g/dL    Hematocrit 39.2 (L) 40.0 - 53.0 %    MCV 84 78 - 100 fL    MCH 28.2 26.5 - 33.0 pg    MCHC 33.7 31.5 - 36.5 g/dL    RDW 12.9 10.0 - 15.0 %    Platelet Count 214 150 - 450 10e3/uL    % Neutrophils 87 %    % Lymphocytes 6 %    % Monocytes 6 %    % Eosinophils 0 %    % Basophils 0 %    % Immature Granulocytes 1 %    NRBCs per 100 WBC 0 <1 /100    Absolute Neutrophils 12.9 (H) 1.6 - 8.3 10e3/uL    Absolute Lymphocytes 0.9 0.8 - 5.3 10e3/uL    Absolute Monocytes 0.8 0.0 - 1.3 10e3/uL    Absolute Eosinophils 0.0 0.0 - 0.7 10e3/uL    Absolute Basophils 0.1 0.0 - 0.2 10e3/uL    Absolute Immature Granulocytes 0.2 <=0.4 10e3/uL    Absolute NRBCs 0.0 10e3/uL   Extra Blue Top Tube   Result Value Ref Range    Hold Specimen JIC    Extra Red Top Tube   Result Value Ref Range    Hold Specimen JIC    Influenza A/B, RSV and SARS-CoV2 PCR (COVID-19) Nose    Specimen: Nose; Swab   Result Value Ref Range    Influenza A PCR Negative Negative    Influenza B PCR Negative Negative    RSV PCR Negative Negative    SARS CoV2 PCR Negative Negative   Lactic acid whole blood   Result Value Ref Range    Lactic Acid 2.3 (H) 0.7 - 2.0 mmol/L   Result Value Ref Range    Procalcitonin 3.36 (H) <0.50 ng/mL   UA with Microscopic reflex to Culture    Specimen: Urine, Midstream   Result Value Ref Range    Color Urine Yellow Colorless, Straw, Light Yellow, Yellow    Appearance Urine  Clear Clear    Glucose Urine 200 (A) Negative mg/dL    Bilirubin Urine Negative Negative    Ketones Urine 40 (A) Negative mg/dL    Specific Gravity Urine 1.031 (H) 1.001 - 1.030    Blood Urine 0.2 mg/dL (A) Negative    pH Urine 6.0 5.0 - 7.0    Protein Albumin Urine 50 (A) Negative mg/dL    Urobilinogen Urine 4.0 (A) <2.0 mg/dL    Nitrite Urine Negative Negative    Leukocyte Esterase Urine Negative Negative    Bacteria Urine Few (A) None Seen /HPF    Mucus Urine Present (A) None Seen /LPF    RBC Urine 3 (H) <=2 /HPF    WBC Urine 1 <=5 /HPF   Result Value Ref Range    Troponin T, High Sensitivity 16 <=22 ng/L   Hepatic function panel   Result Value Ref Range    Protein Total 6.5 6.4 - 8.3 g/dL    Albumin 3.0 (L) 3.5 - 5.2 g/dL    Bilirubin Total 1.8 (H) <=1.2 mg/dL    Alkaline Phosphatase 150 40 - 150 U/L     (H) 0 - 45 U/L     (H) 0 - 70 U/L    Bilirubin Direct 1.28 (H) 0.00 - 0.30 mg/dL   Result Value Ref Range    Troponin T, High Sensitivity 15 <=22 ng/L   Extra Green Top (Lithium Heparin) Tube   Result Value Ref Range    Hold Specimen JIC    Result Value Ref Range    Sodium 125 (L) 135 - 145 mmol/L   Result Value Ref Range    Sodium 128 (L) 135 - 145 mmol/L   Comprehensive metabolic panel   Result Value Ref Range    Sodium 131 (L) 135 - 145 mmol/L    Potassium 4.2 3.4 - 5.3 mmol/L    Carbon Dioxide (CO2) 24 22 - 29 mmol/L    Anion Gap 11 7 - 15 mmol/L    Urea Nitrogen 16.0 8.0 - 23.0 mg/dL    Creatinine 0.79 0.67 - 1.17 mg/dL    GFR Estimate >90 >60 mL/min/1.73m2    Calcium 8.4 (L) 8.8 - 10.4 mg/dL    Chloride 96 (L) 98 - 107 mmol/L    Glucose 214 (H) 70 - 99 mg/dL    Alkaline Phosphatase 135 40 - 150 U/L     (H) 0 - 45 U/L    ALT 93 (H) 0 - 70 U/L    Protein Total 6.0 (L) 6.4 - 8.3 g/dL    Albumin 2.6 (L) 3.5 - 5.2 g/dL    Bilirubin Total 1.4 (H) <=1.2 mg/dL   CBC with platelets   Result Value Ref Range    WBC Count 15.1 (H) 4.0 - 11.0 10e3/uL    RBC Count 4.31 (L) 4.40 - 5.90 10e6/uL     Hemoglobin 12.1 (L) 13.3 - 17.7 g/dL    Hematocrit 36.2 (L) 40.0 - 53.0 %    MCV 84 78 - 100 fL    MCH 28.1 26.5 - 33.0 pg    MCHC 33.4 31.5 - 36.5 g/dL    RDW 13.1 10.0 - 15.0 %    Platelet Count 206 150 - 450 10e3/uL   Result Value Ref Range    Sodium 131 (L) 135 - 145 mmol/L   Lactic acid whole blood   Result Value Ref Range    Lactic Acid 2.0 0.7 - 2.0 mmol/L   Result Value Ref Range    Sodium 129 (L) 135 - 145 mmol/L   Result Value Ref Range    INR 1.26 (H) 0.85 - 1.15   Result Value Ref Range    Sodium 129 (L) 135 - 145 mmol/L   Result Value Ref Range    Sodium 128 (L) 135 - 145 mmol/L   Glucose by meter   Result Value Ref Range    GLUCOSE BY METER POCT 243 (H) 70 - 99 mg/dL   Result Value Ref Range    Sodium 127 (L) 135 - 145 mmol/L   Result Value Ref Range    Sodium 128 (L) 135 - 145 mmol/L   Glucose by meter   Result Value Ref Range    GLUCOSE BY METER POCT 371 (H) 70 - 99 mg/dL   Basic metabolic panel   Result Value Ref Range    Sodium 129 (L) 135 - 145 mmol/L    Potassium 3.4 3.4 - 5.3 mmol/L    Chloride 96 (L) 98 - 107 mmol/L    Carbon Dioxide (CO2) 25 22 - 29 mmol/L    Anion Gap 8 7 - 15 mmol/L    Urea Nitrogen 11.6 8.0 - 23.0 mg/dL    Creatinine 0.75 0.67 - 1.17 mg/dL    GFR Estimate >90 >60 mL/min/1.73m2    Calcium 7.8 (L) 8.8 - 10.4 mg/dL    Glucose 180 (H) 70 - 99 mg/dL   Glucose by meter   Result Value Ref Range    GLUCOSE BY METER POCT 230 (H) 70 - 99 mg/dL   Hemoglobin A1c   Result Value Ref Range    Estimated Average Glucose 180 (H) <117 mg/dL    Hemoglobin A1C 7.9 (H) <5.7 %   Hepatic panel   Result Value Ref Range    Protein Total 5.4 (L) 6.4 - 8.3 g/dL    Albumin 2.2 (L) 3.5 - 5.2 g/dL    Bilirubin Total 0.9 <=1.2 mg/dL    Alkaline Phosphatase 144 40 - 150 U/L    AST 83 (H) 0 - 45 U/L    ALT 65 0 - 70 U/L    Bilirubin Direct 0.57 (H) 0.00 - 0.30 mg/dL   CBC with platelets and differential   Result Value Ref Range    WBC Count 11.8 (H) 4.0 - 11.0 10e3/uL    RBC Count 3.74 (L) 4.40 - 5.90  10e6/uL    Hemoglobin 10.5 (L) 13.3 - 17.7 g/dL    Hematocrit 30.6 (L) 40.0 - 53.0 %    MCV 82 78 - 100 fL    MCH 28.1 26.5 - 33.0 pg    MCHC 34.3 31.5 - 36.5 g/dL    RDW 13.1 10.0 - 15.0 %    Platelet Count 213 150 - 450 10e3/uL    % Neutrophils 80 %    % Lymphocytes 11 %    % Monocytes 7 %    % Eosinophils 1 %    % Basophils 0 %    % Immature Granulocytes 2 %    NRBCs per 100 WBC 0 <1 /100    Absolute Neutrophils 9.4 (H) 1.6 - 8.3 10e3/uL    Absolute Lymphocytes 1.3 0.8 - 5.3 10e3/uL    Absolute Monocytes 0.8 0.0 - 1.3 10e3/uL    Absolute Eosinophils 0.1 0.0 - 0.7 10e3/uL    Absolute Basophils 0.0 0.0 - 0.2 10e3/uL    Absolute Immature Granulocytes 0.2 <=0.4 10e3/uL    Absolute NRBCs 0.0 10e3/uL   RBC and Platelet Morphology   Result Value Ref Range    RBC Morphology Confirmed RBC Indices     Platelet Assessment  Automated Count Confirmed. Platelet morphology is normal.     Automated Count Confirmed. Platelet morphology is normal.   Glucose by meter   Result Value Ref Range    GLUCOSE BY METER POCT 168 (H) 70 - 99 mg/dL   Echocardiogram Complete   Result Value Ref Range    LVEF  50-55%    Blood Culture Peripheral Blood    Specimen: Peripheral Blood   Result Value Ref Range    Culture Positive on the 1st day of incubation (A)     Culture Gram positive cocci in pairs and chains (AA)    Blood Culture Line, venous    Specimen: Line, venous; Blood   Result Value Ref Range    Culture Positive on the 1st day of incubation (A)     Culture Gram positive cocci in pairs and chains (AA)    Verigene GP Panel    Specimen: Peripheral Blood   Result Value Ref Range    Staphylococcus species Not Detected Not Detected    Staphylococcus aureus Not Detected Not Detected    Staphylococcus epidermidis Not Detected Not Detected    Staphylococcus lugdunensis Not Detected Not Detected    Enterococcus faecalis Not Detected Not Detected    Enterococcus faecium Not Detected Not Detected    Streptococcus agalactiae Not Detected Not Detected     Streptococcus anginosus group Detected (A) Not Detected    Streptococcus pneumoniae Not Detected Not Detected    Streptococcus pyogenes Not Detected Not Detected    Listeria species Not Detected Not Detected   Abscess Aerobic Bacterial Culture Routine With Gram Stain    Specimen: Liver; Abscess   Result Value Ref Range    Culture Culture in progress     Gram Stain Result 4+ Gram positive cocci (A)     Gram Stain Result 4+ WBC seen (A)    Abscess Aerobic Bacterial Culture Routine With Gram Stain    Specimen: Liver; Abscess   Result Value Ref Range    Gram Stain Result 4+ Gram positive cocci (A)     Gram Stain Result 4+ WBC seen (A)    Blood Culture Arm, Right    Specimen: Arm, Right; Blood   Result Value Ref Range    Culture No growth after 12 hours    Blood Culture Arm, Right    Specimen: Arm, Right; Blood   Result Value Ref Range    Culture No growth after 12 hours          Gelacio Mckee MD  [address] [address]  [phone]      Gelacio Mckee MD  01/17/25 1129

## 2025-01-17 NOTE — PROGRESS NOTES
[facility]    Referral received  from Shruthi PRESCOTT for IV Abx.    Benefits verified. Pt does not have coverage for IV Abx at home under his [employer] Medicare Advantage Plan. Drug would be billed to part D and supplies will be self pay. Based on Vanco, Cefepime and Flagyl, total cost is $214.12 per day for drugs and supplies.    CM updated, will wait to hear back on how to proceed. Writer will speak with pt to review benefits, home infusion and to offer choice of agency/home infusion provider once final plan in place.    Thank you for the referral.    Anna Amezquita RN  [facility] Liaison  [phone](Mon-Fri, 8:00 am-5:00 pm)  [phone] (Office)

## 2025-01-17 NOTE — PHARMACY-VANCOMYCIN DOSING SERVICE
Pharmacy Vancomycin Note  Date of Service 2025  Patient's  1955   69 year old, male    Indication: Sepsis  Day of Therapy: 3  Current vancomycin regimen:  1250 mg IV q12h  Current vancomycin monitoring method: AUC  Current vancomycin therapeutic monitoring goal: 400-600 mg*h/L    InsightRX Prediction of Current Vancomycin Regimen  Loading dose: N/A  Regimen: 1250 mg IV every 12 hours.  Start time: 14:04 on 2025  Exposure target: AUC24 (range)400-600 mg/L.hr   AUC24,ss: 383 mg/L.hr  Probability of AUC24 > 400: 39 %  Ctrough,ss: 10.2 mg/L  Probability of Ctrough,ss > 20: 0 %  Probability of nephrotoxicity (Lodise RUSSELL ): 6 %    Current estimated CrCl = Estimated Creatinine Clearance: 116.9 mL/min (based on SCr of 0.75 mg/dL).    Creatinine for last 3 days  1/15/2025: 12:53 PM Creatinine 0.85 mg/dL  2025:  6:44 AM Creatinine 0.79 mg/dL  2025:  6:41 AM Creatinine 0.75 mg/dL    Recent Vancomycin Levels (past 3 days)  2025: 12:51 PM Vancomycin 9.2 ug/mL    Vancomycin IV Administrations (past 72 hours)                     vancomycin (VANCOCIN) 1,250 mg in 0.9% NaCl 262.5 mL intermittent infusion (mg) 1,250 mg New Bag 25 0204     1,250 mg New Bag 25 1648    vancomycin (VANCOCIN) 2,000 mg in 0.9% NaCl 520 mL intermittent infusion ()  Restarted 01/15/25 2006      Restarted  1716     2,000 mg New Bag  1622                    Nephrotoxins and other renal medications (From now, onward)      Start     Dose/Rate Route Frequency Ordered Stop    25 1500  vancomycin (VANCOCIN) 1,250 mg in 0.9% NaCl 262.5 mL intermittent infusion         1,250 mg  over 90 Minutes Intravenous EVERY 12 HOURS 25 1436                 Contrast Orders - past 72 hours (72h ago, onward)      Start     Dose/Rate Route Frequency Stop    25 1435  perflutren lipid microsphere (DEFINITY) injection SUSP 5.5 mL         5.5 mL Intravenous ONCE 25 1435    01/15/25 1900  gadobutrol  (GADAVIST) injection 10 mL         10 mL Intravenous ONCE 01/15/25 2085            Interpretation of levels and current regimen:  Vancomycin level is reflective of AUC less than 400    Has serum creatinine changed greater than 50% in last 72 hours: No    Urine output:  good urine output    Renal Function: Stable    InsightRX Prediction of Planned New Vancomycin Regimen  Loading dose: N/A  Regimen: 1750 mg IV every 12 hours.  Start time: 14:04 on 01/17/2025  Exposure target: AUC24 (range)400-600 mg/L.hr   AUC24,ss: 535 mg/L.hr  Probability of AUC24 > 400: 97 %  Ctrough,ss: 14.4 mg/L  Probability of Ctrough,ss > 20: 7 %  Probability of nephrotoxicity (Lodise RUSSELL 2009): 10 %    Plan:  Increase Dose to 1750 mg IV q12h  Vancomycin monitoring method: AUC  Vancomycin therapeutic monitoring goal: 400-600 mg*h/L  Pharmacy will check vancomycin levels as appropriate in 1-3 Days.  Serum creatinine levels will be ordered daily for the first week of therapy and at least twice weekly for subsequent weeks.    Leora Suggs, PharmD, BCPS 01/17/25 1:52 PM

## 2025-01-18 ENCOUNTER — APPOINTMENT (OUTPATIENT)
Dept: PHYSICAL THERAPY | Facility: HOSPITAL | Age: 70
End: 2025-01-18
Attending: HOSPITALIST
Payer: COMMERCIAL

## 2025-01-18 LAB
ALBUMIN SERPL BCG-MCNC: 2 G/DL (ref 3.5–5.2)
ANION GAP SERPL CALCULATED.3IONS-SCNC: 8 MMOL/L (ref 7–15)
BACTERIA ABSC ANAEROBE+AEROBE CULT: ABNORMAL
BASOPHILS # BLD AUTO: 0 10E3/UL (ref 0–0.2)
BASOPHILS NFR BLD AUTO: 0 %
BUN SERPL-MCNC: 9.2 MG/DL (ref 8–23)
CALCIUM SERPL-MCNC: 7.8 MG/DL (ref 8.8–10.4)
CHLORIDE SERPL-SCNC: 99 MMOL/L (ref 98–107)
CREAT SERPL-MCNC: 0.72 MG/DL (ref 0.67–1.17)
EGFRCR SERPLBLD CKD-EPI 2021: >90 ML/MIN/1.73M2
EOSINOPHIL # BLD AUTO: 0.1 10E3/UL (ref 0–0.7)
EOSINOPHIL NFR BLD AUTO: 1 %
ERYTHROCYTE [DISTWIDTH] IN BLOOD BY AUTOMATED COUNT: 13.3 % (ref 10–15)
GLUCOSE BLDC GLUCOMTR-MCNC: 116 MG/DL (ref 70–99)
GLUCOSE BLDC GLUCOMTR-MCNC: 134 MG/DL (ref 70–99)
GLUCOSE BLDC GLUCOMTR-MCNC: 280 MG/DL (ref 70–99)
GLUCOSE BLDC GLUCOMTR-MCNC: 72 MG/DL (ref 70–99)
GLUCOSE BLDC GLUCOMTR-MCNC: 78 MG/DL (ref 70–99)
GLUCOSE BLDC GLUCOMTR-MCNC: 84 MG/DL (ref 70–99)
GLUCOSE SERPL-MCNC: 81 MG/DL (ref 70–99)
GRAM STAIN RESULT: ABNORMAL
GRAM STAIN RESULT: ABNORMAL
HCO3 SERPL-SCNC: 23 MMOL/L (ref 22–29)
HCT VFR BLD AUTO: 32.9 % (ref 40–53)
HGB BLD-MCNC: 11 G/DL (ref 13.3–17.7)
IMM GRANULOCYTES # BLD: 0.4 10E3/UL
IMM GRANULOCYTES NFR BLD: 3 %
LYMPHOCYTES # BLD AUTO: 1.5 10E3/UL (ref 0.8–5.3)
LYMPHOCYTES NFR BLD AUTO: 12 %
MAGNESIUM SERPL-MCNC: 2 MG/DL (ref 1.7–2.3)
MCH RBC QN AUTO: 27.8 PG (ref 26.5–33)
MCHC RBC AUTO-ENTMCNC: 33.4 G/DL (ref 31.5–36.5)
MCV RBC AUTO: 83 FL (ref 78–100)
MONOCYTES # BLD AUTO: 0.9 10E3/UL (ref 0–1.3)
MONOCYTES NFR BLD AUTO: 7 %
NEUTROPHILS # BLD AUTO: 10 10E3/UL (ref 1.6–8.3)
NEUTROPHILS NFR BLD AUTO: 78 %
NRBC # BLD AUTO: 0 10E3/UL
NRBC BLD AUTO-RTO: 0 /100
PHOSPHATE SERPL-MCNC: 2 MG/DL (ref 2.5–4.5)
PLAT MORPH BLD: NORMAL
PLATELET # BLD AUTO: 277 10E3/UL (ref 150–450)
POTASSIUM SERPL-SCNC: 3.6 MMOL/L (ref 3.4–5.3)
RBC # BLD AUTO: 3.95 10E6/UL (ref 4.4–5.9)
RBC MORPH BLD: NORMAL
SODIUM SERPL-SCNC: 130 MMOL/L (ref 135–145)
WBC # BLD AUTO: 12.8 10E3/UL (ref 4–11)

## 2025-01-18 PROCEDURE — 99232 SBSQ HOSP IP/OBS MODERATE 35: CPT | Performed by: INTERNAL MEDICINE

## 2025-01-18 PROCEDURE — 120N000001 HC R&B MED SURG/OB

## 2025-01-18 PROCEDURE — 250N000009 HC RX 250: Performed by: INTERNAL MEDICINE

## 2025-01-18 PROCEDURE — 82947 ASSAY GLUCOSE BLOOD QUANT: CPT | Performed by: HOSPITALIST

## 2025-01-18 PROCEDURE — 258N000003 HC RX IP 258 OP 636: Performed by: INTERNAL MEDICINE

## 2025-01-18 PROCEDURE — 97116 GAIT TRAINING THERAPY: CPT | Mod: GP | Performed by: PHYSICAL THERAPIST

## 2025-01-18 PROCEDURE — 250N000013 HC RX MED GY IP 250 OP 250 PS 637: Performed by: STUDENT IN AN ORGANIZED HEALTH CARE EDUCATION/TRAINING PROGRAM

## 2025-01-18 PROCEDURE — 272N000450 HC KIT 4FR POWER PICC SINGLE LUMEN

## 2025-01-18 PROCEDURE — 97162 PT EVAL MOD COMPLEX 30 MIN: CPT | Mod: GP | Performed by: PHYSICAL THERAPIST

## 2025-01-18 PROCEDURE — 250N000011 HC RX IP 250 OP 636: Performed by: INTERNAL MEDICINE

## 2025-01-18 PROCEDURE — 250N000013 HC RX MED GY IP 250 OP 250 PS 637: Performed by: HOSPITALIST

## 2025-01-18 PROCEDURE — 250N000013 HC RX MED GY IP 250 OP 250 PS 637: Performed by: INTERNAL MEDICINE

## 2025-01-18 PROCEDURE — 85025 COMPLETE CBC W/AUTO DIFF WBC: CPT | Performed by: HOSPITALIST

## 2025-01-18 PROCEDURE — 83735 ASSAY OF MAGNESIUM: CPT | Performed by: STUDENT IN AN ORGANIZED HEALTH CARE EDUCATION/TRAINING PROGRAM

## 2025-01-18 PROCEDURE — 99233 SBSQ HOSP IP/OBS HIGH 50: CPT | Performed by: STUDENT IN AN ORGANIZED HEALTH CARE EDUCATION/TRAINING PROGRAM

## 2025-01-18 PROCEDURE — 80051 ELECTROLYTE PANEL: CPT | Performed by: HOSPITALIST

## 2025-01-18 PROCEDURE — 36569 INSJ PICC 5 YR+ W/O IMAGING: CPT

## 2025-01-18 PROCEDURE — 84100 ASSAY OF PHOSPHORUS: CPT | Performed by: HOSPITALIST

## 2025-01-18 PROCEDURE — 36415 COLL VENOUS BLD VENIPUNCTURE: CPT | Performed by: HOSPITALIST

## 2025-01-18 RX ORDER — TESTOSTERONE GEL, 1% 10 MG/G
20 GEL TRANSDERMAL
Status: DISCONTINUED | OUTPATIENT
Start: 2025-01-18 | End: 2025-01-18

## 2025-01-18 RX ADMIN — ONDANSETRON 4 MG: 4 TABLET, ORALLY DISINTEGRATING ORAL at 08:21

## 2025-01-18 RX ADMIN — CEFEPIME HYDROCHLORIDE 2 G: 2 INJECTION, POWDER, FOR SOLUTION INTRAVENOUS at 23:53

## 2025-01-18 RX ADMIN — PANTOPRAZOLE SODIUM 40 MG: 40 TABLET, DELAYED RELEASE ORAL at 06:36

## 2025-01-18 RX ADMIN — METRONIDAZOLE 500 MG: 500 INJECTION, SOLUTION INTRAVENOUS at 22:11

## 2025-01-18 RX ADMIN — METRONIDAZOLE 500 MG: 500 INJECTION, SOLUTION INTRAVENOUS at 09:18

## 2025-01-18 RX ADMIN — MORPHINE SULFATE 15 MG: 15 TABLET, FILM COATED, EXTENDED RELEASE ORAL at 08:15

## 2025-01-18 RX ADMIN — MORPHINE SULFATE 15 MG: 15 TABLET, FILM COATED, EXTENDED RELEASE ORAL at 13:18

## 2025-01-18 RX ADMIN — APIXABAN 5 MG: 5 TABLET, FILM COATED ORAL at 22:08

## 2025-01-18 RX ADMIN — POLYETHYLENE GLYCOL 3350 17 G: 17 POWDER, FOR SOLUTION ORAL at 08:14

## 2025-01-18 RX ADMIN — GABAPENTIN 600 MG: 300 CAPSULE ORAL at 22:08

## 2025-01-18 RX ADMIN — GABAPENTIN 600 MG: 300 CAPSULE ORAL at 08:14

## 2025-01-18 RX ADMIN — GABAPENTIN 600 MG: 300 CAPSULE ORAL at 13:18

## 2025-01-18 RX ADMIN — CEFEPIME HYDROCHLORIDE 2 G: 2 INJECTION, POWDER, FOR SOLUTION INTRAVENOUS at 16:07

## 2025-01-18 RX ADMIN — MORPHINE SULFATE 15 MG: 15 TABLET, FILM COATED, EXTENDED RELEASE ORAL at 22:08

## 2025-01-18 RX ADMIN — APIXABAN 5 MG: 5 TABLET, FILM COATED ORAL at 08:15

## 2025-01-18 RX ADMIN — LIDOCAINE HYDROCHLORIDE 2 ML: 10 INJECTION, SOLUTION EPIDURAL; INFILTRATION; INTRACAUDAL; PERINEURAL at 12:17

## 2025-01-18 RX ADMIN — SENNOSIDES, DOCUSATE SODIUM 2 TABLET: 50; 8.6 TABLET, FILM COATED ORAL at 08:14

## 2025-01-18 RX ADMIN — SODIUM CHLORIDE 1750 MG: 9 INJECTION, SOLUTION INTRAVENOUS at 02:59

## 2025-01-18 RX ADMIN — POTASSIUM & SODIUM PHOSPHATES POWDER PACK 280-160-250 MG 1 PACKET: 280-160-250 PACK at 16:06

## 2025-01-18 RX ADMIN — INSULIN ASPART 3 UNITS: 100 INJECTION, SOLUTION INTRAVENOUS; SUBCUTANEOUS at 17:12

## 2025-01-18 RX ADMIN — POTASSIUM & SODIUM PHOSPHATES POWDER PACK 280-160-250 MG 1 PACKET: 280-160-250 PACK at 13:18

## 2025-01-18 RX ADMIN — CEFEPIME HYDROCHLORIDE 2 G: 2 INJECTION, POWDER, FOR SOLUTION INTRAVENOUS at 00:04

## 2025-01-18 RX ADMIN — CEFEPIME HYDROCHLORIDE 2 G: 2 INJECTION, POWDER, FOR SOLUTION INTRAVENOUS at 08:15

## 2025-01-18 RX ADMIN — SODIUM CHLORIDE 1750 MG: 9 INJECTION, SOLUTION INTRAVENOUS at 13:18

## 2025-01-18 RX ADMIN — POTASSIUM & SODIUM PHOSPHATES POWDER PACK 280-160-250 MG 1 PACKET: 280-160-250 PACK at 09:18

## 2025-01-18 RX ADMIN — DULOXETINE HYDROCHLORIDE 60 MG: 60 CAPSULE, DELAYED RELEASE ORAL at 08:14

## 2025-01-18 ASSESSMENT — ACTIVITIES OF DAILY LIVING (ADL)
ADLS_ACUITY_SCORE: 59

## 2025-01-18 NOTE — PLAN OF CARE
"Problem: Adult Inpatient Plan of Care  Goal: Optimal Comfort and Wellbeing  Outcome: Progressing     Problem: Comorbidity Management  Goal: Blood Glucose Levels Within Targeted Range  Outcome: Progressing     Problem: Infection  Goal: Absence of Infection Signs and Symptoms  Outcome: Progressing     Problem: Pain Acute  Goal: Optimal Pain Control and Function  Outcome: Progressing   Goal Outcome Evaluation:    Slept well. Denies pain. Alert and oriented but is intermittently \"off\" or confused but able to answer orientation questions. Tele- NSR. IV abx x2 infused overnight. Francesco's flushed, patent. Bloodsugar tonight 116. Using urinal at bedside, otherwise heavy assist x2-3 when fully oob. Plan for picc placement today for long term abx.     Temp: 98.2  F (36.8  C) Temp src: Oral BP: (!) 141/74 Pulse: 83   Resp: 20 SpO2: 97 % O2 Device: None (Room air)          "

## 2025-01-18 NOTE — PROCEDURES
"PICC Line Insertion Procedure Note  Pt. Name: Bon Luque  MRN:        [id number]    Procedure: Insertion of a  singleLumen  4 fr  Bard SOLO (valved) Power PICC, Lot number [id number]    Indications: Antibiotic    Contraindications : none    Procedure Details     Patient identified with 2 identifiers and \"Time Out\" conducted.  .     Central line insertion bundle followed: hand hygiene performed prior to procedure, site cleansed with cholraprep, hat, mask, sterile gloves, sterile gown worn, patient draped with maximum barrier head to toe drape, sterile field maintained.    The vein was assessed and found to be compressible and of adequate size.     Lidocaine 1% 2 ml administered sq to the insertion site. A 4 Fr PICC was inserted into the basilic vein of the left arm with ultrasound guidance. 1 attempt(s) required to access vein.   Catheter threaded without difficulty. Good blood return noted.    Modified Seldinger Technique used for insertion.    The 8 sharps that are included in the PICC insertion kit were accounted for and disposed of in the sharps container prior to breakdown of the sterile field.    Catheter secured with Statlock, biopatch and Tegaderm dressing applied.    Findings:    Total catheter length  50 cm, with 0 cm exposed. Mid upper arm circumference is 34 cm. Catheter was flushed with 20 cc NS. Patient  tolerated procedure well.    Tip placement verified by 3CG technology. Tip placement in the SVC/RA junction.    CLABSI prevention brochure left at bedside.    Patient's primary RN notified PICC is ready for use.      Comments:        Shankar Mukherjee PICC RN  [facility]      "

## 2025-01-18 NOTE — PROGRESS NOTES
"[facility] Progress Note    SUBJECTIVE:  PICC in place. Still not feeling great. Reviewed course    Had antibiotic with recent dental work and concern for dental infection.      REVIEW OF SYSTEMS:  Negative unless as listed above.  Social history, Family history, Medications: reviewed.    OBJECTIVE:  /67 (BP Location: Right arm)   Pulse 89   Temp 98.5  F (36.9  C) (Oral)   Resp 18   Ht 1.829 m (6')   Wt 99.8 kg (220 lb)   SpO2 98%   BMI 29.84 kg/m                PHYSICAL EXAM:  Alert, awake  Vitals tabulated above, reviewed  Neck supple without lymphadenopathy  Sclera normal color, not injected  CARDIOVASCULAR regular rate and rhythm, no murmur  Lungs CLEAR TO AUSCULTATION   Abdomen soft, NT/ND, absent HEPATOSPLENOMEGALY  Skin normal  Joints normal  Neurologic exam non focal  2 drains in the liver abscess  PICC    Antibiotics:cefepime, vanco, flagyl    Pertinent labs:    Recent Labs   Lab Test 01/18/25  0613 01/17/25  0641 01/16/25  0644   WBC 12.8* 11.8* 15.1*   HGB 11.0* 10.5* 12.1*   HCT 32.9* 30.6* 36.2*   MCV 83 82 84    213 206       Lab Results   Component Value Date    RBC 3.74 01/17/2025     Lab Results   Component Value Date    HGB 10.5 01/17/2025     Lab Results   Component Value Date    HCT 30.6 01/17/2025     No components found for: \"MCT\"  Lab Results   Component Value Date    MCV 82 01/17/2025     Lab Results   Component Value Date    MCH 28.1 01/17/2025     Lab Results   Component Value Date    MCHC 34.3 01/17/2025     Lab Results   Component Value Date    RDW 13.1 01/17/2025     Lab Results   Component Value Date     01/17/2025       Last Comprehensive Metabolic Panel:  Sodium   Date Value Ref Range Status   01/18/2025 130 (L) 135 - 145 mmol/L Final     Potassium   Date Value Ref Range Status   01/18/2025 3.6 3.4 - 5.3 mmol/L Final   07/13/2022 4.2 3.5 - 5.0 mmol/L Final     Chloride   Date Value Ref Range Status   01/18/2025 99 98 - 107 mmol/L Final " "  07/13/2022 104 98 - 107 mmol/L Final     Carbon Dioxide (CO2)   Date Value Ref Range Status   01/18/2025 23 22 - 29 mmol/L Final   07/13/2022 22 22 - 31 mmol/L Final     Anion Gap   Date Value Ref Range Status   01/18/2025 8 7 - 15 mmol/L Final   07/13/2022 8 5 - 18 mmol/L Final     Glucose   Date Value Ref Range Status   07/13/2022 286 (H) 70 - 125 mg/dL Final     GLUCOSE BY METER POCT   Date Value Ref Range Status   01/18/2025 134 (H) 70 - 99 mg/dL Final     Urea Nitrogen   Date Value Ref Range Status   01/18/2025 9.2 8.0 - 23.0 mg/dL Final   07/13/2022 17 8 - 22 mg/dL Final     Creatinine   Date Value Ref Range Status   01/18/2025 0.72 0.67 - 1.17 mg/dL Final     GFR Estimate   Date Value Ref Range Status   01/18/2025 >90 >60 mL/min/1.73m2 Final     Comment:     eGFR calculated using 2021 CKD-EPI equation.   01/13/2021 >60 >60 mL/min/1.73m2 Final     GFR, ESTIMATED POCT   Date Value Ref Range Status   11/08/2024 >60 >60 mL/min/1.73m2 Final     Calcium   Date Value Ref Range Status   01/18/2025 7.8 (L) 8.8 - 10.4 mg/dL Final     Comment:     Reference intervals for this test were updated on 7/16/2024 to reflect our healthy population more accurately. There may be differences in the flagging of prior results with similar values performed with this method. Those prior results can be interpreted in the context of the updated reference intervals.       Liver Function Studies -   Recent Labs   Lab Test 01/17/25  0641   PROTTOTAL 5.4*   ALBUMIN 2.2*   BILITOTAL 0.9   ALKPHOS 144   AST 83*   ALT 65       No results found for: \"SED\"    No results found for: \"CRP\"            MICROBIOLOGY DATA:    GPC in blood --> strep int  Gm stain liver pus also heavy GPC, culture same    IMAGING/RADIOLOGY:          ASSESSMENT:  Liver abscess, strep intermedius  Drained  On biologic for skin  Solitary kidney    RECOMMENDATION:  Can stop vancomycin  Cefepime to ceftriaxone likely tomorrow  Flagyl for anaerobes    Kristopher Sharma MD  [facility]  Contact via Vocera or [employer] paging  ______________________________________________________________________

## 2025-01-18 NOTE — PROGRESS NOTES
[facility]    Medicine Progress Note - Hospitalist Service    Date of Admission:  1/15/2025    Assessment & Plan   Bon Luque is a 69 year old male with past medical history significant for CAD, renal cell carcinoma status post nephrectomy 2017, hypertension, diabetes with neuropathy, HLD, hypogonadism on testosterone, psoriasis, and small bowel obstruction who was admitted on 1/15/2025 due to abdominal pain, diarrhea, nausea, dizziness, generalized weakness, and poor appetite for 11 days PTA. Found to have liver abscess, sepsis, Blood cultures growing strep intermedius.     Sepsis from  Strep intermedius bacteremia  Multiple liver abscesses  Immunocompromised host  -CT 1/13 w/ 3 prominent indeterminate complex hypodense masses in the liver concerning for neoplasm versus infectious process  -MRI 1/15: 3 heterogeneous masses in the liver new from 11/8/2024 concerning for infection, possible rapidly progressive malignancy/metastases  -Initially received cefepime and vancomycin in the ED  -Started on cefepime and metronidazole  -IR consulted, underwent CT-guided liver biopsy x 2 1/16  -Cultures growing strep intermedius  -Continue vancomycin, cefepime, Flagyl per ID  -PICC line placed 1/18 in anticipation of extended IV antibiotic course  -Defer antibiotic selection to ID, anticipate narrowing soon  -Care manager unable to find home health for outpatient antibiotics, may need to pursue TCU to facilitate parenteral antibiotics recommended by ID    Hyponatremia, improving  - Na 126 on presentation -> 130  - TSH normal  - Monitor Na    Acute drop in Hb  - Hb 13.2 on presentation, ~ 11.0  - No evidence of bleeding  - Monitor Hb     Psoriasis  - No obvious flaring lesions, remotely on Skyrizi which has been held recently  - Outpatient follow-up     Lactic acidosis  - Mild on admission at 2.6, normalized with fluids    Hypophosphatemia  - Monitor and replete as needed     Remote DVT and PE  -  Routinely manages on Eliquis, held prior to drain placement, resume 1/17, monitor for bleeding     Chronic pain  - Pain overall controlled, resume usual MS Contin, gabapentin, duloxetine, oxycodone     History of renal cell carcinoma  - Remote nephrectomy, follow creatinine carefully     Hypogonadism  - Resume usual testosterone supplement     Insulin-dependent type 2 diabetes mellitus  - Blood sugars acceptable  - A1c 7.9%  - Typically on 50 units of Lantus twice daily  - Initially on 40 units of Lantus twice daily with correctional NovoLog, increased to 45 units 1/18 for hyperglycemia  - Titrate as needed    - PT/OT following          Diet: Regular Diet Adult    DVT Prophylaxis: DOAC  Seals Catheter: Not present  Lines: None     Cardiac Monitoring: ACTIVE order. Indication: Tachyarrhythmias, acute (48 hours)  Code Status: Full Code      Clinically Significant Risk Factors         # Hyponatremia: Lowest Na = 127 mmol/L in last 2 days, will monitor as appropriate  # Hypochloremia: Lowest Cl = 96 mmol/L in last 2 days, will monitor as appropriate      # Hypoalbuminemia: Lowest albumin = 2 g/dL at 1/18/2025  6:13 AM, will monitor as appropriate  # Coagulation Defect: INR = 1.26 (Ref range: 0.85 - 1.15) and/or PTT = N/A, will monitor for bleeding    # Hypertension: Noted on problem list           # DMII: A1C = 7.9 % (Ref range: <5.7 %) within past 6 months   # Overweight: Estimated body mass index is 29.84 kg/m  as calculated from the following:    Height as of this encounter: 1.829 m (6').    Weight as of this encounter: 99.8 kg (220 lb)., PRESENT ON ADMISSION            Social Drivers of Health    Tobacco Use: Medium Risk (1/14/2025)    Patient History     Smoking Tobacco Use: Former     Smokeless Tobacco Use: Never     Passive Exposure: Past   Physical Activity: Insufficiently Active (8/23/2024)    Exercise Vital Sign     Days of Exercise per Week: 1 day     Minutes of Exercise per Session: 20 min   Social  Connections: Unknown (8/23/2024)    Social Connection and Isolation Panel [NHANES]     Frequency of Social Gatherings with Friends and Family: Once a week          Disposition Plan     Medically Ready for Discharge: Anticipated in 2-4 Days             Yeny Sparks MD  Hospitalist Service  [facility]  Securely message with [employer] (more info)  Text page via [employer] Paging/Directory   ______________________________________________________________________    Interval History   Patient was examined at bedside, new to me today.  States he just had lunch and has discomfort in his stomach.  Has 1 episode of loose BM today, denies any other complaints    Physical Exam   Vital Signs: Temp: 98.1  F (36.7  C) Temp src: Oral BP: (!) 143/70 Pulse: 79   Resp: 18 SpO2: 96 % O2 Device: None (Room air)    Weight: 220 lbs 0 oz    Constitutional: awake, alert  Respiratory: No increased work of breathing, good air exchange, clear to auscultation bilaterally, no crackles or wheezing  Cardiovascular: RRR  GI: normal bowel sounds, soft, non-distended, non-tender  Skin: normal skin color, texture, turgor, no rashes and no jaundice  Musculoskeletal: no lower extremity pitting edema present, tone is normal, generalized weakness noted  Neurologic: Awake, alert, no gross focal abnormalities     Medical Decision Making       50 MINUTES SPENT BY ME on the date of service doing chart review, history, exam, documentation & further activities per the note.      Data     I have personally reviewed the following data over the past 24 hrs:    12.8 (H)  \   11.0 (L)   / 277     130 (L) 99 9.2 /  134 (H)   3.6 23 0.72 \     ALT: N/A AST: N/A AP: N/A TBILI: N/A   ALB: 2.0 (L) TOT PROTEIN: N/A LIPASE: N/A       Imaging results reviewed over the past 24 hrs:   No results found for this or any previous visit (from the past 24 hours).

## 2025-01-18 NOTE — PLAN OF CARE
Problem: Comorbidity Management  Goal: Blood Glucose Levels Within Targeted Range  Outcome: Progressing     Problem: Infection  Goal: Absence of Infection Signs and Symptoms  Outcome: Progressing   Goal Outcome Evaluation:       No significant events this shift. DAMI 1 had 10 mL of serosanguinous drainage out. ADMI 2 had 5 mL of serosanguinous drainage. VSS. Pt is lethargic, intermittently confused and does not stay on topic, however he is very pleasant. Call light within reach, VSS.

## 2025-01-18 NOTE — PROGRESS NOTES
"   01/18/25 1050   Appointment Info   Signing Clinician's Name / Credentials (PT) Viri Aponte, PT, DPT   Living Environment   People in Home spouse   Current Living Arrangements house   Home Accessibility stairs to enter home   Number of Stairs, Main Entrance 2   Stair Railings, Main Entrance railings safe and in good condition  (from front entrance, no railing from garage entrance)   Transportation Anticipated family or friend will provide   Self-Care   Usual Activity Tolerance good   Current Activity Tolerance fair   Regular Exercise Yes   Activity/Exercise Type walking  (walking at the mall and around the block)   Equipment Currently Used at Home none   Fall history within last six months yes   Number of times patient has fallen within last six months   (\"a bunch\")   Activity/Exercise/Self-Care Comment Pt states he was independent with all ADLs/IADLs prior to feeling sick, however has had decreased mobility over last several weeks.   General Information   Onset of Illness/Injury or Date of Surgery 01/15/25   Referring Physician Charan Park MD   Patient/Family Therapy Goals Statement (PT) Discharge home   Pertinent History of Current Problem (include personal factors and/or comorbidities that impact the POC) Per H&P: \"69 year old male with past medical history significant for CAD, renal cell carcinoma status post nephrectomy 2017, hypertension, diabetes with neuropathy, HLD, hypogonadism on testosterone, psoriasis, and small bowel obstruction who was admitted on 1/15/2025 due to abdominal pain, diarrhea, nausea, dizziness, generalized weakness, and poor appetite for 11 days PTA. \"   Existing Precautions/Restrictions fall  (2 DAMI drains)   Pain Assessment   Patient Currently in Pain Yes, see Vital Sign flowsheet  (abdominal pain)   Range of Motion (ROM)   Range of Motion ROM deficits secondary to weakness   Strength (Manual Muscle Testing)   Strength (Manual Muscle Testing) Deficits observed during " functional mobility   Bed Mobility   Bed Mobility supine-sit;sit-supine   Supine-Sit [place] (Bed Mobility) minimum assist (75% patient effort);verbal cues   Sit-Supine [place] (Bed Mobility) minimum assist (75% patient effort);verbal cues   Impairments Contributing to Impaired Bed Mobility decreased strength   Assistive Device (Bed Mobility) bed rails   Transfers   Transfers sit-stand transfer   Transfer Safety Concerns Noted decreased weight-shifting ability   Impairments Contributing to Impaired Transfers decreased strength   Sit-Stand Transfer   Sit-Stand [place] (Transfers) minimum assist (75% patient effort);verbal cues   Assistive Device (Sit-Stand Transfers) walker, front-wheeled   Gait/Stairs (Locomotion)   [place] Level (Gait) contact guard;verbal cues   Assistive Device (Gait) walker, front-wheeled   Distance in Feet (Gait) 40'   Pattern (Gait) step-to   Deviations/Abnormal Patterns (Gait) kimberyl decreased;gait speed decreased   Comment, (Gait/Stairs) Stairs not yet attempted.  Patient demonstrates good strength and foot clearance to attempt next session with assistance.   Clinical Impression   Criteria for Skilled Therapeutic Intervention Yes, treatment indicated   PT Diagnosis (PT) impaired functional mobility   Influenced by the following impairments decreased strength, impaired balance, decreased activity tolerance   Functional limitations due to impairments transfers, ambulation   Clinical Presentation (PT Evaluation Complexity) evolving   Clinical Presentation Rationale Clinical judgment.   Clinical Decision Making (Complexity) moderate complexity   Planned Therapy Interventions (PT) balance training;bed mobility training;gait training;home exercise program;neuromuscular re-education;patient/family education;strengthening;transfer training   Risk & Benefits of therapy have been explained evaluation/treatment results reviewed;participants voiced agreement with care  plan;participants included;patient   PT Total Evaluation Time   PT Eval, Moderate Complexity Minutes (23692) 10   Physical Therapy Goals   PT Frequency Daily   PT Predicted Duration/Target Date for Goal Attainment 01/25/25   PT Goals Bed Mobility;Transfers;Gait;Stairs   PT: Bed Mobility Independent;Supine to/from sit   PT: Transfers Supervision/stand-by assist;Sit to/from stand;Assistive device   PT: Gait Supervision/stand-by assist;Assistive device;Greater than 200 feet   PT: Stairs Supervision/stand-by assist;2 stairs;Rail on right   Interventions   Interventions Quick Adds Gait Training   Gait Training   Gait Training Minutes (60106) 11   Symptoms Noted During/After Treatment (Gait Training) fatigue   Treatment Detail/Skilled Intervention Verbal cues for safe hand placement with sit <> stands. Cues for walker proximity. Walker height adjusted for patient. Encouraged pt to move UEs and LEs outside of PT session to work toward increasing strength.   Distance in Feet additional 100'   [place] Level (Gait Training) contact guard   Physical Assistance Level (Gait Training) 1 person + 1 person to manage equipment  (1 person to manage IV pole)   Assistive Device (Gait Training) rolling walker   Gait Analysis Deviations decreased kimberly;decreased step length   Impairments (Gait Analysis/Training) balance impaired;strength decreased   PT Discharge Planning   PT Plan gait with FWW, standing ex, stairs   PT Discharge Recommendation (DC Rec) home with assist;home with home care physical therapy   PT Rationale for DC Rec Patient is able to complete transfers and ambulation with contact guard to min assist. Lives with spouse who can assist with IADLs. Recommend continued PT to maximize strength and independence with functional mobility.   PT Brief overview of current status Supine <> sit, min assist of 1. Sit <> stand, min assist of 1. Ambulates 140' with FWW, CGA.   PT Total Distance Amb During Session (feet) 140   PT  Equipment Needed at Discharge walker, rolling  (will need at d/c)   Physical Therapy Time and Intention   Timed Code Treatment Minutes 11   Total Session Time (sum of timed and untimed services) 21

## 2025-01-19 ENCOUNTER — APPOINTMENT (OUTPATIENT)
Dept: PHYSICAL THERAPY | Facility: HOSPITAL | Age: 70
End: 2025-01-19
Attending: STUDENT IN AN ORGANIZED HEALTH CARE EDUCATION/TRAINING PROGRAM
Payer: COMMERCIAL

## 2025-01-19 ENCOUNTER — APPOINTMENT (OUTPATIENT)
Dept: OCCUPATIONAL THERAPY | Facility: HOSPITAL | Age: 70
End: 2025-01-19
Attending: STUDENT IN AN ORGANIZED HEALTH CARE EDUCATION/TRAINING PROGRAM
Payer: COMMERCIAL

## 2025-01-19 LAB
ANION GAP SERPL CALCULATED.3IONS-SCNC: 8 MMOL/L (ref 7–15)
BACTERIA BLD CULT: ABNORMAL
BUN SERPL-MCNC: 6.1 MG/DL (ref 8–23)
CALCIUM SERPL-MCNC: 7.6 MG/DL (ref 8.8–10.4)
CHLORIDE SERPL-SCNC: 98 MMOL/L (ref 98–107)
CREAT SERPL-MCNC: 0.73 MG/DL (ref 0.67–1.17)
EGFRCR SERPLBLD CKD-EPI 2021: >90 ML/MIN/1.73M2
ERYTHROCYTE [DISTWIDTH] IN BLOOD BY AUTOMATED COUNT: 13.4 % (ref 10–15)
GLUCOSE BLDC GLUCOMTR-MCNC: 205 MG/DL (ref 70–99)
GLUCOSE BLDC GLUCOMTR-MCNC: 216 MG/DL (ref 70–99)
GLUCOSE BLDC GLUCOMTR-MCNC: 268 MG/DL (ref 70–99)
GLUCOSE BLDC GLUCOMTR-MCNC: 86 MG/DL (ref 70–99)
GLUCOSE SERPL-MCNC: 65 MG/DL (ref 70–99)
HCO3 SERPL-SCNC: 26 MMOL/L (ref 22–29)
HCT VFR BLD AUTO: 33.8 % (ref 40–53)
HGB BLD-MCNC: 11.2 G/DL (ref 13.3–17.7)
MAGNESIUM SERPL-MCNC: 1.9 MG/DL (ref 1.7–2.3)
MCH RBC QN AUTO: 27.7 PG (ref 26.5–33)
MCHC RBC AUTO-ENTMCNC: 33.1 G/DL (ref 31.5–36.5)
MCV RBC AUTO: 84 FL (ref 78–100)
PHOSPHATE SERPL-MCNC: 3 MG/DL (ref 2.5–4.5)
PLATELET # BLD AUTO: 341 10E3/UL (ref 150–450)
POTASSIUM SERPL-SCNC: 3.4 MMOL/L (ref 3.4–5.3)
POTASSIUM SERPL-SCNC: 4.7 MMOL/L (ref 3.4–5.3)
RBC # BLD AUTO: 4.04 10E6/UL (ref 4.4–5.9)
SODIUM SERPL-SCNC: 132 MMOL/L (ref 135–145)
WBC # BLD AUTO: 15.4 10E3/UL (ref 4–11)

## 2025-01-19 PROCEDURE — 84132 ASSAY OF SERUM POTASSIUM: CPT | Performed by: STUDENT IN AN ORGANIZED HEALTH CARE EDUCATION/TRAINING PROGRAM

## 2025-01-19 PROCEDURE — 82310 ASSAY OF CALCIUM: CPT | Performed by: STUDENT IN AN ORGANIZED HEALTH CARE EDUCATION/TRAINING PROGRAM

## 2025-01-19 PROCEDURE — 250N000013 HC RX MED GY IP 250 OP 250 PS 637: Performed by: HOSPITALIST

## 2025-01-19 PROCEDURE — 84100 ASSAY OF PHOSPHORUS: CPT | Performed by: STUDENT IN AN ORGANIZED HEALTH CARE EDUCATION/TRAINING PROGRAM

## 2025-01-19 PROCEDURE — 80048 BASIC METABOLIC PNL TOTAL CA: CPT | Performed by: STUDENT IN AN ORGANIZED HEALTH CARE EDUCATION/TRAINING PROGRAM

## 2025-01-19 PROCEDURE — 250N000011 HC RX IP 250 OP 636: Performed by: INTERNAL MEDICINE

## 2025-01-19 PROCEDURE — 97535 SELF CARE MNGMENT TRAINING: CPT | Mod: GO

## 2025-01-19 PROCEDURE — 85014 HEMATOCRIT: CPT | Performed by: STUDENT IN AN ORGANIZED HEALTH CARE EDUCATION/TRAINING PROGRAM

## 2025-01-19 PROCEDURE — 85041 AUTOMATED RBC COUNT: CPT | Performed by: STUDENT IN AN ORGANIZED HEALTH CARE EDUCATION/TRAINING PROGRAM

## 2025-01-19 PROCEDURE — 99232 SBSQ HOSP IP/OBS MODERATE 35: CPT | Performed by: STUDENT IN AN ORGANIZED HEALTH CARE EDUCATION/TRAINING PROGRAM

## 2025-01-19 PROCEDURE — 97166 OT EVAL MOD COMPLEX 45 MIN: CPT | Mod: GO

## 2025-01-19 PROCEDURE — 250N000013 HC RX MED GY IP 250 OP 250 PS 637: Performed by: INTERNAL MEDICINE

## 2025-01-19 PROCEDURE — 97116 GAIT TRAINING THERAPY: CPT | Mod: GP | Performed by: PHYSICAL THERAPIST

## 2025-01-19 PROCEDURE — 83735 ASSAY OF MAGNESIUM: CPT | Performed by: STUDENT IN AN ORGANIZED HEALTH CARE EDUCATION/TRAINING PROGRAM

## 2025-01-19 PROCEDURE — 120N000001 HC R&B MED SURG/OB

## 2025-01-19 PROCEDURE — 99207 PR NO CHARGE LOS: CPT | Performed by: INTERNAL MEDICINE

## 2025-01-19 PROCEDURE — 250N000013 HC RX MED GY IP 250 OP 250 PS 637: Performed by: STUDENT IN AN ORGANIZED HEALTH CARE EDUCATION/TRAINING PROGRAM

## 2025-01-19 RX ORDER — CEFTRIAXONE 2 G/1
2 INJECTION, POWDER, FOR SOLUTION INTRAMUSCULAR; INTRAVENOUS EVERY 24 HOURS
Status: DISCONTINUED | OUTPATIENT
Start: 2025-01-20 | End: 2025-02-05 | Stop reason: HOSPADM

## 2025-01-19 RX ORDER — POTASSIUM CHLORIDE 1500 MG/1
40 TABLET, EXTENDED RELEASE ORAL ONCE
Status: COMPLETED | OUTPATIENT
Start: 2025-01-19 | End: 2025-01-19

## 2025-01-19 RX ADMIN — INSULIN GLARGINE 30 UNITS: 100 INJECTION, SOLUTION SUBCUTANEOUS at 20:43

## 2025-01-19 RX ADMIN — PANTOPRAZOLE SODIUM 40 MG: 40 TABLET, DELAYED RELEASE ORAL at 08:59

## 2025-01-19 RX ADMIN — POTASSIUM CHLORIDE 40 MEQ: 1500 TABLET, EXTENDED RELEASE ORAL at 08:59

## 2025-01-19 RX ADMIN — APIXABAN 5 MG: 5 TABLET, FILM COATED ORAL at 08:59

## 2025-01-19 RX ADMIN — GABAPENTIN 600 MG: 300 CAPSULE ORAL at 13:19

## 2025-01-19 RX ADMIN — CEFEPIME HYDROCHLORIDE 2 G: 2 INJECTION, POWDER, FOR SOLUTION INTRAVENOUS at 08:59

## 2025-01-19 RX ADMIN — CEFEPIME HYDROCHLORIDE 2 G: 2 INJECTION, POWDER, FOR SOLUTION INTRAVENOUS at 16:48

## 2025-01-19 RX ADMIN — METRONIDAZOLE 500 MG: 500 INJECTION, SOLUTION INTRAVENOUS at 20:45

## 2025-01-19 RX ADMIN — GABAPENTIN 600 MG: 300 CAPSULE ORAL at 08:59

## 2025-01-19 RX ADMIN — INSULIN ASPART 2 UNITS: 100 INJECTION, SOLUTION INTRAVENOUS; SUBCUTANEOUS at 13:19

## 2025-01-19 RX ADMIN — DULOXETINE HYDROCHLORIDE 60 MG: 60 CAPSULE, DELAYED RELEASE ORAL at 08:59

## 2025-01-19 RX ADMIN — GABAPENTIN 600 MG: 300 CAPSULE ORAL at 20:40

## 2025-01-19 RX ADMIN — METRONIDAZOLE 500 MG: 500 INJECTION, SOLUTION INTRAVENOUS at 10:03

## 2025-01-19 RX ADMIN — MORPHINE SULFATE 15 MG: 15 TABLET, FILM COATED, EXTENDED RELEASE ORAL at 08:59

## 2025-01-19 RX ADMIN — MORPHINE SULFATE 15 MG: 15 TABLET, FILM COATED, EXTENDED RELEASE ORAL at 13:19

## 2025-01-19 RX ADMIN — APIXABAN 5 MG: 5 TABLET, FILM COATED ORAL at 20:40

## 2025-01-19 RX ADMIN — INSULIN ASPART 3 UNITS: 100 INJECTION, SOLUTION INTRAVENOUS; SUBCUTANEOUS at 16:47

## 2025-01-19 RX ADMIN — MORPHINE SULFATE 15 MG: 15 TABLET, FILM COATED, EXTENDED RELEASE ORAL at 20:40

## 2025-01-19 ASSESSMENT — ACTIVITIES OF DAILY LIVING (ADL)
ADLS_ACUITY_SCORE: 59
ADLS_ACUITY_SCORE: 59
ADLS_ACUITY_SCORE: 65
ADLS_ACUITY_SCORE: 59
ADLS_ACUITY_SCORE: 60
ADLS_ACUITY_SCORE: 65
ADLS_ACUITY_SCORE: 65
ADLS_ACUITY_SCORE: 60
ADLS_ACUITY_SCORE: 60
ADLS_ACUITY_SCORE: 59
ADLS_ACUITY_SCORE: 65
ADLS_ACUITY_SCORE: 60
ADLS_ACUITY_SCORE: 59
ADLS_ACUITY_SCORE: 60
ADLS_ACUITY_SCORE: 65
ADLS_ACUITY_SCORE: 60
ADLS_ACUITY_SCORE: 65
ADLS_ACUITY_SCORE: 59
ADLS_ACUITY_SCORE: 59
ADLS_ACUITY_SCORE: 60
ADLS_ACUITY_SCORE: 65
ADLS_ACUITY_SCORE: 59
ADLS_ACUITY_SCORE: 60

## 2025-01-19 NOTE — PROGRESS NOTES
ID brief note    Walking with PT. Continuing current antibiotics, may focus tomorrow.     Kristopher Sharma MD

## 2025-01-19 NOTE — PLAN OF CARE
Problem: Adult Inpatient Plan of Care  Goal: Optimal Comfort and Wellbeing  Intervention: Monitor Pain and Promote Comfort  Recent Flowsheet Documentation  Taken 1/19/2025 1400 by Mack Molina, RN  Pain Management Interventions:   breathing exercises   care clustered   distraction   emotional support   pillow support provided   quiet environment facilitated   relaxation techniques promoted   repositioned   rest   therapeutic presence  Taken 1/19/2025 1300 by Mack Molina, RN  Pain Management Interventions:   medication (see MAR)   breathing exercises   care clustered   distraction   emotional support   pillow support provided   quiet environment facilitated   relaxation techniques promoted   repositioned   rest   therapeutic presence  Taken 1/19/2025 0945 by Mack Molina, RN  Pain Management Interventions:   breathing exercises   care clustered   distraction   emotional support   pillow support provided   quiet environment facilitated   relaxation techniques promoted   repositioned   rest   therapeutic presence  Taken 1/19/2025 0845 by Mack Molina, RN  Pain Management Interventions:   medication (see MAR)   breathing exercises   care clustered   distraction   emotional support   pillow support provided   quiet environment facilitated   relaxation techniques promoted   repositioned   rest   therapeutic presence     Problem: Infection  Goal: Absence of Infection Signs and Symptoms  Outcome: Progressing   Goal Outcome Evaluation:       VSS on RA. A&Ox4, but forgetful, lethargic, & slurred speech. Abd pain managed w/ sched PTA morphine, c/o R ear pain but no PRN needed. Up w/ 1 & W. DAMI drains both flushing w/o issue, irrigant subtracted from total OP, serous in color. IV abx given per order. Up to chair for meals. L PICC line w/ good blood return in lumen. Drain teaching continued w/ patient regarding flushing, emptying, & maintenance. Phos OK, K replaced per protocol, both recheck w/ sierra am labs. Spouse visiting at  bedside.

## 2025-01-19 NOTE — PROGRESS NOTES
01/19/25 0905   Appointment Info   Signing Clinician's Name / Credentials (OT) Nyasia Garcia OTD OTR/L   Living Environment   People in Home spouse   Current Living Arrangements house   Home Accessibility stairs to enter home   Number of Stairs, Main Entrance 2   Living Environment Comments Rambler home, tub/shower, standard toilets   Self-Care   Equipment Currently Used at Home none   Fall history within last six months yes   Activity/Exercise/Self-Care Comment Typically ind with all ADLs and IADLs per chart review and pt report   General Information   Onset of Illness/Injury or Date of Surgery 01/15/25   Referring Physician Yeny Sparks MD   Patient/Family Therapy Goal Statement (OT) To return home   Additional Occupational Profile Info/Pertinent History of Current Problem Bon Luque is a 69 year old male with past medical history significant for CAD, renal cell carcinoma status post nephrectomy 2017, hypertension, diabetes with neuropathy, HLD, hypogonadism on testosterone, psoriasis, and small bowel obstruction who was admitted on 1/15/2025 due to abdominal pain, diarrhea, nausea, dizziness, generalized weakness, and poor appetite for 11 days PTA. Found to have liver abscess, sepsis   Existing Precautions/Restrictions fall   Limitations/Impairments safety/cognitive   Cognitive Status Examination   Orientation Status orientation to person, place and time   Affect/Mental Status (Cognitive) flat/blunted affect   Follows Commands increased processing time needed   Visual Perception   Visual Impairment/Limitations WFL   Posture   Posture not impaired   Range of Motion Comprehensive   General Range of Motion bilateral upper extremity ROM WFL   Strength Comprehensive (MMT)   Comment, General Manual Muscle Testing (MMT) Assessment Min generalized weakness noted   Bed Mobility   Bed Mobility supine-sit   Supine-Sit [place] (Bed Mobility) contact guard   Assistive Device (Bed Mobility) bed rails   Transfers    Transfers sit-stand transfer;toilet transfer;shower transfer   Sit-Stand Transfer   Sit-Stand [place] (Transfers) contact guard   Assistive Device (Sit-Stand Transfers) walker, front-wheeled   Shower Transfer   [place] Level (Shower Transfer) minimum assist (75% patient effort)   Assistive Device (Shower Transfer) walker, front-wheeled   Toilet Transfer   [place] Level (Toilet Transfer) moderate assist (50% patient effort)   Assistive Device (Toilet Transfer) walker, front-wheeled   Balance   Balance Assessment standing dynamic balance   Standing Balance: Dynamic contact guard   Position/Device Used, Standing Balance walker, front-wheeled   Activities of Daily Living   BADL Assessment/Intervention lower body dressing;toileting;grooming   Lower Body Dressing Assessment/Training   [place] Level (Lower Body Dressing) moderate assist (50% patient effort)   Grooming Assessment/Training   [place] Level (Grooming) contact guard assist   Toileting   [place] Level (Toileting) contact guard assist   Clinical Impression   Criteria for Skilled Therapeutic Interventions Met (OT) Yes, treatment indicated   OT Diagnosis Decreased ind with ADLs   Influenced by the following impairments Liver mass, sepsis   OT Problem List-Impairments impacting ADL activity tolerance impaired;balance;cognition;mobility;strength;pain   Assessment of Occupational Performance 3-5 Performance Deficits   Identified Performance Deficits dressing, toileting, bathing, fxl transfers, cognition, act tolerance   Planned Therapy Interventions (OT) ADL retraining;bed mobility training;progressive activity/exercise;risk factor education;visual perception;strengthening;cognition   Clinical Decision Making Complexity (OT) detailed assessment/moderate complexity   Risk & Benefits of therapy have been explained evaluation/treatment results reviewed;care plan/treatment goals reviewed;risks/benefits reviewed;current/potential barriers  reviewed;patient   OT Total Evaluation Time   OT Eval, Moderate Complexity Minutes (64975) 9   OT Goals   Therapy Frequency (OT) 6 times/week   OT Predicted Duration/Target Date for Goal Attainment 01/26/25   OT Goals Toilet Transfer/Toileting;Hygiene/Grooming;Transfers;Cognition   OT: Hygiene/Grooming independent;while standing   OT: Transfer Supervision/stand-by assist  (tub/shower transfer)   OT: Toilet Transfer/Toileting Independent;toilet transfer;cleaning and garment management   OT: Cognitive Patient/caregiver will verbalize understanding of cognitive assessment results/recommendations as needed for safe discharge planning   Self-Care/Home Management   Self-Care/Home Mgmt/ADL, Compensatory, Meal Prep Minutes (06366) 15   Symptoms Noted During/After Treatment (Meal Preparation/Planning Training) fatigue;other (see comments)  (nausea)   Treatment Detail/Skilled Intervention Eval completed, treatment initiated. Pt reporting dizziness following bed mobility, subsided with rest. Fxl mob to bathroom with SBA and FWW, min cueing for hand placement and controlled descent with toilet transfer. Mod A to don new brief, cueing for thoroughness of pericares - set up to complete. Returned to bedside chair CGA with no AD, left with call light in reach.   OT Discharge Planning   OT Plan Progress activity tolerance, dressing, tub/shower transfer, standing tolerance, g/h at sink   OT Discharge Recommendation (DC Rec) home with home care occupational therapy;home with assist   OT Rationale for DC Rec Pt likely able to return home pending daily assist from wife for safety and cognition. If wife not available for frequent supervision - may require short TCU stay for weakness/safety   OT Brief overview of current status CGA fxl mob no AD, CGA transfers   OT Total Distance Amb During Session (feet) 10   Total Session Time   Timed Code Treatment Minutes 15   Total Session Time (sum of timed and untimed services) 24

## 2025-01-19 NOTE — PROGRESS NOTES
Care Management Follow Up    Length of Stay (days): 4    Expected Discharge Date: 01/20/2025    Anticipated Discharge Plan:  Home    Transportation: TBD    PT Recommendations: home with assist, home with home care physical therapy  OT Recommendations:  home with home care occupational therapy, home with assist     Barriers to Discharge: medical stability, IV abx    Prior Living Situation: house with spouse    Discussed  Partnership in Safe Discharge Planning  document with patient/family: No     Handoff Completed: No, handoff not indicated or clinically appropriate    Patient/Spokesperson Updated: No    Additional Information:    Chart review, patient continues on IV antibiotics, he does not have insurance benefits for home IV.  Referrals have been sent for TCU to support possible need for long term IV.      Next Steps:     Follow up on TCU referrals, watch for medical clearance for discharge.      TREMAINE GarcíaW

## 2025-01-19 NOTE — PROGRESS NOTES
[facility]    Medicine Progress Note - Hospitalist Service    Date of Admission:  1/15/2025    Assessment & Plan   Bon Luque is a 69 year old male with past medical history significant for CAD, renal cell carcinoma status post nephrectomy 2017, hypertension, diabetes with neuropathy, HLD, hypogonadism on testosterone, psoriasis, and small bowel obstruction who was admitted on 1/15/2025 due to abdominal pain, diarrhea, nausea, dizziness, generalized weakness, and poor appetite for 11 days PTA. Found to have liver abscess, sepsis, Blood cultures growing strep intermedius.     Sepsis from  Strep intermedius bacteremia  Multiple liver abscesses  Immunocompromised host  -CT 1/13 w/ 3 prominent indeterminate complex hypodense masses in the liver concerning for neoplasm versus infectious process  -MRI 1/15: 3 heterogeneous masses in the liver new from 11/8/2024 concerning for infection, possible rapidly progressive malignancy/metastases  -IR consulted, underwent CT-guided liver biopsy x 2 1/16  -Cultures growing strep intermedius  -Was on IV vancomycin, cefepime, Flagyl per ID, discontinued Vancomycin 01/18  -PICC line placed 1/18 in anticipation of extended IV antibiotic course  -Defer antibiotic selection to ID, anticipate narrowing soon  -Care manager unable to find home health for outpatient antibiotics, may need to pursue TCU to facilitate parenteral antibiotics recommended by ID    Hyponatremia, improving  - Na 126 on presentation -> 130 -> 132  - TSH normal  - Monitor Na    Acute drop in Hb  - Hb 13.2 on presentation, ~ 11.0  - No evidence of bleeding  - Monitor Hb     Psoriasis  - No obvious flaring lesions, remotely on Skyrizi which has been held recently  - Outpatient follow-up     Lactic acidosis  - Mild on admission at 2.6, normalized with fluids    Hypophosphatemia  - Monitor and replete as needed     Remote DVT and PE  - Routinely manages on Eliquis, held prior to drain placement,  resume 1/17, monitor for bleeding     Chronic pain  - Pain overall controlled, resume usual MS Contin, gabapentin, duloxetine, oxycodone     History of renal cell carcinoma  - Remote nephrectomy, follow creatinine carefully     Hypogonadism  - Resume usual testosterone supplement at discharge     Insulin-dependent type 2 diabetes mellitus  - Blood sugars acceptable  - A1c 7.9%  - 50 units of Lantus twice daily at home  - Initially on 40 units of Lantus twice daily with correctional NovoLog, increased to 45 units but hypoglycemic  - Change Lantus 30u bid, decreased appetite as per patient  - Titrate as needed    - PT/OT following, rec Home with Home PT/OT          Diet: Regular Diet Adult    DVT Prophylaxis: DOAC  Seals Catheter: Not present  Lines: PRESENT      PICC 01/18/25 Single Lumen Left Basilic Antibiotic-Site Assessment: WDL      Cardiac Monitoring: None  Code Status: Full Code      Clinically Significant Risk Factors         # Hyponatremia: Lowest Na = 130 mmol/L in last 2 days, will monitor as appropriate       # Hypoalbuminemia: Lowest albumin = 2 g/dL at 1/18/2025  6:13 AM, will monitor as appropriate     # Hypertension: Noted on problem list           # DMII: A1C = 7.9 % (Ref range: <5.7 %) within past 6 months   # Overweight: Estimated body mass index is 29.84 kg/m  as calculated from the following:    Height as of this encounter: 1.829 m (6').    Weight as of this encounter: 99.8 kg (220 lb)., PRESENT ON ADMISSION            Social Drivers of Health    Tobacco Use: Medium Risk (1/14/2025)    Patient History     Smoking Tobacco Use: Former     Smokeless Tobacco Use: Never     Passive Exposure: Past   Physical Activity: Insufficiently Active (8/23/2024)    Exercise Vital Sign     Days of Exercise per Week: 1 day     Minutes of Exercise per Session: 20 min   Social Connections: Unknown (8/23/2024)    Social Connection and Isolation Panel [NHANES]     Frequency of Social Gatherings with Friends and Family:  Once a week          Disposition Plan     Medically Ready for Discharge: Anticipated in 2-4 Days             Yeny Sparks MD  Hospitalist Service  [facility]  Securely message with [employer] (more info)  Text page via [employer] Paging/Directory   ______________________________________________________________________    Interval History   Patient was examined at the bedside, sitting comfortably in the chair.  Wife Arden present    States he continues to have discomfort in his abdomen since the last 10 days, denies pain.  States his appetite has been poor, was hypoglycemic and decrease Lantus dose          Physical Exam   Vital Signs: Temp: 97.5  F (36.4  C) Temp src: Oral BP: 136/63 Pulse: 86   Resp: 18 SpO2: 97 % O2 Device: None (Room air)    Weight: 220 lbs 0 oz    Constitutional: awake, alert  Respiratory: No increased work of breathing, good air exchange, clear to auscultation bilaterally, no crackles or wheezing  Cardiovascular: RRR  GI: normal bowel sounds, soft, non-distended, non-tender, 2 drain +  Skin: normal skin color, texture, turgor, no rashes and no jaundice  Musculoskeletal: no lower extremity pitting edema present, tone is normal, generalized weakness noted  Neurologic: Awake, alert, no gross focal abnormalities    Medical Decision Making       45 MINUTES SPENT BY ME on the date of service doing chart review, history, exam, documentation & further activities per the note.      Data     I have personally reviewed the following data over the past 24 hrs:    15.4 (H)  \   11.2 (L)   / 341     132 (L) 98 6.1 (L) /  205 (H)   4.7 26 0.73 \       Imaging results reviewed over the past 24 hrs:   No results found for this or any previous visit (from the past 24 hours).

## 2025-01-19 NOTE — PLAN OF CARE
Problem: Adult Inpatient Plan of Care  Goal: Optimal Comfort and Wellbeing  Intervention: Monitor Pain and Promote Comfort  Recent Flowsheet Documentation  Taken 1/18/2025 1315 by Mack Molina, RN  Pain Management Interventions:   medication (see MAR)   breathing exercises   care clustered   distraction   emotional support   pillow support provided   quiet environment facilitated   relaxation techniques promoted   repositioned   rest   therapeutic presence  Taken 1/18/2025 0800 by Mack Molina, RN  Pain Management Interventions:   medication (see MAR)   breathing exercises   care clustered   distraction   emotional support   pillow support provided   quiet environment facilitated   relaxation techniques promoted   repositioned   rest   therapeutic presence     Problem: Infection  Goal: Absence of Infection Signs and Symptoms  Outcome: Progressing   Goal Outcome Evaluation:       VSS on RA. SR/ST on tele, now DC'd. A&Ox4, but forgetful, lethargic, & slurred speech. Abd pain managed w/ sched PTA morphine. Up w/ 1 & W. DAMI drains both flushing w/o issue, irrigant subtracted from total OP, serous in color. IV abx given per order. Up to chair for meals. L PICC line placed, good blood return in lumen. Drain teaching done w/ patient regarding flushing, emptying, & maintenance. Had a BM today. Spouse updated by nursing via phone.

## 2025-01-20 ENCOUNTER — APPOINTMENT (OUTPATIENT)
Dept: PHYSICAL THERAPY | Facility: HOSPITAL | Age: 70
End: 2025-01-20
Attending: STUDENT IN AN ORGANIZED HEALTH CARE EDUCATION/TRAINING PROGRAM
Payer: COMMERCIAL

## 2025-01-20 ENCOUNTER — APPOINTMENT (OUTPATIENT)
Dept: OCCUPATIONAL THERAPY | Facility: HOSPITAL | Age: 70
End: 2025-01-20
Attending: STUDENT IN AN ORGANIZED HEALTH CARE EDUCATION/TRAINING PROGRAM
Payer: COMMERCIAL

## 2025-01-20 LAB
ANION GAP SERPL CALCULATED.3IONS-SCNC: 6 MMOL/L (ref 7–15)
BACTERIA ABSC ANAEROBE+AEROBE CULT: ABNORMAL
BASOPHILS # BLD AUTO: 0.1 10E3/UL (ref 0–0.2)
BASOPHILS NFR BLD AUTO: 0 %
BUN SERPL-MCNC: 6.8 MG/DL (ref 8–23)
CALCIUM SERPL-MCNC: 7.9 MG/DL (ref 8.8–10.4)
CHLORIDE SERPL-SCNC: 99 MMOL/L (ref 98–107)
CREAT SERPL-MCNC: 0.68 MG/DL (ref 0.67–1.17)
EGFRCR SERPLBLD CKD-EPI 2021: >90 ML/MIN/1.73M2
EOSINOPHIL # BLD AUTO: 0.1 10E3/UL (ref 0–0.7)
EOSINOPHIL NFR BLD AUTO: 1 %
ERYTHROCYTE [DISTWIDTH] IN BLOOD BY AUTOMATED COUNT: 13.6 % (ref 10–15)
GLUCOSE BLDC GLUCOMTR-MCNC: 173 MG/DL (ref 70–99)
GLUCOSE BLDC GLUCOMTR-MCNC: 178 MG/DL (ref 70–99)
GLUCOSE BLDC GLUCOMTR-MCNC: 243 MG/DL (ref 70–99)
GLUCOSE BLDC GLUCOMTR-MCNC: 269 MG/DL (ref 70–99)
GLUCOSE SERPL-MCNC: 155 MG/DL (ref 70–99)
GRAM STAIN RESULT: ABNORMAL
GRAM STAIN RESULT: ABNORMAL
HCO3 SERPL-SCNC: 27 MMOL/L (ref 22–29)
HCT VFR BLD AUTO: 34.2 % (ref 40–53)
HGB BLD-MCNC: 11.3 G/DL (ref 13.3–17.7)
IMM GRANULOCYTES # BLD: 0.4 10E3/UL
IMM GRANULOCYTES NFR BLD: 3 %
LYMPHOCYTES # BLD AUTO: 1.3 10E3/UL (ref 0.8–5.3)
LYMPHOCYTES NFR BLD AUTO: 9 %
MCH RBC QN AUTO: 27.8 PG (ref 26.5–33)
MCHC RBC AUTO-ENTMCNC: 33 G/DL (ref 31.5–36.5)
MCV RBC AUTO: 84 FL (ref 78–100)
MONOCYTES # BLD AUTO: 0.9 10E3/UL (ref 0–1.3)
MONOCYTES NFR BLD AUTO: 6 %
NEUTROPHILS # BLD AUTO: 11.8 10E3/UL (ref 1.6–8.3)
NEUTROPHILS NFR BLD AUTO: 82 %
NRBC # BLD AUTO: 0 10E3/UL
NRBC BLD AUTO-RTO: 0 /100
PHOSPHATE SERPL-MCNC: 2.3 MG/DL (ref 2.5–4.5)
PLATELET # BLD AUTO: 352 10E3/UL (ref 150–450)
POTASSIUM SERPL-SCNC: 4.5 MMOL/L (ref 3.4–5.3)
RBC # BLD AUTO: 4.06 10E6/UL (ref 4.4–5.9)
SODIUM SERPL-SCNC: 132 MMOL/L (ref 135–145)
WBC # BLD AUTO: 14.4 10E3/UL (ref 4–11)

## 2025-01-20 PROCEDURE — 250N000013 HC RX MED GY IP 250 OP 250 PS 637: Performed by: HOSPITALIST

## 2025-01-20 PROCEDURE — 80048 BASIC METABOLIC PNL TOTAL CA: CPT | Performed by: STUDENT IN AN ORGANIZED HEALTH CARE EDUCATION/TRAINING PROGRAM

## 2025-01-20 PROCEDURE — 250N000011 HC RX IP 250 OP 636: Performed by: INTERNAL MEDICINE

## 2025-01-20 PROCEDURE — 250N000013 HC RX MED GY IP 250 OP 250 PS 637: Performed by: STUDENT IN AN ORGANIZED HEALTH CARE EDUCATION/TRAINING PROGRAM

## 2025-01-20 PROCEDURE — 97110 THERAPEUTIC EXERCISES: CPT | Mod: GP | Performed by: PHYSICAL THERAPIST

## 2025-01-20 PROCEDURE — 250N000012 HC RX MED GY IP 250 OP 636 PS 637: Performed by: STUDENT IN AN ORGANIZED HEALTH CARE EDUCATION/TRAINING PROGRAM

## 2025-01-20 PROCEDURE — 250N000013 HC RX MED GY IP 250 OP 250 PS 637: Performed by: INTERNAL MEDICINE

## 2025-01-20 PROCEDURE — 84100 ASSAY OF PHOSPHORUS: CPT | Performed by: STUDENT IN AN ORGANIZED HEALTH CARE EDUCATION/TRAINING PROGRAM

## 2025-01-20 PROCEDURE — 97116 GAIT TRAINING THERAPY: CPT | Mod: GP | Performed by: PHYSICAL THERAPIST

## 2025-01-20 PROCEDURE — 99233 SBSQ HOSP IP/OBS HIGH 50: CPT | Performed by: STUDENT IN AN ORGANIZED HEALTH CARE EDUCATION/TRAINING PROGRAM

## 2025-01-20 PROCEDURE — 97535 SELF CARE MNGMENT TRAINING: CPT | Mod: GO

## 2025-01-20 PROCEDURE — 85004 AUTOMATED DIFF WBC COUNT: CPT | Performed by: INTERNAL MEDICINE

## 2025-01-20 PROCEDURE — 120N000001 HC R&B MED SURG/OB

## 2025-01-20 PROCEDURE — 250N000011 HC RX IP 250 OP 636: Mod: JZ | Performed by: INTERNAL MEDICINE

## 2025-01-20 PROCEDURE — 99232 SBSQ HOSP IP/OBS MODERATE 35: CPT | Performed by: INTERNAL MEDICINE

## 2025-01-20 PROCEDURE — 85041 AUTOMATED RBC COUNT: CPT | Performed by: INTERNAL MEDICINE

## 2025-01-20 RX ORDER — METRONIDAZOLE 500 MG/1
500 TABLET ORAL 3 TIMES DAILY
Status: DISCONTINUED | OUTPATIENT
Start: 2025-01-20 | End: 2025-02-05 | Stop reason: HOSPADM

## 2025-01-20 RX ORDER — FUROSEMIDE 20 MG/1
20 TABLET ORAL ONCE
Status: COMPLETED | OUTPATIENT
Start: 2025-01-20 | End: 2025-01-20

## 2025-01-20 RX ADMIN — INSULIN ASPART 1 UNITS: 100 INJECTION, SOLUTION INTRAVENOUS; SUBCUTANEOUS at 10:12

## 2025-01-20 RX ADMIN — CEFTRIAXONE SODIUM 2 G: 2 INJECTION, POWDER, FOR SOLUTION INTRAMUSCULAR; INTRAVENOUS at 10:12

## 2025-01-20 RX ADMIN — FUROSEMIDE 20 MG: 20 TABLET ORAL at 21:29

## 2025-01-20 RX ADMIN — POTASSIUM & SODIUM PHOSPHATES POWDER PACK 280-160-250 MG 1 PACKET: 280-160-250 PACK at 14:05

## 2025-01-20 RX ADMIN — POTASSIUM & SODIUM PHOSPHATES POWDER PACK 280-160-250 MG 1 PACKET: 280-160-250 PACK at 16:47

## 2025-01-20 RX ADMIN — APIXABAN 5 MG: 5 TABLET, FILM COATED ORAL at 21:29

## 2025-01-20 RX ADMIN — POTASSIUM & SODIUM PHOSPHATES POWDER PACK 280-160-250 MG 1 PACKET: 280-160-250 PACK at 10:12

## 2025-01-20 RX ADMIN — PANTOPRAZOLE SODIUM 40 MG: 40 TABLET, DELAYED RELEASE ORAL at 10:19

## 2025-01-20 RX ADMIN — GABAPENTIN 600 MG: 300 CAPSULE ORAL at 10:19

## 2025-01-20 RX ADMIN — DULOXETINE HYDROCHLORIDE 60 MG: 60 CAPSULE, DELAYED RELEASE ORAL at 10:19

## 2025-01-20 RX ADMIN — METRONIDAZOLE 500 MG: 500 INJECTION, SOLUTION INTRAVENOUS at 10:38

## 2025-01-20 RX ADMIN — METRONIDAZOLE 500 MG: 500 TABLET ORAL at 21:29

## 2025-01-20 RX ADMIN — INSULIN GLARGINE 30 UNITS: 100 INJECTION, SOLUTION SUBCUTANEOUS at 21:30

## 2025-01-20 RX ADMIN — APIXABAN 5 MG: 5 TABLET, FILM COATED ORAL at 10:19

## 2025-01-20 RX ADMIN — ACETAMINOPHEN 650 MG: 325 TABLET ORAL at 17:52

## 2025-01-20 RX ADMIN — GABAPENTIN 600 MG: 300 CAPSULE ORAL at 14:05

## 2025-01-20 RX ADMIN — INSULIN GLARGINE 30 UNITS: 100 INJECTION, SOLUTION SUBCUTANEOUS at 11:39

## 2025-01-20 RX ADMIN — CEFEPIME HYDROCHLORIDE 2 G: 2 INJECTION, POWDER, FOR SOLUTION INTRAVENOUS at 00:36

## 2025-01-20 RX ADMIN — ANTACID TABLETS 1000 MG: 500 TABLET, CHEWABLE ORAL at 01:58

## 2025-01-20 RX ADMIN — GABAPENTIN 600 MG: 300 CAPSULE ORAL at 21:29

## 2025-01-20 RX ADMIN — MORPHINE SULFATE 15 MG: 15 TABLET, FILM COATED, EXTENDED RELEASE ORAL at 14:05

## 2025-01-20 RX ADMIN — MORPHINE SULFATE 15 MG: 15 TABLET, FILM COATED, EXTENDED RELEASE ORAL at 21:29

## 2025-01-20 RX ADMIN — MORPHINE SULFATE 15 MG: 15 TABLET, FILM COATED, EXTENDED RELEASE ORAL at 10:19

## 2025-01-20 RX ADMIN — PROCHLORPERAZINE MALEATE 5 MG: 5 TABLET ORAL at 01:58

## 2025-01-20 RX ADMIN — INSULIN ASPART 1 UNITS: 100 INJECTION, SOLUTION INTRAVENOUS; SUBCUTANEOUS at 17:46

## 2025-01-20 ASSESSMENT — ACTIVITIES OF DAILY LIVING (ADL)
ADLS_ACUITY_SCORE: 65
ADLS_ACUITY_SCORE: 66
ADLS_ACUITY_SCORE: 65
ADLS_ACUITY_SCORE: 66
ADLS_ACUITY_SCORE: 65
ADLS_ACUITY_SCORE: 66
ADLS_ACUITY_SCORE: 66
ADLS_ACUITY_SCORE: 65

## 2025-01-20 NOTE — PROGRESS NOTES
"[facility] Progress Note    SUBJECTIVE:    First visit by me. Recent events discussed with RN. Family at bedside.    Ongoing abd pain, with nausea and poor appetite.     Walked with physical therapy today.    Plans for TCU when ready for discharge. Drains in place      OBJECTIVE:  BP (!) 147/70 (BP Location: Right arm)   Pulse 75   Temp 97.8  F (36.6  C) (Oral)   Resp 18   Ht 1.829 m (6')   Wt 99.8 kg (220 lb)   SpO2 97%   BMI 29.84 kg/m        PHYSICAL EXAM:  Alert, awake  Vitals tabulated above, reviewed  Sclera normal color, not injected  CARDIOVASCULAR regular rate and rhythm, no murmur  Lungs CLEAR TO AUSCULTATION   Abdomen soft, NT/ND; 2 drains in the liver abscess, milky output  Skin normal  Joints normal  Neurologic exam non focal    PICC, left arm     Antibiotics:  Ceftriaxone 1/20-  Metronidazole     Pertinent labs:    Recent Labs   Lab Test 01/20/25  0757 01/19/25  0618 01/18/25  0613   WBC 14.4* 15.4* 12.8*   HGB 11.3* 11.2* 11.0*   HCT 34.2* 33.8* 32.9*   MCV 84 84 83    341 277       Lab Results   Component Value Date    RBC 3.74 01/17/2025     Lab Results   Component Value Date    HGB 10.5 01/17/2025     Lab Results   Component Value Date    HCT 30.6 01/17/2025     No components found for: \"MCT\"  Lab Results   Component Value Date    MCV 82 01/17/2025     Lab Results   Component Value Date    MCH 28.1 01/17/2025     Lab Results   Component Value Date    MCHC 34.3 01/17/2025     Lab Results   Component Value Date    RDW 13.1 01/17/2025     Lab Results   Component Value Date     01/17/2025       Last Comprehensive Metabolic Panel:  Sodium   Date Value Ref Range Status   01/20/2025 132 (L) 135 - 145 mmol/L Final     Potassium   Date Value Ref Range Status   01/20/2025 4.5 3.4 - 5.3 mmol/L Final   07/13/2022 4.2 3.5 - 5.0 mmol/L Final     Chloride   Date Value Ref Range Status   01/20/2025 99 98 - 107 mmol/L Final   07/13/2022 104 98 - 107 mmol/L Final     Carbon " "Dioxide (CO2)   Date Value Ref Range Status   01/20/2025 27 22 - 29 mmol/L Final   07/13/2022 22 22 - 31 mmol/L Final     Anion Gap   Date Value Ref Range Status   01/20/2025 6 (L) 7 - 15 mmol/L Final   07/13/2022 8 5 - 18 mmol/L Final     Glucose   Date Value Ref Range Status   01/20/2025 155 (H) 70 - 99 mg/dL Final   07/13/2022 286 (H) 70 - 125 mg/dL Final     GLUCOSE BY METER POCT   Date Value Ref Range Status   01/20/2025 269 (H) 70 - 99 mg/dL Final     Urea Nitrogen   Date Value Ref Range Status   01/20/2025 6.8 (L) 8.0 - 23.0 mg/dL Final   07/13/2022 17 8 - 22 mg/dL Final     Creatinine   Date Value Ref Range Status   01/20/2025 0.68 0.67 - 1.17 mg/dL Final     GFR Estimate   Date Value Ref Range Status   01/20/2025 >90 >60 mL/min/1.73m2 Final     Comment:     eGFR calculated using 2021 CKD-EPI equation.   01/13/2021 >60 >60 mL/min/1.73m2 Final     GFR, ESTIMATED POCT   Date Value Ref Range Status   11/08/2024 >60 >60 mL/min/1.73m2 Final     Calcium   Date Value Ref Range Status   01/20/2025 7.9 (L) 8.8 - 10.4 mg/dL Final     Comment:     Reference intervals for this test were updated on 7/16/2024 to reflect our healthy population more accurately. There may be differences in the flagging of prior results with similar values performed with this method. Those prior results can be interpreted in the context of the updated reference intervals.       Liver Function Studies -   Recent Labs   Lab Test 01/17/25  0641   PROTTOTAL 5.4*   ALBUMIN 2.2*   BILITOTAL 0.9   ALKPHOS 144   AST 83*   ALT 65       No results found for: \"SED\"    No results found for: \"CRP\"            MICROBIOLOGY DATA:    GPC in blood --> strep int  Gm stain liver pus also heavy GPC, culture same    Susceptibility data from last 90 days.  Collected Specimen Info Organism Cefotaxime Ceftriaxone Clindamycin Meropenem Penicillin Vancomycin   01/16/25 Abscess from Liver Streptococcus intermedius         01/16/25 Abscess from Liver Streptococcus " intermedius         01/15/25 Peripheral Blood Streptococcus intermedius  S  S  S  S  S  S   01/15/25 Blood from Line, venous Streptococcus intermedius                  ASSESSMENT:  Liver abscess, strep intermedius. leukocytosis  Drained  On biologic for skin  Solitary kidney    RECOMMENDATION:  Vancomycin and cefepime stopped  Continue ceftriaxone  Metronidazole to po   Repeat CT abd with contrast given persistent leukocytosis  Anticipate 6 weeks iv antibiotics due to multi-loculated abscess    Chon Garcia MD  [facility]  Direct messaging: [facility] Paging   On-Call ID provider: [phone], option: 9       Attestation:  I have reviewed today's Medications, Vital Signs, and Labs. Therapy plan discussed with patient and family.

## 2025-01-20 NOTE — PROGRESS NOTES
Care Management Follow Up    Length of Stay (days): 5    Expected Discharge Date: 01/20/2025     Concerns to be Addressed:       Patient plan of care discussed at interdisciplinary rounds: Yes    Anticipated Discharge Disposition: TCU for IV antibiotics              Anticipated Discharge Services:  TBD  Anticipated Discharge DME:      Patient/family educated on Medicare website which has current facility and service quality ratings:  yes  Education Provided on the Discharge Plan:    Patient/Family in Agreement with the Plan:      Referrals Placed by CM/SW:    Private pay costs discussed: Not applicable    Discussed  Partnership in Safe Discharge Planning  document with patient/family: No     Handoff Completed: No, handoff not indicated or clinically appropriate    Additional Information:  Called left message for [facility] confirming bed for today. Sent EPIC message.   11:33 AM   Called [facility] and left voicemail inquiring about bed availability  1:12 PM   Per rounding MD. Patient to have a follow up CT and should be ready to discharge tomorrow    Next Steps: await bed.    Silvia Rodgers RN

## 2025-01-20 NOTE — PLAN OF CARE
Problem: Adult Inpatient Plan of Care  Goal: Plan of Care Review  Description: The Plan of Care Review/Shift note should be completed every shift.  The Outcome Evaluation is a brief statement about your assessment that the patient is improving, declining, or no change.  This information will be displayed automatically on your shift  note.  Outcome: Progressing  Goal: Optimal Comfort and Wellbeing  Outcome: Progressing     Problem: Pain Acute  Goal: Optimal Pain Control and Function  Outcome: Progressing  Intervention: Prevent or Manage Pain  Recent Flowsheet Documentation  Taken 1/20/2025 0045 by Sage Carter RN  Medication Review/Management: medications reviewed   Goal Outcome Evaluation:               Pt is A/Ox4  He was less talkative today stating:  He was havg a talk with God, and how he is so lose to death. I encouraged him to express his feelings and actively listen to what he was saying He stated all his family and friends are gone, so he has made peace with death . Pt is on RA. A1 with gait belt.  Pt requested  medication for N/V.  Given Compazine. Pt tolerated the medication well . Writer turned on the music/imagery channel. Pt has Two DAMI drains set to suctions. Irrigated 10cc in Tube 1 and 2 return 5ml both J-Tube. Pt can verbalized needs.   Call light within reach.                           .    Vital signs:  Temp: 98  F (36.7  C) Temp src: Oral BP: (!) 142/73 Pulse: 84   Resp: 16 SpO2: 97 % O2 Device: None (Room air)   Height: 182.9 cm (6') Weight: 99.8 kg (220 lb)  Estimated body mass index is 29.84 kg/m  as calculated from the following:    Height as of this encounter: 1.829 m (6').    Weight as of this encounter: 99.8 kg (220 lb).

## 2025-01-20 NOTE — PLAN OF CARE
Problem: Adult Inpatient Plan of Care  Goal: Optimal Comfort and Wellbeing  Outcome: Progressing  Intervention: Monitor Pain and Promote Comfort  Recent Flowsheet Documentation  Taken 1/19/2025 1628 by Gabriella Shipley, RN  Pain Management Interventions:   declines   distraction   emotional support   pillow support provided   repositioned     Problem: Comorbidity Management  Goal: Blood Glucose Levels Within Targeted Range  Intervention: Monitor and Manage Glycemia  Recent Flowsheet Documentation  Taken 1/19/2025 1628 by Gabriella Shipley, RN  Medication Review/Management: medications reviewed   Goal Outcome Evaluation:      Plan of Care Reviewed With: patient      VSS.  & 218. New lantus refill ordered. One loose stool. Poor appetite. DAMI drains patent and flushed with no issues, very minimal output on shift. PICC patent and good blood return. Sleeps in between cares. Bed alarm on and calls appropriately.

## 2025-01-21 ENCOUNTER — APPOINTMENT (OUTPATIENT)
Dept: CT IMAGING | Facility: HOSPITAL | Age: 70
End: 2025-01-21
Attending: INTERNAL MEDICINE
Payer: COMMERCIAL

## 2025-01-21 ENCOUNTER — APPOINTMENT (OUTPATIENT)
Dept: PHYSICAL THERAPY | Facility: HOSPITAL | Age: 70
End: 2025-01-21
Attending: INTERNAL MEDICINE
Payer: COMMERCIAL

## 2025-01-21 LAB
BACTERIA BLD CULT: NO GROWTH
BACTERIA BLD CULT: NO GROWTH
BASOPHILS # BLD AUTO: 0 10E3/UL (ref 0–0.2)
BASOPHILS NFR BLD AUTO: 0 %
CRP SERPL-MCNC: 69.2 MG/L
EOSINOPHIL # BLD AUTO: 0.1 10E3/UL (ref 0–0.7)
EOSINOPHIL NFR BLD AUTO: 1 %
ERYTHROCYTE [DISTWIDTH] IN BLOOD BY AUTOMATED COUNT: 13.5 % (ref 10–15)
GLUCOSE BLDC GLUCOMTR-MCNC: 105 MG/DL (ref 70–99)
GLUCOSE BLDC GLUCOMTR-MCNC: 119 MG/DL (ref 70–99)
GLUCOSE BLDC GLUCOMTR-MCNC: 226 MG/DL (ref 70–99)
GLUCOSE BLDC GLUCOMTR-MCNC: 80 MG/DL (ref 70–99)
HCT VFR BLD AUTO: 33.2 % (ref 40–53)
HGB BLD-MCNC: 11.1 G/DL (ref 13.3–17.7)
IMM GRANULOCYTES # BLD: 0.3 10E3/UL
IMM GRANULOCYTES NFR BLD: 2 %
LYMPHOCYTES # BLD AUTO: 1.4 10E3/UL (ref 0.8–5.3)
LYMPHOCYTES NFR BLD AUTO: 10 %
MCH RBC QN AUTO: 28 PG (ref 26.5–33)
MCHC RBC AUTO-ENTMCNC: 33.4 G/DL (ref 31.5–36.5)
MCV RBC AUTO: 84 FL (ref 78–100)
MONOCYTES # BLD AUTO: 0.9 10E3/UL (ref 0–1.3)
MONOCYTES NFR BLD AUTO: 7 %
NEUTROPHILS # BLD AUTO: 10.5 10E3/UL (ref 1.6–8.3)
NEUTROPHILS NFR BLD AUTO: 80 %
NRBC # BLD AUTO: 0 10E3/UL
NRBC BLD AUTO-RTO: 0 /100
PHOSPHATE SERPL-MCNC: 3.1 MG/DL (ref 2.5–4.5)
PLATELET # BLD AUTO: 391 10E3/UL (ref 150–450)
POTASSIUM SERPL-SCNC: 4.2 MMOL/L (ref 3.4–5.3)
RBC # BLD AUTO: 3.97 10E6/UL (ref 4.4–5.9)
WBC # BLD AUTO: 13.2 10E3/UL (ref 4–11)

## 2025-01-21 PROCEDURE — 120N000001 HC R&B MED SURG/OB

## 2025-01-21 PROCEDURE — 250N000011 HC RX IP 250 OP 636: Performed by: INTERNAL MEDICINE

## 2025-01-21 PROCEDURE — 99232 SBSQ HOSP IP/OBS MODERATE 35: CPT | Performed by: INTERNAL MEDICINE

## 2025-01-21 PROCEDURE — 74160 CT ABDOMEN W/CONTRAST: CPT

## 2025-01-21 PROCEDURE — 97116 GAIT TRAINING THERAPY: CPT | Mod: GP | Performed by: PHYSICAL THERAPIST

## 2025-01-21 PROCEDURE — 250N000013 HC RX MED GY IP 250 OP 250 PS 637: Performed by: HOSPITALIST

## 2025-01-21 PROCEDURE — 99233 SBSQ HOSP IP/OBS HIGH 50: CPT | Performed by: STUDENT IN AN ORGANIZED HEALTH CARE EDUCATION/TRAINING PROGRAM

## 2025-01-21 PROCEDURE — 86140 C-REACTIVE PROTEIN: CPT | Performed by: INTERNAL MEDICINE

## 2025-01-21 PROCEDURE — 84100 ASSAY OF PHOSPHORUS: CPT | Performed by: STUDENT IN AN ORGANIZED HEALTH CARE EDUCATION/TRAINING PROGRAM

## 2025-01-21 PROCEDURE — 250N000013 HC RX MED GY IP 250 OP 250 PS 637: Performed by: INTERNAL MEDICINE

## 2025-01-21 PROCEDURE — 85004 AUTOMATED DIFF WBC COUNT: CPT | Performed by: INTERNAL MEDICINE

## 2025-01-21 PROCEDURE — 97530 THERAPEUTIC ACTIVITIES: CPT | Mod: GP | Performed by: PHYSICAL THERAPIST

## 2025-01-21 PROCEDURE — 85041 AUTOMATED RBC COUNT: CPT | Performed by: INTERNAL MEDICINE

## 2025-01-21 PROCEDURE — 84132 ASSAY OF SERUM POTASSIUM: CPT | Performed by: STUDENT IN AN ORGANIZED HEALTH CARE EDUCATION/TRAINING PROGRAM

## 2025-01-21 PROCEDURE — 85018 HEMOGLOBIN: CPT | Performed by: INTERNAL MEDICINE

## 2025-01-21 RX ORDER — IOPAMIDOL 755 MG/ML
85 INJECTION, SOLUTION INTRAVASCULAR ONCE
Status: COMPLETED | OUTPATIENT
Start: 2025-01-21 | End: 2025-01-21

## 2025-01-21 RX ADMIN — ONDANSETRON 4 MG: 4 TABLET, ORALLY DISINTEGRATING ORAL at 21:50

## 2025-01-21 RX ADMIN — METRONIDAZOLE 500 MG: 500 TABLET ORAL at 08:52

## 2025-01-21 RX ADMIN — METRONIDAZOLE 500 MG: 500 TABLET ORAL at 21:38

## 2025-01-21 RX ADMIN — GABAPENTIN 600 MG: 300 CAPSULE ORAL at 21:38

## 2025-01-21 RX ADMIN — INSULIN GLARGINE 30 UNITS: 100 INJECTION, SOLUTION SUBCUTANEOUS at 08:53

## 2025-01-21 RX ADMIN — MORPHINE SULFATE 15 MG: 15 TABLET, FILM COATED, EXTENDED RELEASE ORAL at 08:52

## 2025-01-21 RX ADMIN — ONDANSETRON 4 MG: 4 TABLET, ORALLY DISINTEGRATING ORAL at 10:31

## 2025-01-21 RX ADMIN — APIXABAN 5 MG: 5 TABLET, FILM COATED ORAL at 08:52

## 2025-01-21 RX ADMIN — DULOXETINE HYDROCHLORIDE 60 MG: 60 CAPSULE, DELAYED RELEASE ORAL at 08:52

## 2025-01-21 RX ADMIN — GABAPENTIN 600 MG: 300 CAPSULE ORAL at 14:17

## 2025-01-21 RX ADMIN — MORPHINE SULFATE 15 MG: 15 TABLET, FILM COATED, EXTENDED RELEASE ORAL at 14:17

## 2025-01-21 RX ADMIN — IOPAMIDOL 85 ML: 755 INJECTION, SOLUTION INTRAVENOUS at 02:08

## 2025-01-21 RX ADMIN — PANTOPRAZOLE SODIUM 40 MG: 40 TABLET, DELAYED RELEASE ORAL at 08:52

## 2025-01-21 RX ADMIN — GABAPENTIN 600 MG: 300 CAPSULE ORAL at 08:52

## 2025-01-21 RX ADMIN — METRONIDAZOLE 500 MG: 500 TABLET ORAL at 14:17

## 2025-01-21 RX ADMIN — MORPHINE SULFATE 15 MG: 15 TABLET, FILM COATED, EXTENDED RELEASE ORAL at 21:38

## 2025-01-21 RX ADMIN — CEFTRIAXONE SODIUM 2 G: 2 INJECTION, POWDER, FOR SOLUTION INTRAMUSCULAR; INTRAVENOUS at 08:52

## 2025-01-21 ASSESSMENT — ACTIVITIES OF DAILY LIVING (ADL)
ADLS_ACUITY_SCORE: 65

## 2025-01-21 NOTE — PLAN OF CARE
Problem: Adult Inpatient Plan of Care  Goal: Optimal Comfort and Wellbeing  Outcome: Progressing   Goal Outcome Evaluation:       Pt alert/oriented, sometimes forgetful. Reports less than mild pain. DAMI drains intact, no acute changes. Call light within reach.

## 2025-01-21 NOTE — PROGRESS NOTES
Care Management Follow Up    Length of Stay (days): 6    Expected Discharge Date: 01/21/2025     Concerns to be Addressed:       Patient plan of care discussed at interdisciplinary rounds: Yes    Anticipated Discharge Disposition: [facility]              Anticipated Discharge Services:  na  Anticipated Discharge DME:  na    Patient/family educated on Medicare website which has current facility and service quality ratings:  yes  Education Provided on the Discharge Plan:    Patient/Family in Agreement with the Plan:      Referrals Placed by CM/SW:    Private pay costs discussed: Not applicable    Discussed  Partnership in Safe Discharge Planning  document with patient/family: No     Handoff Completed: No, handoff not indicated or clinically appropriate    Additional Information:  Received a message from [facility] that they have a bed for the patient today.  Message sent to MD alerting her  8:18 AM   Per MD waiting for final recommendations from ID and CT pending  11:29 AM   Per Hospitalist patient is not ready to discharge today. Will update facility    Next Steps: medical readiness.discharge orders, confirm transport with family; ALEXANDRA Rodgers RN

## 2025-01-21 NOTE — CONSULTS
Multifocal liver abscesses. 2 were drained 1/16. Today's CT demonstrated enlargement of the most right cephalad abscess. The primary service was offered aspiration only today versus drain placement after 48 hour eliquis hold. The latter was chosen

## 2025-01-21 NOTE — PLAN OF CARE
Problem: Adult Inpatient Plan of Care  Goal: Optimal Comfort and Wellbeing  Outcome: Progressing     Problem: Adult Inpatient Plan of Care  Goal: Optimal Comfort and Wellbeing  Outcome: Progressing   Goal Outcome Evaluation:       Pt. Pleasant and cooperative, flat affect, BG ac/hs, DAMI drains x2 patent and flushed, k and phos protocol, IV and oral abx, plan for 3rd drain to be placed later this week d/t receiving eliquis this am, working with PT/OT, scheduled morphine helpful with pain control, uses urinal at bedside, will cont to monitor.

## 2025-01-21 NOTE — PROGRESS NOTES
"[facility] Progress Note    SUBJECTIVE:    CT abd overnight, appears to have undrained abscess in segment 4.    Feels tired this morning.    OBJECTIVE:  /73 (BP Location: Right arm)   Pulse 79   Temp 97.3  F (36.3  C) (Oral)   Resp 16   Ht 1.829 m (6')   Wt 99.8 kg (220 lb)   SpO2 99%   BMI 29.84 kg/m        PHYSICAL EXAM:  Alert, awake  Vitals tabulated above, reviewed  Sclera normal color, not injected  CARDIOVASCULAR regular rate and rhythm, no murmur  Lungs CLEAR TO AUSCULTATION   Abdomen soft, NT/ND; 2 drains in the liver abscess, milky output  Skin normal  Joints normal  Neurologic exam non focal    PICC, left arm     Antibiotics:  Ceftriaxone 1/20-  Metronidazole     Pertinent labs:    Recent Labs   Lab Test 01/21/25  0526 01/20/25  0757 01/19/25  0618   WBC 13.2* 14.4* 15.4*   HGB 11.1* 11.3* 11.2*   HCT 33.2* 34.2* 33.8*   MCV 84 84 84    352 341       Lab Results   Component Value Date    RBC 3.74 01/17/2025     Lab Results   Component Value Date    HGB 10.5 01/17/2025     Lab Results   Component Value Date    HCT 30.6 01/17/2025     No components found for: \"MCT\"  Lab Results   Component Value Date    MCV 82 01/17/2025     Lab Results   Component Value Date    MCH 28.1 01/17/2025     Lab Results   Component Value Date    MCHC 34.3 01/17/2025     Lab Results   Component Value Date    RDW 13.1 01/17/2025     Lab Results   Component Value Date     01/17/2025       Last Comprehensive Metabolic Panel:  Sodium   Date Value Ref Range Status   01/20/2025 132 (L) 135 - 145 mmol/L Final     Potassium   Date Value Ref Range Status   01/21/2025 4.2 3.4 - 5.3 mmol/L Final   07/13/2022 4.2 3.5 - 5.0 mmol/L Final     Chloride   Date Value Ref Range Status   01/20/2025 99 98 - 107 mmol/L Final   07/13/2022 104 98 - 107 mmol/L Final     Carbon Dioxide (CO2)   Date Value Ref Range Status   01/20/2025 27 22 - 29 mmol/L Final   07/13/2022 22 22 - 31 mmol/L Final     Anion Gap " "  Date Value Ref Range Status   01/20/2025 6 (L) 7 - 15 mmol/L Final   07/13/2022 8 5 - 18 mmol/L Final     Glucose   Date Value Ref Range Status   07/13/2022 286 (H) 70 - 125 mg/dL Final     GLUCOSE BY METER POCT   Date Value Ref Range Status   01/21/2025 80 70 - 99 mg/dL Final     Urea Nitrogen   Date Value Ref Range Status   01/20/2025 6.8 (L) 8.0 - 23.0 mg/dL Final   07/13/2022 17 8 - 22 mg/dL Final     Creatinine   Date Value Ref Range Status   01/20/2025 0.68 0.67 - 1.17 mg/dL Final     GFR Estimate   Date Value Ref Range Status   01/20/2025 >90 >60 mL/min/1.73m2 Final     Comment:     eGFR calculated using 2021 CKD-EPI equation.   01/13/2021 >60 >60 mL/min/1.73m2 Final     GFR, ESTIMATED POCT   Date Value Ref Range Status   11/08/2024 >60 >60 mL/min/1.73m2 Final     Calcium   Date Value Ref Range Status   01/20/2025 7.9 (L) 8.8 - 10.4 mg/dL Final     Comment:     Reference intervals for this test were updated on 7/16/2024 to reflect our healthy population more accurately. There may be differences in the flagging of prior results with similar values performed with this method. Those prior results can be interpreted in the context of the updated reference intervals.       Liver Function Studies -   Recent Labs   Lab Test 01/17/25  0641   PROTTOTAL 5.4*   ALBUMIN 2.2*   BILITOTAL 0.9   ALKPHOS 144   AST 83*   ALT 65       No results found for: \"SED\"    No results found for: \"CRP\"            MICROBIOLOGY DATA:    GPC in blood --> strep int  Gm stain liver pus also heavy GPC, culture same    Susceptibility data from last 90 days.  Collected Specimen Info Organism Cefotaxime Ceftriaxone Clindamycin Meropenem Penicillin Vancomycin   01/16/25 Abscess from Liver Streptococcus intermedius         01/16/25 Abscess from Liver Streptococcus intermedius         01/15/25 Peripheral Blood Streptococcus intermedius  S  S  S  S  S  S   01/15/25 Blood from Line, venous Streptococcus intermedius          "         ASSESSMENT:  Liver abscess, strep intermedius. leukocytosis  Drained. Repeat CT abd today showing anterior abscess (segment 4) that is larger. Previous drain going inferior to this. Discussed with IR who recommends 3rd drain placement. Other 2 sites seem to be improving.  On biologic for skin  Solitary kidney    RECOMMENDATION:  Continue ceftriaxone and po metronidazole   IR re-consulted for drain. Possible U/S guided drain? Made NPO, but had a light breakfast.  Trend wbc   Anticipate 6 weeks iv antibiotics due to multi-loculated abscess    Chon Garcia MD  [facility]  Direct messaging: [facility] Paging   On-Call ID provider: [phone], option: 9       Attestation:  I have reviewed today's Medications, Vital Signs, Imaging, and Labs. Therapy plan discussed with patient.

## 2025-01-21 NOTE — PROGRESS NOTES
[facility]    Medicine Progress Note - Hospitalist Service    Date of Admission:  1/15/2025    Assessment & Plan   Bon Luque is a 69 year old male with past medical history significant for CAD, renal cell carcinoma status post nephrectomy 2017, hypertension, diabetes with neuropathy, HLD, hypogonadism on testosterone, psoriasis, and small bowel obstruction who was admitted on 1/15/2025 due to abdominal pain, diarrhea, nausea, dizziness, generalized weakness, and poor appetite for 11 days PTA. Found to have liver abscess, sepsis, Blood cultures growing strep intermedius.     Sepsis from  Strep intermedius bacteremia  Multiple liver abscesses  Immunocompromised host  -CT 1/13 w/ 3 prominent indeterminate complex hypodense masses in the liver concerning for neoplasm versus infectious process  -MRI 1/15: 3 heterogeneous masses in the liver new from 11/8/2024 concerning for infection, possible rapidly progressive malignancy/metastases  -IR consulted, underwent CT-guided liver drain x 2 1/16  -Repeat CT 01/21: Multiloculated abscess, left hepatic lobe fluid decreased s/p catheter, abscess in hepatic segment 8 decrease in size s/p catheter.  Abscess in hepatic segment 4 increased in size  -Cultures growing strep intermedius  -Discontinued IV vancomycin and Cefepime, now on IV Ceftriaxone and po Flagyl PER id  -IR to place 3rd drain on Thursday 01/23, Eliquis held  -PICC line placed 1/18  -Care manager unable to find home health for outpatient antibiotics, may need to pursue TCU to facilitate parenteral antibiotics recommended by ID    Hyponatremia, improving  - Na 126 on presentation -> 130 -> 132  - TSH normal  - Monitor Na    Acute drop in Hb  - Hb 13.2 on presentation, ~ 11.0  - No evidence of bleeding  - Monitor Hb     Psoriasis  - No obvious flaring lesions, remotely on Skyrizi which has been held recently  - Outpatient follow-up     Lactic acidosis  - Mild on admission at 2.6, normalized  with fluids    Hypophosphatemia  - Monitor and replete as needed     Remote DVT and PE  - Routinely manages on Eliquis - held for drain placement 01/23     Chronic pain  - Pain overall controlled, resume usual MS Contin, gabapentin, duloxetine, oxycodone     History of renal cell carcinoma  - Remote nephrectomy, follow creatinine carefully     Hypogonadism  - Resume usual testosterone supplement at discharge     Insulin-dependent type 2 diabetes mellitus  - Blood sugars acceptable  - A1c 7.9%  - 50 units of Lantus twice daily at home  - Decreased Insulin requirements due to poor appetite  - Lantus 30u in the morning, 25u at bedtime  - sliding scale insulin, will add I:C coverage tomorrow if needed  - High consistent carb diet  - Titrate as needed      - Plan to discharge to TCU for IV abx when ready          Diet: High Consistent Carb (75 g CHO per Meal) Diet  NPO per Anesthesia Guidelines for Procedure/Surgery Except for: Meds    DVT Prophylaxis: DOAC - held  Seals Catheter: Not present  Lines: PRESENT      PICC 01/18/25 Single Lumen Left Basilic Antibiotic-Site Assessment: WDL      Cardiac Monitoring: None  Code Status: Full Code      Clinically Significant Risk Factors         # Hyponatremia: Lowest Na = 132 mmol/L in last 2 days, will monitor as appropriate       # Hypoalbuminemia: Lowest albumin = 2 g/dL at 1/18/2025  6:13 AM, will monitor as appropriate     # Hypertension: Noted on problem list           # DMII: A1C = 7.9 % (Ref range: <5.7 %) within past 6 months   # Overweight: Estimated body mass index is 29.84 kg/m  as calculated from the following:    Height as of this encounter: 1.829 m (6').    Weight as of this encounter: 99.8 kg (220 lb).             Social Drivers of Health    Tobacco Use: Medium Risk (1/14/2025)    Patient History     Smoking Tobacco Use: Former     Smokeless Tobacco Use: Never     Passive Exposure: Past   Physical Activity: Insufficiently Active (8/23/2024)    Exercise Vital Sign      Days of Exercise per Week: 1 day     Minutes of Exercise per Session: 20 min   Social Connections: Unknown (8/23/2024)    Social Connection and Isolation Panel [NHANES]     Frequency of Social Gatherings with Friends and Family: Once a week          Disposition Plan     Medically Ready for Discharge: Anticipated in 2-4 Days             Yeny Sparks MD  Hospitalist Service  [facility]  Securely message with [employer] (more info)  Text page via [employer] Paging/Directory   ______________________________________________________________________    Interval History   Patient was examined at the bedside, comfortably lying in the bed.  States he continues to have discomfort in his abdomen, has nausea and poor appetite.  Decreased insulin doses.  Pain in the foot and swelling has improved, such as remain foot elevated when laying in bed.    Eliquis held, plan for drain placement by IR on 01/23    Physical Exam   Vital Signs: Temp: 97.6  F (36.4  C) Temp src: Oral BP: 136/65 Pulse: 84   Resp: 18 SpO2: 99 % O2 Device: None (Room air)    Weight: 220 lbs 0 oz    Constitutional: awake, alert  Respiratory: No increased work of breathing, good air exchange, clear to auscultation bilaterally, no crackles or wheezing  Cardiovascular: RRR  GI: normal bowel sounds, soft, non-distended, nontender, 2 drain +  Skin: normal skin color, texture, turgor, no rashes and no jaundice  Musculoskeletal: b/l LE swelling in the dorsal foot  Neurologic: Awake, alert, no gross focal abnormalities    Medical Decision Making       55 MINUTES SPENT BY ME on the date of service doing chart review, history, exam, documentation & further activities per the note.      Data     I have personally reviewed the following data over the past 24 hrs:    13.2 (H)  \   11.1 (L)   / 391     N/A N/A N/A /  119 (H)   4.2 N/A N/A \     Procal: N/A CRP: 69.20 (H) Lactic Acid: N/A         Imaging results reviewed over the past 24 hrs:   Recent  Results (from the past 24 hours)   CT Abdomen w Contrast    Narrative    EXAM: CT ABDOMEN W CONTRAST  LOCATION: [facility]  DATE: 1/21/2025    INDICATION: f up liver abscesses  COMPARISON: 1/13/2025  TECHNIQUE: CT scan of the abdomen was performed following injection of IV contrast. Multiplanar reformats were obtained. Dose reduction techniques were used.  CONTRAST: Isovue 370 85ML    FINDINGS:    LOWER CHEST: Small bilateral pleural effusions with adjacent atelectasis.    HEPATOBILIARY: Interval placement of a percutaneous pigtail drainage catheter within a multiloculated left hepatic lobe abscess. The overall abscess size has minimally changed, although individual fluid pockets have decreased in size. An abscess in   hepatic segment 8 has decreased in size status post placement of a percutaneous pigtail drainage catheter. There are small residual fluid pockets. An abscess in hepatic segments 4 has increased in size, measuring up to 5.5 x 4.1 cm, previously 4.6 x 3.5   cm. New foci of gas within the hepatic abscesses, likely from instrumentation. Cholecystectomy.    PANCREAS: Normal.    SPLEEN: Normal.    ADRENAL GLANDS: Normal.    KIDNEYS: Partial right nephrectomy. Unremarkable left kidney.    BOWEL: Unremarkable    LYMPH NODES: Increased abdominal lymphadenopathy, likely reactive.    VASCULATURE: Unremarkable    MUSCULOSKELETAL: Unremarkable      Impression    IMPRESSION:   1.  Fluid within a multiloculated abscess in the left hepatic lobe has decreased status post placement of a percutaneous pigtail drainage catheter, although the overall abscess size has minimally changed. An abscess in hepatic segment 8 has decreased in   size status post placement of a pigtail percutaneous pigtail drainage catheter. An abscess in hepatic segment 4 has increased in size.    2.  Small bilateral pleural effusions with adjacent atelectasis.

## 2025-01-21 NOTE — PROGRESS NOTES
[facility]    Medicine Progress Note - Hospitalist Service    Date of Admission:  1/15/2025    Assessment & Plan   Bon Luque is a 69 year old male with past medical history significant for CAD, renal cell carcinoma status post nephrectomy 2017, hypertension, diabetes with neuropathy, HLD, hypogonadism on testosterone, psoriasis, and small bowel obstruction who was admitted on 1/15/2025 due to abdominal pain, diarrhea, nausea, dizziness, generalized weakness, and poor appetite for 11 days PTA. Found to have liver abscess, sepsis, Blood cultures growing strep intermedius.     Sepsis from  Strep intermedius bacteremia  Multiple liver abscesses  Immunocompromised host  -CT 1/13 w/ 3 prominent indeterminate complex hypodense masses in the liver concerning for neoplasm versus infectious process  -MRI 1/15: 3 heterogeneous masses in the liver new from 11/8/2024 concerning for infection, possible rapidly progressive malignancy/metastases  -IR consulted, underwent CT-guided liver drain x 2 1/16  -Cultures growing strep intermedius  -Discontinued IV vancomycin and Cefepime, now on IV Ceftriaxone and po Flagyl  - Repeat CT 01/20 due to persistent leukocytosis  -PICC line placed 1/18 in anticipation of extended IV antibiotic course  -Defer antibiotic selection to ID, anticipate narrowing soon  -Care manager unable to find home health for outpatient antibiotics, may need to pursue TCU to facilitate parenteral antibiotics recommended by ID    Hyponatremia, improving  - Na 126 on presentation -> 130 -> 132  - TSH normal  - Monitor Na    Acute drop in Hb  - Hb 13.2 on presentation, ~ 11.0  - No evidence of bleeding  - Monitor Hb     Psoriasis  - No obvious flaring lesions, remotely on Skyrizi which has been held recently  - Outpatient follow-up     Lactic acidosis  - Mild on admission at 2.6, normalized with fluids    Hypophosphatemia  - Monitor and replete as needed     Remote DVT and PE  - Routinely  manages on Eliquis, held prior to drain placement, resume 1/17, monitor for bleeding     Chronic pain  - Pain overall controlled, resume usual MS Contin, gabapentin, duloxetine, oxycodone     History of renal cell carcinoma  - Remote nephrectomy, follow creatinine carefully     Hypogonadism  - Resume usual testosterone supplement at discharge     Insulin-dependent type 2 diabetes mellitus  - Blood sugars acceptable  - A1c 7.9%  - 50 units of Lantus twice daily at home  - Initially on 40 units of Lantus twice daily with correctional NovoLog, increased to 45 units but hypoglycemic  - Change Lantus 30u bid, decreased appetite as per patient  - High consistent carb diet  - Titrate as needed    - PT/OT following, rec Home with Home PT/OT          Diet: High Consistent Carb (75 g CHO per Meal) Diet    DVT Prophylaxis: DOAC  Seals Catheter: Not present  Lines: PRESENT      PICC 01/18/25 Single Lumen Left Basilic Antibiotic-Site Assessment: WDL      Cardiac Monitoring: None  Code Status: Full Code      Clinically Significant Risk Factors         # Hyponatremia: Lowest Na = 132 mmol/L in last 2 days, will monitor as appropriate       # Hypoalbuminemia: Lowest albumin = 2 g/dL at 1/18/2025  6:13 AM, will monitor as appropriate     # Hypertension: Noted on problem list           # DMII: A1C = 7.9 % (Ref range: <5.7 %) within past 6 months   # Overweight: Estimated body mass index is 29.84 kg/m  as calculated from the following:    Height as of this encounter: 1.829 m (6').    Weight as of this encounter: 99.8 kg (220 lb).             Social Drivers of Health    Tobacco Use: Medium Risk (1/14/2025)    Patient History     Smoking Tobacco Use: Former     Smokeless Tobacco Use: Never     Passive Exposure: Past   Physical Activity: Insufficiently Active (8/23/2024)    Exercise Vital Sign     Days of Exercise per Week: 1 day     Minutes of Exercise per Session: 20 min   Social Connections: Unknown (8/23/2024)    Social Connection  and Isolation Panel [NHANES]     Frequency of Social Gatherings with Friends and Family: Once a week          Disposition Plan     Medically Ready for Discharge: Anticipated in 2-4 Days             Yeny Sparks MD  Hospitalist Service  [facility]  Securely message with [employer] (more info)  Text page via [employer] Paging/Directory   ______________________________________________________________________    Interval History   Patient was examined at the bedside along with wife Arden and daughter present.  Continues to have abdominal discomfort and nausea.  Pending CT abdomen today  Also complaining of pain in bilateral lower extremity left more than right, pain in the dorsal area of foot, swollen.  No signs of infection.  Advised to elevate the bed and will do trial of Lasix      Physical Exam   Vital Signs: Temp: 98.2  F (36.8  C) Temp src: Oral BP: (!) 143/71 Pulse: 79   Resp: 16 SpO2: 98 % O2 Device: None (Room air)    Weight: 220 lbs 0 oz    Constitutional: awake, alert  Respiratory: No increased work of breathing, good air exchange, clear to auscultation bilaterally, no crackles or wheezing  Cardiovascular: RRR  GI: normal bowel sounds, soft, non-distended, non-tender, 2 drain +  Skin: normal skin color, texture, turgor, no rashes and no jaundice  Musculoskeletal: b/l LE swelling in the dorsal foot  Neurologic: Awake, alert, no gross focal abnormalities    Medical Decision Making       50 MINUTES SPENT BY ME on the date of service doing chart review, history, exam, documentation & further activities per the note.      Data     I have personally reviewed the following data over the past 24 hrs:    14.4 (H)  \   11.3 (L)   / 352     132 (L) 99 6.8 (L) /  178 (H)   4.5 27 0.68 \       Imaging results reviewed over the past 24 hrs:   No results found for this or any previous visit (from the past 24 hours).

## 2025-01-21 NOTE — PLAN OF CARE
Problem: Adult Inpatient Plan of Care  Goal: Optimal Comfort and Wellbeing  Intervention: Monitor Pain and Promote Comfort  Recent Flowsheet Documentation  Taken 1/20/2025 1845 by Mack Molina, RN  Pain Management Interventions:   breathing exercises   care clustered   distraction   emotional support   pillow support provided   quiet environment facilitated   relaxation techniques promoted   repositioned   rest   therapeutic presence  Taken 1/20/2025 1745 by Mack Molina, RN  Pain Management Interventions:   medication (see MAR)   breathing exercises   care clustered   distraction   emotional support   pillow support provided   quiet environment facilitated   relaxation techniques promoted   repositioned   rest   therapeutic presence  Taken 1/20/2025 1000 by Mack Molina, RN  Pain Management Interventions:   medication (see MAR)   breathing exercises   care clustered   distraction   emotional support   pillow support provided   quiet environment facilitated   relaxation techniques promoted   repositioned   rest   therapeutic presence     Problem: Infection  Goal: Absence of Infection Signs and Symptoms  Outcome: Progressing   Goal Outcome Evaluation:       VSS on RA. A&Ox4, but forgetful, lethargic. Abd pain managed w/ sched PTA morphine. Up w/ 1 & W. DAMI drains both flushing w/o issue, irrigant subtracted from total OP, serous in color. IV abx given per order. Up to chair for meals. L PICC line w/ good blood return in lumen. Drain teaching continued w/ patient regarding flushing, emptying, & maintenance. Abd CT scan ordered, pending CT availability. Serum K OK, phos replaced per protocol. Spouse visiting at bedside.

## 2025-01-21 NOTE — PLAN OF CARE
Problem: Pain Acute  Goal: Optimal Pain Control and Function  Intervention: Develop Pain Management Plan  Recent Flowsheet Documentation  Taken 1/20/2025 1845 by Mack Molina RN  Pain Management Interventions:   breathing exercises   care clustered   distraction   emotional support   pillow support provided   quiet environment facilitated   relaxation techniques promoted   repositioned   rest   therapeutic presence  Taken 1/20/2025 1745 by Mack Molina, RN  Pain Management Interventions:   medication (see MAR)   breathing exercises   care clustered   distraction   emotional support   pillow support provided   quiet environment facilitated   relaxation techniques promoted   repositioned   rest   therapeutic presence  Taken 1/20/2025 1630 by Mack Molina, RN  Pain Management Interventions:   breathing exercises   care clustered   distraction   emotional support   pillow support provided   quiet environment facilitated   relaxation techniques promoted   repositioned   rest   therapeutic presence  Taken 1/20/2025 1000 by Mack Molina RN  Pain Management Interventions:   medication (see MAR)   breathing exercises   care clustered   distraction   emotional support   pillow support provided   quiet environment facilitated   relaxation techniques promoted   repositioned   rest   therapeutic presence     Problem: Infection  Goal: Absence of Infection Signs and Symptoms  1/20/2025 1946 by Mack Molina, RN  Outcome: Progressing  1/20/2025 1434 by Mack Molina RN  Outcome: Progressing   Goal Outcome Evaluation:       VSS on RA. A&Ox4, but forgetful, lethargic. Bilat foot pain managed w/ sched PTA morphine & PRN tylenol. Up w/ 1 & W. DAMI drains both flushing w/o issue, irrigant subtracted from total OP, serous in color. L PICC line w/ good blood return in lumen. Had a BM. Drain teaching continued w/ patient regarding flushing, emptying, & maintenance.

## 2025-01-22 ENCOUNTER — APPOINTMENT (OUTPATIENT)
Dept: OCCUPATIONAL THERAPY | Facility: HOSPITAL | Age: 70
End: 2025-01-22
Attending: INTERNAL MEDICINE
Payer: COMMERCIAL

## 2025-01-22 LAB
ANION GAP SERPL CALCULATED.3IONS-SCNC: 7 MMOL/L (ref 7–15)
BUN SERPL-MCNC: 6.3 MG/DL (ref 8–23)
CALCIUM SERPL-MCNC: 7.6 MG/DL (ref 8.8–10.4)
CHLORIDE SERPL-SCNC: 102 MMOL/L (ref 98–107)
CREAT SERPL-MCNC: 0.72 MG/DL (ref 0.67–1.17)
EGFRCR SERPLBLD CKD-EPI 2021: >90 ML/MIN/1.73M2
ERYTHROCYTE [DISTWIDTH] IN BLOOD BY AUTOMATED COUNT: 13.6 % (ref 10–15)
GLUCOSE BLDC GLUCOMTR-MCNC: 106 MG/DL (ref 70–99)
GLUCOSE BLDC GLUCOMTR-MCNC: 114 MG/DL (ref 70–99)
GLUCOSE BLDC GLUCOMTR-MCNC: 156 MG/DL (ref 70–99)
GLUCOSE BLDC GLUCOMTR-MCNC: 294 MG/DL (ref 70–99)
GLUCOSE BLDC GLUCOMTR-MCNC: 307 MG/DL (ref 70–99)
GLUCOSE SERPL-MCNC: 76 MG/DL (ref 70–99)
HCO3 SERPL-SCNC: 27 MMOL/L (ref 22–29)
HCT VFR BLD AUTO: 30.7 % (ref 40–53)
HGB BLD-MCNC: 10.4 G/DL (ref 13.3–17.7)
MCH RBC QN AUTO: 28.7 PG (ref 26.5–33)
MCHC RBC AUTO-ENTMCNC: 33.9 G/DL (ref 31.5–36.5)
MCV RBC AUTO: 85 FL (ref 78–100)
PHOSPHATE SERPL-MCNC: 2.8 MG/DL (ref 2.5–4.5)
PLATELET # BLD AUTO: 384 10E3/UL (ref 150–450)
POTASSIUM SERPL-SCNC: 3.9 MMOL/L (ref 3.4–5.3)
RBC # BLD AUTO: 3.62 10E6/UL (ref 4.4–5.9)
SODIUM SERPL-SCNC: 136 MMOL/L (ref 135–145)
WBC # BLD AUTO: 11.7 10E3/UL (ref 4–11)

## 2025-01-22 PROCEDURE — 250N000013 HC RX MED GY IP 250 OP 250 PS 637: Performed by: HOSPITALIST

## 2025-01-22 PROCEDURE — 250N000011 HC RX IP 250 OP 636: Performed by: INTERNAL MEDICINE

## 2025-01-22 PROCEDURE — 120N000001 HC R&B MED SURG/OB

## 2025-01-22 PROCEDURE — 250N000013 HC RX MED GY IP 250 OP 250 PS 637: Performed by: INTERNAL MEDICINE

## 2025-01-22 PROCEDURE — 85041 AUTOMATED RBC COUNT: CPT | Performed by: STUDENT IN AN ORGANIZED HEALTH CARE EDUCATION/TRAINING PROGRAM

## 2025-01-22 PROCEDURE — 97535 SELF CARE MNGMENT TRAINING: CPT | Mod: GO

## 2025-01-22 PROCEDURE — 97110 THERAPEUTIC EXERCISES: CPT | Mod: GO

## 2025-01-22 PROCEDURE — 84100 ASSAY OF PHOSPHORUS: CPT | Performed by: STUDENT IN AN ORGANIZED HEALTH CARE EDUCATION/TRAINING PROGRAM

## 2025-01-22 PROCEDURE — 99232 SBSQ HOSP IP/OBS MODERATE 35: CPT | Performed by: INTERNAL MEDICINE

## 2025-01-22 PROCEDURE — 82310 ASSAY OF CALCIUM: CPT | Performed by: STUDENT IN AN ORGANIZED HEALTH CARE EDUCATION/TRAINING PROGRAM

## 2025-01-22 PROCEDURE — 82374 ASSAY BLOOD CARBON DIOXIDE: CPT | Performed by: STUDENT IN AN ORGANIZED HEALTH CARE EDUCATION/TRAINING PROGRAM

## 2025-01-22 PROCEDURE — 99232 SBSQ HOSP IP/OBS MODERATE 35: CPT | Performed by: STUDENT IN AN ORGANIZED HEALTH CARE EDUCATION/TRAINING PROGRAM

## 2025-01-22 PROCEDURE — 97530 THERAPEUTIC ACTIVITIES: CPT | Mod: GO

## 2025-01-22 PROCEDURE — 80048 BASIC METABOLIC PNL TOTAL CA: CPT | Performed by: STUDENT IN AN ORGANIZED HEALTH CARE EDUCATION/TRAINING PROGRAM

## 2025-01-22 PROCEDURE — 85014 HEMATOCRIT: CPT | Performed by: STUDENT IN AN ORGANIZED HEALTH CARE EDUCATION/TRAINING PROGRAM

## 2025-01-22 RX ADMIN — MORPHINE SULFATE 15 MG: 15 TABLET, FILM COATED, EXTENDED RELEASE ORAL at 21:36

## 2025-01-22 RX ADMIN — MORPHINE SULFATE 15 MG: 15 TABLET, FILM COATED, EXTENDED RELEASE ORAL at 08:14

## 2025-01-22 RX ADMIN — GABAPENTIN 600 MG: 300 CAPSULE ORAL at 08:14

## 2025-01-22 RX ADMIN — CEFTRIAXONE SODIUM 2 G: 2 INJECTION, POWDER, FOR SOLUTION INTRAMUSCULAR; INTRAVENOUS at 08:14

## 2025-01-22 RX ADMIN — METRONIDAZOLE 500 MG: 500 TABLET ORAL at 08:15

## 2025-01-22 RX ADMIN — METRONIDAZOLE 500 MG: 500 TABLET ORAL at 21:36

## 2025-01-22 RX ADMIN — GABAPENTIN 600 MG: 300 CAPSULE ORAL at 21:36

## 2025-01-22 RX ADMIN — INSULIN ASPART 4 UNITS: 100 INJECTION, SOLUTION INTRAVENOUS; SUBCUTANEOUS at 17:07

## 2025-01-22 RX ADMIN — GABAPENTIN 600 MG: 300 CAPSULE ORAL at 14:09

## 2025-01-22 RX ADMIN — MORPHINE SULFATE 15 MG: 15 TABLET, FILM COATED, EXTENDED RELEASE ORAL at 14:09

## 2025-01-22 RX ADMIN — PANTOPRAZOLE SODIUM 40 MG: 40 TABLET, DELAYED RELEASE ORAL at 06:34

## 2025-01-22 RX ADMIN — METRONIDAZOLE 500 MG: 500 TABLET ORAL at 14:09

## 2025-01-22 RX ADMIN — DULOXETINE HYDROCHLORIDE 60 MG: 60 CAPSULE, DELAYED RELEASE ORAL at 08:15

## 2025-01-22 RX ADMIN — INSULIN ASPART 1 UNITS: 100 INJECTION, SOLUTION INTRAVENOUS; SUBCUTANEOUS at 12:21

## 2025-01-22 ASSESSMENT — ACTIVITIES OF DAILY LIVING (ADL)
ADLS_ACUITY_SCORE: 65
ADLS_ACUITY_SCORE: 62
ADLS_ACUITY_SCORE: 65
ADLS_ACUITY_SCORE: 62
ADLS_ACUITY_SCORE: 65

## 2025-01-22 NOTE — PROGRESS NOTES
"[facility] Progress Note    SUBJECTIVE:    Felt well until breakfast. Nausea and diarrhea afterwards. No fevers.    Family at bedside    OBJECTIVE:  /60 (BP Location: Right arm)   Pulse 77   Temp 97.6  F (36.4  C) (Oral)   Resp 20   Ht 1.829 m (6')   Wt 93.8 kg (206 lb 12.8 oz)   SpO2 97%   BMI 28.05 kg/m        PHYSICAL EXAM:  Alert, awake  Vitals tabulated above, reviewed  Sclera normal color, not injected  CARDIOVASCULAR regular rate and rhythm, no murmur  Lungs CLEAR TO AUSCULTATION   Abdomen soft, NT/ND; 2 drains in the liver abscess, milky output  Skin normal  Joints normal  Neurologic exam non focal    PICC, left arm     Antibiotics:  Ceftriaxone 1/20-  Metronidazole     Pertinent labs:    Recent Labs   Lab Test 01/22/25  0635 01/21/25  0526 01/20/25  0757   WBC 11.7* 13.2* 14.4*   HGB 10.4* 11.1* 11.3*   HCT 30.7* 33.2* 34.2*   MCV 85 84 84    391 352       Lab Results   Component Value Date    RBC 3.74 01/17/2025     Lab Results   Component Value Date    HGB 10.5 01/17/2025     Lab Results   Component Value Date    HCT 30.6 01/17/2025     No components found for: \"MCT\"  Lab Results   Component Value Date    MCV 82 01/17/2025     Lab Results   Component Value Date    MCH 28.1 01/17/2025     Lab Results   Component Value Date    MCHC 34.3 01/17/2025     Lab Results   Component Value Date    RDW 13.1 01/17/2025     Lab Results   Component Value Date     01/17/2025       Last Comprehensive Metabolic Panel:  Sodium   Date Value Ref Range Status   01/22/2025 136 135 - 145 mmol/L Final     Potassium   Date Value Ref Range Status   01/22/2025 3.9 3.4 - 5.3 mmol/L Final   07/13/2022 4.2 3.5 - 5.0 mmol/L Final     Chloride   Date Value Ref Range Status   01/22/2025 102 98 - 107 mmol/L Final   07/13/2022 104 98 - 107 mmol/L Final     Carbon Dioxide (CO2)   Date Value Ref Range Status   01/22/2025 27 22 - 29 mmol/L Final   07/13/2022 22 22 - 31 mmol/L Final     Anion Gap " "  Date Value Ref Range Status   01/22/2025 7 7 - 15 mmol/L Final   07/13/2022 8 5 - 18 mmol/L Final     Glucose   Date Value Ref Range Status   01/22/2025 76 70 - 99 mg/dL Final   07/13/2022 286 (H) 70 - 125 mg/dL Final     GLUCOSE BY METER POCT   Date Value Ref Range Status   01/22/2025 156 (H) 70 - 99 mg/dL Final     Urea Nitrogen   Date Value Ref Range Status   01/22/2025 6.3 (L) 8.0 - 23.0 mg/dL Final   07/13/2022 17 8 - 22 mg/dL Final     Creatinine   Date Value Ref Range Status   01/22/2025 0.72 0.67 - 1.17 mg/dL Final     GFR Estimate   Date Value Ref Range Status   01/22/2025 >90 >60 mL/min/1.73m2 Final     Comment:     eGFR calculated using 2021 CKD-EPI equation.   01/13/2021 >60 >60 mL/min/1.73m2 Final     GFR, ESTIMATED POCT   Date Value Ref Range Status   11/08/2024 >60 >60 mL/min/1.73m2 Final     Calcium   Date Value Ref Range Status   01/22/2025 7.6 (L) 8.8 - 10.4 mg/dL Final     Comment:     Reference intervals for this test were updated on 7/16/2024 to reflect our healthy population more accurately. There may be differences in the flagging of prior results with similar values performed with this method. Those prior results can be interpreted in the context of the updated reference intervals.       Liver Function Studies -   Recent Labs   Lab Test 01/17/25  0641   PROTTOTAL 5.4*   ALBUMIN 2.2*   BILITOTAL 0.9   ALKPHOS 144   AST 83*   ALT 65       No results found for: \"SED\"    No results found for: \"CRP\"            MICROBIOLOGY DATA:    GPC in blood --> strep int  Gm stain liver pus also heavy GPC, culture same    Susceptibility data from last 90 days.  Collected Specimen Info Organism Cefotaxime Ceftriaxone Clindamycin Meropenem Penicillin Vancomycin   01/16/25 Abscess from Liver Streptococcus intermedius         01/16/25 Abscess from Liver Streptococcus intermedius         01/15/25 Peripheral Blood Streptococcus intermedius  S  S  S  S  S  S   01/15/25 Blood from Line, venous Streptococcus " intermedius                  ASSESSMENT:  Liver abscess, strep intermedius. leukocytosis  Drained. Repeat CT abd today showing anterior abscess (segment 4) that is larger. Previous drain going inferior to this. Discussed with IR who recommends 3rd drain placement. Other 2 sites seem to be improving.  On biologic for skin  Solitary kidney    RECOMMENDATION:  Continue ceftriaxone and po metronidazole   Recommend 3rd drain as opposed to aspiration alone to ensure source control  Plan for IR procedure tomorrow - postponed due to anticoagulation  Trend wbc - improving  Anticipate 6 weeks iv antibiotics due to multi-loculated abscess    Chon Garcia MD  [facility]  Direct messaging: [facility] Paging   On-Call ID provider: [phone], option: [phone]       Attestation:  I have reviewed today's Medications, Vital Signs, and Labs. Therapy plan discussed with patient.

## 2025-01-22 NOTE — PROGRESS NOTES
"CLINICAL NUTRITION SERVICES - ASSESSMENT NOTE    RECOMMENDATIONS FOR MDs/PROVIDERS TO ORDER:  Continue to adjust insulin regimen as needed with hyperglycemia     Malnutrition Status:    Moderate malnutrition in the context of acute illness or injury    Registered Dietitian Interventions:  New weight   Glucerna BID, vary flavors     Future/Additional Recommendations:  Monitor po, supplement hetal, weight, labs, I/Os.      REASON FOR ASSESSMENT  LOS    HPI: Pt with PMH significant for CAD, renal cell carcinoma status post nephrectomy 2017, hypertension, diabetes with neuropathy, HLD, hypogonadism on testosterone, psoriasis, and small bowel obstruction who was admitted on 1/15/2025 due to abdominal pain, diarrhea, nausea, dizziness, generalized weakness, and poor appetite for 11 days PTA. Found to have liver abscess, sepsis, Blood cultures growing strep intermedius.     SUBJECTIVE INFORMATION  Assessed patient in room.    NUTRITION HISTORY  Pt reports poor po intakes and appetite this admit, states he has been eating less than baseline. Baseline pt consumes 2 meals/day + snacks PRN. Pt unsure of current weight or any weight changes, current weight is trending down. Pt reports not feeling well following breakfast this AM, nausea and abd pain noted. Pt interested in nutrition supplements, no flavor preferences.     CURRENT NUTRITION ORDERS  Diet: High Consistent Carbohydrate    CURRENT INTAKE/TOLERANCE  1/16 25% po x1 meal   1/17-18 x2-3 meals ordered, po intake not documented   1/19 25% dinner, po at breakfast and lunch not documented   1/20 75% x2 meals   1/21 75% breakfast and dinner, po at lunch not documented   Pt meeting <75% estimated nutrition needs     LABS  Nutrition-relevant labs: BUN 6.3(L), BG  last 24 hrs     MEDICATIONS  Nutrition-relevant medications: Scheduled rocephin, novolog, lantus, protonix    ANTHROPOMETRICS  Height: 182.9 cm (6' 0\")  Most Recent Weight: 93.8 kg (206 lb 12.8 oz)  IBW: 80.9 " kg  % IBW: 116%  BMI (kg/m ): Overweight BMI 25-29.9  Weight History: 7.7% wt loss <1 month and x5 months   Wt Readings from Last 10 Encounters:   01/22/25 93.8 kg (206 lb 12.8 oz)   01/13/25 99.8 kg (220 lb)   01/02/25 101.4 kg (223 lb 9.6 oz)   08/23/24 101.6 kg (223 lb 14.4 oz)   06/25/24 100.3 kg (221 lb 3.2 oz)   05/21/24 98.5 kg (217 lb 3.2 oz)   05/09/24 98 kg (216 lb)   03/28/24 94.3 kg (208 lb)   02/27/24 97.3 kg (214 lb 8 oz)   01/30/24 95 kg (209 lb 8 oz)      Dosing Weight: 93.8 kg, based on actual wt for kcal, 80.9 kg IBW for protein/fluid    ASSESSED NUTRITION NEEDS  Estimated Energy Needs: 3991-1218+ kcals/day ([place] Jeor x 1-1.2+) REE: 1756  Justification: Maintenance  Estimated Protein Needs: 80-97 grams protein/day (1 - 1.2 grams of pro/kg)  Justification: Increased needs  Estimated Fluid Needs: 1861-4584 mL/day (25 - 30 mL/kg)  Justification: Maintenance    SYSTEM FINDINGS    Abd pain, nausea   BM: last noted x3 1/20   Medial abd drain, LUQ drain, OP: 40 ml 1/21, 30 ml this AM    MALNUTRITION  % Intake: < 75% for > 7 days (moderate)  % Weight Loss: > 5% in 1 month (severe)   Subcutaneous Fat Loss: None observed  Muscle Loss: None observed  Fluid Accumulation/Edema: None noted  Malnutrition Diagnosis: Moderate malnutrition in the context of acute illness or injury  Malnutrition Present on Admission: Yes    NUTRITION DIAGNOSIS  Malnutrition (undernutrition) related to acute illness as evidenced by poor oral intakes, 7.7% wt loss <1 month.     INTERVENTIONS  New weight   Glucerna BID, vary flavors     Recommend continue to adjust insulin regimen as needed with hyperglycemia     Goals  Meet >75% nutrition needs  Maintain weight   BG <180     Monitoring/Evaluation  Progress toward goals will be monitored and evaluated per policy.

## 2025-01-22 NOTE — PLAN OF CARE
Problem: Pain Acute  Goal: Optimal Pain Control and Function  Outcome: Progressing  Intervention: Prevent or Manage Pain  Recent Flowsheet Documentation  Taken 1/22/2025 0800 by Fidelia Castellanos RN  Medication Review/Management: medications reviewed     Problem: Comorbidity Management  Goal: Blood Glucose Levels Within Targeted Range  Outcome: Progressing  Intervention: Monitor and Manage Glycemia  Recent Flowsheet Documentation  Taken 1/22/2025 0800 by Fidelia Castellanos RN  Medication Review/Management: medications reviewed   Goal Outcome Evaluation:         Pt's pain managed with scheduled MS CONTIN and Neurontin with some relief. Pt up to bathroom with SBA. Pt tolerating diet and cooperating with fluid restrictions per order. Pt will be NPO tonight for possible third J.P placement tomorrow. Family visited at bedside.

## 2025-01-22 NOTE — PLAN OF CARE
Goal Outcome Evaluation:    Problem: Comorbidity Management  Goal: Blood Glucose Levels Within Targeted Range  Outcome: Progressing     Problem: Pain Acute  Goal: Optimal Pain Control and Function  Outcome: Progressing        Pt AO, VSS on RA, moderate pain controlled with scheduled medications. Drains with tan output, flushed and patent. Adequate UO. Pt calls appropriately.

## 2025-01-22 NOTE — PROGRESS NOTES
[facility]    Medicine Progress Note - Hospitalist Service    Date of Admission:  1/15/2025    Assessment & Plan   Bon Luque is a 69 year old male with past medical history significant for CAD, renal cell carcinoma status post nephrectomy 2017, hypertension, diabetes with neuropathy, HLD, hypogonadism on testosterone, psoriasis, and small bowel obstruction who was admitted on 1/15/2025 due to abdominal pain, diarrhea, nausea, dizziness, generalized weakness, and poor appetite for 11 days PTA. Found to have liver abscess, sepsis, Blood cultures growing strep intermedius.     Sepsis from  Strep intermedius bacteremia  Multiple liver abscesses  Immunocompromised host  -CT 1/13 w/ 3 prominent indeterminate complex hypodense masses in the liver concerning for neoplasm versus infectious process  -MRI 1/15: 3 heterogeneous masses in the liver new from 11/8/2024 concerning for infection, possible rapidly progressive malignancy/metastases  -IR consulted, underwent CT-guided liver drain x 2 1/16  -Repeat CT 01/21: Multiloculated abscess, left hepatic lobe fluid decreased s/p catheter, abscess in hepatic segment 8 decrease in size s/p catheter.  Abscess in hepatic segment 4 increased in size  -Cultures growing strep intermedius  -Discontinued IV vancomycin and Cefepime, now on IV Ceftriaxone and po Flagyl per ID  -IR to place 3rd drain on Thursday 01/23, Eliquis held  - NPO PM  -PICC line placed 1/18  -Care manager unable to find home health for outpatient antibiotics, may need to pursue TCU to facilitate parenteral antibiotics recommended by ID    Hyponatremia, resolved  - Na 126 on presentation -> 130 -> 132 -> 136  - TSH normal  - Monitor Na    Acute drop in Hb  - Hb 13.2 on presentation, ~ 10.4  - No evidence of bleeding, suspect 2/2 sepsis  - Monitor Hb     Psoriasis  - No obvious flaring lesions, remotely on Skyrizi which has been held recently  - Outpatient follow-up     Lactic acidosis  -  Mild on admission at 2.6, normalized with fluids    Hypophosphatemia - resolved  - Monitor and replete as needed     Remote DVT and PE  - Routinely manages on Eliquis - held for drain placement 01/23     Chronic pain  - Pain overall controlled, resume usual MS Contin, gabapentin, duloxetine, oxycodone     History of renal cell carcinoma  - Remote nephrectomy, follow creatinine carefully     Hypogonadism  - Resume usual testosterone supplement at discharge     Insulin-dependent type 2 diabetes mellitus  - Blood sugars acceptable  - A1c 7.9%  - 50 units of Lantus twice daily at home  - Decreased Insulin requirements due to poor appetite/nausea  - Decreased to Lantus 30u in the morning, 22u at bedtime. Hold AM Lantus due to NPO status tomorrow  - sliding scale insulin  - High consistent carb diet  - Titrate as needed    Moderate Malnutrition  - Seen by RD      - Plan to discharge to TCU for IV abx when ready          Diet: High Consistent Carb (75 g CHO per Meal) Diet  NPO per Anesthesia Guidelines for Procedure/Surgery Except for: Meds  Snacks/Supplements Adult: Glucerna; With Meals    DVT Prophylaxis: DOAC - held  Seals Catheter: Not present  Lines: PRESENT      PICC 01/18/25 Single Lumen Left Basilic Antibiotic-Site Assessment: WDL      Cardiac Monitoring: None  Code Status: Full Code      Clinically Significant Risk Factors               # Hypoalbuminemia: Lowest albumin = 2 g/dL at 1/18/2025  6:13 AM, will monitor as appropriate     # Hypertension: Noted on problem list           # DMII: A1C = 7.9 % (Ref range: <5.7 %) within past 6 months   # Overweight: Estimated body mass index is 28.05 kg/m  as calculated from the following:    Height as of this encounter: 1.829 m (6').    Weight as of this encounter: 93.8 kg (206 lb 12.8 oz).   # Moderate Malnutrition: based on nutrition assessment           Social Drivers of Health    Tobacco Use: Medium Risk (1/14/2025)    Patient History     Smoking Tobacco Use: Former      Smokeless Tobacco Use: Never     Passive Exposure: Past   Physical Activity: Insufficiently Active (8/23/2024)    Exercise Vital Sign     Days of Exercise per Week: 1 day     Minutes of Exercise per Session: 20 min   Social Connections: Unknown (8/23/2024)    Social Connection and Isolation Panel [NHANES]     Frequency of Social Gatherings with Friends and Family: Once a week          Disposition Plan     Medically Ready for Discharge: Anticipated in 2-4 Days             Yeny Sparks MD  Hospitalist Service  [facility]  Securely message with DATY (more info)  Text page via [employer] Paging/Directory   ______________________________________________________________________    Interval History   Patient was examined at the bedside, wife and daughter present.  States his appetite is poor and had nausea.    Plan for third drain tomorrow by IR    Physical Exam   Vital Signs: Temp: 98.1  F (36.7  C) Temp src: Oral BP: 139/64 Pulse: 84   Resp: 18 SpO2: 99 % O2 Device: None (Room air)    Weight: 206 lbs 12.8 oz    Constitutional: awake, alert  Respiratory: No increased work of breathing, good air exchange, clear to auscultation bilaterally, no crackles or wheezing  Cardiovascular: RRR  GI: normal bowel sounds, soft, non-distended, nontender, 2 drain +  Skin: normal skin color, texture, turgor, no rashes and no jaundice  Musculoskeletal: b/l LE swelling in the dorsal foot  Neurologic: Awake, alert, no gross focal abnormalities    Medical Decision Making       45 MINUTES SPENT BY ME on the date of service doing chart review, history, exam, documentation & further activities per the note.      Data     I have personally reviewed the following data over the past 24 hrs:    11.7 (H)  \   10.4 (L)   / 384     136 102 6.3 (L) /  156 (H)   3.9 27 0.72 \       Imaging results reviewed over the past 24 hrs:   No results found for this or any previous visit (from the past 24 hours).

## 2025-01-23 ENCOUNTER — APPOINTMENT (OUTPATIENT)
Dept: CT IMAGING | Facility: HOSPITAL | Age: 70
End: 2025-01-23
Attending: RADIOLOGY
Payer: COMMERCIAL

## 2025-01-23 VITALS
HEIGHT: 72 IN | DIASTOLIC BLOOD PRESSURE: 80 MMHG | OXYGEN SATURATION: 94 % | HEART RATE: 95 BPM | SYSTOLIC BLOOD PRESSURE: 158 MMHG | WEIGHT: 206.8 LBS | RESPIRATION RATE: 18 BRPM | TEMPERATURE: 98.3 F | BODY MASS INDEX: 28.01 KG/M2

## 2025-01-23 LAB
ANION GAP SERPL CALCULATED.3IONS-SCNC: 6 MMOL/L (ref 7–15)
BACTERIA ABSC ANAEROBE+AEROBE CULT: NORMAL
BACTERIA ABSC ANAEROBE+AEROBE CULT: NORMAL
BUN SERPL-MCNC: 8.5 MG/DL (ref 8–23)
CALCIUM SERPL-MCNC: 8.4 MG/DL (ref 8.8–10.4)
CHLORIDE SERPL-SCNC: 102 MMOL/L (ref 98–107)
CREAT SERPL-MCNC: 0.75 MG/DL (ref 0.67–1.17)
EGFRCR SERPLBLD CKD-EPI 2021: >90 ML/MIN/1.73M2
ERYTHROCYTE [DISTWIDTH] IN BLOOD BY AUTOMATED COUNT: 13.9 % (ref 10–15)
GLUCOSE BLDC GLUCOMTR-MCNC: 152 MG/DL (ref 70–99)
GLUCOSE BLDC GLUCOMTR-MCNC: 152 MG/DL (ref 70–99)
GLUCOSE BLDC GLUCOMTR-MCNC: 186 MG/DL (ref 70–99)
GLUCOSE BLDC GLUCOMTR-MCNC: 286 MG/DL (ref 70–99)
GLUCOSE BLDC GLUCOMTR-MCNC: 287 MG/DL (ref 70–99)
GLUCOSE SERPL-MCNC: 168 MG/DL (ref 70–99)
HCO3 SERPL-SCNC: 29 MMOL/L (ref 22–29)
HCT VFR BLD AUTO: 34.6 % (ref 40–53)
HGB BLD-MCNC: 11 G/DL (ref 13.3–17.7)
MCH RBC QN AUTO: 27.3 PG (ref 26.5–33)
MCHC RBC AUTO-ENTMCNC: 31.8 G/DL (ref 31.5–36.5)
MCV RBC AUTO: 86 FL (ref 78–100)
PHOSPHATE SERPL-MCNC: 3 MG/DL (ref 2.5–4.5)
PLATELET # BLD AUTO: 397 10E3/UL (ref 150–450)
POTASSIUM SERPL-SCNC: 4.7 MMOL/L (ref 3.4–5.3)
RBC # BLD AUTO: 4.03 10E6/UL (ref 4.4–5.9)
SODIUM SERPL-SCNC: 137 MMOL/L (ref 135–145)
WBC # BLD AUTO: 9.7 10E3/UL (ref 4–11)

## 2025-01-23 PROCEDURE — 250N000013 HC RX MED GY IP 250 OP 250 PS 637: Performed by: HOSPITALIST

## 2025-01-23 PROCEDURE — 85027 COMPLETE CBC AUTOMATED: CPT | Performed by: STUDENT IN AN ORGANIZED HEALTH CARE EDUCATION/TRAINING PROGRAM

## 2025-01-23 PROCEDURE — 99232 SBSQ HOSP IP/OBS MODERATE 35: CPT | Performed by: INTERNAL MEDICINE

## 2025-01-23 PROCEDURE — 250N000013 HC RX MED GY IP 250 OP 250 PS 637: Performed by: INTERNAL MEDICINE

## 2025-01-23 PROCEDURE — C1729 CATH, DRAINAGE: HCPCS

## 2025-01-23 PROCEDURE — 250N000011 HC RX IP 250 OP 636: Performed by: INTERNAL MEDICINE

## 2025-01-23 PROCEDURE — 99232 SBSQ HOSP IP/OBS MODERATE 35: CPT | Performed by: STUDENT IN AN ORGANIZED HEALTH CARE EDUCATION/TRAINING PROGRAM

## 2025-01-23 PROCEDURE — 84100 ASSAY OF PHOSPHORUS: CPT | Performed by: STUDENT IN AN ORGANIZED HEALTH CARE EDUCATION/TRAINING PROGRAM

## 2025-01-23 PROCEDURE — 0F9030Z DRAINAGE OF LIVER WITH DRAINAGE DEVICE, PERCUTANEOUS APPROACH: ICD-10-PCS

## 2025-01-23 PROCEDURE — 84520 ASSAY OF UREA NITROGEN: CPT | Performed by: STUDENT IN AN ORGANIZED HEALTH CARE EDUCATION/TRAINING PROGRAM

## 2025-01-23 PROCEDURE — 120N000001 HC R&B MED SURG/OB

## 2025-01-23 PROCEDURE — 272N000431 CT LIVER ABSCESS DRAIN W CATH PLACE

## 2025-01-23 PROCEDURE — 82310 ASSAY OF CALCIUM: CPT | Performed by: STUDENT IN AN ORGANIZED HEALTH CARE EDUCATION/TRAINING PROGRAM

## 2025-01-23 PROCEDURE — 250N000011 HC RX IP 250 OP 636: Performed by: RADIOLOGY

## 2025-01-23 RX ORDER — NALOXONE HYDROCHLORIDE 0.4 MG/ML
0.4 INJECTION, SOLUTION INTRAMUSCULAR; INTRAVENOUS; SUBCUTANEOUS
Status: DISCONTINUED | OUTPATIENT
Start: 2025-01-23 | End: 2025-01-23 | Stop reason: HOSPADM

## 2025-01-23 RX ORDER — NALOXONE HYDROCHLORIDE 0.4 MG/ML
0.2 INJECTION, SOLUTION INTRAMUSCULAR; INTRAVENOUS; SUBCUTANEOUS
Status: DISCONTINUED | OUTPATIENT
Start: 2025-01-23 | End: 2025-01-23 | Stop reason: HOSPADM

## 2025-01-23 RX ORDER — FLUMAZENIL 0.1 MG/ML
0.2 INJECTION, SOLUTION INTRAVENOUS
Status: DISCONTINUED | OUTPATIENT
Start: 2025-01-23 | End: 2025-01-23 | Stop reason: HOSPADM

## 2025-01-23 RX ORDER — FENTANYL CITRATE 50 UG/ML
25-50 INJECTION, SOLUTION INTRAMUSCULAR; INTRAVENOUS EVERY 5 MIN PRN
Status: DISCONTINUED | OUTPATIENT
Start: 2025-01-23 | End: 2025-01-23 | Stop reason: HOSPADM

## 2025-01-23 RX ADMIN — ACETAMINOPHEN 650 MG: 325 TABLET ORAL at 00:24

## 2025-01-23 RX ADMIN — MIDAZOLAM HYDROCHLORIDE 1 MG: 1 INJECTION, SOLUTION INTRAMUSCULAR; INTRAVENOUS at 10:52

## 2025-01-23 RX ADMIN — FENTANYL CITRATE 50 MCG: 50 INJECTION, SOLUTION INTRAMUSCULAR; INTRAVENOUS at 09:54

## 2025-01-23 RX ADMIN — SENNOSIDES, DOCUSATE SODIUM 1 TABLET: 50; 8.6 TABLET, FILM COATED ORAL at 21:30

## 2025-01-23 RX ADMIN — METRONIDAZOLE 500 MG: 500 TABLET ORAL at 08:16

## 2025-01-23 RX ADMIN — METRONIDAZOLE 500 MG: 500 TABLET ORAL at 14:06

## 2025-01-23 RX ADMIN — INSULIN ASPART 3 UNITS: 100 INJECTION, SOLUTION INTRAVENOUS; SUBCUTANEOUS at 17:12

## 2025-01-23 RX ADMIN — INSULIN ASPART 1 UNITS: 100 INJECTION, SOLUTION INTRAVENOUS; SUBCUTANEOUS at 12:48

## 2025-01-23 RX ADMIN — MORPHINE SULFATE 15 MG: 15 TABLET, FILM COATED, EXTENDED RELEASE ORAL at 08:16

## 2025-01-23 RX ADMIN — METRONIDAZOLE 500 MG: 500 TABLET ORAL at 20:13

## 2025-01-23 RX ADMIN — GABAPENTIN 600 MG: 300 CAPSULE ORAL at 08:17

## 2025-01-23 RX ADMIN — MIDAZOLAM HYDROCHLORIDE 1 MG: 1 INJECTION, SOLUTION INTRAMUSCULAR; INTRAVENOUS at 10:08

## 2025-01-23 RX ADMIN — MORPHINE SULFATE 15 MG: 15 TABLET, FILM COATED, EXTENDED RELEASE ORAL at 20:13

## 2025-01-23 RX ADMIN — DULOXETINE HYDROCHLORIDE 60 MG: 60 CAPSULE, DELAYED RELEASE ORAL at 08:16

## 2025-01-23 RX ADMIN — FENTANYL CITRATE 50 MCG: 50 INJECTION, SOLUTION INTRAMUSCULAR; INTRAVENOUS at 10:46

## 2025-01-23 RX ADMIN — MORPHINE SULFATE 15 MG: 15 TABLET, FILM COATED, EXTENDED RELEASE ORAL at 14:06

## 2025-01-23 RX ADMIN — INSULIN ASPART 1 UNITS: 100 INJECTION, SOLUTION INTRAVENOUS; SUBCUTANEOUS at 08:13

## 2025-01-23 RX ADMIN — GABAPENTIN 600 MG: 300 CAPSULE ORAL at 14:05

## 2025-01-23 RX ADMIN — PANTOPRAZOLE SODIUM 40 MG: 40 TABLET, DELAYED RELEASE ORAL at 06:31

## 2025-01-23 RX ADMIN — HYDROMORPHONE HYDROCHLORIDE 0.2 MG: 0.2 INJECTION, SOLUTION INTRAMUSCULAR; INTRAVENOUS; SUBCUTANEOUS at 21:29

## 2025-01-23 RX ADMIN — FENTANYL CITRATE 50 MCG: 50 INJECTION, SOLUTION INTRAMUSCULAR; INTRAVENOUS at 11:02

## 2025-01-23 RX ADMIN — GABAPENTIN 600 MG: 300 CAPSULE ORAL at 20:13

## 2025-01-23 RX ADMIN — PROCHLORPERAZINE MALEATE 5 MG: 5 TABLET ORAL at 00:24

## 2025-01-23 RX ADMIN — CEFTRIAXONE SODIUM 2 G: 2 INJECTION, POWDER, FOR SOLUTION INTRAMUSCULAR; INTRAVENOUS at 08:16

## 2025-01-23 ASSESSMENT — ACTIVITIES OF DAILY LIVING (ADL)
ADLS_ACUITY_SCORE: 64
ADLS_ACUITY_SCORE: 65
ADLS_ACUITY_SCORE: 64
ADLS_ACUITY_SCORE: 63
ADLS_ACUITY_SCORE: 64
ADLS_ACUITY_SCORE: 64
ADLS_ACUITY_SCORE: 65
ADLS_ACUITY_SCORE: 65
ADLS_ACUITY_SCORE: 64
ADLS_ACUITY_SCORE: 65
ADLS_ACUITY_SCORE: 65
ADLS_ACUITY_SCORE: 64
ADLS_ACUITY_SCORE: 65
ADLS_ACUITY_SCORE: 63
ADLS_ACUITY_SCORE: 63
ADLS_ACUITY_SCORE: 65
ADLS_ACUITY_SCORE: 64
ADLS_ACUITY_SCORE: 64
ADLS_ACUITY_SCORE: 65

## 2025-01-23 NOTE — PLAN OF CARE
Goal Outcome Evaluation:    Problem: Adult Inpatient Plan of Care  Goal: Optimal Comfort and Wellbeing  Outcome: Progressing  Intervention: Monitor Pain and Promote Comfort     Problem: Comorbidity Management  Goal: Blood Glucose Levels Within Targeted Range  Outcome: Progressing        Pt alert and oriented, VSS on RA, moderate pain controlled with scheduled mediation. Drains patent with tan output. Pt to be NPO at midnight for CT liver abscess aspiration morning of 1/23. BG of 307.

## 2025-01-23 NOTE — PLAN OF CARE
Problem: Infection  Goal: Absence of Infection Signs and Symptoms  Outcome: Progressing     Problem: Comorbidity Management  Goal: Blood Glucose Levels Within Targeted Range  Outcome: Progressing  Intervention: Monitor and Manage Glycemia  Recent Flowsheet Documentation  Taken 1/23/2025 0030 by Bre Mckenzie RN  Medication Review/Management: medications reviewed   Goal Outcome Evaluation:       Patient A/O x4. VSS on RA. Reported pain to drain insertion site managed with PRN tylenol with good results. SBA to bathroom. Drains irrigated this shift producing tan colored output. NPO for abscess aspiration and possible drain placement today. Blood sugar 186 overnight. PICC line patent with blood return noted. Call light within reach and bed alarm activated.

## 2025-01-23 NOTE — PRE-PROCEDURE
GENERAL PRE-PROCEDURE:   Procedure:  CT guided liver abscess drain placement with moderate sedation  Date/Time:  1/23/2025 9:05 AM    Written consent obtained?: Yes    Risks and benefits: Risks, benefits and alternatives were discussed    Consent given by:  Patient  Patient states understanding of procedure being performed: Yes    Patient's understanding of procedure matches consent: Yes    Procedure consent matches procedure scheduled: Yes    Expected level of sedation:  Moderate  Appropriately NPO:  Yes  ASA Class:  2  Mallampati  :  Grade 2- soft palate, base of uvula, tonsillar pillars, and portion of posterior pharyngeal wall visible  Lungs:  Lungs clear with good breath sounds bilaterally  Heart:  Normal heart sounds and rate  History & Physical reviewed:  History and physical reviewed and no updates needed  Statement of review:  I have reviewed the lab findings, diagnostic data, medications, and the plan for sedation

## 2025-01-23 NOTE — PLAN OF CARE
Problem: Infection  Goal: Absence of Infection Signs and Symptoms  Outcome: Progressing   Goal Outcome Evaluation:         Pt is back from IR, third J.P placed. Site intact. Pt denies any pain and settled in the room.

## 2025-01-23 NOTE — PROGRESS NOTES
[facility]  POST PROCEDURE NOTE    Date of Procedure: 01/23/25     Time of Procedure: 11:20 AM     Pre-Procedure Diagnosis: Liver abscesses    Post-Procedure Diagnosis: Same    Procedure: CT guided liver abscess drain placement with moderate sedation    Physician: Teja Higginbotham MD     Assistants: none    Type of Sedation: Sedation    Estimated Blood Loss: none, Replacements: n/a    Specimen/Tissues Removed: 15 ml of purulent fluid     Complications: None     Assessment/Plan: Status post 10 [place] locking drain placement into a medial segment liver abscess.     Teja Higginbotham MD   1/23/2025, 11:52 AM

## 2025-01-23 NOTE — SEDATION DOCUMENTATION
Patient Name: Bon Luque  Medical Record Number: [id number]  Today's Date: 1/23/2025    Procedure: liver abscess drainplacement #3  Proceduralist: Dr. Higginbotham    Procedure Start: 0953  Procedure end: 1120  Sedation medications administered: 2 mg midazolam and 150 mcg fentanyl   Sedation time: 90 minutes

## 2025-01-23 NOTE — PROGRESS NOTES
Care Management Follow Up    Length of Stay (days): 8    Expected Discharge Date: 01/24/2025     Concerns to be Addressed:       Patient plan of care discussed at interdisciplinary rounds: Yes    Anticipated Discharge Disposition: Home VS TCU               Anticipated Discharge Services:  TBD  Anticipated Discharge DME:  na    Patient/family educated on Medicare website which has current facility and service quality ratings:    Education Provided on the Discharge Plan:    Patient/Family in Agreement with the Plan:      Referrals Placed by CM/SW:    Private pay costs discussed: Not applicable    Discussed  Partnership in Safe Discharge Planning  document with patient/family: No     Handoff Completed: No, handoff not indicated or clinically appropriate    Additional Information:  Per last therapy note patient may be able to return home with PT. Patient has not worked with therapy in a few days due to procedures. Will watch for their recommendations. Not medically ready at this time    Next Steps: IV antibiotics TCU   Silvia Rodgers RN

## 2025-01-23 NOTE — PROGRESS NOTES
Patient returned to 108 via cart, patient is awake and alert.Patient states he just has some abdominal soreness. 3rd liver abscess drain placed, purulent drainage returned. All drains to bulb suction and labeled. Report to Fidelia RN. Drains reviewed with LIMA .

## 2025-01-23 NOTE — PROGRESS NOTES
"[place] Infectious Disease Progress Note    SUBJECTIVE:    Went to IR for drain placement. 15 ml of pus removed. Tenderness around drain site. Feels better.    OBJECTIVE:  /64 (BP Location: Right arm, Cuff Size: Adult Regular)   Pulse 88   Temp 97.8  F (36.6  C) (Oral)   Resp 20   Ht 1.829 m (6')   Wt 93.8 kg (206 lb 12.8 oz)   SpO2 98%   BMI 28.05 kg/m        PHYSICAL EXAM:  Alert, awake  Vitals tabulated above, reviewed  Sclera normal color, not injected  CARDIOVASCULAR regular rate and rhythm, no murmur  Lungs CLEAR TO AUSCULTATION   Abdomen soft, NT/ND; 3 drains in the liver abscess, milky output from drain #1  Skin normal  Joints normal  Neurologic exam non focal    PICC, left arm     Antibiotics:  Ceftriaxone 1/20-  Metronidazole     Pertinent labs:    Recent Labs   Lab Test 01/23/25  0630 01/22/25  0635 01/21/25  0526   WBC 9.7 11.7* 13.2*   HGB 11.0* 10.4* 11.1*   HCT 34.6* 30.7* 33.2*   MCV 86 85 84    384 391       Lab Results   Component Value Date    RBC 3.74 01/17/2025     Lab Results   Component Value Date    HGB 10.5 01/17/2025     Lab Results   Component Value Date    HCT 30.6 01/17/2025     No components found for: \"MCT\"  Lab Results   Component Value Date    MCV 82 01/17/2025     Lab Results   Component Value Date    MCH 28.1 01/17/2025     Lab Results   Component Value Date    MCHC 34.3 01/17/2025     Lab Results   Component Value Date    RDW 13.1 01/17/2025     Lab Results   Component Value Date     01/17/2025       Last Comprehensive Metabolic Panel:  Sodium   Date Value Ref Range Status   01/23/2025 137 135 - 145 mmol/L Final     Potassium   Date Value Ref Range Status   01/23/2025 4.7 3.4 - 5.3 mmol/L Final   07/13/2022 4.2 3.5 - 5.0 mmol/L Final     Chloride   Date Value Ref Range Status   01/23/2025 102 98 - 107 mmol/L Final   07/13/2022 104 98 - 107 mmol/L Final     Carbon Dioxide (CO2)   Date Value Ref Range Status   01/23/2025 29 22 - 29 mmol/L Final " "  07/13/2022 22 22 - 31 mmol/L Final     Anion Gap   Date Value Ref Range Status   01/23/2025 6 (L) 7 - 15 mmol/L Final   07/13/2022 8 5 - 18 mmol/L Final     Glucose   Date Value Ref Range Status   07/13/2022 286 (H) 70 - 125 mg/dL Final     GLUCOSE BY METER POCT   Date Value Ref Range Status   01/23/2025 152 (H) 70 - 99 mg/dL Final     Urea Nitrogen   Date Value Ref Range Status   01/23/2025 8.5 8.0 - 23.0 mg/dL Final   07/13/2022 17 8 - 22 mg/dL Final     Creatinine   Date Value Ref Range Status   01/23/2025 0.75 0.67 - 1.17 mg/dL Final     GFR Estimate   Date Value Ref Range Status   01/23/2025 >90 >60 mL/min/1.73m2 Final     Comment:     eGFR calculated using 2021 CKD-EPI equation.   01/13/2021 >60 >60 mL/min/1.73m2 Final     GFR, ESTIMATED POCT   Date Value Ref Range Status   11/08/2024 >60 >60 mL/min/1.73m2 Final     Calcium   Date Value Ref Range Status   01/23/2025 8.4 (L) 8.8 - 10.4 mg/dL Final     Comment:     Reference intervals for this test were updated on 7/16/2024 to reflect our healthy population more accurately. There may be differences in the flagging of prior results with similar values performed with this method. Those prior results can be interpreted in the context of the updated reference intervals.       Liver Function Studies -   Recent Labs   Lab Test 01/17/25  0641   PROTTOTAL 5.4*   ALBUMIN 2.2*   BILITOTAL 0.9   ALKPHOS 144   AST 83*   ALT 65       No results found for: \"SED\"    No results found for: \"CRP\"            MICROBIOLOGY DATA:    GPC in blood --> strep int  Gm stain liver pus also heavy GPC, culture same    Susceptibility data from last 90 days.  Collected Specimen Info Organism Cefotaxime Ceftriaxone Clindamycin Meropenem Penicillin Vancomycin   01/16/25 Abscess from Liver Streptococcus intermedius         01/16/25 Abscess from Liver Streptococcus intermedius         01/15/25 Peripheral Blood Streptococcus intermedius  S  S  S  S  S  S   01/15/25 Blood from Line, venous " Streptococcus intermedius                  ASSESSMENT:  Liver abscess, strep intermedius. Leukocytosis (now normal)  Drained. Repeat CT abd today showing anterior abscess (segment 4) that is larger. Previous drain going inferior to this. 3rd drain placed on 1/23.  On biologic for skin  Solitary kidney    RECOMMENDATION:  Continue ceftriaxone and po metronidazole   Anticipate 6 weeks iv antibiotics due to multi-loculated abscess  Okay to discharge once outpatient IV antibiotics are arranged      Chon Garcia MD  [facility]  Direct messaging: [facility] Paging   On-Call ID provider: [phone], option: [phone]       Attestation:  I have reviewed today's Medications, Vital Signs, Imaging, and Labs. Therapy plan discussed with patient.

## 2025-01-23 NOTE — PROGRESS NOTES
[facility]    Medicine Progress Note - Hospitalist Service    Date of Admission:  1/15/2025    Assessment & Plan   Bon Luque is a 69 year old male with past medical history significant for CAD, renal cell carcinoma status post nephrectomy 2017, hypertension, diabetes with neuropathy, HLD, hypogonadism on testosterone, psoriasis, and small bowel obstruction who was admitted on 1/15/2025 due to abdominal pain, diarrhea, nausea, dizziness, generalized weakness, and poor appetite for 11 days PTA. Found to have liver abscess, sepsis, Blood cultures growing strep intermedius.     Sepsis from  Strep intermedius bacteremia  Multiple liver abscesses  Immunocompromised host  -CT 1/13 w/ 3 prominent indeterminate complex hypodense masses in the liver concerning for neoplasm versus infectious process  -MRI 1/15: 3 heterogeneous masses in the liver new from 11/8/2024 concerning for infection, possible rapidly progressive malignancy/metastases  -IR consulted, underwent CT-guided liver drain x 2 1/16  -Repeat CT 01/21: Multiloculated abscess, left hepatic lobe fluid decreased s/p catheter, abscess in hepatic segment 8 decrease in size s/p catheter.  Abscess in hepatic segment 4 increased in size  -Cultures growing strep intermedius  -was on  IV vancomycin and Cefepime, now on IV Ceftriaxone and po Flagyl per ID  -s/p  3rd drain today on  01/23  -PICC line placed 1/18  -Care manager unable to find home health for outpatient antibiotics, may need to pursue TCU to facilitate parenteral antibiotics recommended by ID     Hyponatremia, resolved  - Na 126 on presentation -> 130 -> 132 -> 136  - TSH normal  - Monitor Na     Acute drop in Hb  - Hb 13.2 on presentation, ~ 10.4  - No evidence of bleeding, suspect 2/2 sepsis  - Monitor Hb     Psoriasis  - No obvious flaring lesions, remotely on Skyrizi which has been held recently  - Outpatient follow-up     Hypophosphatemia - resolved  - Monitor and replete as  needed     Remote DVT and PE  - Routinely manages on Eliquis -plan to resume tomorrow      Chronic pain  - Pain overall controlled, resume usual MS Contin, gabapentin, duloxetine, oxycodone     History of renal cell carcinoma  - Remote nephrectomy, follow creatinine carefully     Hypogonadism  - Resume usual testosterone supplement at discharge     Insulin-dependent type 2 diabetes mellitus  - Blood sugars acceptable  - A1c 7.9%  - 50 units of Lantus twice daily at home  - Decreased Insulin requirements due to poor appetite/nausea  - Decreased to Lantus 30u in the morning, 22u at bedtime.   - sliding scale insulin  - High consistent carb diet  - Titrate as needed     Moderate Malnutrition  - Seen by RD             Diet: NPO per Anesthesia Guidelines for Procedure/Surgery Except for: Meds  Snacks/Supplements Adult: Glucerna; With Meals    DVT Prophylaxis: Pneumatic Compression Devices  Seals Catheter: Not present  Lines: PRESENT      PICC 01/18/25 Single Lumen Left Basilic Antibiotic-Site Assessment: WDL      Cardiac Monitoring: None  Code Status: Full Code      Clinically Significant Risk Factors               # Hypoalbuminemia: Lowest albumin = 2 g/dL at 1/18/2025  6:13 AM, will monitor as appropriate     # Hypertension: Noted on problem list           # DMII: A1C = 7.9 % (Ref range: <5.7 %) within past 6 months   # Overweight: Estimated body mass index is 28.05 kg/m  as calculated from the following:    Height as of this encounter: 1.829 m (6').    Weight as of this encounter: 93.8 kg (206 lb 12.8 oz).   # Moderate Malnutrition: based on nutrition assessment           Social Drivers of Health    Tobacco Use: Medium Risk (1/14/2025)    Patient History     Smoking Tobacco Use: Former     Smokeless Tobacco Use: Never     Passive Exposure: Past   Physical Activity: Insufficiently Active (8/23/2024)    Exercise Vital Sign     Days of Exercise per Week: 1 day     Minutes of Exercise per Session: 20 min   Social  Connections: Unknown (8/23/2024)    Social Connection and Isolation Panel [NHANES]     Frequency of Social Gatherings with Friends and Family: Once a week          Disposition Plan     Medically Ready for Discharge: Anticipated in 2-4 Days             Manan Liao MD  Hospitalist Service  [facility]  Securely message with [employer] (more info)  Text page via [employer] Paging/Directory   ______________________________________________________________________    Interval History   Patient new to me today.  Chart, labs and imaging results reviewed. S/p surgery drain on hepatic abscess has been placed in IR today.  He denies having any significant pain or discomfort currently.  He endorses having generalized body weakness.  Management plan discussed with the patient and he expressed understanding.    Physical Exam   Vital Signs: Temp: 96.8  F (36  C) Temp src: Axillary BP: (!) 146/69 Pulse: 62   Resp: 18 SpO2: 99 % O2 Device: None (Room air)    Weight: 206 lbs 12.8 oz    General Appearance: No distress noted  Respiratory: Good air entry bilaterally  Cardiovascular: S1 and S2 well heard  GI: Soft abdomen, obese, no tenderness, normoactive bowel sounds, 2 drains on right upper quadrant abdominal draining well  Skin: Intact and warm      Medical Decision Making       45 MINUTES SPENT BY ME on the date of service doing chart review, history, exam, documentation & further activities per the note.      Data

## 2025-01-24 ENCOUNTER — APPOINTMENT (OUTPATIENT)
Dept: OCCUPATIONAL THERAPY | Facility: HOSPITAL | Age: 70
End: 2025-01-24
Attending: RADIOLOGY
Payer: COMMERCIAL

## 2025-01-24 ENCOUNTER — APPOINTMENT (OUTPATIENT)
Dept: PHYSICAL THERAPY | Facility: HOSPITAL | Age: 70
End: 2025-01-24
Attending: RADIOLOGY
Payer: COMMERCIAL

## 2025-01-24 LAB
ANION GAP SERPL CALCULATED.3IONS-SCNC: 7 MMOL/L (ref 7–15)
BUN SERPL-MCNC: 8.6 MG/DL (ref 8–23)
CALCIUM SERPL-MCNC: 8.6 MG/DL (ref 8.8–10.4)
CHLORIDE SERPL-SCNC: 96 MMOL/L (ref 98–107)
CREAT SERPL-MCNC: 0.77 MG/DL (ref 0.67–1.17)
EGFRCR SERPLBLD CKD-EPI 2021: >90 ML/MIN/1.73M2
ERYTHROCYTE [DISTWIDTH] IN BLOOD BY AUTOMATED COUNT: 13.9 % (ref 10–15)
GLUCOSE BLDC GLUCOMTR-MCNC: 166 MG/DL (ref 70–99)
GLUCOSE BLDC GLUCOMTR-MCNC: 211 MG/DL (ref 70–99)
GLUCOSE BLDC GLUCOMTR-MCNC: 221 MG/DL (ref 70–99)
GLUCOSE BLDC GLUCOMTR-MCNC: 229 MG/DL (ref 70–99)
GLUCOSE BLDC GLUCOMTR-MCNC: 363 MG/DL (ref 70–99)
GLUCOSE SERPL-MCNC: 187 MG/DL (ref 70–99)
HCO3 SERPL-SCNC: 27 MMOL/L (ref 22–29)
HCT VFR BLD AUTO: 40 % (ref 40–53)
HGB BLD-MCNC: 12.8 G/DL (ref 13.3–17.7)
MCH RBC QN AUTO: 27.5 PG (ref 26.5–33)
MCHC RBC AUTO-ENTMCNC: 32 G/DL (ref 31.5–36.5)
MCV RBC AUTO: 86 FL (ref 78–100)
PHOSPHATE SERPL-MCNC: 2.9 MG/DL (ref 2.5–4.5)
PLATELET # BLD AUTO: 483 10E3/UL (ref 150–450)
POTASSIUM SERPL-SCNC: 5 MMOL/L (ref 3.4–5.3)
RBC # BLD AUTO: 4.65 10E6/UL (ref 4.4–5.9)
SODIUM SERPL-SCNC: 130 MMOL/L (ref 135–145)
WBC # BLD AUTO: 21.6 10E3/UL (ref 4–11)

## 2025-01-24 PROCEDURE — 99232 SBSQ HOSP IP/OBS MODERATE 35: CPT | Performed by: STUDENT IN AN ORGANIZED HEALTH CARE EDUCATION/TRAINING PROGRAM

## 2025-01-24 PROCEDURE — 250N000013 HC RX MED GY IP 250 OP 250 PS 637: Performed by: STUDENT IN AN ORGANIZED HEALTH CARE EDUCATION/TRAINING PROGRAM

## 2025-01-24 PROCEDURE — 80048 BASIC METABOLIC PNL TOTAL CA: CPT | Performed by: STUDENT IN AN ORGANIZED HEALTH CARE EDUCATION/TRAINING PROGRAM

## 2025-01-24 PROCEDURE — 258N000003 HC RX IP 258 OP 636: Performed by: STUDENT IN AN ORGANIZED HEALTH CARE EDUCATION/TRAINING PROGRAM

## 2025-01-24 PROCEDURE — 97535 SELF CARE MNGMENT TRAINING: CPT | Mod: GO

## 2025-01-24 PROCEDURE — 250N000013 HC RX MED GY IP 250 OP 250 PS 637: Performed by: INTERNAL MEDICINE

## 2025-01-24 PROCEDURE — 85014 HEMATOCRIT: CPT | Performed by: STUDENT IN AN ORGANIZED HEALTH CARE EDUCATION/TRAINING PROGRAM

## 2025-01-24 PROCEDURE — 250N000011 HC RX IP 250 OP 636: Performed by: INTERNAL MEDICINE

## 2025-01-24 PROCEDURE — 97116 GAIT TRAINING THERAPY: CPT | Mod: GP

## 2025-01-24 PROCEDURE — 120N000001 HC R&B MED SURG/OB

## 2025-01-24 PROCEDURE — 82374 ASSAY BLOOD CARBON DIOXIDE: CPT | Performed by: STUDENT IN AN ORGANIZED HEALTH CARE EDUCATION/TRAINING PROGRAM

## 2025-01-24 PROCEDURE — 250N000013 HC RX MED GY IP 250 OP 250 PS 637: Performed by: HOSPITALIST

## 2025-01-24 PROCEDURE — 99232 SBSQ HOSP IP/OBS MODERATE 35: CPT | Performed by: INTERNAL MEDICINE

## 2025-01-24 PROCEDURE — 97110 THERAPEUTIC EXERCISES: CPT | Mod: GP

## 2025-01-24 PROCEDURE — 84100 ASSAY OF PHOSPHORUS: CPT | Performed by: STUDENT IN AN ORGANIZED HEALTH CARE EDUCATION/TRAINING PROGRAM

## 2025-01-24 RX ORDER — SODIUM CHLORIDE 9 MG/ML
INJECTION, SOLUTION INTRAVENOUS CONTINUOUS
Status: DISCONTINUED | OUTPATIENT
Start: 2025-01-24 | End: 2025-01-25

## 2025-01-24 RX ORDER — POLYETHYLENE GLYCOL 3350 17 G/17G
17 POWDER, FOR SOLUTION ORAL DAILY
Status: DISCONTINUED | OUTPATIENT
Start: 2025-01-24 | End: 2025-02-05 | Stop reason: HOSPADM

## 2025-01-24 RX ADMIN — HYDROMORPHONE HYDROCHLORIDE 0.2 MG: 0.2 INJECTION, SOLUTION INTRAMUSCULAR; INTRAVENOUS; SUBCUTANEOUS at 02:39

## 2025-01-24 RX ADMIN — DULOXETINE HYDROCHLORIDE 60 MG: 60 CAPSULE, DELAYED RELEASE ORAL at 08:53

## 2025-01-24 RX ADMIN — INSULIN ASPART 1 UNITS: 100 INJECTION, SOLUTION INTRAVENOUS; SUBCUTANEOUS at 17:31

## 2025-01-24 RX ADMIN — MORPHINE SULFATE 15 MG: 15 TABLET, FILM COATED, EXTENDED RELEASE ORAL at 21:04

## 2025-01-24 RX ADMIN — INSULIN ASPART 2 UNITS: 100 INJECTION, SOLUTION INTRAVENOUS; SUBCUTANEOUS at 09:09

## 2025-01-24 RX ADMIN — CEFTRIAXONE SODIUM 2 G: 2 INJECTION, POWDER, FOR SOLUTION INTRAMUSCULAR; INTRAVENOUS at 09:17

## 2025-01-24 RX ADMIN — GABAPENTIN 600 MG: 300 CAPSULE ORAL at 08:53

## 2025-01-24 RX ADMIN — HYDROMORPHONE HYDROCHLORIDE 0.4 MG: 0.2 INJECTION, SOLUTION INTRAMUSCULAR; INTRAVENOUS; SUBCUTANEOUS at 10:31

## 2025-01-24 RX ADMIN — ONDANSETRON 4 MG: 2 INJECTION INTRAMUSCULAR; INTRAVENOUS at 10:31

## 2025-01-24 RX ADMIN — METRONIDAZOLE 500 MG: 500 TABLET ORAL at 08:53

## 2025-01-24 RX ADMIN — METRONIDAZOLE 500 MG: 500 TABLET ORAL at 21:05

## 2025-01-24 RX ADMIN — APIXABAN 5 MG: 5 TABLET, FILM COATED ORAL at 21:04

## 2025-01-24 RX ADMIN — POLYETHYLENE GLYCOL 3350 17 G: 17 POWDER, FOR SOLUTION ORAL at 08:56

## 2025-01-24 RX ADMIN — METRONIDAZOLE 500 MG: 500 TABLET ORAL at 14:16

## 2025-01-24 RX ADMIN — MORPHINE SULFATE 15 MG: 15 TABLET, FILM COATED, EXTENDED RELEASE ORAL at 08:53

## 2025-01-24 RX ADMIN — SODIUM CHLORIDE: 9 INJECTION, SOLUTION INTRAVENOUS at 11:28

## 2025-01-24 RX ADMIN — INSULIN ASPART 2 UNITS: 100 INJECTION, SOLUTION INTRAVENOUS; SUBCUTANEOUS at 13:04

## 2025-01-24 RX ADMIN — PANTOPRAZOLE SODIUM 40 MG: 40 TABLET, DELAYED RELEASE ORAL at 06:31

## 2025-01-24 RX ADMIN — GABAPENTIN 600 MG: 300 CAPSULE ORAL at 14:16

## 2025-01-24 RX ADMIN — APIXABAN 5 MG: 5 TABLET, FILM COATED ORAL at 08:53

## 2025-01-24 RX ADMIN — GABAPENTIN 600 MG: 300 CAPSULE ORAL at 21:05

## 2025-01-24 RX ADMIN — MORPHINE SULFATE 15 MG: 15 TABLET, FILM COATED, EXTENDED RELEASE ORAL at 14:16

## 2025-01-24 RX ADMIN — HYDROMORPHONE HYDROCHLORIDE 0.4 MG: 0.2 INJECTION, SOLUTION INTRAMUSCULAR; INTRAVENOUS; SUBCUTANEOUS at 17:31

## 2025-01-24 ASSESSMENT — ACTIVITIES OF DAILY LIVING (ADL)
ADLS_ACUITY_SCORE: 65
ADLS_ACUITY_SCORE: 63
ADLS_ACUITY_SCORE: 65
ADLS_ACUITY_SCORE: 63
ADLS_ACUITY_SCORE: 65
ADLS_ACUITY_SCORE: 63
ADLS_ACUITY_SCORE: 65
ADLS_ACUITY_SCORE: 63
ADLS_ACUITY_SCORE: 65
ADLS_ACUITY_SCORE: 65
ADLS_ACUITY_SCORE: 63
ADLS_ACUITY_SCORE: 65

## 2025-01-24 NOTE — PLAN OF CARE
Problem: Adult Inpatient Plan of Care  Goal: Absence of Hospital-Acquired Illness or Injury  Intervention: Prevent Infection  Recent Flowsheet Documentation  Taken 1/23/2025 1600 by Fidelia Castellanos RN  Infection Prevention: hand hygiene promoted  Taken 1/23/2025 0800 by Fidelia Castellanos RN  Infection Prevention: hand hygiene promoted     Problem: Pain Acute  Goal: Optimal Pain Control and Function  1/23/2025 1848 by Fidelia Castellanos RN  Outcome: Progressing  1/23/2025 1215 by Fidelia Castellanos RN  Outcome: Progressing  Intervention: Develop Pain Management Plan  Recent Flowsheet Documentation  Taken 1/23/2025 1630 by Fidelia Castellanos RN  Pain Management Interventions: cold applied  Taken 1/23/2025 0816 by Fidelia Castellanos RN  Pain Management Interventions: medication (see MAR)  Intervention: Prevent or Manage Pain  Recent Flowsheet Documentation  Taken 1/23/2025 1600 by Fidelia Castellanos RN  Medication Review/Management: medications reviewed  Taken 1/23/2025 0800 by Fidelia Castellanos RN  Medication Review/Management: medications reviewed     Problem: Comorbidity Management  Goal: Blood Glucose Levels Within Targeted Range  1/23/2025 1848 by Fidelia Castellanos RN  Outcome: Progressing  1/23/2025 1215 by Fidelia Castellanos RN  Outcome: Progressing  Intervention: Monitor and Manage Glycemia  Recent Flowsheet Documentation  Taken 1/23/2025 1600 by Fidelia Castellanos RN  Medication Review/Management: medications reviewed  Taken 1/23/2025 0800 by Fidelia Castellanos RN  Medication Review/Management: medications reviewed   Goal Outcome Evaluation:         Pt's pain was managed with scheduled medications and some ice packs to the drain sites. J.P drained as needed with outputs entered in the flowsheets. Pt's BG was 152 and 286 got due coverages. Care ongoing.

## 2025-01-24 NOTE — PROGRESS NOTES
Care Management Follow Up    Length of Stay (days): 9    Expected Discharge Date: 01/24/2025     Concerns to be Addressed:       Patient plan of care discussed at interdisciplinary rounds: Yes    Anticipated Discharge Disposition: TCU for IV antibiotics              Anticipated Discharge Services:  TBD  Anticipated Discharge DME:  NA    Patient/family educated on Medicare website which has current facility and service quality ratings:  yes  Education Provided on the Discharge Plan:    Patient/Family in Agreement with the Plan:      Referrals Placed by CM/SW:    Private pay costs discussed: Not applicable    Discussed  Partnership in Safe Discharge Planning  document with patient/family: No     Handoff Completed: No, handoff not indicated or clinically appropriate    Additional Information:  Per ID note today patient is not ready for discharge at this time. Message sent to [facility]  informing them that patient is not ready today and inquiring if patient can admit there over the weekend if he is ready  11:00 AM  Received a return call from Franny from [place]. Patient can discharge to facility tomorrow if known and communicated to them by the end of today. Other wise they will not have staff for it. If patient does not admit over the weekend Franny is asking that CM call on Monday before 9am to check bed status.       Next Steps: medical readiness, PAS when discharge confirmed    Silvia Rodgers, RN

## 2025-01-24 NOTE — PROGRESS NOTES
[facility]    Medicine Progress Note - Hospitalist Service    Date of Admission:  1/15/2025    Assessment & Plan   Bon Luque is a 69 year old male with past medical history significant for CAD, renal cell carcinoma status post nephrectomy 2017, hypertension, diabetes with neuropathy, HLD, hypogonadism on testosterone, psoriasis, and small bowel obstruction who was admitted on 1/15/2025 due to abdominal pain, diarrhea, nausea, dizziness, generalized weakness, and poor appetite for 11 days PTA. Found to have liver abscess, sepsis, Blood cultures growing strep intermedius.     Sepsis from  Strep intermedius bacteremia  Multiple liver abscesses  Immunocompromised host  -CT 1/13 w/ 3 prominent indeterminate complex hypodense masses in the liver concerning for neoplasm versus infectious process  -MRI 1/15: 3 heterogeneous masses in the liver new from 11/8/2024 concerning for infection, possible rapidly progressive malignancy/metastases  -IR consulted, underwent CT-guided liver drain x 2 1/16  -Repeat CT 01/21: Multiloculated abscess, left hepatic lobe fluid decreased s/p catheter, abscess in hepatic segment 8 decrease in size s/p catheter.  Abscess in hepatic segment 4 increased in size  -Cultures growing strep intermedius  -was on  IV vancomycin and Cefepime, now on IV Ceftriaxone and po Flagyl per ID since 01/20   -s/p  3rd drain on  01/23  -PICC line placed 1/18     Hyponatremia, resolved  - Na 126 on presentation -> 130 -> 132 -> 136  - TSH normal  - Will give IVF infusion today for having low oral intake, nausea and low sodium   - Monitor     Acute drop in Hb  - Hb 13.2 on presentation, ~ 10.4  - No evidence of bleeding, suspect 2/2 sepsis  - Monitor Hb     Psoriasis  - No obvious flaring lesions, remotely on Skyrizi which has been held recently  - Outpatient follow-up     Hypophosphatemia - resolved  - Monitor and replete as needed     Remote DVT and PE  - Eliquis      Chronic pain  -  PTA  MS Contin, gabapentin, duloxetine, oxycodone     History of renal cell carcinoma  - Remote nephrectomy, follow creatinine      Hypogonadism  - Resume usual testosterone supplement at discharge     Insulin-dependent type 2 diabetes mellitus  - Blood sugars acceptable  - A1c 7.9%  - 50 units of Lantus twice daily at home  - Decreased Insulin requirements due to poor appetite/nausea  - Decreased to Lantus 30u in the morning, 22u at bedtime.   - sliding scale insulin  - High consistent carb diet  - Titrate as needed     Moderate Malnutrition  - Seen by RD             Diet: Snacks/Supplements Adult: Glucerna; With Meals  Moderate Consistent Carb (60 g CHO per Meal) Diet    DVT Prophylaxis: DOAC  Seals Catheter: Not present  Lines: PRESENT      PICC 01/18/25 Single Lumen Left Basilic Antibiotic-Site Assessment: WDL      Cardiac Monitoring: None  Code Status: Full Code      Clinically Significant Risk Factors         # Hyponatremia: Lowest Na = 130 mmol/L in last 2 days, will monitor as appropriate  # Hypochloremia: Lowest Cl = 96 mmol/L in last 2 days, will monitor as appropriate      # Hypoalbuminemia: Lowest albumin = 2 g/dL at 1/18/2025  6:13 AM, will monitor as appropriate     # Hypertension: Noted on problem list           # DMII: A1C = 7.9 % (Ref range: <5.7 %) within past 6 months   # Overweight: Estimated body mass index is 28.05 kg/m  as calculated from the following:    Height as of this encounter: 1.829 m (6').    Weight as of this encounter: 93.8 kg (206 lb 12.8 oz).   # Moderate Malnutrition: based on nutrition assessment           Social Drivers of Health    Tobacco Use: Medium Risk (1/14/2025)    Patient History     Smoking Tobacco Use: Former     Smokeless Tobacco Use: Never     Passive Exposure: Past   Physical Activity: Insufficiently Active (8/23/2024)    Exercise Vital Sign     Days of Exercise per Week: 1 day     Minutes of Exercise per Session: 20 min   Social Connections: Unknown (8/23/2024)     Social Connection and Isolation Panel [NHANES]     Frequency of Social Gatherings with Friends and Family: Once a week          Disposition Plan     Medically Ready for Discharge: Anticipated Tomorrow             Manan Liao MD  Hospitalist Service  [facility]  Securely message with [employer] (more info)  Text page via [employer] Paging/Directory   ______________________________________________________________________    Interval History   Patient reports having significant pain at the new incision site.  He also reports having some nausea and generalized body weakness.  Lab results discussed with the patient.  Management plan also discussed with the patient and he expressed understanding.    Physical Exam   Vital Signs: Temp: 97.9  F (36.6  C) Temp src: Oral BP: (!) 143/71 Pulse: 92   Resp: 18 SpO2: 95 % O2 Device: None (Room air)    Weight: 206 lbs 12.8 oz    General Appearance:  No distress noted  Respiratory: Good air entry bilaterally  Cardiovascular: S1 and S2 well heard  GI: Soft abdomen, obese, no tenderness, normoactive bowel sounds, 2 drains on right upper quadrant abdominal draining well  Skin: Intact and warm    Medical Decision Making       40 MINUTES SPENT BY ME on the date of service doing chart review, history, exam, documentation & further activities per the note.      Data

## 2025-01-24 NOTE — PROGRESS NOTES
CLINICAL NUTRITION SERVICES - REASSESSMENT NOTE     RECOMMENDATIONS FOR MDs/PROVIDERS TO ORDER:  Increasing insulin for better BG control. Pt with poor meal intake but taking multiple supplements well    Malnutrition Status:    Malnutrition Diagnosis: Moderate malnutrition in the context of acute illness or injury  Malnutrition Present on Admission: Yes    Registered Dietitian Interventions:  -Change glucerna to ensure enlive tid to better meet nutrition needs with poor p. Intake = 1050 kcal, 60 g protein  -Encouraged pt to focus on liquid nutrition for better tolerance and intake    Future/Additional Recommendations:  Adjust supplements pending intake, weight, tolerance, acceptance, labs, BG       SUBJECTIVE INFORMATION  Assessed patient in room.  Pt reports his meals are not going well. He had terrible abd pain and contraction this am and ongoing pain. He reports he has difficulty eating most solids except fruit, unable to get them down. He is tolerating liquids and likes the supplements. He is willing to increase protein shakes. He is concerned about weight loss from inadequate intake    CURRENT NUTRITION ORDERS  Diet: Orders Placed This Encounter      Moderate Consistent Carb (60 g CHO per Meal) Diet    Nutrition Support: Glucerna bid = 440 kcal, 20 g protein    CURRENT INTAKE/TOLERANCE  Poor of meals, fair with supplements    Ate 75% of 2 meal for the day + 2 glucerna per pt report at 1178 kcal, 63 g protein, meeting 70% of estimated kcal, 75% of estimated protein needs    Receiving NS IVF At 75 ml/hr today     NEW FINDINGS  Weight: 7.7% weight loss < 1 month    Date/Time Weight Weight Method   01/22/25 1046 93.8 kg (206 lb 12.8 oz) Standing scale   01/15/25 1220 99.8 kg (220 lb) --     01/02/25 101.4 kg (223 lb 9.6 oz)   08/23/24 101.6 kg (223 lb 14.4 oz)   06/25/24 100.3 kg (221 lb 3.2 oz)   05/21/24 98.5 kg (217 lb 3.2 oz)   05/09/24 98 kg (216 lb)   03/28/24 94.3 kg (208 lb)   02/27/24 97.3 kg (214 lb 8 oz)    01/30/24 95 kg (209 lb 8 oz)     Skin/wounds: Francesco drain x 3  GI symptoms: abd pain. Last BM 1/22 soft/loose  Nutrition-relevant labs:   Na 130 (L), decreased  -289 mg/dl past 24 hours, in fair control  Nutrition-relevant medications: iv abx, ssi, lantus bid, oral antifugal, morphine, protonix, miralax daily    Dosing Weight: 93.8 kg, based on actual wt for kcal, 80.9 kg IBW for protein/fluid     ASSESSED NUTRITION NEEDS  Estimated Energy Needs: 4012-9879+ kcals/day (Nunda St Jeor x 1-1.2+) REE: 1756  Justification: Maintenance  Estimated Protein Needs: 80-97 grams protein/day (1 - 1.2 grams of pro/kg)  Justification: Increased needs  Estimated Fluid Needs: 3411-4529 mL/day (25 - 30 mL/kg)  Justification: Maintenance    MALNUTRITION  % Intake: < 75% for > 7 days (moderate)- improving with supplements  % Weight Loss: > 5% in 1 month (severe)   Subcutaneous Fat Loss: None observed  Muscle Loss: None observed  Fluid Accumulation/Edema: None noted  Malnutrition Diagnosis: Moderate malnutrition in the context of acute illness or injury  Malnutrition Present on Admission: Yes    EVALUATION OF THE PROGRESS TOWARD GOALS     NUTRITION DIAGNOSIS  Malnutrition (undernutrition) related to acute illness as evidenced by poor oral intakes, 7.7% wt loss <1 month.     Goals  Meet >75% nutrition needs- progressing with supplements  Maintain weight - in progress  BG <180 - not progressing     Monitoring/Evaluation      Progress toward goals will be monitored and evaluated per policy.

## 2025-01-24 NOTE — PROGRESS NOTES
"[facility] Progress Note    SUBJECTIVE:    Pain from his new drain. Minimal output overnight.    OBJECTIVE:  BP (!) 143/71 (BP Location: Right arm)   Pulse 92   Temp 97.9  F (36.6  C) (Oral)   Resp 18   Ht 1.829 m (6')   Wt 93.8 kg (206 lb 12.8 oz)   SpO2 95%   BMI 28.05 kg/m        PHYSICAL EXAM:  Alert, awake  Vitals tabulated above, reviewed  Sclera normal color, not injected  CARDIOVASCULAR regular rate and rhythm, no murmur  Lungs CLEAR TO AUSCULTATION   Abdomen soft, ND; 3 drains, empty. Tenderness at insertion site, midabdomen  Skin normal  Joints normal  Neurologic exam non focal    PICC, left arm     Antibiotics:  Ceftriaxone 1/20-  Metronidazole     Pertinent labs:    Recent Labs   Lab Test 01/24/25  0631 01/23/25  0630 01/22/25  0635   WBC 21.6* 9.7 11.7*   HGB 12.8* 11.0* 10.4*   HCT 40.0 34.6* 30.7*   MCV 86 86 85   * 397 384       Lab Results   Component Value Date    RBC 3.74 01/17/2025     Lab Results   Component Value Date    HGB 10.5 01/17/2025     Lab Results   Component Value Date    HCT 30.6 01/17/2025     No components found for: \"MCT\"  Lab Results   Component Value Date    MCV 82 01/17/2025     Lab Results   Component Value Date    MCH 28.1 01/17/2025     Lab Results   Component Value Date    MCHC 34.3 01/17/2025     Lab Results   Component Value Date    RDW 13.1 01/17/2025     Lab Results   Component Value Date     01/17/2025       Last Comprehensive Metabolic Panel:  Sodium   Date Value Ref Range Status   01/24/2025 130 (L) 135 - 145 mmol/L Final     Potassium   Date Value Ref Range Status   01/24/2025 5.0 3.4 - 5.3 mmol/L Final   07/13/2022 4.2 3.5 - 5.0 mmol/L Final     Chloride   Date Value Ref Range Status   01/24/2025 96 (L) 98 - 107 mmol/L Final   07/13/2022 104 98 - 107 mmol/L Final     Carbon Dioxide (CO2)   Date Value Ref Range Status   01/24/2025 27 22 - 29 mmol/L Final   07/13/2022 22 22 - 31 mmol/L Final     Anion Gap   Date Value Ref Range " "Status   01/24/2025 7 7 - 15 mmol/L Final   07/13/2022 8 5 - 18 mmol/L Final     Glucose   Date Value Ref Range Status   01/24/2025 187 (H) 70 - 99 mg/dL Final   07/13/2022 286 (H) 70 - 125 mg/dL Final     GLUCOSE BY METER POCT   Date Value Ref Range Status   01/24/2025 221 (H) 70 - 99 mg/dL Final     Urea Nitrogen   Date Value Ref Range Status   01/24/2025 8.6 8.0 - 23.0 mg/dL Final   07/13/2022 17 8 - 22 mg/dL Final     Creatinine   Date Value Ref Range Status   01/24/2025 0.77 0.67 - 1.17 mg/dL Final     GFR Estimate   Date Value Ref Range Status   01/24/2025 >90 >60 mL/min/1.73m2 Final     Comment:     eGFR calculated using 2021 CKD-EPI equation.   01/13/2021 >60 >60 mL/min/1.73m2 Final     GFR, ESTIMATED POCT   Date Value Ref Range Status   11/08/2024 >60 >60 mL/min/1.73m2 Final     Calcium   Date Value Ref Range Status   01/24/2025 8.6 (L) 8.8 - 10.4 mg/dL Final     Comment:     Reference intervals for this test were updated on 7/16/2024 to reflect our healthy population more accurately. There may be differences in the flagging of prior results with similar values performed with this method. Those prior results can be interpreted in the context of the updated reference intervals.       Liver Function Studies -   Recent Labs   Lab Test 01/17/25  0641   PROTTOTAL 5.4*   ALBUMIN 2.2*   BILITOTAL 0.9   ALKPHOS 144   AST 83*   ALT 65       No results found for: \"SED\"    No results found for: \"CRP\"            MICROBIOLOGY DATA:    GPC in blood --> strep int  Gm stain liver pus also heavy GPC, culture same    Susceptibility data from last 90 days.  Collected Specimen Info Organism Cefotaxime Ceftriaxone Clindamycin Meropenem Penicillin Vancomycin   01/16/25 Abscess from Liver Streptococcus intermedius         01/16/25 Abscess from Liver Streptococcus intermedius         01/15/25 Peripheral Blood Streptococcus intermedius  S  S  S  S  S  S   01/15/25 Blood from Line, venous Streptococcus intermedius          "         ASSESSMENT:  Liver abscess, strep intermedius. Leukocytosis, normal, now up after 3rd drain placed  Drained. Repeat CT abd today showing anterior abscess (segment 4) that is larger. Previous drain going inferior to this. 3rd drain placed on 1/23.  On biologic for skin  Solitary kidney    RECOMMENDATION:  Continue ceftriaxone and po metronidazole   Anticipate 6 weeks iv antibiotics due to multi-loculated abscess  Not ready for discharge. Need to ensure wbc impoving  If no drain output through day today, will need to call IR.    Chon Garcia MD  [facility]  Direct messaging: [facility] Paging   On-Call ID provider: [phone], option: [phone]       Attestation:  I have reviewed today's Medications, Vital Signs, and Labs. Therapy plan discussed with patient.

## 2025-01-24 NOTE — PROGRESS NOTES
Interventional Radiology - Progress Note  Inpatient - [facility]  01/24/2025     S:  POD 1 CT guided drain placement into liver abscess and POD 8 CT guided drain placements into lateral LEFT hepatic and medial LEFT/RIGHT hepatic collections. Endorses severe pain at the latest drain site.      O:  BP (!) 143/71 (BP Location: Right arm)   Pulse 92   Temp 97.9  F (36.6  C) (Oral)   Resp 18   Ht 1.829 m (6')   Wt 93.8 kg (206 lb 12.8 oz)   SpO2 95%   BMI 28.05 kg/m    General: Stable. In no acute distress.    Neuro: Alert and oriented x 3. No focal deficits.  Psych: Appropriate mood and affect. Linear/coherent thought process.   Resp: Normal respirations.  Skin:  Liver drains x3 CDI, no drainage noted in compressed bulb of #3, purulent fluid noted in compressed bulb of drains #1 and #2.      IMAGING:  CT Liver Abscess Drainage 01/23/2025  EXAM:  1. PERCUTANEOUS DRAIN PLACEMENT INTO A LIVER ABSCESS  2. CT GUIDANCE  3. CONSCIOUS SEDATION  LOCATION: [facility]  DATE: 1/23/2025     INDICATION: Liver abscess  TECHNIQUE: Dose reduction techniques were used.     PROCEDURE: Informed consent obtained. Site marked. Prior images reviewed. Required items made available. Patient identity confirmed verbally and with arm band. Patient reevaluated immediately before administering sedation. Universal protocol was   followed. Time out performed. The site was prepped and draped in sterile fashion. 10 mL of 1% lidocaine was infused into the local soft tissues. Using standard technique and under direct CT guidance, a 10 [place] drainage catheter was inserted into the   fluid collection.  Of note, only a small amount of the guidewire was able to be advanced into the fluid collection. As a result, the drain is located along the inferior aspect of the abscess.     SPECIMEN: 15 mL of purulent fluid was aspirated and sent to lab for cultures and Gram stain.     BLOOD LOSS: Minimal.      The patient tolerated the procedure well. No immediate complications.     SEDATION: Versed 2 mg. Fentanyl 150 mcg. The procedure was performed with administration intravenous conscious sedation with appropriate preoperative, intraoperative, and postoperative evaluation.     87 minutes of supervised face to face conscious sedation time was provided by a radiology nurse under my direct supervision.                                                                      IMPRESSION:  1.  Status post CT-guided drain placement into a liver abscess.    CT Liver Abscess Drainage 01/16/2025  EXAM:  1. PERCUTANEOUS DRAIN PLACEMENT LATERAL LEFT HEPATIC LOBE  2. PERCUTANEOUS DRAIN PLACEMENT MEDIAL LEFT AND RIGHT HEPATIC LOBES  3. CT GUIDANCE  4. CONSCIOUS SEDATION  LOCATION: [facility]  DATE: 1/16/2025     INDICATION: abscesses  TECHNIQUE: Dose reduction techniques were used.     PROCEDURE: Informed consent obtained. Site marked. Prior images reviewed. Required items made available. Patient identity confirmed verbally and with arm band. Patient reevaluated immediately before administering sedation. Universal protocol was   followed. Time out performed. The site was prepped and draped in sterile fashion. 3 mL of 1% lidocaine was infused into the local soft tissues near the lateral left hepatic lobe lesion. Using standard technique and under direct CT guidance, a 10 [place]   drainage catheter was inserted into the fluid collection.  Subsequently, 3 mL of 1% lidocaine was infused into the local soft tissues near the medial left hepatic lobe and right hepatic lobe lesions. Using standard technique and under direct CT guidance,   a 10 [place] drainage catheter was inserted into the fluid collections.     SPECIMEN: 60 mL of opaque brown fluid was aspirated from the lateral left hepatic lobe collection and sent to lab for cultures and Gram stain. 38 mL of opaque brown fluid was aspirated from the right hepatic  "lobe collection and sent to lab for cultures   and Gram stain.     BLOOD LOSS: Minimal.     The patient tolerated the procedure well. No immediate complications.     SEDATION: Versed 0.5 mg. Fentanyl 25 mcg. The procedure was performed with administration intravenous conscious sedation with appropriate preoperative, intraoperative, and postoperative evaluation.     32 minutes of supervised face to face conscious sedation time was provided by a radiology nurse under my direct supervision.                                                                      IMPRESSION:  1.  Successful CT-guided drain placement into the lateral left hepatic collection.  2.  Successful CT-guided drain placement into the medial left hepatic and right hepatic collections.  3.  Moderate sedation.    LABS:  Recent Labs   Lab 01/24/25  0631 01/23/25  0630 01/22/25  0635 01/21/25  0526 01/20/25  0757   WBC 21.6* 9.7 11.7* 13.2* 14.4*   HGB 12.8* 11.0* 10.4* 11.1* 11.3*   * 397 384 391 352   CR 0.77 0.75 0.72  --  0.68     Culture:  01/16 Streptococcus intermedius  Antibiotic: Rocephin daily, Metronidazole TID  Flushing: 10 ml NS q8h to LEFT UPPER and RIGHT UPPER abdominal drains    Drain 1 Superior Medial Abdomen LEFT liver Outputs (in mL):  1/20 25   1/21 15   1/22 40   1/23 45   1/24 5     Drain 2 Medial RIGHT LUQ RIGHT liver Outputs (in mL):  1/20 35   1/21 25   1/22 25   1/23 20   1/24 0     Drain 3 RIGHT RUQ Outputs (in mL):  1/23 20   1/24 0                 A:  69 year old male with a PMH of PE/DVT, CAD s/p stenting, DM II, HTN, HLD, ischemic cardiomyopathy, MARÍA ELENA, RCC s/p partial nephrectomy, and tobacco abuse who was admitted to [facility] on 01/15/2025 2/2 abdominal pain, diarrhea, nausea, dizziness, generalized weakness, and poor appetite. Recent CT demonstrated \"3 prominent indeterminate complex hypodense masses within the liver\". Found to have sepsis 2/2 liver abscess. Underwent CT guided drain placement into liver x2 on 01/16. " "Cultures positive for Streptococcus intermedius. Repeat CT 01/21 demonstrated \"abscess in hepatic segment 4 has increased in size\". Underwent additional CT guided drain placement 01/23. Today, he states his pain is not controlled.    P:    - Continue to follow Hgb, WBC, and Cultures, drain OPs and patient's clinical signs/symptoms for improvement.   - Continue present drain cares. Continue drain to DAMI suction drainage. Flush drain as ordered.  - Antibiotics per ID.   - Anticipate that patient will likely discharge with drains in place.  - Drain care education provided to patient. All questions answered.   - Drain discharge instructions previously entered into D/C navigator.  - Patient to flush EACH drain with 10 mL NS daily upon discharge. NS flushes ordered in D/C navigator.  - Will continue to follow patient's clinical status, imaging, drain(s) function and drain(s) OPs to determine drain follow up plan. Anticipating follow-up CT/abscessogram approximately 7-14 days from drain placement date, once drain outputs are <20mL/day for a consecutive 2 days, and/or pending patient's clinical status and drain function.   - Drain follow up can occur as inpatient or outpatient, most often the first drain check will occur as outpatient. Outpatient drain follow up orders have been entered.  - IR will continue to follow loosely. Please contact IR with drain related questions or concerns.      Total time spent on the date of the encounter: 35 minutes.    CRISTINA CAMPOS [facility]  Interventional Radiology  [phone]    "

## 2025-01-24 NOTE — PLAN OF CARE
Problem: Pain Acute  Goal: Optimal Pain Control and Function  Intervention: Prevent or Manage Pain  Recent Flowsheet Documentation  Taken 1/24/2025 0917 by Abiodun Cagle RN  Medication Review/Management: medications reviewed     Problem: Infection  Goal: Absence of Infection Signs and Symptoms  Outcome: Progressing   Goal Outcome Evaluation:      Plan of Care Reviewed With: patient    Overall Patient Progress: no changeOverall Patient Progress: no change     A&Ox4, able to use call light and express needs to staff. C/O 8/10 pain in abd around DAMI drain sites, pain somewhat alleviated with PRN and scheduled medications but patient did not want anymore PRN pain medication when writer asked. DAMI drains irrigated per order and to bulb suctions, have had scant output this shift. Tolerated sitting up in recliner for a few hours this shift. Started on IVF. VSS on RA.

## 2025-01-24 NOTE — PLAN OF CARE
Patient ambulated to the bathroom but was not successful having a bowel movement. PRN senna was given. DAMI drains, x3, are all intact and draining. Output measured and 10 mL flush subtracted from the total. Patient complains of pain on the right abdomen overnight due to placement of third drain yesterday. PRN dilaudid given twice overnight.     Problem: Adult Inpatient Plan of Care  Goal: Plan of Care Review  Description: The Plan of Care Review/Shift note should be completed every shift.  The Outcome Evaluation is a brief statement about your assessment that the patient is improving, declining, or no change.  This information will be displayed automatically on your shift  note.  Outcome: Progressing     Problem: Adult Inpatient Plan of Care  Goal: Optimal Comfort and Wellbeing  Outcome: Progressing     Problem: Comorbidity Management  Goal: Blood Glucose Levels Within Targeted Range  Outcome: Progressing     Problem: Infection  Goal: Absence of Infection Signs and Symptoms  Outcome: Progressing     Problem: Pain Acute  Goal: Optimal Pain Control and Function  Outcome: Progressing

## 2025-01-25 ENCOUNTER — APPOINTMENT (OUTPATIENT)
Dept: PHYSICAL THERAPY | Facility: HOSPITAL | Age: 70
DRG: 871 | End: 2025-01-25
Attending: RADIOLOGY
Payer: COMMERCIAL

## 2025-01-25 LAB
ANION GAP SERPL CALCULATED.3IONS-SCNC: 3 MMOL/L (ref 7–15)
BUN SERPL-MCNC: 8.2 MG/DL (ref 8–23)
CALCIUM SERPL-MCNC: 8.2 MG/DL (ref 8.8–10.4)
CHLORIDE SERPL-SCNC: 96 MMOL/L (ref 98–107)
CREAT SERPL-MCNC: 0.7 MG/DL (ref 0.67–1.17)
EGFRCR SERPLBLD CKD-EPI 2021: >90 ML/MIN/1.73M2
ERYTHROCYTE [DISTWIDTH] IN BLOOD BY AUTOMATED COUNT: 13.6 % (ref 10–15)
GLUCOSE BLDC GLUCOMTR-MCNC: 209 MG/DL (ref 70–99)
GLUCOSE BLDC GLUCOMTR-MCNC: 244 MG/DL (ref 70–99)
GLUCOSE BLDC GLUCOMTR-MCNC: 253 MG/DL (ref 70–99)
GLUCOSE BLDC GLUCOMTR-MCNC: 276 MG/DL (ref 70–99)
GLUCOSE BLDC GLUCOMTR-MCNC: 279 MG/DL (ref 70–99)
GLUCOSE SERPL-MCNC: 221 MG/DL (ref 70–99)
HCO3 SERPL-SCNC: 30 MMOL/L (ref 22–29)
HCT VFR BLD AUTO: 34.9 % (ref 40–53)
HGB BLD-MCNC: 11.3 G/DL (ref 13.3–17.7)
MCH RBC QN AUTO: 27.8 PG (ref 26.5–33)
MCHC RBC AUTO-ENTMCNC: 32.4 G/DL (ref 31.5–36.5)
MCV RBC AUTO: 86 FL (ref 78–100)
PHOSPHATE SERPL-MCNC: 2.4 MG/DL (ref 2.5–4.5)
PLATELET # BLD AUTO: 369 10E3/UL (ref 150–450)
POTASSIUM SERPL-SCNC: 4.6 MMOL/L (ref 3.4–5.3)
RBC # BLD AUTO: 4.06 10E6/UL (ref 4.4–5.9)
SODIUM SERPL-SCNC: 129 MMOL/L (ref 135–145)
WBC # BLD AUTO: 19.4 10E3/UL (ref 4–11)

## 2025-01-25 PROCEDURE — 250N000011 HC RX IP 250 OP 636: Performed by: INTERNAL MEDICINE

## 2025-01-25 PROCEDURE — 258N000003 HC RX IP 258 OP 636: Performed by: STUDENT IN AN ORGANIZED HEALTH CARE EDUCATION/TRAINING PROGRAM

## 2025-01-25 PROCEDURE — 250N000013 HC RX MED GY IP 250 OP 250 PS 637: Performed by: STUDENT IN AN ORGANIZED HEALTH CARE EDUCATION/TRAINING PROGRAM

## 2025-01-25 PROCEDURE — 80048 BASIC METABOLIC PNL TOTAL CA: CPT | Performed by: STUDENT IN AN ORGANIZED HEALTH CARE EDUCATION/TRAINING PROGRAM

## 2025-01-25 PROCEDURE — 85027 COMPLETE CBC AUTOMATED: CPT | Performed by: STUDENT IN AN ORGANIZED HEALTH CARE EDUCATION/TRAINING PROGRAM

## 2025-01-25 PROCEDURE — 99232 SBSQ HOSP IP/OBS MODERATE 35: CPT | Performed by: INTERNAL MEDICINE

## 2025-01-25 PROCEDURE — 99232 SBSQ HOSP IP/OBS MODERATE 35: CPT | Performed by: STUDENT IN AN ORGANIZED HEALTH CARE EDUCATION/TRAINING PROGRAM

## 2025-01-25 PROCEDURE — 97116 GAIT TRAINING THERAPY: CPT | Mod: GP

## 2025-01-25 PROCEDURE — 82374 ASSAY BLOOD CARBON DIOXIDE: CPT | Performed by: STUDENT IN AN ORGANIZED HEALTH CARE EDUCATION/TRAINING PROGRAM

## 2025-01-25 PROCEDURE — 120N000001 HC R&B MED SURG/OB

## 2025-01-25 PROCEDURE — 250N000012 HC RX MED GY IP 250 OP 636 PS 637: Performed by: STUDENT IN AN ORGANIZED HEALTH CARE EDUCATION/TRAINING PROGRAM

## 2025-01-25 PROCEDURE — 250N000013 HC RX MED GY IP 250 OP 250 PS 637: Performed by: INTERNAL MEDICINE

## 2025-01-25 PROCEDURE — 250N000013 HC RX MED GY IP 250 OP 250 PS 637: Performed by: HOSPITALIST

## 2025-01-25 PROCEDURE — 84100 ASSAY OF PHOSPHORUS: CPT | Performed by: STUDENT IN AN ORGANIZED HEALTH CARE EDUCATION/TRAINING PROGRAM

## 2025-01-25 PROCEDURE — 97110 THERAPEUTIC EXERCISES: CPT | Mod: GP

## 2025-01-25 RX ORDER — BISACODYL 10 MG
10 SUPPOSITORY, RECTAL RECTAL DAILY PRN
Status: DISCONTINUED | OUTPATIENT
Start: 2025-01-25 | End: 2025-02-05 | Stop reason: HOSPADM

## 2025-01-25 RX ORDER — SODIUM CHLORIDE 9 MG/ML
INJECTION, SOLUTION INTRAVENOUS CONTINUOUS
Status: DISCONTINUED | OUTPATIENT
Start: 2025-01-25 | End: 2025-01-25

## 2025-01-25 RX ORDER — TESTOSTERONE 1.62 MG/G
1 GEL TRANSDERMAL
Status: DISCONTINUED | OUTPATIENT
Start: 2025-01-25 | End: 2025-02-05 | Stop reason: HOSPADM

## 2025-01-25 RX ADMIN — HYDROMORPHONE HYDROCHLORIDE 0.4 MG: 0.2 INJECTION, SOLUTION INTRAMUSCULAR; INTRAVENOUS; SUBCUTANEOUS at 18:44

## 2025-01-25 RX ADMIN — INSULIN ASPART 2 UNITS: 100 INJECTION, SOLUTION INTRAVENOUS; SUBCUTANEOUS at 08:13

## 2025-01-25 RX ADMIN — SODIUM CHLORIDE: 9 INJECTION, SOLUTION INTRAVENOUS at 09:20

## 2025-01-25 RX ADMIN — PANTOPRAZOLE SODIUM 40 MG: 40 TABLET, DELAYED RELEASE ORAL at 06:27

## 2025-01-25 RX ADMIN — HYDROMORPHONE HYDROCHLORIDE 0.4 MG: 0.2 INJECTION, SOLUTION INTRAMUSCULAR; INTRAVENOUS; SUBCUTANEOUS at 13:53

## 2025-01-25 RX ADMIN — BISACODYL 10 MG: 10 SUPPOSITORY RECTAL at 11:19

## 2025-01-25 RX ADMIN — METRONIDAZOLE 500 MG: 500 TABLET ORAL at 08:10

## 2025-01-25 RX ADMIN — INSULIN ASPART: 100 INJECTION, SOLUTION INTRAVENOUS; SUBCUTANEOUS at 18:05

## 2025-01-25 RX ADMIN — DULOXETINE HYDROCHLORIDE 60 MG: 60 CAPSULE, DELAYED RELEASE ORAL at 08:10

## 2025-01-25 RX ADMIN — POTASSIUM & SODIUM PHOSPHATES POWDER PACK 280-160-250 MG 1 PACKET: 280-160-250 PACK at 08:10

## 2025-01-25 RX ADMIN — CEFTRIAXONE SODIUM 2 G: 2 INJECTION, POWDER, FOR SOLUTION INTRAMUSCULAR; INTRAVENOUS at 08:17

## 2025-01-25 RX ADMIN — METRONIDAZOLE 500 MG: 500 TABLET ORAL at 21:29

## 2025-01-25 RX ADMIN — GABAPENTIN 600 MG: 300 CAPSULE ORAL at 13:51

## 2025-01-25 RX ADMIN — HYDROMORPHONE HYDROCHLORIDE 0.2 MG: 0.2 INJECTION, SOLUTION INTRAMUSCULAR; INTRAVENOUS; SUBCUTANEOUS at 05:27

## 2025-01-25 RX ADMIN — HYDROMORPHONE HYDROCHLORIDE 0.4 MG: 0.2 INJECTION, SOLUTION INTRAMUSCULAR; INTRAVENOUS; SUBCUTANEOUS at 16:20

## 2025-01-25 RX ADMIN — GABAPENTIN 600 MG: 300 CAPSULE ORAL at 08:10

## 2025-01-25 RX ADMIN — POLYETHYLENE GLYCOL 3350 17 G: 17 POWDER, FOR SOLUTION ORAL at 08:09

## 2025-01-25 RX ADMIN — POTASSIUM & SODIUM PHOSPHATES POWDER PACK 280-160-250 MG 1 PACKET: 280-160-250 PACK at 16:21

## 2025-01-25 RX ADMIN — OXYCODONE HYDROCHLORIDE 5 MG: 5 TABLET ORAL at 00:39

## 2025-01-25 RX ADMIN — HYDROMORPHONE HYDROCHLORIDE 0.4 MG: 0.2 INJECTION, SOLUTION INTRAMUSCULAR; INTRAVENOUS; SUBCUTANEOUS at 11:16

## 2025-01-25 RX ADMIN — MORPHINE SULFATE 15 MG: 15 TABLET, FILM COATED, EXTENDED RELEASE ORAL at 21:29

## 2025-01-25 RX ADMIN — POTASSIUM & SODIUM PHOSPHATES POWDER PACK 280-160-250 MG 1 PACKET: 280-160-250 PACK at 12:14

## 2025-01-25 RX ADMIN — MORPHINE SULFATE 15 MG: 15 TABLET, FILM COATED, EXTENDED RELEASE ORAL at 13:51

## 2025-01-25 RX ADMIN — GABAPENTIN 600 MG: 300 CAPSULE ORAL at 21:29

## 2025-01-25 RX ADMIN — METRONIDAZOLE 500 MG: 500 TABLET ORAL at 13:51

## 2025-01-25 RX ADMIN — INSULIN GLARGINE 25 UNITS: 100 INJECTION, SOLUTION SUBCUTANEOUS at 21:31

## 2025-01-25 RX ADMIN — SODIUM CHLORIDE: 9 INJECTION, SOLUTION INTRAVENOUS at 00:37

## 2025-01-25 RX ADMIN — APIXABAN 5 MG: 5 TABLET, FILM COATED ORAL at 21:29

## 2025-01-25 RX ADMIN — MORPHINE SULFATE 15 MG: 15 TABLET, FILM COATED, EXTENDED RELEASE ORAL at 08:10

## 2025-01-25 RX ADMIN — APIXABAN 5 MG: 5 TABLET, FILM COATED ORAL at 08:10

## 2025-01-25 ASSESSMENT — ACTIVITIES OF DAILY LIVING (ADL)
ADLS_ACUITY_SCORE: 65
ADLS_ACUITY_SCORE: 61
ADLS_ACUITY_SCORE: 65
ADLS_ACUITY_SCORE: 61
ADLS_ACUITY_SCORE: 61

## 2025-01-25 NOTE — PROGRESS NOTES
[facility]    Medicine Progress Note - Hospitalist Service    Date of Admission:  1/15/2025    Assessment & Plan   Bon Luque is a 69 year old male with past medical history significant for CAD, renal cell carcinoma status post nephrectomy 2017, hypertension, diabetes with neuropathy, HLD, hypogonadism on testosterone, psoriasis, and small bowel obstruction who was admitted on 1/15/2025 due to abdominal pain, diarrhea, nausea, dizziness, generalized weakness, and poor appetite for 11 days PTA. Found to have liver abscess, sepsis, Blood cultures growing strep intermedius.     Sepsis from Strep intermedius bacteremia  Multiple liver abscesses  Immunocompromised host  CT 1/13 w/ 3 prominent indeterminate complex hypodense masses in the liver concerning for neoplasm versus infectious process  MRI 1/15: 3 heterogeneous masses in the liver new from 11/8/2024 concerning for infection, possible rapidly progressive malignancy/metastases  IR consulted, underwent CT-guided liver drain x 2 1/16  Repeat CT 01/21: Multiloculated abscess, left hepatic lobe fluid decreased s/p catheter, abscess in hepatic segment 8 decrease in size s/p catheter.  Abscess in hepatic segment 4 increased in size  Cultures growing strep intermedius  was on  IV vancomycin and Cefepime, now on IV Ceftriaxone and po Flagyl per ID since 01/20   s/p 3rd drain on  01/23  PICC line placed 1/18  He is having significant discomfort at the third drain incision site      Hyponatremia  - after showing improvement sodium has dropped to 129. Most likely due to poor oral intake   - TSH normal  - Monitor     Acute drop in Hb  - Hb 13.2 on presentation, ~ 10.4  - No evidence of bleeding, suspect 2/2 sepsis  - Monitor Hb     Psoriasis  - No obvious flaring lesions, remotely on Skyrizi which has been held recently  - Outpatient follow-up     Hypophosphatemia - resolved  - Monitor and replete as needed     Remote DVT and PE  Eliquis       Chronic pain  PTA  MS Contin, gabapentin, duloxetine, oxycodone     History of renal cell carcinoma  Remote nephrectomy, follow creatinine      Hypogonadism  Resume usual testosterone supplement now if possible      Insulin-dependent type 2 diabetes mellitus  - Blood sugars acceptable  - A1c 7.9%  - 50 units of Lantus twice daily at home  - Decreased Insulin requirements due to poor appetite/nausea  - Decreased to Lantus 30u in the morning, 22u at bedtime.   - sliding scale insulin  - High consistent carb diet  - Titrate as needed     Moderate Malnutrition  - Seen by RD     Constipation  Miralex daily  Bisacodyl PRN           Diet: Moderate Consistent Carb (60 g CHO per Meal) Diet  Snacks/Supplements Adult: Ensure Enlive; With Meals    DVT Prophylaxis: DOAC  Seals Catheter: Not present  Lines: PRESENT      PICC 01/18/25 Single Lumen Left Basilic Antibiotic-Site Assessment: WDL      Cardiac Monitoring: None  Code Status: Full Code      Clinically Significant Risk Factors         # Hyponatremia: Lowest Na = 129 mmol/L in last 2 days, will monitor as appropriate  # Hypochloremia: Lowest Cl = 96 mmol/L in last 2 days, will monitor as appropriate      # Hypoalbuminemia: Lowest albumin = 2 g/dL at 1/18/2025  6:13 AM, will monitor as appropriate     # Hypertension: Noted on problem list           # DMII: A1C = 7.9 % (Ref range: <5.7 %) within past 6 months   # Overweight: Estimated body mass index is 28.05 kg/m  as calculated from the following:    Height as of this encounter: 1.829 m (6').    Weight as of this encounter: 93.8 kg (206 lb 12.8 oz).   # Moderate Malnutrition: based on nutrition assessment           Social Drivers of Health    Tobacco Use: Medium Risk (1/14/2025)    Patient History     Smoking Tobacco Use: Former     Smokeless Tobacco Use: Never     Passive Exposure: Past   Physical Activity: Insufficiently Active (8/23/2024)    Exercise Vital Sign     Days of Exercise per Week: 1 day     Minutes of  Exercise per Session: 20 min   Social Connections: Unknown (8/23/2024)    Social Connection and Isolation Panel [NHANES]     Frequency of Social Gatherings with Friends and Family: Once a week          Disposition Plan     Medically Ready for Discharge: Anticipated in 2-4 Days             Manan Liao MD  Hospitalist Service  [facility]  Securely message with [employer] (more info)  Text page via [employer] Paging/Directory   ______________________________________________________________________    Interval History   He reports having significant abdominal discomfort with nausea and generalized body weakness.  He complains of having pain at the site drain incision site.  Management plan discussed with the patient and he expressed understanding.    Physical Exam   Vital Signs: Temp: 98.7  F (37.1  C) Temp src: Oral BP: (!) 151/72 Pulse: 84   Resp: 16 SpO2: 95 % O2 Device: None (Room air)    Weight: 206 lbs 12.8 oz    General Appearance:  No distress noted  Respiratory: Good air entry bilaterally  Cardiovascular: S1 and S2 well heard  GI: Soft abdomen, obese, mild epigastric tenderness, normoactive bowel sounds, 2 drains on right upper quadrant abdominal draining well  Skin: Intact and warm       Medical Decision Making       40 MINUTES SPENT BY ME on the date of service doing chart review, history, exam, documentation & further activities per the note.      Data

## 2025-01-25 NOTE — PROGRESS NOTES
"[place] [facility] Progress Note    SUBJECTIVE:    Pain from his new drain. More output from drain #3 today    OBJECTIVE:  BP (!) 147/70 (BP Location: Right arm)   Pulse 102   Temp 97.4  F (36.3  C) (Oral)   Resp 18   Ht 1.829 m (6')   Wt 93.8 kg (206 lb 12.8 oz)   SpO2 97%   BMI 28.05 kg/m        PHYSICAL EXAM:  Alert, awake  Vitals tabulated above, reviewed  Sclera normal color, not injected  CARDIOVASCULAR regular rate and rhythm, no murmur  Lungs CLEAR TO AUSCULTATION   Abdomen soft, ND; 3 drains, empty. Tenderness at insertion site, midabdomen  Skin normal  Joints normal  Neurologic exam non focal    PICC, left arm     Antibiotics:  Ceftriaxone 1/20-  Metronidazole     Pertinent labs:    Recent Labs   Lab Test 01/25/25  0622 01/24/25  0631 01/23/25  0630   WBC 19.4* 21.6* 9.7   HGB 11.3* 12.8* 11.0*   HCT 34.9* 40.0 34.6*   MCV 86 86 86    483* 397       Lab Results   Component Value Date    RBC 3.74 01/17/2025     Lab Results   Component Value Date    HGB 10.5 01/17/2025     Lab Results   Component Value Date    HCT 30.6 01/17/2025     No components found for: \"MCT\"  Lab Results   Component Value Date    MCV 82 01/17/2025     Lab Results   Component Value Date    MCH 28.1 01/17/2025     Lab Results   Component Value Date    MCHC 34.3 01/17/2025     Lab Results   Component Value Date    RDW 13.1 01/17/2025     Lab Results   Component Value Date     01/17/2025       Last Comprehensive Metabolic Panel:  Sodium   Date Value Ref Range Status   01/25/2025 129 (L) 135 - 145 mmol/L Final     Potassium   Date Value Ref Range Status   01/25/2025 4.6 3.4 - 5.3 mmol/L Final   07/13/2022 4.2 3.5 - 5.0 mmol/L Final     Chloride   Date Value Ref Range Status   01/25/2025 96 (L) 98 - 107 mmol/L Final   07/13/2022 104 98 - 107 mmol/L Final     Carbon Dioxide (CO2)   Date Value Ref Range Status   01/25/2025 30 (H) 22 - 29 mmol/L Final   07/13/2022 22 22 - 31 mmol/L Final     Anion Gap   Date Value " "Ref Range Status   01/25/2025 3 (L) 7 - 15 mmol/L Final   07/13/2022 8 5 - 18 mmol/L Final     Glucose   Date Value Ref Range Status   01/25/2025 221 (H) 70 - 99 mg/dL Final   07/13/2022 286 (H) 70 - 125 mg/dL Final     GLUCOSE BY METER POCT   Date Value Ref Range Status   01/25/2025 253 (H) 70 - 99 mg/dL Final     Urea Nitrogen   Date Value Ref Range Status   01/25/2025 8.2 8.0 - 23.0 mg/dL Final   07/13/2022 17 8 - 22 mg/dL Final     Creatinine   Date Value Ref Range Status   01/25/2025 0.70 0.67 - 1.17 mg/dL Final     GFR Estimate   Date Value Ref Range Status   01/25/2025 >90 >60 mL/min/1.73m2 Final     Comment:     eGFR calculated using 2021 CKD-EPI equation.   01/13/2021 >60 >60 mL/min/1.73m2 Final     GFR, ESTIMATED POCT   Date Value Ref Range Status   11/08/2024 >60 >60 mL/min/1.73m2 Final     Calcium   Date Value Ref Range Status   01/25/2025 8.2 (L) 8.8 - 10.4 mg/dL Final     Comment:     Reference intervals for this test were updated on 7/16/2024 to reflect our healthy population more accurately. There may be differences in the flagging of prior results with similar values performed with this method. Those prior results can be interpreted in the context of the updated reference intervals.       Liver Function Studies -   Recent Labs   Lab Test 01/17/25  0641   PROTTOTAL 5.4*   ALBUMIN 2.2*   BILITOTAL 0.9   ALKPHOS 144   AST 83*   ALT 65       No results found for: \"SED\"    No results found for: \"CRP\"            MICROBIOLOGY DATA:    GPC in blood --> strep int  Gm stain liver pus also heavy GPC, culture same    Susceptibility data from last 90 days.  Collected Specimen Info Organism Cefotaxime Ceftriaxone Clindamycin Meropenem Penicillin Vancomycin   01/16/25 Abscess from Liver Streptococcus intermedius         01/16/25 Abscess from Liver Streptococcus intermedius         01/15/25 Peripheral Blood Streptococcus intermedius  S  S  S  S  S  S   01/15/25 Blood from Line, venous Streptococcus intermedius      "             ASSESSMENT:  Liver abscess, strep intermedius. Leukocytosis, normal, now up after 3rd drain placed  Drained. Repeat CT abd today showing anterior abscess (segment 4) that is larger. Previous drain going inferior to this. 3rd drain placed on 1/23.  On biologic for skin  Solitary kidney    RECOMMENDATION:  Continue ceftriaxone and po metronidazole   Anticipate 6 weeks iv antibiotics due to multi-loculated abscess  IR to re-assess next week due to low output from drains #1 and #2    Chon Garcia MD  [facility]  Direct messaging: [facility] Paging   On-Call ID provider: [phone], option: [phone]       Attestation:  I have reviewed today's Medications, Vital Signs, and Labs. Therapy plan discussed with patient.

## 2025-01-25 NOTE — PLAN OF CARE
Problem: Infection  Goal: Absence of Infection Signs and Symptoms  Outcome: Not Progressing     Problem: Pain Acute  Goal: Optimal Pain Control and Function  Outcome: Not Progressing  Intervention: Develop Pain Management Plan  Recent Flowsheet Documentation  Taken 1/25/2025 0810 by Abiodun Cagle RN  Pain Management Interventions: (Patient refused PRN pain medication.) medication (see MAR)  Intervention: Prevent or Manage Pain  Recent Flowsheet Documentation  Taken 1/25/2025 0817 by Abiodun Cagle RN  Medication Review/Management: medications reviewed   Goal Outcome Evaluation:      Plan of Care Reviewed With: patient    Overall Patient Progress: no changeOverall Patient Progress: no change     A&Ox4, able to use call light and express needs to staff. C/O 8/10 pain which is well managed with PRN pain medication after speaking with patient about utilizing pain meds more. Patient and family asking if 3rd DAMI drain is necessary cause patient has had increased pain and only having 6mL of output in 24 hours (provider notified). Given PRN suppository for constipation but has not had a BM this shift. No acute changes. VSS on RA.

## 2025-01-25 NOTE — PLAN OF CARE
Problem: Adult Inpatient Plan of Care  Goal: Absence of Hospital-Acquired Illness or Injury  Intervention: Identify and Manage Fall Risk  Recent Flowsheet Documentation  Taken 1/24/2025 1730 by Cherise Metcalf RN  Safety Promotion/Fall Prevention:   activity supervised   clutter free environment maintained   lighting adjusted   mobility aid in reach   nonskid shoes/slippers when out of bed   patient and family education     Problem: Adult Inpatient Plan of Care  Goal: Absence of Hospital-Acquired Illness or Injury  Intervention: Prevent Skin Injury  Recent Flowsheet Documentation  Taken 1/24/2025 2100 by Cherise Metcalf RN  Body Position: position changed independently  Taken 1/24/2025 1730 by Cherise Metcalf RN  Body Position: position changed independently   Goal Outcome Evaluation:       Pt A&Ox4. Activity encouraged, urinal used at bedside. Reported abdominal pain, PRN dilaudid given with relief. (3) DAMI sites C/D/I. PICC C/D/I. NS running @75mL/hr. Phos, K+ protocol recheck in AM. VSS on RA. Cherise Metcalf, LIM[place]

## 2025-01-26 ENCOUNTER — APPOINTMENT (OUTPATIENT)
Dept: PHYSICAL THERAPY | Facility: HOSPITAL | Age: 70
End: 2025-01-26
Attending: RADIOLOGY
Payer: COMMERCIAL

## 2025-01-26 LAB
ANION GAP SERPL CALCULATED.3IONS-SCNC: 5 MMOL/L (ref 7–15)
BUN SERPL-MCNC: 7.1 MG/DL (ref 8–23)
CALCIUM SERPL-MCNC: 8.5 MG/DL (ref 8.8–10.4)
CHLORIDE SERPL-SCNC: 94 MMOL/L (ref 98–107)
CREAT SERPL-MCNC: 0.65 MG/DL (ref 0.67–1.17)
EGFRCR SERPLBLD CKD-EPI 2021: >90 ML/MIN/1.73M2
ERYTHROCYTE [DISTWIDTH] IN BLOOD BY AUTOMATED COUNT: 13.6 % (ref 10–15)
GLUCOSE BLDC GLUCOMTR-MCNC: 188 MG/DL (ref 70–99)
GLUCOSE BLDC GLUCOMTR-MCNC: 199 MG/DL (ref 70–99)
GLUCOSE BLDC GLUCOMTR-MCNC: 221 MG/DL (ref 70–99)
GLUCOSE BLDC GLUCOMTR-MCNC: 282 MG/DL (ref 70–99)
GLUCOSE BLDC GLUCOMTR-MCNC: 286 MG/DL (ref 70–99)
GLUCOSE SERPL-MCNC: 191 MG/DL (ref 70–99)
HCO3 SERPL-SCNC: 30 MMOL/L (ref 22–29)
HCT VFR BLD AUTO: 32.6 % (ref 40–53)
HGB BLD-MCNC: 10.9 G/DL (ref 13.3–17.7)
MCH RBC QN AUTO: 28.3 PG (ref 26.5–33)
MCHC RBC AUTO-ENTMCNC: 33.4 G/DL (ref 31.5–36.5)
MCV RBC AUTO: 85 FL (ref 78–100)
PHOSPHATE SERPL-MCNC: 2.7 MG/DL (ref 2.5–4.5)
PLATELET # BLD AUTO: 375 10E3/UL (ref 150–450)
POTASSIUM SERPL-SCNC: 4.5 MMOL/L (ref 3.4–5.3)
RBC # BLD AUTO: 3.85 10E6/UL (ref 4.4–5.9)
SODIUM SERPL-SCNC: 129 MMOL/L (ref 135–145)
WBC # BLD AUTO: 16.6 10E3/UL (ref 4–11)

## 2025-01-26 PROCEDURE — 250N000013 HC RX MED GY IP 250 OP 250 PS 637: Performed by: STUDENT IN AN ORGANIZED HEALTH CARE EDUCATION/TRAINING PROGRAM

## 2025-01-26 PROCEDURE — 97110 THERAPEUTIC EXERCISES: CPT | Mod: GP

## 2025-01-26 PROCEDURE — 84100 ASSAY OF PHOSPHORUS: CPT | Performed by: STUDENT IN AN ORGANIZED HEALTH CARE EDUCATION/TRAINING PROGRAM

## 2025-01-26 PROCEDURE — 250N000013 HC RX MED GY IP 250 OP 250 PS 637: Performed by: HOSPITALIST

## 2025-01-26 PROCEDURE — 250N000013 HC RX MED GY IP 250 OP 250 PS 637: Performed by: INTERNAL MEDICINE

## 2025-01-26 PROCEDURE — 82435 ASSAY OF BLOOD CHLORIDE: CPT | Performed by: STUDENT IN AN ORGANIZED HEALTH CARE EDUCATION/TRAINING PROGRAM

## 2025-01-26 PROCEDURE — 120N000001 HC R&B MED SURG/OB

## 2025-01-26 PROCEDURE — 97116 GAIT TRAINING THERAPY: CPT | Mod: GP

## 2025-01-26 PROCEDURE — 84295 ASSAY OF SERUM SODIUM: CPT | Performed by: STUDENT IN AN ORGANIZED HEALTH CARE EDUCATION/TRAINING PROGRAM

## 2025-01-26 PROCEDURE — 85014 HEMATOCRIT: CPT | Performed by: STUDENT IN AN ORGANIZED HEALTH CARE EDUCATION/TRAINING PROGRAM

## 2025-01-26 PROCEDURE — 250N000011 HC RX IP 250 OP 636: Performed by: INTERNAL MEDICINE

## 2025-01-26 PROCEDURE — 99232 SBSQ HOSP IP/OBS MODERATE 35: CPT | Performed by: INTERNAL MEDICINE

## 2025-01-26 PROCEDURE — 80048 BASIC METABOLIC PNL TOTAL CA: CPT | Performed by: STUDENT IN AN ORGANIZED HEALTH CARE EDUCATION/TRAINING PROGRAM

## 2025-01-26 PROCEDURE — 85048 AUTOMATED LEUKOCYTE COUNT: CPT | Performed by: STUDENT IN AN ORGANIZED HEALTH CARE EDUCATION/TRAINING PROGRAM

## 2025-01-26 PROCEDURE — 99232 SBSQ HOSP IP/OBS MODERATE 35: CPT | Performed by: STUDENT IN AN ORGANIZED HEALTH CARE EDUCATION/TRAINING PROGRAM

## 2025-01-26 PROCEDURE — 82374 ASSAY BLOOD CARBON DIOXIDE: CPT | Performed by: STUDENT IN AN ORGANIZED HEALTH CARE EDUCATION/TRAINING PROGRAM

## 2025-01-26 RX ADMIN — CEFTRIAXONE SODIUM 2 G: 2 INJECTION, POWDER, FOR SOLUTION INTRAMUSCULAR; INTRAVENOUS at 09:49

## 2025-01-26 RX ADMIN — PANTOPRAZOLE SODIUM 40 MG: 40 TABLET, DELAYED RELEASE ORAL at 06:39

## 2025-01-26 RX ADMIN — GABAPENTIN 600 MG: 300 CAPSULE ORAL at 13:56

## 2025-01-26 RX ADMIN — MORPHINE SULFATE 15 MG: 15 TABLET, FILM COATED, EXTENDED RELEASE ORAL at 09:37

## 2025-01-26 RX ADMIN — HYDROMORPHONE HYDROCHLORIDE 0.4 MG: 0.2 INJECTION, SOLUTION INTRAMUSCULAR; INTRAVENOUS; SUBCUTANEOUS at 23:01

## 2025-01-26 RX ADMIN — METRONIDAZOLE 500 MG: 500 TABLET ORAL at 13:56

## 2025-01-26 RX ADMIN — MORPHINE SULFATE 15 MG: 15 TABLET, FILM COATED, EXTENDED RELEASE ORAL at 21:26

## 2025-01-26 RX ADMIN — HYDROMORPHONE HYDROCHLORIDE 0.2 MG: 0.2 INJECTION, SOLUTION INTRAMUSCULAR; INTRAVENOUS; SUBCUTANEOUS at 13:57

## 2025-01-26 RX ADMIN — OXYCODONE HYDROCHLORIDE 5 MG: 5 TABLET ORAL at 04:24

## 2025-01-26 RX ADMIN — GABAPENTIN 600 MG: 300 CAPSULE ORAL at 21:26

## 2025-01-26 RX ADMIN — METRONIDAZOLE 500 MG: 500 TABLET ORAL at 09:36

## 2025-01-26 RX ADMIN — APIXABAN 5 MG: 5 TABLET, FILM COATED ORAL at 21:26

## 2025-01-26 RX ADMIN — HYDROMORPHONE HYDROCHLORIDE 0.2 MG: 0.2 INJECTION, SOLUTION INTRAMUSCULAR; INTRAVENOUS; SUBCUTANEOUS at 10:49

## 2025-01-26 RX ADMIN — HYDROMORPHONE HYDROCHLORIDE 0.4 MG: 0.2 INJECTION, SOLUTION INTRAMUSCULAR; INTRAVENOUS; SUBCUTANEOUS at 18:48

## 2025-01-26 RX ADMIN — INSULIN ASPART: 100 INJECTION, SOLUTION INTRAVENOUS; SUBCUTANEOUS at 18:17

## 2025-01-26 RX ADMIN — POLYETHYLENE GLYCOL 3350 17 G: 17 POWDER, FOR SOLUTION ORAL at 09:35

## 2025-01-26 RX ADMIN — ANTACID TABLETS 1000 MG: 500 TABLET, CHEWABLE ORAL at 10:48

## 2025-01-26 RX ADMIN — GABAPENTIN 600 MG: 300 CAPSULE ORAL at 09:36

## 2025-01-26 RX ADMIN — MORPHINE SULFATE 15 MG: 15 TABLET, FILM COATED, EXTENDED RELEASE ORAL at 13:56

## 2025-01-26 RX ADMIN — DULOXETINE HYDROCHLORIDE 60 MG: 60 CAPSULE, DELAYED RELEASE ORAL at 09:36

## 2025-01-26 RX ADMIN — APIXABAN 5 MG: 5 TABLET, FILM COATED ORAL at 09:36

## 2025-01-26 RX ADMIN — INSULIN ASPART 5 UNITS: 100 INJECTION, SOLUTION INTRAVENOUS; SUBCUTANEOUS at 12:08

## 2025-01-26 RX ADMIN — METRONIDAZOLE 500 MG: 500 TABLET ORAL at 21:26

## 2025-01-26 ASSESSMENT — ACTIVITIES OF DAILY LIVING (ADL)
ADLS_ACUITY_SCORE: 61

## 2025-01-26 NOTE — PROGRESS NOTES
"[facility] Progress Note    SUBJECTIVE:    Pain better. Wbc improved.    Drain #1 with 14 ml out yesterday  Drain #2 with 9 ml out yesterday  Drain #3 with 24 ml out yesterday      OBJECTIVE:  /60 (BP Location: Right arm)   Pulse 89   Temp 97.6  F (36.4  C) (Oral)   Resp 18   Ht 1.829 m (6')   Wt 92.9 kg (204 lb 12.8 oz)   SpO2 95%   BMI 27.78 kg/m        PHYSICAL EXAM:  Alert, awake  Vitals tabulated above, reviewed  Sclera normal color, not injected  CARDIOVASCULAR regular rate and rhythm, no murmur  Lungs CLEAR TO AUSCULTATION   Abdomen soft, ND; 3 drains, empty. Tenderness at insertion site, midabdomen  Skin normal  Joints normal  Neurologic exam non focal    PICC, left arm     Antibiotics:  Ceftriaxone 1/20-  Metronidazole     Pertinent labs:    Recent Labs   Lab Test 01/26/25  0549 01/25/25  0622 01/24/25  0631   WBC 16.6* 19.4* 21.6*   HGB 10.9* 11.3* 12.8*   HCT 32.6* 34.9* 40.0   MCV 85 86 86    369 483*       Lab Results   Component Value Date    RBC 3.74 01/17/2025     Lab Results   Component Value Date    HGB 10.5 01/17/2025     Lab Results   Component Value Date    HCT 30.6 01/17/2025     No components found for: \"MCT\"  Lab Results   Component Value Date    MCV 82 01/17/2025     Lab Results   Component Value Date    MCH 28.1 01/17/2025     Lab Results   Component Value Date    MCHC 34.3 01/17/2025     Lab Results   Component Value Date    RDW 13.1 01/17/2025     Lab Results   Component Value Date     01/17/2025       Last Comprehensive Metabolic Panel:  Sodium   Date Value Ref Range Status   01/26/2025 129 (L) 135 - 145 mmol/L Final     Potassium   Date Value Ref Range Status   01/26/2025 4.5 3.4 - 5.3 mmol/L Final   07/13/2022 4.2 3.5 - 5.0 mmol/L Final     Chloride   Date Value Ref Range Status   01/26/2025 94 (L) 98 - 107 mmol/L Final   07/13/2022 104 98 - 107 mmol/L Final     Carbon Dioxide (CO2)   Date Value Ref Range Status   01/26/2025 30 (H) 22 - 29 " "mmol/L Final   07/13/2022 22 22 - 31 mmol/L Final     Anion Gap   Date Value Ref Range Status   01/26/2025 5 (L) 7 - 15 mmol/L Final   07/13/2022 8 5 - 18 mmol/L Final     Glucose   Date Value Ref Range Status   01/26/2025 191 (H) 70 - 99 mg/dL Final   07/13/2022 286 (H) 70 - 125 mg/dL Final     GLUCOSE BY METER POCT   Date Value Ref Range Status   01/26/2025 286 (H) 70 - 99 mg/dL Final     Urea Nitrogen   Date Value Ref Range Status   01/26/2025 7.1 (L) 8.0 - 23.0 mg/dL Final   07/13/2022 17 8 - 22 mg/dL Final     Creatinine   Date Value Ref Range Status   01/26/2025 0.65 (L) 0.67 - 1.17 mg/dL Final     GFR Estimate   Date Value Ref Range Status   01/26/2025 >90 >60 mL/min/1.73m2 Final     Comment:     eGFR calculated using 2021 CKD-EPI equation.   01/13/2021 >60 >60 mL/min/1.73m2 Final     GFR, ESTIMATED POCT   Date Value Ref Range Status   11/08/2024 >60 >60 mL/min/1.73m2 Final     Calcium   Date Value Ref Range Status   01/26/2025 8.5 (L) 8.8 - 10.4 mg/dL Final     Comment:     Reference intervals for this test were updated on 7/16/2024 to reflect our healthy population more accurately. There may be differences in the flagging of prior results with similar values performed with this method. Those prior results can be interpreted in the context of the updated reference intervals.       Liver Function Studies -   Recent Labs   Lab Test 01/17/25  0641   PROTTOTAL 5.4*   ALBUMIN 2.2*   BILITOTAL 0.9   ALKPHOS 144   AST 83*   ALT 65       No results found for: \"SED\"    No results found for: \"CRP\"            MICROBIOLOGY DATA:    GPC in blood --> strep int  Gm stain liver pus also heavy GPC, culture same    Susceptibility data from last 90 days.  Collected Specimen Info Organism Cefotaxime Ceftriaxone Clindamycin Meropenem Penicillin Vancomycin   01/16/25 Abscess from Liver Streptococcus intermedius         01/16/25 Abscess from Liver Streptococcus intermedius         01/15/25 Peripheral Blood Streptococcus " intermedius  S  S  S  S  S  S   01/15/25 Blood from Line, venous Streptococcus intermedius                  ASSESSMENT:  Liver abscess, strep intermedius. Leukocytosis, normal, now up after 3rd drain placed  Drained. Repeat CT abd today showing anterior abscess (segment 4) that is larger. Previous drain going inferior to this. 3rd drain placed on 1/23.  On biologic for skin  Solitary kidney    RECOMMENDATION:  Continue ceftriaxone and po metronidazole   Anticipate 6 weeks iv antibiotics due to multi-loculated abscess  IR to re-assess tomorrow due to low output from drains #1 and #2    Chon Garcia MD  [facility]  Direct messaging: [facility] Paging   On-Call ID provider: [phone], option: 9       Attestation:  I have reviewed today's Medications, Vital Signs, and Labs. Therapy plan discussed with patient.

## 2025-01-26 NOTE — PLAN OF CARE
Problem: Comorbidity Management  Goal: Blood Glucose Levels Within Targeted Range  Outcome: Progressing  Intervention: Monitor and Manage Glycemia  Recent Flowsheet Documentation  Taken 1/26/2025 0020 by Salomón Bray, RN  Medication Review/Management: medications reviewed     Problem: Infection  Goal: Absence of Infection Signs and Symptoms  Outcome: Progressing     Problem: Pain Acute  Goal: Optimal Pain Control and Function  Outcome: Progressing  Intervention: Develop Pain Management Plan  Recent Flowsheet Documentation  Taken 1/26/2025 0020 by Salomón Bray, RN  Pain Management Interventions:   rest   repositioned   relaxation techniques promoted  Intervention: Prevent or Manage Pain  Recent Flowsheet Documentation  Taken 1/26/2025 0020 by Salomón Bray, RN  Medication Review/Management: medications reviewed   Goal Outcome Evaluation:       Pt is alert and able to communicate needs. Complained of abdominal pain rated 7/10 on pain scale. PRN Oxycodone given with relief. 3 DAMI drains intact with minimal output. PICC in placed. VSS on room air.

## 2025-01-26 NOTE — PLAN OF CARE
Problem: Comorbidity Management  Goal: Blood Glucose Levels Within Targeted Range  Outcome: Not Progressing  Intervention: Monitor and Manage Glycemia  Recent Flowsheet Documentation  Taken 1/26/2025 0950 by Abiodun Cagle, RN  Medication Review/Management: medications reviewed     Problem: Infection  Goal: Absence of Infection Signs and Symptoms  Outcome: Progressing     Problem: Pain Acute  Goal: Optimal Pain Control and Function  Intervention: Prevent or Manage Pain  Recent Flowsheet Documentation  Taken 1/26/2025 0950 by Abiodun Cagle, RN  Medication Review/Management: medications reviewed   Goal Outcome Evaluation:      Plan of Care Reviewed With: patient    Overall Patient Progress: no changeOverall Patient Progress: no change     A&Ox4, able to express needs to staff. C/O 6/10 pain this shift which is well managed with PRN and scheduled medication. DAMI drains are C/D/I and irrigated per order, continues to have scant output from all 3 drains. Patient only willing to get OOB with encouragement from staff but does reposition himself. Poor appetite today. No acute changes. VSS on RA.

## 2025-01-26 NOTE — PLAN OF CARE
Problem: Adult Inpatient Plan of Care  Goal: Absence of Hospital-Acquired Illness or Injury  Intervention: Identify and Manage Fall Risk  Recent Flowsheet Documentation  Taken 1/25/2025 1620 by Cherise Metcalf RN  Safety Promotion/Fall Prevention:   activity supervised   clutter free environment maintained   lighting adjusted   mobility aid in reach   nonskid shoes/slippers when out of bed   patient and family education     Problem: Pain Acute  Goal: Optimal Pain Control and Function  Outcome: Progressing  Intervention: Prevent or Manage Pain  Recent Flowsheet Documentation  Taken 1/25/2025 1620 by Cherise Metcalf RN  Bowel Elimination Promotion:   adequate fluid intake promoted   ambulation promoted  Medication Review/Management: medications reviewed   Goal Outcome Evaluation:       Pt A&Ox4, up with assist 1 and walker to bathroom. DAMI drain sites C/D/I, minimal output. PICC C/D/I. Pt reports pain 7/10, PRN dilaudid given x2 with relief. Phos, K+ protocol recheck in AM. BS covered per orders. Cherise Metcalf, RN

## 2025-01-26 NOTE — PROGRESS NOTES
[facility]    Medicine Progress Note - Hospitalist Service    Date of Admission:  1/15/2025    Assessment & Plan   Bon Luque is a 69 year old male with past medical history significant for CAD, renal cell carcinoma status post nephrectomy 2017, hypertension, diabetes with neuropathy, HLD, hypogonadism on testosterone, psoriasis, and small bowel obstruction who was admitted on 1/15/2025 due to abdominal pain, diarrhea, nausea, dizziness, generalized weakness, and poor appetite for 11 days PTA. Found to have liver abscess, sepsis, Blood cultures growing strep intermedius.     Sepsis from Strep intermedius bacteremia  Multiple liver abscesses  Immunocompromised host  CT 1/13 w/ 3 prominent indeterminate complex hypodense masses in the liver concerning for neoplasm versus infectious process  MRI 1/15: 3 heterogeneous masses in the liver new from 11/8/2024 concerning for infection, possible rapidly progressive malignancy/metastases  IR consulted, underwent CT-guided liver drain x 2 1/16  Repeat CT 01/21: Multiloculated abscess, left hepatic lobe fluid decreased s/p catheter, abscess in hepatic segment 8 decrease in size s/p catheter.  Abscess in hepatic segment 4 increased in size  Cultures growing strep intermedius  was on  IV vancomycin and Cefepime, now on IV Ceftriaxone and po Flagyl per ID since 01/20   s/p 3rd drain on  01/23  PICC line placed 1/18  All three drains have some outputs  Encourage IS, hydration      Hyponatremia  - after showing improvement sodium has dropped to 129. Most likely due to poor oral intake   - TSH normal  - Monitor     Acute drop in Hb  - Hb 13.2 on presentation, ~ 10.4  - Monitor Hb     Psoriasis  - No obvious flaring lesions, remotely on Skyrizi which has been held recently  - Outpatient follow-up     Hypophosphatemia - resolved  - Monitor and replete as needed     Remote DVT and PE  Eliquis      Chronic pain  PTA  MS Contin, gabapentin, duloxetine,  oxycodone     History of renal cell carcinoma  Remote nephrectomy, follow creatinine      Hypogonadism  Resume usual testosterone supplement now if possible      Insulin-dependent type 2 diabetes mellitus  - Blood sugars acceptable  - A1c 7.9%  - 50 units of Lantus twice daily at home  - increased  to Lantus 35u in the morning, 30u at bedtime.   - sliding scale insulin  - High consistent carb diet  - Titrate as needed     Moderate Malnutrition  - Seen by RD     Constipation  Miralex daily  Bisacodyl PRN           Diet: Moderate Consistent Carb (60 g CHO per Meal) Diet  Snacks/Supplements Adult: Ensure Enlive; With Meals    DVT Prophylaxis: DOAC  Seals Catheter: Not present  Lines: PRESENT      PICC 01/18/25 Single Lumen Left Basilic Antibiotic-Site Assessment: WDL      Cardiac Monitoring: None  Code Status: Full Code      Clinically Significant Risk Factors         # Hyponatremia: Lowest Na = 129 mmol/L in last 2 days, will monitor as appropriate  # Hypochloremia: Lowest Cl = 94 mmol/L in last 2 days, will monitor as appropriate      # Hypoalbuminemia: Lowest albumin = 2 g/dL at 1/18/2025  6:13 AM, will monitor as appropriate     # Hypertension: Noted on problem list           # DMII: A1C = 7.9 % (Ref range: <5.7 %) within past 6 months   # Overweight: Estimated body mass index is 27.78 kg/m  as calculated from the following:    Height as of this encounter: 1.829 m (6').    Weight as of this encounter: 92.9 kg (204 lb 12.8 oz).   # Moderate Malnutrition: based on nutrition assessment           Social Drivers of Health    Tobacco Use: Medium Risk (1/14/2025)    Patient History     Smoking Tobacco Use: Former     Smokeless Tobacco Use: Never     Passive Exposure: Past   Physical Activity: Insufficiently Active (8/23/2024)    Exercise Vital Sign     Days of Exercise per Week: 1 day     Minutes of Exercise per Session: 20 min   Social Connections: Unknown (8/23/2024)    Social Connection and Isolation Panel [NHANES]      Frequency of Social Gatherings with Friends and Family: Once a week          Disposition Plan     Medically Ready for Discharge: Anticipated in 2-4 Days             Manan Liao MD  Hospitalist Service  [facility]  Securely message with Dinah (more info)  Text page via [employer] Paging/Directory   ______________________________________________________________________    Interval History   No distress noted.  Patient states right upper quadrant abdominal discomfort around incision site is relatively better today.  Did have bowel movement yesterday.  Management plan discussed with the patient and he expressed understanding.    Physical Exam   Vital Signs: Temp: 97.4  F (36.3  C) Temp src: Oral BP: (!) 158/73 Pulse: 98   Resp: 18 SpO2: 98 % O2 Device: None (Room air)    Weight: 204 lbs 12.8 oz    General Appearance:  No distress noted  Respiratory: Good air entry bilaterally  Cardiovascular: S1 and S2 well heard  GI: Soft abdomen, obese, mild epigastric tenderness, normoactive bowel sounds, 2 drains on right upper quadrant abdominal draining well  Skin: Intact and warm       Medical Decision Making       40 MINUTES SPENT BY ME on the date of service doing chart review, history, exam, documentation & further activities per the note.      Data

## 2025-01-27 ENCOUNTER — APPOINTMENT (OUTPATIENT)
Dept: OCCUPATIONAL THERAPY | Facility: HOSPITAL | Age: 70
End: 2025-01-27
Attending: RADIOLOGY
Payer: COMMERCIAL

## 2025-01-27 LAB
ANION GAP SERPL CALCULATED.3IONS-SCNC: 7 MMOL/L (ref 7–15)
ATRIAL RATE - MUSE: 109 BPM
BUN SERPL-MCNC: 8.3 MG/DL (ref 8–23)
CALCIUM SERPL-MCNC: 8.6 MG/DL (ref 8.8–10.4)
CHLORIDE SERPL-SCNC: 95 MMOL/L (ref 98–107)
CREAT SERPL-MCNC: 0.61 MG/DL (ref 0.67–1.17)
DIASTOLIC BLOOD PRESSURE - MUSE: NORMAL MMHG
EGFRCR SERPLBLD CKD-EPI 2021: >90 ML/MIN/1.73M2
ERYTHROCYTE [DISTWIDTH] IN BLOOD BY AUTOMATED COUNT: 13.5 % (ref 10–15)
GLUCOSE BLDC GLUCOMTR-MCNC: 123 MG/DL (ref 70–99)
GLUCOSE BLDC GLUCOMTR-MCNC: 181 MG/DL (ref 70–99)
GLUCOSE BLDC GLUCOMTR-MCNC: 184 MG/DL (ref 70–99)
GLUCOSE BLDC GLUCOMTR-MCNC: 240 MG/DL (ref 70–99)
GLUCOSE BLDC GLUCOMTR-MCNC: 286 MG/DL (ref 70–99)
GLUCOSE SERPL-MCNC: 140 MG/DL (ref 70–99)
HCO3 SERPL-SCNC: 30 MMOL/L (ref 22–29)
HCT VFR BLD AUTO: 32 % (ref 40–53)
HGB BLD-MCNC: 10.5 G/DL (ref 13.3–17.7)
INTERPRETATION ECG - MUSE: NORMAL
MCH RBC QN AUTO: 28.1 PG (ref 26.5–33)
MCHC RBC AUTO-ENTMCNC: 32.8 G/DL (ref 31.5–36.5)
MCV RBC AUTO: 86 FL (ref 78–100)
P AXIS - MUSE: 54 DEGREES
PHOSPHATE SERPL-MCNC: 3.1 MG/DL (ref 2.5–4.5)
PLATELET # BLD AUTO: 361 10E3/UL (ref 150–450)
POTASSIUM SERPL-SCNC: 4.4 MMOL/L (ref 3.4–5.3)
PR INTERVAL - MUSE: 152 MS
QRS DURATION - MUSE: 96 MS
QT - MUSE: 296 MS
QTC - MUSE: 398 MS
R AXIS - MUSE: 37 DEGREES
RBC # BLD AUTO: 3.74 10E6/UL (ref 4.4–5.9)
SODIUM SERPL-SCNC: 132 MMOL/L (ref 135–145)
SYSTOLIC BLOOD PRESSURE - MUSE: NORMAL MMHG
T AXIS - MUSE: -2 DEGREES
VENTRICULAR RATE- MUSE: 109 BPM
WBC # BLD AUTO: 11.7 10E3/UL (ref 4–11)

## 2025-01-27 PROCEDURE — 82310 ASSAY OF CALCIUM: CPT | Performed by: STUDENT IN AN ORGANIZED HEALTH CARE EDUCATION/TRAINING PROGRAM

## 2025-01-27 PROCEDURE — 99232 SBSQ HOSP IP/OBS MODERATE 35: CPT | Performed by: INTERNAL MEDICINE

## 2025-01-27 PROCEDURE — 97535 SELF CARE MNGMENT TRAINING: CPT | Mod: GO

## 2025-01-27 PROCEDURE — 250N000013 HC RX MED GY IP 250 OP 250 PS 637: Performed by: HOSPITALIST

## 2025-01-27 PROCEDURE — 82374 ASSAY BLOOD CARBON DIOXIDE: CPT | Performed by: STUDENT IN AN ORGANIZED HEALTH CARE EDUCATION/TRAINING PROGRAM

## 2025-01-27 PROCEDURE — 99232 SBSQ HOSP IP/OBS MODERATE 35: CPT | Performed by: STUDENT IN AN ORGANIZED HEALTH CARE EDUCATION/TRAINING PROGRAM

## 2025-01-27 PROCEDURE — 250N000011 HC RX IP 250 OP 636: Performed by: INTERNAL MEDICINE

## 2025-01-27 PROCEDURE — 120N000001 HC R&B MED SURG/OB

## 2025-01-27 PROCEDURE — 84100 ASSAY OF PHOSPHORUS: CPT | Performed by: STUDENT IN AN ORGANIZED HEALTH CARE EDUCATION/TRAINING PROGRAM

## 2025-01-27 PROCEDURE — 250N000013 HC RX MED GY IP 250 OP 250 PS 637: Performed by: INTERNAL MEDICINE

## 2025-01-27 PROCEDURE — 85014 HEMATOCRIT: CPT | Performed by: STUDENT IN AN ORGANIZED HEALTH CARE EDUCATION/TRAINING PROGRAM

## 2025-01-27 PROCEDURE — 80048 BASIC METABOLIC PNL TOTAL CA: CPT | Performed by: STUDENT IN AN ORGANIZED HEALTH CARE EDUCATION/TRAINING PROGRAM

## 2025-01-27 PROCEDURE — 250N000013 HC RX MED GY IP 250 OP 250 PS 637: Performed by: STUDENT IN AN ORGANIZED HEALTH CARE EDUCATION/TRAINING PROGRAM

## 2025-01-27 RX ADMIN — CEFTRIAXONE SODIUM 2 G: 2 INJECTION, POWDER, FOR SOLUTION INTRAMUSCULAR; INTRAVENOUS at 08:51

## 2025-01-27 RX ADMIN — INSULIN ASPART 20 UNITS: 100 INJECTION, SOLUTION INTRAVENOUS; SUBCUTANEOUS at 17:23

## 2025-01-27 RX ADMIN — MORPHINE SULFATE 15 MG: 15 TABLET, FILM COATED, EXTENDED RELEASE ORAL at 20:59

## 2025-01-27 RX ADMIN — MORPHINE SULFATE 15 MG: 15 TABLET, FILM COATED, EXTENDED RELEASE ORAL at 08:50

## 2025-01-27 RX ADMIN — INSULIN ASPART 12 UNITS: 100 INJECTION, SOLUTION INTRAVENOUS; SUBCUTANEOUS at 10:32

## 2025-01-27 RX ADMIN — HYDROMORPHONE HYDROCHLORIDE 0.4 MG: 0.2 INJECTION, SOLUTION INTRAMUSCULAR; INTRAVENOUS; SUBCUTANEOUS at 09:27

## 2025-01-27 RX ADMIN — APIXABAN 5 MG: 5 TABLET, FILM COATED ORAL at 21:00

## 2025-01-27 RX ADMIN — GABAPENTIN 600 MG: 300 CAPSULE ORAL at 21:00

## 2025-01-27 RX ADMIN — METRONIDAZOLE 500 MG: 500 TABLET ORAL at 08:50

## 2025-01-27 RX ADMIN — GABAPENTIN 600 MG: 300 CAPSULE ORAL at 15:04

## 2025-01-27 RX ADMIN — DULOXETINE HYDROCHLORIDE 60 MG: 60 CAPSULE, DELAYED RELEASE ORAL at 08:50

## 2025-01-27 RX ADMIN — METRONIDAZOLE 500 MG: 500 TABLET ORAL at 15:04

## 2025-01-27 RX ADMIN — APIXABAN 5 MG: 5 TABLET, FILM COATED ORAL at 08:50

## 2025-01-27 RX ADMIN — MORPHINE SULFATE 15 MG: 15 TABLET, FILM COATED, EXTENDED RELEASE ORAL at 15:04

## 2025-01-27 RX ADMIN — GABAPENTIN 600 MG: 300 CAPSULE ORAL at 08:50

## 2025-01-27 RX ADMIN — INSULIN ASPART: 100 INJECTION, SOLUTION INTRAVENOUS; SUBCUTANEOUS at 15:04

## 2025-01-27 RX ADMIN — HYDROMORPHONE HYDROCHLORIDE 0.4 MG: 0.2 INJECTION, SOLUTION INTRAMUSCULAR; INTRAVENOUS; SUBCUTANEOUS at 16:24

## 2025-01-27 RX ADMIN — OXYCODONE HYDROCHLORIDE 5 MG: 5 TABLET ORAL at 03:07

## 2025-01-27 RX ADMIN — METRONIDAZOLE 500 MG: 500 TABLET ORAL at 21:00

## 2025-01-27 RX ADMIN — HYDROMORPHONE HYDROCHLORIDE 0.4 MG: 0.2 INJECTION, SOLUTION INTRAMUSCULAR; INTRAVENOUS; SUBCUTANEOUS at 20:59

## 2025-01-27 RX ADMIN — PANTOPRAZOLE SODIUM 40 MG: 40 TABLET, DELAYED RELEASE ORAL at 06:41

## 2025-01-27 ASSESSMENT — ACTIVITIES OF DAILY LIVING (ADL)
ADLS_ACUITY_SCORE: 58
ADLS_ACUITY_SCORE: 57
ADLS_ACUITY_SCORE: 58
ADLS_ACUITY_SCORE: 57
ADLS_ACUITY_SCORE: 58
ADLS_ACUITY_SCORE: 57
ADLS_ACUITY_SCORE: 57
ADLS_ACUITY_SCORE: 58
ADLS_ACUITY_SCORE: 57
ADLS_ACUITY_SCORE: 58
ADLS_ACUITY_SCORE: 57
ADLS_ACUITY_SCORE: 58

## 2025-01-27 NOTE — PROGRESS NOTES
[facility]    Medicine Progress Note - Hospitalist Service    Date of Admission:  1/15/2025    Assessment & Plan   Bon Luque is a 69 year old male with past medical history significant for CAD, renal cell carcinoma status post nephrectomy 2017, hypertension, diabetes with neuropathy, HLD, hypogonadism on testosterone, psoriasis, and small bowel obstruction who was admitted on 1/15/2025 due to abdominal pain, diarrhea, nausea, dizziness, generalized weakness, and poor appetite for 11 days PTA. Found to have liver abscess, sepsis, Blood cultures growing strep intermedius.     Sepsis from Strep intermedius bacteremia  Multiple liver abscesses  Immunocompromised host  CT 1/13 w/ 3 prominent indeterminate complex hypodense masses in the liver concerning for neoplasm versus infectious process  MRI 1/15: 3 heterogeneous masses in the liver new from 11/8/2024 concerning for infection, possible rapidly progressive malignancy/metastases  IR consulted, underwent CT-guided liver drain x 2 1/16  Repeat CT 01/21: Multiloculated abscess, left hepatic lobe fluid decreased s/p catheter, abscess in hepatic segment 8 decrease in size s/p catheter.  Abscess in hepatic segment 4 increased in size  Cultures growing strep intermedius  was on  IV vancomycin and Cefepime, now on IV Ceftriaxone and po Flagyl per ID since 01/20   s/p 3rd drain on  01/23  PICC line placed 1/18  All three drains have minimal output  Encourage IS, hydration      Hyponatremia  - improving   - Monitor     Acute drop in Hb  - Hb 13.2 on presentation, ~ 10.4  - Monitor Hb     Psoriasis  - No obvious flaring lesions, remotely on Skyrizi which has been held recently  - Outpatient follow-up     Hypophosphatemia   - Monitor and replete as needed     Remote DVT and PE  Eliquis      Chronic pain  PTA  MS Contin, gabapentin, duloxetine, oxycodone     History of renal cell carcinoma  Remote nephrectomy, follow creatinine       Hypogonadism  Resume usual testosterone supplement now if possible      Insulin-dependent type 2 diabetes mellitus  - Blood sugars acceptable  - A1c 7.9%  - 50 units of Lantus twice daily at home  - increased  to Lantus 35u BID  - sliding scale insulin  - High consistent carb diet  - Titrate as needed     Moderate Malnutrition  - Seen by RD     Constipation  Miralex daily  Bisacodyl PRN           Diet: Moderate Consistent Carb (60 g CHO per Meal) Diet  Snacks/Supplements Adult: Ensure Enlive; With Meals    DVT Prophylaxis: DOAC  Seals Catheter: Not present  Lines: PRESENT      PICC 01/18/25 Single Lumen Left Basilic Antibiotic-Site Assessment: WDL      Cardiac Monitoring: None  Code Status: Full Code      Clinically Significant Risk Factors         # Hyponatremia: Lowest Na = 129 mmol/L in last 2 days, will monitor as appropriate  # Hypochloremia: Lowest Cl = 94 mmol/L in last 2 days, will monitor as appropriate      # Hypoalbuminemia: Lowest albumin = 2 g/dL at 1/18/2025  6:13 AM, will monitor as appropriate     # Hypertension: Noted on problem list           # DMII: A1C = 7.9 % (Ref range: <5.7 %) within past 6 months   # Overweight: Estimated body mass index is 27.68 kg/m  as calculated from the following:    Height as of this encounter: 1.829 m (6').    Weight as of this encounter: 92.6 kg (204 lb 1.6 oz).   # Moderate Malnutrition: based on nutrition assessment           Social Drivers of Health    Tobacco Use: Medium Risk (1/14/2025)    Patient History     Smoking Tobacco Use: Former     Smokeless Tobacco Use: Never     Passive Exposure: Past   Physical Activity: Insufficiently Active (8/23/2024)    Exercise Vital Sign     Days of Exercise per Week: 1 day     Minutes of Exercise per Session: 20 min   Social Connections: Unknown (8/23/2024)    Social Connection and Isolation Panel [NHANES]     Frequency of Social Gatherings with Friends and Family: Once a week          Disposition Plan     Medically Ready for  Discharge: Anticipated Tomorrow         Manan Liao MD  Hospitalist Service  [facility]  Securely message with Dinah (more info)  Text page via [employer] Paging/Directory   ______________________________________________________________________    Interval History   No distress noted.  Patient states his right upper abdominal pain is showing some improvement.  All 3 drains are in place and patent with minimal output.  No new complaints.  Management plan discussed with the patient and he expressed understanding.    Physical Exam   Vital Signs: Temp: 97.9  F (36.6  C) Temp src: Oral BP: (!) 145/72 Pulse: 78   Resp: 18 SpO2: 94 % O2 Device: None (Room air)    Weight: 204 lbs 1.6 oz    General Appearance:  No distress noted  Respiratory: Good air entry bilaterally  Cardiovascular: S1 and S2 well heard  GI: Soft abdomen, obese, mild epigastric tenderness, normoactive bowel sounds, 2 drains on right upper quadrant abdominal draining well  Skin: Intact and warm    Medical Decision Making       40 MINUTES SPENT BY ME on the date of service doing chart review, history, exam, documentation & further activities per the note.      Data

## 2025-01-27 NOTE — PROGRESS NOTES
Interventional Radiology  1/27/2025    Repeat CT ordered for follow up of drain(s). Potential for abscessogram 1/28 pending CT results review by IR Radiologist on 1/28.     NPO ordered for midnight 1/28 in case procedure warranted. Writer ill discontinue NPO if abscessogram deemed to not be of benefit to patient.    Please see previous IR recommendations per DYLAN Matos NP note on 1/24/25.    CRISTINA Eisenberg CNP  Interventional Radiology

## 2025-01-27 NOTE — PROGRESS NOTES
CLINICAL NUTRITION SERVICES - REASSESSMENT NOTE     RECOMMENDATIONS FOR MDs/PROVIDERS TO ORDER:  Increasing insulin for better BG control. Pt with poor meal intake but taking multiple supplements well    Malnutrition Status:    Malnutrition Diagnosis: Moderate malnutrition in the context of acute illness or injury  Malnutrition Present on Admission: Yes    Registered Dietitian Interventions:  Continue ensure tid     Future/Additional Recommendations:  Adjust supplements pending intake, weight, tolerance, acceptance, labs, BG       SUBJECTIVE INFORMATION  Assessed patient in room.  Pt reports solid food continues to be difficult to eat. He continues to like and take the supplements. He reports intake is up and down based on how he is feeling which is up and down by the day. He states he is not having a good day today but continues to take ensure    CURRENT NUTRITION ORDERS  Diet: Orders Placed This Encounter      Moderate Consistent Carb (60 g CHO per Meal) Diet    Nutrition Support: Ensure enlive tid - 1050 kcal, 60 g protein    CURRENT INTAKE/TOLERANCE  Poor of meals, fair with supplements  Estimate intake of meals 350-450 kcal, 12-16 g protein/day    With good intake of supplements   meeting 80-85% of estimated kcal, 90-95% of estimated protein needs       NEW FINDINGS  Weight: Current wt down 2 lb x 4 days  8.5% weight loss x 3 weeks  Date/Time Weight Weight Method   01/27/25 0500 92.6 kg (204 lb 1.6 oz) Standing scale   01/26/25 0353 92.9 kg (204 lb 12.8 oz) Standing scale   01/22/25 1046 93.8 kg (206 lb 12.8 oz) Standing scale   01/15/25 1220 99.8 kg (220 lb) --     01/02/25 101.4 kg (223 lb 9.6 oz)   08/23/24 101.6 kg (223 lb 14.4 oz)   06/25/24 100.3 kg (221 lb 3.2 oz)   05/21/24 98.5 kg (217 lb 3.2 oz)   05/09/24 98 kg (216 lb)   03/28/24 94.3 kg (208 lb)   02/27/24 97.3 kg (214 lb 8 oz)   01/30/24 95 kg (209 lb 8 oz)     Skin/wounds: Francesco drain x 3, min clear OP per nsg  GI symptoms: abd pain. 2 small  soft/loose BM yesterday  Nutrition-relevant labs:   Na 132 (L), improved  Phos 3.1 WNL, improved  -286 mg/dl past 24 hours, in fair control. Not much increase with change to higher Cho supplement. Insulin being increased by MD  Nutrition-relevant medications: iv abx, ssi, lantus bid-increased yesterday and today, oral antifugal, morphine bid, protonix, miralax daily. Novolog 1u: 5 g cho added 1/25.    Dosing Weight: 93.8 kg, based on actual wt for kcal, 80.9 kg IBW for protein/fluid     ASSESSED NUTRITION NEEDS  Estimated Energy Needs: 2870-1871+ kcals/day ([address] Jeor x 1-1.2+) REE: 1756  Justification: Maintenance  Estimated Protein Needs: 80-97 grams protein/day (1 - 1.2 grams of pro/kg)  Justification: Increased needs  Estimated Fluid Needs: 8251-6761 mL/day (25 - 30 mL/kg)  Justification: Maintenance    MALNUTRITION  % Intake: < 75% for > 7 days (moderate)- improving with supplements  % Weight Loss: > 5% in 1 month (severe)   Subcutaneous Fat Loss: None observed  Muscle Loss: None observed  Fluid Accumulation/Edema: None noted  Malnutrition Diagnosis: Moderate malnutrition in the context of acute illness or injury  Malnutrition Present on Admission: Yes    EVALUATION OF THE PROGRESS TOWARD GOALS     NUTRITION DIAGNOSIS  Malnutrition (undernutrition) related to acute illness as evidenced by poor oral intakes, 8.5% wt loss x 3 weeks    Goals  Meet >75% nutrition needs- met with supplements  Maintain weight -progressing  BG <180 - no change     Monitoring/Evaluation      Progress toward goals will be monitored and evaluated per policy.

## 2025-01-27 NOTE — PLAN OF CARE
Problem: Comorbidity Management  Goal: Blood Glucose Levels Within Targeted Range  Outcome: Progressing  Intervention: Monitor and Manage Glycemia  Recent Flowsheet Documentation  Taken 1/27/2025 0005 by Salomón Bray, RN  Medication Review/Management: medications reviewed     Problem: Infection  Goal: Absence of Infection Signs and Symptoms  Outcome: Progressing     Problem: Pain Acute  Goal: Optimal Pain Control and Function  Outcome: Progressing  Intervention: Prevent or Manage Pain  Recent Flowsheet Documentation  Taken 1/27/2025 0005 by Salomón Bray, RN  Bowel Elimination Promotion: adequate fluid intake promoted  Medication Review/Management: medications reviewed   Goal Outcome Evaluation:       Pt is alert and oriented x4.3 DAMI drains remains in placed with minimal clear output. PICC intact. PRN oxycodone given for abdominal pain with relief. On K and Phos protocol blood drawn this am and sent to lab. VSS on room air.

## 2025-01-27 NOTE — PROGRESS NOTES
[facility] Progress Note    01/27/2025    Chart reviewed  Awaiting repeat IR assessment  Patient seen and updated    ASSESSMENT:  Liver abscess, strep intermedius. Leukocytosis, normal, now up after 3rd drain placed  Drained. Repeat CT abd today showing anterior abscess (segment 4) that is larger. Previous drain going inferior to this. 3rd drain placed on 1/23.  On biologic for skin  Solitary kidney    RECOMMENDATION:  Continue ceftriaxone and po metronidazole   Anticipate 6 weeks iv antibiotics due to multi-loculated abscess  IR to re-assess on 1/28/2025  due to low output from drains #1 and #2  Patient seen and updated  Patient new to me on 1/27/2025  Plan to go to TCU upon discharge    Please see previous ID notes by Dr. Garcia.     Laura Norton MD  [facility]  Answering Service: [phone]  On-Call ID provider: [phone], option: 9      SUBJECTIVE:    Chart reviewed  Pain better. Wbc improved.    Discussed test results  Previous note    Drain #1 with 14 ml out yesterday  Drain #2 with 9 ml out yesterday  Drain #3 with 24 ml out yesterday      OBJECTIVE:  BP (!) 145/72 (BP Location: Right arm)   Pulse 78   Temp 97.9  F (36.6  C) (Oral)   Resp 18   Ht 1.829 m (6')   Wt 92.6 kg (204 lb 1.6 oz)   SpO2 94%   BMI 27.68 kg/m        PHYSICAL EXAM:  Alert, awake  Vitals tabulated above, reviewed  Sclera normal color, not injected  CARDIOVASCULAR regular rate and rhythm, no murmur  Lungs CLEAR TO AUSCULTATION   Abdomen soft, ND; 3 drains, empty. Tenderness at insertion site, midabdomen  Skin normal  Joints normal  Neurologic exam non focal    PICC, left arm     Antibiotics:  Ceftriaxone 1/20-  Metronidazole     Pertinent labs:    Recent Labs   Lab Test 01/27/25  0554 01/26/25  0549 01/25/25  0622   WBC 11.7* 16.6* 19.4*   HGB 10.5* 10.9* 11.3*   HCT 32.0* 32.6* 34.9*   MCV 86 85 86    375 369       Lab Results   Component Value Date    RBC 3.74 01/17/2025  "    Lab Results   Component Value Date    HGB 10.5 01/17/2025     Lab Results   Component Value Date    HCT 30.6 01/17/2025     No components found for: \"MCT\"  Lab Results   Component Value Date    MCV 82 01/17/2025     Lab Results   Component Value Date    MCH 28.1 01/17/2025     Lab Results   Component Value Date    MCHC 34.3 01/17/2025     Lab Results   Component Value Date    RDW 13.1 01/17/2025     Lab Results   Component Value Date     01/17/2025       Last Comprehensive Metabolic Panel:  Sodium   Date Value Ref Range Status   01/27/2025 132 (L) 135 - 145 mmol/L Final     Potassium   Date Value Ref Range Status   01/27/2025 4.4 3.4 - 5.3 mmol/L Final   07/13/2022 4.2 3.5 - 5.0 mmol/L Final     Chloride   Date Value Ref Range Status   01/27/2025 95 (L) 98 - 107 mmol/L Final   07/13/2022 104 98 - 107 mmol/L Final     Carbon Dioxide (CO2)   Date Value Ref Range Status   01/27/2025 30 (H) 22 - 29 mmol/L Final   07/13/2022 22 22 - 31 mmol/L Final     Anion Gap   Date Value Ref Range Status   01/27/2025 7 7 - 15 mmol/L Final   07/13/2022 8 5 - 18 mmol/L Final     Glucose   Date Value Ref Range Status   01/27/2025 140 (H) 70 - 99 mg/dL Final   07/13/2022 286 (H) 70 - 125 mg/dL Final     GLUCOSE BY METER POCT   Date Value Ref Range Status   01/27/2025 123 (H) 70 - 99 mg/dL Final     Urea Nitrogen   Date Value Ref Range Status   01/27/2025 8.3 8.0 - 23.0 mg/dL Final   07/13/2022 17 8 - 22 mg/dL Final     Creatinine   Date Value Ref Range Status   01/27/2025 0.61 (L) 0.67 - 1.17 mg/dL Final     GFR Estimate   Date Value Ref Range Status   01/27/2025 >90 >60 mL/min/1.73m2 Final     Comment:     eGFR calculated using 2021 CKD-EPI equation.   01/13/2021 >60 >60 mL/min/1.73m2 Final     GFR, ESTIMATED POCT   Date Value Ref Range Status   11/08/2024 >60 >60 mL/min/1.73m2 Final     Calcium   Date Value Ref Range Status   01/27/2025 8.6 (L) 8.8 - 10.4 mg/dL Final     Comment:     Reference intervals for this test were " "updated on 7/16/2024 to reflect our healthy population more accurately. There may be differences in the flagging of prior results with similar values performed with this method. Those prior results can be interpreted in the context of the updated reference intervals.       Liver Function Studies -   Recent Labs   Lab Test 01/17/25  0641   PROTTOTAL 5.4*   ALBUMIN 2.2*   BILITOTAL 0.9   ALKPHOS 144   AST 83*   ALT 65       No results found for: \"SED\"    No results found for: \"CRP\"            MICROBIOLOGY DATA:    GPC in blood --> strep int  Gm stain liver pus also heavy GPC, culture same    Susceptibility data from last 90 days.  Collected Specimen Info Organism Cefotaxime Ceftriaxone Clindamycin Meropenem Penicillin Vancomycin   01/16/25 Abscess from Liver Streptococcus intermedius         01/16/25 Abscess from Liver Streptococcus intermedius         01/15/25 Peripheral Blood Streptococcus intermedius  S  S  S  S  S  S   01/15/25 Blood from Line, venous Streptococcus intermedius            Medical Decision Making       MANAGEMENT DISCUSSED with the following over the past 24 hours: patient   NOTE(S)/MEDICAL RECORDS REVIEWED over the past 24 hours: reviewed  Tests ORDERED & REVIEWED in the past 24 hours:  - See lab/imaging results included in the data section of the note  Medical complexity over the past 24 hours:  - Intensive monitoring for MEDICATION TOXICITY  - awaiting drain evaluation                   "

## 2025-01-27 NOTE — PROVIDER NOTIFICATION
"IR called re: reassessing drains per ID's notes. IR last documented they were following \"loosely\". No answer. VM left.   "

## 2025-01-27 NOTE — PLAN OF CARE
Problem: Adult Inpatient Plan of Care  Goal: Absence of Hospital-Acquired Illness or Injury  Intervention: Identify and Manage Fall Risk  Recent Flowsheet Documentation  Taken 1/26/2025 1630 by Cherise Metcalf, RN  Safety Promotion/Fall Prevention:   activity supervised   clutter free environment maintained   lighting adjusted   mobility aid in reach   nonskid shoes/slippers when out of bed   patient and family education     Problem: Adult Inpatient Plan of Care  Goal: Optimal Comfort and Wellbeing  Intervention: Monitor Pain and Promote Comfort  Recent Flowsheet Documentation  Taken 1/26/2025 1630 by Cherise Metcalf, RN  Pain Management Interventions: pain management plan reviewed with patient/caregiver   Goal Outcome Evaluation:       Pt A&Ox4, assist 1 with walker. Sitting up on edge of bed this evening, good appetite. BG covered per order. Reported abdominal pain, PRN dilaudid given x2, along with scheduled medications with relief. PICC dressing changed. (3) DAMI drains C/D/I- no output this shift. Makes needs known. Phos, K+ protocol recheck in AM. Cherise Metcalf, RN

## 2025-01-27 NOTE — PLAN OF CARE
Goal Outcome Evaluation:      Plan of Care Reviewed With: patient, spouse    Overall Patient Progress: improvingOverall Patient Progress: improving         Problem: Adult Inpatient Plan of Care  Goal: Optimal Comfort and Wellbeing  Intervention: Monitor Pain and Promote Comfort  Recent Flowsheet Documentation  Taken 1/27/2025 0927 by Salome Alves RN  Pain Management Interventions: medication (see MAR)  Taken 1/27/2025 0850 by Salome Alves RN  Pain Management Interventions: medication (see MAR)     Problem: Comorbidity Management  Goal: Blood Glucose Levels Within Targeted Range  Outcome: Progressing  Intervention: Monitor and Manage Glycemia  Recent Flowsheet Documentation  Taken 1/27/2025 0850 by Salome Alves RN  Medication Review/Management: medications reviewed     Problem: Adult Inpatient Plan of Care  Goal: Plan of Care Review  Description: The Plan of Care Review/Shift note should be completed every shift.  The Outcome Evaluation is a brief statement about your assessment that the patient is improving, declining, or no change.  This information will be displayed automatically on your shift  note.  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 Has dealt with for 20 years. Followed by pain clinic. New abdominal pain. Pt states it's the worst by the 3rd DAMI drain. Irrigation done per order. Milky output. SEE IR notification. Up to bathroom and sitting on side of bed. Wife visiting. Poor appetite. Does like Ensure drinks.

## 2025-01-27 NOTE — PROGRESS NOTES
SPIRITUAL HEALTH SERVICES NOTE  [facility]; P1    Reason for Visit: LOS    SPIRITUAL ASSESSMENT  Grateful he is alive  Discouraged that his recovery is slower than he expected  Trusting that God will continue to provide for him    Patient/Family Understanding of Illness and Goals of Care - Saw Bon due to LOS. He shares that he was at home for nine days with an infection, thinking that it was a viral illness. He says that he had a fever, diarrhea, and significant weakness in his legs. When he finally got into the hospital, he was surprised to learn that he has lesions on his liver. He notes that the #3 drain causes him the most discomfort. Bon anticipates that he will need to go to a TCU at discharge.     Distress and Loss - Bon has been discouraged that his recovery has been so slow.     Strengths, Coping, and Resources - Bon identifies his wife and his daughter as sources of encouragement and support.    Meaning, Beliefs, and Spirituality - Bon is connected to [facility] and welcomes my offer to notify them of his admission. He spoke about the ways he has experienced God over the years. Prayer shared.     Plan of Care: Will remain available for further support as requested by patient/family/staff    Loida Cervantes M.Div.    Office: [phone] (for non-urgent requests)  Please Vocera or page through [facility] for time-sensitive requests

## 2025-01-27 NOTE — PROGRESS NOTES
Care Management Follow Up    Length of Stay (days): 12    Expected Discharge Date: 01/28/2025    Anticipated Discharge Plan:  TCU    Transportation: Anticipate Family/friend    PT Recommendations: home with assist, home with home care physical therapy  OT Recommendations:  Transitional Care Facility     Barriers to Discharge: medical stability    Prior Living Situation: house with spouse    Discussed  Partnership in Safe Discharge Planning  document with patient/family: No     Handoff Completed: No, handoff not indicated or clinically appropriate    Patient/Spokesperson Updated: No    Additional Information:  Updated [facility] at [facility] that patient is not medically ready for discharge today. Says to call back daily for bed availability     Next Steps: continue to follow     Ciera Bhatti RN

## 2025-01-28 ENCOUNTER — APPOINTMENT (OUTPATIENT)
Dept: INTERVENTIONAL RADIOLOGY/VASCULAR | Facility: HOSPITAL | Age: 70
End: 2025-01-28
Attending: NURSE PRACTITIONER
Payer: COMMERCIAL

## 2025-01-28 ENCOUNTER — APPOINTMENT (OUTPATIENT)
Dept: CT IMAGING | Facility: HOSPITAL | Age: 70
End: 2025-01-28
Attending: NURSE PRACTITIONER
Payer: COMMERCIAL

## 2025-01-28 ENCOUNTER — APPOINTMENT (OUTPATIENT)
Dept: PHYSICAL THERAPY | Facility: HOSPITAL | Age: 70
End: 2025-01-28
Attending: NURSE PRACTITIONER
Payer: COMMERCIAL

## 2025-01-28 LAB
ANION GAP SERPL CALCULATED.3IONS-SCNC: 6 MMOL/L (ref 7–15)
BUN SERPL-MCNC: 9.5 MG/DL (ref 8–23)
CALCIUM SERPL-MCNC: 8.8 MG/DL (ref 8.8–10.4)
CHLORIDE SERPL-SCNC: 96 MMOL/L (ref 98–107)
CREAT SERPL-MCNC: 0.67 MG/DL (ref 0.67–1.17)
EGFRCR SERPLBLD CKD-EPI 2021: >90 ML/MIN/1.73M2
ERYTHROCYTE [DISTWIDTH] IN BLOOD BY AUTOMATED COUNT: 13.6 % (ref 10–15)
GLUCOSE BLDC GLUCOMTR-MCNC: 105 MG/DL (ref 70–99)
GLUCOSE BLDC GLUCOMTR-MCNC: 136 MG/DL (ref 70–99)
GLUCOSE BLDC GLUCOMTR-MCNC: 144 MG/DL (ref 70–99)
GLUCOSE BLDC GLUCOMTR-MCNC: 144 MG/DL (ref 70–99)
GLUCOSE SERPL-MCNC: 117 MG/DL (ref 70–99)
HCO3 SERPL-SCNC: 31 MMOL/L (ref 22–29)
HCT VFR BLD AUTO: 34.8 % (ref 40–53)
HGB BLD-MCNC: 11.1 G/DL (ref 13.3–17.7)
MCH RBC QN AUTO: 27.5 PG (ref 26.5–33)
MCHC RBC AUTO-ENTMCNC: 31.9 G/DL (ref 31.5–36.5)
MCV RBC AUTO: 86 FL (ref 78–100)
PHOSPHATE SERPL-MCNC: 3.5 MG/DL (ref 2.5–4.5)
PLATELET # BLD AUTO: 373 10E3/UL (ref 150–450)
POTASSIUM SERPL-SCNC: 4.4 MMOL/L (ref 3.4–5.3)
RBC # BLD AUTO: 4.04 10E6/UL (ref 4.4–5.9)
SODIUM SERPL-SCNC: 133 MMOL/L (ref 135–145)
WBC # BLD AUTO: 9.6 10E3/UL (ref 4–11)

## 2025-01-28 PROCEDURE — C1769 GUIDE WIRE: HCPCS

## 2025-01-28 PROCEDURE — 85014 HEMATOCRIT: CPT | Performed by: STUDENT IN AN ORGANIZED HEALTH CARE EDUCATION/TRAINING PROGRAM

## 2025-01-28 PROCEDURE — 250N000013 HC RX MED GY IP 250 OP 250 PS 637: Performed by: INTERNAL MEDICINE

## 2025-01-28 PROCEDURE — 250N000011 HC RX IP 250 OP 636: Performed by: NURSE PRACTITIONER

## 2025-01-28 PROCEDURE — 74160 CT ABDOMEN W/CONTRAST: CPT

## 2025-01-28 PROCEDURE — 250N000013 HC RX MED GY IP 250 OP 250 PS 637: Performed by: NURSE PRACTITIONER

## 2025-01-28 PROCEDURE — 97530 THERAPEUTIC ACTIVITIES: CPT | Mod: GP | Performed by: PHYSICAL THERAPIST

## 2025-01-28 PROCEDURE — 120N000001 HC R&B MED SURG/OB

## 2025-01-28 PROCEDURE — 272N000116 HC CATH CR1

## 2025-01-28 PROCEDURE — 49424 ASSESS CYST CONTRAST INJECT: CPT

## 2025-01-28 PROCEDURE — 250N000011 HC RX IP 250 OP 636: Performed by: INTERNAL MEDICINE

## 2025-01-28 PROCEDURE — 99152 MOD SED SAME PHYS/QHP 5/>YRS: CPT

## 2025-01-28 PROCEDURE — 85041 AUTOMATED RBC COUNT: CPT | Performed by: STUDENT IN AN ORGANIZED HEALTH CARE EDUCATION/TRAINING PROGRAM

## 2025-01-28 PROCEDURE — 84100 ASSAY OF PHOSPHORUS: CPT | Performed by: STUDENT IN AN ORGANIZED HEALTH CARE EDUCATION/TRAINING PROGRAM

## 2025-01-28 PROCEDURE — 99232 SBSQ HOSP IP/OBS MODERATE 35: CPT | Performed by: INTERNAL MEDICINE

## 2025-01-28 PROCEDURE — 99222 1ST HOSP IP/OBS MODERATE 55: CPT | Performed by: NURSE PRACTITIONER

## 2025-01-28 PROCEDURE — 97116 GAIT TRAINING THERAPY: CPT | Mod: GP | Performed by: PHYSICAL THERAPIST

## 2025-01-28 PROCEDURE — 0F20X0Z CHANGE DRAINAGE DEVICE IN LIVER, EXTERNAL APPROACH: ICD-10-PCS | Performed by: RADIOLOGY

## 2025-01-28 PROCEDURE — 255N000002 HC RX 255 OP 636: Performed by: RADIOLOGY

## 2025-01-28 PROCEDURE — 250N000013 HC RX MED GY IP 250 OP 250 PS 637: Performed by: STUDENT IN AN ORGANIZED HEALTH CARE EDUCATION/TRAINING PROGRAM

## 2025-01-28 PROCEDURE — 99232 SBSQ HOSP IP/OBS MODERATE 35: CPT | Performed by: STUDENT IN AN ORGANIZED HEALTH CARE EDUCATION/TRAINING PROGRAM

## 2025-01-28 PROCEDURE — 80048 BASIC METABOLIC PNL TOTAL CA: CPT | Performed by: STUDENT IN AN ORGANIZED HEALTH CARE EDUCATION/TRAINING PROGRAM

## 2025-01-28 RX ORDER — ACETAMINOPHEN 500 MG
500 TABLET ORAL EVERY 6 HOURS PRN
Status: DISCONTINUED | OUTPATIENT
Start: 2025-01-28 | End: 2025-02-05 | Stop reason: HOSPADM

## 2025-01-28 RX ORDER — FENTANYL CITRATE 50 UG/ML
25-50 INJECTION, SOLUTION INTRAMUSCULAR; INTRAVENOUS EVERY 5 MIN PRN
Status: DISCONTINUED | OUTPATIENT
Start: 2025-01-28 | End: 2025-01-28 | Stop reason: HOSPADM

## 2025-01-28 RX ORDER — NALOXONE HYDROCHLORIDE 0.4 MG/ML
0.4 INJECTION, SOLUTION INTRAMUSCULAR; INTRAVENOUS; SUBCUTANEOUS
Status: DISCONTINUED | OUTPATIENT
Start: 2025-01-28 | End: 2025-01-28 | Stop reason: HOSPADM

## 2025-01-28 RX ORDER — MORPHINE SULFATE 15 MG/1
15 TABLET, FILM COATED, EXTENDED RELEASE ORAL 3 TIMES DAILY
Status: DISCONTINUED | OUTPATIENT
Start: 2025-01-28 | End: 2025-02-05 | Stop reason: HOSPADM

## 2025-01-28 RX ORDER — LIDOCAINE 4 G/G
3 PATCH TOPICAL
Status: DISCONTINUED | OUTPATIENT
Start: 2025-01-29 | End: 2025-02-05 | Stop reason: HOSPADM

## 2025-01-28 RX ORDER — FLUMAZENIL 0.1 MG/ML
0.2 INJECTION, SOLUTION INTRAVENOUS
Status: DISCONTINUED | OUTPATIENT
Start: 2025-01-28 | End: 2025-01-28 | Stop reason: HOSPADM

## 2025-01-28 RX ORDER — NALOXONE HYDROCHLORIDE 0.4 MG/ML
0.2 INJECTION, SOLUTION INTRAMUSCULAR; INTRAVENOUS; SUBCUTANEOUS
Status: DISCONTINUED | OUTPATIENT
Start: 2025-01-28 | End: 2025-01-28 | Stop reason: HOSPADM

## 2025-01-28 RX ORDER — HYDROMORPHONE HCL IN WATER/PF 6 MG/30 ML
.2-.4 PATIENT CONTROLLED ANALGESIA SYRINGE INTRAVENOUS EVERY 4 HOURS PRN
Status: DISCONTINUED | OUTPATIENT
Start: 2025-01-28 | End: 2025-01-30

## 2025-01-28 RX ORDER — IOPAMIDOL 755 MG/ML
90 INJECTION, SOLUTION INTRAVASCULAR ONCE
Status: COMPLETED | OUTPATIENT
Start: 2025-01-28 | End: 2025-01-28

## 2025-01-28 RX ORDER — OXYCODONE HYDROCHLORIDE 5 MG/1
5 TABLET ORAL
Status: DISCONTINUED | OUTPATIENT
Start: 2025-01-28 | End: 2025-01-29

## 2025-01-28 RX ADMIN — MIDAZOLAM HYDROCHLORIDE 1 MG: 1 INJECTION, SOLUTION INTRAMUSCULAR; INTRAVENOUS at 14:45

## 2025-01-28 RX ADMIN — HYDROMORPHONE HYDROCHLORIDE 0.4 MG: 0.2 INJECTION, SOLUTION INTRAMUSCULAR; INTRAVENOUS; SUBCUTANEOUS at 00:12

## 2025-01-28 RX ADMIN — MIDAZOLAM HYDROCHLORIDE 2 MG: 1 INJECTION, SOLUTION INTRAMUSCULAR; INTRAVENOUS at 14:40

## 2025-01-28 RX ADMIN — CEFTRIAXONE SODIUM 2 G: 2 INJECTION, POWDER, FOR SOLUTION INTRAMUSCULAR; INTRAVENOUS at 08:58

## 2025-01-28 RX ADMIN — PANTOPRAZOLE SODIUM 40 MG: 40 TABLET, DELAYED RELEASE ORAL at 06:15

## 2025-01-28 RX ADMIN — HYDROMORPHONE HYDROCHLORIDE 0.2 MG: 0.2 INJECTION, SOLUTION INTRAMUSCULAR; INTRAVENOUS; SUBCUTANEOUS at 06:15

## 2025-01-28 RX ADMIN — OXYCODONE HYDROCHLORIDE 5 MG: 5 TABLET ORAL at 23:56

## 2025-01-28 RX ADMIN — ACETAMINOPHEN 500 MG: 500 TABLET, FILM COATED ORAL at 17:18

## 2025-01-28 RX ADMIN — INSULIN ASPART 18 UNITS: 100 INJECTION, SOLUTION INTRAVENOUS; SUBCUTANEOUS at 18:09

## 2025-01-28 RX ADMIN — MORPHINE SULFATE 15 MG: 15 TABLET, FILM COATED, EXTENDED RELEASE ORAL at 20:50

## 2025-01-28 RX ADMIN — IOHEXOL 20 ML: 350 INJECTION, SOLUTION INTRAVENOUS at 14:53

## 2025-01-28 RX ADMIN — FENTANYL CITRATE 50 MCG: 50 INJECTION, SOLUTION INTRAMUSCULAR; INTRAVENOUS at 14:42

## 2025-01-28 RX ADMIN — HYDROMORPHONE HYDROCHLORIDE 0.4 MG: 0.2 INJECTION, SOLUTION INTRAMUSCULAR; INTRAVENOUS; SUBCUTANEOUS at 11:18

## 2025-01-28 RX ADMIN — HYDROMORPHONE HYDROCHLORIDE 0.2 MG: 0.2 INJECTION, SOLUTION INTRAMUSCULAR; INTRAVENOUS; SUBCUTANEOUS at 22:10

## 2025-01-28 RX ADMIN — IOPAMIDOL 90 ML: 755 INJECTION, SOLUTION INTRAVENOUS at 10:42

## 2025-01-28 RX ADMIN — HYDROMORPHONE HYDROCHLORIDE 0.4 MG: 0.2 INJECTION, SOLUTION INTRAMUSCULAR; INTRAVENOUS; SUBCUTANEOUS at 02:32

## 2025-01-28 RX ADMIN — METRONIDAZOLE 500 MG: 500 TABLET ORAL at 20:50

## 2025-01-28 RX ADMIN — OXYCODONE HYDROCHLORIDE 5 MG: 5 TABLET ORAL at 18:14

## 2025-01-28 RX ADMIN — APIXABAN 5 MG: 5 TABLET, FILM COATED ORAL at 20:49

## 2025-01-28 RX ADMIN — GABAPENTIN 600 MG: 300 CAPSULE ORAL at 20:49

## 2025-01-28 ASSESSMENT — ACTIVITIES OF DAILY LIVING (ADL)
ADLS_ACUITY_SCORE: 58
ADLS_ACUITY_SCORE: 63
ADLS_ACUITY_SCORE: 63
ADLS_ACUITY_SCORE: 58
ADLS_ACUITY_SCORE: 63
ADLS_ACUITY_SCORE: 58
ADLS_ACUITY_SCORE: 63
ADLS_ACUITY_SCORE: 58
ADLS_ACUITY_SCORE: 63
ADLS_ACUITY_SCORE: 61
ADLS_ACUITY_SCORE: 58
ADLS_ACUITY_SCORE: 61
ADLS_ACUITY_SCORE: 58
ADLS_ACUITY_SCORE: 63

## 2025-01-28 NOTE — PLAN OF CARE
Goal Outcome Evaluation:      Plan of Care Reviewed With: patient    Overall Patient Progress: improvingOverall Patient Progress: improving         Problem: Adult Inpatient Plan of Care  Goal: Optimal Comfort and Wellbeing  Intervention: Monitor Pain and Promote Comfort  Recent Flowsheet Documentation  Taken 1/28/2025 1118 by Salome Alves, RN  Pain Management Interventions: medication (see MAR)  Taken 1/28/2025 1004 by Salome Alves, RN  Pain Management Interventions:   emotional support   ambulation/increased activity     Problem: Infection  Goal: Absence of Infection Signs and Symptoms  Outcome: Progressing     Problem: Adult Inpatient Plan of Care  Goal: Plan of Care Review  Description: The Plan of Care Review/Shift note should be completed every shift.  The Outcome Evaluation is a brief statement about your assessment that the patient is improving, declining, or no change.  This information will be displayed automatically on your shift  note.  Outcome: Progressing  Flowsheets (Taken 1/28/2025 1320)  Plan of Care Reviewed With: patient  Overall Patient Progress: improving     CT scan this AM. Abscess drain check at 1500. NPO except ice chips. Met with pain team. PRN IV Dilaudid given since pt NPO. Up with SBA and walker. No visitors.

## 2025-01-28 NOTE — PROGRESS NOTES
Interventional Radiology - Progress Note  Inpatient - [facility]  01/28/2025     S:  patient denies any current issues with drains. Minimal output noted on chart review. CT scan ordered and completed, awaiting imaging.     O:  EXAM:  /65 (BP Location: Right arm)   Pulse 73   Temp 98.4  F (36.9  C) (Oral)   Resp 20   Ht 1.829 m (6')   Wt 92.6 kg (204 lb 1.6 oz)   SpO2 97%   BMI 27.68 kg/m    General: Stable. In no acute distress.    Neurologic: Alert and oriented x 3. No focal deficits.  Psychiatric: Appropriate mood and affect. Cooperative. Answering questions appropriately. Linear/coherent thought process.   Respiratory: Normal respirations on room air. Lungs clear to auscultation bilaterally.  Cardiac: S1S2, regular rate and rhythm, without murmur, clicks or rubs.  Drains(s)/Tube(s):   - Abscess Drain: Midline drain to DAMI bulb with  clear  output. Dressing clean, dry, and intact. Stitch present. No leaking appreciated from drain exit site. Drain site nontender with palpation. Flushing port Uresil - Y flushing port in place and appropriately positioned. Tubing appears intact and patent. Draining well.   Drains(s)/Tube(s):   - Abscess Drain: R LUQ drain to DAMI bulb with clear output. Dressing clean, dry, and intact. Stitch present. No leaking appreciated from drain exit site. Drain site nontender with palpation. Flushing port Uresil - Y flushing port in place and appropriately positioned. Tubing appears intact and patent. Draining well.   Drains(s)/Tube(s):   - Abscess Drain: RUQ drain to DAMI bulb with clear output. Dressing clean, dry, and intact. Stitch present. No leaking appreciated from drain exit site. Drain site nontender with palpation. Flushing port Uresil - Y flushing port in place and appropriately positioned. Tubing appears intact and patent. Draining well.      PRE-SEDATION ASSESSMENT:  Mallampati Airway Classification:  II - Faucial pillars and soft palate may be  "seen, but uvula is masked by the base of the tongue  Previous reaction to anesthesia/sedation:  No  Sedation plan based on assessment: Moderate (conscious) sedation  ASA Classification: Class 3 - SEVERE SYSTEMIC DISEASE, DEFINITE FUNCTIONAL LIMITATIONS.   Code Status: FULL CODE    NPO: since midnight - okay for sips of water with meds and/or ice chips as needed prior to potential procedure  ANTICOAG: no holds required for procedure    IMAGING:  CT scan today 1/28 pending    LABS:  Recent Labs   Lab 01/28/25  0622 01/27/25  0554 01/26/25  0549 01/25/25  0622   WBC 9.6 11.7* 16.6* 19.4*   HGB 11.1* 10.5* 10.9* 11.3*    361 375 369   CR 0.67 0.61* 0.65* 0.70     Antibiotic: Rocephin IV, Flagyl IV  Flushing: 10mL NS q shift to each drain and PRN for clogging concerns    Drain Outputs (in mL):  DATE #1 medial #2 R LUQ #3 RUQ   1/22 40 25    1/23 45 20 20   1/24 5 0 0   1/25 14 9 24   1/26 3 5 3   1/27 2 5 10       A:  69 year old male who was admitted 1/15/25 d/t abdominal pain, diarrhea, nausea, weakness and poor appetite. CT with \"3 prominent indeterminate complex hypodense masses within the liver\". Found to have sepsis 2/2 liver abscesses. CT guided drain placement x2 1/16/25. Cultures positive for streptococcus intermedius. Repeat CT revealing \"abscess in hepatic segment 4 has increased in size\" s/p CT guided drain placement 1/23.    IR continues to follow for drain management. Drains with dwindling output and repeat CT ordered 1/27 for re-assessment of fluid collections prior to potential abscessogram.     P:    PENDING CT SCAN RESULTS, TENTATIVE PLAN FOR:    Abscessogram(s) with potential intervention to drain(s) and with sedation as needed.    ** Please note the procedural date is tentative and the timing of the procedure is to be determined based on staffing/schedule and triage.     Procedural education reviewed with patient in detail including, but not limited to risks, benefits and alternatives with " understanding verbalized by patient.      Total time spent on the date of the encounter: 40 minutes.    Dayanara Drew, APRN CNP  Interventional Radiology  [phone]

## 2025-01-28 NOTE — PROGRESS NOTES
[facility]    Medicine Progress Note - Hospitalist Service    Date of Admission:  1/15/2025    Assessment & Plan   Bon Luque is a 69 year old male with past medical history significant for CAD, renal cell carcinoma status post nephrectomy 2017, hypertension, diabetes with neuropathy, HLD, hypogonadism on testosterone, psoriasis, and small bowel obstruction who was admitted on 1/15/2025 due to abdominal pain, diarrhea, nausea, dizziness, generalized weakness, and poor appetite for 11 days PTA. Found to have liver abscess, sepsis, Blood cultures growing strep intermedius.  Anticipate total of 6 weeks of IV ceftriaxone and p.o. Flagyl.  Plan for abscessogram today.     Sepsis from Strep intermedius bacteremia  Multiple liver abscesses  Immunocompromised host  CT 1/13 w/ 3 prominent indeterminate complex hypodense masses in the liver concerning for neoplasm versus infectious process  MRI 1/15: 3 heterogeneous masses in the liver new from 11/8/2024 concerning for infection, possible rapidly progressive malignancy/metastases  IR consulted, underwent CT-guided liver drain x 2 1/16  Repeat CT 01/21: Multiloculated abscess, left hepatic lobe fluid decreased s/p catheter, abscess in hepatic segment 8 decrease in size s/p catheter.  Abscess in hepatic segment 4 increased in size.   Cultures growing strep intermedius  was on  IV vancomycin and Cefepime, now on IV Ceftriaxone and po Flagyl per ID since 01/20   s/p 3rd drain on  01/23  PICC line placed 1/18  All three drains have minimal output  Plan for abscessogram today on 1/28     Hyponatremia  - improving   - Monitor     Acute drop in Hb  - Hb 13.2 on presentation, ~ 10.4  - Monitor Hb     Psoriasis  - No obvious flaring lesions, remotely on Skyrizi which has been held recently  - Outpatient follow-up     Hypophosphatemia   - Monitor and replete as needed     Remote DVT and PE  Eliquis      Chronic pain  PTA  MS Contin, gabapentin, duloxetine,  oxycodone  Pain team consulted as patient is utilizing multiple doses of IV Dilaudid     History of renal cell carcinoma  Remote nephrectomy, follow creatinine      Hypogonadism  Resume usual testosterone supplement      Insulin-dependent type 2 diabetes mellitus  - Blood sugars acceptable  - A1c 7.9%  - 50 units of Lantus twice daily at home  - increased  to Lantus 35u BID  - sliding scale insulin  - High consistent carb diet  - Titrate as needed     Moderate Malnutrition  - Seen by RD     Constipation  Miralex daily  Bisacodyl PRN           Diet: Snacks/Supplements Adult: Ensure Enlive; With Meals  NPO for Medical/Clinical Reasons Except for: Ice Chips    DVT Prophylaxis: DOAC  Seals Catheter: Not present  Lines: PRESENT      PICC 01/18/25 Single Lumen Left Basilic Antibiotic-Site Assessment: WDL      Cardiac Monitoring: None  Code Status: Full Code      Clinically Significant Risk Factors         # Hyponatremia: Lowest Na = 132 mmol/L in last 2 days, will monitor as appropriate  # Hypochloremia: Lowest Cl = 95 mmol/L in last 2 days, will monitor as appropriate      # Hypoalbuminemia: Lowest albumin = 2 g/dL at 1/18/2025  6:13 AM, will monitor as appropriate     # Hypertension: Noted on problem list           # DMII: A1C = 7.9 % (Ref range: <5.7 %) within past 6 months   # Overweight: Estimated body mass index is 27.68 kg/m  as calculated from the following:    Height as of this encounter: 1.829 m (6').    Weight as of this encounter: 92.6 kg (204 lb 1.6 oz).   # Moderate Malnutrition: based on nutrition assessment           Social Drivers of Health    Tobacco Use: Medium Risk (1/14/2025)    Patient History     Smoking Tobacco Use: Former     Smokeless Tobacco Use: Never     Passive Exposure: Past   Physical Activity: Insufficiently Active (8/23/2024)    Exercise Vital Sign     Days of Exercise per Week: 1 day     Minutes of Exercise per Session: 20 min   Social Connections: Unknown (8/23/2024)    Social  Connection and Isolation Panel [NHANES]     Frequency of Social Gatherings with Friends and Family: Once a week          Disposition Plan     Medically Ready for Discharge: Anticipated in 2-4 Days             Manan Liao MD  Hospitalist Service  [facility]  Securely message with Dinah (more info)  Text page via [employer] Paging/Directory   ______________________________________________________________________    Interval History   Patient appears groggy.  No distress noted.  He still having significant right upper quadrant abdominal pain.  Plan for possible abscessogram today.  Management plan discussed with the patient and he expressed understanding.    Physical Exam   Vital Signs: Temp: 98.4  F (36.9  C) Temp src: Oral BP: 125/65 Pulse: 73   Resp: 20 SpO2: 97 % O2 Device: None (Room air)    Weight: 204 lbs 1.6 oz    General Appearance:  No distress noted  Respiratory: Good air entry bilaterally  Cardiovascular: S1 and S2 well heard  GI: Soft abdomen, obese, mild epigastric tenderness, normoactive bowel sounds, 2 drains on right upper quadrant abdominal draining well  Skin: Intact and warm    Medical Decision Making       40 MINUTES SPENT BY ME on the date of service doing chart review, history, exam, documentation & further activities per the note.      Data

## 2025-01-28 NOTE — PROCEDURES
[facility]    Procedure: IR Procedure Note    Date/Time: 1/28/2025 2:56 PM    Performed by: Saul Galdamez MD  Authorized by: Saul Galdamez MD  IR Fellow Physician:    Pre Procedure Diagnosis: Liver abscesses  Post Procedure Diagnosis: Liver abscesses    UNIVERSAL PROTOCOL   Site Marked: No  Prior Images Obtained and Reviewed:  Yes  Required items: Required blood products, implants, devices and special equipment available    Patient identity confirmed:  Verbally with patient, provided demographic data, arm band, hospital-assigned identification number and anonymous protocol, patient vented/unresponsive  Patient was reevaluated immediately before administering moderate or deep sedation or anesthesia  Confirmation Checklist:  Patient's identity using two indicators, relevant allergies, procedure was appropriate and matched the consent or emergent situation and correct equipment/implants were available  Time out: Immediately prior to the procedure a time out was called    Universal Protocol: the Joint Commission Universal Protocol was followed    Preparation: Patient was prepped and draped in usual sterile fashion    ESBL (mL):  0     ANESTHESIA    Anesthesia:  Local infiltration  Local Anesthetic:  Lidocaine 1% without epinephrine  Anesthetic Total (mL):  10      SEDATION  Patient Sedated: Yes    Sedation Type:  Moderate (conscious) sedation  Vital signs: Vital signs monitored during sedation    Fluoroscopy Time: 2 minute(s)  Findings: Abscessogram of all 3 liver drains. Two of the drains had no residual abscess and therefore was removed.  One of the remaining drain still had moderate residual abscess pocket.  This drain was upsized/exchanged for a bigger 12 K[place]z drain for better drainage.  Drain was placed to DAMI suction bulb.  Flush drain once a day with 5 ml of normal saline.    Specimens: none    Procedural Complications: None    Condition: Stable    Plan: Abscessogram of all 3 liver  drains. Two of the drains had no residual abscess and therefore was removed.  One of the remaining drain still had moderate residual abscess pocket.  This drain was upsized/exchanged for a bigger 12 Icelandic drain for better drainage.  Drain was placed to DAMI suction bulb.  Flush drain once a day with 5 ml of normal saline.      PROCEDURE  Describe Procedure: Abscessogram of all 3 liver drains. Two of the drains had no residual abscess and therefore was removed.  One of the remaining drain still had moderate residual abscess pocket.  This drain was upsized/exchanged for a bigger 12 Icelandic drain for better drainage.  Drain was placed to DAMI suction bulb.  Flush drain once a day with 5 ml of normal saline.  Length of time physician/provider present for 1:1 monitoring during sedation:  0-22 min

## 2025-01-28 NOTE — PROGRESS NOTES
[facility] Progress Note    01/28/2025    Chart reviewed  Awaiting repeat IR assessment on 1/28/2025 - at procedure  Previous note below    ASSESSMENT:  Liver abscess, strep intermedius. Leukocytosis, normal, now up after 3rd drain placed  Drained. Repeat CT abd today showing anterior abscess (segment 4) that is larger. Previous drain going inferior to this. 3rd drain placed on 1/23.  On biologic for skin  Solitary kidney    RECOMMENDATION:  Continue ceftriaxone and po metronidazole   Anticipate 6 weeks iv antibiotics due to multi-loculated abscess  IR to re-assess on 1/28/2025  due to low output from drains #1 and #2  Patient seen and updated  Patient new to me on 1/27/2025  Plan to go to TCU upon discharge    Please see previous ID notes by Dr. Garcia.     Laura Norton MD  [facility]  Answering Service: [phone]  On-Call ID provider: [phone], option: 9      SUBJECTIVE:    Chart reviewed  Pain better. Wbc improved.    Discussed test results  Previous note    Drain #1 with 14 ml out yesterday  Drain #2 with 9 ml out yesterday  Drain #3 with 24 ml out yesterday      OBJECTIVE:  BP (!) 145/67 (BP Location: Right arm)   Pulse 70   Temp 97.8  F (36.6  C) (Oral)   Resp 19   Ht 1.829 m (6')   Wt 92.6 kg (204 lb 1.6 oz)   SpO2 100%   BMI 27.68 kg/m        PHYSICAL EXAM:  Alert, awake  Vitals tabulated above, reviewed  Sclera normal color, not injected  CARDIOVASCULAR regular rate and rhythm, no murmur  Lungs CLEAR TO AUSCULTATION   Abdomen soft, ND; 3 drains, empty. Tenderness at insertion site, midabdomen  Skin normal  Joints normal  Neurologic exam non focal    PICC, left arm     Antibiotics:  Ceftriaxone 1/20-  Metronidazole     Pertinent labs:    Recent Labs   Lab Test 01/28/25  0622 01/27/25  0554 01/26/25  0549   WBC 9.6 11.7* 16.6*   HGB 11.1* 10.5* 10.9*   HCT 34.8* 32.0* 32.6*   MCV 86 86 85    361 375       Lab Results   Component Value Date  "   RBC 3.74 01/17/2025     Lab Results   Component Value Date    HGB 10.5 01/17/2025     Lab Results   Component Value Date    HCT 30.6 01/17/2025     No components found for: \"MCT\"  Lab Results   Component Value Date    MCV 82 01/17/2025     Lab Results   Component Value Date    MCH 28.1 01/17/2025     Lab Results   Component Value Date    MCHC 34.3 01/17/2025     Lab Results   Component Value Date    RDW 13.1 01/17/2025     Lab Results   Component Value Date     01/17/2025       Last Comprehensive Metabolic Panel:  Sodium   Date Value Ref Range Status   01/28/2025 133 (L) 135 - 145 mmol/L Final     Potassium   Date Value Ref Range Status   01/28/2025 4.4 3.4 - 5.3 mmol/L Final   07/13/2022 4.2 3.5 - 5.0 mmol/L Final     Chloride   Date Value Ref Range Status   01/28/2025 96 (L) 98 - 107 mmol/L Final   07/13/2022 104 98 - 107 mmol/L Final     Carbon Dioxide (CO2)   Date Value Ref Range Status   01/28/2025 31 (H) 22 - 29 mmol/L Final   07/13/2022 22 22 - 31 mmol/L Final     Anion Gap   Date Value Ref Range Status   01/28/2025 6 (L) 7 - 15 mmol/L Final   07/13/2022 8 5 - 18 mmol/L Final     Glucose   Date Value Ref Range Status   01/28/2025 117 (H) 70 - 99 mg/dL Final   07/13/2022 286 (H) 70 - 125 mg/dL Final     GLUCOSE BY METER POCT   Date Value Ref Range Status   01/28/2025 136 (H) 70 - 99 mg/dL Final     Urea Nitrogen   Date Value Ref Range Status   01/28/2025 9.5 8.0 - 23.0 mg/dL Final   07/13/2022 17 8 - 22 mg/dL Final     Creatinine   Date Value Ref Range Status   01/28/2025 0.67 0.67 - 1.17 mg/dL Final     GFR Estimate   Date Value Ref Range Status   01/28/2025 >90 >60 mL/min/1.73m2 Final     Comment:     eGFR calculated using 2021 CKD-EPI equation.   01/13/2021 >60 >60 mL/min/1.73m2 Final     GFR, ESTIMATED POCT   Date Value Ref Range Status   11/08/2024 >60 >60 mL/min/1.73m2 Final     Calcium   Date Value Ref Range Status   01/28/2025 8.8 8.8 - 10.4 mg/dL Final     Comment:     Reference intervals " "for this test were updated on 7/16/2024 to reflect our healthy population more accurately. There may be differences in the flagging of prior results with similar values performed with this method. Those prior results can be interpreted in the context of the updated reference intervals.       Liver Function Studies -   Recent Labs   Lab Test 01/17/25  0641   PROTTOTAL 5.4*   ALBUMIN 2.2*   BILITOTAL 0.9   ALKPHOS 144   AST 83*   ALT 65       No results found for: \"SED\"    No results found for: \"CRP\"            MICROBIOLOGY DATA:    GPC in blood --> strep int  Gm stain liver pus also heavy GPC, culture same    Susceptibility data from last 90 days.  Collected Specimen Info Organism Cefotaxime Ceftriaxone Clindamycin Meropenem Penicillin Vancomycin   01/16/25 Abscess from Liver Streptococcus intermedius         01/16/25 Abscess from Liver Streptococcus intermedius         01/15/25 Peripheral Blood Streptococcus intermedius  S  S  S  S  S  S   01/15/25 Blood from Line, venous Streptococcus intermedius                "

## 2025-01-28 NOTE — CONSULTS
[facility] ACUTE PAIN SERVICE CONSULTATION   [facility], [facility], [facility], [facility], [place]     Date of Admission:  1/15/2025  Date of Consult (When I saw the patient): 01/28/25     Assessment/Plan:     Bon Luque is a 69 year old male who was admitted on 1/15/2025.  Pain team was asked to see the patient for uncontrolled abdomen pain related to hepatic abscesses. Admitted for liver abscess, sepsis, blood cultures growing strep intermedius. History of CAD, renal cell carcinoma status post nephrectomy in 2017, hypertension, diabetes with neuropathy, HLD, psoriasis, small bowel obstruction, hypergonadism, chronic pain on chronic opioid therapy with MS Contin 15 mg tid.  Describes pain as 7/10 and aching, sharp in the right upper quadrant abdomen.     Patient has used 2.2 mg IV Dilaudid and 45 mg MS Contin.     Home pain medications/psych medications/anticoagulation medications include: Cymbalta 60 mg every morning, Eliquis 5 mg twice daily, gabapentin 600 mg 3 times daily, magnesium oxide 400 mg nightly as needed, MS Contin 30 - 15 - 30 (patient reports he was tapering off MS Contin and was taking 15 mg 3 times daily prior to admission), naloxone nasal spray    PLAN:   1) Pain is consistent with right upper quadrant abdomen pain, multiple liver abscesses, sepsis from strep intermedius bacteremia, immunocompromise host, in the setting of chronic pain on chronic opioid therapy.  Patient reports he was tapering off MS Contin at home and was taking 15 mg 3 times daily which is a decrease from 30-15-30 mg. CT 1/13 w/ 3 prominent indeterminate complex hypodense masses in the liver concerning for neoplasm versus infectious process. MRI 1/15: 3 heterogeneous masses in the liver new from 11/8/2024 concerning for infection, possible rapidly progressive malignancy/metastases. IR consulted, underwent CT-guided liver drain x 2 1/16. Repeat CT 01/21: Multiloculated abscess, left hepatic lobe fluid decreased s/p  catheter, abscess in hepatic segment 8 decrease in size s/p catheter.  Abscess in hepatic segment 4 increased in size. Cultures growing strep intermedius and was on  IV vancomycin and Cefepime, now on IV Ceftriaxone and po Flagyl per ID since 01/20. S/p 3rd drain on  01/23. PICC line placed 1/18.  Plans for abscessogram today.  Patient currently n.p.o. for procedure.  Multimodal Medication Therapy  Topical: Lidocaine patches x 3  NSAID'S: Estimated Creatinine Clearance: 140.1 mL/min (based on SCr of 0.67 mg/dL).  None, on apixaban 5 mg twice daily  Steroids: none  Muscle Relaxants: none  Adjuvants: Acetaminophen 500 mg every 6 as needed -keep less than 2 g given elevated LFTs, gabapentin 600 mg 3 times daily  Antidepressants/anxiolytics: Duloxetine 60 mg every morning  Opioids: MS Contin 15 mg tid, Oxycodone 2.5-5 mg q4h prn - change to Oxycodone 5 mg q3h prn - first line    IV Pain medication: dilaudid 0.2-0.4 mg q2h prn- change to dilaudid 0.2-0.4 mg q4h prn - second line   Non-medication interventions: Ice, Heat, Rest, Distraction (TV, Music, Reading), and Meditation  Constipation Prophylaxis: Senna-docusate and Miralax    -Opioid prescriber has been Faiza Galvan - [facility]  -[place]  pulled from system on 1/28/25. This indicates ongoing fills for MS Contin, gabapentin  12/18/2024 MS Contin 15 mg #30 for 30 days  12/16/2024 MS Contin 30 mg #60 for 30 days  11/15/2024 MS Contin 30 mg #60 for 30 days  10/16/2024 gabapentin 300 mg #630 for 90 days  Discharge Recommendations - We recommend prescribing the following at the time of discharge: TBD     History of Present Illness (HPI):       Bon Luque is a 69 year old male who presented for abdominal pain, diarrhea, nausea, dizziness, generalized weakness, poor appetite for 11 days prior to admission.  Past medical history as above. The pain is reported to be acute right upper quadrant abdomen pain, chronic pain due to neuropathy, back pain, right shoulder  pain, headaches. Current pain is rated at 7/10 and goal is 2-4/10.  The patient denies vomiting, constipation, chest pain, shortness of breath, dizziness, fever, and chills. The patient reports nausea and diarrhea.     Per [place]  review, the patient does have an opioid tolerance. Opioid induced side effects noted and include: none    Reviewed medical record, labs, imaging, ED note, and care everywhere.  Reviewed pain clinic note from 11/15/2024.  Patient is seen for chronic pain in legs due to neuropathy, chronic headaches, chronic right shoulder pain, chronic low back pain.  Plan for tapering MS Contin from 30 mg 3 times daily to 30 - 15 - 30 was discussed.  Gabapentin and Cymbalta were continued.    Home pain medications/psych medications/anticoagulation medications include: Cymbalta 60 mg every morning, Eliquis 5 mg twice daily, gabapentin 600 mg 3 times daily, magnesium oxide 400 mg nightly as needed, MS Contin 30 - 15 - 30 (patient reports he was tapering off MS Contin and was taking 15 mg 3 times daily prior to admission), naloxone nasal spray    Medical History   PAST MEDICAL HISTORY:   Past Medical History:   Diagnosis Date    Abdominal pain, epigastric 06/23/2022    Normal gastric emptying study.  Peptic ulcer disease or gastritis suspected    Acute cholecystitis 07/12/2022    Hospitalized with acute abdominal pain, abnormal LFTs and CT showing distended gallbladder and bile duct dilatation.  Acute cholecystitis.  Cholecystectomy.    Acute pulmonary embolism without acute cor pulmonale (H) 05/21/2024    Anxiety 08/23/2024    Arthritis     psoriatic    ASCVD (arteriosclerotic cardiovascular disease) 04/28/2016    Coronary artery stent ×2 in RCA following myocardial infarction 2008 , echo June 2014 normal LV function with ejection fraction 50-55% with mild apical inferior hypokinesis    Bilateral leg pain 08/23/2024    Bilateral sensorineural hearing loss 08/23/2024    Chronic fatigue 06/23/2022    Chronic  low back pain 04/28/2016    Chronic right shoulder pain 06/23/2022    Coronary artery disease     Deep vein thrombosis (DVT) of upper extremity (H) 10/09/2023    Depression 12/26/2017    Depressive disorder 2002    Diabetic peripheral neuropathy associated with type 2 diabetes mellitus (H) 04/28/2016    Abnormal EMG and workup at [facility]    Dilated bile duct 07/03/2017    CT scan with incidental finding of dilated intra-and extrahepatic ducts    Duodenal ulcer without hemorrhage, perforation, or obstruction 10/16/2023    History of duodenal ulcer 08/23/2024    HTN (hypertension)     Hypercholesterolemia     Hypogonadism in male     Hyponatremia 07/12/2022    Hypophosphatemia 10/05/2023    Ischemic cardiomyopathy     Stress Echocardiogram 2017 with decreased LV function with EF 43% worse compared to previous echo.  No change in infarct.  No new ischemia    Knee effusion, left 11/06/2018    Left upper quadrant pain 06/30/2017    Leukocytosis, unspecified type 10/27/2023    Lower abdominal pain     Mass of duodenum 10/16/2023    Obstruction of second portion of duodenum with abnormal CT scan, biopsies negative for endoscopic ultrasound    MI (myocardial infarction) (H)     Nausea and vomiting 10/05/2023    Obstructive sleep apnea on CPAP     CPAP    Optic neuritis     Psoriasis     Recurrent vomiting 09/01/2023    Renal cell carcinoma, right (H) 06/23/2022    Right partial nephrectomy 2017    Right renal mass     SBO (small bowel obstruction) (H) 10/05/2023    Screening for AAA (abdominal aortic aneurysm)     No AAA on imaging in 2024    Sebaceous cyst 2010    Thoracic radiculopathy 2004    right T8 intercostal nerve block 2002    Tobacco abuse, in remission 04/28/2016    Transaminitis 07/12/2022    Type 2 diabetes mellitus with insulin therapy (H)        PAST SURGICAL HISTORY:   Past Surgical History:   Procedure Laterality Date    APPENDECTOMY      COLONOSCOPY      Colonoscopy September 2017 with hyperplastic  polyps, repeat in 10 years    CORONARY ANGIOPLASTY      stent    ENDOSCOPIC RETROGRADE CHOLANGIOPANCREATOGRAM N/A 7/12/2022    Procedure: ENDOSCOPIC RETROGRADE CHOLANGIOPANCREATOGRAPHY, STONE EXTRACTION, BILIARY SPHINCTEROTOMY;  Surgeon: Bon Meyer MD;  [place] [facility] [place]    ESOPHAGOSCOPY, GASTROSCOPY, DUODENOSCOPY (EGD), COMBINED N/A 10/6/2023    Procedure: ESOPHAGOGASTRODUODENOSCOPY, WITH ENDOSCOPIC ULTRASOUND WITH FINE NEEDLE ASPIRATION AND DUODENAL BIOPSY;  Surgeon: Bon Meyer MD;  Location: [facility] OR    KNEE SURGERY      LAPAROSCOPIC CHOLECYSTECTOMY N/A 7/12/2022    Procedure: CHOLECYSTECTOMY, LAPAROSCOPIC;  Surgeon: Adam March MD;  Location: [facility] OR    NOSE SURGERY      PICC DOUBLE LUMEN PLACEMENT  10/9/2023    PICC SINGLE LUMEN PLACEMENT  1/18/2025    MO LAP,PARTIAL NEPHRECTOMY Right 1/10/2018    Procedure: RIGHT ROBOTIC PARTIAL NEPHRECTOMY WITH INTRAOPERATIVE ULTRASOUND;  Surgeon: Chase Rodríguez MD;  Location: [facility] [place];  Service: Urology       FAMILY HISTORY:   Family History   Problem Relation Age of Onset    Diabetes Mother     Other Cancer Mother     Diabetes Father     Depression Father     Diabetes Brother     Depression Sister     Substance Abuse Brother         Alcohol       SOCIAL HISTORY:   Social History     Tobacco Use    Smoking status: Former     Current packs/day: 0.50     Average packs/day: 0.5 packs/day for 5.0 years (2.5 ttl pk-yrs)     Types: Cigarettes     Passive exposure: Past    Smokeless tobacco: Never   Substance Use Topics    Alcohol use: No     Comment: Alcoholic Drinks/day: alcohol abuse - in remission x16 years        HEALTH & LIFESTYLE PRACTICES  Tobacco:  reports that he has quit smoking. His smoking use included cigarettes. He has a 2.5 pack-year smoking history. He has been exposed to tobacco smoke. He has never used smokeless tobacco.  Alcohol:  reports no history of alcohol use.  Illicit drugs:  reports  no history of drug use.    Allergies  Allergies   Allergen Reactions    Atorvastatin Muscle Pain (Myalgia)    Valomag [Arthritis Pain Formula] Nausea and Vomiting     gastric ulcer from aspirin use    Ampicillin Rash     Had a reaction to this medication many years ago.    Codeine Rash     It has been about 30 years ago       Problem List  Patient Active Problem List    Diagnosis Date Noted    Liver mass 01/15/2025     Priority: Medium    Diarrhea, unspecified type 01/15/2025     Priority: Medium    Sepsis, due to unspecified organism, unspecified whether acute organ dysfunction present (H) 01/15/2025     Priority: Medium    Upper abdominal pain 01/14/2025     Priority: Medium    Anxiety 08/23/2024     Priority: Medium    Bilateral sensorineural hearing loss 08/23/2024     Priority: Medium    History of duodenal ulcer 08/23/2024     Priority: Medium    Bilateral leg pain 08/23/2024     Priority: Medium    Acute pulmonary embolism without acute cor pulmonale (H) 05/21/2024     Priority: Medium    Acute deep vein thrombosis (DVT) of femoral vein of right lower extremity (H) 05/09/2024     Priority: Medium    Pseudocyst of pancreas 10/28/2023     Priority: Medium    Dilated cbd, acquired 07/12/2022     Priority: Medium    Renal cell carcinoma, right (H) 06/23/2022     Priority: Medium    Chronic fatigue 06/23/2022     Priority: Medium    Chronic right shoulder pain 06/23/2022     Priority: Medium    Drug-induced constipation      Priority: Medium    Type 2 diabetes mellitus with complication, with long-term current use of insulin (H)      Priority: Medium    Chronic pain syndrome      Priority: Medium    Depression 12/26/2017     Priority: Medium    Ischemic cardiomyopathy      Priority: Medium     Stress Echocardiogram 2017 with decreased LV function with EF 43% worse   compared to previous echo.  No change in infarct.  No new ischemia        Ankylosis, sacroiliac joint 07/11/2016     Priority: Medium    Spondylosis  of lumbar region without myelopathy or radiculopathy 07/11/2016     Priority: Medium    Psoriatic arthritis (H) 04/28/2016     Priority: Medium    Chronic low back pain 04/28/2016     Priority: Medium    Diabetic peripheral neuropathy associated with type 2 diabetes mellitus (H) 04/28/2016     Priority: Medium    ASCVD (arteriosclerotic cardiovascular disease) 04/28/2016     Priority: Medium     Coronary artery stent ×2 in RCA following myocardial infarction 2008 ,   echo June 2014 normal LV function with ejection fraction 50-55% with mild   apical inferior hypokinesis        Alcohol abuse, in remission 04/28/2016     Priority: Medium    Psoriasis      Priority: Medium    Hypercholesterolemia      Priority: Medium    HTN (hypertension)      Priority: Medium    Optic neuritis      Priority: Medium    Obstructive sleep apnea on CPAP      Priority: Medium    Hypogonadism in male      Priority: Medium    Erectile dysfunction      Priority: Medium     Urologic evaluation July 2015, hypogonadism        Thoracic radiculopathy 01/01/2004     Priority: Medium     right T8 intercostal nerve block 2002           Prior to Admission Medications   Medications Prior to Admission   Medication Sig Dispense Refill Last Dose/Taking    Continuous Blood Gluc Sensor (KereosYLE SHIRA 14 DAY SENSOR) MISC    Taking    DULoxetine (CYMBALTA) 60 MG capsule Take 60 mg by mouth every morning.   1/15/2025 Morning    ELIQUIS ANTICOAGULANT 5 MG tablet Take 1 tablet (5 mg) by mouth 2 times daily. 180 tablet 3 1/15/2025 Morning    gabapentin (NEURONTIN) 300 MG capsule [GABAPENTIN (NEURONTIN) 300 MG CAPSULE] Take 600 mg by mouth 3 (three) times a day.    1/15/2025 Morning    insulin lispro (HUMALOG KWIKPEN) 100 UNIT/ML (1 unit dial) KWIKPEN Inject subcutaneously 3 times daily (before meals). Sliding scale. Average use is 50 units with each meal   1/14/2025 Noon    LANTUS SOLOSTAR 100 UNIT/ML soln Inject 50 Units Subcutaneous 2 times daily   1/15/2025  Morning    magnesium oxide 400 MG CAPS Take 400 mg by mouth nightly as needed.   Taking As Needed    melatonin 5 MG CAPS Take 5-10 mg by mouth nightly as needed (sleep)   Taking As Needed    morphine (MS CONTIN) 15 MG CR tablet Take 15 mg by mouth daily at 2 pm.   1/14/2025 at  2:00 PM    morphine (MS CONTIN) 30 MG CR tablet Take 30 mg by mouth 2 times daily.   1/15/2025 Morning    multivitamin w/minerals (THERA-VIT-M) tablet Take 1 tablet by mouth daily as needed.   Taking As Needed    naloxone (NARCAN) 4 MG/0.1ML nasal spray Spray 1 spray into one nostril alternating nostrils once as needed for opioid reversal   Taking As Needed    omeprazole (PRILOSEC) 20 MG DR capsule Take 20 mg by mouth 2 times daily.   1/15/2025 Morning    ondansetron (ZOFRAN ODT) 4 MG ODT tab Take 1 tablet (4 mg) by mouth every 8 hours as needed for nausea. 10 tablet 0 Taking As Needed    Risankizumab-rzaa (SKYRIZI SC) Inject subcutaneously every 4 months. Last dose administered on 10/23/24   More than a month    sodium fluoride dental gel (PREVIDENT) 1.1 % GEL topical gel Apply to affected area nightly as needed.   Taking As Needed    testosterone (ANDROGEL 1.62 % PUMP) 20.25 MG/ACT gel Place 1 Pump onto the skin every 72 hours.   1/14/2025 Morning       Review of Systems  Complete ROS reviewed, unless noted in HPI, all other systems reviewed (with patient) and all others found to be negative.      Objective:     Physical Exam:  /65 (BP Location: Right arm)   Pulse 73   Temp 98.4  F (36.9  C) (Oral)   Resp 20   Ht 1.829 m (6')   Wt 92.6 kg (204 lb 1.6 oz)   SpO2 97%   BMI 27.68 kg/m    Weight:   Vitals:    01/22/25 1046 01/26/25 0353 01/27/25 0500   Weight: 93.8 kg (206 lb 12.8 oz) 92.9 kg (204 lb 12.8 oz) 92.6 kg (204 lb 1.6 oz)      Body mass index is 27.68 kg/m .    General Appearance:  Alert, cooperative, no distress, appears stated age, resting in bed    Head:  Normocephalic, without obvious abnormality, atraumatic   Eyes:   PERRL, conjunctiva/corneas clear, EOM's intact   ENT/Throat: Lips, mucosa, and tongue normal; teeth and gums normal   Lymph/Neck: Supple, symmetrical, trachea midline   Lungs:   Clear to auscultation bilaterally, respirations unlabored, room air    Chest Wall:  No tenderness or deformity   Cardiovascular/Heart:  Regular rate and rhythm, S1, S2 normal   Abdomen:   Soft, RUQ tender, bowel sounds active all four quadrants,  no masses, no organomegaly, drains in place    Musculoskeletal: Extremities normal, atraumatic   Skin: Skin warm, dry    Neurologic: Alert and oriented X 3, Moves all 4 extremities     Psych: Affect is appropriate, calm, cooperative      Imaging: Reviewed I have personally reviewed pertinent notes, labs, tests, and radiologic imaging in patient's chart.  Labs: Reviewed I have personally reviewed pertinent notes, labs, tests, and radiologic imaging in patient's chart.  Notes: Reviewed I have personally reviewed pertinent notes, labs, tests, and radiologic imaging in patient's chart.    Total time spent 65 minutes with greater than 50% in consultation, education and coordination of care.   Also discussed with RN.   Treatment plan includes: multimodal pain approach, Hospital Medicine Service for medical management, .   Patient educated regarding: multimodal pain approach and medications as listed above.   Elements of Medical Decision Making as described above. Acute or chronic illness or injury or surgery. High risk therapy including opioids, high risk drug therapy including oral and/or parenteral controlled substances.    Patient is understanding of the plan. All questions and concerns addressed to patient's satisfaction.     Thank you for this consultation.    Meaghan EVANS, FNP-C  Acute Care Inpatient Pain Management Program  [facility])  Hours of coverage Monday-Friday [id number]. After hours please contact Primary team   Page via Epic chat or [employer]  Messaging

## 2025-01-28 NOTE — PLAN OF CARE
Problem: Adult Inpatient Plan of Care  Goal: Optimal Comfort and Wellbeing  Intervention: Monitor Pain and Promote Comfort  Recent Flowsheet Documentation  Taken 1/28/2025 0232 by Che Haji RN  Pain Management Interventions: medication (see MAR)  Taken 1/28/2025 0012 by Che Haji RN  Pain Management Interventions: medication (see MAR)     Problem: Comorbidity Management  Goal: Blood Glucose Levels Within Targeted Range  Outcome: Progressing  Intervention: Monitor and Manage Glycemia  Recent Flowsheet Documentation  Taken 1/28/2025 0000 by Che Haji RN  Medication Review/Management: medications reviewed     Problem: Infection  Goal: Absence of Infection Signs and Symptoms  Outcome: Progressing     Problem: Pain Acute  Goal: Optimal Pain Control and Function  Outcome: Progressing  Intervention: Develop Pain Management Plan  Recent Flowsheet Documentation  Taken 1/28/2025 0232 by Che Haji RN  Pain Management Interventions: medication (see MAR)  Taken 1/28/2025 0012 by Che Haji RN  Pain Management Interventions: medication (see MAR)  Intervention: Prevent or Manage Pain  Recent Flowsheet Documentation  Taken 1/28/2025 0000 by Che Haji RN  Medication Review/Management: medications reviewed   Goal Outcome Evaluation:    Slept well. Alert, oriented x4. Endorsing abdominal pain up to a 7/10 overnight- managed with prn iv dilaudid x2 which brings pain down to 3/10. DAMI drains flushed, little to no output in them. NPO since midnight and plan is for IR to see pt and assess drains today. Bloodsugar 144. K, phos protocols, re-check this am. ID following for abx. TCU placement when medically ready.     Temp: 98.2  F (36.8  C) Temp src: Oral BP: 125/59 Pulse: 78   Resp: 24 SpO2: 96 % O2 Device: None (Room air)

## 2025-01-28 NOTE — PLAN OF CARE
Problem: Adult Inpatient Plan of Care  Goal: Optimal Comfort and Wellbeing  1/27/2025 2320 by Amy Boyer RN  Outcome: Progressing     Problem: Adult Inpatient Plan of Care  Goal: Optimal Comfort and Wellbeing  Intervention: Monitor Pain and Promote Comfort  Recent Flowsheet Documentation  Taken 1/27/2025 2059 by Amy Boyer RN  Pain Management Interventions: medication (see MAR)  Taken 1/27/2025 1624 by Amy Boyer RN  Pain Management Interventions: medication (see MAR)     Problem: Comorbidity Management  Goal: Blood Glucose Levels Within Targeted Range  1/27/2025 2320 by Amy Boyer RN  Outcome: Progressing     Problem: Infection  Goal: Absence of Infection Signs and Symptoms  1/27/2025 2320 by Amy Boyer RN  Outcome: Progressing   Goal Outcome Evaluation:         Pt A&Ox4, VSS on room air. Lower extremity and drain site pain managed with scheduled morphine and PRN dilaudid. Phos & K protocol recheck in the morning. Moves with SBA and walker in the room. Declines bed alarm on. Can call appropriately. NPO @ midnight for possible procedure.

## 2025-01-28 NOTE — PROGRESS NOTES
"Care Management Follow Up    Length of Stay (days): 13    Expected Discharge Date: 01/28/2025    Anticipated Discharge Plan:  Transitional Care    Transportation: Anticipate Family/friend    PT Recommendations: home with assist, home with home care physical therapy  OT Recommendations:  Transitional Care Facility     Barriers to Discharge: medical stability    Prior Living Situation: house with spouse    Discussed  Partnership in Safe Discharge Planning  document with patient/family: No     Handoff Completed: No, handoff not indicated or clinically appropriate    Patient/Spokesperson Updated: No    Additional Information: SW called [facility] regarding admission. They noted that there are no open beds today. They advised calling back if the patient is cleared for discharge today. If the patient is ready for discharge, they will inform us if a bed becomes available later in the day. They can admit him no later than 6:00 PM. Per MD, the patient is not medically cleared for discharge today.     Per chart note on 1/27/25, \"Updated Mehnaz at [facility] admissions that patient is not medically ready for discharge today. Says to call back daily for bed availability.\"     Next Steps: Continue to follow and assist with discharge planning and complete PAS.    IBRAHIMA Benavides     "

## 2025-01-28 NOTE — PROGRESS NOTES
Patient Name: Bon Luque  Medical Record Number: [id number]  Today's Date: 1/28/2025    Procedure: Drain exchange  Proceduralist: Dr Galdamez    Sedation medications administered: 3 mg midazolam and 50 mcg fentanyl   Sedation time: 15 minutes    Pt transported back to room 108 sleeping but easily aroused and he denies pain at present.   Report given to receiving RN without question or concerns.

## 2025-01-29 ENCOUNTER — APPOINTMENT (OUTPATIENT)
Dept: OCCUPATIONAL THERAPY | Facility: HOSPITAL | Age: 70
End: 2025-01-29
Attending: NURSE PRACTITIONER
Payer: COMMERCIAL

## 2025-01-29 ENCOUNTER — APPOINTMENT (OUTPATIENT)
Dept: PHYSICAL THERAPY | Facility: HOSPITAL | Age: 70
End: 2025-01-29
Attending: NURSE PRACTITIONER
Payer: COMMERCIAL

## 2025-01-29 LAB
ANION GAP SERPL CALCULATED.3IONS-SCNC: 8 MMOL/L (ref 7–15)
BUN SERPL-MCNC: 11.2 MG/DL (ref 8–23)
CALCIUM SERPL-MCNC: 8.9 MG/DL (ref 8.8–10.4)
CHLORIDE SERPL-SCNC: 97 MMOL/L (ref 98–107)
CREAT SERPL-MCNC: 0.73 MG/DL (ref 0.67–1.17)
EGFRCR SERPLBLD CKD-EPI 2021: >90 ML/MIN/1.73M2
ERYTHROCYTE [DISTWIDTH] IN BLOOD BY AUTOMATED COUNT: 13.8 % (ref 10–15)
GLUCOSE BLDC GLUCOMTR-MCNC: 108 MG/DL (ref 70–99)
GLUCOSE BLDC GLUCOMTR-MCNC: 134 MG/DL (ref 70–99)
GLUCOSE BLDC GLUCOMTR-MCNC: 162 MG/DL (ref 70–99)
GLUCOSE BLDC GLUCOMTR-MCNC: 244 MG/DL (ref 70–99)
GLUCOSE BLDC GLUCOMTR-MCNC: 313 MG/DL (ref 70–99)
GLUCOSE SERPL-MCNC: 144 MG/DL (ref 70–99)
HCO3 SERPL-SCNC: 30 MMOL/L (ref 22–29)
HCT VFR BLD AUTO: 38.9 % (ref 40–53)
HGB BLD-MCNC: 12.5 G/DL (ref 13.3–17.7)
MCH RBC QN AUTO: 27.4 PG (ref 26.5–33)
MCHC RBC AUTO-ENTMCNC: 32.1 G/DL (ref 31.5–36.5)
MCV RBC AUTO: 85 FL (ref 78–100)
PHOSPHATE SERPL-MCNC: 3.9 MG/DL (ref 2.5–4.5)
PLATELET # BLD AUTO: 471 10E3/UL (ref 150–450)
POTASSIUM SERPL-SCNC: 4.3 MMOL/L (ref 3.4–5.3)
RBC # BLD AUTO: 4.56 10E6/UL (ref 4.4–5.9)
SODIUM SERPL-SCNC: 135 MMOL/L (ref 135–145)
WBC # BLD AUTO: 11.7 10E3/UL (ref 4–11)

## 2025-01-29 PROCEDURE — 97116 GAIT TRAINING THERAPY: CPT | Mod: GP

## 2025-01-29 PROCEDURE — 250N000013 HC RX MED GY IP 250 OP 250 PS 637: Performed by: NURSE PRACTITIONER

## 2025-01-29 PROCEDURE — 250N000013 HC RX MED GY IP 250 OP 250 PS 637: Performed by: INTERNAL MEDICINE

## 2025-01-29 PROCEDURE — 85048 AUTOMATED LEUKOCYTE COUNT: CPT | Performed by: STUDENT IN AN ORGANIZED HEALTH CARE EDUCATION/TRAINING PROGRAM

## 2025-01-29 PROCEDURE — 84100 ASSAY OF PHOSPHORUS: CPT | Performed by: STUDENT IN AN ORGANIZED HEALTH CARE EDUCATION/TRAINING PROGRAM

## 2025-01-29 PROCEDURE — 99233 SBSQ HOSP IP/OBS HIGH 50: CPT | Performed by: STUDENT IN AN ORGANIZED HEALTH CARE EDUCATION/TRAINING PROGRAM

## 2025-01-29 PROCEDURE — 250N000013 HC RX MED GY IP 250 OP 250 PS 637: Performed by: STUDENT IN AN ORGANIZED HEALTH CARE EDUCATION/TRAINING PROGRAM

## 2025-01-29 PROCEDURE — 97530 THERAPEUTIC ACTIVITIES: CPT | Mod: GO

## 2025-01-29 PROCEDURE — 250N000011 HC RX IP 250 OP 636: Performed by: INTERNAL MEDICINE

## 2025-01-29 PROCEDURE — 80048 BASIC METABOLIC PNL TOTAL CA: CPT | Performed by: STUDENT IN AN ORGANIZED HEALTH CARE EDUCATION/TRAINING PROGRAM

## 2025-01-29 PROCEDURE — 250N000011 HC RX IP 250 OP 636: Performed by: NURSE PRACTITIONER

## 2025-01-29 PROCEDURE — 120N000001 HC R&B MED SURG/OB

## 2025-01-29 PROCEDURE — 99232 SBSQ HOSP IP/OBS MODERATE 35: CPT | Performed by: NURSE PRACTITIONER

## 2025-01-29 PROCEDURE — 85018 HEMOGLOBIN: CPT | Performed by: STUDENT IN AN ORGANIZED HEALTH CARE EDUCATION/TRAINING PROGRAM

## 2025-01-29 PROCEDURE — 97535 SELF CARE MNGMENT TRAINING: CPT | Mod: GO

## 2025-01-29 PROCEDURE — 99232 SBSQ HOSP IP/OBS MODERATE 35: CPT | Performed by: INTERNAL MEDICINE

## 2025-01-29 RX ORDER — OXYCODONE HYDROCHLORIDE 5 MG/1
5-10 TABLET ORAL EVERY 4 HOURS PRN
Status: DISCONTINUED | OUTPATIENT
Start: 2025-01-29 | End: 2025-02-05 | Stop reason: HOSPADM

## 2025-01-29 RX ORDER — PANTOPRAZOLE SODIUM 40 MG/1
40 TABLET, DELAYED RELEASE ORAL
Status: DISCONTINUED | OUTPATIENT
Start: 2025-01-29 | End: 2025-02-05 | Stop reason: HOSPADM

## 2025-01-29 RX ADMIN — OXYCODONE HYDROCHLORIDE 10 MG: 5 TABLET ORAL at 15:57

## 2025-01-29 RX ADMIN — OXYCODONE HYDROCHLORIDE 10 MG: 5 TABLET ORAL at 10:09

## 2025-01-29 RX ADMIN — LIDOCAINE 3 PATCH: 4 PATCH TOPICAL at 09:15

## 2025-01-29 RX ADMIN — HYDROMORPHONE HYDROCHLORIDE 0.4 MG: 0.2 INJECTION, SOLUTION INTRAMUSCULAR; INTRAVENOUS; SUBCUTANEOUS at 02:41

## 2025-01-29 RX ADMIN — METRONIDAZOLE 500 MG: 500 TABLET ORAL at 15:02

## 2025-01-29 RX ADMIN — DULOXETINE HYDROCHLORIDE 60 MG: 60 CAPSULE, DELAYED RELEASE ORAL at 09:13

## 2025-01-29 RX ADMIN — OXYCODONE HYDROCHLORIDE 5 MG: 5 TABLET ORAL at 05:51

## 2025-01-29 RX ADMIN — CEFTRIAXONE SODIUM 2 G: 2 INJECTION, POWDER, FOR SOLUTION INTRAMUSCULAR; INTRAVENOUS at 09:15

## 2025-01-29 RX ADMIN — PROCHLORPERAZINE MALEATE 5 MG: 5 TABLET ORAL at 10:09

## 2025-01-29 RX ADMIN — METRONIDAZOLE 500 MG: 500 TABLET ORAL at 09:13

## 2025-01-29 RX ADMIN — GABAPENTIN 600 MG: 300 CAPSULE ORAL at 09:13

## 2025-01-29 RX ADMIN — MORPHINE SULFATE 15 MG: 15 TABLET, FILM COATED, EXTENDED RELEASE ORAL at 09:13

## 2025-01-29 RX ADMIN — APIXABAN 5 MG: 5 TABLET, FILM COATED ORAL at 21:12

## 2025-01-29 RX ADMIN — ONDANSETRON 4 MG: 4 TABLET, ORALLY DISINTEGRATING ORAL at 07:01

## 2025-01-29 RX ADMIN — HYDROMORPHONE HYDROCHLORIDE 0.4 MG: 0.2 INJECTION, SOLUTION INTRAMUSCULAR; INTRAVENOUS; SUBCUTANEOUS at 18:57

## 2025-01-29 RX ADMIN — MORPHINE SULFATE 15 MG: 15 TABLET, FILM COATED, EXTENDED RELEASE ORAL at 15:02

## 2025-01-29 RX ADMIN — ANTACID TABLETS 1000 MG: 500 TABLET, CHEWABLE ORAL at 01:21

## 2025-01-29 RX ADMIN — GABAPENTIN 600 MG: 300 CAPSULE ORAL at 15:02

## 2025-01-29 RX ADMIN — GABAPENTIN 600 MG: 300 CAPSULE ORAL at 21:12

## 2025-01-29 RX ADMIN — PROCHLORPERAZINE MALEATE 5 MG: 5 TABLET ORAL at 19:01

## 2025-01-29 RX ADMIN — APIXABAN 5 MG: 5 TABLET, FILM COATED ORAL at 09:12

## 2025-01-29 RX ADMIN — PANTOPRAZOLE SODIUM 40 MG: 40 TABLET, DELAYED RELEASE ORAL at 15:45

## 2025-01-29 RX ADMIN — HYDROMORPHONE HYDROCHLORIDE 0.2 MG: 0.2 INJECTION, SOLUTION INTRAMUSCULAR; INTRAVENOUS; SUBCUTANEOUS at 11:18

## 2025-01-29 RX ADMIN — INSULIN ASPART 14 UNITS: 100 INJECTION, SOLUTION INTRAVENOUS; SUBCUTANEOUS at 09:26

## 2025-01-29 RX ADMIN — METRONIDAZOLE 500 MG: 500 TABLET ORAL at 21:12

## 2025-01-29 RX ADMIN — MORPHINE SULFATE 15 MG: 15 TABLET, FILM COATED, EXTENDED RELEASE ORAL at 21:12

## 2025-01-29 RX ADMIN — PANTOPRAZOLE SODIUM 40 MG: 40 TABLET, DELAYED RELEASE ORAL at 07:01

## 2025-01-29 RX ADMIN — INSULIN ASPART 26 UNITS: 100 INJECTION, SOLUTION INTRAVENOUS; SUBCUTANEOUS at 17:33

## 2025-01-29 RX ADMIN — ONDANSETRON 4 MG: 4 TABLET, ORALLY DISINTEGRATING ORAL at 15:02

## 2025-01-29 ASSESSMENT — ACTIVITIES OF DAILY LIVING (ADL)
ADLS_ACUITY_SCORE: 62
ADLS_ACUITY_SCORE: 62
ADLS_ACUITY_SCORE: 61
ADLS_ACUITY_SCORE: 61
ADLS_ACUITY_SCORE: 65
ADLS_ACUITY_SCORE: 62
ADLS_ACUITY_SCORE: 61
ADLS_ACUITY_SCORE: 63
ADLS_ACUITY_SCORE: 63
ADLS_ACUITY_SCORE: 62
ADLS_ACUITY_SCORE: 61
ADLS_ACUITY_SCORE: 65
ADLS_ACUITY_SCORE: 62
ADLS_ACUITY_SCORE: 61
ADLS_ACUITY_SCORE: 65
ADLS_ACUITY_SCORE: 65
ADLS_ACUITY_SCORE: 61
ADLS_ACUITY_SCORE: 62
ADLS_ACUITY_SCORE: 61
ADLS_ACUITY_SCORE: 62
ADLS_ACUITY_SCORE: 65

## 2025-01-29 NOTE — PROGRESS NOTES
[facility]    Medicine Progress Note - Hospitalist Service    Date of Admission:  1/15/2025    Assessment & Plan   Bon Luque is a 69 year old male with past medical history significant for CAD, renal cell carcinoma status post nephrectomy 2017, hypertension, diabetes with neuropathy, HLD, hypogonadism on testosterone, psoriasis, and small bowel obstruction who was admitted on 1/15/2025 due to abdominal pain, diarrhea, nausea for 11 days PTA. Found to have strep bacteremia, liver abscess. Anticipate total of 6 weeks of IV ceftriaxone and PO Flagyl.     #Sepsis from Strep intermedius bacteremia  #Multiple liver abscesses  Presented with 11 days of nausea, diarrhea, abdominal pain. Had dental work done 1/3/25. CT 1/13 w/ 3 prominent indeterminate complex hypodense masses in the liver.  MRI 1/15: 3 heterogeneous masses in the liver concerning for infection, possible rapidly progressive malignancy/metastases. IR consulted, underwent CT-guided liver drain x 2 1/16. Repeat CT 1/21: Multiloculated abscess, 2 abscess with drains decreased in size, the third abscess remained present. 3rd drain placed into the persistnet abscess on 1/23. Abscessogram 1/28 led to removal of 2 drains due to abscess resolution, 1 drain remains in place.  - ID and IR consulted  - Blood and abscess cultures growing strep intermedius, blood cultures on 1/16 NG  - Was on  IV vancomycin and Cefepime, now on IV Ceftriaxone and po Flagyl per ID since 01/20   - PICC line placed 1/18  - Eventual discharge to TCU pending drain plan, will keep if drain is likely to be removed soon     #Hyponatremia  Has been low-normal on past checks. Here at the low-normal border but stable.  - Monitor     #Acute anemia  Due to our IV fluids, inflammation most likely. Hgb stable.     #Psoriasis  - No obvious flaring lesions, remotely on Skyrizi which has been held recently  - Outpatient follow-up     #Hypophosphatemia   - Monitor and replete as  needed     #Remote DVT and PE  - Continue PTA Eliquis      #Chronic pain  PTA  MS Contin, gabapentin, duloxetine, oxycodone  - Pain team consulted     #History of renal cell carcinoma  Remote nephrectomy     #Hypogonadism  - Hold PTA supplement while here     #Type 2 DM with long-term insulin use  - A1c 7.9%  - 50 units of Lantus twice daily at home  - Lantus 35u QHS  - sliding scale insulin  - Titrate as needed     #Moderate Malnutrition  - Seen by RD             Diet: Snacks/Supplements Adult: Ensure Enlive; With Meals  Moderate Consistent Carb (60 g CHO per Meal) Diet    DVT Prophylaxis: DOAC  Seals Catheter: Not present  Lines: PRESENT      PICC 01/18/25 Single Lumen Left Basilic Antibiotic-Site Assessment: WDL      Cardiac Monitoring: None  Code Status: Full Code      Clinically Significant Risk Factors         # Hyponatremia: Lowest Na = 133 mmol/L in last 2 days, will monitor as appropriate  # Hypochloremia: Lowest Cl = 96 mmol/L in last 2 days, will monitor as appropriate      # Hypoalbuminemia: Lowest albumin = 2 g/dL at 1/18/2025  6:13 AM, will monitor as appropriate     # Hypertension: Noted on problem list           # DMII: A1C = 7.9 % (Ref range: <5.7 %) within past 6 months   # Overweight: Estimated body mass index is 27.68 kg/m  as calculated from the following:    Height as of this encounter: 1.829 m (6').    Weight as of this encounter: 92.6 kg (204 lb 1.6 oz).   # Moderate Malnutrition: based on nutrition assessment           Social Drivers of Health    Tobacco Use: Medium Risk (1/14/2025)    Patient History     Smoking Tobacco Use: Former     Smokeless Tobacco Use: Never     Passive Exposure: Past   Physical Activity: Insufficiently Active (8/23/2024)    Exercise Vital Sign     Days of Exercise per Week: 1 day     Minutes of Exercise per Session: 20 min   Social Connections: Unknown (8/23/2024)    Social Connection and Isolation Panel [NHANES]     Frequency of Social Gatherings with Friends and  Family: Once a week          Disposition Plan     Medically Ready for Discharge: Anticipated in 2-4 Days           ROBERT PAK MD  Hospitalist Service  [facility]  Securely message with [employer] (more info)  Text page via [employer] Paging/Directory   ______________________________________________________________________    Interval History   Most bothered by loose stools, which is persistent since before admission. Not watery.    Physical Exam   Vital Signs: Temp: 97.5  F (36.4  C) Temp src: Oral BP: (!) 157/75 Pulse: 91   Resp: 20 SpO2: 98 % O2 Device: None (Room air)    Weight: 204 lbs 1.6 oz    General Appearance:  No distress noted  Respiratory: Normal effort  Cardiovascular: RRR  GI: Soft abdomen, obese, mild epigastric tenderness, 1 drain on right upper quadrant  Skin: Intact and warm    Medical Decision Making       >50 MINUTES SPENT BY ME on the date of service doing chart review, history, exam, documentation & further activities per the note.      Data   Labs and imaging of last 24 hours reviewed

## 2025-01-29 NOTE — PLAN OF CARE
Problem: Adult Inpatient Plan of Care  Goal: Optimal Comfort and Wellbeing  Intervention: Monitor Pain and Promote Comfort  Recent Flowsheet Documentation  Taken 1/29/2025 1118 by Salome Alves RN  Pain Management Interventions:   medication (see MAR)   emotional support  Taken 1/29/2025 1009 by Salome Alves, RN  Pain Management Interventions: medication (see MAR)     Problem: Infection  Goal: Absence of Infection Signs and Symptoms  Outcome: Progressing     Problem: Adult Inpatient Plan of Care  Goal: Plan of Care Review  Description: The Plan of Care Review/Shift note should be completed every shift.  The Outcome Evaluation is a brief statement about your assessment that the patient is improving, declining, or no change.  This information will be displayed automatically on your shift  note.  Outcome: Progressing  Flowsheets (Taken 1/29/2025 1302)  Plan of Care Reviewed With:   patient   spouse  Overall Patient Progress: improving   Goal Outcome Evaluation:      Plan of Care Reviewed With: patient, spouse    Overall Patient Progress: improvingOverall Patient Progress: improving       Pleasant and cooperative. Frustrated that he didn't sleep at all last noc due to abdominal cramping and diarrhea. Feeling better this morning after pain and nausea medication. Up walking in halls. One diarrhea stool. Feels like the lidocaine patches are helping, Sched and prn pain meds given as ordered. Appetite improving a little. Likes Ensure drinks. States he was told by the dietician that if he drank three of those a day he would get in all of the nutrients he needed so he really tries to drink those at every meal. Wife visiting. DAMI with minimal output. Irrigated as ordered.

## 2025-01-29 NOTE — PROGRESS NOTES
[facility] ACUTE PAIN SERVICE    [facility], [facility], [facility], [facility], [place]   PAIN Progress Note    Assessment/Plan:  Bon Luque is a 69 year old male who was admitted on 1/15/2025.  Pain team was asked to see the patient for uncontrolled abdomen pain related to hepatic abscesses. Admitted for liver abscess, sepsis, blood cultures growing strep intermedius. History of CAD, renal cell carcinoma status post nephrectomy in 2017, hypertension, diabetes with neuropathy, HLD, psoriasis, small bowel obstruction, hypergonadism, chronic pain on chronic opioid therapy with MS Contin 15 mg tid.       Home pain medications/psych medications/anticoagulation medications include: Cymbalta 60 mg every morning, Eliquis 5 mg twice daily, gabapentin 600 mg 3 times daily, magnesium oxide 400 mg nightly as needed, MS Contin 30 - 15 - 30 (patient reports he was tapering off MS Contin and was taking 15 mg 3 times daily prior to admission), naloxone nasal spray    Patient is use 15 mg p.o. MS Contin, 15 mg p.o. oxycodone, 1 mg IV Dilaudid in 24 hours.      PLAN:   1) Pain is consistent with right upper quadrant abdomen pain, multiple liver abscesses, sepsis from strep intermedius bacteremia, immunocompromise host, in the setting of chronic pain on chronic opioid therapy.  Patient reports he was tapering off MS Contin at home and was taking 15 mg 3 times daily which is a decrease from 30-15-30 mg. CT 1/13/25 showing three prominent indeterminate complex hypodense masses in the liver concerning for neoplasm versus infectious process. MRI 1/15: three heterogeneous masses in the liver new from 11/8/2024 concerning for infection, possible rapidly progressive malignancy/metastases. IR consulted, and he underwent CT-guided liver drain x 2 1/16/25. Repeat CT 01/21/25: Multiloculated abscess, left hepatic lobe fluid decreased s/p catheter, abscess in hepatic segment 8 decrease in size s/p catheter.  Abscess in hepatic segment 4  increased in size. Cultures growing strep intermedius and was on  IV vancomycin and Cefepime, now on IV Ceftriaxone and po Flagyl per ID. S/p 3rd drain on  01/23. PICC line placed 1/18.  S/p abscessogram 1/29/25.   Multimodal Medication Therapy  Topical: Lidocaine patches x 3  NSAID'S: Estimated Creatinine Clearance: 128.6 mL/min (based on SCr of 0.73 mg/dL). None, on apixaban 5 mg twice daily  Steroids: none  Muscle Relaxants: none  Adjuvants: Acetaminophen 500 mg every 6 as needed -keep less than 2 g given elevated LFTs, gabapentin 600 mg 3 times daily (home med)  Antidepressants/anxiolytics: Duloxetine 60 mg every morning  Opioids: MS Contin 15 mg tid (home med), Oxycodone 5-10 mg q4h prn - first line    IV Pain medication: dilaudid 0.2-0.4 mg q4h prn - second line   Non-medication interventions: Ice, Heat, Rest, Distraction (TV, Music, Reading), and Meditation  Constipation Prophylaxis: Senna-docusate and Miralax     -Opioid prescriber has been Faiza Galvan - [facility]  -[place]  pulled from system on 1/28/25. This indicates ongoing fills for MS Contin, gabapentin  12/18/2024 MS Contin 15 mg #30 for 30 days  12/16/2024 MS Contin 30 mg #60 for 30 days  11/15/2024 MS Contin 30 mg #60 for 30 days  10/16/2024 gabapentin 300 mg #630 for 90 days  Discharge Recommendations - We recommend prescribing the following at the time of discharge: Home MS Contin 15 mg 3 times daily, home gabapentin 600 mg 3 times daily, short supply Oxycodone.     Subjective:  Describes pain as 5-7/10 and aching, sharp in the right upper quadrant abdomen. The patient denies constipation, diarrhea, chest pain, shortness of breath, dizziness, fever, and chills. The patient reports nausea and vomiting.        Principal Problem:  <principal problem not specified>     Patient Active Problem List   Diagnosis    Psoriatic arthritis (H)    Chronic low back pain    Diabetic peripheral neuropathy associated with type 2 diabetes mellitus (H)     Psoriasis    Hypercholesterolemia    HTN (hypertension)    Optic neuritis    Obstructive sleep apnea on CPAP    Thoracic radiculopathy    Hypogonadism in male    Erectile dysfunction    ASCVD (arteriosclerotic cardiovascular disease)    Alcohol abuse, in remission    Ankylosis, sacroiliac joint    Spondylosis of lumbar region without myelopathy or radiculopathy    Ischemic cardiomyopathy    Depression    Type 2 diabetes mellitus with complication, with long-term current use of insulin (H)    Chronic pain syndrome    Drug-induced constipation    Renal cell carcinoma, right (H)    Chronic fatigue    Chronic right shoulder pain    Dilated cbd, acquired    Pseudocyst of pancreas    Acute deep vein thrombosis (DVT) of femoral vein of right lower extremity (H)    Acute pulmonary embolism without acute cor pulmonale (H)    Anxiety    Bilateral sensorineural hearing loss    History of duodenal ulcer    Bilateral leg pain    Upper abdominal pain    Liver mass    Diarrhea, unspecified type    Sepsis, due to unspecified organism, unspecified whether acute organ dysfunction present (H)        Objective:    History   Drug Use No          Tobacco Use      Smoking status: Former        Packs/day: 0.50        Years: 0.5 packs/day for 5.0 years (2.5 ttl pk-yrs)        Types: Cigarettes        Passive exposure: Past      Smokeless tobacco: Never      Vital signs in last 24 hours:  BP (!) 145/74 (BP Location: Right arm)   Pulse 91   Temp 96.9  F (36.1  C) (Axillary)   Resp 18   Ht 1.829 m (6')   Wt 92.6 kg (204 lb 1.6 oz)   SpO2 96%   BMI 27.68 kg/m      Weight:     Vitals:    01/15/25 1220 01/22/25 1046 01/26/25 0353 01/27/25 0500   Weight: 99.8 kg (220 lb) 93.8 kg (206 lb 12.8 oz) 92.9 kg (204 lb 12.8 oz) 92.6 kg (204 lb 1.6 oz)      Weight change:   Body mass index is 27.68 kg/m .    Intake/Output last 3 shifts:  I/O last 3 completed shifts:  In: 590 [P.O.:470; I.V.:120]  Out: 0   Intake/Output this shift:  I/O this  shift:  In: 380 [P.O.:380]  Out: 10 [Drains:10]    Review of Systems:   As per subjective, all others negative.    Physical Exam:  General Appearance:  Alert, cooperative, no distress, up in a chair   Head:  Normocephalic, without obvious abnormality, atraumatic   Eyes:  PERRL, conjunctiva/corneas clear, EOM's intact   Nose: Nares normal, septum midline   Throat: Lips, mucosa, and tongue normal; teeth and gums normal   Neck: Supple, symmetrical, trachea midline   Back:   Symmetric, no curvature, ROM normal   Lungs:   Clear to auscultation bilaterally, respirations unlabored, room air   Heart:  Regular rate and rhythm, S1, S2 normal    Abdomen:   Soft, right upper quadrant tender, DAMI drain in place   Extremities: Extremities normal, atraumatic   Skin: Skin warm, dry   Neurologic: Alert and oriented X 3, Moves all 4 extremities   Psych: Affect is appropriate     Imaging: Reviewed I have personally reviewed pertinent notes, labs, tests, and radiologic imaging in patient's chart.  Labs: Reviewed I have personally reviewed pertinent notes, labs, tests, and radiologic imaging in patient's chart.  Notes: Reviewed I have personally reviewed pertinent notes, labs, tests, and radiologic imaging in patient's chart.    Total time spent 35 minutes with greater than 50% in consultation, education and coordination of care.   Treatment plan includes: multimodal pain approach, Hospital Medicine Service for medical management, infectious disease, interventional radiology.   Patient educated regarding: multimodal pain approach and medications as listed above.   Elements of Medical Decision Making as described above. Acute or chronic illness or injury or surgery. High risk therapy including opioids, high risk drug therapy including oral and/or parenteral controlled substances.    Patient is understanding of the plan. All questions and concerns addressed to patient's satisfaction.     Meaghan EVANS, FNP-C  Acute Care Inpatient Pain  Management Program  [facility] ([facility], [facility])  Hours of coverage Monday-Friday [phone]. After hours please contact Primary team.   Page via Epic chat or [employer]

## 2025-01-29 NOTE — PLAN OF CARE
Problem: Comorbidity Management  Goal: Blood Glucose Levels Within Targeted Range  Outcome: Progressing     Problem: Infection  Goal: Absence of Infection Signs and Symptoms  Outcome: Progressing     Problem: Pain Acute  Goal: Optimal Pain Control and Function  Outcome: Progressing  Intervention: Develop Pain Management Plan  Recent Flowsheet Documentation  Taken 1/28/2025 2210 by Amy Boyer, RN  Pain Management Interventions: medication (see MAR)  Taken 1/28/2025 1718 by Amy Boyer, RN  Pain Management Interventions: medication (see MAR)   Goal Outcome Evaluation:         Pt A&Ox4, VSS on room air. Lower extremity and drain site pain managed with scheduled morphine, PRN dilaudid and oxycodone. DAMI drain with no output, irrigated. 1 watery/loose BM. Moves with SBA and walker in the room. Bed alarm on. Can call appropriately.

## 2025-01-29 NOTE — PLAN OF CARE
Problem: Comorbidity Management  Goal: Blood Glucose Levels Within Targeted Range  Outcome: Progressing     Problem: Infection  Goal: Absence of Infection Signs and Symptoms  Outcome: Progressing     Problem: Pain Acute  Goal: Optimal Pain Control and Function  Outcome: Progressing   Goal Outcome Evaluation:    Patient slept intermittently between cares. Vitals stable on room air. R abdominal pain managed with prn oxycodone and iv dilaudid. DAMI drain intact. Order in place to irrigate drain once daily with 5ml Normal saline. Up with assist of 1 with walker and gait belt. Received prn Tums and zofran for upset stomach. Pt wondering if he has a stomach bug due to diarrhea. Note left for provider.

## 2025-01-30 ENCOUNTER — APPOINTMENT (OUTPATIENT)
Dept: OCCUPATIONAL THERAPY | Facility: HOSPITAL | Age: 70
End: 2025-01-30
Attending: NURSE PRACTITIONER
Payer: COMMERCIAL

## 2025-01-30 ENCOUNTER — APPOINTMENT (OUTPATIENT)
Dept: PHYSICAL THERAPY | Facility: HOSPITAL | Age: 70
End: 2025-01-30
Attending: NURSE PRACTITIONER
Payer: COMMERCIAL

## 2025-01-30 VITALS
HEART RATE: 97 BPM | TEMPERATURE: 98.2 F | WEIGHT: 204.1 LBS | OXYGEN SATURATION: 97 % | BODY MASS INDEX: 27.64 KG/M2 | SYSTOLIC BLOOD PRESSURE: 131 MMHG | DIASTOLIC BLOOD PRESSURE: 68 MMHG | RESPIRATION RATE: 18 BRPM | HEIGHT: 72 IN

## 2025-01-30 LAB
ALBUMIN SERPL BCG-MCNC: 2.1 G/DL (ref 3.5–5.2)
ALP SERPL-CCNC: 90 U/L (ref 40–150)
ALT SERPL W P-5'-P-CCNC: 7 U/L (ref 0–70)
ANION GAP SERPL CALCULATED.3IONS-SCNC: 6 MMOL/L (ref 7–15)
AST SERPL W P-5'-P-CCNC: 18 U/L (ref 0–45)
BILIRUB SERPL-MCNC: 0.2 MG/DL
BUN SERPL-MCNC: 11.3 MG/DL (ref 8–23)
CALCIUM SERPL-MCNC: 7 MG/DL (ref 8.8–10.4)
CHLORIDE SERPL-SCNC: 107 MMOL/L (ref 98–107)
CREAT SERPL-MCNC: 0.63 MG/DL (ref 0.67–1.17)
EGFRCR SERPLBLD CKD-EPI 2021: >90 ML/MIN/1.73M2
ERYTHROCYTE [DISTWIDTH] IN BLOOD BY AUTOMATED COUNT: 13.8 % (ref 10–15)
GLUCOSE BLDC GLUCOMTR-MCNC: 156 MG/DL (ref 70–99)
GLUCOSE BLDC GLUCOMTR-MCNC: 171 MG/DL (ref 70–99)
GLUCOSE BLDC GLUCOMTR-MCNC: 177 MG/DL (ref 70–99)
GLUCOSE BLDC GLUCOMTR-MCNC: 193 MG/DL (ref 70–99)
GLUCOSE BLDC GLUCOMTR-MCNC: 236 MG/DL (ref 70–99)
GLUCOSE SERPL-MCNC: 144 MG/DL (ref 70–99)
HCO3 SERPL-SCNC: 26 MMOL/L (ref 22–29)
HCT VFR BLD AUTO: 35.3 % (ref 40–53)
HGB BLD-MCNC: 11.2 G/DL (ref 13.3–17.7)
MCH RBC QN AUTO: 27.6 PG (ref 26.5–33)
MCHC RBC AUTO-ENTMCNC: 31.7 G/DL (ref 31.5–36.5)
MCV RBC AUTO: 87 FL (ref 78–100)
PHOSPHATE SERPL-MCNC: 2.4 MG/DL (ref 2.5–4.5)
PLATELET # BLD AUTO: 366 10E3/UL (ref 150–450)
POTASSIUM SERPL-SCNC: 3.9 MMOL/L (ref 3.4–5.3)
PROT SERPL-MCNC: 5.6 G/DL (ref 6.4–8.3)
RBC # BLD AUTO: 4.06 10E6/UL (ref 4.4–5.9)
SODIUM SERPL-SCNC: 139 MMOL/L (ref 135–145)
WBC # BLD AUTO: 9.6 10E3/UL (ref 4–11)

## 2025-01-30 PROCEDURE — 250N000013 HC RX MED GY IP 250 OP 250 PS 637: Performed by: INTERNAL MEDICINE

## 2025-01-30 PROCEDURE — 84100 ASSAY OF PHOSPHORUS: CPT | Performed by: STUDENT IN AN ORGANIZED HEALTH CARE EDUCATION/TRAINING PROGRAM

## 2025-01-30 PROCEDURE — 250N000011 HC RX IP 250 OP 636: Performed by: STUDENT IN AN ORGANIZED HEALTH CARE EDUCATION/TRAINING PROGRAM

## 2025-01-30 PROCEDURE — 250N000013 HC RX MED GY IP 250 OP 250 PS 637: Performed by: STUDENT IN AN ORGANIZED HEALTH CARE EDUCATION/TRAINING PROGRAM

## 2025-01-30 PROCEDURE — 82435 ASSAY OF BLOOD CHLORIDE: CPT | Performed by: INTERNAL MEDICINE

## 2025-01-30 PROCEDURE — 250N000009 HC RX 250: Performed by: STUDENT IN AN ORGANIZED HEALTH CARE EDUCATION/TRAINING PROGRAM

## 2025-01-30 PROCEDURE — 250N000012 HC RX MED GY IP 250 OP 636 PS 637: Performed by: STUDENT IN AN ORGANIZED HEALTH CARE EDUCATION/TRAINING PROGRAM

## 2025-01-30 PROCEDURE — 250N000011 HC RX IP 250 OP 636: Performed by: INTERNAL MEDICINE

## 2025-01-30 PROCEDURE — 82947 ASSAY GLUCOSE BLOOD QUANT: CPT | Performed by: INTERNAL MEDICINE

## 2025-01-30 PROCEDURE — 99232 SBSQ HOSP IP/OBS MODERATE 35: CPT | Performed by: STUDENT IN AN ORGANIZED HEALTH CARE EDUCATION/TRAINING PROGRAM

## 2025-01-30 PROCEDURE — 120N000001 HC R&B MED SURG/OB

## 2025-01-30 PROCEDURE — 258N000003 HC RX IP 258 OP 636: Performed by: STUDENT IN AN ORGANIZED HEALTH CARE EDUCATION/TRAINING PROGRAM

## 2025-01-30 PROCEDURE — 250N000013 HC RX MED GY IP 250 OP 250 PS 637: Performed by: NURSE PRACTITIONER

## 2025-01-30 PROCEDURE — 97110 THERAPEUTIC EXERCISES: CPT | Mod: GO

## 2025-01-30 PROCEDURE — 97535 SELF CARE MNGMENT TRAINING: CPT | Mod: GO

## 2025-01-30 PROCEDURE — 85049 AUTOMATED PLATELET COUNT: CPT | Performed by: STUDENT IN AN ORGANIZED HEALTH CARE EDUCATION/TRAINING PROGRAM

## 2025-01-30 PROCEDURE — 85014 HEMATOCRIT: CPT | Performed by: STUDENT IN AN ORGANIZED HEALTH CARE EDUCATION/TRAINING PROGRAM

## 2025-01-30 PROCEDURE — 99232 SBSQ HOSP IP/OBS MODERATE 35: CPT | Performed by: INTERNAL MEDICINE

## 2025-01-30 PROCEDURE — 97116 GAIT TRAINING THERAPY: CPT | Mod: GP

## 2025-01-30 RX ORDER — HYDROMORPHONE HCL IN WATER/PF 6 MG/30 ML
.2-.4 PATIENT CONTROLLED ANALGESIA SYRINGE INTRAVENOUS 3 TIMES DAILY PRN
Status: DISCONTINUED | OUTPATIENT
Start: 2025-01-30 | End: 2025-02-05 | Stop reason: HOSPADM

## 2025-01-30 RX ADMIN — LIDOCAINE 3 PATCH: 4 PATCH TOPICAL at 08:25

## 2025-01-30 RX ADMIN — MORPHINE SULFATE 15 MG: 15 TABLET, FILM COATED, EXTENDED RELEASE ORAL at 13:11

## 2025-01-30 RX ADMIN — PANTOPRAZOLE SODIUM 40 MG: 40 TABLET, DELAYED RELEASE ORAL at 06:30

## 2025-01-30 RX ADMIN — METRONIDAZOLE 500 MG: 500 TABLET ORAL at 20:20

## 2025-01-30 RX ADMIN — POLYETHYLENE GLYCOL 3350 17 G: 17 POWDER, FOR SOLUTION ORAL at 08:29

## 2025-01-30 RX ADMIN — OXYCODONE HYDROCHLORIDE 10 MG: 5 TABLET ORAL at 18:42

## 2025-01-30 RX ADMIN — PANTOPRAZOLE SODIUM 40 MG: 40 TABLET, DELAYED RELEASE ORAL at 15:45

## 2025-01-30 RX ADMIN — SODIUM PHOSPHATE, MONOBASIC, MONOHYDRATE AND SODIUM PHOSPHATE, DIBASIC, ANHYDROUS 15 MMOL: 142; 276 INJECTION, SOLUTION INTRAVENOUS at 09:28

## 2025-01-30 RX ADMIN — GABAPENTIN 600 MG: 300 CAPSULE ORAL at 20:20

## 2025-01-30 RX ADMIN — INSULIN ASPART 20 UNITS: 100 INJECTION, SOLUTION INTRAVENOUS; SUBCUTANEOUS at 09:18

## 2025-01-30 RX ADMIN — CEFTRIAXONE SODIUM 2 G: 2 INJECTION, POWDER, FOR SOLUTION INTRAMUSCULAR; INTRAVENOUS at 08:26

## 2025-01-30 RX ADMIN — GABAPENTIN 600 MG: 300 CAPSULE ORAL at 13:11

## 2025-01-30 RX ADMIN — HYDROMORPHONE HYDROCHLORIDE 0.4 MG: 0.2 INJECTION, SOLUTION INTRAMUSCULAR; INTRAVENOUS; SUBCUTANEOUS at 20:54

## 2025-01-30 RX ADMIN — MORPHINE SULFATE 15 MG: 15 TABLET, FILM COATED, EXTENDED RELEASE ORAL at 08:29

## 2025-01-30 RX ADMIN — PROCHLORPERAZINE MALEATE 5 MG: 5 TABLET ORAL at 01:16

## 2025-01-30 RX ADMIN — METRONIDAZOLE 500 MG: 500 TABLET ORAL at 08:29

## 2025-01-30 RX ADMIN — ONDANSETRON 4 MG: 4 TABLET, ORALLY DISINTEGRATING ORAL at 18:42

## 2025-01-30 RX ADMIN — DULOXETINE HYDROCHLORIDE 60 MG: 60 CAPSULE, DELAYED RELEASE ORAL at 08:29

## 2025-01-30 RX ADMIN — INSULIN ASPART 20 UNITS: 100 INJECTION, SOLUTION INTRAVENOUS; SUBCUTANEOUS at 13:12

## 2025-01-30 RX ADMIN — APIXABAN 5 MG: 5 TABLET, FILM COATED ORAL at 20:20

## 2025-01-30 RX ADMIN — INSULIN ASPART 20 UNITS: 100 INJECTION, SOLUTION INTRAVENOUS; SUBCUTANEOUS at 18:34

## 2025-01-30 RX ADMIN — GABAPENTIN 600 MG: 300 CAPSULE ORAL at 08:29

## 2025-01-30 RX ADMIN — APIXABAN 5 MG: 5 TABLET, FILM COATED ORAL at 08:29

## 2025-01-30 RX ADMIN — OXYCODONE HYDROCHLORIDE 5 MG: 5 TABLET ORAL at 00:33

## 2025-01-30 RX ADMIN — METRONIDAZOLE 500 MG: 500 TABLET ORAL at 13:11

## 2025-01-30 RX ADMIN — MORPHINE SULFATE 15 MG: 15 TABLET, FILM COATED, EXTENDED RELEASE ORAL at 20:20

## 2025-01-30 ASSESSMENT — ACTIVITIES OF DAILY LIVING (ADL)
ADLS_ACUITY_SCORE: 57
ADLS_ACUITY_SCORE: 58
ADLS_ACUITY_SCORE: 57
ADLS_ACUITY_SCORE: 58
ADLS_ACUITY_SCORE: 61
ADLS_ACUITY_SCORE: 57
ADLS_ACUITY_SCORE: 61
ADLS_ACUITY_SCORE: 57
ADLS_ACUITY_SCORE: 57
ADLS_ACUITY_SCORE: 61
ADLS_ACUITY_SCORE: 57
ADLS_ACUITY_SCORE: 61
ADLS_ACUITY_SCORE: 57
ADLS_ACUITY_SCORE: 61
ADLS_ACUITY_SCORE: 58

## 2025-01-30 NOTE — PROGRESS NOTES
[facility] Progress Note    01/30/2025        ASSESSMENT:  Liver abscess, strep intermedius. Leukocytosis, normal, now up after 3rd drain placed  Drained. Repeat CT abd today showing anterior abscess (segment 4) that is larger. Previous drain going inferior to this. 3rd drain placed on 1/23.  On biologic for skin  Solitary kidney  Drains removed on repeat abscessogram on 1/28/2025, has 1 drain in place    RECOMMENDATION:  Continue ceftriaxone and po metronidazole   Anticipate 6 weeks iv antibiotics due to multi-loculated abscess  IR reassessed 1/28/2025    IMPRESSION:    1. Abscessogram through the left hepatic lobe drain demonstrates no residual abscess cavity. This drain was removed without complications.  2. Abscessogram through the deeper right hepatic lobe liver abscess drain demonstrates minimal to no residual abscess cavity. This drain was removed.  3. Abscessogram through the more superficial right hepatic lobe liver abscess drain demonstrates the drain to be suboptimally positioned and partially clogged. There was still moderate residual abscess cavity. This drain was exchange/upsize to a larger   12 French drain with the pigtail better positioned within the center of the abscess cavity. Drain was placed to DAMI suction. Drain will be flushed once daily with 5 cc of sterile saline.  Patient new to me on 1/27/2025  Plan to go to TCU upon discharge  Clinically improving  Please see previous ID notes by Dr. Garcia.     Laura Norton MD  [facility]  Answering Service: [phone]  On-Call ID provider: [phone], option: 9      SUBJECTIVE:    Chart reviewed  Currently improving, feels better today  Drain in place    Previous note:   Pain better. Wbc improved.    Discussed test results  Previous note    Drain #1 with 14 ml out yesterday  Drain #2 with 9 ml out yesterday  Drain #3 with 24 ml out yesterday      OBJECTIVE:  /68 (BP Location: Right arm)   Pulse  "97   Temp 98.2  F (36.8  C) (Oral)   Resp 18   Ht 1.829 m (6')   Wt 92.6 kg (204 lb 1.6 oz)   SpO2 97%   BMI 27.68 kg/m        PHYSICAL EXAM:  Gen: resting  Vitals tabulated above, reviewed  Sclera normal color, not injected  CARDIOVASCULAR regular rate and rhythm, no murmur  Lungs CLEAR TO AUSCULTATION   Abdomen soft, ND; 1 drain with creamy output: Tenderness at insertion site, midabdomen  Skin normal  Joints normal  Neurologic exam non focal    PICC, left arm     Antibiotics:  Ceftriaxone 1/20-  Metronidazole     Pertinent labs:    Recent Labs   Lab Test 01/30/25  0639 01/29/25  0559 01/28/25  0622   WBC 9.6 11.7* 9.6   HGB 11.2* 12.5* 11.1*   HCT 35.3* 38.9* 34.8*   MCV 87 85 86    471* 373       Lab Results   Component Value Date    RBC 3.74 01/17/2025     Lab Results   Component Value Date    HGB 10.5 01/17/2025     Lab Results   Component Value Date    HCT 30.6 01/17/2025     No components found for: \"MCT\"  Lab Results   Component Value Date    MCV 82 01/17/2025     Lab Results   Component Value Date    MCH 28.1 01/17/2025     Lab Results   Component Value Date    MCHC 34.3 01/17/2025     Lab Results   Component Value Date    RDW 13.1 01/17/2025     Lab Results   Component Value Date     01/17/2025       Last Comprehensive Metabolic Panel:  Sodium   Date Value Ref Range Status   01/30/2025 139 135 - 145 mmol/L Final     Potassium   Date Value Ref Range Status   01/30/2025 3.9 3.4 - 5.3 mmol/L Final   07/13/2022 4.2 3.5 - 5.0 mmol/L Final     Chloride   Date Value Ref Range Status   01/30/2025 107 98 - 107 mmol/L Final   07/13/2022 104 98 - 107 mmol/L Final     Carbon Dioxide (CO2)   Date Value Ref Range Status   01/30/2025 26 22 - 29 mmol/L Final   07/13/2022 22 22 - 31 mmol/L Final     Anion Gap   Date Value Ref Range Status   01/30/2025 6 (L) 7 - 15 mmol/L Final   07/13/2022 8 5 - 18 mmol/L Final     Glucose   Date Value Ref Range Status   07/13/2022 286 (H) 70 - 125 mg/dL Final " "    GLUCOSE BY METER POCT   Date Value Ref Range Status   01/30/2025 156 (H) 70 - 99 mg/dL Final     Urea Nitrogen   Date Value Ref Range Status   01/30/2025 11.3 8.0 - 23.0 mg/dL Final   07/13/2022 17 8 - 22 mg/dL Final     Creatinine   Date Value Ref Range Status   01/30/2025 0.63 (L) 0.67 - 1.17 mg/dL Final     GFR Estimate   Date Value Ref Range Status   01/30/2025 >90 >60 mL/min/1.73m2 Final     Comment:     eGFR calculated using 2021 CKD-EPI equation.   01/13/2021 >60 >60 mL/min/1.73m2 Final     GFR, ESTIMATED POCT   Date Value Ref Range Status   11/08/2024 >60 >60 mL/min/1.73m2 Final     Calcium   Date Value Ref Range Status   01/30/2025 7.0 (L) 8.8 - 10.4 mg/dL Final       Liver Function Studies -   Recent Labs   Lab Test 01/17/25  0641   PROTTOTAL 5.4*   ALBUMIN 2.2*   BILITOTAL 0.9   ALKPHOS 144   AST 83*   ALT 65       No results found for: \"SED\"    No results found for: \"CRP\"            MICROBIOLOGY DATA:    GPC in blood --> strep int  Gm stain liver pus also heavy GPC, culture same    Susceptibility data from last 90 days.  Collected Specimen Info Organism Cefotaxime Ceftriaxone Clindamycin Meropenem Penicillin Vancomycin   01/16/25 Abscess from Liver Streptococcus intermedius         01/16/25 Abscess from Liver Streptococcus intermedius         01/15/25 Peripheral Blood Streptococcus intermedius  S  S  S  S  S  S   01/15/25 Blood from Line, venous Streptococcus intermedius            Medical Decision Making       MANAGEMENT DISCUSSED with the following over the past 24 hours: patient   NOTE(S)/MEDICAL RECORDS REVIEWED over the past 24 hours: reviewed  Tests ORDERED & REVIEWED in the past 24 hours:  - See lab/imaging results included in the data section of the note  Medical complexity over the past 24 hours:  - Intensive monitoring for MEDICATION TOXICITY        "

## 2025-01-30 NOTE — PROGRESS NOTES
[facility]    Medicine Progress Note - Hospitalist Service    Date of Admission:  1/15/2025    Assessment & Plan   Bon Luque is a 69 year old male with past medical history significant for CAD, renal cell carcinoma status post nephrectomy 2017, hypertension, diabetes with neuropathy, HLD, hypogonadism on testosterone, psoriasis, and small bowel obstruction who was admitted on 1/15/2025 due to abdominal pain, diarrhea, nausea for 11 days PTA. Found to have strep bacteremia, liver abscess. Anticipate total of 6 weeks of IV ceftriaxone and PO Flagyl.     #Sepsis from Strep intermedius bacteremia  #Multiple liver abscesses  Presented with 11 days of nausea, diarrhea, abdominal pain. Had dental work done 1/3/25. CT 1/13 w/ 3 prominent indeterminate complex hypodense masses in the liver.  MRI 1/15: 3 heterogeneous masses in the liver concerning for infection, possible rapidly progressive malignancy/metastases. IR consulted, underwent CT-guided liver drain x 2 1/16. Repeat CT 1/21: Multiloculated abscess, 2 abscess with drains decreased in size, the third abscess remained present. 3rd drain placed into the persistnet abscess on 1/23. Abscessogram 1/28 led to removal of 2 drains due to abscess resolution, 1 drain remains in place.  - ID and IR consulted  - Blood and abscess cultures growing strep intermedius, blood cultures on 1/16 NG  - Was on  IV vancomycin and Cefepime, now on IV Ceftriaxone and po Flagyl per ID since 01/20   - PICC line placed 1/18  - Eventual discharge to TCU pending drain plan, will keep if drain is likely to be removed soon     #Hyponatremia  Has been low-normal on past checks. Here at the low-normal border but stable.  - Monitor     #Acute anemia  Due to our IV fluids, inflammation most likely. Hgb stable.     #Psoriasis  - No obvious flaring lesions, remotely on Skyrizi which has been held recently  - Outpatient follow-up     #Hypophosphatemia   - Monitor and replete as  needed     #Remote DVT and PE  - Continue PTA Eliquis      #Chronic pain  PTA  MS Contin, gabapentin, duloxetine, oxycodone  - Pain team consulted     #History of renal cell carcinoma  Remote nephrectomy     #Hypogonadism  - Hold PTA supplement while here     #Type 2 DM with long-term insulin use  - A1c 7.9%  - 50 units of Lantus twice daily at home  - Lantus 40u QHS  - sliding scale insulin  - Titrate as needed     #Moderate Malnutrition  - Seen by RD             Diet: Snacks/Supplements Adult: Ensure Enlive; With Meals  Moderate Consistent Carb (60 g CHO per Meal) Diet    DVT Prophylaxis: DOAC  Seals Catheter: Not present  Lines: PRESENT      PICC 01/18/25 Single Lumen Left Basilic Antibiotic-Site Assessment: WDL      Cardiac Monitoring: None  Code Status: Full Code      Clinically Significant Risk Factors          # Hypochloremia: Lowest Cl = 97 mmol/L in last 2 days, will monitor as appropriate      # Hypoalbuminemia: Lowest albumin = 2 g/dL at 1/18/2025  6:13 AM, will monitor as appropriate     # Hypertension: Noted on problem list           # DMII: A1C = 7.9 % (Ref range: <5.7 %) within past 6 months   # Overweight: Estimated body mass index is 27.68 kg/m  as calculated from the following:    Height as of this encounter: 1.829 m (6').    Weight as of this encounter: 92.6 kg (204 lb 1.6 oz).   # Moderate Malnutrition: based on nutrition assessment           Social Drivers of Health    Tobacco Use: Medium Risk (1/14/2025)    Patient History     Smoking Tobacco Use: Former     Smokeless Tobacco Use: Never     Passive Exposure: Past   Physical Activity: Insufficiently Active (8/23/2024)    Exercise Vital Sign     Days of Exercise per Week: 1 day     Minutes of Exercise per Session: 20 min   Social Connections: Unknown (8/23/2024)    Social Connection and Isolation Panel [NHANES]     Frequency of Social Gatherings with Friends and Family: Once a week          Disposition Plan     Medically Ready for Discharge:  Anticipated in 2-4 Days           ROBERT PAK MD  Hospitalist Service  [facility]  Securely message with [employer] (more info)  Text page via [facility] Paging/Directory   ______________________________________________________________________    Interval History   No clinical changes, antibiotics ongoing.    Physical Exam   Vital Signs: Temp: 98.2  F (36.8  C) Temp src: Oral BP: 131/68 Pulse: 97   Resp: 18 SpO2: 97 % O2 Device: None (Room air)    Weight: 204 lbs 1.6 oz    General Appearance:  No distress noted  Respiratory: Normal effort  Cardiovascular: RRR  GI: Soft abdomen, obese, mild epigastric tenderness, 1 drain on right upper quadrant  Skin: Intact and warm    Medical Decision Making       40 MINUTES SPENT BY ME on the date of service doing chart review, history, exam, documentation & further activities per the note.      Data   Labs and imaging of last 24 hours reviewed

## 2025-01-30 NOTE — PROGRESS NOTES
Care Management Follow Up    Length of Stay (days): 15    Expected Discharge Date: 01/31/2025    Anticipated Discharge Plan:  [facility] when medically ready, will need to confirm w/facility when anticipated discharge date and time is near    Transportation: TBD    PT Recommendations: home with assist, home with home care physical therapy, Per plan established by the PT  OT Recommendations:  home with assist, home with home care occupational therapy     Barriers to Discharge: medical stability    Prior Living Situation: house with spouse    Discussed  Partnership in Safe Discharge Planning  document with patient/family: No     Handoff Completed: No, handoff not indicated or clinically appropriate    Patient/Spokesperson Updated: No    Additional Information:  MD updated CM team, pt  not ready and will still need PAS when appropriate for discharge.    Next Steps: medically stability, need to update facility of anticipation of discharge and need PAS    Sheila Maxwell RN

## 2025-01-30 NOTE — PLAN OF CARE
Problem: Adult Inpatient Plan of Care  Goal: Optimal Comfort and Wellbeing  Outcome: Progressing     Problem: Comorbidity Management  Goal: Blood Glucose Levels Within Targeted Range  Outcome: Progressing  Intervention: Monitor and Manage Glycemia  Recent Flowsheet Documentation  Taken 1/29/2025 1546 by Aym Boyer RN  Medication Review/Management: medications reviewed     Problem: Pain Acute  Goal: Optimal Pain Control and Function  Outcome: Progressing   Goal Outcome Evaluation:         Pt A&Ox4, VSS on room air. Right abdomen pain managed with scheduled MS cont, PRN oxycodone 10mg.  He rates his pain 4/10 but continues to request the narcotics despite education. PRN compazine given for nausea pt reporting somewhat effective. Irrigated DAMI drain with 5mL. Dressing over old drain sites changed. Moves with SBA and walker in the room. Bed alarm on. Can call appropriately.

## 2025-01-30 NOTE — PROGRESS NOTES
[facility] ACUTE PAIN SERVICE    [facility], [facility], [facility], [facility], [place]   PAIN Progress Note     Assessment/Plan:  Bon Luque is a 69 year old male who was admitted on 1/15/2025.  Pain team was asked to see the patient for uncontrolled abdomen pain related to hepatic abscesses. Admitted for liver abscess, sepsis, blood cultures growing strep intermedius. History of CAD, renal cell carcinoma status post nephrectomy in 2017, hypertension, diabetes with neuropathy, HLD, psoriasis, small bowel obstruction, hypergonadism, chronic pain on chronic opioid therapy with MS Contin 15 mg tid.       Home pain medications/psych medications/anticoagulation medications include: Cymbalta 60 mg every morning, Eliquis 5 mg twice daily, gabapentin 600 mg 3 times daily, magnesium oxide 400 mg nightly as needed, MS Contin 30 - 15 - 30 (patient reports he was tapering off MS Contin and was taking 15 mg 3 times daily prior to admission), naloxone nasal spray     Pt has used x 1 dose of oxy 5 mg, x 1 dose of oxy 10, and x 1 dose of IV dilaudid in addition to MSER in the last 24 hours. Patient up eating lunch, pain managed during assessment, lidocaine patches effective. Feeling much better. See discharge reccs below. Pain team will sign off, please reconsult if new issues arise. Thank you.      PLAN:   1) Pain is consistent with right upper quadrant abdomen pain, multiple liver abscesses, sepsis from strep intermedius bacteremia, immunocompromise host, in the setting of chronic pain on chronic opioid therapy.  Patient reports he was tapering off MS Contin at home and was taking 15 mg 3 times daily which is a decrease from 30-15-30 mg. CT 1/13/25 showing three prominent indeterminate complex hypodense masses in the liver concerning for neoplasm versus infectious process. MRI 1/15: three heterogeneous masses in the liver new from 11/8/2024 concerning for infection, possible rapidly progressive malignancy/metastases. IR  consulted, and he underwent CT-guided liver drain x 2 1/16/25. Repeat CT 01/21/25: Multiloculated abscess, left hepatic lobe fluid decreased s/p catheter, abscess in hepatic segment 8 decrease in size s/p catheter.  Abscess in hepatic segment 4 increased in size. Cultures growing strep intermedius and was on  IV vancomycin and Cefepime, now on IV Ceftriaxone and po Flagyl per ID. S/p 3rd drain on  01/23. PICC line placed 1/18.  S/p abscessogram 1/29/25.   Multimodal Medication Therapy  Topical: Lidocaine patches x 3  NSAID'S: Estimated Creatinine Clearance: 128.6 mL/min (based on SCr of 0.73 mg/dL). None, on apixaban 5 mg twice daily  Steroids: none  Muscle Relaxants: none  Adjuvants: Acetaminophen 500 mg every 6 as needed -keep less than 2 g given elevated LFTs, gabapentin 600 mg 3 times daily (home med)  Antidepressants/anxiolytics: Duloxetine 60 mg every morning  Opioids: MS Contin 15 mg tid (home med), Oxycodone 5-10 mg q4h prn - first line    IV Pain medication: dilaudid 0.2-0.4 mg q4h prn - second line - reduce to tid prn   Non-medication interventions: Ice, Heat, Rest, Distraction (TV, Music, Reading), and Meditation  Constipation Prophylaxis: Senna-docusate and Miralax     -Opioid prescriber has been Faiza Galvan - [facility]  -[place]  pulled from system on 1/28/25. This indicates ongoing fills for MS Contin, gabapentin  12/18/2024 MS Contin 15 mg #30 for 30 days  12/16/2024 MS Contin 30 mg #60 for 30 days  11/15/2024 MS Contin 30 mg #60 for 30 days  10/16/2024 gabapentin 300 mg #630 for 90 days  Discharge Recommendations - We recommend prescribing the following at the time of discharge: Reduce home MS Contin to 15 mg 3 times daily, home gabapentin 600 mg 3 times daily, short supply Oxycodone, lidocaine patches       Carolina Hall, PharmD, BCPS  Acute Care Inpatient Pain Management Program  [facility])  Hours of coverage Monday-Friday [phone]. After  hours please contact Primary team.   Page via Epic chat or [employer]

## 2025-01-30 NOTE — PLAN OF CARE
Problem: Pain Acute  Goal: Optimal Pain Control and Function  1/30/2025 0059 by Riccardo Swain RN  Outcome: Progressing     Problem: Infection  Goal: Absence of Infection Signs and Symptoms  1/30/2025 0059 by Riccardo Swain RN  Outcome: Progressing  1/30/2025 0058 by Riccardo Swain RN  Outcome: Progressing     Problem: Comorbidity Management  Problem: Adult Inpatient Plan of Care  Goal: Optimal Comfort and Wellbeing  Outcome: Progressing     Goal: Blood Glucose Levels Within Targeted Range  1/30/2025 0058 by Riccardo Swain RN  Outcome: Progressing   Goal Outcome Evaluation:    Pt A/O, able to communicate needs, Right abdomen pain is managed with  PRN Oxycodone and scheduled MS cont.. Rated pain as 4/10. Pt also complaint of nausea, PRN compazine was administered. Pt ambulates to the bathroom  with standby assist x 1, with his walker. VS on RA, O2 sats WNL.DAMI Drain intact, flushed and draining serosanguinous fluid.  3ml output. Pt acknowledged nausea and pain is getting better.

## 2025-01-30 NOTE — PLAN OF CARE
Problem: Infection  Goal: Absence of Infection Signs and Symptoms  Outcome: Progressing     Problem: Pain Acute  Goal: Optimal Pain Control and Function  Outcome: Progressing  Intervention: Develop Pain Management Plan  Recent Flowsheet Documentation  Taken 1/30/2025 0829 by Jair Travis RN  Pain Management Interventions: medication (see MAR)  Intervention: Prevent or Manage Pain  Recent Flowsheet Documentation  Taken 1/30/2025 0800 by Jair Travis RN  Bowel Elimination Promotion: adequate fluid intake promoted  Medication Review/Management: medications reviewed   Goal Outcome Evaluation:  Pt is alert and oriented x 4, up with stand by assist and a walker for safety. PICC line in place for long term antibiotics up on discharge. Phos replaced per protocol. Abdominal pain controled with Lidocaine patch and schedule MS Contin. DAMI drain flashed with 10 ml NS. On IV Antibiotics per ID, will continue to monitor.

## 2025-01-30 NOTE — DISCHARGE INSTRUCTIONS
Drain Placement Discharge Instructions:  You had a small tube or drain(s) placed. This was placed so the abnormal fluid collection within your body can be drained out (externally). Sometimes drains must stay in place for weeks to months. Please follow the below instructions as you recover:    Care Instructions after drain placement:  - Rest after your drain was placed. Avoid strenuous activity and heavy lifting for the next 2 days. Return to your normal activities as you tolerate after the 2 day restriction.  - You may shower; however, you should not submerge site under water like in a tub bath, Jacuzzi or pool. Keep drain exit site covered with plastic wrap and tape when showering.  - Keep dressing clean and dry as long as drain is in place. If you have a gauze dressing, please change the dressing daily and as needed to keep site clean and dry.  - You may eat and drink as normal.  - You may have discomfort after the procedure near the drain exit site. You may take acetaminophen (Tylenol ) or ibuprofen (Motrin ) as needed for any discomfort.  - Protect the drain from being pulled out or dislodged.  - Inspect the tube often for kinks.  - If you have a gravity bag, keep the bag below the drain exit site to allow for free flow of drainage by gravity.  - Flush your drainage tube with 5mL sterile normal saline daily.  - Record your daily drain outputs and amount flushed on your drainage record. Bring your records to your next radiology appointment. Often drains will need to stay in place until the drainage output is less than about 15mL a day for 2-3 days in a row.  - Empty your drainage bag/bulb daily or when it is approximately half way fluid. Follow below instructions for emptying your bag/bulb:       - Clean hands well with soap and water.       - Place a measuring container near the outlet valve of the drainage bag/bulb.       - If you have a drainage BAG: Twist the blue valve at the bottom of the bag while holding  "the valve over your measuring cup and open container to empty. Re-twist the valve closed on the drainage bag once complete.       - If you have a drainage BULB: Open the bulb cap (at the top of the bulb). Empty the fluid into the measuring cup. Squeeze bulb and hold flat. While bulb is squeezed, close the cap.       - After draining fluid record your drainage output.        - Discard drainage into toilet once fluid drained.    Flushing Your Drainage Tube:   If you have a 3 way stop cock:     1. Collect flushing supplies: 5mL of sterile normal saline in syringe, alcohol pad.  2. Clean the flushing (center) port with alcohol and attach the flushing syringe by twisting into place (clockwise).  3. Turn the 3 way stopcock valve \"off\" to the drainage bag/bulb. (Valve should be pointed toward the bag/bulb)  4. Gently inject/flush the drain so fluid is moving towards your body through the drainage catheter.   5. Turn the stopcock valve back to its center position to allow for drainage to resume.   6. Remove flushing syringe from flushing port by untwisting the syringe (counter clockwise).  7. Squeeze bulb or accordion to reapply suction if you have one.  8. Record the output from your drain and flush amount on your drainage record.     If you have a Y flushing port / UreSil Flush Adapter:    1. Collect flushing supplies: 5mL of sterile normal saline in syringe, alcohol pad.  2. Clamp off the tubing to the drain by pinching the white clamp closed. Clean the drain port with an alcohol wipe.   3. Attach the saline syringe to the drain port.   4. Twist the syringe (clockwise) to tighten it to the port   5. Flush sterile normal saline from the syringe into the drain. The saline should flow toward your body not toward the drainage bag.   6. Untwist the syringe (counter clockwise) and remove it.   7. Unclamp the white clamp making sure the drainage is able to flow freely into the bag.   8. Squeeze bulb or accordion to reapply " suction if you have one.  9. Record the output from your drain and flush amount on your drainage record.    Follow-Up:   - Please contact [facility] Outpatient Scheduling at [phone].    Please seek medical evaluation for:  - Nausea and/or vomiting   - Diffuse abdominal pain  - Fevers (greater than 101 F)  - Drainage tube falls out, is pulled back or felt to be out of position.     Call [facility] RN Line at [phone] with tube related questions or concerns:  - Unable to flush drainage tube.   - Leakage (or purulent drainage) around drainage tube site.   - Extreme pain at drainage tube site.   - Outputs suddenly stop or significantly reduces.   - Warmth, redness, swelling or tenderness around the drainage tube

## 2025-01-30 NOTE — PROGRESS NOTES
[facility] Progress Note    01/29/2025        ASSESSMENT:  Liver abscess, strep intermedius. Leukocytosis, normal, now up after 3rd drain placed  Drained. Repeat CT abd today showing anterior abscess (segment 4) that is larger. Previous drain going inferior to this. 3rd drain placed on 1/23.  On biologic for skin  Solitary kidney    RECOMMENDATION:  Continue ceftriaxone and po metronidazole   Anticipate 6 weeks iv antibiotics due to multi-loculated abscess  IR reassessed 1/28/2025    IMPRESSION:    1. Abscessogram through the left hepatic lobe drain demonstrates no residual abscess cavity. This drain was removed without complications.  2. Abscessogram through the deeper right hepatic lobe liver abscess drain demonstrates minimal to no residual abscess cavity. This drain was removed.  3. Abscessogram through the more superficial right hepatic lobe liver abscess drain demonstrates the drain to be suboptimally positioned and partially clogged. There was still moderate residual abscess cavity. This drain was exchange/upsize to a larger   12 French drain with the pigtail better positioned within the center of the abscess cavity. Drain was placed to DAMI suction. Drain will be flushed once daily with 5 cc of sterile saline.  Patient new to me on 1/27/2025  Plan to go to TCU upon discharge  Please see previous ID notes by Dr. Garcia.     Laura Norton MD  [facility]  Answering Service: [phone]  On-Call ID provider: [phone], option: 9      SUBJECTIVE:    Chart reviewed  Patient seen- resting  Previous note:   Pain better. Wbc improved.    Discussed test results  Previous note    Drain #1 with 14 ml out yesterday  Drain #2 with 9 ml out yesterday  Drain #3 with 24 ml out yesterday      OBJECTIVE:  /73 (BP Location: Right arm)   Pulse 109   Temp 97.5  F (36.4  C) (Oral)   Resp 18   Ht 1.829 m (6')   Wt 92.6 kg (204 lb 1.6 oz)   SpO2 96%   BMI 27.68 kg/m   "      PHYSICAL EXAM:  Gen: resting  Vitals tabulated above, reviewed  Sclera normal color, not injected  CARDIOVASCULAR regular rate and rhythm, no murmur  Lungs CLEAR TO AUSCULTATION   Abdomen soft, ND; 3 drains, empty. Tenderness at insertion site, midabdomen  Skin normal  Joints normal  Neurologic exam non focal    PICC, left arm     Antibiotics:  Ceftriaxone 1/20-  Metronidazole     Pertinent labs:    Recent Labs   Lab Test 01/29/25  0559 01/28/25  0622 01/27/25  0554   WBC 11.7* 9.6 11.7*   HGB 12.5* 11.1* 10.5*   HCT 38.9* 34.8* 32.0*   MCV 85 86 86   * 373 361       Lab Results   Component Value Date    RBC 3.74 01/17/2025     Lab Results   Component Value Date    HGB 10.5 01/17/2025     Lab Results   Component Value Date    HCT 30.6 01/17/2025     No components found for: \"MCT\"  Lab Results   Component Value Date    MCV 82 01/17/2025     Lab Results   Component Value Date    MCH 28.1 01/17/2025     Lab Results   Component Value Date    MCHC 34.3 01/17/2025     Lab Results   Component Value Date    RDW 13.1 01/17/2025     Lab Results   Component Value Date     01/17/2025       Last Comprehensive Metabolic Panel:  Sodium   Date Value Ref Range Status   01/29/2025 135 135 - 145 mmol/L Final     Potassium   Date Value Ref Range Status   01/29/2025 4.3 3.4 - 5.3 mmol/L Final   07/13/2022 4.2 3.5 - 5.0 mmol/L Final     Chloride   Date Value Ref Range Status   01/29/2025 97 (L) 98 - 107 mmol/L Final   07/13/2022 104 98 - 107 mmol/L Final     Carbon Dioxide (CO2)   Date Value Ref Range Status   01/29/2025 30 (H) 22 - 29 mmol/L Final   07/13/2022 22 22 - 31 mmol/L Final     Anion Gap   Date Value Ref Range Status   01/29/2025 8 7 - 15 mmol/L Final   07/13/2022 8 5 - 18 mmol/L Final     Glucose   Date Value Ref Range Status   07/13/2022 286 (H) 70 - 125 mg/dL Final     GLUCOSE BY METER POCT   Date Value Ref Range Status   01/29/2025 162 (H) 70 - 99 mg/dL Final     Urea Nitrogen   Date Value Ref Range " "Status   01/29/2025 11.2 8.0 - 23.0 mg/dL Final   07/13/2022 17 8 - 22 mg/dL Final     Creatinine   Date Value Ref Range Status   01/29/2025 0.73 0.67 - 1.17 mg/dL Final     GFR Estimate   Date Value Ref Range Status   01/29/2025 >90 >60 mL/min/1.73m2 Final     Comment:     eGFR calculated using 2021 CKD-EPI equation.   01/13/2021 >60 >60 mL/min/1.73m2 Final     GFR, ESTIMATED POCT   Date Value Ref Range Status   11/08/2024 >60 >60 mL/min/1.73m2 Final     Calcium   Date Value Ref Range Status   01/29/2025 8.9 8.8 - 10.4 mg/dL Final       Liver Function Studies -   Recent Labs   Lab Test 01/17/25  0641   PROTTOTAL 5.4*   ALBUMIN 2.2*   BILITOTAL 0.9   ALKPHOS 144   AST 83*   ALT 65       No results found for: \"SED\"    No results found for: \"CRP\"            MICROBIOLOGY DATA:    GPC in blood --> strep int  Gm stain liver pus also heavy GPC, culture same    Susceptibility data from last 90 days.  Collected Specimen Info Organism Cefotaxime Ceftriaxone Clindamycin Meropenem Penicillin Vancomycin   01/16/25 Abscess from Liver Streptococcus intermedius         01/16/25 Abscess from Liver Streptococcus intermedius         01/15/25 Peripheral Blood Streptococcus intermedius  S  S  S  S  S  S   01/15/25 Blood from Line, venous Streptococcus intermedius                "

## 2025-01-30 NOTE — PROGRESS NOTES
CLINICAL NUTRITION SERVICES - REASSESSMENT NOTE     RECOMMENDATIONS FOR MDs/PROVIDERS TO ORDER:  Increasing insulin for better BG control. Pt with poor meal intake but taking multiple supplements well. Expect increasing meal intake today    Malnutrition Status:    Malnutrition Diagnosis: Moderate malnutrition in the context of acute illness or injury  Malnutrition Present on Admission: Yes    Registered Dietitian Interventions:  -continue ensure tid to help meet increased nutrition needs    Future/Additional Recommendations:  -Adjust supplements pending intake, weight, tolerance, acceptance, labs, BG  -Consider changing to lower CHO supplement as meal intake improves       SUBJECTIVE INFORMATION  Assessed patient in room.  Pt sitting up in his chair. He reports he is feeling better today and yesterday. He reports he has not had any nausea since last antemetic given yesterday. He was feeling quite poorly x 2 days prior. Not able to eat much. Pt states breakfast today was only the second time he has eaten 100%. He reports he is feeling a bit restricted by DM diet restriction. Discussed diet need d/t ongoing elevated BG.     Pt reports he drinks a lot of fluid at home, including 2 sugar free gatorade or powerade per day for electrolytes    CURRENT NUTRITION ORDERS  Diet: Orders Placed This Encounter      Moderate Consistent Carb (60 g CHO per Meal) Diet    Nutrition Support: Ensure enlive tid - 1050 kcal, 60 g protein    CURRENT INTAKE/TOLERANCE  Poor of meals, fair with supplements  Variable overall  Estimate intake of meals 138-452 kcal, 7-22 g protein/day    With good intake of supplements   meeting % of estimated kcal, % of estimated protein needs    Improved intake at breakfast today at 350 kcal, 16 g protein     Anticipate improving intake today and hopefully onward.      NEW FINDINGS  Weight: Current wt down 2 lb x 4 days  8.5% weight loss x 3 weeks  Date/Time Weight Weight Method   01/27/25 0500  92.6 kg (204 lb 1.6 oz) Standing scale   01/26/25 0353 92.9 kg (204 lb 12.8 oz) Standing scale   01/22/25 1046 93.8 kg (206 lb 12.8 oz) Standing scale   01/15/25 1220 99.8 kg (220 lb) --     01/02/25 101.4 kg (223 lb 9.6 oz)   08/23/24 101.6 kg (223 lb 14.4 oz)   06/25/24 100.3 kg (221 lb 3.2 oz)   05/21/24 98.5 kg (217 lb 3.2 oz)   05/09/24 98 kg (216 lb)   03/28/24 94.3 kg (208 lb)   02/27/24 97.3 kg (214 lb 8 oz)   01/30/24 95 kg (209 lb 8 oz)     Skin/wounds: Francesco drain x 1 (2 drains removed with resolution of abscesses). Serosanquenous drainage  GI symptoms: abd pain, intermittent nausea.. Increased loose  stool 1 yesterday, 2 day prior. More? Miralax held past 3 days. Given this am  Nutrition-relevant labs:   Na 139 WNL, improved  Phos 2.4 (L), decreased  -313 mg/dl past 24 hours, in poor control. Exacerbated by infection. Supplement is being covered by CHO dosed insulin  Nutrition-relevant medications: iv abx, ssi, lantus bid-increased today, oral antifugal, morphine increased to tid, protonix, miralax daily. Novolog 1u: 5 g cho. Iv Naphos today    Dosing Weight: 93.8 kg, based on actual wt for kcal, 80.9 kg IBW for protein/fluid     ASSESSED NUTRITION NEEDS  Estimated Energy Needs: 8237-2033+ kcals/day ([place] [address] Jeor x 1-1.2+) REE: 1756  Justification: Maintenance  Estimated Protein Needs: 80-97 grams protein/day (1 - 1.2 grams of pro/kg)  Justification: Increased needs  Estimated Fluid Needs: 8690-5032 mL/day (25 - 30 mL/kg)  Justification: Maintenance    MALNUTRITION  % Intake: < 75% for > 7 days (moderate)- improving with supplements  % Weight Loss: > 5% in 1 month (severe)   Subcutaneous Fat Loss: None observed  Muscle Loss: None observed  Fluid Accumulation/Edema: None noted  Malnutrition Diagnosis: Moderate malnutrition in the context of acute illness or injury  Malnutrition Present on Admission: Yes    EVALUATION OF THE PROGRESS TOWARD GOALS     NUTRITION DIAGNOSIS  Malnutrition (undernutrition)  related to acute illness as evidenced by poor oral intakes, 8.5% wt loss x 3 weeks    Goals  Meet >75% nutrition needs- not met. Expect improving today  Maintain weight -progressing  BG <180 - not progressing     Monitoring/Evaluation      Progress toward goals will be monitored and evaluated per policy.

## 2025-01-31 LAB
GLUCOSE BLDC GLUCOMTR-MCNC: 162 MG/DL (ref 70–99)
GLUCOSE BLDC GLUCOMTR-MCNC: 241 MG/DL (ref 70–99)
GLUCOSE BLDC GLUCOMTR-MCNC: 328 MG/DL (ref 70–99)
GLUCOSE BLDC GLUCOMTR-MCNC: 83 MG/DL (ref 70–99)
PHOSPHATE SERPL-MCNC: 3.4 MG/DL (ref 2.5–4.5)
POTASSIUM SERPL-SCNC: 4.3 MMOL/L (ref 3.4–5.3)

## 2025-01-31 PROCEDURE — 99207 PR NO CHARGE LOS: CPT | Performed by: INTERNAL MEDICINE

## 2025-01-31 PROCEDURE — 250N000011 HC RX IP 250 OP 636: Performed by: INTERNAL MEDICINE

## 2025-01-31 PROCEDURE — 250N000013 HC RX MED GY IP 250 OP 250 PS 637: Performed by: NURSE PRACTITIONER

## 2025-01-31 PROCEDURE — 84132 ASSAY OF SERUM POTASSIUM: CPT | Performed by: STUDENT IN AN ORGANIZED HEALTH CARE EDUCATION/TRAINING PROGRAM

## 2025-01-31 PROCEDURE — 250N000012 HC RX MED GY IP 250 OP 636 PS 637: Performed by: STUDENT IN AN ORGANIZED HEALTH CARE EDUCATION/TRAINING PROGRAM

## 2025-01-31 PROCEDURE — 84100 ASSAY OF PHOSPHORUS: CPT | Performed by: STUDENT IN AN ORGANIZED HEALTH CARE EDUCATION/TRAINING PROGRAM

## 2025-01-31 PROCEDURE — 120N000001 HC R&B MED SURG/OB

## 2025-01-31 PROCEDURE — 250N000011 HC RX IP 250 OP 636: Performed by: STUDENT IN AN ORGANIZED HEALTH CARE EDUCATION/TRAINING PROGRAM

## 2025-01-31 PROCEDURE — 250N000013 HC RX MED GY IP 250 OP 250 PS 637: Performed by: STUDENT IN AN ORGANIZED HEALTH CARE EDUCATION/TRAINING PROGRAM

## 2025-01-31 PROCEDURE — 250N000013 HC RX MED GY IP 250 OP 250 PS 637: Performed by: INTERNAL MEDICINE

## 2025-01-31 PROCEDURE — 99232 SBSQ HOSP IP/OBS MODERATE 35: CPT | Performed by: STUDENT IN AN ORGANIZED HEALTH CARE EDUCATION/TRAINING PROGRAM

## 2025-01-31 RX ORDER — LOPERAMIDE HYDROCHLORIDE 2 MG/1
2 CAPSULE ORAL 4 TIMES DAILY PRN
Status: DISCONTINUED | OUTPATIENT
Start: 2025-01-31 | End: 2025-02-05 | Stop reason: HOSPADM

## 2025-01-31 RX ADMIN — MORPHINE SULFATE 15 MG: 15 TABLET, FILM COATED, EXTENDED RELEASE ORAL at 10:31

## 2025-01-31 RX ADMIN — METRONIDAZOLE 500 MG: 500 TABLET ORAL at 10:31

## 2025-01-31 RX ADMIN — PANTOPRAZOLE SODIUM 40 MG: 40 TABLET, DELAYED RELEASE ORAL at 17:41

## 2025-01-31 RX ADMIN — INSULIN GLARGINE 35 UNITS: 100 INJECTION, SOLUTION SUBCUTANEOUS at 22:35

## 2025-01-31 RX ADMIN — ONDANSETRON 4 MG: 4 TABLET, ORALLY DISINTEGRATING ORAL at 14:24

## 2025-01-31 RX ADMIN — INSULIN ASPART 9 UNITS: 100 INJECTION, SOLUTION INTRAVENOUS; SUBCUTANEOUS at 13:25

## 2025-01-31 RX ADMIN — GABAPENTIN 600 MG: 300 CAPSULE ORAL at 14:20

## 2025-01-31 RX ADMIN — CEFTRIAXONE SODIUM 2 G: 2 INJECTION, POWDER, FOR SOLUTION INTRAMUSCULAR; INTRAVENOUS at 10:35

## 2025-01-31 RX ADMIN — INSULIN ASPART 9 UNITS: 100 INJECTION, SOLUTION INTRAVENOUS; SUBCUTANEOUS at 10:35

## 2025-01-31 RX ADMIN — PANTOPRAZOLE SODIUM 40 MG: 40 TABLET, DELAYED RELEASE ORAL at 06:31

## 2025-01-31 RX ADMIN — LOPERAMIDE HYDROCHLORIDE 2 MG: 2 CAPSULE ORAL at 14:24

## 2025-01-31 RX ADMIN — METRONIDAZOLE 500 MG: 500 TABLET ORAL at 14:20

## 2025-01-31 RX ADMIN — HYDROMORPHONE HYDROCHLORIDE 0.4 MG: 0.2 INJECTION, SOLUTION INTRAMUSCULAR; INTRAVENOUS; SUBCUTANEOUS at 01:00

## 2025-01-31 RX ADMIN — METRONIDAZOLE 500 MG: 500 TABLET ORAL at 21:17

## 2025-01-31 RX ADMIN — APIXABAN 5 MG: 5 TABLET, FILM COATED ORAL at 10:31

## 2025-01-31 RX ADMIN — GABAPENTIN 600 MG: 300 CAPSULE ORAL at 10:30

## 2025-01-31 RX ADMIN — GABAPENTIN 600 MG: 300 CAPSULE ORAL at 21:17

## 2025-01-31 RX ADMIN — MORPHINE SULFATE 15 MG: 15 TABLET, FILM COATED, EXTENDED RELEASE ORAL at 21:16

## 2025-01-31 RX ADMIN — INSULIN ASPART 8 UNITS: 100 INJECTION, SOLUTION INTRAVENOUS; SUBCUTANEOUS at 17:49

## 2025-01-31 RX ADMIN — ONDANSETRON 4 MG: 4 TABLET, ORALLY DISINTEGRATING ORAL at 23:57

## 2025-01-31 RX ADMIN — DULOXETINE HYDROCHLORIDE 60 MG: 60 CAPSULE, DELAYED RELEASE ORAL at 10:39

## 2025-01-31 RX ADMIN — APIXABAN 5 MG: 5 TABLET, FILM COATED ORAL at 21:17

## 2025-01-31 RX ADMIN — MORPHINE SULFATE 15 MG: 15 TABLET, FILM COATED, EXTENDED RELEASE ORAL at 14:20

## 2025-01-31 RX ADMIN — OXYCODONE HYDROCHLORIDE 5 MG: 5 TABLET ORAL at 14:25

## 2025-01-31 ASSESSMENT — ACTIVITIES OF DAILY LIVING (ADL)
ADLS_ACUITY_SCORE: 57
ADLS_ACUITY_SCORE: 58
ADLS_ACUITY_SCORE: 57
ADLS_ACUITY_SCORE: 57
ADLS_ACUITY_SCORE: 58
ADLS_ACUITY_SCORE: 57
ADLS_ACUITY_SCORE: 57
ADLS_ACUITY_SCORE: 58
ADLS_ACUITY_SCORE: 57
ADLS_ACUITY_SCORE: 58
ADLS_ACUITY_SCORE: 57
ADLS_ACUITY_SCORE: 58
ADLS_ACUITY_SCORE: 58
ADLS_ACUITY_SCORE: 57
ADLS_ACUITY_SCORE: 57

## 2025-01-31 NOTE — PLAN OF CARE
Goal Outcome Evaluation:       Bon slept late today, breakfast and lunch eaten late. Lidocaine patches were not applied this a.m. due to yesterdays patches were still in place, writer removed the patches. PRN 5 mg oxycodone given for 4/10 abdominal pain. Ondansetron given for nausea and imodium for loose stools.  DAMI drain and PICC are patent. Call light in reach.

## 2025-01-31 NOTE — PROGRESS NOTES
[facility]    Medicine Progress Note - Hospitalist Service    Date of Admission:  1/15/2025    Assessment & Plan   Bon Luque is a 69 year old male with past medical history significant for CAD, renal cell carcinoma status post nephrectomy 2017, hypertension, diabetes with neuropathy, HLD, hypogonadism on testosterone, psoriasis, and small bowel obstruction who was admitted on 1/15/2025 due to abdominal pain, diarrhea, nausea for 11 days PTA. Found to have strep bacteremia, liver abscess. Anticipate total of 6 weeks of IV ceftriaxone and PO Flagyl.     #Sepsis from Strep intermedius bacteremia  #Multiple liver abscesses  Presented with 11 days of nausea, diarrhea, abdominal pain. Had dental work done 1/3/25. CT 1/13 w/ 3 prominent indeterminate complex hypodense masses in the liver.  MRI 1/15: 3 heterogeneous masses in the liver concerning for infection, possible rapidly progressive malignancy/metastases. IR consulted, underwent CT-guided liver drain x 2 1/16. Repeat CT 1/21: Multiloculated abscess, 2 abscess with drains decreased in size, the third abscess remained present. 3rd drain placed into the persistnet abscess on 1/23. Abscessogram 1/28 led to removal of 2 drains due to abscess resolution, 1 drain remains in place.  - ID and IR consulted  - Blood and abscess cultures growing strep intermedius, blood cultures on 1/16 NG  - Was on  IV vancomycin and Cefepime, now on IV Ceftriaxone and po Flagyl per ID since 01/20, plan for 6 weeks  - PICC line placed 1/18  - Plan to discharge to TCU, spoke with IR and plan for repeat CT on Monday (2/3), then abscessogram on Tuesday with possible drain pull at that time - will be ready for discharge after that, so Tuesday afternoon vs Wednesday     #Hyponatremia  Has been low-normal on past checks. Here at the low-normal border but stable.  - Monitor     #Acute anemia  Due to our IV fluids, inflammation most likely. Hgb stable.     #Psoriasis  - No  obvious flaring lesions, remotely on Skyrizi which has been held recently  - Outpatient follow-up     #Hypophosphatemia   - Monitor and replete as needed     #Remote DVT and PE  - Continue PTA Eliquis      #Chronic pain  PTA  MS Contin, gabapentin, duloxetine, oxycodone  - Pain team consulted     #History of renal cell carcinoma  Remote nephrectomy     #Hypogonadism  - Hold PTA supplement while here     #Type 2 DM with long-term insulin use  - A1c 7.9%  - 50 units of Lantus twice daily at home  - Lantus 40u QHS  - sliding scale insulin  - Titrate as needed     #Moderate Malnutrition  - Seen by RD             Diet: Snacks/Supplements Adult: Ensure Enlive; With Meals  Moderate Consistent Carb (60 g CHO per Meal) Diet    DVT Prophylaxis: DOAC  Seals Catheter: Not present  Lines: PRESENT      PICC 01/18/25 Single Lumen Left Basilic Antibiotic-Site Assessment: WDL      Cardiac Monitoring: None  Code Status: Full Code      Clinically Significant Risk Factors               # Hypoalbuminemia: Lowest albumin = 2 g/dL at 1/18/2025  6:13 AM, will monitor as appropriate     # Hypertension: Noted on problem list           # DMII: A1C = 7.9 % (Ref range: <5.7 %) within past 6 months   # Overweight: Estimated body mass index is 27.68 kg/m  as calculated from the following:    Height as of this encounter: 1.829 m (6').    Weight as of this encounter: 92.6 kg (204 lb 1.6 oz).   # Moderate Malnutrition: based on nutrition assessment           Social Drivers of Health    Tobacco Use: Medium Risk (1/14/2025)    Patient History     Smoking Tobacco Use: Former     Smokeless Tobacco Use: Never     Passive Exposure: Past   Physical Activity: Insufficiently Active (8/23/2024)    Exercise Vital Sign     Days of Exercise per Week: 1 day     Minutes of Exercise per Session: 20 min   Social Connections: Unknown (8/23/2024)    Social Connection and Isolation Panel [NHANES]     Frequency of Social Gatherings with Friends and Family: Once a week           Disposition Plan     Medically Ready for Discharge: Anticipated in 2-4 Days           ROBERT PAK MD  Hospitalist Service  [facility]  Securely message with [employer] (more info)  Text page via [employer] Paging/Directory   ______________________________________________________________________    Interval History   No clinical changes, antibiotics ongoing. Feeling better since admission.    Physical Exam   Vital Signs: Temp: 97.5  F (36.4  C) Temp src: Oral BP: 115/62 Pulse: 76   Resp: 18 SpO2: 96 % O2 Device: None (Room air)    Weight: 204 lbs 1.6 oz    General Appearance:  No distress noted  Respiratory: Normal effort  Cardiovascular: RRR  GI: Soft abdomen, obese, mild epigastric tenderness, 1 drain on right upper quadrant  Skin: Intact and warm    Medical Decision Making       40 MINUTES SPENT BY ME on the date of service doing chart review, history, exam, documentation & further activities per the note.      Data   Labs and imaging of last 24 hours reviewed

## 2025-01-31 NOTE — PROGRESS NOTES
Care Management Follow Up    Length of Stay (days): 16    Expected Discharge Date: 02/01/2025    Anticipated Discharge Plan:  Transitional Care    Transportation: TBD    PT Recommendations: home with assist, home with home care physical therapy, Per plan established by the PT  OT Recommendations:  home with assist, home with home care occupational therapy     Barriers to Discharge: medical stability, placement, IV antibiotics     Prior Living Situation: house with spouse    Discussed  Partnership in Safe Discharge Planning  document with patient/family: No     Handoff Completed: No, handoff not indicated or clinically appropriate    Patient/Spokesperson Updated: No    Additional Information:    Reviewed with MD, will remain in-patient over the weekend, having repeat CT on Monday, 02/03.  Continues to be on IV antibiotics, per previous assessment, he does not have insurance benefits for home IV antibiotics.  Have been looking for and on list for TCU.    Next Steps:     Care Management remains involved for discharge needs and support.     TREMAINE GarcíaW

## 2025-01-31 NOTE — PLAN OF CARE
Problem: Pain Acute  Goal: Optimal Pain Control and Function  1/30/2025 1944 by Jair Travis RN  Outcome: Progressing  1/30/2025 1123 by Jair Travis RN  Outcome: Progressing  Intervention: Develop Pain Management Plan  Recent Flowsheet Documentation  Taken 1/30/2025 1926 by Jair Travis RN  Pain Management Interventions: repositioned  Taken 1/30/2025 1842 by Jair Travis RN  Pain Management Interventions: medication (see MAR)  Taken 1/30/2025 1528 by Jair Travis RN  Pain Management Interventions: emotional support  Taken 1/30/2025 0829 by Jair Travis RN  Pain Management Interventions: medication (see MAR)  Intervention: Prevent or Manage Pain  Recent Flowsheet Documentation  Taken 1/30/2025 1528 by Jair Travis RN  Bowel Elimination Promotion: adequate fluid intake promoted  Medication Review/Management: medications reviewed  Taken 1/30/2025 0800 by Jair Travis RN  Bowel Elimination Promotion: adequate fluid intake promoted  Medication Review/Management: medications reviewed   Goal Outcome Evaluation:  Pt complaining of abdominal discomfort PRN Oxycodone and IV Dilaudid was given once along with schedule MS Contin. Will continue to assess and medicate as needed.

## 2025-01-31 NOTE — PLAN OF CARE
Problem: Pain Acute  Goal: Optimal Pain Control and Function  Outcome: Progressing     Problem: Infection  Goal: Absence of Infection Signs and Symptoms  Outcome: Progressing     Problem: Comorbidity Management  Goal: Blood Glucose Levels Within Targeted Range  Outcome: Progressing     Problem: Adult Inpatient Plan of Care  Goal: Absence of Hospital-Acquired Illness or Injury  Intervention: Prevent Skin Injury  Recent Flowsheet Documentation  Taken 1/31/2025 0000 by Riccardo Swain RN  Body Position: position changed independently   Goal Outcome Evaluation:    Pt A/O, pleasant, speech clear, Right abdomen pain is managed with  scheduled MS cont., PRN Oxycodone  and PRN dilaudid, stated nausea has decrease,   and Rated pain as 7/10. Pt ambulates to the bathroom  with standby assist x 1, with his walker. VS on RA, O2 sats WNL.DAMI Drain intact, flushed and draining serosanguinous fluid.  2.5ml output. Pt acknowledged this morning nausea and pain is getting better.

## 2025-01-31 NOTE — PROGRESS NOTES
[facility] Progress Note    01/31/2025    Chart reviewed  Awaiting repeat CT and abscessogram on 2/3/25   ID will follow next week. Please call with questions or change in clinical status over the weekend. ID will chart check over the weekend.   Previous note below    ASSESSMENT:  Liver abscess, strep intermedius. Leukocytosis, normal, now up after 3rd drain placed  Drained. Repeat CT abd today showing anterior abscess (segment 4) that is larger. Previous drain going inferior to this. 3rd drain placed on 1/23.  On biologic for skin  Solitary kidney  Drains removed on repeat abscessogram on 1/28/2025, has 1 drain in place    RECOMMENDATION:  Continue ceftriaxone and po metronidazole   Anticipate 6 weeks iv antibiotics due to multi-loculated abscess  IR reassessed 1/28/2025    IMPRESSION:    1. Abscessogram through the left hepatic lobe drain demonstrates no residual abscess cavity. This drain was removed without complications.  2. Abscessogram through the deeper right hepatic lobe liver abscess drain demonstrates minimal to no residual abscess cavity. This drain was removed.  3. Abscessogram through the more superficial right hepatic lobe liver abscess drain demonstrates the drain to be suboptimally positioned and partially clogged. There was still moderate residual abscess cavity. This drain was exchange/upsize to a larger   12 French drain with the pigtail better positioned within the center of the abscess cavity. Drain was placed to DAMI suction. Drain will be flushed once daily with 5 cc of sterile saline.  Patient new to me on 1/27/2025  Plan to go to TCU upon discharge  Clinically improving  Please see previous ID notes by Dr. Garcia.     Laura Norton MD  [facility]  Answering Service: [phone]  On-Call ID provider: [phone], option: 9      SUBJECTIVE:    Chart reviewed  Currently improving, feels better today  Drain in place    Previous note:   Pain better.  "Wbc improved.    Discussed test results  Previous note    Drain #1 with 14 ml out yesterday  Drain #2 with 9 ml out yesterday  Drain #3 with 24 ml out yesterday      OBJECTIVE:  /62 (BP Location: Right arm)   Pulse 76   Temp 97.5  F (36.4  C) (Oral)   Resp 18   Ht 1.829 m (6')   Wt 92.6 kg (204 lb 1.6 oz)   SpO2 96%   BMI 27.68 kg/m        PHYSICAL EXAM:  Gen: resting  Vitals tabulated above, reviewed  Sclera normal color, not injected  CARDIOVASCULAR regular rate and rhythm, no murmur  Lungs CLEAR TO AUSCULTATION   Abdomen soft, ND; 1 drain with creamy output: Tenderness at insertion site, midabdomen  Skin normal  Joints normal  Neurologic exam non focal    PICC, left arm     Antibiotics:  Ceftriaxone 1/20-  Metronidazole     Pertinent labs:    Recent Labs   Lab Test 01/30/25  0639 01/29/25  0559 01/28/25  0622   WBC 9.6 11.7* 9.6   HGB 11.2* 12.5* 11.1*   HCT 35.3* 38.9* 34.8*   MCV 87 85 86    471* 373       Lab Results   Component Value Date    RBC 3.74 01/17/2025     Lab Results   Component Value Date    HGB 10.5 01/17/2025     Lab Results   Component Value Date    HCT 30.6 01/17/2025     No components found for: \"MCT\"  Lab Results   Component Value Date    MCV 82 01/17/2025     Lab Results   Component Value Date    MCH 28.1 01/17/2025     Lab Results   Component Value Date    MCHC 34.3 01/17/2025     Lab Results   Component Value Date    RDW 13.1 01/17/2025     Lab Results   Component Value Date     01/17/2025       Last Comprehensive Metabolic Panel:  Sodium   Date Value Ref Range Status   01/30/2025 139 135 - 145 mmol/L Final     Potassium   Date Value Ref Range Status   01/31/2025 4.3 3.4 - 5.3 mmol/L Final   07/13/2022 4.2 3.5 - 5.0 mmol/L Final     Chloride   Date Value Ref Range Status   01/30/2025 107 98 - 107 mmol/L Final   07/13/2022 104 98 - 107 mmol/L Final     Carbon Dioxide (CO2)   Date Value Ref Range Status   01/30/2025 26 22 - 29 mmol/L Final   07/13/2022 22 22 - 31 " "mmol/L Final     Anion Gap   Date Value Ref Range Status   01/30/2025 6 (L) 7 - 15 mmol/L Final   07/13/2022 8 5 - 18 mmol/L Final     Glucose   Date Value Ref Range Status   07/13/2022 286 (H) 70 - 125 mg/dL Final     GLUCOSE BY METER POCT   Date Value Ref Range Status   01/31/2025 83 70 - 99 mg/dL Final     Comment:     Dr/RN Notified     Urea Nitrogen   Date Value Ref Range Status   01/30/2025 11.3 8.0 - 23.0 mg/dL Final   07/13/2022 17 8 - 22 mg/dL Final     Creatinine   Date Value Ref Range Status   01/30/2025 0.63 (L) 0.67 - 1.17 mg/dL Final     GFR Estimate   Date Value Ref Range Status   01/30/2025 >90 >60 mL/min/1.73m2 Final     Comment:     eGFR calculated using 2021 CKD-EPI equation.   01/13/2021 >60 >60 mL/min/1.73m2 Final     GFR, ESTIMATED POCT   Date Value Ref Range Status   11/08/2024 >60 >60 mL/min/1.73m2 Final     Calcium   Date Value Ref Range Status   01/30/2025 7.0 (L) 8.8 - 10.4 mg/dL Final       Liver Function Studies -   Recent Labs   Lab Test 01/17/25  0641   PROTTOTAL 5.4*   ALBUMIN 2.2*   BILITOTAL 0.9   ALKPHOS 144   AST 83*   ALT 65       No results found for: \"SED\"    No results found for: \"CRP\"            MICROBIOLOGY DATA:    GPC in blood --> strep int  Gm stain liver pus also heavy GPC, culture same    Susceptibility data from last 90 days.  Collected Specimen Info Organism Cefotaxime Ceftriaxone Clindamycin Meropenem Penicillin Vancomycin   01/16/25 Abscess from Liver Streptococcus intermedius         01/16/25 Abscess from Liver Streptococcus intermedius         01/15/25 Peripheral Blood Streptococcus intermedius  S  S  S  S  S  S   01/15/25 Blood from Line, venous Streptococcus intermedius               "

## 2025-02-01 ENCOUNTER — APPOINTMENT (OUTPATIENT)
Dept: PHYSICAL THERAPY | Facility: HOSPITAL | Age: 70
End: 2025-02-01
Attending: NURSE PRACTITIONER
Payer: COMMERCIAL

## 2025-02-01 LAB
ANION GAP SERPL CALCULATED.3IONS-SCNC: 3 MMOL/L (ref 7–15)
BUN SERPL-MCNC: 13.5 MG/DL (ref 8–23)
CALCIUM SERPL-MCNC: 9.1 MG/DL (ref 8.8–10.4)
CHLORIDE SERPL-SCNC: 99 MMOL/L (ref 98–107)
CREAT SERPL-MCNC: 0.78 MG/DL (ref 0.67–1.17)
EGFRCR SERPLBLD CKD-EPI 2021: >90 ML/MIN/1.73M2
ERYTHROCYTE [DISTWIDTH] IN BLOOD BY AUTOMATED COUNT: 13.5 % (ref 10–15)
GLUCOSE BLDC GLUCOMTR-MCNC: 111 MG/DL (ref 70–99)
GLUCOSE BLDC GLUCOMTR-MCNC: 157 MG/DL (ref 70–99)
GLUCOSE BLDC GLUCOMTR-MCNC: 343 MG/DL (ref 70–99)
GLUCOSE BLDC GLUCOMTR-MCNC: 89 MG/DL (ref 70–99)
GLUCOSE BLDC GLUCOMTR-MCNC: 99 MG/DL (ref 70–99)
GLUCOSE SERPL-MCNC: 94 MG/DL (ref 70–99)
HCO3 SERPL-SCNC: 34 MMOL/L (ref 22–29)
HCT VFR BLD AUTO: 32.9 % (ref 40–53)
HGB BLD-MCNC: 10.8 G/DL (ref 13.3–17.7)
MAGNESIUM SERPL-MCNC: 1.8 MG/DL (ref 1.7–2.3)
MCH RBC QN AUTO: 28.1 PG (ref 26.5–33)
MCHC RBC AUTO-ENTMCNC: 32.8 G/DL (ref 31.5–36.5)
MCV RBC AUTO: 86 FL (ref 78–100)
PHOSPHATE SERPL-MCNC: 3.2 MG/DL (ref 2.5–4.5)
PLATELET # BLD AUTO: 305 10E3/UL (ref 150–450)
POTASSIUM SERPL-SCNC: 4.3 MMOL/L (ref 3.4–5.3)
RBC # BLD AUTO: 3.85 10E6/UL (ref 4.4–5.9)
SODIUM SERPL-SCNC: 136 MMOL/L (ref 135–145)
WBC # BLD AUTO: 7.5 10E3/UL (ref 4–11)

## 2025-02-01 PROCEDURE — 97116 GAIT TRAINING THERAPY: CPT | Mod: GP

## 2025-02-01 PROCEDURE — 85041 AUTOMATED RBC COUNT: CPT | Performed by: STUDENT IN AN ORGANIZED HEALTH CARE EDUCATION/TRAINING PROGRAM

## 2025-02-01 PROCEDURE — 250N000011 HC RX IP 250 OP 636: Performed by: STUDENT IN AN ORGANIZED HEALTH CARE EDUCATION/TRAINING PROGRAM

## 2025-02-01 PROCEDURE — 84100 ASSAY OF PHOSPHORUS: CPT | Performed by: STUDENT IN AN ORGANIZED HEALTH CARE EDUCATION/TRAINING PROGRAM

## 2025-02-01 PROCEDURE — 250N000013 HC RX MED GY IP 250 OP 250 PS 637: Performed by: INTERNAL MEDICINE

## 2025-02-01 PROCEDURE — 250N000013 HC RX MED GY IP 250 OP 250 PS 637: Performed by: STUDENT IN AN ORGANIZED HEALTH CARE EDUCATION/TRAINING PROGRAM

## 2025-02-01 PROCEDURE — 99232 SBSQ HOSP IP/OBS MODERATE 35: CPT | Performed by: STUDENT IN AN ORGANIZED HEALTH CARE EDUCATION/TRAINING PROGRAM

## 2025-02-01 PROCEDURE — 250N000011 HC RX IP 250 OP 636: Performed by: INTERNAL MEDICINE

## 2025-02-01 PROCEDURE — 80048 BASIC METABOLIC PNL TOTAL CA: CPT | Performed by: STUDENT IN AN ORGANIZED HEALTH CARE EDUCATION/TRAINING PROGRAM

## 2025-02-01 PROCEDURE — 85014 HEMATOCRIT: CPT | Performed by: STUDENT IN AN ORGANIZED HEALTH CARE EDUCATION/TRAINING PROGRAM

## 2025-02-01 PROCEDURE — 99207 PR NO CHARGE LOS: CPT

## 2025-02-01 PROCEDURE — 120N000001 HC R&B MED SURG/OB

## 2025-02-01 PROCEDURE — 83735 ASSAY OF MAGNESIUM: CPT | Performed by: STUDENT IN AN ORGANIZED HEALTH CARE EDUCATION/TRAINING PROGRAM

## 2025-02-01 PROCEDURE — 250N000013 HC RX MED GY IP 250 OP 250 PS 637: Performed by: NURSE PRACTITIONER

## 2025-02-01 PROCEDURE — 84520 ASSAY OF UREA NITROGEN: CPT | Performed by: STUDENT IN AN ORGANIZED HEALTH CARE EDUCATION/TRAINING PROGRAM

## 2025-02-01 RX ADMIN — INSULIN ASPART 12 UNITS: 100 INJECTION, SOLUTION INTRAVENOUS; SUBCUTANEOUS at 18:45

## 2025-02-01 RX ADMIN — METRONIDAZOLE 500 MG: 500 TABLET ORAL at 10:46

## 2025-02-01 RX ADMIN — GABAPENTIN 600 MG: 300 CAPSULE ORAL at 10:46

## 2025-02-01 RX ADMIN — GABAPENTIN 600 MG: 300 CAPSULE ORAL at 14:50

## 2025-02-01 RX ADMIN — CEFTRIAXONE SODIUM 2 G: 2 INJECTION, POWDER, FOR SOLUTION INTRAMUSCULAR; INTRAVENOUS at 10:39

## 2025-02-01 RX ADMIN — HYDROMORPHONE HYDROCHLORIDE 0.4 MG: 0.2 INJECTION, SOLUTION INTRAMUSCULAR; INTRAVENOUS; SUBCUTANEOUS at 23:59

## 2025-02-01 RX ADMIN — HYDROMORPHONE HYDROCHLORIDE 0.4 MG: 0.2 INJECTION, SOLUTION INTRAMUSCULAR; INTRAVENOUS; SUBCUTANEOUS at 00:02

## 2025-02-01 RX ADMIN — DULOXETINE HYDROCHLORIDE 60 MG: 60 CAPSULE, DELAYED RELEASE ORAL at 10:46

## 2025-02-01 RX ADMIN — MORPHINE SULFATE 15 MG: 15 TABLET, FILM COATED, EXTENDED RELEASE ORAL at 10:47

## 2025-02-01 RX ADMIN — PANTOPRAZOLE SODIUM 40 MG: 40 TABLET, DELAYED RELEASE ORAL at 10:46

## 2025-02-01 RX ADMIN — ONDANSETRON 4 MG: 4 TABLET, ORALLY DISINTEGRATING ORAL at 22:09

## 2025-02-01 RX ADMIN — METRONIDAZOLE 500 MG: 500 TABLET ORAL at 14:51

## 2025-02-01 RX ADMIN — MORPHINE SULFATE 15 MG: 15 TABLET, FILM COATED, EXTENDED RELEASE ORAL at 22:09

## 2025-02-01 RX ADMIN — APIXABAN 5 MG: 5 TABLET, FILM COATED ORAL at 10:47

## 2025-02-01 RX ADMIN — MORPHINE SULFATE 15 MG: 15 TABLET, FILM COATED, EXTENDED RELEASE ORAL at 14:51

## 2025-02-01 RX ADMIN — GABAPENTIN 600 MG: 300 CAPSULE ORAL at 22:08

## 2025-02-01 RX ADMIN — INSULIN ASPART 14 UNITS: 100 INJECTION, SOLUTION INTRAVENOUS; SUBCUTANEOUS at 14:21

## 2025-02-01 RX ADMIN — ONDANSETRON 4 MG: 4 TABLET, ORALLY DISINTEGRATING ORAL at 16:11

## 2025-02-01 RX ADMIN — INSULIN GLARGINE 35 UNITS: 100 INJECTION, SOLUTION SUBCUTANEOUS at 22:47

## 2025-02-01 RX ADMIN — LIDOCAINE 3 PATCH: 4 PATCH TOPICAL at 10:46

## 2025-02-01 RX ADMIN — ACETAMINOPHEN 500 MG: 500 TABLET, FILM COATED ORAL at 16:11

## 2025-02-01 RX ADMIN — INSULIN ASPART 21 UNITS: 100 INJECTION, SOLUTION INTRAVENOUS; SUBCUTANEOUS at 10:48

## 2025-02-01 RX ADMIN — METRONIDAZOLE 500 MG: 500 TABLET ORAL at 22:08

## 2025-02-01 RX ADMIN — APIXABAN 5 MG: 5 TABLET, FILM COATED ORAL at 22:08

## 2025-02-01 RX ADMIN — PANTOPRAZOLE SODIUM 40 MG: 40 TABLET, DELAYED RELEASE ORAL at 16:11

## 2025-02-01 ASSESSMENT — ACTIVITIES OF DAILY LIVING (ADL)
ADLS_ACUITY_SCORE: 58
ADLS_ACUITY_SCORE: 57
ADLS_ACUITY_SCORE: 58
ADLS_ACUITY_SCORE: 57
ADLS_ACUITY_SCORE: 57
ADLS_ACUITY_SCORE: 58

## 2025-02-01 NOTE — PLAN OF CARE
Goal Outcome Evaluation:       Bon slept most of the shift. He walked in the halls with his wife. He reported abdominal pain was tolerable at 4/10, no PRNs given. Minimal serosanguinous drainage in bulb suction drain. No diarrhea today. Call light in reach.

## 2025-02-01 NOTE — PROGRESS NOTES
[facility]    Medicine Progress Note - Hospitalist Service    Date of Admission:  1/15/2025    Assessment & Plan   Bon Luque is a 69 year old male with past medical history significant for CAD, renal cell carcinoma status post nephrectomy 2017, hypertension, diabetes with neuropathy, HLD, hypogonadism on testosterone, psoriasis, and small bowel obstruction who was admitted on 1/15/2025 due to abdominal pain, diarrhea, nausea for 11 days PTA. Found to have strep bacteremia, liver abscess. Anticipate total of 6 weeks of IV ceftriaxone and PO Flagyl.     #Sepsis from Strep intermedius bacteremia  #Multiple liver abscesses  Presented with 11 days of nausea, diarrhea, abdominal pain. Had dental work done 1/3/25. CT 1/13 w/ 3 prominent indeterminate complex hypodense masses in the liver.  MRI 1/15: 3 heterogeneous masses in the liver concerning for infection, possible rapidly progressive malignancy/metastases. IR consulted, underwent CT-guided liver drain x 2 1/16. Repeat CT 1/21: Multiloculated abscess, 2 abscess with drains decreased in size, the third abscess remained present. 3rd drain placed into the persistebt abscess on 1/23. Abscessogram 1/28 led to removal of 2 drains due to abscess resolution, 1 drain remains in place.  - ID and IR consulted  - Blood and abscess cultures growing strep intermedius, blood cultures on 1/16 NG  - Was on  IV vancomycin and Cefepime, now on IV Ceftriaxone and po Flagyl per ID since 01/20, plan for 6 weeks  - PICC line placed 1/18  - Plan to discharge to TCU, spoke with IR and plan for repeat CT on Monday (2/3), then abscessogram on Tuesday with possible drain pull at that time - will be ready for discharge after that, so Tuesday afternoon vs Wednesday     #Hyponatremia  Has been low-normal on past checks. Here at the low-normal border but stable.  - Monitor     #Acute anemia  Due to our IV fluids, inflammation most likely. Hgb stable.     #Psoriasis  - No  obvious flaring lesions, remotely on Skyrizi which has been held recently  - Outpatient follow-up     #Hypophosphatemia   - Monitor and replete as needed     #Remote DVT and PE  - Continue PTA Eliquis      #Chronic pain  PTA  MS Contin, gabapentin, duloxetine, oxycodone  - Pain team consulted     #History of renal cell carcinoma  Remote nephrectomy     #Hypogonadism  - Hold PTA supplement while here     #Type 2 DM with long-term insulin use  - A1c 7.9%  - 50 units of Lantus twice daily at home  - Lantus 40u QHS  - sliding scale insulin  - Titrate as needed     #Moderate Malnutrition  - Seen by RD             Diet: Snacks/Supplements Adult: Ensure Enlive; With Meals  Moderate Consistent Carb (60 g CHO per Meal) Diet    DVT Prophylaxis: DOAC  Seals Catheter: Not present  Lines: PRESENT      PICC 01/18/25 Single Lumen Left Basilic Antibiotic-Site Assessment: WDL      Cardiac Monitoring: None  Code Status: Full Code      Clinically Significant Risk Factors               # Hypoalbuminemia: Lowest albumin = 2 g/dL at 1/18/2025  6:13 AM, will monitor as appropriate     # Hypertension: Noted on problem list           # DMII: A1C = 7.9 % (Ref range: <5.7 %) within past 6 months   # Overweight: Estimated body mass index is 27.68 kg/m  as calculated from the following:    Height as of this encounter: 1.829 m (6').    Weight as of this encounter: 92.6 kg (204 lb 1.6 oz).   # Moderate Malnutrition: based on nutrition assessment           Social Drivers of Health    Tobacco Use: Medium Risk (1/14/2025)    Patient History     Smoking Tobacco Use: Former     Smokeless Tobacco Use: Never     Passive Exposure: Past   Physical Activity: Insufficiently Active (8/23/2024)    Exercise Vital Sign     Days of Exercise per Week: 1 day     Minutes of Exercise per Session: 20 min   Social Connections: Unknown (8/23/2024)    Social Connection and Isolation Panel [NHANES]     Frequency of Social Gatherings with Friends and Family: Once a week           Disposition Plan     Medically Ready for Discharge: Anticipated in 2-4 Days           ROBERT PAK MD  Hospitalist Service  [facility]  Securely message with [employer]X (more info)  Text page via [employer] Paging/Directory   ______________________________________________________________________    Interval History   No clinical changes, antibiotics ongoing.    Physical Exam   Vital Signs: Temp: 98.2  F (36.8  C) Temp src: Oral BP: 135/72 Pulse: 71   Resp: 19 SpO2: 98 % O2 Device: None (Room air)    Weight: 204 lbs 1.6 oz    General Appearance:  No distress noted  Respiratory: Normal effort  Cardiovascular: RRR  GI: Soft abdomen, obese, mild epigastric tenderness, 1 drain on right upper quadrant  Skin: Intact and warm    Medical Decision Making       40 MINUTES SPENT BY ME on the date of service doing chart review, history, exam, documentation & further activities per the note.      Data   Labs and imaging of last 24 hours reviewed

## 2025-02-01 NOTE — PLAN OF CARE
Problem: Adult Inpatient Plan of Care  Goal: Optimal Comfort and Wellbeing  Outcome: Progressing  Intervention: Monitor Pain and Promote Comfort  Recent Flowsheet Documentation  Taken 1/31/2025 1732 by Maddie Casas, RN  Pain Management Interventions:   care clustered   quiet environment facilitated     Problem: Infection  Goal: Absence of Infection Signs and Symptoms  Outcome: Progressing   Goal Outcome Evaluation:    Pt is A+O x4, on RA. Abdominal pain managed by  scheduled MS contin. DAMI drain patent. Pt eating, drinking and voiding well. Up standby assist. Ambulated in hallway x1. Pt is able to make his needs known.     Maddie Casas, LIM[place].

## 2025-02-01 NOTE — PLAN OF CARE
Problem: Comorbidity Management  Goal: Blood Glucose Levels Within Targeted Range  Outcome: Progressing  Intervention: Monitor and Manage Glycemia  Recent Flowsheet Documentation  Taken 2/1/2025 0000 by Devon Maciel RN  Medication Review/Management: medications reviewed     Problem: Infection  Goal: Absence of Infection Signs and Symptoms  Outcome: Progressing     Problem: Pain Acute  Goal: Optimal Pain Control and Function  Outcome: Progressing  Intervention: Prevent or Manage Pain  Recent Flowsheet Documentation  Taken 2/1/2025 0000 by Devon Maciel RN  Medication Review/Management: medications reviewed   Goal Outcome Evaluation:  Patient complained of right abdominal pain rated 7/10. Refused oxycodone. Given prn iv dilaudid 0.4 mg at 0002, effective. DAMI drain intact and patent. Output from DAMI drain minimal. Afebrile. 2 am .

## 2025-02-01 NOTE — PROGRESS NOTES
Infectious Disease Chart Check    Remains afebrile with normal WBC.    Will see again on 2/3.    If further questions or concerns,  On call Infectious Disease can be reached at [phone]    SIS HEATH MD

## 2025-02-01 NOTE — PLAN OF CARE
Physical Therapy Discharge Summary    Reason for therapy discharge:    All goals and outcomes met, no further needs identified.    Progress towards therapy goal(s). See goals on Care Plan in [facility] electronic health record for goal details.  Goals met    Therapy recommendation(s):    Pt to continue walking magdaleno on his own or with family

## 2025-02-02 LAB
GLUCOSE BLDC GLUCOMTR-MCNC: 126 MG/DL (ref 70–99)
GLUCOSE BLDC GLUCOMTR-MCNC: 135 MG/DL (ref 70–99)
GLUCOSE BLDC GLUCOMTR-MCNC: 228 MG/DL (ref 70–99)
GLUCOSE BLDC GLUCOMTR-MCNC: 254 MG/DL (ref 70–99)
GLUCOSE BLDC GLUCOMTR-MCNC: 337 MG/DL (ref 70–99)

## 2025-02-02 PROCEDURE — 250N000013 HC RX MED GY IP 250 OP 250 PS 637: Performed by: INTERNAL MEDICINE

## 2025-02-02 PROCEDURE — 120N000001 HC R&B MED SURG/OB

## 2025-02-02 PROCEDURE — 250N000011 HC RX IP 250 OP 636: Performed by: INTERNAL MEDICINE

## 2025-02-02 PROCEDURE — 250N000013 HC RX MED GY IP 250 OP 250 PS 637: Performed by: NURSE PRACTITIONER

## 2025-02-02 PROCEDURE — 250N000013 HC RX MED GY IP 250 OP 250 PS 637: Performed by: STUDENT IN AN ORGANIZED HEALTH CARE EDUCATION/TRAINING PROGRAM

## 2025-02-02 PROCEDURE — 99232 SBSQ HOSP IP/OBS MODERATE 35: CPT | Performed by: STUDENT IN AN ORGANIZED HEALTH CARE EDUCATION/TRAINING PROGRAM

## 2025-02-02 RX ADMIN — MORPHINE SULFATE 15 MG: 15 TABLET, FILM COATED, EXTENDED RELEASE ORAL at 21:29

## 2025-02-02 RX ADMIN — GABAPENTIN 600 MG: 300 CAPSULE ORAL at 13:50

## 2025-02-02 RX ADMIN — PANTOPRAZOLE SODIUM 40 MG: 40 TABLET, DELAYED RELEASE ORAL at 07:01

## 2025-02-02 RX ADMIN — MORPHINE SULFATE 15 MG: 15 TABLET, FILM COATED, EXTENDED RELEASE ORAL at 09:29

## 2025-02-02 RX ADMIN — DULOXETINE HYDROCHLORIDE 60 MG: 60 CAPSULE, DELAYED RELEASE ORAL at 09:29

## 2025-02-02 RX ADMIN — METRONIDAZOLE 500 MG: 500 TABLET ORAL at 21:29

## 2025-02-02 RX ADMIN — MORPHINE SULFATE 15 MG: 15 TABLET, FILM COATED, EXTENDED RELEASE ORAL at 13:50

## 2025-02-02 RX ADMIN — METRONIDAZOLE 500 MG: 500 TABLET ORAL at 13:49

## 2025-02-02 RX ADMIN — LIDOCAINE 3 PATCH: 4 PATCH TOPICAL at 09:28

## 2025-02-02 RX ADMIN — INSULIN ASPART 21 UNITS: 100 INJECTION, SOLUTION INTRAVENOUS; SUBCUTANEOUS at 10:06

## 2025-02-02 RX ADMIN — GABAPENTIN 600 MG: 300 CAPSULE ORAL at 09:29

## 2025-02-02 RX ADMIN — CEFTRIAXONE SODIUM 2 G: 2 INJECTION, POWDER, FOR SOLUTION INTRAMUSCULAR; INTRAVENOUS at 09:27

## 2025-02-02 RX ADMIN — APIXABAN 5 MG: 5 TABLET, FILM COATED ORAL at 21:29

## 2025-02-02 RX ADMIN — INSULIN GLARGINE 35 UNITS: 100 INJECTION, SOLUTION SUBCUTANEOUS at 22:28

## 2025-02-02 RX ADMIN — METRONIDAZOLE 500 MG: 500 TABLET ORAL at 09:29

## 2025-02-02 RX ADMIN — INSULIN ASPART 15 UNITS: 100 INJECTION, SOLUTION INTRAVENOUS; SUBCUTANEOUS at 18:44

## 2025-02-02 RX ADMIN — GABAPENTIN 600 MG: 300 CAPSULE ORAL at 21:29

## 2025-02-02 RX ADMIN — PANTOPRAZOLE SODIUM 40 MG: 40 TABLET, DELAYED RELEASE ORAL at 18:16

## 2025-02-02 RX ADMIN — APIXABAN 5 MG: 5 TABLET, FILM COATED ORAL at 09:30

## 2025-02-02 ASSESSMENT — ACTIVITIES OF DAILY LIVING (ADL)
ADLS_ACUITY_SCORE: 57
ADLS_ACUITY_SCORE: 58
ADLS_ACUITY_SCORE: 57
ADLS_ACUITY_SCORE: 58
ADLS_ACUITY_SCORE: 57

## 2025-02-02 NOTE — PLAN OF CARE
Problem: Adult Inpatient Plan of Care  Goal: Optimal Comfort and Wellbeing  Outcome: Progressing  Intervention: Monitor Pain and Promote Comfort  Recent Flowsheet Documentation  Taken 2/1/2025 1611 by Maddie Casas, RN  Pain Management Interventions: medication (see MAR)   Goal Outcome Evaluation:    Pt is A+O x4, on RA. Has intermittent nausea, managed by prn Zofran x2. DAMI drain patent, dressing changed x1.  Eating, drinking and voiding well. Pt expressed feeling down today, anxious and lonely. Long walk in hallway. Pt is up independently in the room. Able to make his needs known.     Maddie Casas, RN.

## 2025-02-02 NOTE — PROGRESS NOTES
[facility]    Medicine Progress Note - Hospitalist Service    Date of Admission:  1/15/2025    Assessment & Plan   Bon Luque is a 69 year old male with past medical history significant for CAD, renal cell carcinoma status post nephrectomy 2017, hypertension, diabetes with neuropathy, HLD, hypogonadism on testosterone, psoriasis, and small bowel obstruction who was admitted on 1/15/2025 due to abdominal pain, diarrhea, nausea for 11 days PTA. Found to have strep bacteremia, liver abscess. Anticipate total of 6 weeks of IV ceftriaxone and PO Flagyl.     #Sepsis from Strep intermedius bacteremia  #Multiple liver abscesses  Presented with 11 days of nausea, diarrhea, abdominal pain. Had dental work done 1/3/25. CT 1/13 w/ 3 prominent indeterminate complex hypodense masses in the liver.  MRI 1/15: 3 heterogeneous masses in the liver concerning for infection, possible rapidly progressive malignancy/metastases. IR consulted, underwent CT-guided liver drain x 2 1/16. Repeat CT 1/21: Multiloculated abscess, 2 abscess with drains decreased in size, the third abscess remained present. 3rd drain placed into the persistebt abscess on 1/23. Abscessogram 1/28 led to removal of 2 drains due to abscess resolution, 1 drain remains in place.  - ID and IR consulted  - Blood and abscess cultures growing strep intermedius, blood cultures on 1/16 NG  - Was on  IV vancomycin and Cefepime, now on IV Ceftriaxone and po Flagyl per ID since 01/20, plan for 6 weeks  - PICC line placed 1/18  - Plan to discharge to TCU, spoke with IR and plan for repeat CT on Monday (2/3), then abscessogram on Tuesday with possible drain pull at that time - will be ready for discharge after that, so Tuesday afternoon vs Wednesday     #Hyponatremia, resolved  Has been low-normal on past checks. Here at the low-normal border but stable.  - Monitor     #Acute anemia  Due to our IV fluids, inflammation most likely. Hgb stable.      #Psoriasis  - No obvious flaring lesions, remotely on Skyrizi which has been held recently  - Outpatient follow-up     #Hypophosphatemia   - Monitor and replete as needed     #Remote DVT and PE  - Continue PTA Eliquis      #Chronic pain  PTA  MS Contin, gabapentin, duloxetine, oxycodone  - Pain team consulted     #History of renal cell carcinoma  Remote nephrectomy     #Hypogonadism  - Hold PTA supplement while here     #Type 2 DM with long-term insulin use  - A1c 7.9%  - 50 units of Lantus twice daily at home  - Lantus 35u at bedtime here  - sliding scale insulin plus carb correction - needs a lot off meal time insulin  - Titrate as needed     #Moderate Malnutrition  - Seen by RD             Diet: Snacks/Supplements Adult: Ensure Enlive; With Meals  Moderate Consistent Carb (60 g CHO per Meal) Diet    DVT Prophylaxis: DOAC  Seals Catheter: Not present  Lines: PRESENT      PICC 01/18/25 Single Lumen Left Basilic Antibiotic-Site Assessment: WDL      Cardiac Monitoring: None  Code Status: Full Code      Clinically Significant Risk Factors               # Hypoalbuminemia: Lowest albumin = 2 g/dL at 1/18/2025  6:13 AM, will monitor as appropriate     # Hypertension: Noted on problem list           # DMII: A1C = 7.9 % (Ref range: <5.7 %) within past 6 months   # Overweight: Estimated body mass index is 27.68 kg/m  as calculated from the following:    Height as of this encounter: 1.829 m (6').    Weight as of this encounter: 92.6 kg (204 lb 1.6 oz).   # Moderate Malnutrition: based on nutrition assessment           Social Drivers of Health    Tobacco Use: Medium Risk (1/14/2025)    Patient History     Smoking Tobacco Use: Former     Smokeless Tobacco Use: Never     Passive Exposure: Past   Physical Activity: Insufficiently Active (8/23/2024)    Exercise Vital Sign     Days of Exercise per Week: 1 day     Minutes of Exercise per Session: 20 min   Social Connections: Unknown (8/23/2024)    Social Connection and Isolation  Panel [NHANES]     Frequency of Social Gatherings with Friends and Family: Once a week          Disposition Plan     Medically Ready for Discharge: Anticipated in 2-4 Days           ROBERT PAK MD  Hospitalist Service  [facility]  Securely message with [employer] (more info)  Text page via [employer] Paging/Directory   ______________________________________________________________________    Interval History   No clinical changes, antibiotics ongoing.    Physical Exam   Vital Signs: Temp: 97.8  F (36.6  C) Temp src: Oral BP: 111/53 Pulse: 66   Resp: 17 SpO2: 95 % O2 Device: None (Room air)    Weight: 204 lbs 1.6 oz    General Appearance:  No distress noted  Respiratory: Normal effort  Cardiovascular: RRR  GI: Soft abdomen, obese, mild epigastric tenderness, 1 drain on right upper quadrant  Skin: Intact and warm    Medical Decision Making       40 MINUTES SPENT BY ME on the date of service doing chart review, history, exam, documentation & further activities per the note.      Data   Labs and imaging of last 24 hours reviewed

## 2025-02-02 NOTE — PLAN OF CARE
Goal Outcome Evaluation:         Bon has walked in the halls. He denies pain. PICC dressing changed. He has been more awake today. Minimal drainage in bulb suction drain. Call light in reach

## 2025-02-02 NOTE — PLAN OF CARE
Goal Outcome Evaluation:      Plan of Care Reviewed With: patient    Patient complained of right abdominal pain rated 7/10. Given prn iv dilaudid 0.4 mg at 2359. Pt expressed pain relief afterwards. DAMI drain is intact and patent. 2 am .        Problem: Comorbidity Management  Goal: Blood Glucose Levels Within Targeted Range  Outcome: Progressing  Intervention: Monitor and Manage Glycemia  Recent Flowsheet Documentation  Taken 2/1/2025 2351 by Devon Maciel RN  Medication Review/Management: medications reviewed     Problem: Pain Acute  Goal: Optimal Pain Control and Function  Outcome: Progressing  Intervention: Prevent or Manage Pain  Recent Flowsheet Documentation  Taken 2/1/2025 2351 by Devon Maciel RN  Medication Review/Management: medications reviewed

## 2025-02-02 NOTE — PROGRESS NOTES
Care Management Follow Up    Length of Stay (days): 18    Expected Discharge Date: 02/05/2025    Anticipated Discharge Plan:  Transitional Care    Transportation: Anticipate Family/friend    PT Recommendations: home with assist  OT Recommendations:  home with assist, home with home care occupational therapy     Barriers to Discharge: medical stability    Prior Living Situation: house with spouse    Discussed  Partnership in Safe Discharge Planning  document with patient/family: No     Handoff Completed: No, handoff not indicated or clinically appropriate    Patient/Spokesperson Updated: No    Additional Information:  Per MD anticipate pt will remain in the hospital until 2/5 with abcessogram planned on 2/4. During the week SW will confirm that [facility] can accept pt on 2/5.    Next Steps: confirm TCU bed for pt on 2/5, IBRAHIMA Whiteside

## 2025-02-03 ENCOUNTER — TELEPHONE (OUTPATIENT)
Dept: INFECTIOUS DISEASES | Facility: CLINIC | Age: 70
End: 2025-02-03

## 2025-02-03 ENCOUNTER — APPOINTMENT (OUTPATIENT)
Dept: CT IMAGING | Facility: HOSPITAL | Age: 70
End: 2025-02-03
Attending: NURSE PRACTITIONER
Payer: COMMERCIAL

## 2025-02-03 LAB
GLUCOSE BLDC GLUCOMTR-MCNC: 122 MG/DL (ref 70–99)
GLUCOSE BLDC GLUCOMTR-MCNC: 188 MG/DL (ref 70–99)
GLUCOSE BLDC GLUCOMTR-MCNC: 225 MG/DL (ref 70–99)
GLUCOSE BLDC GLUCOMTR-MCNC: 242 MG/DL (ref 70–99)
GLUCOSE BLDC GLUCOMTR-MCNC: 255 MG/DL (ref 70–99)

## 2025-02-03 PROCEDURE — 120N000001 HC R&B MED SURG/OB

## 2025-02-03 PROCEDURE — 99232 SBSQ HOSP IP/OBS MODERATE 35: CPT | Performed by: INTERNAL MEDICINE

## 2025-02-03 PROCEDURE — 250N000013 HC RX MED GY IP 250 OP 250 PS 637: Performed by: INTERNAL MEDICINE

## 2025-02-03 PROCEDURE — 250N000011 HC RX IP 250 OP 636: Performed by: INTERNAL MEDICINE

## 2025-02-03 PROCEDURE — 250N000011 HC RX IP 250 OP 636: Performed by: NURSE PRACTITIONER

## 2025-02-03 PROCEDURE — 250N000013 HC RX MED GY IP 250 OP 250 PS 637: Performed by: NURSE PRACTITIONER

## 2025-02-03 PROCEDURE — 99232 SBSQ HOSP IP/OBS MODERATE 35: CPT | Performed by: STUDENT IN AN ORGANIZED HEALTH CARE EDUCATION/TRAINING PROGRAM

## 2025-02-03 PROCEDURE — 74160 CT ABDOMEN W/CONTRAST: CPT

## 2025-02-03 PROCEDURE — 250N000013 HC RX MED GY IP 250 OP 250 PS 637: Performed by: STUDENT IN AN ORGANIZED HEALTH CARE EDUCATION/TRAINING PROGRAM

## 2025-02-03 RX ORDER — ONDANSETRON 2 MG/ML
4 INJECTION INTRAMUSCULAR; INTRAVENOUS
Status: DISCONTINUED | OUTPATIENT
Start: 2025-02-03 | End: 2025-02-04

## 2025-02-03 RX ORDER — ONDANSETRON 2 MG/ML
4 INJECTION INTRAMUSCULAR; INTRAVENOUS
Status: DISCONTINUED | OUTPATIENT
Start: 2025-02-04 | End: 2025-02-04

## 2025-02-03 RX ORDER — NALOXONE HYDROCHLORIDE 0.4 MG/ML
0.2 INJECTION, SOLUTION INTRAMUSCULAR; INTRAVENOUS; SUBCUTANEOUS
Status: DISCONTINUED | OUTPATIENT
Start: 2025-02-03 | End: 2025-02-04

## 2025-02-03 RX ORDER — NALOXONE HYDROCHLORIDE 0.4 MG/ML
0.4 INJECTION, SOLUTION INTRAMUSCULAR; INTRAVENOUS; SUBCUTANEOUS
Status: DISCONTINUED | OUTPATIENT
Start: 2025-02-03 | End: 2025-02-04

## 2025-02-03 RX ORDER — IOPAMIDOL 755 MG/ML
90 INJECTION, SOLUTION INTRAVASCULAR ONCE
Status: COMPLETED | OUTPATIENT
Start: 2025-02-03 | End: 2025-02-03

## 2025-02-03 RX ORDER — CEFTRIAXONE 2 G/1
2 INJECTION, POWDER, FOR SOLUTION INTRAMUSCULAR; INTRAVENOUS EVERY 24 HOURS
Status: SHIPPED
Start: 2025-02-04 | End: 2025-02-05

## 2025-02-03 RX ORDER — FLUMAZENIL 0.1 MG/ML
0.2 INJECTION, SOLUTION INTRAVENOUS
Status: DISCONTINUED | OUTPATIENT
Start: 2025-02-03 | End: 2025-02-04

## 2025-02-03 RX ORDER — FENTANYL CITRATE 50 UG/ML
25-50 INJECTION, SOLUTION INTRAMUSCULAR; INTRAVENOUS EVERY 5 MIN PRN
Status: DISCONTINUED | OUTPATIENT
Start: 2025-02-03 | End: 2025-02-04

## 2025-02-03 RX ADMIN — MORPHINE SULFATE 15 MG: 15 TABLET, FILM COATED, EXTENDED RELEASE ORAL at 13:21

## 2025-02-03 RX ADMIN — LIDOCAINE 3 PATCH: 4 PATCH TOPICAL at 08:47

## 2025-02-03 RX ADMIN — INSULIN ASPART 12 UNITS: 100 INJECTION, SOLUTION INTRAVENOUS; SUBCUTANEOUS at 18:17

## 2025-02-03 RX ADMIN — INSULIN ASPART 8 UNITS: 100 INJECTION, SOLUTION INTRAVENOUS; SUBCUTANEOUS at 08:59

## 2025-02-03 RX ADMIN — PANTOPRAZOLE SODIUM 40 MG: 40 TABLET, DELAYED RELEASE ORAL at 06:36

## 2025-02-03 RX ADMIN — GABAPENTIN 600 MG: 300 CAPSULE ORAL at 08:46

## 2025-02-03 RX ADMIN — GABAPENTIN 600 MG: 300 CAPSULE ORAL at 20:52

## 2025-02-03 RX ADMIN — INSULIN ASPART 8 UNITS: 100 INJECTION, SOLUTION INTRAVENOUS; SUBCUTANEOUS at 13:22

## 2025-02-03 RX ADMIN — METRONIDAZOLE 500 MG: 500 TABLET ORAL at 13:20

## 2025-02-03 RX ADMIN — CEFTRIAXONE SODIUM 2 G: 2 INJECTION, POWDER, FOR SOLUTION INTRAMUSCULAR; INTRAVENOUS at 08:47

## 2025-02-03 RX ADMIN — INSULIN GLARGINE 35 UNITS: 100 INJECTION, SOLUTION SUBCUTANEOUS at 21:01

## 2025-02-03 RX ADMIN — GABAPENTIN 600 MG: 300 CAPSULE ORAL at 13:20

## 2025-02-03 RX ADMIN — MORPHINE SULFATE 15 MG: 15 TABLET, FILM COATED, EXTENDED RELEASE ORAL at 08:47

## 2025-02-03 RX ADMIN — APIXABAN 5 MG: 5 TABLET, FILM COATED ORAL at 20:51

## 2025-02-03 RX ADMIN — PANTOPRAZOLE SODIUM 40 MG: 40 TABLET, DELAYED RELEASE ORAL at 17:19

## 2025-02-03 RX ADMIN — METRONIDAZOLE 500 MG: 500 TABLET ORAL at 20:53

## 2025-02-03 RX ADMIN — METRONIDAZOLE 500 MG: 500 TABLET ORAL at 08:47

## 2025-02-03 RX ADMIN — MORPHINE SULFATE 15 MG: 15 TABLET, FILM COATED, EXTENDED RELEASE ORAL at 20:54

## 2025-02-03 RX ADMIN — IOPAMIDOL 90 ML: 755 INJECTION, SOLUTION INTRAVENOUS at 18:08

## 2025-02-03 RX ADMIN — DULOXETINE HYDROCHLORIDE 60 MG: 60 CAPSULE, DELAYED RELEASE ORAL at 08:46

## 2025-02-03 RX ADMIN — APIXABAN 5 MG: 5 TABLET, FILM COATED ORAL at 08:47

## 2025-02-03 ASSESSMENT — ACTIVITIES OF DAILY LIVING (ADL)
ADLS_ACUITY_SCORE: 60
ADLS_ACUITY_SCORE: 57
ADLS_ACUITY_SCORE: 60
ADLS_ACUITY_SCORE: 57
ADLS_ACUITY_SCORE: 60
ADLS_ACUITY_SCORE: 57
ADLS_ACUITY_SCORE: 60
ADLS_ACUITY_SCORE: 57
ADLS_ACUITY_SCORE: 60
ADLS_ACUITY_SCORE: 60
ADLS_ACUITY_SCORE: 57
ADLS_ACUITY_SCORE: 60
ADLS_ACUITY_SCORE: 57

## 2025-02-03 NOTE — PROGRESS NOTES
Interventional Radiology - Pre-Procedure Evaluation:  [facility]  2025     Procedure Requested: abscessogram  Requested by: Cheyenne Ball CNP    HPI: Bon Luque is a 69 year old male with a PMH of PE/DVT, CAD s/p stenting, DM II, HTN, HLD, ischemic cardiomyopathy, MARÍA ELENA, RCC s/p partial nephrectomy, and tobacco abuse who was admitted to [facility] on 01/15/2025; found to have liver abscess. 25 s/p CT guided drain placement into liver x2. Follow up CT demonstrated an increase in a liver abscess; 25 s/p CT guided placement.     Medial superior 10F and medial right 10F drains removed. RUQ 12F drain remains.     Culture: positive for Streptococcus intermedius.   Flushing: NS 10ml Qshift  Drainage OP (ml):     5    12    9    12.5    13    9   2/3 3          IMAGIN/3/25 CT abd pending    NPO: ordered for midnight  ANTICOAGULANTS/ANTIPLATELETS: Eliquis; no hold  ANTIBIOTICS: IV rocephin Q24H, PO flagyl    ALLERGIES:  Allergies   Allergen Reactions    Atorvastatin Muscle Pain (Myalgia)    Valomag [Arthritis Pain Formula] Nausea and Vomiting     gastric ulcer from aspirin use    Ampicillin Rash     Had a reaction to this medication many years ago.    Codeine Rash     It has been about 30 years ago         LABS:  INR   Date Value Ref Range Status   2025 1.26 (H) 0.85 - 1.15 Final      Hemoglobin   Date Value Ref Range Status   2025 10.8 (L) 13.3 - 17.7 g/dL Final     Platelet Count   Date Value Ref Range Status   2025 305 150 - 450 10e3/uL Final     Creatinine   Date Value Ref Range Status   2025 0.78 0.67 - 1.17 mg/dL Final     Potassium   Date Value Ref Range Status   2025 4.3 3.4 - 5.3 mmol/L Final   2022 4.2 3.5 - 5.0 mmol/L Final         EXAM:  BP (!) 141/74 (BP Location: Right arm)   Pulse 76   Temp 97.7  F (36.5  C) (Oral)   Resp 18   Ht 1.829 m (6')   Wt 89.4 kg (197 lb 3.2 oz)   SpO2 98%   BMI  26.75 kg/m    General: Stable. In no acute distress.    Neuro: Alert and oriented x 3. No focal deficits.  Psych: Appropriate mood and affect. Linear/coherent thought process.   Resp: Normal respirations. Lungs clear to auscultation bilaterally.  Cardio: S1S2, regular rate and rhythm, without murmur, clicks or rubs  Abdomen: RUQ drain intact to DAMI bulb; holding suction; serous drainage noted in tubing/bulb; no leakage.       PRE-SEDATION ASSESSMENT:  Mallampati Airway Classification:  II - Faucial pillars and soft palate may be seen, but uvula is masked by the base of the tongue  Previous reaction to anesthesia/sedation:  Yes: Nausea; needs to be pretreated with IV zofran; ordered  Sedation plan based on assessment: Moderate (conscious) sedation  ASA Classification: Class 3 - SEVERE SYSTEMIC DISEASE, DEFINITE FUNCTIONAL LIMITATIONS.   Code Status: FULL CODE      ASSESSMENT/PLAN:   CT today; pending  Abscessogram with possible intervention and sedation.   NPO at midnight.   Pre-treat with IV zofran; ordered    Procedural education reviewed with patient in detail including, but not limited to risks, benefits and alternatives with understanding verbalized by patient.    Total time spent on the date of the encounter: 25 minutes.      CRISTINA Vila CNP  Interventional Radiology

## 2025-02-03 NOTE — PROGRESS NOTES
CLINICAL NUTRITION SERVICES - BRIEF NOTE     RECOMMENDATIONS FOR MDs/PROVIDERS TO ORDER:  Consider high consistent carbohydrate diet  Continue to adjust insulin regimen with hyperglycemia     Registered Dietitian Interventions:  Discontinue ensure enlive TID   Trial Glucerna BID - adjusting to lower cho supplements with improving po     Future/Additional Recommendations:  Monitor diet, po, supplement hetal, weight, labs, I/Os.      SUBJECTIVE INFORMATION  Assessed patient in room.  Pt reports good po intakes and appetite, consuming most of his meals and 100% of nutrition supplements. Pt interested in ordering more at meals. Pt agreeable to adjustments in nutrition supplements with improving po. Pt with various nutrition questions and concerns r/t cho diet and cho counting.     CURRENT NUTRITION ORDERS  Diet: Moderate Consistent Carbohydrate  Nutrition Supplement: Ensure enlive TID    CURRENT INTAKE/TOLERANCE  12/30-2/2: Pt consuming % x2-3 meals/day + ensure enlive TID  Pt consuming 2337+ kcal and 118+ g protein/day, meeting >100% nutrition needs     NEW FINDINGS  Weight: CBW continues trending down, 11.8% wt loss x1 month and 6 months   Date/Time Weight Weight Method   02/03/25 0812 89.4 kg (197 lb 3.2 oz) --   01/27/25 0500 92.6 kg (204 lb 1.6 oz) Standing scale   01/26/25 0353 92.9 kg (204 lb 12.8 oz) Standing scale   01/22/25 1046 93.8 kg (206 lb 12.8 oz) Standing scale   01/15/25 1220 99.8 kg (220 lb) --     01/02/25 101.4 kg (223 lb 9.6 oz)   08/23/24 101.6 kg (223 lb 14.4 oz)   Rounded abd   BM: last x2 2/2   RUQ drain, OP: 9 ML 2/2, 3 ml this AM  Nutrition-relevant labs: -337 last 24 hrs   Nutrition-relevant medications: Scheduled rocephin, novolog, lantus, protonix, miralax    MALNUTRITION  Malnutrition Diagnosis: Moderate malnutrition in the context of acute illness or injury     INTERVENTIONS  Medical food supplement therapy- Discontinue ensure enlive TID   Trial Glucerna BID   Diet education-  provided and discussed Diabetes placemat, Consistent carb diet for people with diabetes, Plate method for diabetes, Nutrition labels: Carbohydrates       Consider high consistent carbohydrate diet  Continue to adjust insulin regimen with hyperglycemia      Monitoring/Evaluation      Progress toward goals will be monitored and evaluated per policy.

## 2025-02-03 NOTE — PRE-PROCEDURE
GENERAL PRE-PROCEDURE:   Procedure:  Abscessogram  Date/Time:  2/3/2025 2:47 PM    Written consent obtained?: Yes    Risks and benefits: Risks, benefits and alternatives were discussed    Consent given by:  Patient  Patient states understanding of procedure being performed: Yes    Patient's understanding of procedure matches consent: Yes    Procedure consent matches procedure scheduled: Yes    Expected level of sedation:  Moderate  Appropriately NPO:  Yes  ASA Class:  3  Mallampati  :  Grade 2- soft palate, base of uvula, tonsillar pillars, and portion of posterior pharyngeal wall visible  Lungs:  Lungs clear with good breath sounds bilaterally  Heart:  Normal heart sounds and rate  History & Physical reviewed:  History and physical reviewed and no updates needed  Statement of review:  I have reviewed the lab findings, diagnostic data, medications, and the plan for sedation

## 2025-02-03 NOTE — TELEPHONE ENCOUNTER
----- Message from Nkaujnub X sent at 2/3/2025 11:42 AM CST -----    ----- Message -----  From: Woodrow Bui MD  Sent: 2/3/2025  11:39 AM CST  To: #    See me 3-4 wks

## 2025-02-03 NOTE — PROGRESS NOTES
Care Management Follow Up    Length of Stay (days): 19    Expected Discharge Date: 02/05/2025    Anticipated Discharge Plan:  Transitional Care    Transportation: Anticipate Family/friend    PT Recommendations: home with assist  OT Recommendations:  home with assist, home with home care occupational therapy     Barriers to Discharge: medical stability    Prior Living Situation: house with spouse    Discussed  Partnership in Safe Discharge Planning  document with patient/family: No     Handoff Completed: No, handoff not indicated or clinically appropriate    Patient/Spokesperson Updated: No    Additional Information: Chart reviewed. We anticipate the patient will be ready for discharge on 2/4/25 The plan is to discharge the patient to [facility]. SW contacted TCU admission regarding discharge planning, and they do not have available bed on 2/4/25. They will update us when they have available bed. SW updated the patient and he noted that the family will provide transportation upon discharge.     Next Steps: Confirm TCU bed and complete PAS.    IBRAHIMA Benavides

## 2025-02-03 NOTE — PLAN OF CARE
Problem: Adult Inpatient Plan of Care  Goal: Absence of Hospital-Acquired Illness or Injury  Intervention: Identify and Manage Fall Risk  Recent Flowsheet Documentation  Taken 2/3/2025 0900 by Lizet Masters RN  Safety Promotion/Fall Prevention:   activity supervised   mobility aid in reach  Intervention: Prevent Skin Injury  Recent Flowsheet Documentation  Taken 2/3/2025 0900 by Lizet Masters RN  Body Position: position changed independently  Taken 2/3/2025 0812 by Lizet Masters RN  Body Position: position changed independently  Intervention: Prevent Infection  Recent Flowsheet Documentation  Taken 2/3/2025 0900 by Lizet Masters RN  Infection Prevention:   rest/sleep promoted   hand hygiene promoted     Problem: Comorbidity Management  Goal: Blood Glucose Levels Within Targeted Range  Intervention: Monitor and Manage Glycemia  Recent Flowsheet Documentation  Taken 2/3/2025 0900 by Lizet Masters RN  Medication Review/Management: medications reviewed     Problem: Pain Acute  Goal: Optimal Pain Control and Function  Intervention: Prevent or Manage Pain  Recent Flowsheet Documentation  Taken 2/3/2025 0900 by Lizet Masters RN  Medication Review/Management: medications reviewed     Problem: Adult Inpatient Plan of Care  Goal: Absence of Hospital-Acquired Illness or Injury  Intervention: Identify and Manage Fall Risk  Recent Flowsheet Documentation  Taken 2/3/2025 0900 by Lizet Masters RN  Safety Promotion/Fall Prevention:   activity supervised   mobility aid in reach   Goal Outcome Evaluation:         Up independently in hallway denies pain waiting for Ct scan to check drain. Hoping to go to Tcu in am.

## 2025-02-03 NOTE — PROGRESS NOTES
[facility] Progress Note    02/03/2025      ASSESSMENT:  Liver abscess, strep intermedius. Leukocytosis, normal, now up after 3rd drain placed  Drained. Repeat CT abd today showing anterior abscess (segment 4) that is larger. Previous drain going inferior to this. 3rd drain placed on 1/23.  On biologic for skin  Solitary kidney  Drains removed on repeat abscessogram on 1/28/2025, has 1 drain in place    RECOMMENDATION:  Continue ceftriaxone and po metronidazole   Anticipate 6 weeks iv antibiotics due to multi-loculated abscess  IR reassessed 1/28/2025    IMPRESSION:    1. Abscessogram through the left hepatic lobe drain demonstrates no residual abscess cavity. This drain was removed without complications.  2. Abscessogram through the deeper right hepatic lobe liver abscess drain demonstrates minimal to no residual abscess cavity. This drain was removed.  3. Abscessogram through the more superficial right hepatic lobe liver abscess drain demonstrates the drain to be suboptimally positioned and partially clogged. There was still moderate residual abscess cavity. This drain was exchange/upsize to a larger   12 French drain with the pigtail better positioned within the center of the abscess cavity. Drain was placed to DAMI suction. Drain will be flushed once daily with 5 cc of sterile saline.    Plan to go to TCU upon discharge  Clinically improving  Imaging looks to be the plan this week, today and tomorrow I believe.  A final ID plan will be placed probably tomorrow    SILVIA Bui MD  [facility]  Answering Service: [phone]  On-Call ID provider: [phone], option: 9      SUBJECTIVE:    Pt is known to me.  I did the initial consult.  Here now almost 3 wks.        OBJECTIVE:  /68 (BP Location: Right arm)   Pulse 72   Temp 97.8  F (36.6  C) (Oral)   Resp 16   Ht 1.829 m (6')   Wt 89.4 kg (197 lb 3.2 oz)   SpO2 98%   BMI 26.75 kg/m        PHYSICAL EXAM:  Gen:  "resting  Vitals tabulated above, reviewed  Sclera normal color, not injected  CARDIOVASCULAR regular rate and rhythm, no murmur  Lungs CLEAR TO AUSCULTATION   Abdomen soft, ND; 1 drain with creamy output: Tenderness at insertion site, midabdomen  Skin normal  Joints normal  Neurologic exam non focal    PICC, left arm     Antibiotics:  Ceftriaxone 1/20-  Metronidazole     Pertinent labs:    Recent Labs   Lab Test 02/01/25  0536 01/30/25  0639 01/29/25  0559   WBC 7.5 9.6 11.7*   HGB 10.8* 11.2* 12.5*   HCT 32.9* 35.3* 38.9*   MCV 86 87 85    366 471*       Lab Results   Component Value Date    RBC 3.74 01/17/2025     Lab Results   Component Value Date    HGB 10.5 01/17/2025     Lab Results   Component Value Date    HCT 30.6 01/17/2025     No components found for: \"MCT\"  Lab Results   Component Value Date    MCV 82 01/17/2025     Lab Results   Component Value Date    MCH 28.1 01/17/2025     Lab Results   Component Value Date    MCHC 34.3 01/17/2025     Lab Results   Component Value Date    RDW 13.1 01/17/2025     Lab Results   Component Value Date     01/17/2025       Last Comprehensive Metabolic Panel:  Sodium   Date Value Ref Range Status   02/01/2025 136 135 - 145 mmol/L Final     Potassium   Date Value Ref Range Status   02/01/2025 4.3 3.4 - 5.3 mmol/L Final   07/13/2022 4.2 3.5 - 5.0 mmol/L Final     Chloride   Date Value Ref Range Status   02/01/2025 99 98 - 107 mmol/L Final   07/13/2022 104 98 - 107 mmol/L Final     Carbon Dioxide (CO2)   Date Value Ref Range Status   02/01/2025 34 (H) 22 - 29 mmol/L Final   07/13/2022 22 22 - 31 mmol/L Final     Anion Gap   Date Value Ref Range Status   02/01/2025 3 (L) 7 - 15 mmol/L Final   07/13/2022 8 5 - 18 mmol/L Final     Glucose   Date Value Ref Range Status   07/13/2022 286 (H) 70 - 125 mg/dL Final     GLUCOSE BY METER POCT   Date Value Ref Range Status   02/03/2025 122 (H) 70 - 99 mg/dL Final     Urea Nitrogen   Date Value Ref Range Status   02/01/2025 " "13.5 8.0 - 23.0 mg/dL Final   07/13/2022 17 8 - 22 mg/dL Final     Creatinine   Date Value Ref Range Status   02/01/2025 0.78 0.67 - 1.17 mg/dL Final     GFR Estimate   Date Value Ref Range Status   02/01/2025 >90 >60 mL/min/1.73m2 Final     Comment:     eGFR calculated using 2021 CKD-EPI equation.   01/13/2021 >60 >60 mL/min/1.73m2 Final     GFR, ESTIMATED POCT   Date Value Ref Range Status   11/08/2024 >60 >60 mL/min/1.73m2 Final     Calcium   Date Value Ref Range Status   02/01/2025 9.1 8.8 - 10.4 mg/dL Final       Liver Function Studies -   Recent Labs   Lab Test 01/17/25  0641   PROTTOTAL 5.4*   ALBUMIN 2.2*   BILITOTAL 0.9   ALKPHOS 144   AST 83*   ALT 65       No results found for: \"SED\"    No results found for: \"CRP\"            MICROBIOLOGY DATA:    GPC in blood --> strep int  Gm stain liver pus also heavy GPC, culture same    Susceptibility data from last 90 days.  Collected Specimen Info Organism Cefotaxime Ceftriaxone Clindamycin Meropenem Penicillin Vancomycin   01/16/25 Abscess from Liver Streptococcus intermedius         01/16/25 Abscess from Liver Streptococcus intermedius         01/15/25 Peripheral Blood Streptococcus intermedius  S  S  S  S  S  S   01/15/25 Blood from Line, venous Streptococcus intermedius               "

## 2025-02-03 NOTE — PLAN OF CARE
Problem: Adult Inpatient Plan of Care  Goal: Optimal Comfort and Wellbeing  2/3/2025 0604 by Maddie Casas, RN  Outcome: Progressing  2/2/2025 2253 by Maddie Casas RN  Outcome: Progressing   Goal Outcome Evaluation:    A+Ox4, on RA. Denies pain. Slept comfortably. Francesco drain patent. Up independently. Calls appropriately.     Maddie Casas RN.

## 2025-02-03 NOTE — PLAN OF CARE
Problem: Adult Inpatient Plan of Care  Goal: Optimal Comfort and Wellbeing  Outcome: Progressing     Problem: Pain Acute  Goal: Optimal Pain Control and Function  Outcome: Progressing  Intervention: Prevent or Manage Pain  Recent Flowsheet Documentation  Taken 2/2/2025 1605 by Maddie Casas, RN  Medication Review/Management: medications reviewed   Goal Outcome Evaluation:    Pt is A+O x4, on RA. Denies pain or nausea. DAMI drain patent. Minimal serosanguineous fluid- 3ml.  Eating, drinking and voiding well. Up independently in the room. Able to make his needs known.     Maddie Casas, LIMA.

## 2025-02-03 NOTE — PLAN OF CARE
Occupational Therapy Discharge Summary    Reason for therapy discharge:    All goals and outcomes met, no further needs identified.    Progress towards therapy goal(s). See goals on Care Plan in [facility] electronic health record for goal details.  Goals met    Therapy recommendation(s):    Plans for TCU for IV abx    Latosha Dobson MOT, OTR/L, CLT 2/3/2025 , 10:58 AM      Goal Outcome Evaluation:

## 2025-02-03 NOTE — PROGRESS NOTES
"SPIRITUAL HEALTH SERVICES NOTE  [facility]; P1    Reason for Visit: Follow-up    Patient/Family Understanding of Illness and Goals of Care - Follow-up visit with Bon while he was walking in the hallway. Bon shares that he will have a CT to potentially remove his drain this afternoon. He anticipates discharging to a TCU tomorrow. While Bon is discouraged that this hospitalization has been long, he is able to see progress and understands the need to continue antibiotics. He says \"I don't want to go through this again.\"     Distress and Loss - Bon notes that he has been hospitalized for a total of almost 3 months in the last year.     Strengths, Coping, and Resources - Bon's wife has been sick and has not been able to come visit him. He has enjoyed watching Gnosticist programming on TV.     Meaning, Beliefs, and Spirituality - Bon is hopeful about his recovery and is grateful that he is no longer feeling disoriented. He says that he is hoping to experience more of the Holy Spirit in his life in the months ahead.     Plan of Care: No plans for follow-up at this time due to patient's anticipated discharge today/tomorrow.  available by patient or staff request.     Loida Cervantes M.Div.    Office: [phone] (for non-urgent requests)  Please Vocera or page through [facility] for time-sensitive requests    "

## 2025-02-03 NOTE — PROGRESS NOTES
[facility]    Medicine Progress Note - Hospitalist Service    Date of Admission:  1/15/2025    Assessment & Plan   Bon Luque is a 69 year old male with past medical history significant for CAD, renal cell carcinoma status post nephrectomy 2017, hypertension, diabetes with neuropathy, HLD, hypogonadism on testosterone, psoriasis, and small bowel obstruction who was admitted on 1/15/2025 due to abdominal pain, diarrhea, nausea for 11 days PTA. Found to have strep bacteremia, liver abscess. Anticipate total of 6 weeks of IV ceftriaxone and PO Flagyl     #Sepsis from Strep intermedius bacteremia  #Multiple liver abscesses  Presented with 11 days of nausea, diarrhea, abdominal pain. Had dental work done 1/3/25. CT 1/13 w/ 3 prominent indeterminate complex hypodense masses in the liver.  MRI 1/15: 3 heterogeneous masses in the liver concerning for infection, possible rapidly progressive malignancy/metastases. IR consulted, underwent CT-guided liver drain x 2 1/16. Repeat CT 1/21: Multiloculated abscess, 2 abscess with drains decreased in size, the third abscess remained present. 3rd drain placed into the persistebt abscess on 1/23. Abscessogram 1/28 led to removal of 2 drains due to abscess resolution, 1 drain remains in place  Blood and abscess cultures growing strep intermedius, blood cultures on 1/16 NG  - ID and IR consulted  - Was on  IV vancomycin and Cefepime, now on IV Ceftriaxone and po Flagyl per ID since 01/20, plan for 6 weeks  - PICC line placed 1/18  - Plan to discharge to TCU, spoke with IR and plan for repeat CT on Monday (2/3), then abscessogram on Tuesday with possible drain pull at that time - will be ready for discharge after that, so Tuesday afternoon vs Wednesday     #Hyponatremia, resolved  Has been low-normal on past checks. Here at the low-normal border but stable.  - Monitor     #Acute anemia  Due to our IV fluids, inflammation most likely. Hgb stable.      #Psoriasis  - No obvious flaring lesions, remotely on Skyrizi which has been held recently  - Outpatient follow-up     #Hypophosphatemia   - Monitor and replete as needed     #Remote DVT and PE  - Continue PTA Eliquis      #Chronic pain  PTA  MS Contin, gabapentin, duloxetine, oxycodone  - Pain team consulted     #History of renal cell carcinoma  Remote nephrectomy     #Hypogonadism  - Hold PTA supplement while here     #Type 2 DM with long-term insulin use  - A1c 7.9%  - 50 units of Lantus twice daily at home  - Lantus 35u at bedtime here  - sliding scale insulin plus carb correction - needs a lot off meal time insulin  - Titrate as needed     #Moderate Malnutrition  - Seen by RD         Diet: Moderate Consistent Carb (60 g CHO per Meal) Diet  Snacks/Supplements Adult: Glucerna; With Meals  NPO per Anesthesia Guidelines for Procedure/Surgery Except for: Meds    DVT Prophylaxis: DOAC  Seals Catheter: Not present  Lines: PRESENT      PICC 01/18/25 Single Lumen Left Basilic Antibiotic-Site Assessment: WDL      Cardiac Monitoring: None  Code Status: Full Code      Clinically Significant Risk Factors               # Hypoalbuminemia: Lowest albumin = 2 g/dL at 1/18/2025  6:13 AM, will monitor as appropriate     # Hypertension: Noted on problem list           # DMII: A1C = 7.9 % (Ref range: <5.7 %) within past 6 months   # Overweight: Estimated body mass index is 26.75 kg/m  as calculated from the following:    Height as of this encounter: 1.829 m (6').    Weight as of this encounter: 89.4 kg (197 lb 3.2 oz).   # Moderate Malnutrition: based on nutrition assessment           Social Drivers of Health    Tobacco Use: Medium Risk (1/14/2025)    Patient History     Smoking Tobacco Use: Former     Smokeless Tobacco Use: Never     Passive Exposure: Past   Physical Activity: Insufficiently Active (8/23/2024)    Exercise Vital Sign     Days of Exercise per Week: 1 day     Minutes of Exercise per Session: 20 min   Social  Connections: Unknown (8/23/2024)    Social Connection and Isolation Panel [NHANES]     Frequency of Social Gatherings with Friends and Family: Once a week          Disposition Plan     Medically Ready for Discharge: Anticipated in 2-4 Days           Lior Schuster DO  Hospitalist Service  [facility]  Securely message with [employer] (more info)  Text page via [employer] Paging/Directory   ______________________________________________________________________    Interval History     Physical Exam   Vital Signs: Temp: 97.9  F (36.6  C) Temp src: Oral BP: 121/57 Pulse: 72   Resp: 17 SpO2: 97 % O2 Device: None (Room air)    Weight: 197 lbs 3.2 oz    General Appearance:  No distress noted  Respiratory: Normal effort  Cardiovascular: RRR  GI: Soft abdomen, obese, mild epigastric tenderness, 1 drain on right upper quadrant  Skin: Intact and warm    Medical Decision Making       45 MINUTES SPENT BY ME on the date of service doing chart review, history, exam, documentation & further activities per the note.      Data   Labs and imaging of last 24 hours reviewed

## 2025-02-04 LAB
GLUCOSE BLDC GLUCOMTR-MCNC: 145 MG/DL (ref 70–99)
GLUCOSE BLDC GLUCOMTR-MCNC: 151 MG/DL (ref 70–99)
GLUCOSE BLDC GLUCOMTR-MCNC: 192 MG/DL (ref 70–99)
GLUCOSE BLDC GLUCOMTR-MCNC: 75 MG/DL (ref 70–99)
GLUCOSE BLDC GLUCOMTR-MCNC: 78 MG/DL (ref 70–99)
GLUCOSE BLDC GLUCOMTR-MCNC: 94 MG/DL (ref 70–99)

## 2025-02-04 PROCEDURE — 99232 SBSQ HOSP IP/OBS MODERATE 35: CPT | Performed by: STUDENT IN AN ORGANIZED HEALTH CARE EDUCATION/TRAINING PROGRAM

## 2025-02-04 PROCEDURE — 250N000013 HC RX MED GY IP 250 OP 250 PS 637: Performed by: STUDENT IN AN ORGANIZED HEALTH CARE EDUCATION/TRAINING PROGRAM

## 2025-02-04 PROCEDURE — 250N000011 HC RX IP 250 OP 636: Performed by: INTERNAL MEDICINE

## 2025-02-04 PROCEDURE — 250N000013 HC RX MED GY IP 250 OP 250 PS 637: Performed by: NURSE PRACTITIONER

## 2025-02-04 PROCEDURE — 99232 SBSQ HOSP IP/OBS MODERATE 35: CPT | Performed by: INTERNAL MEDICINE

## 2025-02-04 PROCEDURE — 250N000013 HC RX MED GY IP 250 OP 250 PS 637: Performed by: INTERNAL MEDICINE

## 2025-02-04 PROCEDURE — 120N000001 HC R&B MED SURG/OB

## 2025-02-04 RX ADMIN — GABAPENTIN 600 MG: 300 CAPSULE ORAL at 14:01

## 2025-02-04 RX ADMIN — PANTOPRAZOLE SODIUM 40 MG: 40 TABLET, DELAYED RELEASE ORAL at 08:48

## 2025-02-04 RX ADMIN — METRONIDAZOLE 500 MG: 500 TABLET ORAL at 08:48

## 2025-02-04 RX ADMIN — GABAPENTIN 600 MG: 300 CAPSULE ORAL at 20:56

## 2025-02-04 RX ADMIN — MORPHINE SULFATE 15 MG: 15 TABLET, FILM COATED, EXTENDED RELEASE ORAL at 14:00

## 2025-02-04 RX ADMIN — MORPHINE SULFATE 15 MG: 15 TABLET, FILM COATED, EXTENDED RELEASE ORAL at 08:48

## 2025-02-04 RX ADMIN — DULOXETINE HYDROCHLORIDE 60 MG: 60 CAPSULE, DELAYED RELEASE ORAL at 08:48

## 2025-02-04 RX ADMIN — METRONIDAZOLE 500 MG: 500 TABLET ORAL at 20:56

## 2025-02-04 RX ADMIN — CEFTRIAXONE SODIUM 2 G: 2 INJECTION, POWDER, FOR SOLUTION INTRAMUSCULAR; INTRAVENOUS at 08:49

## 2025-02-04 RX ADMIN — PANTOPRAZOLE SODIUM 40 MG: 40 TABLET, DELAYED RELEASE ORAL at 16:41

## 2025-02-04 RX ADMIN — OXYCODONE HYDROCHLORIDE 5 MG: 5 TABLET ORAL at 02:50

## 2025-02-04 RX ADMIN — INSULIN ASPART 18 UNITS: 100 INJECTION, SOLUTION INTRAVENOUS; SUBCUTANEOUS at 14:33

## 2025-02-04 RX ADMIN — METRONIDAZOLE 500 MG: 500 TABLET ORAL at 14:00

## 2025-02-04 RX ADMIN — APIXABAN 5 MG: 5 TABLET, FILM COATED ORAL at 08:48

## 2025-02-04 RX ADMIN — APIXABAN 5 MG: 5 TABLET, FILM COATED ORAL at 20:56

## 2025-02-04 RX ADMIN — MORPHINE SULFATE 15 MG: 15 TABLET, FILM COATED, EXTENDED RELEASE ORAL at 20:56

## 2025-02-04 RX ADMIN — GABAPENTIN 600 MG: 300 CAPSULE ORAL at 08:48

## 2025-02-04 RX ADMIN — INSULIN ASPART 12 UNITS: 100 INJECTION, SOLUTION INTRAVENOUS; SUBCUTANEOUS at 18:08

## 2025-02-04 ASSESSMENT — ACTIVITIES OF DAILY LIVING (ADL)
ADLS_ACUITY_SCORE: 60
ADLS_ACUITY_SCORE: 57
ADLS_ACUITY_SCORE: 60
ADLS_ACUITY_SCORE: 57
ADLS_ACUITY_SCORE: 57
ADLS_ACUITY_SCORE: 60
ADLS_ACUITY_SCORE: 57
ADLS_ACUITY_SCORE: 60
ADLS_ACUITY_SCORE: 57
ADLS_ACUITY_SCORE: 57
ADLS_ACUITY_SCORE: 60
ADLS_ACUITY_SCORE: 57

## 2025-02-04 NOTE — PROGRESS NOTES
Interventional Radiology - Progress Note  Inpatient - [facility]  02/04/2025     S:  Resting comfortably in bed. Denies pain. No issues with his drain; reports minimal output and no leakage. Looking forward to discharging home soon.       O:  /67 (BP Location: Right arm)   Pulse 72   Temp 97.6  F (36.4  C) (Oral)   Resp 16   Ht 1.829 m (6')   Wt 89.4 kg (197 lb 3.2 oz)   SpO2 97%   BMI 26.75 kg/m    General: Stable. In no acute distress.    Neuro: Alert and oriented x 3. No focal deficits.  Psych: Appropriate mood and affect. Linear/coherent thought process.   Resp: Normal respirations. Lungs clear to auscultation bilaterally.  Cardio: S1S2, regular rate and rhythm, without murmur, clicks or rubs  Abdomen: Soft, non-distended, non-tender. Left side abdominal drain removed; new dressing applied.   Skin:  No erythema, ecchymosis at drain insertion site.        IMAGING:  EXAM: CT ABDOMEN W CONTRAST  LOCATION: [facility]  DATE: 2/3/2025     INDICATION: Right hepatic lobe fluid collection s p drainage catheter placement. Evaluate cavity.  COMPARISON: CT abdomen and pelvis 1/28/2025.  TECHNIQUE: CT scan of the abdomen was performed following injection of IV contrast. Multiplanar reformats were obtained. Dose reduction techniques were used.  CONTRAST: isovue 370 90ml     FINDINGS:    LOWER CHEST: Mild bibasilar atelectasis.     HEPATOBILIARY: A single left hepatic percutaneous hepatic drainage catheter remains in position. There is no residual abscess cavity at the site of the catheter pigtail. A tiny residual 11 x 3 mm subcapsular focus of fluid along the anterior margin of   hepatic segment IV inferior to the catheter has decreased from 1/28/2025 when it measured up to 3.3 x 0.5 cm (image 48 of series 3). There is an 11 mm residual hypodense focus in the lateral segment of the left hepatic lobe at the site of prior catheter   drainage which is decreased  in size from 2.5 x 1.7 cm on 1/28/2025 (image 39 of series 3). There is a tiny 8mm residual hypodense focus in hepatic segment VIII at the site of prior catheter drainage which was not well-seen on the prior study (image 40 of   series 3). A pocket of subcapsular fluid along the inferior aspect of the left hepatic lobe near the falciform ligament separate from the catheter has decreased in size to 3.3 x 1.8 cm from 6.2 x 5.3 cm on 1/28/2025 (image 57 of series 3). Patient is   post cholecystectomy. Mild extrahepatic biliary dilatation with the common duct measuring up to 14 mm is stable and compatible with reservoir effect after cholecystectomy.     PANCREAS: Stable mild dilatation of the main pancreatic duct in the pancreatic head up to 6 mm. Pancreas otherwise normal.     SPLEEN: Normal.     ADRENAL GLANDS: Normal.     KIDNEYS: Chronic cortical scarring of the right kidney. Tiny probable benign cyst of the left kidney not warranting follow-up.     BOWEL: Small periampullary duodenal diverticula.     LYMPH NODES: Normal.     VASCULATURE: Moderate aortoiliac atherosclerosis. No aneurysm in the field-of-view. Coronary artery calcification incompletely imaged.     MUSCULOSKELETAL: Degenerative changes of the spine.                                                                      IMPRESSION:   1.  A single left hepatic percutaneous hepatic drainage catheter remains in position. There is no residual abscess cavity at the site of the catheter pigtail.   2.  A tiny residual 11 x 3 mm subcapsular focus of fluid along the anterior margin of hepatic segment IV inferior to the catheter has decreased from 1/28/2025 when it measured up to 3.3 x 0.5 cm.  3.  An 11 mm residual focus of fluid in the lateral segment of the left hepatic lobe at the site of prior catheter drainage has decreased in size from 2.5 x 1.7 cm on 1/28/2025.  4.  A tiny 8mm residual focus of fluid in hepatic segment VIII at the site of prior catheter  drainage was not well-seen on the prior study.  5.  A pocket of subcapsular fluid along the inferior aspect of the left hepatic lobe near the falciform ligament also separate from the catheter has decreased in size to 3.3 x 1.8 cm from 6.2 x 5.3 cm on 1/28/2025.    LABS:  Recent Labs   Lab 02/01/25  0536 01/30/25  0639 01/30/25  0638 01/29/25  0559   WBC 7.5 9.6  --  11.7*   HGB 10.8* 11.2*  --  12.5*    366  --  471*   CR 0.78  --  0.63* 0.73   BILITOTAL  --   --  0.2  --    ALKPHOS  --   --  90  --    AST  --   --  18  --    ALT  --   --  7  --        Culture: positive for Streptococcus intermedius.   Flushing: NS 10ml Qshift  ANTIBIOTICS: IV rocephin Q24H, PO flagyl     Drain Outputs (in mL):  1/31 12.5   2/1 13   2/2 9   2/3 18   2/4 scant       A:  69 year old male ith a PMH of PE/DVT, CAD s/p stenting, DM II, HTN, HLD, ischemic cardiomyopathy, MARÍA ELENA, RCC s/p partial nephrectomy, and tobacco abuse who was admitted to [facility] on 01/15/2025; found to have liver abscess. 1/16/25 s/p CT guided drain placement into liver x2. Follow up CT demonstrated an increase in a liver abscess; 1/23/25 s/p CT guided placement. Medial superior 10F and medial right 10F drains removed. RUQ 12F drain remains. Follow up CT demonstrated resolved abscess around drain.     P:    Case and imaging reviewed with IR MD Dr. Coombs. Abscess resolved; no sinogram needed; abscessogram canceled.   Abscess drain removed without difficulty; patient tolerated well.       Total time spent on the date of the encounter: 35 minutes.    CRISTINA Vila CNP  Interventional Radiology  [phone]

## 2025-02-04 NOTE — PLAN OF CARE
"[facility] - P1    RN Progress Note:            Pertinent Assessments:      Please refer to flowsheet rows for full assessment     A&OX4, all VSS. Independent in the room. DAMI drain removed by NP.          Key Events - This Shift:     -DAMI drain removed          Barriers to Discharge/Downgrade     -none, plan to discharge 2/5     Goal Outcome Evaluation:      Plan of Care Reviewed With: patient    Overall Patient Progress: improvingOverall Patient Progress: improving    Outcome Evaluation: Patient VSS. DAMI drain removed.      Problem: Adult Inpatient Plan of Care  Goal: Plan of Care Review  Description: The Plan of Care Review/Shift note should be completed every shift.  The Outcome Evaluation is a brief statement about your assessment that the patient is improving, declining, or no change.  This information will be displayed automatically on your shift  note.  Outcome: Progressing  Flowsheets (Taken 2/4/2025 7573)  Outcome Evaluation: Patient VSS. DAMI drain removed.  Plan of Care Reviewed With: patient  Overall Patient Progress: improving  Goal: Patient-Specific Goal (Individualized)  Description: You can add care plan individualizations to a care plan. Examples of Individualization might be:  \"Parent requests to be called daily at 9am for status\", \"I have a hard time hearing out of my right ear\", or \"Do not touch me to wake me up as it startles  me\".  Outcome: Progressing  Goal: Absence of Hospital-Acquired Illness or Injury  Outcome: Progressing  Intervention: Identify and Manage Fall Risk  Recent Flowsheet Documentation  Taken 2/4/2025 0848 by Erin Beasley, RN  Safety Promotion/Fall Prevention:   clutter free environment maintained   nonskid shoes/slippers when out of bed   patient and family education   safety round/check completed  Intervention: Prevent Skin Injury  Recent Flowsheet Documentation  Taken 2/4/2025 0848 by Erin Beasley, RN  Body Position: position changed " independently  Intervention: Prevent and Manage VTE (Venous Thromboembolism) Risk  Recent Flowsheet Documentation  Taken 2/4/2025 0848 by Erin Beasley RN  VTE Prevention/Management: SCDs off (sequential compression devices)  Goal: Optimal Comfort and Wellbeing  Outcome: Progressing  Goal: Readiness for Transition of Care  Outcome: Progressing     Problem: Comorbidity Management  Goal: Blood Glucose Levels Within Targeted Range  Outcome: Progressing  Intervention: Monitor and Manage Glycemia  Recent Flowsheet Documentation  Taken 2/4/2025 0848 by Erin Beasley RN  Medication Review/Management: medications reviewed     Problem: Infection  Goal: Absence of Infection Signs and Symptoms  Outcome: Progressing     Problem: Pain Acute  Goal: Optimal Pain Control and Function  Outcome: Progressing  Intervention: Prevent or Manage Pain  Recent Flowsheet Documentation  Taken 2/4/2025 0848 by Erin Beasley RN  Medication Review/Management: medications reviewed

## 2025-02-04 NOTE — PROGRESS NOTES
"[facility] Progress Note    02/04/2025      ASSESSMENT:  Liver abscess, strep intermedius.  Drained.   CT feb 3 with improvement in all infected sites, reviewed.  On biologic for skin  Solitary kidney    RECOMMENDATION:  Continue ceftriaxone and po metronidazole   I think with this degree of improvement on CT scan, we switch to ORAL augmentin at Discharge and give 21 days by mouth.  Would not need PICC nor TCU      SILVIA Bui MD  [facility]  Answering Service: [phone]  On-Call ID provider: [phone], option: 9      SUBJECTIVE:    Pt is known to me.  I did the initial consult.  CT improved      OBJECTIVE:  /67 (BP Location: Right arm)   Pulse 72   Temp 97.6  F (36.4  C) (Oral)   Resp 16   Ht 1.829 m (6')   Wt 89.4 kg (197 lb 3.2 oz)   SpO2 97%   BMI 26.75 kg/m        PHYSICAL EXAM:  Gen: resting  Vitals tabulated above, reviewed  Sclera normal color, not injected  CARDIOVASCULAR regular rate and rhythm, no murmur  Lungs CLEAR TO AUSCULTATION   Abdomen soft, ND; 1 drain with creamy output: Tenderness at insertion site, midabdomen  Skin normal  Joints normal  Neurologic exam non focal    PICC, left arm     Antibiotics:  Ceftriaxone 1/20-  Metronidazole     Pertinent labs:    Recent Labs   Lab Test 02/01/25  0536 01/30/25  0639 01/29/25  0559   WBC 7.5 9.6 11.7*   HGB 10.8* 11.2* 12.5*   HCT 32.9* 35.3* 38.9*   MCV 86 87 85    366 471*       Lab Results   Component Value Date    RBC 3.74 01/17/2025     Lab Results   Component Value Date    HGB 10.5 01/17/2025     Lab Results   Component Value Date    HCT 30.6 01/17/2025     No components found for: \"MCT\"  Lab Results   Component Value Date    MCV 82 01/17/2025     Lab Results   Component Value Date    MCH 28.1 01/17/2025     Lab Results   Component Value Date    MCHC 34.3 01/17/2025     Lab Results   Component Value Date    RDW 13.1 01/17/2025     Lab Results   Component Value Date     " "01/17/2025       Last Comprehensive Metabolic Panel:  Sodium   Date Value Ref Range Status   02/01/2025 136 135 - 145 mmol/L Final     Potassium   Date Value Ref Range Status   02/01/2025 4.3 3.4 - 5.3 mmol/L Final   07/13/2022 4.2 3.5 - 5.0 mmol/L Final     Chloride   Date Value Ref Range Status   02/01/2025 99 98 - 107 mmol/L Final   07/13/2022 104 98 - 107 mmol/L Final     Carbon Dioxide (CO2)   Date Value Ref Range Status   02/01/2025 34 (H) 22 - 29 mmol/L Final   07/13/2022 22 22 - 31 mmol/L Final     Anion Gap   Date Value Ref Range Status   02/01/2025 3 (L) 7 - 15 mmol/L Final   07/13/2022 8 5 - 18 mmol/L Final     Glucose   Date Value Ref Range Status   07/13/2022 286 (H) 70 - 125 mg/dL Final     GLUCOSE BY METER POCT   Date Value Ref Range Status   02/04/2025 78 70 - 99 mg/dL Final     Urea Nitrogen   Date Value Ref Range Status   02/01/2025 13.5 8.0 - 23.0 mg/dL Final   07/13/2022 17 8 - 22 mg/dL Final     Creatinine   Date Value Ref Range Status   02/01/2025 0.78 0.67 - 1.17 mg/dL Final     GFR Estimate   Date Value Ref Range Status   02/01/2025 >90 >60 mL/min/1.73m2 Final     Comment:     eGFR calculated using 2021 CKD-EPI equation.   01/13/2021 >60 >60 mL/min/1.73m2 Final     GFR, ESTIMATED POCT   Date Value Ref Range Status   11/08/2024 >60 >60 mL/min/1.73m2 Final     Calcium   Date Value Ref Range Status   02/01/2025 9.1 8.8 - 10.4 mg/dL Final       Liver Function Studies -   Recent Labs   Lab Test 01/17/25  0641   PROTTOTAL 5.4*   ALBUMIN 2.2*   BILITOTAL 0.9   ALKPHOS 144   AST 83*   ALT 65       No results found for: \"SED\"    No results found for: \"CRP\"                                                    IMPRESSION:   1.  A single left hepatic percutaneous hepatic drainage catheter remains in position. There is no residual abscess cavity at the site of the catheter pigtail.   2.  A tiny residual 11 x 3 mm subcapsular focus of fluid along the anterior margin of hepatic segment IV inferior to the " catheter has decreased from 1/28/2025 when it measured up to 3.3 x 0.5 cm.  3.  An 11 mm residual focus of fluid in the lateral segment of the left hepatic lobe at the site of prior catheter drainage has decreased in size from 2.5 x 1.7 cm on 1/28/2025.  4.  A tiny 8mm residual focus of fluid in hepatic segment VIII at the site of prior catheter drainage was not well-seen on the prior study.  5.  A pocket of subcapsular fluid along the inferior aspect of the left hepatic lobe near the falciform ligament also separate from the catheter has decreased in size to 3.3 x 1.8 cm from 6.2 x 5.3 cm on 1/28/2025.      MICROBIOLOGY DATA:    GPC in blood --> strep int  Gm stain liver pus also heavy GPC, culture same    Susceptibility data from last 90 days.  Collected Specimen Info Organism Cefotaxime Ceftriaxone Clindamycin Meropenem Penicillin Vancomycin   01/16/25 Abscess from Liver Streptococcus intermedius         01/16/25 Abscess from Liver Streptococcus intermedius         01/15/25 Peripheral Blood Streptococcus intermedius  S  S  S  S  S  S   01/15/25 Blood from Line, venous Streptococcus intermedius

## 2025-02-04 NOTE — PLAN OF CARE
Goal Outcome Evaluation:         Problem: Adult Inpatient Plan of Care  Goal: Optimal Comfort and Wellbeing  Outcome: Progressing  Intervention: Monitor Pain and Promote Comfort  Recent Flowsheet Documentation  Taken 2/3/2025 2100 by Shannan Bolanos RN  Pain Management Interventions:   declines   emotional support  Taken 2/3/2025 1700 by Shannan Bolanos RN  Pain Management Interventions:   declines   emotional support     Problem: Comorbidity Management  Goal: Blood Glucose Levels Within Targeted Range  Outcome: Progressing  Intervention: Monitor and Manage Glycemia  Recent Flowsheet Documentation  Taken 2/3/2025 1700 by Shannan Bolanos RN  Medication Review/Management: medications reviewed     Problem: Infection  Goal: Absence of Infection Signs and Symptoms  Outcome: Progressing         Pt is alert and oriented X4, pleasant and cooperative, rating right sided abdominal pain #2, declines PRN pain medication, Pt taking scheduled MS Contin for chronic neuropathy pain in bilateral feet.  DAMI patent draining serous output, 5 ml.  Pt ambulating in room independently, gait steady.  Blood glucose 225 and 255.  CT abdomen done this evening.  NPO after midnight for abscessogram with possible drain removal tomorrow.      Shannan Bolanos RN

## 2025-02-04 NOTE — PROGRESS NOTES
Care Management Follow Up    Length of Stay (days): 20    Expected Discharge Date: 02/05/2025    Anticipated Discharge Plan:  Transitional Care ([facility])    Transportation: Anticipate Family/friend    PT Recommendations: home with assist  OT Recommendations:  home with assist, home with home care occupational therapy     Barriers to Discharge: medical stability    Prior Living Situation: house with spouse    Discussed  Partnership in Safe Discharge Planning  document with patient/family: No     Handoff Completed: No, handoff not indicated or clinically appropriate    Patient/Spokesperson Updated: No    Additional Information: Chart reviewed. [facility] was [facility] and they have available bed for him on 2/6/25. ANGELA updated care team and the patient regarding discharge plan.     Discharge Plan:   Destination: [facility]   Transportation: Family at 3:00 PM on 2/6/25  PAS completed     Next Steps: ANGELA will continue to follow.     IBRAHIMA Benavides    Addendum 3:12 PM  The patient is cleared for discharge home and no longer needs TCU placement. The SW canceled the discharge planned for 2/6/25.    IBRAHIMA Benavides

## 2025-02-04 NOTE — PROGRESS NOTES
[facility]    Medicine Progress Note - Hospitalist Service    Date of Admission:  1/15/2025    Assessment & Plan   Bon Luque is a 69 year old male with past medical history significant for CAD, renal cell carcinoma status post nephrectomy 2017, hypertension, diabetes with neuropathy, HLD, hypogonadism on testosterone, psoriasis, and small bowel obstruction who was admitted on 1/15/2025 due to abdominal pain, diarrhea, nausea for 11 days PTA. Found to have strep bacteremia, liver abscess. Anticipate total of 6 weeks of IV ceftriaxone and PO Flagyl     #Sepsis from Strep intermedius bacteremia  #Multiple liver abscesses (resolved)  Presented with 11 days of nausea, diarrhea, abdominal pain. Had dental work done 1/3/25. CT 1/13 w/ 3 prominent indeterminate complex hypodense masses in the liver.  MRI 1/15: 3 heterogeneous masses in the liver concerning for infection, possible rapidly progressive malignancy/metastases. IR consulted, underwent CT-guided liver drain x 2 1/16. Repeat CT 1/21: Multiloculated abscess, 2 abscess with drains decreased in size, the third abscess remained present. 3rd drain placed into the persistebt abscess on 1/23. Abscessogram 1/28 led to removal of 2 drains due to abscess resolution, 1 drain remains in place  Blood and abscess cultures growing strep intermedius, blood cultures on 1/16 NG  - ID and IR consulted  - Was on  IV vancomycin and Cefepime, now on IV Ceftriaxone and po Flagyl per ID since 01/20, will switch to oral Augmentin for 21 days at time of discharged  - PICC line placed 1/18, can be removed     #Hyponatremia, resolved  Has been low-normal on past checks. Here at the low-normal border but stable.  - Monitor     #Acute anemia  Due to our IV fluids, inflammation most likely. Hgb stable.     #Psoriasis  - No obvious flaring lesions, remotely on Skyrizi which has been held recently  - Outpatient follow-up     #Hypophosphatemia   - Monitor and replete as  needed     #Remote DVT and PE  - Continue PTA Eliquis      #Chronic pain  PTA  MS Contin, gabapentin, duloxetine, oxycodone  - Pain team consulted     #History of renal cell carcinoma  Remote nephrectomy     #Hypogonadism  - Hold PTA supplement while here     #Type 2 DM with long-term insulin use  - A1c 7.9%  - 50 units of Lantus twice daily at home  - Lantus switch to BID  - sliding scale insulin plus carb correction - needs a lot off meal time insulin  - Titrate as needed     #Moderate Malnutrition  - Seen by RD         Diet: Snacks/Supplements Adult: Glucerna; With Meals  Moderate Consistent Carb (60 g CHO per Meal) Diet    DVT Prophylaxis: DOAC  Seals Catheter: Not present  Lines: PRESENT      PICC 01/18/25 Single Lumen Left Basilic Antibiotic-Site Assessment: WDL      Cardiac Monitoring: None  Code Status: Full Code      Clinically Significant Risk Factors               # Hypoalbuminemia: Lowest albumin = 2 g/dL at 1/18/2025  6:13 AM, will monitor as appropriate     # Hypertension: Noted on problem list           # DMII: A1C = 7.9 % (Ref range: <5.7 %) within past 6 months   # Overweight: Estimated body mass index is 26.75 kg/m  as calculated from the following:    Height as of this encounter: 1.829 m (6').    Weight as of this encounter: 89.4 kg (197 lb 3.2 oz).   # Moderate Malnutrition: based on nutrition assessment           Social Drivers of Health    Tobacco Use: Medium Risk (1/14/2025)    Patient History     Smoking Tobacco Use: Former     Smokeless Tobacco Use: Never     Passive Exposure: Past   Physical Activity: Insufficiently Active (8/23/2024)    Exercise Vital Sign     Days of Exercise per Week: 1 day     Minutes of Exercise per Session: 20 min   Social Connections: Unknown (8/23/2024)    Social Connection and Isolation Panel [NHANES]     Frequency of Social Gatherings with Friends and Family: Once a week          Disposition Plan     Medically Ready for Discharge: Anticipated Tomorrow to home        Lior Schuster DO  Hospitalist Service  [facility]  Securely message with Socialeyes App (more info)  Text page via [employer] Paging/Directory   ______________________________________________________________________    Interval History     Physical Exam   Vital Signs: Temp: 97.6  F (36.4  C) Temp src: Oral BP: 124/67 Pulse: 72   Resp: 16 SpO2: 97 % O2 Device: None (Room air)    Weight: 197 lbs 3.2 oz    General Appearance:  No distress noted  Respiratory: Normal effort  Cardiovascular: RRR  GI: Soft abdomen, obese, non-tender  Skin: Intact and warm    Medical Decision Making       46 MINUTES SPENT BY ME on the date of service doing chart review, history, exam, documentation & further activities per the note.      Data   Labs and imaging of last 24 hours reviewed

## 2025-02-04 NOTE — PLAN OF CARE
Goal Outcome Evaluation:      Plan of Care Reviewed With: patient    Overall Patient Progress: improving       Pt is A & O x4.   Pain  6/10 managing with Oxycodone   Lines PICC right single lumen accessed/ flushed and is patent    Respirations even and unlabored.   No distress noted on assessments.  Saturation at 98 %. Room air    Denies SOB/ difficult of breathing/ chest pain  DAMI Tubes patent and color serous scant drainage   Skin intact. Repositioned independent   Plan discharge Tues afternoon or Wed after abscessogram    Labs protocol none   Care explained. Slept between cares  Call light within reach   Plan of care on going   /58 (BP Location: Right arm)   Pulse 67   Temp 98.3  F (36.8  C) (Oral)   Resp 17   Ht 1.829 m (6')   Wt 89.4 kg (197 lb 3.2 oz)   SpO2 98%   BMI 26.75 kg/m      Isabelle Yen RN on 2/4/2025 at 5:50 AM          Problem: Adult Inpatient Plan of Care  Goal: Absence of Hospital-Acquired Illness or Injury  Intervention: Prevent Skin Injury  Recent Flowsheet Documentation  Taken 2/4/2025 0141 by Isabelle Yen RN  Body Position: position changed independently     Problem: Adult Inpatient Plan of Care  Goal: Absence of Hospital-Acquired Illness or Injury  Intervention: Prevent Infection  Recent Flowsheet Documentation  Taken 2/4/2025 0141 by Isabelle Yen RN  Infection Prevention: hand hygiene promoted     Problem: Comorbidity Management  Goal: Blood Glucose Levels Within Targeted Range  Outcome: Progressing  Intervention: Monitor and Manage Glycemia  Recent Flowsheet Documentation  Taken 2/4/2025 0141 by Isabelle Yen RN  Medication Review/Management: medications reviewed     Problem: Pain Acute  Goal: Optimal Pain Control and Function  Outcome: Progressing  Intervention: Prevent or Manage Pain  Recent Flowsheet Documentation  Taken 2/4/2025 0141 by Isabelle Yen RN  Medication Review/Management: medications reviewed

## 2025-02-05 VITALS
HEART RATE: 79 BPM | SYSTOLIC BLOOD PRESSURE: 135 MMHG | DIASTOLIC BLOOD PRESSURE: 70 MMHG | RESPIRATION RATE: 20 BRPM | WEIGHT: 197.2 LBS | TEMPERATURE: 97.7 F | OXYGEN SATURATION: 94 % | BODY MASS INDEX: 26.71 KG/M2 | HEIGHT: 72 IN

## 2025-02-05 LAB
ALBUMIN SERPL BCG-MCNC: 3.1 G/DL (ref 3.5–5.2)
ALP SERPL-CCNC: 118 U/L (ref 40–150)
ALT SERPL W P-5'-P-CCNC: 14 U/L (ref 0–70)
ANION GAP SERPL CALCULATED.3IONS-SCNC: 4 MMOL/L (ref 7–15)
AST SERPL W P-5'-P-CCNC: 30 U/L (ref 0–45)
BILIRUB SERPL-MCNC: 0.3 MG/DL
BUN SERPL-MCNC: 10.8 MG/DL (ref 8–23)
CALCIUM SERPL-MCNC: 9.7 MG/DL (ref 8.8–10.4)
CHLORIDE SERPL-SCNC: 101 MMOL/L (ref 98–107)
CREAT SERPL-MCNC: 0.8 MG/DL (ref 0.67–1.17)
EGFRCR SERPLBLD CKD-EPI 2021: >90 ML/MIN/1.73M2
ERYTHROCYTE [DISTWIDTH] IN BLOOD BY AUTOMATED COUNT: 13.9 % (ref 10–15)
GLUCOSE BLDC GLUCOMTR-MCNC: 72 MG/DL (ref 70–99)
GLUCOSE BLDC GLUCOMTR-MCNC: 95 MG/DL (ref 70–99)
GLUCOSE SERPL-MCNC: 89 MG/DL (ref 70–99)
HCO3 SERPL-SCNC: 33 MMOL/L (ref 22–29)
HCT VFR BLD AUTO: 37.2 % (ref 40–53)
HGB BLD-MCNC: 12.3 G/DL (ref 13.3–17.7)
MCH RBC QN AUTO: 28.2 PG (ref 26.5–33)
MCHC RBC AUTO-ENTMCNC: 33.1 G/DL (ref 31.5–36.5)
MCV RBC AUTO: 85 FL (ref 78–100)
PLATELET # BLD AUTO: 303 10E3/UL (ref 150–450)
POTASSIUM SERPL-SCNC: 4.4 MMOL/L (ref 3.4–5.3)
PROT SERPL-MCNC: 8.3 G/DL (ref 6.4–8.3)
RBC # BLD AUTO: 4.36 10E6/UL (ref 4.4–5.9)
SODIUM SERPL-SCNC: 138 MMOL/L (ref 135–145)
WBC # BLD AUTO: 8.6 10E3/UL (ref 4–11)

## 2025-02-05 PROCEDURE — 250N000013 HC RX MED GY IP 250 OP 250 PS 637: Performed by: STUDENT IN AN ORGANIZED HEALTH CARE EDUCATION/TRAINING PROGRAM

## 2025-02-05 PROCEDURE — 250N000013 HC RX MED GY IP 250 OP 250 PS 637: Performed by: NURSE PRACTITIONER

## 2025-02-05 PROCEDURE — 80053 COMPREHEN METABOLIC PANEL: CPT | Performed by: STUDENT IN AN ORGANIZED HEALTH CARE EDUCATION/TRAINING PROGRAM

## 2025-02-05 PROCEDURE — 82040 ASSAY OF SERUM ALBUMIN: CPT | Performed by: STUDENT IN AN ORGANIZED HEALTH CARE EDUCATION/TRAINING PROGRAM

## 2025-02-05 PROCEDURE — 250N000013 HC RX MED GY IP 250 OP 250 PS 637: Performed by: INTERNAL MEDICINE

## 2025-02-05 PROCEDURE — 85041 AUTOMATED RBC COUNT: CPT | Performed by: STUDENT IN AN ORGANIZED HEALTH CARE EDUCATION/TRAINING PROGRAM

## 2025-02-05 PROCEDURE — 99239 HOSP IP/OBS DSCHRG MGMT >30: CPT | Performed by: STUDENT IN AN ORGANIZED HEALTH CARE EDUCATION/TRAINING PROGRAM

## 2025-02-05 PROCEDURE — 85018 HEMOGLOBIN: CPT | Performed by: STUDENT IN AN ORGANIZED HEALTH CARE EDUCATION/TRAINING PROGRAM

## 2025-02-05 PROCEDURE — 80051 ELECTROLYTE PANEL: CPT | Performed by: STUDENT IN AN ORGANIZED HEALTH CARE EDUCATION/TRAINING PROGRAM

## 2025-02-05 PROCEDURE — 99207 PR NO CHARGE LOS: CPT | Performed by: INTERNAL MEDICINE

## 2025-02-05 PROCEDURE — 250N000011 HC RX IP 250 OP 636: Performed by: INTERNAL MEDICINE

## 2025-02-05 RX ORDER — MORPHINE SULFATE 15 MG/1
15 TABLET, FILM COATED, EXTENDED RELEASE ORAL 3 TIMES DAILY
Status: SHIPPED
Start: 2025-02-05

## 2025-02-05 RX ORDER — OXYCODONE HYDROCHLORIDE 5 MG/1
5 TABLET ORAL EVERY 4 HOURS
Qty: 24 TABLET | Refills: 0 | Status: SHIPPED | OUTPATIENT
Start: 2025-02-05 | End: 2025-02-09

## 2025-02-05 RX ORDER — CEFDINIR 300 MG/1
300 CAPSULE ORAL 2 TIMES DAILY
Qty: 42 CAPSULE | Refills: 0 | Status: SHIPPED | OUTPATIENT
Start: 2025-02-05 | End: 2025-02-26

## 2025-02-05 RX ORDER — LIDOCAINE 4 G/G
3 PATCH TOPICAL EVERY 24 HOURS
Qty: 15 PATCH | Refills: 0 | Status: SHIPPED | OUTPATIENT
Start: 2025-02-05

## 2025-02-05 RX ORDER — INSULIN GLARGINE 100 [IU]/ML
15 INJECTION, SOLUTION SUBCUTANEOUS 2 TIMES DAILY
Qty: 15 ML | Refills: 0 | Status: SHIPPED | OUTPATIENT
Start: 2025-02-05

## 2025-02-05 RX ADMIN — MORPHINE SULFATE 15 MG: 15 TABLET, FILM COATED, EXTENDED RELEASE ORAL at 09:45

## 2025-02-05 RX ADMIN — POLYETHYLENE GLYCOL 3350 17 G: 17 POWDER, FOR SOLUTION ORAL at 09:44

## 2025-02-05 RX ADMIN — METRONIDAZOLE 500 MG: 500 TABLET ORAL at 09:45

## 2025-02-05 RX ADMIN — DULOXETINE HYDROCHLORIDE 60 MG: 60 CAPSULE, DELAYED RELEASE ORAL at 09:45

## 2025-02-05 RX ADMIN — PANTOPRAZOLE SODIUM 40 MG: 40 TABLET, DELAYED RELEASE ORAL at 06:30

## 2025-02-05 RX ADMIN — GABAPENTIN 600 MG: 300 CAPSULE ORAL at 09:44

## 2025-02-05 RX ADMIN — APIXABAN 5 MG: 5 TABLET, FILM COATED ORAL at 09:44

## 2025-02-05 RX ADMIN — INSULIN ASPART 17 UNITS: 100 INJECTION, SOLUTION INTRAVENOUS; SUBCUTANEOUS at 10:48

## 2025-02-05 RX ADMIN — CEFTRIAXONE SODIUM 2 G: 2 INJECTION, POWDER, FOR SOLUTION INTRAMUSCULAR; INTRAVENOUS at 09:42

## 2025-02-05 ASSESSMENT — ACTIVITIES OF DAILY LIVING (ADL)
ADLS_ACUITY_SCORE: 57

## 2025-02-05 NOTE — PLAN OF CARE
Problem: Comorbidity Management  Goal: Blood Glucose Levels Within Targeted Range  Outcome: Progressing  Intervention: Monitor and Manage Glycemia  Recent Flowsheet Documentation  Taken 2/4/2025 2056 by Tess Valentine RN  Medication Review/Management: medications reviewed     Problem: Infection  Goal: Absence of Infection Signs and Symptoms  Outcome: Progressing     Problem: Pain Acute  Goal: Optimal Pain Control and Function  Outcome: Progressing  Intervention: Prevent or Manage Pain  Recent Flowsheet Documentation  Taken 2/4/2025 2056 by Tess Valentine RN  Medication Review/Management: medications reviewed   Goal Outcome Evaluation:  Pt is A0x4, denies pain, VSS, Independent w/ activities. Gauze C/D/I. Discharge planned for tomorrow.

## 2025-02-05 NOTE — DISCHARGE SUMMARY
[facility]  Hospitalist Discharge Summary      Date of Admission:  1/15/2025  Date of Discharge:  2/5/2025  Discharging Provider: Lior Schuster DO  Discharge Service: Hospitalist Service    Discharge Diagnoses   ***    Clinically Significant Risk Factors     # DMII: A1C = 7.9 % (Ref range: <5.7 %) within past 6 months  # Overweight: Estimated body mass index is 26.75 kg/m  as calculated from the following:    Height as of this encounter: 1.829 m (6').    Weight as of this encounter: 89.4 kg (197 lb 3.2 oz).  # Moderate Malnutrition: based on nutrition assessment      Follow-ups Needed After Discharge   Follow-up Appointments       Hospital Follow-up with Existing Primary Care Provider (PCP)      Please see details below         Schedule Primary Care visit within: 7 Days             Unresulted Labs Ordered in the Past 30 Days of this Admission       No orders found from 12/16/2024 to 1/16/2025.          Discharge Disposition   Discharged to home  Condition at discharge: Stable    Hospital Course   {OPTIONAL -- use to select A&P section   :[id number]}    Consultations This Hospital Stay   PHARMACY TO DOSE VANCO  INTERVENTIONAL RADIOLOGY ADULT/PEDS IP CONSULT  CARE MANAGEMENT / SOCIAL WORK IP CONSULT  PHARMACY TO DOSE VANCO  INFECTIOUS DISEASES IP CONSULT  VASCULAR ACCESS ADULT IP CONSULT  PHYSICAL THERAPY ADULT IP CONSULT  OCCUPATIONAL THERAPY ADULT IP CONSULT  INTERVENTIONAL RADIOLOGY ADULT/PEDS IP CONSULT  PAIN MANAGEMENT ADULT IP CONSULT    Code Status   Full Code    Time Spent on this Encounter   ILior DO, personally saw the patient today and spent greater than 30 minutes discharging this patient.       DO DYLAN Jay [address] [address]  Phone: [phone]  Fax: [phone]  ______________________________________________________________________    Physical Exam   Vital Signs: Temp: 97.7  F (36.5  C)  Temp src: Oral BP: 135/70 Pulse: 79   Resp: 20 SpO2: 94 % O2 Device: None (Room air)    Weight: 197 lbs 3.2 oz  {Recommend personal SmartPhrase or Notewriter for exam (OPTIONAL)   :[id number]}       Primary Care Physician   Aditya Munoz    Discharge Orders      IR Abscess Tube Check     CT Abdomen w Contrast     Primary Care - Care Coordination Referral      Patient care order    ID discharge orders:    Draw weekly:  CBC with differential  cMP  ESR  CRP inflammation (NOT the hi sens version)    Fax above weekly to [facility] [phone]     Reason for your hospital stay    strep bacteremia, liver abscess     Activity    Your activity upon discharge: activity as tolerated     Diet    Follow this diet upon discharge: Current Diet:Orders Placed This Encounter      Snacks/Supplements Adult: Glucerna; With Meals      Moderate Consistent Carb (60 g CHO per Meal) Diet     Hospital Follow-up with Existing Primary Care Provider (PCP)    Please see details below            Significant Results and Procedures   {Data for Discharge Summary:095289}    Discharge Medications   Current Discharge Medication List        START taking these medications    Details   cefdinir (OMNICEF) 300 MG capsule Take 1 capsule (300 mg) by mouth 2 times daily for 21 days.  Qty: 42 capsule, Refills: 0    Associated Diagnoses: Liver abscess           CONTINUE these medications which have CHANGED    Details   LANTUS SOLOSTAR 100 UNIT/ML soln Inject 15 Units subcutaneously 2 times daily.  Qty: 15 mL, Refills: 0    Comments: If Lantus is not covered by insurance, may substitute Basaglar or Semglee or other insulin glargine product per insurance preference at same dose and frequency.    Associated Diagnoses: Type 2 diabetes mellitus with complication, with long-term current use of insulin (H)           CONTINUE these medications which have NOT CHANGED    Details   Continuous Blood Gluc Sensor (FREESTYLE SHIRA 14 DAY SENSOR) MISC       DULoxetine (CYMBALTA) 60  MG capsule Take 60 mg by mouth every morning.      ELIQUIS ANTICOAGULANT 5 MG tablet Take 1 tablet (5 mg) by mouth 2 times daily.  Qty: 180 tablet, Refills: 3    Associated Diagnoses: Acute pulmonary embolism without acute cor pulmonale, unspecified pulmonary embolism type (H)      gabapentin (NEURONTIN) 300 MG capsule [GABAPENTIN (NEURONTIN) 300 MG CAPSULE] Take 600 mg by mouth 3 (three) times a day.       insulin lispro (HUMALOG KWIKPEN) 100 UNIT/ML (1 unit dial) KWIKPEN Inject subcutaneously 3 times daily (before meals). Sliding scale. Average use is 50 units with each meal      magnesium oxide 400 MG CAPS Take 400 mg by mouth nightly as needed.      melatonin 5 MG CAPS Take 5-10 mg by mouth nightly as needed (sleep)      !! morphine (MS CONTIN) 15 MG CR tablet Take 15 mg by mouth daily at 2 pm.      !! morphine (MS CONTIN) 30 MG CR tablet Take 30 mg by mouth 2 times daily.      multivitamin w/minerals (THERA-VIT-M) tablet Take 1 tablet by mouth daily as needed.      naloxone (NARCAN) 4 MG/0.1ML nasal spray Spray 1 spray into one nostril alternating nostrils once as needed for opioid reversal      omeprazole (PRILOSEC) 20 MG DR capsule Take 20 mg by mouth 2 times daily.      ondansetron (ZOFRAN ODT) 4 MG ODT tab Take 1 tablet (4 mg) by mouth every 8 hours as needed for nausea.  Qty: 10 tablet, Refills: 0      sodium fluoride dental gel (PREVIDENT) 1.1 % GEL topical gel Apply to affected area nightly as needed.      testosterone (ANDROGEL 1.62 % PUMP) 20.25 MG/ACT gel Place 1 Pump onto the skin every 72 hours.       !! - Potential duplicate medications found. Please discuss with provider.        STOP taking these medications       Risankizumab-rzaa (SKYRIZI SC) Comments:   Reason for Stopping:             Allergies   Allergies   Allergen Reactions    Atorvastatin Muscle Pain (Myalgia)    Valomag [Arthritis Pain Formula] Nausea and Vomiting     gastric ulcer from aspirin use    Ampicillin Rash     Had a reaction to  this medication many years ago.    Codeine Rash     It has been about 30 years ago      pulmonary embolism type (H)      gabapentin (NEURONTIN) 300 MG capsule [GABAPENTIN (NEURONTIN) 300 MG CAPSULE] Take 600 mg by mouth 3 (three) times a day.       insulin lispro (HUMALOG KWIKPEN) 100 UNIT/ML (1 unit dial) KWIKPEN Inject subcutaneously 3 times daily (before meals). Sliding scale. Average use is 50 units with each meal      magnesium oxide 400 MG CAPS Take 400 mg by mouth nightly as needed.      melatonin 5 MG CAPS Take 5-10 mg by mouth nightly as needed (sleep)      !! morphine (MS CONTIN) 15 MG CR tablet Take 15 mg by mouth daily at 2 pm.      !! morphine (MS CONTIN) 30 MG CR tablet Take 30 mg by mouth 2 times daily.      multivitamin w/minerals (THERA-VIT-M) tablet Take 1 tablet by mouth daily as needed.      naloxone (NARCAN) 4 MG/0.1ML nasal spray Spray 1 spray into one nostril alternating nostrils once as needed for opioid reversal      omeprazole (PRILOSEC) 20 MG DR capsule Take 20 mg by mouth 2 times daily.      ondansetron (ZOFRAN ODT) 4 MG ODT tab Take 1 tablet (4 mg) by mouth every 8 hours as needed for nausea.  Qty: 10 tablet, Refills: 0      sodium fluoride dental gel (PREVIDENT) 1.1 % GEL topical gel Apply to affected area nightly as needed.      testosterone (ANDROGEL 1.62 % PUMP) 20.25 MG/ACT gel Place 1 Pump onto the skin every 72 hours.       !! - Potential duplicate medications found. Please discuss with provider.        STOP taking these medications       Risankizumab-gilmazaa (SKYRIZI SC) Comments:   Reason for Stopping:             Allergies   Allergies   Allergen Reactions    Atorvastatin Muscle Pain (Myalgia)    Valomag [Arthritis Pain Formula] Nausea and Vomiting     gastric ulcer from aspirin use    Ampicillin Rash     Had a reaction to this medication many years ago.    Codeine Rash     It has been about 30 years ago

## 2025-02-05 NOTE — PROGRESS NOTES
Care Management Discharge Note    Discharge Date: 02/05/2025     Discharge Disposition: Home    Discharge Services: None    Discharge DME: None    Discharge Transportation: family or friend will provide    Private pay costs discussed: Not applicable    Does the patient's insurance plan have a 3 day qualifying hospital stay waiver?  Yes     Which insurance plan 3 day waiver is available? Alternative insurance waiver    Will the waiver be used for post-acute placement? No    PAS Confirmation Code: NA  Patient/family educated on Medicare website which has current facility and service quality ratings: no    Education Provided on the Discharge Plan:  (per team)  Persons Notified of Discharge Plans: Nursing;   Patient/Family in Agreement with the Plan: yes    Handoff Referral Completed: No, handoff not indicated or clinically appropriate    Additional Information:  Discharge orders noted and reviewed. No CM needs identified.     Ciera Arambula RN

## 2025-02-05 NOTE — PROGRESS NOTES
As d/w attending MD, ok to do cefdinir rather than augmentin.  3 wks duration.  I did not set up formal ID follow up given improved CT scan and oral plan going forward.     SILVIA Bui MD

## 2025-02-05 NOTE — PLAN OF CARE
Problem: Adult Inpatient Plan of Care  Goal: Optimal Comfort and Wellbeing  Outcome: Met     Problem: Infection  Goal: Absence of Infection Signs and Symptoms  Outcome: Met     /70 (BP Location: Right arm)   Pulse 79   Temp 97.7  F (36.5  C) (Oral)   Resp 20   Ht 1.829 m (6')   Wt 89.4 kg (197 lb 3.2 oz)   SpO2 94%   BMI 26.75 kg/m      Pt reports chronic pain, scheduled pain meds given and effective. Pt up independently in room. All medications sent with pt. All belongings sent with pt. Pt discharged home, familly transport

## 2025-02-05 NOTE — PLAN OF CARE
Problem: Adult Inpatient Plan of Care  Goal: Optimal Comfort and Wellbeing  Outcome: Progressing   Goal Outcome Evaluation:    A&O x4. RA. Denies pain. Reported has ongoing chronic pain to which he already has scheduled medications for. Independent in the room and is able to make needs known. BG at 0200 was close to borderline which was at 72. Offered orange juice and patient requested for the following : orange juice, water, diet hammad and lemon ice. Patient is aware of his diet and stated will work diligently on maintaining a healthy diet from now on. Patient is hoping to go home today. No new changes noted overnight, care plan is ongoing.     /64 (BP Location: Right arm)   Pulse 76   Temp 98  F (36.7  C) (Oral)   Resp 20   Ht 1.829 m (6')   Wt 89.4 kg (197 lb 3.2 oz)   SpO2 99%   BMI 26.75 kg/m

## 2025-02-06 DIAGNOSIS — Z09 HOSPITAL DISCHARGE FOLLOW-UP: ICD-10-CM

## 2025-02-06 NOTE — TELEPHONE ENCOUNTER
02.06- lmtcb x1- Please schedule appointment on 02/27 at 2:40pm with Dr. Bui. Appt slot held with patients MRN.

## 2025-02-11 ENCOUNTER — OFFICE VISIT (OUTPATIENT)
Dept: INTERNAL MEDICINE | Facility: CLINIC | Age: 70
End: 2025-02-11
Payer: COMMERCIAL

## 2025-02-11 VITALS
HEART RATE: 81 BPM | HEIGHT: 72 IN | SYSTOLIC BLOOD PRESSURE: 110 MMHG | DIASTOLIC BLOOD PRESSURE: 62 MMHG | OXYGEN SATURATION: 99 % | BODY MASS INDEX: 27.09 KG/M2 | RESPIRATION RATE: 16 BRPM | WEIGHT: 200 LBS | TEMPERATURE: 97.9 F

## 2025-02-11 DIAGNOSIS — K75.0 LIVER ABSCESS DUE TO BACTERIA: Primary | ICD-10-CM

## 2025-02-11 DIAGNOSIS — A41.9 SEPSIS, DUE TO UNSPECIFIED ORGANISM, UNSPECIFIED WHETHER ACUTE ORGAN DYSFUNCTION PRESENT (H): ICD-10-CM

## 2025-02-11 DIAGNOSIS — E11.8 TYPE 2 DIABETES MELLITUS WITH COMPLICATION, WITH LONG-TERM CURRENT USE OF INSULIN (H): ICD-10-CM

## 2025-02-11 DIAGNOSIS — Z79.4 TYPE 2 DIABETES MELLITUS WITH COMPLICATION, WITH LONG-TERM CURRENT USE OF INSULIN (H): ICD-10-CM

## 2025-02-11 DIAGNOSIS — E03.8 SUBCLINICAL HYPOTHYROIDISM: ICD-10-CM

## 2025-02-11 DIAGNOSIS — B96.89 LIVER ABSCESS DUE TO BACTERIA: Primary | ICD-10-CM

## 2025-02-11 DIAGNOSIS — L40.50 PSORIATIC ARTHRITIS (H): ICD-10-CM

## 2025-02-11 DIAGNOSIS — R19.7 DIARRHEA, UNSPECIFIED TYPE: ICD-10-CM

## 2025-02-11 DIAGNOSIS — F32.1 CURRENT MODERATE EPISODE OF MAJOR DEPRESSIVE DISORDER, UNSPECIFIED WHETHER RECURRENT (H): ICD-10-CM

## 2025-02-11 DIAGNOSIS — I10 PRIMARY HYPERTENSION: ICD-10-CM

## 2025-02-11 DIAGNOSIS — R79.89 ABNORMAL LFTS: ICD-10-CM

## 2025-02-11 DIAGNOSIS — Z86.711 HISTORY OF PULMONARY EMBOLISM: ICD-10-CM

## 2025-02-11 PROBLEM — R10.10 UPPER ABDOMINAL PAIN: Status: RESOLVED | Noted: 2025-01-14 | Resolved: 2025-02-11

## 2025-02-11 LAB
ALBUMIN SERPL BCG-MCNC: 3.5 G/DL (ref 3.5–5.2)
ALP SERPL-CCNC: 137 U/L (ref 40–150)
ALT SERPL W P-5'-P-CCNC: 29 U/L (ref 0–70)
ANION GAP SERPL CALCULATED.3IONS-SCNC: 12 MMOL/L (ref 7–15)
AST SERPL W P-5'-P-CCNC: 48 U/L (ref 0–45)
BILIRUB DIRECT SERPL-MCNC: <0.2 MG/DL (ref 0–0.3)
BILIRUB SERPL-MCNC: 0.3 MG/DL
BUN SERPL-MCNC: 12 MG/DL (ref 8–23)
CALCIUM SERPL-MCNC: 9.2 MG/DL (ref 8.8–10.4)
CHLORIDE SERPL-SCNC: 103 MMOL/L (ref 98–107)
CREAT SERPL-MCNC: 0.81 MG/DL (ref 0.67–1.17)
EGFRCR SERPLBLD CKD-EPI 2021: >90 ML/MIN/1.73M2
ERYTHROCYTE [DISTWIDTH] IN BLOOD BY AUTOMATED COUNT: 14.3 % (ref 10–15)
GLUCOSE SERPL-MCNC: 118 MG/DL (ref 70–99)
HCO3 SERPL-SCNC: 25 MMOL/L (ref 22–29)
HCT VFR BLD AUTO: 40.2 % (ref 40–53)
HGB BLD-MCNC: 13 G/DL (ref 13.3–17.7)
MCH RBC QN AUTO: 27.9 PG (ref 26.5–33)
MCHC RBC AUTO-ENTMCNC: 32.3 G/DL (ref 31.5–36.5)
MCV RBC AUTO: 86 FL (ref 78–100)
PLATELET # BLD AUTO: 330 10E3/UL (ref 150–450)
POTASSIUM SERPL-SCNC: 4.6 MMOL/L (ref 3.4–5.3)
PROT SERPL-MCNC: 8.2 G/DL (ref 6.4–8.3)
RBC # BLD AUTO: 4.66 10E6/UL (ref 4.4–5.9)
SODIUM SERPL-SCNC: 140 MMOL/L (ref 135–145)
TSH SERPL DL<=0.005 MIU/L-ACNC: 1.64 UIU/ML (ref 0.3–4.2)
WBC # BLD AUTO: 10.3 10E3/UL (ref 4–11)

## 2025-02-11 PROCEDURE — 96127 BRIEF EMOTIONAL/BEHAV ASSMT: CPT | Performed by: INTERNAL MEDICINE

## 2025-02-11 PROCEDURE — 82248 BILIRUBIN DIRECT: CPT | Performed by: INTERNAL MEDICINE

## 2025-02-11 PROCEDURE — 84443 ASSAY THYROID STIM HORMONE: CPT | Performed by: INTERNAL MEDICINE

## 2025-02-11 PROCEDURE — 99215 OFFICE O/P EST HI 40 MIN: CPT | Performed by: INTERNAL MEDICINE

## 2025-02-11 PROCEDURE — 85027 COMPLETE CBC AUTOMATED: CPT | Performed by: INTERNAL MEDICINE

## 2025-02-11 PROCEDURE — 36415 COLL VENOUS BLD VENIPUNCTURE: CPT | Performed by: INTERNAL MEDICINE

## 2025-02-11 PROCEDURE — 80053 COMPREHEN METABOLIC PANEL: CPT | Performed by: INTERNAL MEDICINE

## 2025-02-11 PROCEDURE — G2211 COMPLEX E/M VISIT ADD ON: HCPCS | Performed by: INTERNAL MEDICINE

## 2025-02-11 ASSESSMENT — PATIENT HEALTH QUESTIONNAIRE - PHQ9
10. IF YOU CHECKED OFF ANY PROBLEMS, HOW DIFFICULT HAVE THESE PROBLEMS MADE IT FOR YOU TO DO YOUR WORK, TAKE CARE OF THINGS AT HOME, OR GET ALONG WITH OTHER PEOPLE: EXTREMELY DIFFICULT
SUM OF ALL RESPONSES TO PHQ QUESTIONS 1-9: 21
SUM OF ALL RESPONSES TO PHQ QUESTIONS 1-9: 21

## 2025-02-11 NOTE — PROGRESS NOTES
Assessment & Plan     Liver abscess due to bacteria with sepsis  69-year-old male here to follow-up after hospitalization with sepsis associated with multiple abscesses of his liver.  Presented with 11 days of abdominal pain associate with nausea and diarrhea.  CT demonstrating hypodense masses in the liver.  Interventional radiology consulted and underwent CT-guided drainage.  Blood cultures and abscess cultures grew out strep intermedius.  Drains placed.  Follow-up CT January 21 with decrease in size of 2 abscesses with drains.  Third abscess remain present with drain placed.  Abscessogram January 28 led to removal of the initial 2 drains due to abscess resolution.  Final drain removed after third abscess resolution.  Was treated with IV vancomycin and cefepime initially before being transitioned to IV ceftriaxone and oral metronidazole.  Discharged with 21-day course of cefdinir 300 mg twice daily.  He has follow-up with infectious disease at the end of the month.  He remains afebrile.  Denies any abdominal pain.  Still not much of an appetite and having loose stools as discussed below.  Still with fatigue and generalized weakness.  We discussed that it can take weeks to recover from the prolonged hospitalization that he had.  He is not getting any home physical therapy but plans to start going to his gym to be more physically active.  - CBC with platelets; Future        Type 2 diabetes mellitus with complication, with long-term current use of insulin (H)  Blood sugars have been generally well-controlled usually in the 90s in the morning and under 200 later in the day.  He continues on 15 units of Lantus twice daily and uses Humalog prior to meals.  His last A1c was 7.9% in January and should be rechecked in 3 months.    Diarrhea, unspecified type  Ongoing loose stools/diarrhea raising concern for possible C. difficile especially with prolonged course of antibiotics.  I am sending him home with a container so  that he can complete a stool collection  - C. difficile Toxin B PCR with reflex to C. difficile EIA; Future    History of pulmonary embolism  Remains anticoagulated with Eliquis 5 mg twice daily    Psoriatic arthritis (H)  Holding Skyrizi until he has completed antibiotic course and until he follows up with infectious disease.  He can coordinate restarting with his dermatology clinic    Primary hypertension  Blood pressure looking adequately controlled  - Basic metabolic panel  (Ca, Cl, CO2, Creat, Gluc, K, Na, BUN); Future    Depression, currently active  PHQ-9 score 21.  No intentions of self-harm.  Much of this is likely situational from illness and prolonged hospitalization.  He continues on duloxetine 60 mg daily    The longitudinal plan of care for the diagnosis(es)/condition(s) as documented were addressed during this visit. Due to the added complexity in care, I will continue to support Bon in the subsequent management and with ongoing continuity of care.     45 minutes spent in the management of this patient including reviewing hospital records, discussing medical concerns, performing exam, ordering labs and documenting visit.    MED REC REQUIRED  Post Medication Reconciliation Status:  Discharge medications reconciled, continue medications without change  BMI  Estimated body mass index is 27.12 kg/m  as calculated from the following:    Height as of this encounter: 1.829 m (6').    Weight as of this encounter: 90.7 kg (200 lb).       Depression Screening Follow Up        2/11/2025     1:48 PM   PHQ   PHQ-9 Total Score 21    Q9: Thoughts of better off dead/self-harm past 2 weeks Not at all       Patient-reported             See Patient Instructions    Subjective   Bon is a 69 year old, presenting for the following health issues: Here to follow-up after hospitalization with sepsis related to multiple liver abscesses.  See assessment and plan for details  Hospital F/U (sepsis)      2/11/2025     1:57 PM    Additional Questions   Roomed by            Review of Systems  Constitutional, HEENT, cardiovascular, pulmonary, gi and gu systems are negative, except as otherwise noted.      Objective    /62 (BP Location: Right arm, Patient Position: Sitting, Cuff Size: Adult Regular)   Pulse 81   Temp 97.9  F (36.6  C) (Oral)   Resp 16   Ht 1.829 m (6')   Wt 90.7 kg (200 lb)   SpO2 99%   BMI 27.12 kg/m    Body mass index is 27.12 kg/m .  Physical Exam     Tired looking but otherwise healthy appearing middle-age male  Lungs clear bilaterally no rales or wheezes  Heart regular rate and rhythm.  No murmurs  Abdomen soft without hepatosplenomegaly masses or tenderness  Extremities without edema            Signed Electronically by: Aditya Munoz MD    Answers submitted by the patient for this visit:  Patient Health Questionnaire (Submitted on 2/11/2025)  If you checked off any problems, how difficult have these problems made it for you to do your work, take care of things at home, or get along with other people?: Extremely difficult  PHQ9 TOTAL SCORE: 21

## 2025-02-11 NOTE — PATIENT INSTRUCTIONS
I would continue to hold Skyrizi until you have completed the 21-day course of antibiotics and until you have followed up with Dr. Bui at infectious disease

## 2025-02-12 LAB — C DIFF TOX B STL QL: NEGATIVE

## 2025-02-12 PROCEDURE — 87493 C DIFF AMPLIFIED PROBE: CPT | Performed by: INTERNAL MEDICINE

## 2025-02-27 ENCOUNTER — OFFICE VISIT (OUTPATIENT)
Dept: INFECTIOUS DISEASES | Facility: CLINIC | Age: 70
End: 2025-02-27
Payer: COMMERCIAL

## 2025-02-27 VITALS — HEART RATE: 96 BPM | TEMPERATURE: 98.8 F | DIASTOLIC BLOOD PRESSURE: 76 MMHG | SYSTOLIC BLOOD PRESSURE: 148 MMHG

## 2025-02-27 DIAGNOSIS — R16.0 LIVER MASS: Primary | ICD-10-CM

## 2025-02-27 PROCEDURE — G2211 COMPLEX E/M VISIT ADD ON: HCPCS | Performed by: INTERNAL MEDICINE

## 2025-02-27 PROCEDURE — 3077F SYST BP >= 140 MM HG: CPT | Performed by: INTERNAL MEDICINE

## 2025-02-27 PROCEDURE — 99213 OFFICE O/P EST LOW 20 MIN: CPT | Performed by: INTERNAL MEDICINE

## 2025-02-27 PROCEDURE — 3078F DIAST BP <80 MM HG: CPT | Performed by: INTERNAL MEDICINE

## 2025-02-27 NOTE — PROGRESS NOTES
CLINIC FOLLOW UP NOTE:    Original Reason for consultation, site patient seen:    Interval history:  Pt with a multifocal S Intermedius liver abscess(es) hospitalization.  I had not really planned any Follow-up, but I think this appt made the pt feel more at ease.  Happy to see him.  Doing better, finished oral abx 2 days ago, all drains are out.  Still some leg weakness.  Going on cruise in April, to [place].  Admits he made mistake not taking oral abx prescribed by his dentist prior to the hospitalization.          Current antibiotics and duration plan:    Reviewed PAST MEDICAL HISTORY,  family and social history      Examination:  Alert, awake  Vitals tabulated above, reviewed  Neck supple  Sclera OK  CARDIOVASCULAR regular rate and rhythm, no mumur  Lungs CLEAR TO AUSCULTATION   Abdomen soft, NT/ND, absent HEPATOSPLENOMEGALY  Skin normal  Joints normal  Neurologic exam non focal  Wound:  N/A    Lab Data/New Imaging/Medication levels/Microbiologic data:    IMPRESSION:  Strep Intermedius liver abscess, multi loculated.  3 total drains done, all out  Off abx    PLAN:  Return to normal life essentially.    Work on conditioning  I would not reimage given no serious ongoing sxs      Thank you for allowing me to continue care of this patient.    Sincerely:      SILVIA Bui MD  [facility]  [facility] [phone]

## 2025-03-04 ENCOUNTER — NURSE TRIAGE (OUTPATIENT)
Dept: INTERNAL MEDICINE | Facility: CLINIC | Age: 70
End: 2025-03-04
Payer: COMMERCIAL

## 2025-03-04 NOTE — TELEPHONE ENCOUNTER
Incoming call from patient. Relayed provider message and he stated understanding. He is agreeable to the plan of care. He does report a mild/moderate headache today but has not yet taken any OTC meds. He will try some tylenol. Advised if headache pain worsens or becomes severe for him to be seen in ED. He stated understanding and had no further questions.

## 2025-03-04 NOTE — TELEPHONE ENCOUNTER
This sounds like benign positional vertigo.  I agree with an appointment on Thursday.  He can try over-the-counter meclizine or Dramamine to see if these help with his symptoms.  Should he develop any other neurologic symptoms such as difficulties with speech, severe headache, change in vision or weakness in any extremity, he needs to be seen in the ER ASAP

## 2025-03-04 NOTE — TELEPHONE ENCOUNTER
Nurse Triage SBAR    Is this a 2nd Level Triage? NO,     Situation: Incoming call from patient to report vertigo over the last couple of nights. When laying in bed and turning over he would start to feel dizzy and the room would spin. Resolves after laying still for a while. No dizziness at the time of call. Sometimes gets a headache after the dizziness. Denies nausea, vomiting, and fever.     Background: LOV 2/11/25. Pt was also hospitalized 1/15/25-2/5/25 for liver mass and sepsis.     Assessment: See above.     Protocol Recommended Disposition:   See in Office Within 3 Days    Recommendation: Scheduled pt with PCP on Thursday 3/6/25. Pt declined seeing a different provider sooner. Pt would like to know if PCP has any recommendations.     Routed to provider    Does the patient meet one of the following criteria for ADS visit consideration? 16+ years old, with an [facility] PCP     TIP  Providers, please consider if this condition is appropriate for management at one of our Acute and Diagnostic Services sites.     If patient is a good candidate, please use dotphrase <dot>triageresponse and select Refer to ADS to document.        Reason for Disposition   MILD dizziness (e.g., walking normally) and has NOT been evaluated by physician for this (Exception: Dizziness caused by heat exposure, sudden standing, or poor fluid intake.)    Additional Information   Negative: SEVERE difficulty breathing (e.g., struggling for each breath, speaks in single words)   Negative: Shock suspected (e.g., cold/pale/clammy skin, too weak to stand, low BP, rapid pulse)   Negative: Difficult to awaken or acting confused (e.g., disoriented, slurred speech)   Negative: Fainted, and still feels dizzy afterwards   Negative: Overdose (accidental or intentional) of medications   Negative: New neurologic deficit that is present now: * Weakness of the face, arm, or leg on one side of the body * Numbness of the face, arm, or leg on one side of the body *  Loss of speech or garbled speech   Negative: Heart beating < 50 beats per minute OR > 140 beats per minute   Negative: Sounds like a life-threatening emergency to the triager   Negative: Chest pain   Negative: Rectal bleeding, bloody stool, or tarry-black stool   Negative: Vomiting is main symptom   Negative: Diarrhea is main symptom   Negative: Headache is main symptom   Negative: Heat exhaustion suspected (i.e., dehydration from heat exposure)   Negative: Patient states that they are having an anxiety or panic attack   Negative: Dizziness from low blood sugar (i.e., < 60 mg/dl or 3.5 mmol/l)   Negative: SEVERE dizziness (e.g., unable to stand, requires support to walk, feels like passing out now)   Negative: SEVERE headache or neck pain   Negative: Spinning or tilting sensation (vertigo) present now and one or more stroke risk factors (i.e., hypertension, diabetes mellitus, prior stroke/TIA, heart attack, age over 60) (Exception: Prior physician evaluation for this AND no different/worse than usual.)   Negative: Neurologic deficit that was brief (now gone), ANY of the following:* Weakness of the face, arm, or leg on one side of the body* Numbness of the face, arm, or leg on one side of the body* Loss of speech or garbled speech   Negative: Loss of vision or double vision  (Exception: Similar to previous migraines.)   Negative: Extra heartbeats, irregular heart beating, or heart is beating very fast (i.e., 'palpitations')   Negative: Difficulty breathing   Negative: Drinking very little and dehydration suspected (e.g., no urine > 12 hours, very dry mouth, very lightheaded)   Negative: Follows bleeding (e.g., stomach, rectum, vagina)  (Exception: Became dizzy from sight of small amount blood.)   Negative: Patient sounds very sick or weak to the triager   Negative: Taking a medicine that could cause dizziness (e.g., blood pressure medications, diuretics)   Negative: Lightheadedness (dizziness) present now, after 2  hours of rest and fluids   Negative: Spinning or tilting sensation (vertigo) present now   Negative: Fever > 103 F (39.4 C)   Negative: Fever > 100.0 F (37.8 C) and has diabetes mellitus or a weak immune system (e.g., HIV positive, cancer chemotherapy, organ transplant, splenectomy, chronic steroids)   Negative: MODERATE dizziness (e.g., interferes with normal activities)  (Exception: Dizziness caused by heat exposure, sudden standing, or poor fluid intake.)   Negative: Vomiting occurs with dizziness   Negative: Patient wants to be seen    Protocols used: Dizziness-A-OH

## 2025-03-06 ENCOUNTER — OFFICE VISIT (OUTPATIENT)
Dept: INTERNAL MEDICINE | Facility: CLINIC | Age: 70
End: 2025-03-06
Payer: COMMERCIAL

## 2025-03-06 VITALS
WEIGHT: 207 LBS | BODY MASS INDEX: 28.04 KG/M2 | OXYGEN SATURATION: 99 % | TEMPERATURE: 98.1 F | DIASTOLIC BLOOD PRESSURE: 68 MMHG | RESPIRATION RATE: 16 BRPM | HEIGHT: 72 IN | SYSTOLIC BLOOD PRESSURE: 138 MMHG | HEART RATE: 98 BPM

## 2025-03-06 DIAGNOSIS — R07.9 CHEST PAIN, UNSPECIFIED TYPE: ICD-10-CM

## 2025-03-06 DIAGNOSIS — R19.7 DIARRHEA, UNSPECIFIED TYPE: ICD-10-CM

## 2025-03-06 DIAGNOSIS — I10 PRIMARY HYPERTENSION: ICD-10-CM

## 2025-03-06 DIAGNOSIS — H81.13 BENIGN PAROXYSMAL POSITIONAL VERTIGO DUE TO BILATERAL VESTIBULAR DISORDER: ICD-10-CM

## 2025-03-06 DIAGNOSIS — I25.10 ASCVD (ARTERIOSCLEROTIC CARDIOVASCULAR DISEASE): ICD-10-CM

## 2025-03-06 DIAGNOSIS — R55 SYNCOPE, UNSPECIFIED SYNCOPE TYPE: Primary | ICD-10-CM

## 2025-03-06 LAB
ATRIAL RATE - MUSE: 81 BPM
DIASTOLIC BLOOD PRESSURE - MUSE: NORMAL MMHG
INTERPRETATION ECG - MUSE: NORMAL
P AXIS - MUSE: 66 DEGREES
PR INTERVAL - MUSE: 154 MS
QRS DURATION - MUSE: 88 MS
QT - MUSE: 348 MS
QTC - MUSE: 404 MS
R AXIS - MUSE: 57 DEGREES
SYSTOLIC BLOOD PRESSURE - MUSE: NORMAL MMHG
T AXIS - MUSE: 124 DEGREES
VENTRICULAR RATE- MUSE: 81 BPM

## 2025-03-06 NOTE — PROGRESS NOTES
Assessment & Plan     Syncope, unspecified syncope type  69-year-old male with complicated medical history including recent hospitalization as with sepsis and history of pulmonary embolism and ASCVD.  He describes an episode of syncope that occurred 2-3 weeks ago.  He became diaphoretic and lightheaded with generalized weakness and ended up falling to the ground with brief loss of consciousness.  He stayed on the ground for almost 1 hour as he continued not to feel well.  He describes a rapid breathing.  No chest pain or palpitations.  The symptoms recurred 2 more times that day but he has had no recurrence since then.  He was having issues with diarrhea at that time and was trying to stay hydrated but may have been becoming dehydrated contributing to his symptoms.  Vasovagal syncope is a consideration although this would be unusual with multiple recurrence in 24 hours.  However, he also has cardiac history.  There was no chest pain but rapid respiratory rate is concerning.  EKG with T wave inversion laterally.  Will plan for stress test as outlined below.  If normal, consider 2-week cardiac monitor with Zio patch  - EKG 12-lead, tracing only      ASCVD (arteriosclerotic cardiovascular disease)  As above, history of coronary artery disease with further workup needed in light of the above symptoms    Shortly after the above symptoms, he did have a prolonged episode of chest pain that he assumes was reflux related but myocardial infarction or coronary ischemia cannot be excluded.  I reviewed his EKG which is showing some T wave inversion laterally.  Currently asymptomatic.  However, I think it would be prudent to get a nuclear stress test completed ASA to further evaluate all of the above.        Benign paroxysmal positional vertigo due to bilateral vestibular disorder  Unrelated to the above, he has developed the acute onset of vertigo in the past several days.  Describes room spinning sensation.  No ear pain or  change in hearing or ringing.  Slight headache occurring with each episode but nothing severe.  He did not hit his head when he had the above syncopal episode but this was 2 weeks ago and vertigo symptoms do not coincide in any manner.  He describes being able to control the symptoms when sitting up but symptoms will worsen when he turns over in bed.  He has also found Dramamine to be helpful.  No other neurologic symptoms suggesting posterior circulation stroke.  I think this is more likely benign positional vertigo.  His neurologic exam is grossly nonfocal.  He tried to do a home Epley maneuver but did not notice any effects.  We discussed that benign vertigo usually resolves on its own without any further intervention.  He may continue to use Dramamine as needed but if his symptoms persist into next week, I would recommend evaluation at our vestibular clinic and referral can be made.    Primary hypertension  Blood pressure looks well-controlled with current medication    Diarrhea, unspecified type  Diarrhea that he was experiencing related to the antibiotics has resolved.  May have contributed to volume depletion and the above syncopal episodes.    The longitudinal plan of care for the diagnosis(es)/condition(s) as documented were addressed during this visit. Due to the added complexity in care, I will continue to support Bon in the subsequent management and with ongoing continuity of care.     60 minutes spent in the management this patient including reviewing records, obtaining history, performing exam, reviewing EKG, ordering studies and documenting visit.    BMI  Estimated body mass index is 28.07 kg/m  as calculated from the following:    Height as of this encounter: 1.829 m (6').    Weight as of this encounter: 93.9 kg (207 lb).         See Patient Instructions    Subjective   Bon is a 69 year old, presenting for the following health issues: Here to discuss recurrent syncopal episodes that occurred 2  weeks ago along with episode of chest pain and now with recurrent vertigo.  See assessment and plan for details  Dizziness (X 3 weeks when laying down only)      3/6/2025     4:31 PM   Additional Questions   Roomed by      History of Present Illness       Reason for visit:  Dizzy spells  Symptom onset:  3-7 days ago  Symptoms include:  Room spins when rolling over  Symptom intensity:  Mild  Symptom progression:  Improving  Had these symptoms before:  No  What makes it worse:  No  What makes it better:  Tylonal   He is taking medications regularly.                  Review of Systems  Constitutional, HEENT, cardiovascular, pulmonary, gi and gu systems are negative, except as otherwise noted.      Objective    /68 (BP Location: Right arm, Patient Position: Sitting, Cuff Size: Adult Regular)   Pulse 98   Temp 98.1  F (36.7  C) (Oral)   Resp 16   Ht 1.829 m (6')   Wt 93.9 kg (207 lb)   SpO2 99%   BMI 28.07 kg/m    Body mass index is 28.07 kg/m .  Physical Exam   Well-appearing middle-age male  HEENT normal.  TMs normal.  Lungs clear bilaterally  Heart regular rate and rhythm without murmur or gallop neurologically intact both cranial nerves and motor                    Signed Electronically by: Aditya Munoz MD

## 2025-03-07 NOTE — ADDENDUM NOTE
Addended by: DESTINEE WALKER on: 3/6/2025 06:31 PM     Modules accepted: Orders, Level of Service

## 2025-03-11 ENCOUNTER — HOSPITAL ENCOUNTER (OUTPATIENT)
Dept: NUCLEAR MEDICINE | Facility: HOSPITAL | Age: 70
Discharge: HOME | End: 2025-03-11
Attending: INTERNAL MEDICINE
Payer: COMMERCIAL

## 2025-03-11 ENCOUNTER — HOSPITAL ENCOUNTER (OUTPATIENT)
Dept: CARDIOLOGY | Facility: HOSPITAL | Age: 70
Discharge: HOME | End: 2025-03-11
Attending: INTERNAL MEDICINE
Payer: COMMERCIAL

## 2025-03-11 DIAGNOSIS — R07.9 CHEST PAIN, UNSPECIFIED TYPE: ICD-10-CM

## 2025-03-11 DIAGNOSIS — I25.10 ASCVD (ARTERIOSCLEROTIC CARDIOVASCULAR DISEASE): Primary | ICD-10-CM

## 2025-03-11 DIAGNOSIS — R55 SYNCOPE, UNSPECIFIED SYNCOPE TYPE: ICD-10-CM

## 2025-03-11 DIAGNOSIS — I21.4 NSTEMI (NON-ST ELEVATED MYOCARDIAL INFARCTION) (H): ICD-10-CM

## 2025-03-11 LAB
CV STRESS CURRENT BP HE: NORMAL
CV STRESS CURRENT HR HE: 100
CV STRESS CURRENT HR HE: 101
CV STRESS CURRENT HR HE: 101
CV STRESS CURRENT HR HE: 107
CV STRESS CURRENT HR HE: 108
CV STRESS CURRENT HR HE: 110
CV STRESS CURRENT HR HE: 111
CV STRESS CURRENT HR HE: 114
CV STRESS CURRENT HR HE: 80
CV STRESS CURRENT HR HE: 86
CV STRESS CURRENT HR HE: 87
CV STRESS CURRENT HR HE: 92
CV STRESS CURRENT HR HE: 93
CV STRESS CURRENT HR HE: 93
CV STRESS CURRENT HR HE: 95
CV STRESS CURRENT HR HE: 96
CV STRESS CURRENT HR HE: 96
CV STRESS CURRENT HR HE: 98
CV STRESS CURRENT HR HE: 98
CV STRESS CURRENT HR HE: 99
CV STRESS DEVIATION TIME HE: NORMAL
CV STRESS ECHO PERCENT HR HE: NORMAL
CV STRESS EXERCISE STAGE HE: NORMAL
CV STRESS FINAL RESTING BP HE: NORMAL
CV STRESS FINAL RESTING HR HE: 96
CV STRESS MAX HR HE: 115
CV STRESS MAX TREADMILL GRADE HE: 0
CV STRESS MAX TREADMILL SPEED HE: 0
CV STRESS PEAK DIA BP HE: NORMAL
CV STRESS PEAK SYS BP HE: NORMAL
CV STRESS PHASE HE: NORMAL
CV STRESS PROTOCOL HE: NORMAL
CV STRESS RESTING PT POSITION HE: NORMAL
CV STRESS RESTING PT POSITION HE: NORMAL
CV STRESS ST DEVIATION AMOUNT HE: NORMAL
CV STRESS ST DEVIATION ELEVATION HE: NORMAL
CV STRESS ST EVELATION AMOUNT HE: NORMAL
CV STRESS TEST TYPE HE: NORMAL
CV STRESS TOTAL STAGE TIME MIN 1 HE: NORMAL
NUC STRESS EJECTION FRACTION: 38 %
RATE PRESSURE PRODUCT: NORMAL
STRESS ECHO BASELINE DIASTOLIC HE: 79
STRESS ECHO BASELINE HR: 86
STRESS ECHO BASELINE SYSTOLIC BP: 138
STRESS ECHO CALCULATED PERCENT HR: 75 %
STRESS ECHO LAST STRESS DIASTOLIC BP: 63
STRESS ECHO LAST STRESS HR: 101
STRESS ECHO LAST STRESS SYSTOLIC BP: 134
STRESS ECHO TARGET HR: 153

## 2025-03-11 PROCEDURE — 78452 HT MUSCLE IMAGE SPECT MULT: CPT

## 2025-03-11 PROCEDURE — 78452 HT MUSCLE IMAGE SPECT MULT: CPT | Mod: 26 | Performed by: INTERNAL MEDICINE

## 2025-03-11 PROCEDURE — 343N000001 HC RX 343 MED OP 636: Performed by: INTERNAL MEDICINE

## 2025-03-11 PROCEDURE — 93018 CV STRESS TEST I&R ONLY: CPT | Performed by: INTERNAL MEDICINE

## 2025-03-11 PROCEDURE — 93016 CV STRESS TEST SUPVJ ONLY: CPT | Performed by: GENERAL ACUTE CARE HOSPITAL

## 2025-03-11 PROCEDURE — 93017 CV STRESS TEST TRACING ONLY: CPT

## 2025-03-11 PROCEDURE — 250N000011 HC RX IP 250 OP 636: Performed by: INTERNAL MEDICINE

## 2025-03-11 PROCEDURE — A9500 TC99M SESTAMIBI: HCPCS | Performed by: INTERNAL MEDICINE

## 2025-03-11 RX ORDER — AMINOPHYLLINE 25 MG/ML
50-100 INJECTION, SOLUTION INTRAVENOUS
Status: COMPLETED | OUTPATIENT
Start: 2025-03-11 | End: 2025-03-11

## 2025-03-11 RX ORDER — REGADENOSON 0.08 MG/ML
0.4 INJECTION, SOLUTION INTRAVENOUS ONCE
Status: COMPLETED | OUTPATIENT
Start: 2025-03-11 | End: 2025-03-11

## 2025-03-11 RX ADMIN — REGADENOSON 0.4 MG: 0.08 INJECTION, SOLUTION INTRAVENOUS at 14:27

## 2025-03-11 RX ADMIN — AMINOPHYLLINE 50 MG: 25 INJECTION, SOLUTION INTRAVENOUS at 14:31

## 2025-03-11 RX ADMIN — Medication 7.9 MILLICURIE: at 13:00

## 2025-03-11 RX ADMIN — Medication 30.1 MILLICURIE: at 14:20

## 2025-03-11 NOTE — PROGRESS NOTES
Pt having syncopal episodes over the last month.  Now having dizzy spells states has had tachypnea.  Pt has risk factors for CAD.  Here for further cardiac evaluation.    Gabriella Hernandez RN

## 2025-03-13 ENCOUNTER — TELEPHONE (OUTPATIENT)
Dept: RHEUMATOLOGY | Facility: CLINIC | Age: 70
End: 2025-03-13
Payer: COMMERCIAL

## 2025-03-13 NOTE — TELEPHONE ENCOUNTER
[facility] HEART CARE   [id number]  [place] [address]   [email or web address]   OFFICE: [phone]     CARDIOLOGY CLINIC NOTE     Thank you, Aditya Chairez, for asking the [facility] Heart Care team to see Mr. Bon Luque       History of Present Illness   Previously seen by cardiology;: Last visit with cardiology in January 2020    MI 2008, RCA stent x 2, maría elena, DM x 22 yrs, HLD, MARÍA ELENA, HTN, DVT, prior syncope (holter unremarkable), chronic pain , neuropathy, fibromyalgia, GERD January 2025 for strep intermedius bacteremia; following discharge persistent syncope; getting going to the bathroom.  Reports being quite dehydrated from diarrhea at that time.    Also reports an episode of chest pain lasting 6 hrs following heavy meal; called paramedics, was told EKG is ok, not taken to ER; no recurrance in CP.    PCP ordered stress test which showed inferior/inferoseptal lateral scar.  Recent echo in Jan- nl EF, no WMA    No recurrence of syncope.    Currently on no statin (stopped taking it for neuropathy and chronic pains)  Not on ASA due to h/o infected peptic ulcer      Other than above- no CP or REDD; although not very active due to chr pains.       Medications  Allergies   Current Outpatient Medications   Medication Sig Dispense Refill    Continuous Blood Gluc Sensor (FREESTYLE SHIRA 14 DAY SENSOR) MISC       DULoxetine (CYMBALTA) 60 MG capsule Take 60 mg by mouth every morning.      ELIQUIS ANTICOAGULANT 5 MG tablet Take 1 tablet (5 mg) by mouth 2 times daily. 180 tablet 3    evolocumab (REPATHA) 140 MG/ML prefilled autoinjector Inject 1 mL (140 mg) subcutaneously every 14 days. 6 mL 0    gabapentin (NEURONTIN) 300 MG capsule [GABAPENTIN (NEURONTIN) 300 MG CAPSULE] Take 600 mg by mouth 3 (three) times a day.       insulin lispro (HUMALOG KWIKPEN) 100 UNIT/ML (1 unit dial) KWIKPEN Inject subcutaneously 3 times daily (before meals). Sliding scale. Average use is  50 units with each meal      LANTUS SOLOSTAR 100 UNIT/ML soln Inject 15 Units subcutaneously 2 times daily. 15 mL 0    Lidocaine (LIDOCARE) 4 % Patch Place 3 patches over 12 hours onto the skin every 24 hours. To prevent lidocaine toxicity, patient should be patch free for 12 hrs daily. (Patient not taking: Reported on 3/17/2025) 15 patch 0    magnesium oxide 400 MG CAPS Take 400 mg by mouth nightly as needed.      melatonin 5 MG CAPS Take 5-10 mg by mouth nightly as needed (sleep)      morphine (MS CONTIN) 15 MG CR tablet Take 1 tablet (15 mg) by mouth 3 times daily.      multivitamin w/minerals (THERA-VIT-M) tablet Take 1 tablet by mouth daily as needed.      naloxone (NARCAN) 4 MG/0.1ML nasal spray Spray 1 spray into one nostril alternating nostrils once as needed for opioid reversal (Patient not taking: Reported on 3/17/2025)      omeprazole (PRILOSEC) 20 MG DR capsule Take 20 mg by mouth 2 times daily.      ondansetron (ZOFRAN ODT) 4 MG ODT tab Take 1 tablet (4 mg) by mouth every 8 hours as needed for nausea. (Patient not taking: Reported on 3/17/2025) 10 tablet 0    rosuvastatin (CRESTOR) 5 MG tablet Take 1 tablet (5 mg) by mouth daily. 90 tablet 0    sodium fluoride dental gel (PREVIDENT) 1.1 % GEL topical gel Apply to affected area nightly as needed.      testosterone (ANDROGEL 1.62 % PUMP) 20.25 MG/ACT gel Place 1 Pump onto the skin every 72 hours.        Allergies   Allergen Reactions    Atorvastatin Muscle Pain (Myalgia)    Valomag [Arthritis Pain Formula] Nausea and Vomiting     gastric ulcer from aspirin use    Ampicillin Rash     Had a reaction to this medication many years ago.    Codeine Rash     It has been about 30 years ago        Physical Examination Review of Systems   Vitals: There were no vitals taken for this visit.  BMI= There is no height or weight on file to calculate BMI.  Wt Readings from Last 3 Encounters:   03/17/25 94.8 kg (209 lb)   03/06/25 93.9 kg (207 lb)   02/11/25 90.7 kg (200  lb)       General: pleasant male. No acute distress.   Neck: No JVD  Lungs: clear to auscultation  COR:  regular rate and rhythm, No murmurs, rubs, or gallops  Extrem: No edema        Please refer above for cardiac ROS details.       Past History     Family History:   Family History   Problem Relation Age of Onset    Diabetes Mother     Other Cancer Mother     Diabetes Father     Depression Father     Diabetes Brother     Depression Sister     Substance Abuse Brother         Alcohol        Social History:   Social History     Socioeconomic History    Marital status:      Spouse name: Not on file    Number of children: Not on file    Years of education: Not on file    Highest education level: Not on file   Occupational History    Not on file   Tobacco Use    Smoking status: Former     Current packs/day: 0.50     Average packs/day: 0.5 packs/day for 5.0 years (2.5 ttl pk-yrs)     Types: Cigarettes     Passive exposure: Past    Smokeless tobacco: Never   Vaping Use    Vaping status: Never Used   Substance and Sexual Activity    Alcohol use: No     Comment: Alcoholic Drinks/day: alcohol abuse - in remission x16 years    Drug use: No    Sexual activity: Not Currently     Partners: Female     Birth control/protection: None   Other Topics Concern    Parent/sibling w/ CABG, MI or angioplasty before 65F 55M? No   Social History Narrative    Not on file     Social Drivers of Health     Financial Resource Strain: Low Risk  (1/16/2025)    Financial Resource Strain     Within the past 12 months, have you or your family members you live with been unable to get utilities (heat, electricity) when it was really needed?: No   Food Insecurity: Low Risk  (1/16/2025)    Food Insecurity     Within the past 12 months, did you worry that your food would run out before you got money to buy more?: No     Within the past 12 months, did the food you bought just not last and you didn t have money to get more?: No   Transportation Needs:  Low Risk  (1/16/2025)    Transportation Needs     Within the past 12 months, has lack of transportation kept you from medical appointments, getting your medicines, non-medical meetings or appointments, work, or from getting things that you need?: No   Physical Activity: Insufficiently Active (8/23/2024)    Exercise Vital Sign     Days of Exercise per Week: 1 day     Minutes of Exercise per Session: 20 min   Stress: No Stress Concern Present (8/23/2024)    [employer] - Occupational Stress Questionnaire     Feeling of Stress : Not at all   Social Connections: Unknown (8/23/2024)    Social Connection and Isolation Panel [NHANES]     Frequency of Communication with Friends and Family: Not on file     Frequency of Social Gatherings with Friends and Family: Once a week     Attends Yarsani Services: Not on file     Active Member of Clubs or Organizations: Not on file     Attends Club or Organization Meetings: Not on file     Marital Status: Not on file   Interpersonal Safety: Low Risk  (1/16/2025)    Interpersonal Safety     Do you feel physically and emotionally safe where you currently live?: Yes     Within the past 12 months, have you been hit, slapped, kicked or otherwise physically hurt by someone?: No     Within the past 12 months, have you been humiliated or emotionally abused in other ways by your partner or ex-partner?: No   Housing Stability: Low Risk  (1/16/2025)    Housing Stability     Do you have housing? : Yes     Are you worried about losing your housing?: No            Lab Results    Chemistry/lipid CBC Cardiac Enzymes/BNP/TSH/INR   Lab Results   Component Value Date    CHOL 233 (H) 08/23/2024    HDL 38 (L) 08/23/2024    TRIG 195 (H) 08/23/2024    BUN 12.0 02/11/2025     02/11/2025    CO2 25 02/11/2025    Lab Results   Component Value Date    WBC 10.3 02/11/2025    HGB 13.0 (L) 02/11/2025    HCT 40.2 02/11/2025    MCV 86 02/11/2025     02/11/2025    Lab Results    Component Value Date    TROPONINI 0.03 07/12/2022    BNP 11 06/16/2022    TSH 1.64 02/11/2025    INR 1.26 (H) 01/16/2025          Cardiac Problems and Cardiac Diagnostics     Most Recent Cardiac testing:  ECG Personal interpretation   3/6/25: NSR, inf q waves, NWTI    ECHO 1/15/25:  1. Left ventricular chamber size is normal. Mild concentric increase in wall  thickness. Systolic function is low normal. The visually estimated left  ventricular ejection fraction is 50-55%.  2. Right ventricular chamber size and systolic function are normal.  3. No hemodynamically significant valvular abnormalities.    Stress test: 3/11/25:    Lexiscan stress ECG nondiagnostic for ischemia secondary to baseline ST-T wave abnormality.    The nuclear stress test is abnormal.  There is a large area of nontransmural infarction involving the inferior, inferoseptal and inferolateral walls.  No definite ischemia.    The left ventricular ejection fraction at stress is 38% with hypokinesis to akinesis of the inferolateral to inferior wall.    The patient is at a low to intermediate risk of future cardiac ischemic events.    There is no prior study for comparison.           Assessment/Recommendations   Assessment:      Abnormal stress test: Reviewed stress test and echo images ; echo images not the best, EF appears to be somewhat depressed on some images.  stress test findings for consistent with prior RCA infarction no ischemia.  Continue medical therapy; needs chol and DM optimization.  Will obtain cardiac MRI to accurately assess EF   CAD: Not on aspirin due to history of infected peptic ulcer disease; currently also on noach for history of DVT (heme recommended lifelong therapy per pt);  not on any therapy at this time.  Started PCSK9 inh, also advised to keep statin as tolerated (starting with twice a week uptitrating as tolerated if no worsening symptoms of chronic left leg pain which is likely multifactorial). DM management as  below.  HLD: start repatha and crestor (latter as tolerated), repeat labs in 3  months  DM: A1c goal <7; has jardiance at home, will start  Syncope: prior h.o similar episodes; in the setting of dehydration, will obtain cardiac EVERETT- 2 week zio to r/o arrhthymias; cmri to accurately assess ef      The longitudinal plan of care for the diagnosis(es)/condition(s) as documented were addressed during this visit. Due to the added complexity in care, I will continue to support Bon in the subsequent management and with ongoing continuity of care.           Baldo Buenrostro MD, MPH  Non-invasive Cardiologist  [facility]

## 2025-03-13 NOTE — TELEPHONE ENCOUNTER
[facility] Call Center    Phone Message    May a detailed message be left on voicemail: yes     Reason for Call: Medication Refill Request    Has the patient contacted the pharmacy for the refill? Yes   Name of medication being requested: Prednisone    Provider who prescribed the medication:     Pharmacy: [address] [place] [address], [place] [address]    Date medication is needed: asap, pt will leaving on trip on 03/25/2025 for 15 days.     Action Taken: Other: rheum    Travel Screening: Not Applicable     Date of Service:

## 2025-03-17 ENCOUNTER — HOSPITAL ENCOUNTER (OUTPATIENT)
Dept: GENERAL RADIOLOGY | Facility: HOSPITAL | Age: 70
Discharge: HOME | End: 2025-03-17
Attending: INTERNAL MEDICINE | Admitting: INTERNAL MEDICINE
Payer: COMMERCIAL

## 2025-03-17 ENCOUNTER — OFFICE VISIT (OUTPATIENT)
Dept: RHEUMATOLOGY | Facility: CLINIC | Age: 70
End: 2025-03-17
Payer: COMMERCIAL

## 2025-03-17 ENCOUNTER — OFFICE VISIT (OUTPATIENT)
Dept: CARDIOLOGY | Facility: CLINIC | Age: 70
End: 2025-03-17
Attending: INTERNAL MEDICINE
Payer: COMMERCIAL

## 2025-03-17 VITALS
WEIGHT: 209 LBS | SYSTOLIC BLOOD PRESSURE: 144 MMHG | DIASTOLIC BLOOD PRESSURE: 70 MMHG | BODY MASS INDEX: 28.35 KG/M2 | HEART RATE: 80 BPM | OXYGEN SATURATION: 99 %

## 2025-03-17 VITALS
BODY MASS INDEX: 28.31 KG/M2 | HEIGHT: 72 IN | RESPIRATION RATE: 16 BRPM | WEIGHT: 209 LBS | HEART RATE: 80 BPM | SYSTOLIC BLOOD PRESSURE: 140 MMHG | DIASTOLIC BLOOD PRESSURE: 76 MMHG

## 2025-03-17 DIAGNOSIS — E78.00 PURE HYPERCHOLESTEROLEMIA: ICD-10-CM

## 2025-03-17 DIAGNOSIS — I25.10 CORONARY ARTERY DISEASE INVOLVING NATIVE CORONARY ARTERY OF NATIVE HEART WITHOUT ANGINA PECTORIS: ICD-10-CM

## 2025-03-17 DIAGNOSIS — I25.10 ASCVD (ARTERIOSCLEROTIC CARDIOVASCULAR DISEASE): ICD-10-CM

## 2025-03-17 DIAGNOSIS — R94.39 ABNORMAL STRESS TEST: ICD-10-CM

## 2025-03-17 DIAGNOSIS — L40.9 PSORIASIS: ICD-10-CM

## 2025-03-17 DIAGNOSIS — L40.50 PSORIATIC ARTHRITIS (H): ICD-10-CM

## 2025-03-17 DIAGNOSIS — R55 SYNCOPE, UNSPECIFIED SYNCOPE TYPE: Primary | ICD-10-CM

## 2025-03-17 DIAGNOSIS — M19.012 OSTEOARTHRITIS OF BILATERAL GLENOHUMERAL JOINTS: ICD-10-CM

## 2025-03-17 DIAGNOSIS — M19.011 OSTEOARTHRITIS OF BILATERAL GLENOHUMERAL JOINTS: ICD-10-CM

## 2025-03-17 DIAGNOSIS — M43.28 ANKYLOSIS, SACROILIAC JOINT: ICD-10-CM

## 2025-03-17 DIAGNOSIS — L40.50 PSORIATIC ARTHRITIS (H): Primary | ICD-10-CM

## 2025-03-17 DIAGNOSIS — I21.4 NSTEMI (NON-ST ELEVATED MYOCARDIAL INFARCTION) (H): ICD-10-CM

## 2025-03-17 PROCEDURE — 73030 X-RAY EXAM OF SHOULDER: CPT | Mod: 50

## 2025-03-17 PROCEDURE — 3078F DIAST BP <80 MM HG: CPT | Performed by: INTERNAL MEDICINE

## 2025-03-17 PROCEDURE — 3077F SYST BP >= 140 MM HG: CPT | Performed by: INTERNAL MEDICINE

## 2025-03-17 PROCEDURE — 99214 OFFICE O/P EST MOD 30 MIN: CPT | Performed by: INTERNAL MEDICINE

## 2025-03-17 PROCEDURE — G2211 COMPLEX E/M VISIT ADD ON: HCPCS | Performed by: INTERNAL MEDICINE

## 2025-03-17 RX ORDER — ROSUVASTATIN CALCIUM 5 MG/1
5 TABLET, COATED ORAL DAILY
Qty: 90 TABLET | Refills: 0 | Status: SHIPPED | OUTPATIENT
Start: 2025-03-17 | End: 2025-06-15

## 2025-03-17 RX ORDER — PREDNISONE 10 MG/1
10 TABLET ORAL DAILY
Qty: 15 TABLET | Refills: 0 | Status: SHIPPED | OUTPATIENT
Start: 2025-03-17 | End: 2025-04-01

## 2025-03-17 NOTE — PLAN OF CARE
Problem: Adult Inpatient Plan of Care  Goal: Plan of Care Review  Description: The Plan of Care Review/Shift note should be completed every shift.  The Outcome Evaluation is a brief statement about your assessment that the patient is improving, declining, or no change.  This information will be displayed automatically on your shift  note.  Outcome: Progressing     Problem: Infection  Goal: Absence of Infection Signs and Symptoms  Outcome: Progressing   Goal Outcome Evaluation:             Pt is progressing with these goals.  Pt's pain and nausea is controlled with prn meds.  Pt is tolerating iv antibiotics.  Will continue to monitor status.            Patients cousin called office today- on friends and family, requesting when patient should have his lab orders completed. He has an appointment at 4/16. She is asking if one week ahead of appointment is okay?

## 2025-03-17 NOTE — PROGRESS NOTES
Rheumatology follow-up visit note     Bon is a 69 year old male presents today for follow-up.    Bon was seen today for recheck.    Diagnoses and all orders for this visit:    Psoriatic arthritis (H)  -     predniSONE (DELTASONE) 10 MG tablet; Take 1 tablet (10 mg) by mouth daily for 15 days.  -     Cancel: XR Shoulder Bilateral 3 Views; Future    Psoriasis    Ankylosis, sacroiliac joint    Osteoarthritis of bilateral glenohumeral joints  -     Cancel: XR Shoulder Bilateral 3 Views; Future        The longitudinal plan of care for the diagnosis(es)/condition(s) as documented were addressed during this visit. Due to the added complexity in care, I will continue to support Bon in the subsequent management and with ongoing continuity of care.      Follow up in 1 year.    HPI    Bon Luque is a 69 year old male is here for follow-up of   psoriatic arthritis, psoriasis, was seen here recently after a long interval.  He feels that Skyrizi has helped both psoriasis and joint symptoms.  He is going for an  cruise 3/25 there will be quite a bit of walking involved, furthermore he is concerned that he might get a flareup there which potentially might review in his ability to enjoy the cruise that he is so much looking forward to.  He was in the hospital with septicemia, developed liver abscess those data are reviewed he finished the course of antibiotics approximately 2 weeks ago as best as he can recall.  His shoulders have been troubling him again.  He is known to have a combination of tendinopathy glenohumeral degenerative changes.  He wondered about corticosteroid injections I have advised that we deferred them till he is back from the cruise.  Today will take x-rays of the shoulders however.        Following is the excerpt from a previous note:  Discharge Diagnoses  #Sepsis from Strep intermedius bacteremia  #Multiple liver abscesses (resolved)  #Hyponatremia, resolved  #Acute  anemia  #Psoriasis  #Hypophosphatemia   #Remote DVT and PE  #Chronic pain  #History of renal cell carcinoma  #Hypogonadism  #Type 2 DM with long-term insulin use  #Moderate Malnutrition        DETAILED EXAMINATION  03/17/25  :    Vitals:    03/17/25 1217   BP: (!) 144/70   BP Location: Right arm   Patient Position: Sitting   Cuff Size: Adult Large   Pulse: 80   SpO2: 99%   Weight: 94.8 kg (209 lb)    Alert oriented. Head including the face is examined for malar rash, heliotropes, scarring, lupus pernio. Eyes examined for redness such as in episcleritis/scleritis, periorbital lesions.   Neck/ Face examined for parotid gland swelling, range of motion of neck.  Left upper and lower and right upper and lower extremities examined for tenderness, swelling, warmth of the appendicular joints, range of motion, edema, rash.  Some of the important findings included: he does not have evidence of synovitis in the palpable joints of the upper extremities.  No significant deformities of the digits.  [place] nodes.  Range of motion of the shoulders  show full abduction.  No JLT effusion or warmth of the knees.  he does not have dactylitis of the digits.     Patient Active Problem List    Diagnosis Date Noted    Benign paroxysmal positional vertigo due to bilateral vestibular disorder 03/06/2025     Priority: Medium    Syncope, unspecified syncope type 03/06/2025     Priority: Medium    History of pulmonary embolism 02/11/2025     Priority: Medium    Liver abscess due to bacteria 02/11/2025     Priority: Medium    Liver mass 01/15/2025     Priority: Medium    Sepsis, due to unspecified organism, unspecified whether acute organ dysfunction present (H) 01/15/2025     Priority: Medium    Anxiety 08/23/2024     Priority: Medium    Bilateral sensorineural hearing loss 08/23/2024     Priority: Medium    History of duodenal ulcer 08/23/2024     Priority: Medium    Bilateral leg pain 08/23/2024     Priority: Medium    Acute pulmonary  embolism without acute cor pulmonale (H) 05/21/2024     Priority: Medium    Acute deep vein thrombosis (DVT) of femoral vein of right lower extremity (H) 05/09/2024     Priority: Medium    Pseudocyst of pancreas 10/28/2023     Priority: Medium    Dilated cbd, acquired 07/12/2022     Priority: Medium    Renal cell carcinoma, right (H) 06/23/2022     Priority: Medium    Chronic fatigue 06/23/2022     Priority: Medium    Chronic right shoulder pain 06/23/2022     Priority: Medium    Drug-induced constipation      Priority: Medium    Type 2 diabetes mellitus with complication, with long-term current use of insulin (H)      Priority: Medium    Chronic pain syndrome      Priority: Medium    Depression 12/26/2017     Priority: Medium    Ischemic cardiomyopathy      Priority: Medium     Stress Echocardiogram 2017 with decreased LV function with EF 43% worse   compared to previous echo.  No change in infarct.  No new ischemia        Ankylosis, sacroiliac joint 07/11/2016     Priority: Medium    Spondylosis of lumbar region without myelopathy or radiculopathy 07/11/2016     Priority: Medium    Psoriatic arthritis (H) 04/28/2016     Priority: Medium    Chronic low back pain 04/28/2016     Priority: Medium    Diabetic peripheral neuropathy associated with type 2 diabetes mellitus (H) 04/28/2016     Priority: Medium    ASCVD (arteriosclerotic cardiovascular disease) 04/28/2016     Priority: Medium     Coronary artery stent ×2 in RCA following myocardial infarction 2008 ,   echo June 2014 normal LV function with ejection fraction 50-55% with mild   apical inferior hypokinesis        Alcohol abuse, in remission 04/28/2016     Priority: Medium    Psoriasis      Priority: Medium    Hypercholesterolemia      Priority: Medium    HTN (hypertension)      Priority: Medium    Optic neuritis      Priority: Medium    Obstructive sleep apnea on CPAP      Priority: Medium    Hypogonadism in male      Priority: Medium    Erectile dysfunction       Priority: Medium     Urologic evaluation July 2015, hypogonadism        Thoracic radiculopathy 01/01/2004     Priority: Medium     right T8 intercostal nerve block 2002         Past Surgical History:   Procedure Laterality Date    APPENDECTOMY      COLONOSCOPY      Colonoscopy September 2017 with hyperplastic polyps, repeat in 10 years    CORONARY ANGIOPLASTY      stent    ENDOSCOPIC RETROGRADE CHOLANGIOPANCREATOGRAM N/A 7/12/2022    Procedure: ENDOSCOPIC RETROGRADE CHOLANGIOPANCREATOGRAPHY, STONE EXTRACTION, BILIARY SPHINCTEROTOMY;  Surgeon: Bon Meyer MD;  Location: [facility] [place]    ESOPHAGOSCOPY, GASTROSCOPY, DUODENOSCOPY (EGD), COMBINED N/A 10/6/2023    Procedure: ESOPHAGOGASTRODUODENOSCOPY, WITH ENDOSCOPIC ULTRASOUND WITH FINE NEEDLE ASPIRATION AND DUODENAL BIOPSY;  Surgeon: Bon Meyer MD;  Location: [facility] - [place] OR    KNEE SURGERY      LAPAROSCOPIC CHOLECYSTECTOMY N/A 7/12/2022    Procedure: CHOLECYSTECTOMY, LAPAROSCOPIC;  Surgeon: Adam March MD;  Location: [facility] [place] OR    NOSE SURGERY      PICC DOUBLE LUMEN PLACEMENT  10/9/2023    PICC SINGLE LUMEN PLACEMENT  1/18/2025    MD LAP,PARTIAL NEPHRECTOMY Right 1/10/2018    Procedure: RIGHT ROBOTIC PARTIAL NEPHRECTOMY WITH INTRAOPERATIVE ULTRASOUND;  Surgeon: Chase Rodríguez MD;  Location: [facility] - [place];  Service: Urology      Past Medical History:   Diagnosis Date    Abdominal pain, epigastric 06/23/2022    Normal gastric emptying study.  Peptic ulcer disease or gastritis suspected    Acute cholecystitis 07/12/2022    Hospitalized with acute abdominal pain, abnormal LFTs and CT showing distended gallbladder and bile duct dilatation.  Acute cholecystitis.  Cholecystectomy.    Acute pulmonary embolism without acute cor pulmonale (H) 05/21/2024    Anxiety 08/23/2024    Arthritis     psoriatic    ASCVD (arteriosclerotic cardiovascular disease) 04/28/2016    Coronary artery stent ×2 in RCA following myocardial  infarction 2008 , echo June 2014 normal LV function with ejection fraction 50-55% with mild apical inferior hypokinesis    Benign paroxysmal positional vertigo due to bilateral vestibular disorder 03/06/2025    Bilateral leg pain 08/23/2024    Bilateral sensorineural hearing loss 08/23/2024    Chronic fatigue 06/23/2022    Chronic low back pain 04/28/2016    Chronic right shoulder pain 06/23/2022    Coronary artery disease     Deep vein thrombosis (DVT) of upper extremity (H) 10/09/2023    Depression 12/26/2017    Depressive disorder 2002    Diabetic peripheral neuropathy associated with type 2 diabetes mellitus (H) 04/28/2016    Abnormal EMG and workup at [facility]    Diarrhea, unspecified type 01/15/2025    Dilated bile duct 07/03/2017    CT scan with incidental finding of dilated intra-and extrahepatic ducts    Duodenal ulcer without hemorrhage, perforation, or obstruction 10/16/2023    History of duodenal ulcer 08/23/2024    HTN (hypertension)     Hypercholesterolemia     Hypogonadism in male     Hyponatremia 07/12/2022    Hypophosphatemia 10/05/2023    Ischemic cardiomyopathy     Stress Echocardiogram 2017 with decreased LV function with EF 43% worse compared to previous echo.  No change in infarct.  No new ischemia    Knee effusion, left 11/06/2018    Left upper quadrant pain 06/30/2017    Leukocytosis, unspecified type 10/27/2023    Liver abscess due to bacteria 02/11/2025    Lower abdominal pain     Mass of duodenum 10/16/2023    Obstruction of second portion of duodenum with abnormal CT scan, biopsies negative for endoscopic ultrasound    MI (myocardial infarction) (H)     Nausea and vomiting 10/05/2023    Obstructive sleep apnea on CPAP     CPAP    Optic neuritis     Psoriasis     Recurrent vomiting 09/01/2023    Renal cell carcinoma, right (H) 06/23/2022    Right partial nephrectomy 2017    Right renal mass     SBO (small bowel obstruction) (H) 10/05/2023    Screening for AAA (abdominal aortic aneurysm)      No AAA on imaging in 2024    Sebaceous cyst 2010    Syncope, unspecified syncope type 03/06/2025    Thoracic radiculopathy 2004    right T8 intercostal nerve block 2002    Tobacco abuse, in remission 04/28/2016    Transaminitis 07/12/2022    Type 2 diabetes mellitus with insulin therapy (H)      Allergies   Allergen Reactions    Atorvastatin Muscle Pain (Myalgia)    Valomag [Arthritis Pain Formula] Nausea and Vomiting     gastric ulcer from aspirin use    Ampicillin Rash     Had a reaction to this medication many years ago.    Codeine Rash     It has been about 30 years ago     Current Outpatient Medications   Medication Sig Dispense Refill    Continuous Blood Gluc Sensor (Simple StarSTYLE SHIRA 14 DAY SENSOR) MISC       DULoxetine (CYMBALTA) 60 MG capsule Take 60 mg by mouth every morning.      ELIQUIS ANTICOAGULANT 5 MG tablet Take 1 tablet (5 mg) by mouth 2 times daily. 180 tablet 3    evolocumab (REPATHA) 140 MG/ML prefilled autoinjector Inject 1 mL (140 mg) subcutaneously every 14 days. 6 mL 0    gabapentin (NEURONTIN) 300 MG capsule [GABAPENTIN (NEURONTIN) 300 MG CAPSULE] Take 600 mg by mouth 3 (three) times a day.       insulin lispro (HUMALOG KWIKPEN) 100 UNIT/ML (1 unit dial) KWIKPEN Inject subcutaneously 3 times daily (before meals). Sliding scale. Average use is 50 units with each meal      LANTUS SOLOSTAR 100 UNIT/ML soln Inject 15 Units subcutaneously 2 times daily. 15 mL 0    Lidocaine (LIDOCARE) 4 % Patch Place 3 patches over 12 hours onto the skin every 24 hours. To prevent lidocaine toxicity, patient should be patch free for 12 hrs daily. (Patient not taking: Reported on 3/17/2025) 15 patch 0    magnesium oxide 400 MG CAPS Take 400 mg by mouth nightly as needed.      melatonin 5 MG CAPS Take 5-10 mg by mouth nightly as needed (sleep)      morphine (MS CONTIN) 15 MG CR tablet Take 1 tablet (15 mg) by mouth 3 times daily.      multivitamin w/minerals (THERA-VIT-M) tablet Take 1 tablet by mouth daily as  needed.      naloxone (NARCAN) 4 MG/0.1ML nasal spray Spray 1 spray into one nostril alternating nostrils once as needed for opioid reversal (Patient not taking: Reported on 3/17/2025)      omeprazole (PRILOSEC) 20 MG DR capsule Take 20 mg by mouth 2 times daily.      ondansetron (ZOFRAN ODT) 4 MG ODT tab Take 1 tablet (4 mg) by mouth every 8 hours as needed for nausea. (Patient not taking: Reported on 3/17/2025) 10 tablet 0    rosuvastatin (CRESTOR) 5 MG tablet Take 1 tablet (5 mg) by mouth daily. 90 tablet 0    sodium fluoride dental gel (PREVIDENT) 1.1 % GEL topical gel Apply to affected area nightly as needed.      testosterone (ANDROGEL 1.62 % PUMP) 20.25 MG/ACT gel Place 1 Pump onto the skin every 72 hours.       family history includes Depression in his father and sister; Diabetes in his brother, father, and mother; Other Cancer in his mother; Substance Abuse in his brother.  Social Connections: Unknown (8/23/2024)    Social Connection and Isolation Panel [NHANES]     Frequency of Communication with Friends and Family: Not on file     Frequency of Social Gatherings with Friends and Family: Once a week     Attends [employer] Services: Not on file     Active Member of Clubs or Organizations: Not on file     Attends Club or Organization Meetings: Not on file     Marital Status: Not on file          WBC Count   Date Value Ref Range Status   02/11/2025 10.3 4.0 - 11.0 10e3/uL Final     RBC Count   Date Value Ref Range Status   02/11/2025 4.66 4.40 - 5.90 10e6/uL Final     Hemoglobin   Date Value Ref Range Status   02/11/2025 13.0 (L) 13.3 - 17.7 g/dL Final     Hematocrit   Date Value Ref Range Status   02/11/2025 40.2 40.0 - 53.0 % Final     MCV   Date Value Ref Range Status   02/11/2025 86 78 - 100 fL Final     MCH   Date Value Ref Range Status   02/11/2025 27.9 26.5 - 33.0 pg Final     Platelet Count   Date Value Ref Range Status   02/11/2025 330 150 - 450 10e3/uL Final     % Lymphocytes   Date Value Ref Range  Status   01/21/2025 10 % Final     AST   Date Value Ref Range Status   02/11/2025 48 (H) 0 - 45 U/L Final     ALT   Date Value Ref Range Status   02/11/2025 29 0 - 70 U/L Final     Albumin   Date Value Ref Range Status   02/11/2025 3.5 3.5 - 5.2 g/dL Final   07/13/2022 3.1 (L) 3.5 - 5.0 g/dL Final     Alkaline Phosphatase   Date Value Ref Range Status   02/11/2025 137 40 - 150 U/L Final     Creatinine   Date Value Ref Range Status   02/11/2025 0.81 0.67 - 1.17 mg/dL Final     GFR Estimate   Date Value Ref Range Status   02/11/2025 >90 >60 mL/min/1.73m2 Final     Comment:     eGFR calculated using 2021 CKD-EPI equation.   01/13/2021 >60 >60 mL/min/1.73m2 Final     GFR, ESTIMATED POCT   Date Value Ref Range Status   11/08/2024 >60 >60 mL/min/1.73m2 Final     GFR Estimate If Black   Date Value Ref Range Status   01/13/2021 >60 >60 mL/min/1.73m2 Final     Creatinine Urine mg/dL   Date Value Ref Range Status   08/23/2024 47.2 mg/dL Final     Comment:     The reference ranges have not been established in urine creatinine. The results should be integrated into the clinical context for interpretation.

## 2025-03-17 NOTE — LETTER
3/17/2025    Aditya Munoz MD  [address] [facility] [address]    RE: Bon Luque       Dear Colleague,     I had the pleasure of seeing Bon Luque in the [facility].    [facility]   [address]  [place] [address]   [email or web address]   OFFICE: [phone]     CARDIOLOGY CLINIC NOTE     Thank you, Aditya Chairez, for asking the [facility] team to see Mr. Bon Luque       History of Present Illness     Pt referred for syncope and abn stress test    Pmh- CAD s/p MI in 2008 RCA stent x 2, DM x 22 yrs, HLD, RCC, HTN, DVT on noac, h/o infected peptic ulcer, hypogonadism on testosterone, vasovagal syncope, psoriatic arthritis, chronic pain, fibromyalgia.    admitted [facility] 15-feb 5 for  strep bacteremia, liver abscess. After discontinue he had an episode of abd and CP lasting 6 hours, called 911, paramedics told him his EKG is ok and wasn't taken to ER. No recurrence.    He had a few episodes of syncope when getting out of bed after that; was dehydrated due to diarrhea.    No recurrence.    Last cardiology visit jan 2020 ([facility])       Medications  Allergies   Current Outpatient Medications   Medication Sig Dispense Refill     Continuous Blood Gluc Sensor (FREESTYLE SHIRA 14 DAY SENSOR) MISC        DULoxetine (CYMBALTA) 60 MG capsule Take 60 mg by mouth every morning.       ELIQUIS ANTICOAGULANT 5 MG tablet Take 1 tablet (5 mg) by mouth 2 times daily. 180 tablet 3     evolocumab (REPATHA) 140 MG/ML prefilled autoinjector Inject 1 mL (140 mg) subcutaneously every 14 days. 6 mL 0     gabapentin (NEURONTIN) 300 MG capsule [GABAPENTIN (NEURONTIN) 300 MG CAPSULE] Take 600 mg by mouth 3 (three) times a day.        insulin lispro (HUMALOG KWIKPEN) 100 UNIT/ML (1 unit dial) KWIKPEN Inject subcutaneously 3 times daily (before meals). Sliding scale. Average use is 50 units with each meal       LANTUS SOLOSTAR 100  UNIT/ML soln Inject 15 Units subcutaneously 2 times daily. 15 mL 0     magnesium oxide 400 MG CAPS Take 400 mg by mouth nightly as needed.       melatonin 5 MG CAPS Take 5-10 mg by mouth nightly as needed (sleep)       morphine (MS CONTIN) 15 MG CR tablet Take 1 tablet (15 mg) by mouth 3 times daily.       multivitamin w/minerals (THERA-VIT-M) tablet Take 1 tablet by mouth daily as needed.       omeprazole (PRILOSEC) 20 MG DR capsule Take 20 mg by mouth 2 times daily.       rosuvastatin (CRESTOR) 5 MG tablet Take 1 tablet (5 mg) by mouth daily. 90 tablet 0     sodium fluoride dental gel (PREVIDENT) 1.1 % GEL topical gel Apply to affected area nightly as needed.       testosterone (ANDROGEL 1.62 % PUMP) 20.25 MG/ACT gel Place 1 Pump onto the skin every 72 hours.       Lidocaine (LIDOCARE) 4 % Patch Place 3 patches over 12 hours onto the skin every 24 hours. To prevent lidocaine toxicity, patient should be patch free for 12 hrs daily. (Patient not taking: Reported on 3/17/2025) 15 patch 0     naloxone (NARCAN) 4 MG/0.1ML nasal spray Spray 1 spray into one nostril alternating nostrils once as needed for opioid reversal (Patient not taking: Reported on 3/17/2025)       ondansetron (ZOFRAN ODT) 4 MG ODT tab Take 1 tablet (4 mg) by mouth every 8 hours as needed for nausea. (Patient not taking: Reported on 3/17/2025) 10 tablet 0     predniSONE (DELTASONE) 10 MG tablet Take 1 tablet (10 mg) by mouth daily for 15 days. 15 tablet 0      Allergies   Allergen Reactions     Atorvastatin Muscle Pain (Myalgia)     Valomag [Arthritis Pain Formula] Nausea and Vomiting     gastric ulcer from aspirin use     Ampicillin Rash     Had a reaction to this medication many years ago.     Codeine Rash     It has been about 30 years ago        Physical Examination Review of Systems   Vitals: BP (!) 140/76 (BP Location: Right arm, Patient Position: Sitting, Cuff Size: Adult Regular)   Pulse 80   Resp 16   Ht 1.829 m (6')   Wt 94.8 kg (209  lb)   BMI 28.35 kg/m    BMI= Body mass index is 28.35 kg/m .  Wt Readings from Last 3 Encounters:   03/17/25 94.8 kg (209 lb)   03/17/25 94.8 kg (209 lb)   03/06/25 93.9 kg (207 lb)       General: pleasant male. No acute distress.   Neck: No JVD  Lungs: clear to auscultation  COR:  regular rate and rhythm, No murmurs, rubs, or gallops  Extrem: No edema        Please refer above for cardiac ROS details.       Past History     Family History:   Family History   Problem Relation Age of Onset     Diabetes Mother      Other Cancer Mother      Diabetes Father      Depression Father      Diabetes Brother      Depression Sister      Substance Abuse Brother         Alcohol        Social History:   Social History     Socioeconomic History     Marital status:      Spouse name: Not on file     Number of children: Not on file     Years of education: Not on file     Highest education level: Not on file   Occupational History     Not on file   Tobacco Use     Smoking status: Former     Current packs/day: 0.50     Average packs/day: 0.5 packs/day for 5.0 years (2.5 ttl pk-yrs)     Types: Cigarettes     Passive exposure: Past     Smokeless tobacco: Never   Vaping Use     Vaping status: Never Used   Substance and Sexual Activity     Alcohol use: No     Comment: Alcoholic Drinks/day: alcohol abuse - in remission x16 years     Drug use: No     Sexual activity: Not Currently     Partners: Female     Birth control/protection: None   Other Topics Concern     Parent/sibling w/ CABG, MI or angioplasty before 65F 55M? No   Social History Narrative     Not on file     Social Drivers of Health     Financial Resource Strain: Low Risk  (1/16/2025)    Financial Resource Strain      Within the past 12 months, have you or your family members you live with been unable to get utilities (heat, electricity) when it was really needed?: No   Food Insecurity: Low Risk  (1/16/2025)    Food Insecurity      Within the past 12 months, did you worry  that your food would run out before you got money to buy more?: No      Within the past 12 months, did the food you bought just not last and you didn t have money to get more?: No   Transportation Needs: Low Risk  (1/16/2025)    Transportation Needs      Within the past 12 months, has lack of transportation kept you from medical appointments, getting your medicines, non-medical meetings or appointments, work, or from getting things that you need?: No   Physical Activity: Insufficiently Active (8/23/2024)    Exercise Vital Sign      Days of Exercise per Week: 1 day      Minutes of Exercise per Session: 20 min   Stress: No Stress Concern Present (8/23/2024)    [employer] - Occupational Stress Questionnaire      Feeling of Stress : Not at all   Social Connections: Unknown (8/23/2024)    Social Connection and Isolation Panel [NHANES]      Frequency of Communication with Friends and Family: Not on file      Frequency of Social Gatherings with Friends and Family: Once a week      Attends Anabaptism Services: Not on file      Active Member of Clubs or Organizations: Not on file      Attends Club or Organization Meetings: Not on file      Marital Status: Not on file   Interpersonal Safety: Low Risk  (1/16/2025)    Interpersonal Safety      Do you feel physically and emotionally safe where you currently live?: Yes      Within the past 12 months, have you been hit, slapped, kicked or otherwise physically hurt by someone?: No      Within the past 12 months, have you been humiliated or emotionally abused in other ways by your partner or ex-partner?: No   Housing Stability: Low Risk  (1/16/2025)    Housing Stability      Do you have housing? : Yes      Are you worried about losing your housing?: No            Lab Results    Chemistry/lipid CBC Cardiac Enzymes/BNP/TSH/INR   Lab Results   Component Value Date    CHOL 233 (H) 08/23/2024    HDL 38 (L) 08/23/2024    TRIG 195 (H) 08/23/2024    BUN 12.0  02/11/2025     02/11/2025    CO2 25 02/11/2025    Lab Results   Component Value Date    WBC 10.3 02/11/2025    HGB 13.0 (L) 02/11/2025    HCT 40.2 02/11/2025    MCV 86 02/11/2025     02/11/2025    Lab Results   Component Value Date    TROPONINI 0.03 07/12/2022    BNP 11 06/16/2022    TSH 1.64 02/11/2025    INR 1.26 (H) 01/16/2025          Cardiac Problems and Cardiac Diagnostics     Most Recent Cardiac testing:  ECG Personal interpretation 3/6/25: NSR, inf q waves, TWI    ECHO 1/15/25: 1. Left ventricular chamber size is normal. Mild concentric increase in wall  thickness. Systolic function is low normal. The visually estimated left  ventricular ejection fraction is 50-55%.  2. Right ventricular chamber size and systolic function are normal.  3. No hemodynamically significant valvular abnormalities.    Stress test:   3/6/25: Lexiscan stress ECG nondiagnostic for ischemia secondary to baseline ST-T wave abnormality.     The nuclear stress test is abnormal.  There is a large area of nontransmural infarction involving the inferior, inferoseptal and inferolateral walls.  No definite ischemia.     The left ventricular ejection fraction at stress is 38% with hypokinesis to akinesis of the inferolateral to inferior wall.     The patient is at a low to intermediate risk of future cardiac ischemic events.     There is no prior study for comparison.           Assessment/Recommendations   Assessment:      Abnormal stress test  2. Syncope  3. CAD  4. HLD  5. DM    tress test consistent with prior inferior infarction no ischemia; EF reported to be 38 on stress test, most recent echocardiogram from January suboptimal images    Syncopal episode in the setting of dehydration; will rule out cardiac etiology 2 weeks Zio patch to rule out arrhythmia- ; cardiac MRI to accurately assess EF, scar.  Currently not on any cholesterol medications LDL is 156.  Previously stopped statin Due to his chronic lower extremity pain  symptoms.  Start PCSK9.  Started crestor at a low dose; uptitrate as tolerated.  Follow-up labs in 3 months. LDL goal < 70    Pt will start jardiance (has at home, hasn't started), A1c goal < 7      The longitudinal plan of care for the diagnosis(es)/condition(s) as documented were addressed during this visit. Due to the added complexity in care, I will continue to support Bon in the subsequent management and with ongoing continuity of care.           Baldo Buenrostro MD, MPH  Non-invasive Cardiologist  [facility]       Thank you for allowing me to participate in the care of your patient.      Sincerely,     Baldo RICHARDSON [facility]  cc:   Aditya Munoz MD  [address] [place] [address]

## 2025-03-17 NOTE — PROGRESS NOTES
[facility]   [address]  [place] [address]   [email or web address]   OFFICE: [phone]     CARDIOLOGY CLINIC NOTE     Thank you, Aditya Chairez, for asking the [facility] team to see Mr. Bon Luque       History of Present Illness     Pt referred for syncope and abn stress test    Pmh- CAD s/p MI in 2008 RCA stent x 2, DM x 22 yrs, HLD, RCC, HTN, DVT on noac, h/o infected peptic ulcer, hypogonadism on testosterone, vasovagal syncope, psoriatic arthritis, chronic pain, fibromyalgia.    admitted [facility]aine 15-feb 5 for  strep bacteremia, liver abscess. After discontinue he had an episode of abd and CP lasting 6 hours, called 911, paramedics told him his EKG is ok and wasn't taken to ER. No recurrence.    He had a few episodes of syncope when getting out of bed after that; was dehydrated due to diarrhea.    No recurrence.    Last cardiology visit jan 2020 ([facility])       Medications  Allergies   Current Outpatient Medications   Medication Sig Dispense Refill    Continuous Blood Gluc Sensor (Colibri Heart ValveSTYLE SHIRA 14 DAY SENSOR) MISC       DULoxetine (CYMBALTA) 60 MG capsule Take 60 mg by mouth every morning.      ELIQUIS ANTICOAGULANT 5 MG tablet Take 1 tablet (5 mg) by mouth 2 times daily. 180 tablet 3    evolocumab (REPATHA) 140 MG/ML prefilled autoinjector Inject 1 mL (140 mg) subcutaneously every 14 days. 6 mL 0    gabapentin (NEURONTIN) 300 MG capsule [GABAPENTIN (NEURONTIN) 300 MG CAPSULE] Take 600 mg by mouth 3 (three) times a day.       insulin lispro (HUMALOG KWIKPEN) 100 UNIT/ML (1 unit dial) KWIKPEN Inject subcutaneously 3 times daily (before meals). Sliding scale. Average use is 50 units with each meal      LANTUS SOLOSTAR 100 UNIT/ML soln Inject 15 Units subcutaneously 2 times daily. 15 mL 0    magnesium oxide 400 MG CAPS Take 400 mg by mouth nightly as needed.      melatonin 5 MG CAPS Take 5-10 mg by mouth nightly as needed (sleep)       morphine (MS CONTIN) 15 MG CR tablet Take 1 tablet (15 mg) by mouth 3 times daily.      multivitamin w/minerals (THERA-VIT-M) tablet Take 1 tablet by mouth daily as needed.      omeprazole (PRILOSEC) 20 MG DR capsule Take 20 mg by mouth 2 times daily.      rosuvastatin (CRESTOR) 5 MG tablet Take 1 tablet (5 mg) by mouth daily. 90 tablet 0    sodium fluoride dental gel (PREVIDENT) 1.1 % GEL topical gel Apply to affected area nightly as needed.      testosterone (ANDROGEL 1.62 % PUMP) 20.25 MG/ACT gel Place 1 Pump onto the skin every 72 hours.      Lidocaine (LIDOCARE) 4 % Patch Place 3 patches over 12 hours onto the skin every 24 hours. To prevent lidocaine toxicity, patient should be patch free for 12 hrs daily. (Patient not taking: Reported on 3/17/2025) 15 patch 0    naloxone (NARCAN) 4 MG/0.1ML nasal spray Spray 1 spray into one nostril alternating nostrils once as needed for opioid reversal (Patient not taking: Reported on 3/17/2025)      ondansetron (ZOFRAN ODT) 4 MG ODT tab Take 1 tablet (4 mg) by mouth every 8 hours as needed for nausea. (Patient not taking: Reported on 3/17/2025) 10 tablet 0    predniSONE (DELTASONE) 10 MG tablet Take 1 tablet (10 mg) by mouth daily for 15 days. 15 tablet 0      Allergies   Allergen Reactions    Atorvastatin Muscle Pain (Myalgia)    Valomag [Arthritis Pain Formula] Nausea and Vomiting     gastric ulcer from aspirin use    Ampicillin Rash     Had a reaction to this medication many years ago.    Codeine Rash     It has been about 30 years ago        Physical Examination Review of Systems   Vitals: BP (!) 140/76 (BP Location: Right arm, Patient Position: Sitting, Cuff Size: Adult Regular)   Pulse 80   Resp 16   Ht 1.829 m (6')   Wt 94.8 kg (209 lb)   BMI 28.35 kg/m    BMI= Body mass index is 28.35 kg/m .  Wt Readings from Last 3 Encounters:   03/17/25 94.8 kg (209 lb)   03/17/25 94.8 kg (209 lb)   03/06/25 93.9 kg (207 lb)       General: pleasant male. No acute distress.    Neck: No JVD  Lungs: clear to auscultation  COR:  regular rate and rhythm, No murmurs, rubs, or gallops  Extrem: No edema        Please refer above for cardiac ROS details.       Past History     Family History:   Family History   Problem Relation Age of Onset    Diabetes Mother     Other Cancer Mother     Diabetes Father     Depression Father     Diabetes Brother     Depression Sister     Substance Abuse Brother         Alcohol        Social History:   Social History     Socioeconomic History    Marital status:      Spouse name: Not on file    Number of children: Not on file    Years of education: Not on file    Highest education level: Not on file   Occupational History    Not on file   Tobacco Use    Smoking status: Former     Current packs/day: 0.50     Average packs/day: 0.5 packs/day for 5.0 years (2.5 ttl pk-yrs)     Types: Cigarettes     Passive exposure: Past    Smokeless tobacco: Never   Vaping Use    Vaping status: Never Used   Substance and Sexual Activity    Alcohol use: No     Comment: Alcoholic Drinks/day: alcohol abuse - in remission x16 years    Drug use: No    Sexual activity: Not Currently     Partners: Female     Birth control/protection: None   Other Topics Concern    Parent/sibling w/ CABG, MI or angioplasty before 65F 55M? No   Social History Narrative    Not on file     Social Drivers of Health     Financial Resource Strain: Low Risk  (1/16/2025)    Financial Resource Strain     Within the past 12 months, have you or your family members you live with been unable to get utilities (heat, electricity) when it was really needed?: No   Food Insecurity: Low Risk  (1/16/2025)    Food Insecurity     Within the past 12 months, did you worry that your food would run out before you got money to buy more?: No     Within the past 12 months, did the food you bought just not last and you didn t have money to get more?: No   Transportation Needs: Low Risk  (1/16/2025)    Transportation Needs      Within the past 12 months, has lack of transportation kept you from medical appointments, getting your medicines, non-medical meetings or appointments, work, or from getting things that you need?: No   Physical Activity: Insufficiently Active (8/23/2024)    Exercise Vital Sign     Days of Exercise per Week: 1 day     Minutes of Exercise per Session: 20 min   Stress: No Stress Concern Present (8/23/2024)    [employer] - Occupational Stress Questionnaire     Feeling of Stress : Not at all   Social Connections: Unknown (8/23/2024)    Social Connection and Isolation Panel [NHANES]     Frequency of Communication with Friends and Family: Not on file     Frequency of Social Gatherings with Friends and Family: Once a week     Attends Roman [employer] Services: Not on file     Active Member of Clubs or Organizations: Not on file     Attends Club or Organization Meetings: Not on file     Marital Status: Not on file   Interpersonal Safety: Low Risk  (1/16/2025)    Interpersonal Safety     Do you feel physically and emotionally safe where you currently live?: Yes     Within the past 12 months, have you been hit, slapped, kicked or otherwise physically hurt by someone?: No     Within the past 12 months, have you been humiliated or emotionally abused in other ways by your partner or ex-partner?: No   Housing Stability: Low Risk  (1/16/2025)    Housing Stability     Do you have housing? : Yes     Are you worried about losing your housing?: No            Lab Results    Chemistry/lipid CBC Cardiac Enzymes/BNP/TSH/INR   Lab Results   Component Value Date    CHOL 233 (H) 08/23/2024    HDL 38 (L) 08/23/2024    TRIG 195 (H) 08/23/2024    BUN 12.0 02/11/2025     02/11/2025    CO2 25 02/11/2025    Lab Results   Component Value Date    WBC 10.3 02/11/2025    HGB 13.0 (L) 02/11/2025    HCT 40.2 02/11/2025    MCV 86 02/11/2025     02/11/2025    Lab Results   Component Value Date    TROPONINI 0.03  07/12/2022    BNP 11 06/16/2022    TSH 1.64 02/11/2025    INR 1.26 (H) 01/16/2025          Cardiac Problems and Cardiac Diagnostics     Most Recent Cardiac testing:  ECG Personal interpretation 3/6/25: NSR, inf q waves, TWI    ECHO 1/15/25: 1. Left ventricular chamber size is normal. Mild concentric increase in wall  thickness. Systolic function is low normal. The visually estimated left  ventricular ejection fraction is 50-55%.  2. Right ventricular chamber size and systolic function are normal.  3. No hemodynamically significant valvular abnormalities.    Stress test:  3/6/25: Lexiscan stress ECG nondiagnostic for ischemia secondary to baseline ST-T wave abnormality.    The nuclear stress test is abnormal.  There is a large area of nontransmural infarction involving the inferior, inferoseptal and inferolateral walls.  No definite ischemia.    The left ventricular ejection fraction at stress is 38% with hypokinesis to akinesis of the inferolateral to inferior wall.    The patient is at a low to intermediate risk of future cardiac ischemic events.    There is no prior study for comparison.           Assessment/Recommendations   Assessment:      Abnormal stress test  2. Syncope  3. CAD  4. HLD  5. DM    tress test consistent with prior inferior infarction no ischemia; EF reported to be 38 on stress test, most recent echocardiogram from January suboptimal images    Syncopal episode in the setting of dehydration; will rule out cardiac etiology 2 weeks Zio patch to rule out arrhythmia- ; cardiac MRI to accurately assess EF, scar.  Currently not on any cholesterol medications LDL is 156.  Previously stopped statin Due to his chronic lower extremity pain symptoms.  Start PCSK9.  Started crestor at a low dose; uptitrate as tolerated.  Follow-up labs in 3 months. LDL goal < 70    Pt will start jardiance (has at home, hasn't started), A1c goal < 7      The longitudinal plan of care for the diagnosis(es)/condition(s) as  documented were addressed during this visit. Due to the added complexity in care, I will continue to support Bon in the subsequent management and with ongoing continuity of care.           Baldo Buenrostro MD, MPH  Non-invasive Cardiologist  [facility]

## 2025-03-17 NOTE — PATIENT INSTRUCTIONS
Schedule cardiac MRI  2 week heart froylan (mailed home)  Start crestor 5mg as tolerated  Start the injectable cholesterol medicine  See me back in 3  months

## 2025-04-15 ENCOUNTER — TELEPHONE (OUTPATIENT)
Dept: INTERNAL MEDICINE | Facility: CLINIC | Age: 70
End: 2025-04-15

## 2025-04-15 ENCOUNTER — HOSPITAL ENCOUNTER (OUTPATIENT)
Dept: MRI IMAGING | Facility: HOSPITAL | Age: 70
Discharge: HOME | End: 2025-04-15
Attending: INTERNAL MEDICINE
Payer: COMMERCIAL

## 2025-04-15 VITALS
HEART RATE: 87 BPM | OXYGEN SATURATION: 94 % | TEMPERATURE: 98.3 F | SYSTOLIC BLOOD PRESSURE: 152 MMHG | DIASTOLIC BLOOD PRESSURE: 69 MMHG

## 2025-04-15 DIAGNOSIS — I25.10 CORONARY ARTERY DISEASE INVOLVING NATIVE CORONARY ARTERY OF NATIVE HEART WITHOUT ANGINA PECTORIS: ICD-10-CM

## 2025-04-15 DIAGNOSIS — R55 SYNCOPE, UNSPECIFIED SYNCOPE TYPE: ICD-10-CM

## 2025-04-15 DIAGNOSIS — R94.39 ABNORMAL STRESS TEST: ICD-10-CM

## 2025-04-15 LAB
ATRIAL RATE - MUSE: 81 BPM
ATRIAL RATE - MUSE: 87 BPM
DIASTOLIC BLOOD PRESSURE - MUSE: NORMAL MMHG
DIASTOLIC BLOOD PRESSURE - MUSE: NORMAL MMHG
INTERPRETATION ECG - MUSE: NORMAL
INTERPRETATION ECG - MUSE: NORMAL
P AXIS - MUSE: 73 DEGREES
P AXIS - MUSE: 78 DEGREES
PR INTERVAL - MUSE: 156 MS
PR INTERVAL - MUSE: 162 MS
QRS DURATION - MUSE: 84 MS
QRS DURATION - MUSE: 86 MS
QT - MUSE: 336 MS
QT - MUSE: 338 MS
QTC - MUSE: 392 MS
QTC - MUSE: 404 MS
R AXIS - MUSE: 66 DEGREES
R AXIS - MUSE: 72 DEGREES
SYSTOLIC BLOOD PRESSURE - MUSE: NORMAL MMHG
SYSTOLIC BLOOD PRESSURE - MUSE: NORMAL MMHG
T AXIS - MUSE: 142 DEGREES
T AXIS - MUSE: 89 DEGREES
VENTRICULAR RATE- MUSE: 81 BPM
VENTRICULAR RATE- MUSE: 87 BPM

## 2025-04-15 PROCEDURE — 250N000011 HC RX IP 250 OP 636: Mod: JZ | Performed by: INTERNAL MEDICINE

## 2025-04-15 PROCEDURE — 255N000002 HC RX 255 OP 636: Performed by: INTERNAL MEDICINE

## 2025-04-15 PROCEDURE — 93005 ELECTROCARDIOGRAM TRACING: CPT

## 2025-04-15 PROCEDURE — 93016 CV STRESS TEST SUPVJ ONLY: CPT | Performed by: STUDENT IN AN ORGANIZED HEALTH CARE EDUCATION/TRAINING PROGRAM

## 2025-04-15 PROCEDURE — A9585 GADOBUTROL INJECTION: HCPCS | Performed by: INTERNAL MEDICINE

## 2025-04-15 PROCEDURE — 75563 CARD MRI W/STRESS IMG & DYE: CPT

## 2025-04-15 PROCEDURE — 93018 CV STRESS TEST I&R ONLY: CPT | Performed by: GENERAL ACUTE CARE HOSPITAL

## 2025-04-15 PROCEDURE — 75563 CARD MRI W/STRESS IMG & DYE: CPT | Mod: 26 | Performed by: GENERAL ACUTE CARE HOSPITAL

## 2025-04-15 PROCEDURE — 999N000122 MR MYOCARDIUM  OVERREAD

## 2025-04-15 RX ORDER — REGADENOSON 0.08 MG/ML
0.4 INJECTION, SOLUTION INTRAVENOUS ONCE
Status: COMPLETED | OUTPATIENT
Start: 2025-04-15 | End: 2025-04-15

## 2025-04-15 RX ORDER — GADOBUTROL 604.72 MG/ML
20 INJECTION INTRAVENOUS ONCE
Status: COMPLETED | OUTPATIENT
Start: 2025-04-15 | End: 2025-04-15

## 2025-04-15 RX ORDER — AMINOPHYLLINE 25 MG/ML
50-100 INJECTION, SOLUTION INTRAVENOUS
Status: COMPLETED | OUTPATIENT
Start: 2025-04-15 | End: 2025-04-15

## 2025-04-15 RX ADMIN — REGADENOSON 0.4 MG: 0.08 INJECTION, SOLUTION INTRAVENOUS at 10:37

## 2025-04-15 RX ADMIN — GADOBUTROL 20 ML: 604.72 INJECTION INTRAVENOUS at 11:30

## 2025-04-15 NOTE — TELEPHONE ENCOUNTER
Incoming call from pt, he's currently at MRI appointment wondering who ordered this and why. Verified MRI stress test was ordered by cardiology, pt states he will go ahead with MRI scan today.     Pt also states dizziness is getting worst in brain, not in ear, occurring daily now. On and off for 30 seconds. Bothering him, comes on when laying down, center between temples. Eyes hurt and pt feels imbalance. Pt hoping to do a scan to rule out tumor. Scheduled appointment with PCP for 4/18. Will forward message to PCP for review. Thank you.

## 2025-04-18 PROBLEM — I50.20 HEART FAILURE WITH REDUCED EJECTION FRACTION (H): Status: ACTIVE | Noted: 2025-04-18

## 2025-04-18 PROBLEM — I25.2 HISTORY OF ACUTE MYOCARDIAL INFARCTION: Status: ACTIVE | Noted: 2025-04-18

## 2025-04-18 PROBLEM — H53.8 BLURRED VISION: Status: ACTIVE | Noted: 2025-04-18

## 2025-04-18 PROBLEM — R51.9 NEW ONSET OF HEADACHES: Status: ACTIVE | Noted: 2025-04-18

## 2025-04-22 ENCOUNTER — HOSPITAL ENCOUNTER (OUTPATIENT)
Dept: MRI IMAGING | Facility: HOSPITAL | Age: 70
Discharge: HOME | End: 2025-04-22
Attending: INTERNAL MEDICINE | Admitting: INTERNAL MEDICINE
Payer: COMMERCIAL

## 2025-04-22 DIAGNOSIS — H53.8 BLURRED VISION: ICD-10-CM

## 2025-04-22 DIAGNOSIS — R51.9 NEW ONSET OF HEADACHES: ICD-10-CM

## 2025-04-22 PROCEDURE — 70551 MRI BRAIN STEM W/O DYE: CPT

## 2025-05-01 ENCOUNTER — OFFICE VISIT (OUTPATIENT)
Dept: RHEUMATOLOGY | Facility: CLINIC | Age: 70
End: 2025-05-01
Payer: COMMERCIAL

## 2025-05-01 VITALS
DIASTOLIC BLOOD PRESSURE: 60 MMHG | RESPIRATION RATE: 20 BRPM | WEIGHT: 215.7 LBS | BODY MASS INDEX: 29.25 KG/M2 | OXYGEN SATURATION: 98 % | HEART RATE: 90 BPM | SYSTOLIC BLOOD PRESSURE: 118 MMHG

## 2025-05-01 DIAGNOSIS — M43.28 ANKYLOSIS, SACROILIAC JOINT: ICD-10-CM

## 2025-05-01 DIAGNOSIS — M19.011 OSTEOARTHRITIS OF BILATERAL GLENOHUMERAL JOINTS: ICD-10-CM

## 2025-05-01 DIAGNOSIS — M70.62 GREATER TROCHANTERIC BURSITIS OF LEFT HIP: ICD-10-CM

## 2025-05-01 DIAGNOSIS — L40.9 PSORIASIS: ICD-10-CM

## 2025-05-01 DIAGNOSIS — L40.50 PSORIATIC ARTHRITIS (H): Primary | ICD-10-CM

## 2025-05-01 DIAGNOSIS — M19.012 OSTEOARTHRITIS OF BILATERAL GLENOHUMERAL JOINTS: ICD-10-CM

## 2025-05-01 RX ORDER — TRIAMCINOLONE ACETONIDE 40 MG/ML
40 INJECTION, SUSPENSION INTRA-ARTICULAR; INTRAMUSCULAR ONCE
Status: COMPLETED | OUTPATIENT
Start: 2025-05-01 | End: 2025-05-01

## 2025-05-01 RX ADMIN — TRIAMCINOLONE ACETONIDE 40 MG: 40 INJECTION, SUSPENSION INTRA-ARTICULAR; INTRAMUSCULAR at 12:03

## 2025-05-01 NOTE — PROGRESS NOTES
Rheumatology follow-up visit note     Bon is a 70 year old male presents today for follow-up.    Bon was seen today for recheck.    Diagnoses and all orders for this visit:    Psoriatic arthritis (H)  -     triamcinolone (KENALOG-40) injection 40 mg  -     ASPIRATION/INJECTION MAJOR JOINT    Psoriasis    Ankylosis, sacroiliac joint    Osteoarthritis of bilateral glenohumeral joints  -     triamcinolone (KENALOG-40) injection 40 mg  -     ASPIRATION/INJECTION MAJOR JOINT    Greater trochanteric bursitis of left hip            Psoriatic arthritis is doing very well with the current regimen.  He is complaining of pain in his shoulders worse on the left side especially lying down at nighttime or sometimes reaching more so earlier.  Would like to try corticosteroid injection.  After pros and cons were discussed including risk of infection, bleeding, skin changes including thinning, pigmentary alteration and scarring to name a few, left shoulder SA injected as noted in the orders section. This was done with nontouch technique.  The patient tolerated the procedure well and had a brisk Marcaine effect.  The postinjection care was discussed.'    Follow up in 6 months.    HPI    Bon Luque is a 70 year old male is here for follow-up of   psoriatic arthritis, psoriasis, was seen here recently after a long interval.  He feels that Skyrizi has helped both psoriasis and joint symptoms.  He had a great time on the  cruise that he went last months.  The were able to enjoy it without difficulty having taken prednisone along with.  He is going for an  cruise 3/25 there will be quite a bit of walking involved, furthermore he is concerned that he might get a flareup there which potentially might review in his ability to enjoy the cruise that he is so much looking forward to.  He was in the hospital with septicemia, developed liver abscess those data are reviewed he finished the course of antibiotics approximately 2  weeks ago as best as he can recall.  His shoulders have been troubling him again.  He is known to have a combination of tendinopathy glenohumeral degenerative changes.  He wondered about corticosteroid injections I have advised that we deferred them till he is back from the cruise.  Today will take x-rays of the shoulders however.         DETAILED EXAMINATION  05/01/25  :    Vitals:    05/01/25 1134   BP: 118/60   BP Location: Right arm   Patient Position: Sitting   Cuff Size: Adult Regular   Pulse: 90   Resp: 20   SpO2: 98%   Weight: 97.8 kg (215 lb 11.2 oz)     Alert oriented. Head including the face is examined for malar rash, heliotropes, scarring, lupus pernio. Eyes examined for redness such as in episcleritis/scleritis, periorbital lesions.   Neck/ Face examined for parotid gland swelling, range of motion of neck.  Left upper and lower and right upper and lower extremities examined for tenderness, swelling, warmth of the appendicular joints, range of motion, edema, rash.  Some of the important findings included: he does not have evidence of synovitis in the palpable joints of the upper extremities.  No significant deformities of the digits.  Shoulder abduction with painful arc more so on the left side.     Patient Active Problem List    Diagnosis Date Noted    History of acute myocardial infarction 04/18/2025     Priority: Medium    New onset of headaches 04/18/2025     Priority: Medium    Blurred vision 04/18/2025     Priority: Medium    Heart failure with reduced ejection fraction (H) 04/18/2025     Priority: Medium    Benign paroxysmal positional vertigo due to bilateral vestibular disorder 03/06/2025     Priority: Medium    Syncope, unspecified syncope type 03/06/2025     Priority: Medium    History of pulmonary embolism 02/11/2025     Priority: Medium    Liver abscess due to bacteria 02/11/2025     Priority: Medium    Liver mass 01/15/2025     Priority: Medium    Sepsis, due to unspecified organism,  unspecified whether acute organ dysfunction present (H) 01/15/2025     Priority: Medium    Anxiety 08/23/2024     Priority: Medium    Bilateral sensorineural hearing loss 08/23/2024     Priority: Medium    History of duodenal ulcer 08/23/2024     Priority: Medium    Bilateral leg pain 08/23/2024     Priority: Medium    Acute pulmonary embolism without acute cor pulmonale (H) 05/21/2024     Priority: Medium    Acute deep vein thrombosis (DVT) of femoral vein of right lower extremity (H) 05/09/2024     Priority: Medium    Pseudocyst of pancreas 10/28/2023     Priority: Medium    Dilated cbd, acquired 07/12/2022     Priority: Medium    Renal cell carcinoma, right (H) 06/23/2022     Priority: Medium    Chronic fatigue 06/23/2022     Priority: Medium    Chronic right shoulder pain 06/23/2022     Priority: Medium    Drug-induced constipation      Priority: Medium    Type 2 diabetes mellitus with complication, with long-term current use of insulin (H)      Priority: Medium    Chronic pain syndrome      Priority: Medium    Depression 12/26/2017     Priority: Medium    Ischemic cardiomyopathy      Priority: Medium     Stress Echocardiogram 2017 with decreased LV function with EF 43% worse   compared to previous echo.  No change in infarct.  No new ischemia        Ankylosis, sacroiliac joint 07/11/2016     Priority: Medium    Spondylosis of lumbar region without myelopathy or radiculopathy 07/11/2016     Priority: Medium    Psoriatic arthritis (H) 04/28/2016     Priority: Medium    Chronic low back pain 04/28/2016     Priority: Medium    Diabetic peripheral neuropathy associated with type 2 diabetes mellitus (H) 04/28/2016     Priority: Medium    ASCVD (arteriosclerotic cardiovascular disease) 04/28/2016     Priority: Medium     Coronary artery stent ×2 in RCA following myocardial infarction 2008 ,   echo June 2014 normal LV function with ejection fraction 50-55% with mild   apical inferior hypokinesis        Alcohol abuse,  in remission 04/28/2016     Priority: Medium    Psoriasis      Priority: Medium    Hypercholesterolemia      Priority: Medium    HTN (hypertension)      Priority: Medium    Optic neuritis      Priority: Medium    Obstructive sleep apnea on CPAP      Priority: Medium    Hypogonadism in male      Priority: Medium    Erectile dysfunction      Priority: Medium     Urologic evaluation July 2015, hypogonadism        Thoracic radiculopathy 01/01/2004     Priority: Medium     right T8 intercostal nerve block 2002         Past Surgical History:   Procedure Laterality Date    APPENDECTOMY      COLONOSCOPY      Colonoscopy September 2017 with hyperplastic polyps, repeat in 10 years    CORONARY ANGIOPLASTY      stent    ENDOSCOPIC RETROGRADE CHOLANGIOPANCREATOGRAM N/A 7/12/2022    Procedure: ENDOSCOPIC RETROGRADE CHOLANGIOPANCREATOGRAPHY, STONE EXTRACTION, BILIARY SPHINCTEROTOMY;  Surgeon: Bon Meyer MD;  Location: [facility]    ESOPHAGOSCOPY, GASTROSCOPY, DUODENOSCOPY (EGD), COMBINED N/A 10/6/2023    Procedure: ESOPHAGOGASTRODUODENOSCOPY, WITH ENDOSCOPIC ULTRASOUND WITH FINE NEEDLE ASPIRATION AND DUODENAL BIOPSY;  Surgeon: Bon Meyer MD;  Location: [facility] OR    KNEE SURGERY      LAPAROSCOPIC CHOLECYSTECTOMY N/A 7/12/2022    Procedure: CHOLECYSTECTOMY, LAPAROSCOPIC;  Surgeon: Adam March MD;  Location: [facility] OR    NOSE SURGERY      PICC DOUBLE LUMEN PLACEMENT  10/9/2023    PICC SINGLE LUMEN PLACEMENT  1/18/2025    OH LAP,PARTIAL NEPHRECTOMY Right 1/10/2018    Procedure: RIGHT ROBOTIC PARTIAL NEPHRECTOMY WITH INTRAOPERATIVE ULTRASOUND;  Surgeon: Chase Rodríguez MD;  Location: [facility] [place];  Service: Urology      Past Medical History:   Diagnosis Date    Abdominal pain, epigastric 06/23/2022    Normal gastric emptying study.  Peptic ulcer disease or gastritis suspected    Acute cholecystitis 07/12/2022    Hospitalized with acute abdominal pain, abnormal LFTs and CT  showing distended gallbladder and bile duct dilatation.  Acute cholecystitis.  Cholecystectomy.    Acute pulmonary embolism without acute cor pulmonale (H) 05/21/2024    Anxiety 08/23/2024    Arthritis     psoriatic    ASCVD (arteriosclerotic cardiovascular disease) 04/28/2016    Coronary artery stent ×2 in RCA following myocardial infarction 2008 , echo June 2014 normal LV function with ejection fraction 50-55% with mild apical inferior hypokinesis    Benign paroxysmal positional vertigo due to bilateral vestibular disorder 03/06/2025    Bilateral leg pain 08/23/2024    Bilateral sensorineural hearing loss 08/23/2024    Chronic fatigue 06/23/2022    Chronic low back pain 04/28/2016    Chronic right shoulder pain 06/23/2022    Coronary artery disease     Deep vein thrombosis (DVT) of upper extremity (H) 10/09/2023    Depression 12/26/2017    Depressive disorder 2002    Diabetic peripheral neuropathy associated with type 2 diabetes mellitus (H) 04/28/2016    Abnormal EMG and workup at [facility]    Diarrhea, unspecified type 01/15/2025    Dilated bile duct 07/03/2017    CT scan with incidental finding of dilated intra-and extrahepatic ducts    Duodenal ulcer without hemorrhage, perforation, or obstruction 10/16/2023    History of acute myocardial infarction 04/18/2025    History of duodenal ulcer 08/23/2024    HTN (hypertension)     Hypercholesterolemia     Hypogonadism in male     Hyponatremia 07/12/2022    Hypophosphatemia 10/05/2023    Ischemic cardiomyopathy     Stress Echocardiogram 2017 with decreased LV function with EF 43% worse compared to previous echo.  No change in infarct.  No new ischemia    Knee effusion, left 11/06/2018    Left upper quadrant pain 06/30/2017    Leukocytosis, unspecified type 10/27/2023    Liver abscess due to bacteria 02/11/2025    Lower abdominal pain     Mass of duodenum 10/16/2023    Obstruction of second portion of duodenum with abnormal CT scan, biopsies negative for endoscopic  ultrasound    MI (myocardial infarction) (H)     Nausea and vomiting 10/05/2023    New onset of headaches 04/18/2025    Obstructive sleep apnea on CPAP     CPAP    Optic neuritis     Psoriasis     Recurrent vomiting 09/01/2023    Renal cell carcinoma, right (H) 06/23/2022    Right partial nephrectomy 2017    Right renal mass     SBO (small bowel obstruction) (H) 10/05/2023    Screening for AAA (abdominal aortic aneurysm)     No AAA on imaging in 2024    Sebaceous cyst 2010    Syncope, unspecified syncope type 03/06/2025    Thoracic radiculopathy 2004    right T8 intercostal nerve block 2002    Tobacco abuse, in remission 04/28/2016    Transaminitis 07/12/2022    Type 2 diabetes mellitus with insulin therapy (H)      Allergies   Allergen Reactions    Atorvastatin Muscle Pain (Myalgia)    Valomag [Arthritis Pain Formula] Nausea and Vomiting     gastric ulcer from aspirin use    Ampicillin Rash     Had a reaction to this medication many years ago.    Codeine Rash     It has been about 30 years ago     Current Outpatient Medications   Medication Sig Dispense Refill    Continuous Blood Gluc Sensor (Healtheo360YLE SHIRA 14 DAY SENSOR) MISC       DULoxetine (CYMBALTA) 60 MG capsule Take 60 mg by mouth every morning.      ELIQUIS ANTICOAGULANT 5 MG tablet Take 1 tablet (5 mg) by mouth 2 times daily. 180 tablet 3    empagliflozin (JARDIANCE) 25 MG TABS tablet Take 1 tablet (25 mg) by mouth daily.      evolocumab (REPATHA) 140 MG/ML prefilled autoinjector Inject 1 mL (140 mg) subcutaneously every 14 days. 6 mL 0    gabapentin (NEURONTIN) 300 MG capsule Take 2 capsules (600 mg) by mouth 3 times daily.      insulin lispro (HUMALOG KWIKPEN) 100 UNIT/ML (1 unit dial) KWIKPEN Inject subcutaneously 3 times daily (before meals). Sliding scale. Average use is 50 units with each meal      LANTUS SOLOSTAR 100 UNIT/ML soln Inject 15 Units subcutaneously 2 times daily. 15 mL 0    magnesium oxide 400 MG CAPS Take 400 mg by mouth nightly as  needed.      melatonin 5 MG CAPS Take 5-10 mg by mouth nightly as needed (sleep)      morphine (MS CONTIN) 15 MG CR tablet Take 1 tablet (15 mg) by mouth 3 times daily. (Patient not taking: Reported on 4/18/2025)      multivitamin w/minerals (THERA-VIT-M) tablet Take 1 tablet by mouth daily as needed.      naloxone (NARCAN) 4 MG/0.1ML nasal spray Spray 1 spray into one nostril alternating nostrils once as needed for opioid reversal.      omeprazole (PRILOSEC) 20 MG DR capsule Take 20 mg by mouth 2 times daily.      rosuvastatin (CRESTOR) 5 MG tablet Take 1 tablet (5 mg) by mouth daily. 90 tablet 0    sodium fluoride dental gel (PREVIDENT) 1.1 % GEL topical gel Apply to affected area nightly as needed.      testosterone (ANDROGEL 1.62 % PUMP) 20.25 MG/ACT gel Place 1 Pump onto the skin every 72 hours.       family history includes Depression in his father and sister; Diabetes in his brother, father, and mother; Other Cancer in his mother; Substance Abuse in his brother.  Social Connections: Unknown (8/23/2024)    Social Connection and Isolation Panel [NHANES]     Frequency of Communication with Friends and Family: Not on file     Frequency of Social Gatherings with Friends and Family: Once a week     Attends Shinto Services: Not on file     Active Member of Clubs or Organizations: Not on file     Attends Club or Organization Meetings: Not on file     Marital Status: Not on file          WBC Count   Date Value Ref Range Status   04/18/2025 10.9 4.0 - 11.0 10e3/uL Final     RBC Count   Date Value Ref Range Status   04/18/2025 5.71 4.40 - 5.90 10e6/uL Final     Hemoglobin   Date Value Ref Range Status   04/18/2025 16.1 13.3 - 17.7 g/dL Final     Hematocrit   Date Value Ref Range Status   04/18/2025 47.5 40.0 - 53.0 % Final     MCV   Date Value Ref Range Status   04/18/2025 83 78 - 100 fL Final     MCH   Date Value Ref Range Status   04/18/2025 28.2 26.5 - 33.0 pg Final     Platelet Count   Date Value Ref Range Status    04/18/2025 213 150 - 450 10e3/uL Final     % Lymphocytes   Date Value Ref Range Status   01/21/2025 10 % Final     AST   Date Value Ref Range Status   04/18/2025 39 0 - 45 U/L Final     ALT   Date Value Ref Range Status   04/18/2025 25 0 - 70 U/L Final     Albumin   Date Value Ref Range Status   04/18/2025 4.3 3.5 - 5.2 g/dL Final   07/13/2022 3.1 (L) 3.5 - 5.0 g/dL Final     Alkaline Phosphatase   Date Value Ref Range Status   04/18/2025 145 40 - 150 U/L Final     Creatinine   Date Value Ref Range Status   04/18/2025 0.89 0.67 - 1.17 mg/dL Final     GFR Estimate   Date Value Ref Range Status   04/18/2025 >90 >60 mL/min/1.73m2 Final     Comment:     eGFR calculated using 2021 CKD-EPI equation.   01/13/2021 >60 >60 mL/min/1.73m2 Final     GFR, ESTIMATED POCT   Date Value Ref Range Status   11/08/2024 >60 >60 mL/min/1.73m2 Final     GFR Estimate If Black   Date Value Ref Range Status   01/13/2021 >60 >60 mL/min/1.73m2 Final     Creatinine Urine mg/dL   Date Value Ref Range Status   08/23/2024 47.2 mg/dL Final     Comment:     The reference ranges have not been established in urine creatinine. The results should be integrated into the clinical context for interpretation.     Erythrocyte Sedimentation Rate   Date Value Ref Range Status   04/18/2025 7 0 - 20 mm/hr Final

## 2025-05-09 ENCOUNTER — TRANSFERRED RECORDS (OUTPATIENT)
Dept: MULTI SPECIALTY CLINIC | Facility: CLINIC | Age: 70
End: 2025-05-09
Payer: COMMERCIAL

## 2025-05-09 LAB — RETINOPATHY: NORMAL

## 2025-05-19 ENCOUNTER — OFFICE VISIT (OUTPATIENT)
Dept: INTERNAL MEDICINE | Facility: CLINIC | Age: 70
End: 2025-05-19
Payer: COMMERCIAL

## 2025-05-19 VITALS
DIASTOLIC BLOOD PRESSURE: 60 MMHG | RESPIRATION RATE: 16 BRPM | SYSTOLIC BLOOD PRESSURE: 106 MMHG | HEART RATE: 94 BPM | WEIGHT: 217 LBS | HEIGHT: 72 IN | BODY MASS INDEX: 29.39 KG/M2 | OXYGEN SATURATION: 97 % | TEMPERATURE: 98.4 F

## 2025-05-19 DIAGNOSIS — E11.8 TYPE 2 DIABETES MELLITUS WITH COMPLICATION, WITH LONG-TERM CURRENT USE OF INSULIN (H): ICD-10-CM

## 2025-05-19 DIAGNOSIS — R51.9 NEW ONSET OF HEADACHES: ICD-10-CM

## 2025-05-19 DIAGNOSIS — E78.00 HYPERCHOLESTEROLEMIA: ICD-10-CM

## 2025-05-19 DIAGNOSIS — K21.9 GASTROESOPHAGEAL REFLUX DISEASE WITHOUT ESOPHAGITIS: ICD-10-CM

## 2025-05-19 DIAGNOSIS — Z79.4 TYPE 2 DIABETES MELLITUS WITH COMPLICATION, WITH LONG-TERM CURRENT USE OF INSULIN (H): ICD-10-CM

## 2025-05-19 DIAGNOSIS — I10 PRIMARY HYPERTENSION: ICD-10-CM

## 2025-05-19 DIAGNOSIS — R53.82 CHRONIC FATIGUE: ICD-10-CM

## 2025-05-19 DIAGNOSIS — G89.4 CHRONIC PAIN SYNDROME: ICD-10-CM

## 2025-05-19 DIAGNOSIS — E29.1 HYPOGONADISM IN MALE: ICD-10-CM

## 2025-05-19 DIAGNOSIS — I50.20 HEART FAILURE WITH REDUCED EJECTION FRACTION (H): ICD-10-CM

## 2025-05-19 DIAGNOSIS — I25.5 ISCHEMIC CARDIOMYOPATHY: ICD-10-CM

## 2025-05-19 DIAGNOSIS — I25.10 ASCVD (ARTERIOSCLEROTIC CARDIOVASCULAR DISEASE): Primary | ICD-10-CM

## 2025-05-19 DIAGNOSIS — E11.42 DIABETIC PERIPHERAL NEUROPATHY ASSOCIATED WITH TYPE 2 DIABETES MELLITUS (H): ICD-10-CM

## 2025-05-19 DIAGNOSIS — R05.3 CHRONIC COUGH: ICD-10-CM

## 2025-05-19 DIAGNOSIS — Z86.711 HISTORY OF PULMONARY EMBOLISM: ICD-10-CM

## 2025-05-19 DIAGNOSIS — R55 SYNCOPE, UNSPECIFIED SYNCOPE TYPE: ICD-10-CM

## 2025-05-19 PROBLEM — I82.411 ACUTE DEEP VEIN THROMBOSIS (DVT) OF FEMORAL VEIN OF RIGHT LOWER EXTREMITY (H): Status: RESOLVED | Noted: 2024-05-09 | Resolved: 2025-05-19

## 2025-05-19 PROBLEM — A41.9 SEPSIS, DUE TO UNSPECIFIED ORGANISM, UNSPECIFIED WHETHER ACUTE ORGAN DYSFUNCTION PRESENT (H): Status: RESOLVED | Noted: 2025-01-15 | Resolved: 2025-05-19

## 2025-05-19 PROBLEM — R16.0 LIVER MASS: Status: RESOLVED | Noted: 2025-01-15 | Resolved: 2025-05-19

## 2025-05-19 PROBLEM — I26.99 ACUTE PULMONARY EMBOLISM WITHOUT ACUTE COR PULMONALE (H): Status: RESOLVED | Noted: 2024-05-21 | Resolved: 2025-05-19

## 2025-05-19 PROCEDURE — G2211 COMPLEX E/M VISIT ADD ON: HCPCS | Performed by: INTERNAL MEDICINE

## 2025-05-19 PROCEDURE — 3078F DIAST BP <80 MM HG: CPT | Performed by: INTERNAL MEDICINE

## 2025-05-19 PROCEDURE — 99215 OFFICE O/P EST HI 40 MIN: CPT | Performed by: INTERNAL MEDICINE

## 2025-05-19 PROCEDURE — 3074F SYST BP LT 130 MM HG: CPT | Performed by: INTERNAL MEDICINE

## 2025-05-19 RX ORDER — INSULIN GLARGINE 100 [IU]/ML
40 INJECTION, SOLUTION SUBCUTANEOUS 2 TIMES DAILY
Status: SHIPPED
Start: 2025-05-19

## 2025-05-19 RX ORDER — OMEPRAZOLE 20 MG/1
20 CAPSULE, DELAYED RELEASE ORAL
Qty: 90 CAPSULE | Refills: 3 | Status: SHIPPED | OUTPATIENT
Start: 2025-05-19

## 2025-05-19 ASSESSMENT — PATIENT HEALTH QUESTIONNAIRE - PHQ9: SUM OF ALL RESPONSES TO PHQ QUESTIONS 1-9: 7

## 2025-05-19 NOTE — PROGRESS NOTES
Assessment & Plan     ASCVD (arteriosclerotic cardiovascular disease)  70-year-old male with history of ASCVD with myocardial infarction nearly 20 years ago.  Recent syncopal episode associated with shortness of breath.  Stress test showed large area of infarction and subsequent cardiac MRI showing medium size area of transmural scar involving the basal to mid inferior and inferior lateral walls of the left ventricle consistent with prior myocardial infarction.  However, significant reduction in left ventricular systolic function with EF of 41%.  Echocardiogram showed EF of 50-65% earlier this year.  Denies any chest pain.  He is noticing decreased exercise tolerance becoming easily fatigued with any activity.  No peripheral edema.  No orthopnea or PND.  Cardiology had recommended a Zio patch.  He was not able to keep the patch on for more than 2 days as the adhesive would not stick to his chest.  I am suggesting getting a 2-week event monitor completed as an alternative means of evaluation.  He has a follow-up appoint with cardiology in early June.  He is not on aspirin as he is anticoagulated with Eliquis.  He is now on Repatha and rosuvastatin to manage lipids.    Syncope, unspecified syncope type  As above, syncopal episode of unclear etiology needing event monitor placed as he could not tolerate wearing Zio patch  - Adult Cardiac Event Monitor; Future    Heart failure with reduced ejection fraction (H)  Attributed to ischemic cardiomyopathy.  EF of 41% on cardiac MRI.  Started on Jardiance.  Blood pressure is well-controlled.  Decreased exercise tolerance.  No peripheral edema.  No orthopnea or PND.    Ischemic cardiomyopathy  As above    History of pulmonary embolism  Plan is for lifelong anticoagulation with Eliquis given unprovoked DVT and pulmonary embolus    Type 2 diabetes mellitus with complication, with long-term current use of insulin (H)  Most recent A1c of 7.8%.  Followed by endocrinology.  Using  40 units of Lantus twice daily and Humalog KwikPen before meals.  Restarted Jardiance.  A1c should be rechecked in 3 months.  - LANTUS SOLOSTAR 100 UNIT/ML soln; Inject 40 Units subcutaneously 2 times daily.    Hypercholesterolemia  As above, he is now on combination of Repatha every 2 weeks and low-dose rosuvastatin.  I will defer checking lipid profile to cardiology    Chronic cough  He has noticed a tickle in his throat causing a cough.  Worsen after eating chocolate or drinking Powerade.  This may represent atypical reflux.  He has also been having more typical reflux symptoms and is no longer taking a PPI.  I am recommending that he resume omeprazole 20 mg every morning before breakfast  - omeprazole (PRILOSEC) 20 MG DR capsule; Take 1 capsule (20 mg) by mouth every morning (before breakfast).    Chronic fatigue  Decreased exercise tolerance and feeling more tired.  Multiple etiologies are possible including decreased cardiac function, side effect of medications, uncontrolled diabetes, hypogonadism.    Gastroesophageal reflux disease without esophagitis  As above, resume PPI with chronic cough that likely represents atypical reflux  - omeprazole (PRILOSEC) 20 MG DR capsule; Take 1 capsule (20 mg) by mouth every morning (before breakfast).    Chronic pain syndrome  He has been followed by the pain clinic for his diabetic peripheral neuropathy.  He has been able to wean off of MS Contin.  Continues on gabapentin taking 600 mg 3 times daily and using duloxetine 60 mg daily    Diabetic peripheral neuropathy associated with type 2 diabetes mellitus (H)  As above    Primary hypertension  Blood pressure very well-controlled with current medication    New onset of headaches  Recent headaches associated with blurred vision.  MRI brain showing only age-related findings.  Nothing acute.  Began after starting Skyrizi which is prescribed by his rheumatologist.  However, headaches have now resolved.  In retrospect, this may  "have represented some withdrawal as he was weaning off of MS Contin and has been off the medication now for several weeks    Hypogonadism in male  He will resume testosterone replacement    42 minutes spent in the management of this patient including reviewing records, discussing medical concerns, performing exam ordering tests and documenting visit.      The longitudinal plan of care for the diagnosis(es)/condition(s) as documented were addressed during this visit. Due to the added complexity in care, I will continue to support Bon in the subsequent management and with ongoing continuity of care.     BMI  Estimated body mass index is 29.43 kg/m  as calculated from the following:    Height as of this encounter: 1.829 m (6').    Weight as of this encounter: 98.4 kg (217 lb).         Follow-up  Return in about 3 months (around 8/19/2025) for Follow-up visit.    Florencio Crockett is a 70 year old, presenting for the following health issues: Multiple issues discussed at today's visit including ASCVD, syncopal episode, recent headaches, management of type 2 diabetes, and other issues  Follow Up (3 months, headaches have improved)      5/19/2025     2:11 PM   Additional Questions   Roomed by      Via the Health Maintenance questionnaire, the patient has reported the following services have been completed -Eye Exam: [email or web address] 2025-05-09, this information has been sent to the abstraction team.  History of Present Illness       Headaches:   Since the patient's last clinic visit, headaches are: improved  The patient is getting headaches:  Was every day  He is not able to do normal daily activities when he has a migraine.  The patient is taking the following rescue/relief medications:  Tylenol   Patient states \"I get some relief\" from the rescue/relief medications.   The patient is taking the following medications to prevent migraines:  No medications to prevent migraines  In the past 4 weeks, the patient has gone to " an Urgent Care or Emergency Room 0 times times due to headaches.    He eats 2-3 servings of fruits and vegetables daily.He consumes 0 sweetened beverage(s) daily.He exercises with enough effort to increase his heart rate 10 to 19 minutes per day.  He exercises with enough effort to increase his heart rate 5 days per week.   He is taking medications regularly.                  Review of Systems  Constitutional, HEENT, cardiovascular, pulmonary, gi and gu systems are negative, except as otherwise noted.      Objective    /60 (BP Location: Right arm, Patient Position: Sitting, Cuff Size: Adult Regular)   Pulse 94   Temp 98.4  F (36.9  C) (Oral)   Resp 16   Ht 1.829 m (6')   Wt 98.4 kg (217 lb)   SpO2 97%   BMI 29.43 kg/m    Body mass index is 29.43 kg/m .  Physical Exam     Pleasant and well-appearing middle-aged male  Lungs clear bilaterally no rales or wheezing  Heart regular rate and rhythm without murmur or gallop  No peripheral edema          Signed Electronically by: Aditya Munoz MD

## 2025-05-21 ENCOUNTER — HOSPITAL ENCOUNTER (OUTPATIENT)
Dept: CARDIOLOGY | Facility: HOSPITAL | Age: 70
Discharge: HOME | End: 2025-05-21
Attending: INTERNAL MEDICINE
Payer: COMMERCIAL

## 2025-05-21 DIAGNOSIS — R55 SYNCOPE, UNSPECIFIED SYNCOPE TYPE: ICD-10-CM

## 2025-05-21 PROCEDURE — 93270 REMOTE 30 DAY ECG REV/REPORT: CPT

## 2025-06-02 ENCOUNTER — TELEPHONE (OUTPATIENT)
Dept: PHARMACY | Facility: OTHER | Age: 70
End: 2025-06-02
Payer: COMMERCIAL

## 2025-06-02 NOTE — TELEPHONE ENCOUNTER
JAMES Recruitment: [facility] [employer] insurance     Referral outreach attempt #1 on June 2, 2025      Outcome: left voicemail- Call back number [phone] and MyChart message sent    See Jay  [facility]   [phone]

## 2025-06-03 ENCOUNTER — OFFICE VISIT (OUTPATIENT)
Dept: CARDIOLOGY | Facility: CLINIC | Age: 70
End: 2025-06-03
Payer: COMMERCIAL

## 2025-06-03 ENCOUNTER — RESULTS FOLLOW-UP (OUTPATIENT)
Dept: CARDIOLOGY | Facility: CLINIC | Age: 70
End: 2025-06-03

## 2025-06-03 VITALS
SYSTOLIC BLOOD PRESSURE: 152 MMHG | DIASTOLIC BLOOD PRESSURE: 80 MMHG | WEIGHT: 221 LBS | RESPIRATION RATE: 14 BRPM | BODY MASS INDEX: 29.97 KG/M2 | HEART RATE: 84 BPM

## 2025-06-03 DIAGNOSIS — M79.10 MUSCLE PAIN: ICD-10-CM

## 2025-06-03 DIAGNOSIS — R94.39 ABNORMAL STRESS TEST: ICD-10-CM

## 2025-06-03 DIAGNOSIS — E11.9 TYPE 2 DIABETES MELLITUS WITHOUT COMPLICATION, WITHOUT LONG-TERM CURRENT USE OF INSULIN (H): ICD-10-CM

## 2025-06-03 DIAGNOSIS — E78.00 PURE HYPERCHOLESTEROLEMIA: ICD-10-CM

## 2025-06-03 DIAGNOSIS — I25.10 CORONARY ARTERY DISEASE INVOLVING NATIVE CORONARY ARTERY OF NATIVE HEART WITHOUT ANGINA PECTORIS: ICD-10-CM

## 2025-06-03 DIAGNOSIS — I50.22 CHRONIC SYSTOLIC HEART FAILURE (H): Primary | ICD-10-CM

## 2025-06-03 DIAGNOSIS — R55 SYNCOPE, UNSPECIFIED SYNCOPE TYPE: ICD-10-CM

## 2025-06-03 LAB
ALBUMIN SERPL BCG-MCNC: 4.4 G/DL (ref 3.5–5.2)
ALP SERPL-CCNC: 152 U/L (ref 40–150)
ALT SERPL W P-5'-P-CCNC: 24 U/L (ref 0–70)
AST SERPL W P-5'-P-CCNC: 28 U/L (ref 0–45)
BILIRUB DIRECT SERPL-MCNC: 0.19 MG/DL (ref 0–0.3)
BILIRUB SERPL-MCNC: 0.5 MG/DL
CHOLEST SERPL-MCNC: 128 MG/DL
CK SERPL-CCNC: 142 U/L (ref 39–308)
FASTING STATUS PATIENT QL REPORTED: YES
HDLC SERPL-MCNC: 59 MG/DL
LDLC SERPL CALC-MCNC: 35 MG/DL
NONHDLC SERPL-MCNC: 69 MG/DL
PROT SERPL-MCNC: 7.4 G/DL (ref 6.4–8.3)
TRIGL SERPL-MCNC: 172 MG/DL

## 2025-06-03 PROCEDURE — 82550 ASSAY OF CK (CPK): CPT | Performed by: INTERNAL MEDICINE

## 2025-06-03 PROCEDURE — 80076 HEPATIC FUNCTION PANEL: CPT | Performed by: INTERNAL MEDICINE

## 2025-06-03 PROCEDURE — 36415 COLL VENOUS BLD VENIPUNCTURE: CPT | Performed by: INTERNAL MEDICINE

## 2025-06-03 PROCEDURE — 80061 LIPID PANEL: CPT | Performed by: INTERNAL MEDICINE

## 2025-06-03 RX ORDER — SACUBITRIL AND VALSARTAN 24; 26 MG/1; MG/1
1 TABLET, FILM COATED ORAL 2 TIMES DAILY
Qty: 60 TABLET | Refills: 0 | Status: SHIPPED | OUTPATIENT
Start: 2025-06-03

## 2025-06-03 NOTE — LETTER
6/3/2025    Aditya Munoz MD  [address] [facility] [address]    RE: Bon ANGELES Dominguezhosea       Dear Colleague,     I had the pleasure of seeing Bon Luque in the [facility].    [facility]   [address]  [place] [address]   [email or web address]   OFFICE: [phone]     CARDIOLOGY CLINIC NOTE     Assessment/Recommendations   Assessment:       Abnormal stress test: Consistent with known prior RCA disease.  No ischemia.  Continue risk factor optimization as below  2. Syncope: In the setting of dehydration, EF is mildly reduced.  Heart monitor recommended, currently wearing it; further recommendations pending results  3. CAD: Started on PCSK9 inhibitor following last visit, pretreatment .  Also started on low-dose Crestor BHV is tolerating.  Labs today; uptitrate crestor to bring LDL <70  4. HLD: per #3  5. DM started on Jardiance  6. Muscle pain: tolerating crestor  7. HFrEF: Previously did not tolerate antihypertensives due to syncope, started on low-dose Entresto, advised to take it after meals.             I saw Bon for the first time 3/17/2025, referred for syncope and abn stress test previously followed by cardiologist in [place],; last visit in January 2020.     Pmh- CAD s/p MI in 2008 RCA stent x 2, DM x 22 yrs, HLD, RCC, HTN, DVT on noac, h/o infected peptic ulcer, hypogonadism on testosterone, vasovagal syncope, psoriatic arthritis, chronic pain, fibromyalgia.     admitted [facility] 15-feb 5 for  strep bacteremia, liver abscess. After discharge he had an episode of abd and CP lasting 6 hours, called 911, paramedics told him his EKG is ok and wasn't taken to ER. No recurrence.     He had a few episodes of syncope when getting out of bed after that; was dehydrated due to diarrhea.     No recurrence.  He also reports he has had syncope in the past when the blood pressure medications were started.       Nuclear stress test  showed a large area of scar in the inferior/inferolateral and inferoseptal region without ischemia EF 38%    Cardiac MRI was presumed that showed a medium sized area of scar in the basal to mid inferior, inferolateral wall consistent with prior RCA scar no fibrosis or infiltrative disease, akinesis of basal to mid inferior inferolateral segments with EF 41%    A 2-week Zio patch was mailed to him but it fell off within a few hours, he is currently wearing a heart monitor prescribed by his PCP.  He has previously stopped statin due to muscle pain, I started him on PCSK9 inhibitor and low-dose Crestor          Physical Examination Review of Systems   Vitals: There were no vitals taken for this visit.  BMI= There is no height or weight on file to calculate BMI.  Wt Readings from Last 3 Encounters:   05/19/25 98.4 kg (217 lb)   05/01/25 97.8 kg (215 lb 11.2 oz)   04/18/25 95.7 kg (211 lb)       General: pleasant male. No acute distress.   Neck: No JVD  Lungs: clear to auscultation  COR:  regular rate and rhythm, No murmurs, rubs, or gallops  Extrem: No edema   Per HPI     Medications  Allergies   Current Outpatient Medications   Medication Sig Dispense Refill     Continuous Blood Gluc Sensor (FREESTYLE SHIRA 14 DAY SENSOR) MISC        DULoxetine (CYMBALTA) 60 MG capsule Take 60 mg by mouth every morning.       ELIQUIS ANTICOAGULANT 5 MG tablet Take 1 tablet (5 mg) by mouth 2 times daily. 180 tablet 3     empagliflozin (JARDIANCE) 25 MG TABS tablet Take 1 tablet (25 mg) by mouth daily.       gabapentin (NEURONTIN) 300 MG capsule Take 2 capsules (600 mg) by mouth 3 times daily.       insulin lispro (HUMALOG KWIKPEN) 100 UNIT/ML (1 unit dial) KWIKPEN Inject subcutaneously 3 times daily (before meals). Sliding scale. Average use is 50 units with each meal       LANTUS SOLOSTAR 100 UNIT/ML soln Inject 40 Units subcutaneously 2 times daily.       magnesium oxide 400 MG CAPS Take 400 mg by mouth nightly as needed.        melatonin 5 MG CAPS Take 5-10 mg by mouth nightly as needed (sleep)       multivitamin w/minerals (THERA-VIT-M) tablet Take 1 tablet by mouth daily as needed.       naloxone (NARCAN) 4 MG/0.1ML nasal spray Spray 1 spray into one nostril alternating nostrils once as needed for opioid reversal.       omeprazole (PRILOSEC) 20 MG DR capsule Take 1 capsule (20 mg) by mouth every morning (before breakfast). 90 capsule 3     rosuvastatin (CRESTOR) 5 MG tablet Take 1 tablet (5 mg) by mouth daily. 90 tablet 0     sodium fluoride dental gel (PREVIDENT) 1.1 % GEL topical gel Apply to affected area nightly as needed.       testosterone (ANDROGEL 1.62 % PUMP) 20.25 MG/ACT gel Place 1 Pump onto the skin every 72 hours.       evolocumab (REPATHA) 140 MG/ML prefilled autoinjector Inject 1 mL (140 mg) subcutaneously every 14 days. 6 mL 2      Allergies   Allergen Reactions     Atorvastatin Muscle Pain (Myalgia)     Valomag [Arthritis Pain Formula] Nausea and Vomiting     gastric ulcer from aspirin use     Ampicillin Rash     Had a reaction to this medication many years ago.     Codeine Rash     It has been about 30 years ago             Lab Results    Chemistry/lipid CBC Cardiac Enzymes/BNP/TSH/INR   Lab Results   Component Value Date    CHOL 233 (H) 08/23/2024    HDL 38 (L) 08/23/2024    TRIG 195 (H) 08/23/2024    BUN 13.3 04/18/2025     04/18/2025    CO2 19 (L) 04/18/2025    Lab Results   Component Value Date    WBC 10.9 04/18/2025    HGB 16.1 04/18/2025    HCT 47.5 04/18/2025    MCV 83 04/18/2025     04/18/2025    Lab Results   Component Value Date    TROPONINI 0.03 07/12/2022    BNP 11 06/16/2022    TSH 1.64 02/11/2025    INR 1.26 (H) 01/16/2025          The longitudinal plan of care for the diagnosis(es)/condition(s) as documented were addressed during this visit. Due to the added complexity in care, I will continue to support Bon in the subsequent management and with ongoing continuity of care.          Baldo  Chitra BASS, MPH  Non-invasive Cardiologist  [facility]         Thank you for allowing me to participate in the care of your patient.      Sincerely,     Baldo RICHARDSON [facility]  cc:   Baldo Buenrostro  [address] [place] [address]

## 2025-06-03 NOTE — PROGRESS NOTES
[facility]   [address]  [place] [address]   [email or web address]   OFFICE: [phone]     CARDIOLOGY CLINIC NOTE     Assessment/Recommendations   Assessment:       Abnormal stress test: Consistent with known prior RCA disease.  No ischemia.  Continue risk factor optimization as below  2. Syncope: In the setting of dehydration, EF is mildly reduced.  Heart monitor recommended, currently wearing it; further recommendations pending results  3. CAD: Started on PCSK9 inhibitor following last visit, pretreatment .  Also started on low-dose Crestor BHV is tolerating.  Labs today; uptitrate crestor to bring LDL <70  4. HLD: per #3  5. DM started on Jardiance  6. Muscle pain: tolerating crestor  7. HFrEF: Previously did not tolerate antihypertensives due to syncope, started on low-dose Entresto, advised to take it after meals.             I saw Bon for the first time 3/17/2025, referred for syncope and abn stress test previously followed by cardiologist in [place],; last visit in January 2020.     Pmh- CAD s/p MI in 2008 RCA stent x 2, DM x 22 yrs, HLD, RCC, HTN, DVT on noac, h/o infected peptic ulcer, hypogonadism on testosterone, vasovagal syncope, psoriatic arthritis, chronic pain, fibromyalgia.     admitted [facility] 15-feb 5 for  strep bacteremia, liver abscess. After discharge he had an episode of abd and CP lasting 6 hours, called 911, paramedics told him his EKG is ok and wasn't taken to ER. No recurrence.     He had a few episodes of syncope when getting out of bed after that; was dehydrated due to diarrhea.     No recurrence.  He also reports he has had syncope in the past when the blood pressure medications were started.       Nuclear stress test showed a large area of scar in the inferior/inferolateral and inferoseptal region without ischemia EF 38%    Cardiac MRI was presumed that showed a medium sized area of scar in the basal to mid inferior,  inferolateral wall consistent with prior RCA scar no fibrosis or infiltrative disease, akinesis of basal to mid inferior inferolateral segments with EF 41%    A 2-week Zio patch was mailed to him but it fell off within a few hours, he is currently wearing a heart monitor prescribed by his PCP.  He has previously stopped statin due to muscle pain, I started him on PCSK9 inhibitor and low-dose Crestor          Physical Examination Review of Systems   Vitals: There were no vitals taken for this visit.  BMI= There is no height or weight on file to calculate BMI.  Wt Readings from Last 3 Encounters:   05/19/25 98.4 kg (217 lb)   05/01/25 97.8 kg (215 lb 11.2 oz)   04/18/25 95.7 kg (211 lb)       General: pleasant male. No acute distress.   Neck: No JVD  Lungs: clear to auscultation  COR:  regular rate and rhythm, No murmurs, rubs, or gallops  Extrem: No edema   Per HPI     Medications  Allergies   Current Outpatient Medications   Medication Sig Dispense Refill    Continuous Blood Gluc Sensor (FREESTYLE SHIRA 14 DAY SENSOR) MISC       DULoxetine (CYMBALTA) 60 MG capsule Take 60 mg by mouth every morning.      ELIQUIS ANTICOAGULANT 5 MG tablet Take 1 tablet (5 mg) by mouth 2 times daily. 180 tablet 3    empagliflozin (JARDIANCE) 25 MG TABS tablet Take 1 tablet (25 mg) by mouth daily.      gabapentin (NEURONTIN) 300 MG capsule Take 2 capsules (600 mg) by mouth 3 times daily.      insulin lispro (HUMALOG KWIKPEN) 100 UNIT/ML (1 unit dial) KWIKPEN Inject subcutaneously 3 times daily (before meals). Sliding scale. Average use is 50 units with each meal      LANTUS SOLOSTAR 100 UNIT/ML soln Inject 40 Units subcutaneously 2 times daily.      magnesium oxide 400 MG CAPS Take 400 mg by mouth nightly as needed.      melatonin 5 MG CAPS Take 5-10 mg by mouth nightly as needed (sleep)      multivitamin w/minerals (THERA-VIT-M) tablet Take 1 tablet by mouth daily as needed.      naloxone (NARCAN) 4 MG/0.1ML nasal spray Spray 1 spray  into one nostril alternating nostrils once as needed for opioid reversal.      omeprazole (PRILOSEC) 20 MG DR capsule Take 1 capsule (20 mg) by mouth every morning (before breakfast). 90 capsule 3    rosuvastatin (CRESTOR) 5 MG tablet Take 1 tablet (5 mg) by mouth daily. 90 tablet 0    sodium fluoride dental gel (PREVIDENT) 1.1 % GEL topical gel Apply to affected area nightly as needed.      testosterone (ANDROGEL 1.62 % PUMP) 20.25 MG/ACT gel Place 1 Pump onto the skin every 72 hours.      evolocumab (REPATHA) 140 MG/ML prefilled autoinjector Inject 1 mL (140 mg) subcutaneously every 14 days. 6 mL 2      Allergies   Allergen Reactions    Atorvastatin Muscle Pain (Myalgia)    Valomag [Arthritis Pain Formula] Nausea and Vomiting     gastric ulcer from aspirin use    Ampicillin Rash     Had a reaction to this medication many years ago.    Codeine Rash     It has been about 30 years ago             Lab Results    Chemistry/lipid CBC Cardiac Enzymes/BNP/TSH/INR   Lab Results   Component Value Date    CHOL 233 (H) 08/23/2024    HDL 38 (L) 08/23/2024    TRIG 195 (H) 08/23/2024    BUN 13.3 04/18/2025     04/18/2025    CO2 19 (L) 04/18/2025    Lab Results   Component Value Date    WBC 10.9 04/18/2025    HGB 16.1 04/18/2025    HCT 47.5 04/18/2025    MCV 83 04/18/2025     04/18/2025    Lab Results   Component Value Date    TROPONINI 0.03 07/12/2022    BNP 11 06/16/2022    TSH 1.64 02/11/2025    INR 1.26 (H) 01/16/2025          The longitudinal plan of care for the diagnosis(es)/condition(s) as documented were addressed during this visit. Due to the added complexity in care, I will continue to support Bon in the subsequent management and with ongoing continuity of care.          Baldo Buenrostro MD, MPH  Non-invasive Cardiologist  [facility]

## 2025-06-03 NOTE — PATIENT INSTRUCTIONS
Lets try a medicine to help make your heart stronger- entresto, if possible try to cut in half; take it after breakfast and dinner.  Followup in 6 months

## 2025-06-05 ENCOUNTER — RESULTS FOLLOW-UP (OUTPATIENT)
Dept: INTERNAL MEDICINE | Facility: CLINIC | Age: 70
End: 2025-06-05

## 2025-06-09 ENCOUNTER — RESULTS FOLLOW-UP (OUTPATIENT)
Dept: CARDIOLOGY | Facility: CLINIC | Age: 70
End: 2025-06-09

## 2025-06-09 LAB — CV ZIO PRELIM RESULTS: NORMAL

## 2025-06-16 DIAGNOSIS — I25.10 CORONARY ARTERY DISEASE INVOLVING NATIVE CORONARY ARTERY OF NATIVE HEART WITHOUT ANGINA PECTORIS: ICD-10-CM

## 2025-06-16 DIAGNOSIS — E78.00 PURE HYPERCHOLESTEROLEMIA: ICD-10-CM

## 2025-06-16 RX ORDER — ROSUVASTATIN CALCIUM 5 MG/1
5 TABLET, COATED ORAL DAILY
Qty: 90 TABLET | Refills: 2 | Status: SHIPPED | OUTPATIENT
Start: 2025-06-16

## 2025-07-24 ENCOUNTER — PATIENT OUTREACH (OUTPATIENT)
Dept: CARE COORDINATION | Facility: CLINIC | Age: 70
End: 2025-07-24
Payer: COMMERCIAL

## 2025-08-07 ENCOUNTER — PATIENT OUTREACH (OUTPATIENT)
Dept: CARE COORDINATION | Facility: CLINIC | Age: 70
End: 2025-08-07
Payer: COMMERCIAL

## 2025-08-27 ENCOUNTER — TELEPHONE (OUTPATIENT)
Dept: PHARMACY | Facility: OTHER | Age: 70
End: 2025-08-27
Payer: COMMERCIAL

## (undated) DEVICE — SYSTEM LAPAROVUE VISIBILITY LAPVUE10

## (undated) DEVICE — ENDO NDL ASPIRATION 22GA SLIMLINE EXPECT EUS M00555510

## (undated) DEVICE — PREP CHLORAPREP 26ML TINTED HI-LITE ORANGE 930815

## (undated) DEVICE — NDL SCLEROTHERAPY 25GA CARR-LOCK  00711811

## (undated) DEVICE — BALLOON EXTRACTION 15X1950MM 3.2MM TL B-V243Q-A

## (undated) DEVICE — DECANTER VIAL 2006S

## (undated) DEVICE — BLADE KNIFE SURG 11 371111

## (undated) DEVICE — DRSG TEGADERM 2 3/8X2 3/4" 1624W

## (undated) DEVICE — TUBING SUCTION MEDI-VAC 1/4"X20' N620A - HE

## (undated) DEVICE — GOWN XXL 9575

## (undated) DEVICE — SOL RINGERS LACTATED 1000ML BAG 2B2324X

## (undated) DEVICE — PLATE GROUNDING ADULT W/CORD 9165L

## (undated) DEVICE — SU MONOCRYL+ 4-0 18IN PS2 UND MCP496G

## (undated) DEVICE — ENDO TROCAR FIRST ENTRY KII FIOS Z-THRD 11X100MM CTF33

## (undated) DEVICE — FORCEP BIOPSY 2.3MM DISP COATED 000388

## (undated) DEVICE — TUBING SMOKE EVAC PNEUMOCLEAR HIGH FLOW 0620050250

## (undated) DEVICE — SUCTION MANIFOLD NEPTUNE 2 SYS 1 PORT 702-025-000

## (undated) DEVICE — SOL WATER IRRIG 1000ML BOTTLE 2F7114

## (undated) DEVICE — DRSG TELFA 3X4" 1050

## (undated) DEVICE — CLIP APPLIER ENDO ROTATING 10MM MED/LG ER320

## (undated) DEVICE — ENDO TROCAR FIRST ENTRY KII FIOS Z-THRD 05X100MM CTF03

## (undated) DEVICE — WIRE GUIDE 0.35X270CM STRAIGHT TIP VISIGLIDE G-260-3527S

## (undated) DEVICE — TUBING LAP SUCT/IRRIG STRYKER 250070500

## (undated) DEVICE — GLOVE BIOGEL PI ULTRATOUCH G SZ 8.0 42180

## (undated) DEVICE — SU VICRYL+ 0 27 UR6 VLT VCP603H

## (undated) DEVICE — ENDO SHEARS RENEW LAP ENDOCUT SCISSOR TIP 16.5MM 3142

## (undated) DEVICE — CUSTOM PACK LAP CHOLE SBA5BLCHEA

## (undated) DEVICE — ENDO FUSION OMNI-TOME G31903

## (undated) DEVICE — Device

## (undated) DEVICE — ENDO TROCAR SLEEVE KII Z-THREADED 05X100MM CTS02

## (undated) RX ORDER — FENTANYL CITRATE 50 UG/ML
INJECTION, SOLUTION INTRAMUSCULAR; INTRAVENOUS
Status: DISPENSED
Start: 2022-07-12

## (undated) RX ORDER — LIDOCAINE HYDROCHLORIDE 10 MG/ML
INJECTION, SOLUTION INFILTRATION; PERINEURAL
Status: DISPENSED
Start: 2025-01-23

## (undated) RX ORDER — FENTANYL CITRATE 50 UG/ML
INJECTION, SOLUTION INTRAMUSCULAR; INTRAVENOUS
Status: DISPENSED
Start: 2025-01-28

## (undated) RX ORDER — ONDANSETRON 2 MG/ML
INJECTION INTRAMUSCULAR; INTRAVENOUS
Status: DISPENSED
Start: 2022-07-12

## (undated) RX ORDER — DEXAMETHASONE SODIUM PHOSPHATE 10 MG/ML
INJECTION INTRAMUSCULAR; INTRAVENOUS
Status: DISPENSED
Start: 2022-07-12

## (undated) RX ORDER — BUPIVACAINE HYDROCHLORIDE 2.5 MG/ML
INJECTION, SOLUTION EPIDURAL; INFILTRATION; INTRACAUDAL
Status: DISPENSED
Start: 2022-07-12

## (undated) RX ORDER — KETAMINE HYDROCHLORIDE 10 MG/ML
INJECTION INTRAMUSCULAR; INTRAVENOUS
Status: DISPENSED
Start: 2022-07-12

## (undated) RX ORDER — PROPOFOL 10 MG/ML
INJECTION, EMULSION INTRAVENOUS
Status: DISPENSED
Start: 2023-10-06

## (undated) RX ORDER — LIDOCAINE HYDROCHLORIDE 10 MG/ML
INJECTION, SOLUTION INFILTRATION; PERINEURAL
Status: DISPENSED
Start: 2025-01-28

## (undated) RX ORDER — LIDOCAINE HYDROCHLORIDE 10 MG/ML
INJECTION, SOLUTION EPIDURAL; INFILTRATION; INTRACAUDAL; PERINEURAL
Status: DISPENSED
Start: 2022-07-12

## (undated) RX ORDER — FENTANYL CITRATE-0.9 % NACL/PF 10 MCG/ML
PLASTIC BAG, INJECTION (ML) INTRAVENOUS
Status: DISPENSED
Start: 2022-07-12

## (undated) RX ORDER — FENTANYL CITRATE 50 UG/ML
INJECTION, SOLUTION INTRAMUSCULAR; INTRAVENOUS
Status: DISPENSED
Start: 2025-01-16

## (undated) RX ORDER — FENTANYL CITRATE 50 UG/ML
INJECTION, SOLUTION INTRAMUSCULAR; INTRAVENOUS
Status: DISPENSED
Start: 2025-01-23